# Patient Record
Sex: MALE | Employment: OTHER | URBAN - METROPOLITAN AREA
[De-identification: names, ages, dates, MRNs, and addresses within clinical notes are randomized per-mention and may not be internally consistent; named-entity substitution may affect disease eponyms.]

---

## 2018-01-24 ENCOUNTER — APPOINTMENT (OUTPATIENT)
Dept: GENERAL RADIOLOGY | Age: 58
DRG: 194 | End: 2018-01-24
Attending: EMERGENCY MEDICINE
Payer: MEDICAID

## 2018-01-24 ENCOUNTER — HOSPITAL ENCOUNTER (OUTPATIENT)
Age: 58
Setting detail: OBSERVATION
Discharge: HOME | DRG: 194 | End: 2018-01-26
Attending: EMERGENCY MEDICINE | Admitting: INTERNAL MEDICINE
Payer: MEDICAID

## 2018-01-24 DIAGNOSIS — I16.1 HYPERTENSIVE EMERGENCY: ICD-10-CM

## 2018-01-24 DIAGNOSIS — R07.9 ACUTE CHEST PAIN: ICD-10-CM

## 2018-01-24 DIAGNOSIS — I50.9 CONGESTIVE HEART FAILURE, UNSPECIFIED CONGESTIVE HEART FAILURE CHRONICITY, UNSPECIFIED CONGESTIVE HEART FAILURE TYPE: Primary | ICD-10-CM

## 2018-01-24 DIAGNOSIS — N17.9 ACUTE KIDNEY INJURY (HCC): ICD-10-CM

## 2018-01-24 LAB
ALBUMIN SERPL-MCNC: 3.2 G/DL (ref 3.4–5)
ALBUMIN/GLOB SERPL: 0.9 {RATIO} (ref 0.8–1.7)
ALP SERPL-CCNC: 81 U/L (ref 45–117)
ALT SERPL-CCNC: 67 U/L (ref 16–61)
ANION GAP SERPL CALC-SCNC: 10 MMOL/L (ref 3–18)
AST SERPL-CCNC: 32 U/L (ref 15–37)
BASOPHILS # BLD: 0 K/UL (ref 0–0.06)
BASOPHILS NFR BLD: 0 % (ref 0–2)
BILIRUB SERPL-MCNC: 0.6 MG/DL (ref 0.2–1)
BNP SERPL-MCNC: 1291 PG/ML (ref 0–900)
BUN SERPL-MCNC: 13 MG/DL (ref 7–18)
BUN/CREAT SERPL: 9 (ref 12–20)
CALCIUM SERPL-MCNC: 8.9 MG/DL (ref 8.5–10.1)
CHLORIDE SERPL-SCNC: 104 MMOL/L (ref 100–108)
CK MB CFR SERPL CALC: 1.3 % (ref 0–4)
CK MB CFR SERPL CALC: 1.9 % (ref 0–4)
CK MB CFR SERPL CALC: 2.1 % (ref 0–4)
CK MB SERPL-MCNC: 1.9 NG/ML (ref 5–25)
CK MB SERPL-MCNC: 2.1 NG/ML (ref 5–25)
CK MB SERPL-MCNC: 2.4 NG/ML (ref 5–25)
CK SERPL-CCNC: 100 U/L (ref 39–308)
CK SERPL-CCNC: 128 U/L (ref 39–308)
CK SERPL-CCNC: 147 U/L (ref 39–308)
CO2 SERPL-SCNC: 28 MMOL/L (ref 21–32)
CREAT SERPL-MCNC: 1.43 MG/DL (ref 0.6–1.3)
DIFFERENTIAL METHOD BLD: ABNORMAL
EOSINOPHIL # BLD: 0.2 K/UL (ref 0–0.4)
EOSINOPHIL NFR BLD: 3 % (ref 0–5)
ERYTHROCYTE [DISTWIDTH] IN BLOOD BY AUTOMATED COUNT: 15.1 % (ref 11.6–14.5)
EST. AVERAGE GLUCOSE BLD GHB EST-MCNC: 137 MG/DL
GLOBULIN SER CALC-MCNC: 3.7 G/DL (ref 2–4)
GLUCOSE BLD STRIP.AUTO-MCNC: 169 MG/DL (ref 70–110)
GLUCOSE BLD STRIP.AUTO-MCNC: 223 MG/DL (ref 70–110)
GLUCOSE BLD STRIP.AUTO-MCNC: 231 MG/DL (ref 70–110)
GLUCOSE SERPL-MCNC: 99 MG/DL (ref 74–99)
HBA1C MFR BLD: 6.4 % (ref 4.5–5.6)
HCT VFR BLD AUTO: 46.1 % (ref 36–48)
HGB BLD-MCNC: 14.8 G/DL (ref 13–16)
INR PPP: 1 (ref 0.8–1.2)
LYMPHOCYTES # BLD: 2 K/UL (ref 0.9–3.6)
LYMPHOCYTES NFR BLD: 30 % (ref 21–52)
MCH RBC QN AUTO: 29 PG (ref 24–34)
MCHC RBC AUTO-ENTMCNC: 32.1 G/DL (ref 31–37)
MCV RBC AUTO: 90.4 FL (ref 74–97)
MONOCYTES # BLD: 0.5 K/UL (ref 0.05–1.2)
MONOCYTES NFR BLD: 7 % (ref 3–10)
NEUTS SEG # BLD: 4 K/UL (ref 1.8–8)
NEUTS SEG NFR BLD: 60 % (ref 40–73)
PLATELET # BLD AUTO: 124 K/UL (ref 135–420)
PMV BLD AUTO: 13.3 FL (ref 9.2–11.8)
POTASSIUM SERPL-SCNC: 4.6 MMOL/L (ref 3.5–5.5)
PROT SERPL-MCNC: 6.9 G/DL (ref 6.4–8.2)
PROTHROMBIN TIME: 13 SEC (ref 11.5–15.2)
RBC # BLD AUTO: 5.1 M/UL (ref 4.7–5.5)
SODIUM SERPL-SCNC: 142 MMOL/L (ref 136–145)
TROPONIN I BLD-MCNC: <0.04 NG/ML (ref 0–0.08)
TROPONIN I SERPL-MCNC: 0.03 NG/ML (ref 0–0.06)
TROPONIN I SERPL-MCNC: <0.02 NG/ML (ref 0–0.06)
TROPONIN I SERPL-MCNC: <0.02 NG/ML (ref 0–0.06)
WBC # BLD AUTO: 6.7 K/UL (ref 4.6–13.2)

## 2018-01-24 PROCEDURE — 93005 ELECTROCARDIOGRAM TRACING: CPT

## 2018-01-24 PROCEDURE — 65660000000 HC RM CCU STEPDOWN

## 2018-01-24 PROCEDURE — 74011250636 HC RX REV CODE- 250/636: Performed by: EMERGENCY MEDICINE

## 2018-01-24 PROCEDURE — 74011250637 HC RX REV CODE- 250/637: Performed by: INTERNAL MEDICINE

## 2018-01-24 PROCEDURE — 74011636637 HC RX REV CODE- 636/637: Performed by: INTERNAL MEDICINE

## 2018-01-24 PROCEDURE — 74011250637 HC RX REV CODE- 250/637: Performed by: EMERGENCY MEDICINE

## 2018-01-24 PROCEDURE — 96376 TX/PRO/DX INJ SAME DRUG ADON: CPT

## 2018-01-24 PROCEDURE — 85610 PROTHROMBIN TIME: CPT | Performed by: EMERGENCY MEDICINE

## 2018-01-24 PROCEDURE — 99218 HC RM OBSERVATION: CPT

## 2018-01-24 PROCEDURE — 84484 ASSAY OF TROPONIN QUANT: CPT | Performed by: EMERGENCY MEDICINE

## 2018-01-24 PROCEDURE — 74011250636 HC RX REV CODE- 250/636: Performed by: INTERNAL MEDICINE

## 2018-01-24 PROCEDURE — 83880 ASSAY OF NATRIURETIC PEPTIDE: CPT | Performed by: EMERGENCY MEDICINE

## 2018-01-24 PROCEDURE — 85025 COMPLETE CBC W/AUTO DIFF WBC: CPT | Performed by: EMERGENCY MEDICINE

## 2018-01-24 PROCEDURE — 99285 EMERGENCY DEPT VISIT HI MDM: CPT

## 2018-01-24 PROCEDURE — 36415 COLL VENOUS BLD VENIPUNCTURE: CPT | Performed by: INTERNAL MEDICINE

## 2018-01-24 PROCEDURE — 96372 THER/PROPH/DIAG INJ SC/IM: CPT

## 2018-01-24 PROCEDURE — 80053 COMPREHEN METABOLIC PANEL: CPT | Performed by: EMERGENCY MEDICINE

## 2018-01-24 PROCEDURE — 82550 ASSAY OF CK (CPK): CPT | Performed by: EMERGENCY MEDICINE

## 2018-01-24 PROCEDURE — 71045 X-RAY EXAM CHEST 1 VIEW: CPT

## 2018-01-24 PROCEDURE — 83036 HEMOGLOBIN GLYCOSYLATED A1C: CPT | Performed by: INTERNAL MEDICINE

## 2018-01-24 PROCEDURE — C8929 TTE W OR WO FOL WCON,DOPPLER: HCPCS

## 2018-01-24 PROCEDURE — 96374 THER/PROPH/DIAG INJ IV PUSH: CPT

## 2018-01-24 PROCEDURE — 82962 GLUCOSE BLOOD TEST: CPT

## 2018-01-24 RX ORDER — OXYCODONE AND ACETAMINOPHEN 5; 325 MG/1; MG/1
2 TABLET ORAL
Status: COMPLETED | OUTPATIENT
Start: 2018-01-24 | End: 2018-01-24

## 2018-01-24 RX ORDER — SPIRONOLACTONE 25 MG/1
25 TABLET ORAL DAILY
Status: DISCONTINUED | OUTPATIENT
Start: 2018-01-24 | End: 2018-01-26 | Stop reason: HOSPADM

## 2018-01-24 RX ORDER — SPIRONOLACTONE 25 MG/1
25 TABLET ORAL DAILY
COMMUNITY
End: 2020-04-22

## 2018-01-24 RX ORDER — INSULIN LISPRO 100 [IU]/ML
INJECTION, SOLUTION INTRAVENOUS; SUBCUTANEOUS
Status: DISCONTINUED | OUTPATIENT
Start: 2018-01-24 | End: 2018-01-26 | Stop reason: HOSPADM

## 2018-01-24 RX ORDER — NITROGLYCERIN 0.4 MG/1
0.4 TABLET SUBLINGUAL
Status: COMPLETED | OUTPATIENT
Start: 2018-01-24 | End: 2018-01-24

## 2018-01-24 RX ORDER — FUROSEMIDE 10 MG/ML
40 INJECTION INTRAMUSCULAR; INTRAVENOUS
Status: COMPLETED | OUTPATIENT
Start: 2018-01-24 | End: 2018-01-24

## 2018-01-24 RX ORDER — FUROSEMIDE 10 MG/ML
40 INJECTION INTRAMUSCULAR; INTRAVENOUS EVERY 12 HOURS
Status: DISCONTINUED | OUTPATIENT
Start: 2018-01-24 | End: 2018-01-26 | Stop reason: HOSPADM

## 2018-01-24 RX ORDER — DEXTROSE 50 % IN WATER (D50W) INTRAVENOUS SYRINGE
25-50 AS NEEDED
Status: DISCONTINUED | OUTPATIENT
Start: 2018-01-24 | End: 2018-01-26 | Stop reason: HOSPADM

## 2018-01-24 RX ORDER — AMLODIPINE BESYLATE 5 MG/1
5 TABLET ORAL DAILY
Status: DISCONTINUED | OUTPATIENT
Start: 2018-01-24 | End: 2018-01-26 | Stop reason: HOSPADM

## 2018-01-24 RX ORDER — MAGNESIUM SULFATE 100 %
4 CRYSTALS MISCELLANEOUS AS NEEDED
Status: DISCONTINUED | OUTPATIENT
Start: 2018-01-24 | End: 2018-01-26 | Stop reason: HOSPADM

## 2018-01-24 RX ORDER — NICARDIPINE HYDROCHLORIDE 0.1 MG/ML
5-15 INJECTION INTRAVENOUS
Status: DISCONTINUED | OUTPATIENT
Start: 2018-01-24 | End: 2018-01-24

## 2018-01-24 RX ORDER — MINOXIDIL 2.5 MG/1
5 TABLET ORAL 2 TIMES DAILY
Status: DISCONTINUED | OUTPATIENT
Start: 2018-01-24 | End: 2018-01-26 | Stop reason: HOSPADM

## 2018-01-24 RX ORDER — CLONIDINE HYDROCHLORIDE 0.1 MG/1
0.2 TABLET ORAL 2 TIMES DAILY
Status: DISCONTINUED | OUTPATIENT
Start: 2018-01-24 | End: 2018-01-26 | Stop reason: HOSPADM

## 2018-01-24 RX ORDER — ASPIRIN 325 MG
325 TABLET ORAL
Status: COMPLETED | OUTPATIENT
Start: 2018-01-24 | End: 2018-01-24

## 2018-01-24 RX ORDER — HYDRALAZINE HYDROCHLORIDE 20 MG/ML
20 INJECTION INTRAMUSCULAR; INTRAVENOUS ONCE
Status: COMPLETED | OUTPATIENT
Start: 2018-01-24 | End: 2018-01-24

## 2018-01-24 RX ORDER — HEPARIN SODIUM 5000 [USP'U]/ML
5000 INJECTION, SOLUTION INTRAVENOUS; SUBCUTANEOUS EVERY 8 HOURS
Status: DISCONTINUED | OUTPATIENT
Start: 2018-01-24 | End: 2018-01-26 | Stop reason: HOSPADM

## 2018-01-24 RX ORDER — HYDRALAZINE HYDROCHLORIDE 20 MG/ML
20 INJECTION INTRAMUSCULAR; INTRAVENOUS
Status: DISCONTINUED | OUTPATIENT
Start: 2018-01-24 | End: 2018-01-26 | Stop reason: HOSPADM

## 2018-01-24 RX ADMIN — INSULIN LISPRO 4 UNITS: 100 INJECTION, SOLUTION INTRAVENOUS; SUBCUTANEOUS at 21:37

## 2018-01-24 RX ADMIN — HYDRALAZINE HYDROCHLORIDE 20 MG: 20 INJECTION INTRAMUSCULAR; INTRAVENOUS at 13:28

## 2018-01-24 RX ADMIN — CLONIDINE HYDROCHLORIDE 0.2 MG: 0.1 TABLET ORAL at 14:42

## 2018-01-24 RX ADMIN — NITROGLYCERIN 0.4 MG: 0.4 TABLET SUBLINGUAL at 08:25

## 2018-01-24 RX ADMIN — AMLODIPINE BESYLATE 5 MG: 5 TABLET ORAL at 14:42

## 2018-01-24 RX ADMIN — NITROGLYCERIN 1 INCH: 20 OINTMENT TOPICAL at 11:29

## 2018-01-24 RX ADMIN — ASPIRIN 325 MG ORAL TABLET 325 MG: 325 PILL ORAL at 08:24

## 2018-01-24 RX ADMIN — SPIRONOLACTONE 25 MG: 25 TABLET, FILM COATED ORAL at 14:42

## 2018-01-24 RX ADMIN — FUROSEMIDE 40 MG: 10 INJECTION, SOLUTION INTRAMUSCULAR; INTRAVENOUS at 09:19

## 2018-01-24 RX ADMIN — HEPARIN SODIUM 5000 UNITS: 5000 INJECTION, SOLUTION INTRAVENOUS; SUBCUTANEOUS at 21:37

## 2018-01-24 RX ADMIN — NITROGLYCERIN 0.4 MG: 0.4 TABLET SUBLINGUAL at 09:18

## 2018-01-24 RX ADMIN — INSULIN LISPRO 4 UNITS: 100 INJECTION, SOLUTION INTRAVENOUS; SUBCUTANEOUS at 17:22

## 2018-01-24 RX ADMIN — FUROSEMIDE 40 MG: 10 INJECTION, SOLUTION INTRAMUSCULAR; INTRAVENOUS at 21:36

## 2018-01-24 RX ADMIN — NITROGLYCERIN 1 INCH: 20 OINTMENT TOPICAL at 21:37

## 2018-01-24 RX ADMIN — MINOXIDIL 5 MG: 2.5 TABLET ORAL at 21:36

## 2018-01-24 RX ADMIN — OXYCODONE HYDROCHLORIDE AND ACETAMINOPHEN 2 TABLET: 5; 325 TABLET ORAL at 09:53

## 2018-01-24 RX ADMIN — CLONIDINE HYDROCHLORIDE 0.2 MG: 0.1 TABLET ORAL at 21:36

## 2018-01-24 NOTE — ED TRIAGE NOTES
Patient presents to ED by EMS from home residence with c/o of difficulty breathing and chest pain x1 week. EMS report admin x1 breathing treatment    Sepsis Screening completed    (  )Patient meets SIRS criteria. (X )Patient does not meet SIRS criteria.       SIRS Criteria is achieved when two or more of the following are present   Temperature < 96.8°F (36°C) or > 100.9°F (38.3°C)   Heart Rate > 90 beats per minute   Respiratory Rate > 20 breaths per minute   WBC count > 12,000 or <4,000 or > 10% bands

## 2018-01-24 NOTE — IP AVS SNAPSHOT
Sharif Jaeger [address] 
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 [address] 
[phone] [address] [facility] to continue managing your healthcare needs. [facility] [address] [place] [phone] Discharge Orders None A check brittany indicates which time of day the medication should be taken. My Medications CHANGE how you take these medications Instructions Each Dose to Equal  
 Morning Noon Evening Bedtime  
 oxyCODONE-acetaminophen 5-325 mg per tablet Commonly known as:  PERCOCET What changed:  how much to take Your last dose was: Your next dose is: Take 1 Tab by mouth every four (4) hours as needed. 1 Tab CONTINUE taking these medications Instructions Each Dose to Equal  
 Morning Noon Evening Bedtime  
 albuterol 90 mcg/actuation inhaler Commonly known as:  PROVENTIL HFA, VENTOLIN HFA, PROAIR HFA Your last dose was: Your next dose is: Take 1 Puff by inhalation. 1 puff in am and 1 puff in pm  
 1 Puff  
    
   
   
   
  
 amLODIPine 5 mg tablet Commonly known as:  Larissa Rutledge Your last dose was: Your next dose is: Take  by mouth daily. cloNIDine HCl 0.1 mg tablet Commonly known as:  CATAPRES Your last dose was: Your next dose is: Take  by mouth two (2) times a day. dimethicone 1 % topical cream  
   
Your last dose was: Your next dose is:    
   
   
 Apply  to affected area daily. glipiZIDE SR 5 mg CR tablet Commonly known as:  GLUCOTROL XL Your last dose was: Your next dose is: Take 1 Tab by mouth daily. 5 mg  
    
   
   
   
  
 hydrALAZINE 10 mg tablet Commonly known as:  APRESOLINE Your last dose was: Your next dose is: Take  by mouth. hydroCHLOROthiazide 25 mg tablet Commonly known as:  HYDRODIURIL Your last dose was: Your next dose is: Take  by mouth daily. metFORMIN 500 mg tablet Commonly known as:  GLUCOPHAGE Your last dose was: Your next dose is: Take 2 Tabs by mouth two (2) times daily (with meals). 1000 mg Oxygen Your last dose was: Your next dose is:    
   
   
 Indications: 4L  
     
   
   
   
  
 spironolactone 25 mg tablet Commonly known as:  ALDACTONE Your last dose was: Your next dose is: Take 25 mg by mouth daily. 25 mg Discharge Instructions Chest Pain: Care Instructions Your Care Instructions There are many things that can cause chest pain. Some are not serious and will get better on their own in a few days. But some kinds of chest pain need more testing and treatment. Your doctor may have recommended a follow-up visit in the next 8 to 12 hours. If you are not getting better, you may need more tests or treatment. Even though your doctor has released you, you still need to watch for any problems. The doctor carefully checked you, but sometimes problems can develop later. If you have new symptoms or if your symptoms do not get better, get medical care right away. If you have worse or different chest pain or pressure that lasts more than 5 minutes or you passed out (lost consciousness), call 911 or seek other emergency help right away. A medical visit is only one step in your treatment. Even if you feel better, you still need to do what your doctor recommends, such as going to all suggested follow-up appointments and taking medicines exactly as directed. This will help you recover and help prevent future problems. How can you care for yourself at home? · Rest until you feel better. · Take your medicine exactly as prescribed. Call your doctor if you think you are having a problem with your medicine. · Do not drive after taking a prescription pain medicine. When should you call for help? Call 911 if: 
? · You passed out (lost consciousness). ? · You have severe difficulty breathing. ? · You have symptoms of a heart attack. These may include: ¨ Chest pain or pressure, or a strange feeling in your chest. 
¨ Sweating. ¨ Shortness of breath. ¨ Nausea or vomiting. ¨ Pain, pressure, or a strange feeling in your back, neck, jaw, or upper belly or in one or both shoulders or arms. ¨ Lightheadedness or sudden weakness. ¨ A fast or irregular heartbeat. After you call 911, the  may tell you to chew 1 adult-strength or 2 to 4 low-dose aspirin. Wait for an ambulance. Do not try to drive yourself. ?Call your doctor today if: 
? · You have any trouble breathing. ? · Your chest pain gets worse. ? · You are dizzy or lightheaded, or you feel like you may faint. ? · You are not getting better as expected. ? · You are having new or different chest pain. Where can you learn more? Go to http://bhargav-mily.info/. Enter A120 in the search box to learn more about \"Chest Pain: Care Instructions. \" Current as of: March 20, 2017 Content Version: 11.4 © 1336-6955 [employer]. Care instructions adapted under license by [employer] (which disclaims liability or warranty for this information). If you have questions about a medical condition or this instruction, always ask your healthcare professional. [place] [address] any warranty or liability for your use of this information. Musculoskeletal Chest Pain: Care Instructions Your Care Instructions Chest pain is not always a sign that something is wrong with your heart or that you have another serious problem. The doctor thinks your chest pain is caused by strained muscles or ligaments, inflamed chest cartilage, or another problem in your chest, rather than by your heart. You may need more tests to find the cause of your chest pain. Follow-up care is a key part of your treatment and safety. Be sure to make and go to all appointments, and call your doctor if you are having problems. It's also a good idea to know your test results and keep a list of the medicines you take. How can you care for yourself at home? · Take pain medicines exactly as directed. ¨ If the doctor gave you a prescription medicine for pain, take it as prescribed. ¨ If you are not taking a prescription pain medicine, ask your doctor if you can take an over-the-counter medicine. · Rest and protect the sore area. · Stop, change, or take a break from any activity that may be causing your pain or soreness. · Put ice or a cold pack on the sore area for 10 to 20 minutes at a time. Try to do this every 1 to 2 hours for the next 3 days (when you are awake) or until the swelling goes down. Put a thin cloth between the ice and your skin.  
· After 2 or 3 days, apply a heating pad set on low or a warm cloth to the area that hurts. Some doctors suggest that you go back and forth between hot and cold. · Do not wrap or tape your ribs for support. This may cause you to take smaller breaths, which could increase your risk of lung problems. · Mentholated creams such as Bengay or Icy Hot may soothe sore muscles. Follow the instructions on the package. · Follow your doctor's instructions for exercising. · Gentle stretching and massage may help you get better faster. Stretch slowly to the point just before pain begins, and hold the stretch for at least 15 to 30 seconds. Do this 3 or 4 times a day. Stretch just after you have applied heat. · As your pain gets better, slowly return to your normal activities. Any increased pain may be a sign that you need to rest a while longer. When should you call for help? Call 911 anytime you think you may need emergency care. For example, call if: 
? · You have chest pain or pressure. This may occur with: ¨ Sweating. ¨ Shortness of breath. ¨ Nausea or vomiting. ¨ Pain that spreads from the chest to the neck, jaw, or one or both shoulders or arms. ¨ Dizziness or lightheadedness. ¨ A fast or uneven pulse. After calling 911, chew 1 adult-strength aspirin. Wait for an ambulance. Do not try to drive yourself. ? · You have sudden chest pain and shortness of breath, or you cough up blood. ?Call your doctor now or seek immediate medical care if: 
? · You have any trouble breathing. ? · Your chest pain gets worse. ? · Your chest pain occurs consistently with exercise and is relieved by rest. ? Watch closely for changes in your health, and be sure to contact your doctor if: 
? · Your chest pain does not get better after 1 week. Where can you learn more? Go to http://bhargav-mily.info/. Enter V293 in the search box to learn more about \"Musculoskeletal Chest Pain: Care Instructions. \" Current as of: March 20, 2017 Content Version: 11.4 © 4825-6065 Healthwise[employer] Inc[employer]. Care instructions adapted under license by [employer] (which disclaims liability or warranty for this information). If you have questions about a medical condition or this instruction, always ask your healthcare professional. [address] any warranty or liability for your use of this information. Learning About Saving Energy When You Have a Chronic Condition Introduction Everyday tasks can be tiring when you have COPD, heart failure, or another long-term (chronic) condition. You may feel at times that you've lost your ability to live your life. But learning to conserve, or save, your energy can help you be less tired. Conserving your energy means finding ways of doing daily activities with as little effort as possible. With some small changes in the way you do things, you can get your tasks done more easily. Some treatments are available that might help. Pulmonary rehabilitation can teach you ways to breathe easier. Cardiac rehabilitation can help make your heart stronger. You also may want to see an occupational or physical therapist. The therapist can give you more tips on building strength and moving with less effort. What can you do to conserve your energy? Planning · Make a list of what you have to do every day. Group the tasks by location. · Do all the chores in one part of your house around the same time. · Go out for errands or do chores at the time of day when you have the most energy. · Plan rest periods into your day. Getting things done · Sit down as often as you can when you get dressed, do chores, or cook. · Use a cart with wheels to roll items, such as laundry, from one room to another. · Push or slide boxes or other large items instead of lifting them. Reaching and bending · Put things you use the most on shelves that are at the level of your waist or shoulder. · Use long-handled grabbers or other tools to reach items on a high shelf or to  things off the floor. Use long-handled dusters when you clean the house. · Use a raised toilet seat to avoid bending too far to sit or stand up. Eating · Eat several small meals instead of three larger meals. · If you get too tired to eat much, try to choose healthy foods that have more calories. Have a yogurt-and-fruit smoothie for breakfast. Put avocado on a sandwich. Or add cheese or peanut butter to snacks. · If you don't feel very hungry, try to eat first and drink water or other fluids later, after a meal. This can help keep you from losing weight. Sip small amounts of fluids if you need to drink while you eat. Having sex · Choose the time of day when you have more energy. · A vwse-ne-gyph position for sex can be less tiring. Sometimes you may want to focus more on caressing. Watch closely for changes in your health, and be sure to contact your doctor if you have any problems. Where can you learn more? Go to http://bhargav-mily.info/. Enter H190 in the search box to learn more about \"Learning About Saving Energy When You Have a Chronic Condition. \" Current as of: May 12, 2017 Content Version: 11.4 © 2958-5058 [employer]. Care instructions adapted under license by [employer] (which disclaims liability or warranty for this information). If you have questions about a medical condition or this instruction, always ask your healthcare professional. [place] [address] any warranty or liability for your use of this information. Heart Failure: Care Instructions Your Care Instructions Heart failure occurs when your heart does not pump as much blood as the body needs. Failure does not mean that the heart has stopped pumping but rather that it is not pumping as well as it should.  Over time, this causes fluid buildup in your lungs and other parts of your body. Fluid buildup can cause shortness of breath, fatigue, swollen ankles, and other problems. By taking medicines regularly, reducing sodium (salt) in your diet, checking your weight every day, and making lifestyle changes, you can feel better and live longer. Follow-up care is a key part of your treatment and safety. Be sure to make and go to all appointments, and call your doctor if you are having problems. It's also a good idea to know your test results and keep a list of the medicines you take. How can you care for yourself at home? Medicines ? · Be safe with medicines. Take your medicines exactly as prescribed. Call your doctor if you think you are having a problem with your medicine. ? · Do not take any vitamins, over-the-counter medicine, or herbal products without talking to your doctor first. Diana Hopeical not take ibuprofen (Advil or Motrin) and naproxen (Aleve) without talking to your doctor first. They could make your heart failure worse. ? · You may be taking some of the following medicine. ¨ Beta-blockers can slow heart rate, decrease blood pressure, and improve your condition. Taking a beta-blocker may lower your chance of needing to be hospitalized. ¨ Angiotensin-converting enzyme inhibitors (ACEIs) reduce the heart's workload, lower blood pressure, and reduce swelling. Taking an ACEI may lower your chance of needing to be hospitalized again. ¨ Angiotensin II receptor blockers (ARBs) work like ACEIs. Your doctor may prescribe them instead of ACEIs. ¨ Diuretics, also called water pills, reduce swelling. ¨ Potassium supplements replace this important mineral, which is sometimes lost with diuretics. ¨ Aspirin and other blood thinners prevent blood clots, which can cause a stroke or heart attack. ? You will get more details on the specific medicines your doctor prescribes. Diet ? · Your doctor may suggest that you limit sodium to 2,000 milligrams (mg) a day or less. That is less than 1 teaspoon of salt a day, including all the salt you eat in cooking or in packaged foods. People get most of their sodium from processed foods. Fast food and restaurant meals also tend to be very high in sodium. ? · Ask your doctor how much liquid you can drink each day. You may have to limit liquids. ?Weight ? · Weigh yourself without clothing at the same time each day. Record your weight. Call your doctor if you have a sudden weight gain, such as more than 2 to 3 pounds in a day or 5 pounds in a week. (Your doctor may suggest a different range of weight gain.) A sudden weight gain may mean that your heart failure is getting worse. ? Activity level ? · Start light exercise (if your doctor says it is okay). Even if you can only do a small amount, exercise will help you get stronger, have more energy, and manage your weight and your stress. Walking is an easy way to get exercise. Start out by walking a little more than you did before. Bit by bit, increase the amount you walk. ? · When you exercise, watch for signs that your heart is working too hard. You are pushing yourself too hard if you cannot talk while you are exercising. If you become short of breath or dizzy or have chest pain, stop, sit down, and rest.  
? · If you feel \"wiped out\" the day after you exercise, walk slower or for a shorter distance until you can work up to a better pace. ? · Get enough rest at night. Sleeping with 1 or 2 pillows under your upper body and head may help you breathe easier. ? Lifestyle changes ? · Do not smoke. Smoking can make a heart condition worse. If you need help quitting, talk to your doctor about stop-smoking programs and medicines. These can increase your chances of quitting for good. Quitting smoking may be the most important step you can take to protect your heart. ? · Limit alcohol to 2 drinks a day for men and 1 drink a day for women. Too much alcohol can cause health problems. ? · Avoid getting sick from colds and the flu. Get a pneumococcal vaccine shot. If you have had one before, ask your doctor whether you need another dose. Get a flu shot each year. If you must be around people with colds or the flu, wash your hands often. When should you call for help? Call 911 if you have symptoms of sudden heart failure such as: 
? · You have severe trouble breathing. ? · You cough up pink, foamy mucus. ? · You have a new irregular or rapid heartbeat. ?Call your doctor now or seek immediate medical care if: 
? · You have new or increased shortness of breath. ? · You are dizzy or lightheaded, or you feel like you may faint. ? · You have sudden weight gain, such as more than 2 to 3 pounds in a day or 5 pounds in a week. (Your doctor may suggest a different range of weight gain.) ? · You have increased swelling in your legs, ankles, or feet. ? · You are suddenly so tired or weak that you cannot do your usual activities. ? Watch closely for changes in your health, and be sure to contact your doctor if you develop new symptoms. Where can you learn more? Go to http://bhargav-mily.info/. Enter G449 in the search box to learn more about \"Heart Failure: Care Instructions. \" Current as of: September 21, 2016 Content Version: 11.4 © 6245-1382 Flapshare. Care instructions adapted under license by [employer] (which disclaims liability or warranty for this information). If you have questions about a medical condition or this instruction, always ask your healthcare professional. [place] [address] any warranty or liability for your use of this information. [employer] Announcement  We are excited to announce that we are making your provider's discharge notes available to you in [email or web address]. You will see these notes when they are completed and signed by the physician that discharged you from your recent hospital stay. If you have any questions or concerns about any information you see in [email or web address], please call the Health Information Department where you were seen or reach out to your Primary Care Provider for more information about your plan of care. Introducing [facility] [place] [facility]! Himanshu Walton introduces [email or web address] patient portal. Now you can access parts of your medical record, email your doctor's office, and request medication refills online. 1. In your internet browser, go to [email or web address] 2. Click on the First Time User? Click Here link in the Sign In box. You will see the New Member Sign Up page. 3. Enter your [email or web address] Access Code exactly as it appears below. You will not need to use this code after youve completed the sign-up process. If you do not sign up before the expiration date, you must request a new code. · [email or web address] Access Code: [email or web address] Expires: 4/26/2018  2:04 PM 
 
4. Enter the last four digits of your Social Security Number (xxxx) and Date of Birth (mm/dd/yyyy) as indicated and click Submit. You will be taken to the next sign-up page. 5. Create a [email or web address] ID. This will be your [email or web address] login ID and cannot be changed, so think of one that is secure and easy to remember. 6. Create a [email or web address] password. You can change your password at any time. 7. Enter your Password Reset Question and Answer. This can be used at a later time if you forget your password. 8. Enter your e-mail address. You will receive e-mail notification when new information is available in [address] 9. Click Sign Up. You can now view and download portions of your medical record. 10. Click the Download Summary menu link to download a portable copy of your medical information. If you have questions, please visit the Frequently Asked Questions section of the MyChart website. Remember, Zliot is NOT to be used for urgent needs. For medical emergencies, dial 911. Now available from your iPhone and Android! Unresulted Labs-Please follow up with your PCP about these lab tests Order Current Status EKG, 12 LEAD, INITIAL Preliminary result EKG, 12 LEAD, SUBSEQUENT Preliminary result Providers Seen During Your Hospitalization Provider Specialty Primary office phone Tamika Oconnell MD Emergency Medicine [phone] Chuckie Dejesus MD Internal Medicine [phone] Your Primary Care Physician (PCP) Primary Care Physician Office Phone Office Fax Nyasia Moore, [address] [facility] [phone] You are allergic to the following Allergen Reactions Gabapentin Hives Lisinopril Swelling Tramadol Hives Recent Documentation Height Weight BMI Smoking Status 1.702 m (!) 166.3 kg 57.42 kg/m2 Former Smoker Emergency Contacts Name Discharge Info Relation Home Work Mobile Larissa Shahid DISCHARGE CAREGIVER [3] Spouse [3] [phone] Patient Belongings The following personal items are in your possession at time of discharge: 
  Dental Appliances: None  Visual Aid: None      Home Medications: None   Jewelry: None  Clothing: At bedside    Other Valuables: Radha Tarango Please provide this summary of care documentation to your next provider. Signatures-by signing, you are acknowledging that this After Visit Summary has been reviewed with you and you have received a copy. Patient Signature:  ____________________________________________________________ Date:  ____________________________________________________________  
  
Jackelyn Dumont Provider Signature:  ____________________________________________________________ Date:  ____________________________________________________________

## 2018-01-24 NOTE — PROGRESS NOTES
Indicates he is on Percocet 10/325 q 4 hrs. Tells Dr. Denton Mtz has rx'd this to him. I happen to know this physician has not been in practice some time now. RN called pharmacy to confirm he is getting or not getting this medication. Pharmacy told RN they last filled opioid pain medication August 2017. No prescriptions since then. Thus, patient not on this medication and may be demonstrating drug seeking behavior. With that said with today's \"Opioid Epidemic,\" afoot, I will not to supply this gentleman with opiod / narcotic pain meds.   D/W RN.    Dr. Colleen Mello

## 2018-01-24 NOTE — IP AVS SNAPSHOT
[address] [address] 
[phone] Patient: Sebastián Campa MRN: [id number] A check brittany indicates which time of day the medication should be taken. My Medications CHANGE how you take these medications Instructions Each Dose to Equal  
 Morning Noon Evening Bedtime  
 oxyCODONE-acetaminophen 5-325 mg per tablet Commonly known as:  PERCOCET What changed:  how much to take Your last dose was: Your next dose is: Take 1 Tab by mouth every four (4) hours as needed. 1 Tab CONTINUE taking these medications Instructions Each Dose to Equal  
 Morning Noon Evening Bedtime  
 albuterol 90 mcg/actuation inhaler Commonly known as:  PROVENTIL HFA, VENTOLIN HFA, PROAIR HFA Your last dose was: Your next dose is: Take 1 Puff by inhalation. 1 puff in am and 1 puff in pm  
 1 Puff  
    
   
   
   
  
 amLODIPine 5 mg tablet Commonly known as:  Lety Gant Your last dose was: Your next dose is: Take  by mouth daily. cloNIDine HCl 0.1 mg tablet Commonly known as:  CATAPRES Your last dose was: Your next dose is: Take  by mouth two (2) times a day. dimethicone 1 % topical cream  
   
Your last dose was: Your next dose is:    
   
   
 Apply  to affected area daily. glipiZIDE SR 5 mg CR tablet Commonly known as:  GLUCOTROL XL Your last dose was: Your next dose is: Take 1 Tab by mouth daily. 5 mg  
    
   
   
   
  
 hydrALAZINE 10 mg tablet Commonly known as:  APRESOLINE Your last dose was: Your next dose is: Take  by mouth. hydroCHLOROthiazide 25 mg tablet Commonly known as:  HYDRODIURIL Your last dose was: Your next dose is: Take  by mouth daily. metFORMIN 500 mg tablet Commonly known as:  GLUCOPHAGE Your last dose was: Your next dose is: Take 2 Tabs by mouth two (2) times daily (with meals). 1000 mg Oxygen Your last dose was: Your next dose is:    
   
   
 Indications: 4L  
     
   
   
   
  
 spironolactone 25 mg tablet Commonly known as:  ALDACTONE Your last dose was: Your next dose is: Take 25 mg by mouth daily.   
 25 mg

## 2018-01-24 NOTE — PROGRESS NOTES
Readmission Risk Assessment: Low Risk and [facility] patients    RRAT Score: 1 - 12    Initial Assessment:Emergency Contact: chart reviewed pt came to ED with c/o SOB and chest pain,admitted for CHF, pt states he lives at home alone uses [facility] for his doctors appointments,uses home 02 which pt states he's had the same equipment for over 10 years requesting for a portable machine,pt has home concentrator and 2 tanks on wheels which he does use outside the home,pt states he does have a sister that works at [employer] and lives close to him which she checks on,uses walking cane for ambulation, home C-Pap machine,at this time pt does not recall supply company name,appears to be anxious, due to SOB while talking best for pt to rest at this time,nurse at bedside,pt waiting on available bed,unit cm will follow up on d/c planning. Pertinent Medical Hx: see chart  PCP/Specialists: [facility]: pt stated     DME: C-pap,walking cane, home 02    Low Risk Care Transition Plan:  1. Evaluate for [facility] or [address] coordination of resources  2. Involve patient/caregiver in assessment, planning, education and implement of intervention. 3. CM daily patient care huddles/interdisciplinary rounds. 4. PCP/Specialist appointment within 7 - 10 days made prior to discharge. 5. Facilitate transportation and logistics for follow-up appointments. 6. Handoff to [address] Nurse Navigator or PCP practice.

## 2018-01-24 NOTE — ROUTINE PROCESS
Pt care received. Pt A/O x4. Pt resting in bed. C/O 10/10 neuropathy pain in legs. Dr Vineet Almeida paged for pain rx. Pt stable. Call light within reach, bed in lowest position and locked.

## 2018-01-24 NOTE — ROUTINE PROCESS
Bedside and Verbal shift change report given to Mario Mooney RN (oncoming nurse) by Fam Durán RN   (offgoing nurse). Report included the following information SBAR, Kardex, Intake/Output and MAR.

## 2018-01-24 NOTE — ED NOTES
Notified Dr. Luther Ha about patient's BP trends of 190s-200s/130s. Orders obtained to start rx Nicardipine drip at 5mg/hr. Per MD, new orders will be applied to drip as necessary.

## 2018-01-24 NOTE — H&P
[facility]YS[facility]      Danyelle De Oliveira  MR#: [id number]  : 1960  ACCOUNT #: [de-identified]   ADMIT DATE: 2018    PRIMARY CARE PHYSICIAN:  Dr. Ary Casas:  Chest pain. HISTORY OF PRESENT ILLNESS:  The patient is a 38-year-old gentleman. He is morbidly obese, and has a history of CHF and COPD. He was recently seen at [facility] for some chest pain. It seems like he had a few sets of cardiac enzymes and was discharged. He describes the chest pain as left-sided. He tells that it hurts when he coughs, touches it or moves. He has noted his blood pressure to be higher than usual.  He indicates that he takes all of his blood pressure medications without fail always. He took them today as well. In the ER, he was found to be in an acute exacerbation of CHF. Review of the record shows an echocardiogram completed at this institution in  that demonstrated diastolic congestive heart failure. In the ER, his CBC was checked and was normal.  His metabolic panel showed a mild increase in serum creatinine of 1.43. ProBNP was abnormally elevated at 1291. His chest x-ray demonstrated cardiomegaly. No other acute process. His first set of cardiac enzymes were unrevealing. Blood pressures were significantly elevated in the ER. He has received nitroglycerin. A nicardipine infusion has been ordered. Given the remarkably high blood pressure in the face of reported medication compliance, a duplex renal artery/vein bilateral has been ordered to examine him for the possibility of renal artery stenosis. An echocardiogram has been ordered as well. I saw the patient at bedside with Dr. Aspen Herrera, and also Dr. Liberty Aguilar of cardiology. The plan at this time is to bring him in for evaluation. We will be working on his CHF and his hypertension. We will follow up on his chest pain.   At present time, given the pleuritic nature of the chest pain, it would not appear to be cardiac. PAST MEDICAL HISTORY:  Morbid obesity, hypertension, dyslipidemia, lower extremity neuropathy, on chronic opiate therapy, CHF diastolic chronic, diabetes type 2, former smoker. SOCIAL HISTORY:  No alcohol. Denies drug use. He is a former smoker. Probably smoked about 30 years or so, put it away. FAMILY HISTORY:  Hypertension, congestive heart failure, diabetes, obesity. MEDICATIONS:  Spironolactone 25 mg daily, Percocet 1 tablet every 4 hours as needed, metformin 1000 mg twice a day, glipizide XL 5 mg daily, amlodipine 5 mg daily, clonidine 0.1 mg twice a day, hydrochlorothiazide 25 mg daily, hydralazine 10 mg tablet, albuterol as needed. MEDICATION ALLERGIES:  GABAPENTIN, LISINOPRIL, TRAMADOL. ALLEGEDLY HAS HIVES WITH TRAMADOL AND GABAPENTIN. NATURALLY TOLERATES THE OPIATES. REVIEW OF SYSTEMS:  CONSTITUTIONAL:  No fever or chills. HEENT:  No headache, no blurred vision. CARDIOVASCULAR:  No chest pain or palpitations. RESPIRATORY:  Positive for some shortness of breath. He has a slight cough. He tells he has chronic breathing problems that he attributes to COPD. He tells he is on disability for his breathing problems. GASTROINTESTINAL:  No nausea, vomiting, no abdominal pain. MUSCULOSKELETAL:  No myalgias or arthralgias. SKIN:  No rash, no itching. NEUROLOGIC:  No focal neurological deficits. PHYSICAL EXAMINATION:  VITAL SIGNS:  Blood pressure 183/127, pulse 70, respiratory rate 13, O2 98% on 4 L. Height 5 foot 7 inches, weight 360 pounds. GENERAL:  No apparent distress. HEENT:  PERRLA. EOMI. NECK:  Rather thick. LUNGS:  Rales are in the bases bilaterally. No distress. HEART:  S1, S2, regular rate and rhythm. ABDOMEN:  Soft, nontender, obese. Bowel sounds present. EXTREMITIES:  2+ edema. NEUROLOGIC:  Intact. Moves all 4 extremities. Speech is fluent. No facial droop. LABORATORY RESULTS:  White blood cell count 6.7, hemoglobin 14.8. Sodium 142, potassium 4.6, BUN 13, creatinine 1.43, albumin 3.2. Cardiac enzymes negative. ProBNP 1291. DIAGNOSTIC DATA:  Chest x-ray, cardiomegaly. EKG today, 76 beats per minute, normal sinus rhythm, left ventricular hypertrophy, T-wave abnormality. ASSESSMENT:  1. Congestive heart failure, diastolic, acute. 2.  Atypical chest pain. 3.  Remarkably difficult to control blood pressure in the face of reported medication compliance. 4.  Rule out renal artery stenosis. 5.  Super morbid obesity. 6.  Chronic obstructive pulmonary disease. 7.  Diabetes with neuropathy. 8.  Chronic pain, on chronic opioid therapy. PLAN:  1. Admit. 2.  Cardiac monitoring. 3.  Check 2 more sets of cardiac enzymes. 4.  Echocardiogram.  5.  Renal artery and vein ultrasound to assess for renal artery stenosis. 6.  Cardiology consultation. 7.  Diuresis with furosemide. 8.  Blood pressure control. 9.  DVT and GI prophylaxis. 10.  Oxygen as needed to maintain saturations greater than 90%. 11.  Disposition dependent upon response to therapy.       MD AMBER Moralez/HARSHAD  D: 01/24/2018 10:55     T: 01/24/2018 11:36  JOB #: [id number]
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
## 2018-01-24 NOTE — ROUTINE PROCESS
Echo attempted at 3:00 pm, but patient refused at that time due to just arriving to room and getting settled in. Patient has agreed to getting echo done first thing tomorrow morning (01/25/2018).

## 2018-01-24 NOTE — ED NOTES
Patient reports no relief with administration of rx Nitro, see SHIVANI Saravia MD aware. Orders received to HOLD additional dose.
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
## 2018-01-24 NOTE — ED NOTES
Patient reports continued chest pain. Subsequent EKG obtained. Patient continues to report chest pain despite admin second dose rx Nitro. Rani VILLEDA notified. Admin rx Lasix, see MAR. Rx Nicardipine drip HELD per MD orders at this time.

## 2018-01-25 PROBLEM — R07.9 CHEST PAIN: Status: ACTIVE | Noted: 2018-01-25

## 2018-01-25 PROBLEM — I50.43 ACUTE ON CHRONIC COMBINED SYSTOLIC AND DIASTOLIC ACC/AHA STAGE C CONGESTIVE HEART FAILURE (HCC): Status: ACTIVE | Noted: 2018-01-25

## 2018-01-25 LAB
ANION GAP SERPL CALC-SCNC: 7 MMOL/L (ref 3–18)
BUN SERPL-MCNC: 17 MG/DL (ref 7–18)
BUN/CREAT SERPL: 11 (ref 12–20)
CALCIUM SERPL-MCNC: 9.4 MG/DL (ref 8.5–10.1)
CHLORIDE SERPL-SCNC: 101 MMOL/L (ref 100–108)
CHOLEST SERPL-MCNC: 196 MG/DL
CO2 SERPL-SCNC: 33 MMOL/L (ref 21–32)
CREAT SERPL-MCNC: 1.48 MG/DL (ref 0.6–1.3)
ERYTHROCYTE [DISTWIDTH] IN BLOOD BY AUTOMATED COUNT: 15.1 % (ref 11.6–14.5)
GLUCOSE BLD STRIP.AUTO-MCNC: 105 MG/DL (ref 70–110)
GLUCOSE BLD STRIP.AUTO-MCNC: 115 MG/DL (ref 70–110)
GLUCOSE BLD STRIP.AUTO-MCNC: 193 MG/DL (ref 70–110)
GLUCOSE BLD STRIP.AUTO-MCNC: 215 MG/DL (ref 70–110)
GLUCOSE SERPL-MCNC: 134 MG/DL (ref 74–99)
HCT VFR BLD AUTO: 46 % (ref 36–48)
HDLC SERPL-MCNC: 64 MG/DL (ref 40–60)
HDLC SERPL: 3.1 {RATIO} (ref 0–5)
HGB BLD-MCNC: 14.7 G/DL (ref 13–16)
LDLC SERPL CALC-MCNC: 121.2 MG/DL (ref 0–100)
LIPID PROFILE,FLP: ABNORMAL
MCH RBC QN AUTO: 29 PG (ref 24–34)
MCHC RBC AUTO-ENTMCNC: 32 G/DL (ref 31–37)
MCV RBC AUTO: 90.7 FL (ref 74–97)
PLATELET # BLD AUTO: 160 K/UL (ref 135–420)
PMV BLD AUTO: 13.1 FL (ref 9.2–11.8)
POTASSIUM SERPL-SCNC: 4.2 MMOL/L (ref 3.5–5.5)
RBC # BLD AUTO: 5.07 M/UL (ref 4.7–5.5)
SODIUM SERPL-SCNC: 141 MMOL/L (ref 136–145)
TRIGL SERPL-MCNC: 54 MG/DL (ref ?–150)
TSH SERPL DL<=0.05 MIU/L-ACNC: 0.14 UIU/ML (ref 0.36–3.74)
VLDLC SERPL CALC-MCNC: 10.8 MG/DL
WBC # BLD AUTO: 8.2 K/UL (ref 4.6–13.2)

## 2018-01-25 PROCEDURE — 84443 ASSAY THYROID STIM HORMONE: CPT | Performed by: INTERNAL MEDICINE

## 2018-01-25 PROCEDURE — 74011636637 HC RX REV CODE- 636/637: Performed by: INTERNAL MEDICINE

## 2018-01-25 PROCEDURE — 99218 HC RM OBSERVATION: CPT

## 2018-01-25 PROCEDURE — 36415 COLL VENOUS BLD VENIPUNCTURE: CPT | Performed by: INTERNAL MEDICINE

## 2018-01-25 PROCEDURE — 82962 GLUCOSE BLOOD TEST: CPT

## 2018-01-25 PROCEDURE — 80048 BASIC METABOLIC PNL TOTAL CA: CPT | Performed by: INTERNAL MEDICINE

## 2018-01-25 PROCEDURE — 74011250636 HC RX REV CODE- 250/636: Performed by: INTERNAL MEDICINE

## 2018-01-25 PROCEDURE — 74011250637 HC RX REV CODE- 250/637: Performed by: INTERNAL MEDICINE

## 2018-01-25 PROCEDURE — 93975 VASCULAR STUDY: CPT

## 2018-01-25 PROCEDURE — 96376 TX/PRO/DX INJ SAME DRUG ADON: CPT

## 2018-01-25 PROCEDURE — 77010033678 HC OXYGEN DAILY

## 2018-01-25 PROCEDURE — 96372 THER/PROPH/DIAG INJ SC/IM: CPT

## 2018-01-25 PROCEDURE — 74011000250 HC RX REV CODE- 250: Performed by: INTERNAL MEDICINE

## 2018-01-25 PROCEDURE — 65660000000 HC RM CCU STEPDOWN

## 2018-01-25 PROCEDURE — 80061 LIPID PANEL: CPT | Performed by: INTERNAL MEDICINE

## 2018-01-25 PROCEDURE — 85027 COMPLETE CBC AUTOMATED: CPT | Performed by: INTERNAL MEDICINE

## 2018-01-25 RX ORDER — ACETAMINOPHEN 325 MG/1
650 TABLET ORAL
Status: DISCONTINUED | OUTPATIENT
Start: 2018-01-25 | End: 2018-01-26 | Stop reason: HOSPADM

## 2018-01-25 RX ADMIN — INSULIN LISPRO 4 UNITS: 100 INJECTION, SOLUTION INTRAVENOUS; SUBCUTANEOUS at 06:45

## 2018-01-25 RX ADMIN — SPIRONOLACTONE 25 MG: 25 TABLET, FILM COATED ORAL at 08:28

## 2018-01-25 RX ADMIN — AMLODIPINE BESYLATE 5 MG: 5 TABLET ORAL at 08:28

## 2018-01-25 RX ADMIN — HEPARIN SODIUM 5000 UNITS: 5000 INJECTION, SOLUTION INTRAVENOUS; SUBCUTANEOUS at 14:51

## 2018-01-25 RX ADMIN — FUROSEMIDE 40 MG: 10 INJECTION, SOLUTION INTRAMUSCULAR; INTRAVENOUS at 21:55

## 2018-01-25 RX ADMIN — SODIUM CHLORIDE 1 ML: 9 INJECTION INTRAMUSCULAR; INTRAVENOUS; SUBCUTANEOUS at 09:15

## 2018-01-25 RX ADMIN — ACETAMINOPHEN 650 MG: 325 TABLET ORAL at 19:56

## 2018-01-25 RX ADMIN — CLONIDINE HYDROCHLORIDE 0.2 MG: 0.1 TABLET ORAL at 08:28

## 2018-01-25 RX ADMIN — HEPARIN SODIUM 5000 UNITS: 5000 INJECTION, SOLUTION INTRAVENOUS; SUBCUTANEOUS at 05:30

## 2018-01-25 RX ADMIN — CLONIDINE HYDROCHLORIDE 0.2 MG: 0.1 TABLET ORAL at 21:55

## 2018-01-25 RX ADMIN — FUROSEMIDE 40 MG: 10 INJECTION, SOLUTION INTRAMUSCULAR; INTRAVENOUS at 08:28

## 2018-01-25 RX ADMIN — NITROGLYCERIN 1 INCH: 20 OINTMENT TOPICAL at 21:55

## 2018-01-25 RX ADMIN — INSULIN LISPRO 2 UNITS: 100 INJECTION, SOLUTION INTRAVENOUS; SUBCUTANEOUS at 16:56

## 2018-01-25 RX ADMIN — HEPARIN SODIUM 5000 UNITS: 5000 INJECTION, SOLUTION INTRAVENOUS; SUBCUTANEOUS at 21:54

## 2018-01-25 RX ADMIN — MINOXIDIL 5 MG: 2.5 TABLET ORAL at 21:55

## 2018-01-25 RX ADMIN — MINOXIDIL 5 MG: 2.5 TABLET ORAL at 08:28
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
## 2018-01-25 NOTE — PROGRESS NOTES
Problem: Falls - Risk of  Goal: *Absence of Falls  Document Richard Fall Risk and appropriate interventions in the flowsheet.    Outcome: Progressing Towards Goal  Fall Risk Interventions:

## 2018-01-25 NOTE — ROUTINE PROCESS
Bedside and Verbal shift change report given to Marta Boyce RN (oncoming nurse) by Tess Jeffers RN   (offgoing nurse). Report included the following information SBAR, Kardex, Intake/Output and MAR.

## 2018-01-25 NOTE — PROGRESS NOTES
Cardiology Progress Note      1/25/2018 1:44 PM    Admit Date: 1/24/2018    Admit Diagnosis: CHF (congestive heart failure) ([address]      Subjective:     Clarice Francis denies chest pain. Visit Vitals    /83 (BP 1 Location: Left arm, BP Patient Position: At rest)    Pulse 72    Temp 97.3 °F (36.3 °C)    Resp 26    Ht 5' 7\" (1.702 m)    Wt (!) 177.3 kg (390 lb 14 oz)    SpO2 100%    BMI 61.22 kg/m2     Current Facility-Administered Medications   Medication Dose Route Frequency    acetaminophen (TYLENOL) tablet 650 mg  650 mg Oral Q6H PRN    furosemide (LASIX) injection 40 mg  40 mg IntraVENous Q12H    nitroglycerin (NITROBID) 2 % ointment 1 Inch  1 Inch Topical BID    insulin lispro (HUMALOG) injection   SubCUTAneous AC&HS    glucose chewable tablet 16 g  4 Tab Oral PRN    glucagon (GLUCAGEN) injection 1 mg  1 mg IntraMUSCular PRN    dextrose (D50W) injection syrg 12.5-25 g  25-50 mL IntraVENous PRN    spironolactone (ALDACTONE) tablet 25 mg  25 mg Oral DAILY    cloNIDine HCl (CATAPRES) tablet 0.2 mg  0.2 mg Oral BID    amLODIPine (NORVASC) tablet 5 mg  5 mg Oral DAILY    minoxidil (LONITEN) tablet 5 mg  5 mg Oral BID    hydrALAZINE (APRESOLINE) 20 mg/mL injection 20 mg  20 mg IntraVENous Q6H PRN    heparin (porcine) injection 5,000 Units  5,000 Units SubCUTAneous Q8H         Objective:      Physical Exam:  Visit Vitals    /83 (BP 1 Location: Left arm, BP Patient Position: At rest)    Pulse 72    Temp 97.3 °F (36.3 °C)    Resp 26    Ht 5' 7\" (1.702 m)    Wt (!) 177.3 kg (390 lb 14 oz)    SpO2 100%    BMI 61.22 kg/m2     General Appearance:  Well developed, well nourished,alert and oriented x 3, and individual in no acute distress. Ears/Nose/Mouth/Throat:   Hearing grossly normal.         Neck: Supple. Chest:   Lungs clear to auscultation bilaterally. Cardiovascular:  Regular rate and rhythm, S1, S2 normal, no murmur. Abdomen:   Soft, non-tender, bowel sounds are active. Extremities: No edema bilaterally. Skin: Warm and dry.                Data Review:   Labs:    Recent Results (from the past 24 hour(s))   CARDIAC PANEL,(CK, CKMB & TROPONIN)    Collection Time: 01/24/18  3:15 PM   Result Value Ref Range     39 - 308 U/L    CK - MB 2.4 <3.6 ng/ml    CK-MB Index 1.9 0.0 - 4.0 %    Troponin-I, Qt. <0.02 0.00 - 0.06 NG/ML   HEMOGLOBIN A1C WITH EAG    Collection Time: 01/24/18  3:15 PM   Result Value Ref Range    Hemoglobin A1c 6.4 (H) 4.5 - 5.6 %    Est. average glucose 137 mg/dL   GLUCOSE, POC    Collection Time: 01/24/18  4:44 PM   Result Value Ref Range    Glucose (POC) 223 (H) 70 - 110 mg/dL   CARDIAC PANEL,(CK, CKMB & TROPONIN)    Collection Time: 01/24/18  7:45 PM   Result Value Ref Range     39 - 308 U/L    CK - MB 2.1 <3.6 ng/ml    CK-MB Index 2.1 0.0 - 4.0 %    Troponin-I, Qt. <0.02 0.00 - 0.06 NG/ML   GLUCOSE, POC    Collection Time: 01/24/18  9:04 PM   Result Value Ref Range    Glucose (POC) 231 (H) 70 - 110 mg/dL   LIPID PANEL    Collection Time: 01/25/18  2:51 AM   Result Value Ref Range    LIPID PROFILE          Cholesterol, total 196 <200 MG/DL    Triglyceride 54 <150 MG/DL    HDL Cholesterol 64 (H) 40 - 60 MG/DL    LDL, calculated 121.2 (H) 0 - 100 MG/DL    VLDL, calculated 10.8 MG/DL    CHOL/HDL Ratio 3.1 0 - 5.0     TSH 3RD GENERATION    Collection Time: 01/25/18  2:51 AM   Result Value Ref Range    TSH 0.14 (L) 0.36 - 3.74 uIU/mL   CBC W/O DIFF    Collection Time: 01/25/18  2:51 AM   Result Value Ref Range    WBC 8.2 4.6 - 13.2 K/uL    RBC 5.07 4.70 - 5.50 M/uL    HGB 14.7 13.0 - 16.0 g/dL    HCT 46.0 36.0 - 48.0 %    MCV 90.7 74.0 - 97.0 FL    MCH 29.0 24.0 - 34.0 PG    MCHC 32.0 31.0 - 37.0 g/dL    RDW 15.1 (H) 11.6 - 14.5 %    PLATELET 337 242 - 367 K/uL    MPV 13.1 (H) 9.2 - 84.8 FL   METABOLIC PANEL, BASIC    Collection Time: 01/25/18  2:51 AM   Result Value Ref Range    Sodium 141 136 - 145 mmol/L    Potassium 4.2 3.5 - 5.5 mmol/L    Chloride 101 100 - 108 mmol/L    CO2 33 (H) 21 - 32 mmol/L    Anion gap 7 3.0 - 18 mmol/L    Glucose 134 (H) 74 - 99 mg/dL    BUN 17 7.0 - 18 MG/DL    Creatinine 1.48 (H) 0.6 - 1.3 MG/DL    BUN/Creatinine ratio 11 (L) 12 - 20      GFR est AA 59 (L) >60 ml/min/1.73m2    GFR est non-AA 49 (L) >60 ml/min/1.73m2    Calcium 9.4 8.5 - 10.1 MG/DL   GLUCOSE, POC    Collection Time: 01/25/18  6:04 AM   Result Value Ref Range    Glucose (POC) 215 (H) 70 - 110 mg/dL   GLUCOSE, POC    Collection Time: 01/25/18 11:19 AM   Result Value Ref Range    Glucose (POC) 105 70 - 110 mg/dL       Telemetry: normal sinus rhythm      Assessment:     Active Problems:    CHF (congestive heart failure) ([facility]) (1/24/2018)        Plan:     Stable from the cardiac standpoint. Renal artery ultrasound pending.      Beatrice Billings MD

## 2018-01-25 NOTE — PROGRESS NOTES
2:59 PM  Assumed care for this pt. Pt is awake, alert, oriented x 4. Lungs are clear but diminished. Pt coughing but not expectorating any sputum. On O2 at 4L n.c. Abdomen obese. Pt went down for Dupplex study of renals via bed. 1915  Pt ambulated in  and had a BM.

## 2018-01-25 NOTE — PROGRESS NOTES
Problem: Falls - Risk of  Goal: *Absence of Falls  Document Richard Fall Risk and appropriate interventions in the flowsheet.    Outcome: Progressing Towards Goal  Fall Risk Interventions:  Mobility Interventions: Patient to call before getting OOB         Medication Interventions: Patient to call before getting OOB

## 2018-01-25 NOTE — PROGRESS NOTES
Hospitalist Progress Note    Patient: Reji Floyd MRN: [id number]  CSN: [id number]    YOB: 1960  Age: 59 y.o. Sex: male    DOA: 1/24/2018 LOS:  LOS: 1 day          Chief Complaint:    Chest Pain    Assessment/Plan     1. Acute on Chronic Diastolic CHF  2. Chest Pain  3. Uncontrolled HTN  4. COPD  5. DM w/ Neuropathy  6. Chronic Pain on Chronic Opioid Therapy    1. Echo complete with EF 21-73%, grade 2 diastolic dysfunction. Per Dr Massimo Brannon note, stable from cardiac standpoint. Continue Lasix. 2. Continued complaints of chest pain at time of exam, asking for Percocet 10/325. In agreement with Dr Gamal Wright note, will not give patient opioids for pain control. 3. Renal artery ultrasound pending. Patient was supposed to have this completed today, but went against NPO orders after was told he was scheduled for procedure and had lunch. Will have this completed tomorrow. 4. Continue supplemental O2.   5. Patient states he takes Percocet 10/325 for his neuropathy, and that gabapentin and tramadol do not work for him and cause him severe reactions. Last percocet refill was in Aug 2017. Patient Active Problem List   Diagnosis Code    COPD exacerbation ([facility] [place] [facility] J44.1    Type II or unspecified type diabetes mellitus without mention of complication, uncontrolled E11.65    Hypertension I10    JIM (obstructive sleep apnea) G47.33    Hypoxia R09.02    Morbid obesity (HCC) E66.01    CHF (congestive heart failure) ([facility]) I50.9       Subjective:    Patient upset he cannot have his usual Percocet. States he did not care that he was supposed to be NPO for ultrasound, that he wanted to have food. States he is ok with waiting until tomorrow for the ultrasound.     Review of systems:    Constitutional: denies fevers, chills, myalgias  Respiratory: denies SOB, cough  Cardiovascular: denies chest pain, palpitations  Gastrointestinal: denies nausea, vomiting, diarrhea      Vital signs/Intake and Output:  Visit Vitals    /83 (BP 1 Location: Left arm, BP Patient Position: At rest)    Pulse 72    Temp 97.3 °F (36.3 °C)    Resp 26    Ht 5' 7\" (1.702 m)    Wt (!) 177.3 kg (390 lb 14 oz)    SpO2 100%    BMI 61.22 kg/m2     Current Shift:  01/25 0701 - 01/25 1900  In: 1310 [P.O.:1310]  Out: 1200 [Urine:1200]  Last three shifts:  01/23 1901 - 01/25 0700  In: 80 [P.O.:930]  Out: 2450 [Urine:2450]    Exam:    General: Morbidly obese male, alert, NAD, OX3  Head/Neck: NCAT, supple, No masses, No lymphadenopathy  CVS:Regular rate and rhythm, no M/R/G, S1/S2 heard, no thrill  Lungs:diminished breath sounds bilaterally, no wheezes, rhonchi, or rales  Abdomen: Soft, Nontender, No distention, Normal Bowel sounds, No hepatomegaly  Extremities: No C/C/E, pulses palpable 2+  Skin:normal texture and turgor, no rashes, no lesions  Neuro:grossly normal , follows commands  Psych:appropriate                Labs: Results:       Chemistry Recent Labs      01/25/18   0251 01/24/18   0806   GLU  134*  99   NA  141  142   K  4.2  4.6   CL  101  104   CO2  33*  28   BUN  17  13   CREA  1.48*  1.43*   CA  9.4  8.9   AGAP  7  10   BUCR  11*  9*   AP   --   81   TP   --   6.9   ALB   --   3.2*   GLOB   --   3.7   AGRAT   --   0.9      CBC w/Diff Recent Labs      01/25/18   0251 01/24/18   0806   WBC  8.2  6.7   RBC  5.07  5.10   HGB  14.7  14.8   HCT  46.0  46.1   PLT  160  124*   GRANS   --   60   LYMPH   --   30   EOS   --   3      Cardiac Enzymes Recent Labs      01/24/18   1945  01/24/18   1515   CPK  100  128   CKND1  2.1  1.9      Coagulation Recent Labs      01/24/18   0806   PTP  13.0   INR  1.0       Lipid Panel Lab Results   Component Value Date/Time    Cholesterol, total 196 01/25/2018 02:51 AM    HDL Cholesterol 64 01/25/2018 02:51 AM    LDL, calculated 121.2 01/25/2018 02:51 AM    VLDL, calculated 10.8 01/25/2018 02:51 AM    Triglyceride 54 01/25/2018 02:51 AM    CHOL/HDL Ratio 3.1 01/25/2018 02:51 AM      BNP No results for input(s): BNPP in the last 72 hours.    Liver Enzymes Recent Labs      01/24/18   0806   TP  6.9   ALB  3.2*   AP  81   SGOT  32      Thyroid Studies Lab Results   Component Value Date/Time    TSH 0.14 01/25/2018 02:51 AM        Procedures/imaging: see electronic medical records for all procedures/Xrays and details which were not copied into this note but were reviewed prior to creation of [address] Ja[address] Pina, PA-C

## 2018-01-25 NOTE — PROGRESS NOTES
Pt requesting for percocet 10mg/32mg which he reports he takes at home. However, it was confirmed by pharmacy that pt's last fill on this med was on 8/2017. Per pt., he was not able to get med refilled when he ran out due to having to leave town in Sept to take care of his sick mother and didn't return home until December. Per pt, by the time he returned home, his doctor would not write him another prescription for refill and therefore, he made an appointment to see his pain management doctor this coming Monday.  notified and made aware of this. No order given for Percocet due to pt's drug seeking behavior as  defined at present moment. Tylenol 650mg po q6hrs prn ordered instead. Pt updated with 's decision. Tylenol offered to pt but he declined.

## 2018-01-25 NOTE — PROCEDURES
[facility]  *** FINAL REPORT ***    Name: Eliza Subramanian  MRN: [id number]  : 1960  HIS Order #: [id number]  [address] [facility] Visit #: [id number]  Date: 2018    TYPE OF TEST: Visceral Arterial Duplex    REASON FOR TEST  Eval for renal vascular HTN    Aortic PSV:  43.0 cm/s  Diameter AP: 2.8 cm   TV: 2.8 cm                   Right          Left  Renal Artery:- -------------  -------------  Proximal  PSV: 116.0  Mid       PSV:  80.0  Distal    PSV:  69.0           65.0  Aortic ratio :   2.7            1.5    Medullary PSV:  19.4            EDV:   5.8            EDR:   0.3            SDR:   3.3    Cortical  PSV:            EDV:            EDR:            SDR:  Stenosis:  Kidney size:    9.7 cm        12.1 cm               x      cm      x  5.2 cm    Hilar:-        Right          Left  Acc. Time  AT:  22 secs           secs  Acc. Index AI:             RI: 0.70    INTERPRETATION/FINDINGS  Duplex images were obtained using 2-D gray scale, color flow, and  spectral Doppler analysis. RENAL:    1. The right kidney measures 9.7 cm.  2. The left kidney measures 12.1 x 5.2 x 5.5cm. 3. Bilateral renal artery are not well seen secondary to limitation  stated below. 4. No evidence of significant stenosis on the examined vessels on the  right side. Left side only distal renal artery examined with PSV=  65cm/s  5. Bilateral arterial and renal venous flow noted    ADDITIONAL COMMENTS  Very limited scan secondary to body habitus. Limited scanning window. I have personally reviewed the data relevant to the interpretation of  this  study.     TECHNOLOGIST: Jose Mratin Hong  Signed: 2018 04:40 PM    PHYSICIAN: Jared Walker MD  Signed: 2018 01:10 PM

## 2018-01-25 NOTE — ROUTINE PROCESS
Bedside and Verbal shift change report given to KOURTNEY Shafer (oncoming nurse) by Corazon Hooks (offgoing nurse). Report included the following information SBAR, Kardex and MAR.

## 2018-01-25 NOTE — ROUTINE PROCESS
Bedside and Verbal shift change report given to MALIK Perrin (oncoming nurse) by Argenis Anand RN (offgoing nurse). Report included the following information SBAR, Kardex and MAR. Patient had no acute events overnight. Currently resting in NAD. No express needs reported at this time.

## 2018-01-26 ENCOUNTER — HOME HEALTH ADMISSION (OUTPATIENT)
Dept: HOME HEALTH SERVICES | Facility: HOME HEALTH | Age: 58
End: 2018-01-26

## 2018-01-26 VITALS
HEART RATE: 85 BPM | TEMPERATURE: 97.5 F | DIASTOLIC BLOOD PRESSURE: 85 MMHG | RESPIRATION RATE: 18 BRPM | SYSTOLIC BLOOD PRESSURE: 147 MMHG | BODY MASS INDEX: 49.44 KG/M2 | WEIGHT: 315 LBS | OXYGEN SATURATION: 100 % | HEIGHT: 67 IN

## 2018-01-26 LAB
GLUCOSE BLD STRIP.AUTO-MCNC: 117 MG/DL (ref 70–110)
GLUCOSE BLD STRIP.AUTO-MCNC: 127 MG/DL (ref 70–110)

## 2018-01-26 PROCEDURE — 74011250636 HC RX REV CODE- 250/636: Performed by: INTERNAL MEDICINE

## 2018-01-26 PROCEDURE — 82962 GLUCOSE BLOOD TEST: CPT

## 2018-01-26 PROCEDURE — 74011250637 HC RX REV CODE- 250/637: Performed by: INTERNAL MEDICINE

## 2018-01-26 PROCEDURE — 96372 THER/PROPH/DIAG INJ SC/IM: CPT

## 2018-01-26 PROCEDURE — 97161 PT EVAL LOW COMPLEX 20 MIN: CPT

## 2018-01-26 PROCEDURE — 77010033678 HC OXYGEN DAILY

## 2018-01-26 PROCEDURE — 96376 TX/PRO/DX INJ SAME DRUG ADON: CPT

## 2018-01-26 PROCEDURE — 99218 HC RM OBSERVATION: CPT

## 2018-01-26 RX ADMIN — AMLODIPINE BESYLATE 5 MG: 5 TABLET ORAL at 10:13

## 2018-01-26 RX ADMIN — SPIRONOLACTONE 25 MG: 25 TABLET, FILM COATED ORAL at 10:12

## 2018-01-26 RX ADMIN — FUROSEMIDE 40 MG: 10 INJECTION, SOLUTION INTRAMUSCULAR; INTRAVENOUS at 10:11

## 2018-01-26 RX ADMIN — MINOXIDIL 5 MG: 2.5 TABLET ORAL at 10:12

## 2018-01-26 RX ADMIN — HEPARIN SODIUM 5000 UNITS: 5000 INJECTION, SOLUTION INTRAVENOUS; SUBCUTANEOUS at 04:24

## 2018-01-26 RX ADMIN — NITROGLYCERIN 1 INCH: 20 OINTMENT TOPICAL at 10:11

## 2018-01-26 RX ADMIN — CLONIDINE HYDROCHLORIDE 0.2 MG: 0.1 TABLET ORAL at 10:12

## 2018-01-26 NOTE — DISCHARGE INSTRUCTIONS
Chest Pain: Care Instructions  Your Care Instructions    There are many things that can cause chest pain. Some are not serious and will get better on their own in a few days. But some kinds of chest pain need more testing and treatment. Your doctor may have recommended a follow-up visit in the next 8 to 12 hours. If you are not getting better, you may need more tests or treatment. Even though your doctor has released you, you still need to watch for any problems. The doctor carefully checked you, but sometimes problems can develop later. If you have new symptoms or if your symptoms do not get better, get medical care right away. If you have worse or different chest pain or pressure that lasts more than 5 minutes or you passed out (lost consciousness), call 911 or seek other emergency help right away. A medical visit is only one step in your treatment. Even if you feel better, you still need to do what your doctor recommends, such as going to all suggested follow-up appointments and taking medicines exactly as directed. This will help you recover and help prevent future problems. How can you care for yourself at home? · Rest until you feel better. · Take your medicine exactly as prescribed. Call your doctor if you think you are having a problem with your medicine. · Do not drive after taking a prescription pain medicine. When should you call for help? Call 911 if:  ? · You passed out (lost consciousness). ? · You have severe difficulty breathing. ? · You have symptoms of a heart attack. These may include:  ¨ Chest pain or pressure, or a strange feeling in your chest.  ¨ Sweating. ¨ Shortness of breath. ¨ Nausea or vomiting. ¨ Pain, pressure, or a strange feeling in your back, neck, jaw, or upper belly or in one or both shoulders or arms. ¨ Lightheadedness or sudden weakness. ¨ A fast or irregular heartbeat.   After you call 911, the  may tell you to chew 1 adult-strength or 2 to 4 low-dose aspirin. Wait for an ambulance. Do not try to drive yourself. ?Call your doctor today if:  ? · You have any trouble breathing. ? · Your chest pain gets worse. ? · You are dizzy or lightheaded, or you feel like you may faint. ? · You are not getting better as expected. ? · You are having new or different chest pain. Where can you learn more? Go to http://bhargav-mily.info/. Enter A120 in the search box to learn more about \"Chest Pain: Care Instructions. \"  Current as of: March 20, 2017  Content Version: 11.4  © 3370-2066 [employer]. Care instructions adapted under license by [employer] (which disclaims liability or warranty for this information). If you have questions about a medical condition or this instruction, always ask your healthcare professional. [place] [address] any warranty or liability for your use of this information. Musculoskeletal Chest Pain: Care Instructions  Your Care Instructions    Chest pain is not always a sign that something is wrong with your heart or that you have another serious problem. The doctor thinks your chest pain is caused by strained muscles or ligaments, inflamed chest cartilage, or another problem in your chest, rather than by your heart. You may need more tests to find the cause of your chest pain. Follow-up care is a key part of your treatment and safety. Be sure to make and go to all appointments, and call your doctor if you are having problems. It's also a good idea to know your test results and keep a list of the medicines you take. How can you care for yourself at home? · Take pain medicines exactly as directed. ¨ If the doctor gave you a prescription medicine for pain, take it as prescribed. ¨ If you are not taking a prescription pain medicine, ask your doctor if you can take an over-the-counter medicine. · Rest and protect the sore area.   · Stop, change, or take a break from any activity that may be causing your pain or soreness. · Put ice or a cold pack on the sore area for 10 to 20 minutes at a time. Try to do this every 1 to 2 hours for the next 3 days (when you are awake) or until the swelling goes down. Put a thin cloth between the ice and your skin. · After 2 or 3 days, apply a heating pad set on low or a warm cloth to the area that hurts. Some doctors suggest that you go back and forth between hot and cold. · Do not wrap or tape your ribs for support. This may cause you to take smaller breaths, which could increase your risk of lung problems. · Mentholated creams such as Bengay or Icy Hot may soothe sore muscles. Follow the instructions on the package. · Follow your doctor's instructions for exercising. · Gentle stretching and massage may help you get better faster. Stretch slowly to the point just before pain begins, and hold the stretch for at least 15 to 30 seconds. Do this 3 or 4 times a day. Stretch just after you have applied heat. · As your pain gets better, slowly return to your normal activities. Any increased pain may be a sign that you need to rest a while longer. When should you call for help? Call 911 anytime you think you may need emergency care. For example, call if:  ? · You have chest pain or pressure. This may occur with:  ¨ Sweating. ¨ Shortness of breath. ¨ Nausea or vomiting. ¨ Pain that spreads from the chest to the neck, jaw, or one or both shoulders or arms. ¨ Dizziness or lightheadedness. ¨ A fast or uneven pulse. After calling 911, chew 1 adult-strength aspirin. Wait for an ambulance. Do not try to drive yourself. ? · You have sudden chest pain and shortness of breath, or you cough up blood. ?Call your doctor now or seek immediate medical care if:  ? · You have any trouble breathing. ? · Your chest pain gets worse. ? · Your chest pain occurs consistently with exercise and is relieved by rest.   ? Watch closely for changes in your health, and be sure to contact your doctor if:  ? · Your chest pain does not get better after 1 week. Where can you learn more? Go to http://bhargav-mily.info/. Enter V293 in the search box to learn more about \"Musculoskeletal Chest Pain: Care Instructions. \"  Current as of: March 20, 2017  Content Version: 11.4  © 9515-3695 [employer]. Care instructions adapted under license by [employer] (which disclaims liability or warranty for this information). If you have questions about a medical condition or this instruction, always ask your healthcare professional. [place] [address] any warranty or liability for your use of this information. Learning About Saving Energy When You Have a Chronic Condition  Introduction    Everyday tasks can be tiring when you have COPD, heart failure, or another long-term (chronic) condition. You may feel at times that you've lost your ability to live your life. But learning to conserve, or save, your energy can help you be less tired. Conserving your energy means finding ways of doing daily activities with as little effort as possible. With some small changes in the way you do things, you can get your tasks done more easily. Some treatments are available that might help. Pulmonary rehabilitation can teach you ways to breathe easier. Cardiac rehabilitation can help make your heart stronger. You also may want to see an occupational or physical therapist. The therapist can give you more tips on building strength and moving with less effort. What can you do to conserve your energy? Planning  · Make a list of what you have to do every day. Group the tasks by location. · Do all the chores in one part of your house around the same time. · Go out for errands or do chores at the time of day when you have the most energy. · Plan rest periods into your day.   Getting things done  · Sit down as often as you can when you get dressed, do chores, or cook. · Use a cart with wheels to roll items, such as laundry, from one room to another. · Push or slide boxes or other large items instead of lifting them. Reaching and bending  · Put things you use the most on shelves that are at the level of your waist or shoulder. · Use long-handled grabbers or other tools to reach items on a high shelf or to  things off the floor. Use long-handled dusters when you clean the house. · Use a raised toilet seat to avoid bending too far to sit or stand up. Eating  · Eat several small meals instead of three larger meals. · If you get too tired to eat much, try to choose healthy foods that have more calories. Have a yogurt-and-fruit smoothie for breakfast. Put avocado on a sandwich. Or add cheese or peanut butter to snacks. · If you don't feel very hungry, try to eat first and drink water or other fluids later, after a meal. This can help keep you from losing weight. Sip small amounts of fluids if you need to drink while you eat. Having sex  · Choose the time of day when you have more energy. · A [id number] position for sex can be less tiring. Sometimes you may want to focus more on caressing. Watch closely for changes in your health, and be sure to contact your doctor if you have any problems. Where can you learn more? Go to http://bhargav-mily.info/. Enter H190 in the search box to learn more about \"Learning About Saving Energy When You Have a Chronic Condition. \"  Current as of: May 12, 2017  Content Version: 11.4  © 1976-1527 [employer]. Care instructions adapted under license by [employer] (which disclaims liability or warranty for this information). If you have questions about a medical condition or this instruction, always ask your healthcare professional. [address] any warranty or liability for your use of this information.          Heart Failure: Care Instructions  Your Care Instructions    Heart failure occurs when your heart does not pump as much blood as the body needs. Failure does not mean that the heart has stopped pumping but rather that it is not pumping as well as it should. Over time, this causes fluid buildup in your lungs and other parts of your body. Fluid buildup can cause shortness of breath, fatigue, swollen ankles, and other problems. By taking medicines regularly, reducing sodium (salt) in your diet, checking your weight every day, and making lifestyle changes, you can feel better and live longer. Follow-up care is a key part of your treatment and safety. Be sure to make and go to all appointments, and call your doctor if you are having problems. It's also a good idea to know your test results and keep a list of the medicines you take. How can you care for yourself at home? Medicines  ? · Be safe with medicines. Take your medicines exactly as prescribed. Call your doctor if you think you are having a problem with your medicine. ? · Do not take any vitamins, over-the-counter medicine, or herbal products without talking to your doctor first. Patricio Bowlesude not take ibuprofen (Advil or Motrin) and naproxen (Aleve) without talking to your doctor first. They could make your heart failure worse. ? · You may be taking some of the following medicine. ¨ Beta-blockers can slow heart rate, decrease blood pressure, and improve your condition. Taking a beta-blocker may lower your chance of needing to be hospitalized. ¨ Angiotensin-converting enzyme inhibitors (ACEIs) reduce the heart's workload, lower blood pressure, and reduce swelling. Taking an ACEI may lower your chance of needing to be hospitalized again. ¨ Angiotensin II receptor blockers (ARBs) work like ACEIs. Your doctor may prescribe them instead of ACEIs. ¨ Diuretics, also called water pills, reduce swelling.   ¨ Potassium supplements replace this important mineral, which is sometimes lost with diuretics. ¨ Aspirin and other blood thinners prevent blood clots, which can cause a stroke or heart attack. ? You will get more details on the specific medicines your doctor prescribes. Diet  ? · Your doctor may suggest that you limit sodium to 2,000 milligrams (mg) a day or less. That is less than 1 teaspoon of salt a day, including all the salt you eat in cooking or in packaged foods. People get most of their sodium from processed foods. Fast food and restaurant meals also tend to be very high in sodium. ? · Ask your doctor how much liquid you can drink each day. You may have to limit liquids. ?Weight  ? · Weigh yourself without clothing at the same time each day. Record your weight. Call your doctor if you have a sudden weight gain, such as more than 2 to 3 pounds in a day or 5 pounds in a week. (Your doctor may suggest a different range of weight gain.) A sudden weight gain may mean that your heart failure is getting worse. ? Activity level  ? · Start light exercise (if your doctor says it is okay). Even if you can only do a small amount, exercise will help you get stronger, have more energy, and manage your weight and your stress. Walking is an easy way to get exercise. Start out by walking a little more than you did before. Bit by bit, increase the amount you walk. ? · When you exercise, watch for signs that your heart is working too hard. You are pushing yourself too hard if you cannot talk while you are exercising. If you become short of breath or dizzy or have chest pain, stop, sit down, and rest.   ? · If you feel \"wiped out\" the day after you exercise, walk slower or for a shorter distance until you can work up to a better pace. ? · Get enough rest at night. Sleeping with 1 or 2 pillows under your upper body and head may help you breathe easier. ? Lifestyle changes  ? · Do not smoke. Smoking can make a heart condition worse.  If you need help quitting, talk to your doctor about stop-smoking programs and medicines. These can increase your chances of quitting for good. Quitting smoking may be the most important step you can take to protect your heart. ? · Limit alcohol to 2 drinks a day for men and 1 drink a day for women. Too much alcohol can cause health problems. ? · Avoid getting sick from colds and the flu. Get a pneumococcal vaccine shot. If you have had one before, ask your doctor whether you need another dose. Get a flu shot each year. If you must be around people with colds or the flu, wash your hands often. When should you call for help? Call 911 if you have symptoms of sudden heart failure such as:  ? · You have severe trouble breathing. ? · You cough up pink, foamy mucus. ? · You have a new irregular or rapid heartbeat. ?Call your doctor now or seek immediate medical care if:  ? · You have new or increased shortness of breath. ? · You are dizzy or lightheaded, or you feel like you may faint. ? · You have sudden weight gain, such as more than 2 to 3 pounds in a day or 5 pounds in a week. (Your doctor may suggest a different range of weight gain.)   ? · You have increased swelling in your legs, ankles, or feet. ? · You are suddenly so tired or weak that you cannot do your usual activities. ? Watch closely for changes in your health, and be sure to contact your doctor if you develop new symptoms. Where can you learn more? Go to http://bhargav-mily.info/. Enter W080 in the search box to learn more about \"Heart Failure: Care Instructions. \"  Current as of: September 21, 2016  Content Version: 11.4  © 9360-5108 [employer]. Care instructions adapted under license by [employer] (which disclaims liability or warranty for this information).  If you have questions about a medical condition or this instruction, always ask your healthcare professional. [address] any warranty or liability for your use of this information.
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
## 2018-01-26 NOTE — PROGRESS NOTES
Bedside and verbal report given to Danae Rivas RN, with use of kardex. Rounded on pt Q1 hour throughout shift, no s/s distress nor discomfort noted, call bell in reach.

## 2018-01-26 NOTE — PROGRESS NOTES
D/c plan Home with [facility]  Met with patient at bedside. Patient informed cm he needs home oxygen. Cm asked him where he was getting his oxygen from he had at home. States a friend loaned it to him. Patient will need a walk test to qualify for home oxygen. CM explained this to him states \" I am entitled to it\" explained to patient that there is a process to qualify. Explained to him his nurse will do a walk test once that is completed if he qualifies will submit to oxygen company for authorization. Patient very demanding states he is on disability and he can get what ever he needs. CM re explained to patient above process. Informed him once we have the qualifying oxygen sat. Cm will start process. Patient very demanding to cm. Shweta Taylor, Primary RN made aware of need for oxygen  Patient will need [facility] follow up see order. Plan is if needs oxygen to obtain and d/c home today  No other needs at this time  Care Management Interventions  PCP Verified by CM: Yes  Transition of Care Consult (CM Consult): [facility])  [facility] [place] - INPATIENT: Yes  Health Maintenance Reviewed: Yes  Current Support Network: Lives Alone  Confirm Follow Up Transport: Other (see comment)  Plan discussed with Pt/Family/Caregiver: Yes  Freedom of Choice Offered: Yes  Discharge Location  Discharge Placement: Home with [facility])     Patient does not qualify for oxygen walk test is 100%. Patient made aware and explained to him he should not be using someone elses oxygen and unless it is ordered by a physician. Di encourage patient to follow up with his pcp and have another walk test done. Jt Laboy informed patient does not qualify but will send [facility] if he is ready for d/c. Patient states he is ready for d/c. Cm informed primary RN. Patient informed he needs a phone in room that works. Cm had  give him a new phone. He also informed cm he would need a medicaid cab when he is ready for d/c.  [place] can call for medicaid cab when ready for d/c. If unable to get medicaid cab can call nursing supervisor for cab voucher

## 2018-01-26 NOTE — PROGRESS NOTES
8262 Assumed responsibility for patient from Aleksandra Parra, RN    1003 Morning assessment and medications administered    1330 Patient walk tested without oxygen.   Patient walked 100feet with saturation of 100 percent on room air

## 2018-01-26 NOTE — DISCHARGE SUMMARY
Discharge Summary    Patient: Leti Sheppard MRN: [id number]  CSN: [id number]    YOB: 1960  Age: 59 y.o. Sex: male    DOA: 1/24/2018 LOS:  LOS: 2 days   Discharge Date:      Primary Care Provider:  Bharat Landis MD    Admission Diagnoses: CHF (congestive heart failure) [facility])    Discharge Diagnoses:    Problem List as of 1/26/2018  Date Reviewed: 2/19/2013          Codes Class Noted - Resolved    * (Principal)Chest pain ICD-10-CM: R07.9  ICD-9-CM: 786.50  1/25/2018 - Present        Acute on chronic combined systolic and diastolic ACC/AHA stage C congestive heart failure ([facility] ICD-10-CM: I50.43  ICD-9-CM: 428.43, 428.0  1/25/2018 - Present        COPD exacerbation ([facility] ICD-10-CM: J44.1  ICD-9-CM: 491.21  2/19/2013 - Present        Type II diabetes mellitus with peripheral autonomic neuropathy ([facility] ICD-10-CM: E11.43  ICD-9-CM: 250.60, 337.1  2/19/2013 - Present        Hypertension ICD-10-CM: I10  ICD-9-CM: 401.9  Unknown - Present        JIM (obstructive sleep apnea) ICD-10-CM: G47.33  ICD-9-CM: 327.23  2/19/2013 - Present        Hypoxia ICD-10-CM: R09.02  ICD-9-CM: 799.02  2/19/2013 - Present        Morbid obesity ([facility] ICD-10-CM: E66.01  ICD-9-CM: 278.01  2/19/2013 - Present              Discharge Medications:     Current Discharge Medication List      CONTINUE these medications which have NOT CHANGED    Details   Oxygen Indications: 4L      spironolactone (ALDACTONE) 25 mg tablet Take 25 mg by mouth daily. dimethicone 1 % topical cream Apply  to affected area daily. Qty: 60 g, Refills: 0      oxyCODONE-acetaminophen (PERCOCET) 5-325 mg per tablet Take 1 Tab by mouth every four (4) hours as needed. Qty: 20 Tab, Refills: 0      metFORMIN (GLUCOPHAGE) 500 mg tablet Take 2 Tabs by mouth two (2) times daily (with meals). Qty: 60 Tab, Refills: 2      glipiZIDE SR (GLUCOTROL XL) 5 mg CR tablet Take 1 Tab by mouth daily.   Qty: 30 Tab, Refills: 2      amLODIPine (NORVASC) 5 mg tablet Take  by mouth daily. cloNIDine (CATAPRES) 0.1 mg tablet Take  by mouth two (2) times a day. hydrochlorothiazide (HYDRODIURIL) 25 mg tablet Take  by mouth daily. hydrALAZINE (APRESOLINE) 10 mg tablet Take  by mouth. albuterol (PROVENTIL HFA, VENTOLIN HFA) 90 mcg/actuation inhaler Take 1 Puff by inhalation. 1 puff in am and 1 puff in pm              Discharge Condition: Stable    Procedures : None    Consults: Cardiology      PHYSICAL EXAM    Visit Vitals    /85 (BP 1 Location: Left arm, BP Patient Position: At rest)    Pulse 85    Temp 97.5 °F (36.4 °C)    Resp 18    Ht 5' 7\" (1.702 m)    Wt (!) 166.3 kg (366 lb 9.6 oz)    SpO2 100%    BMI 57.42 kg/m2     General: Awake, cooperative, no acute distress, morbidly obese  HEENT: NC, Atraumatic. PERRLA, EOMI. Anicteric sclerae. Lungs:  CTA Bilaterally. No Wheezing/Rhonchi/Rales. Heart:  Regular  rhythm,  No murmur, No Rubs, No Gallops  Abdomen: Soft, Non distended, Non tender. +Bowel sounds,   Extremities: No c/c/e  Psych:   Not anxious or agitated. Neurologic:  No acute neurological deficits. Admission HPI : The patient is a 61-year-old gentleman. He is morbidly obese, and has a history of CHF and COPD. He was recently seen at [facility] for some chest pain. It seems like he had a few sets of cardiac enzymes and was discharged. He describes the chest pain as left-sided. He tells that it hurts when he coughs, touches it or moves. He has noted his blood pressure to be higher than usual.  He indicates that he takes all of his blood pressure medications without fail always. He took them today as well. In the ER, he was found to be in an acute exacerbation of CHF. Review of the record shows an echocardiogram completed at this institution in 2013 that demonstrated diastolic congestive heart failure.   In the ER, his CBC was checked and was normal.  His metabolic panel showed a mild increase in serum creatinine of 1.43. ProBNP was abnormally elevated at 1291. His chest x-ray demonstrated cardiomegaly. No other acute process. His first set of cardiac enzymes were unrevealing. Blood pressures were significantly elevated in the ER. He has received nitroglycerin. A nicardipine infusion has been ordered. Given the remarkably high blood pressure in the face of reported medication compliance, a duplex renal artery/vein bilateral has been ordered to examine him for the possibility of renal artery stenosis. An echocardiogram has been ordered as well. I saw the patient at bedside with Dr. Tonio Sosa, and also Dr. Sharmila Downey of cardiology. The plan at this time is to bring him in for evaluation. We will be working on his CHF and his hypertension. We will follow up on his chest pain. At present time, given the pleuritic nature of the chest pain, it would not appear to be cardiac. Hospital Course : This patient was admitted to medical services on January 24, 2018 for chest pain. His BNP was acutely elevated as was his BP. He was treated with antihypertensive medication and Lasix. He underwent an echo which showed an EF of 40-45% and grade 2 diastolic dysfunction, both of which are chronic. The patient also underwent a renal artery ultrasound as he was having difficulty controlling his BP even though he stated he was compliant with medications. The renal artery US was limited evaluation given the patient's body habitus. His hospital course was otherwise uncomplicated. When prepping for discharge he stated he needed a resupply of oxygen to be delivered prior to discharge. However, when he underwent the walk test with the nursing staff, he maintained sufficient oxygen saturation and does not meet requirements to have supplemental oxygen. Activity: Activity as tolerated    Diet: Diabetic Diet    Follow-up: This patient was instructed to follow up with his PCP upon discharge.  He also has an appointment with his primary care on Jan 29. Disposition: Patient will be discharged home in stable condition. Minutes spent on discharge: 35      Labs: Results:       Chemistry Recent Labs      01/25/18   0251  01/24/18   0806   GLU  134*  99   NA  141  142   K  4.2  4.6   CL  101  104   CO2  33*  28   BUN  17  13   CREA  1.48*  1.43*   CA  9.4  8.9   AGAP  7  10   BUCR  11*  9*   AP   --   81   TP   --   6.9   ALB   --   3.2*   GLOB   --   3.7   AGRAT   --   0.9      CBC w/Diff Recent Labs      01/25/18   0251  01/24/18   0806   WBC  8.2  6.7   RBC  5.07  5.10   HGB  14.7  14.8   HCT  46.0  46.1   PLT  160  124*   GRANS   --   60   LYMPH   --   30   EOS   --   3      Cardiac Enzymes Recent Labs      01/24/18   1945  01/24/18   1515   CPK  100  128   CKND1  2.1  1.9      Coagulation Recent Labs      01/24/18   0806   PTP  13.0   INR  1.0       Lipid Panel Lab Results   Component Value Date/Time    Cholesterol, total 196 01/25/2018 02:51 AM    HDL Cholesterol 64 01/25/2018 02:51 AM    LDL, calculated 121.2 01/25/2018 02:51 AM    VLDL, calculated 10.8 01/25/2018 02:51 AM    Triglyceride 54 01/25/2018 02:51 AM    CHOL/HDL Ratio 3.1 01/25/2018 02:51 AM      BNP No results for input(s): BNPP in the last 72 hours. Liver Enzymes Recent Labs      01/24/18   0806   TP  6.9   ALB  3.2*   AP  81   SGOT  32      Thyroid Studies Lab Results   Component Value Date/Time    TSH 0.14 01/25/2018 02:51 AM            Significant Diagnostic Studies: Xr Chest Port    Result Date: 1/24/2018  Portable chest History: Shortness of breath and cough. There is mild cardiomegaly. The lungs are clear. Normal pulmonary vasculature and visualized bony structures. IMPRESSION: Mild cardiomegaly but no radiographic evidence of acute cardiopulmonary process. No other significant change since prior study of 4/4/2013. No results found for this or any previous visit.         CC: Jes Flowers MD

## 2018-01-26 NOTE — PROGRESS NOTES
Cardiology Progress Note      1/26/2018 11:32 AM    Admit Date: 1/24/2018    Admit Diagnosis: CHF (congestive heart failure) ([address]      Subjective:     Sebastián Campa denies chest pain. Visit Vitals    /72 (BP 1 Location: Left arm, BP Patient Position: At rest)    Pulse 69    Temp 97.4 °F (36.3 °C)    Resp 18    Ht 5' 7\" (1.702 m)    Wt (!) 166.3 kg (366 lb 9.6 oz)    SpO2 99%    BMI 57.42 kg/m2     Current Facility-Administered Medications   Medication Dose Route Frequency    acetaminophen (TYLENOL) tablet 650 mg  650 mg Oral Q6H PRN    furosemide (LASIX) injection 40 mg  40 mg IntraVENous Q12H    nitroglycerin (NITROBID) 2 % ointment 1 Inch  1 Inch Topical BID    insulin lispro (HUMALOG) injection   SubCUTAneous AC&HS    glucose chewable tablet 16 g  4 Tab Oral PRN    glucagon (GLUCAGEN) injection 1 mg  1 mg IntraMUSCular PRN    dextrose (D50W) injection syrg 12.5-25 g  25-50 mL IntraVENous PRN    spironolactone (ALDACTONE) tablet 25 mg  25 mg Oral DAILY    cloNIDine HCl (CATAPRES) tablet 0.2 mg  0.2 mg Oral BID    amLODIPine (NORVASC) tablet 5 mg  5 mg Oral DAILY    minoxidil (LONITEN) tablet 5 mg  5 mg Oral BID    hydrALAZINE (APRESOLINE) 20 mg/mL injection 20 mg  20 mg IntraVENous Q6H PRN    heparin (porcine) injection 5,000 Units  5,000 Units SubCUTAneous Q8H         Objective:      Physical Exam:  Visit Vitals    /85 (BP 1 Location: Left arm, BP Patient Position: At rest)    Pulse 85    Temp 97.5 °F (36.4 °C)    Resp 18    Ht 5' 7\" (1.702 m)    Wt (!) 166.3 kg (366 lb 9.6 oz)    SpO2 100%    BMI 57.42 kg/m2     General Appearance:  Well developed, well nourished,alert and oriented x 3, and individual in no acute distress. Ears/Nose/Mouth/Throat:   Hearing grossly normal.         Neck: Supple. Chest:   Lungs clear to auscultation bilaterally. Cardiovascular:  Regular rate and rhythm, S1, S2 normal, no murmur. Abdomen:   Soft, non-tender, bowel sounds are active. Extremities: No edema bilaterally. Skin: Warm and dry. Data Review:   Labs:    Recent Results (from the past 24 hour(s))   GLUCOSE, POC    Collection Time: 01/25/18  4:03 PM   Result Value Ref Range    Glucose (POC) 193 (H) 70 - 110 mg/dL   GLUCOSE, POC    Collection Time: 01/25/18  8:58 PM   Result Value Ref Range    Glucose (POC) 115 (H) 70 - 110 mg/dL   GLUCOSE, POC    Collection Time: 01/26/18  6:11 AM   Result Value Ref Range    Glucose (POC) 127 (H) 70 - 110 mg/dL       Telemetry: normal sinus rhythm      Assessment:     Principal Problem:    Chest pain (1/25/2018)    Active Problems:    COPD exacerbation ([facility] (2/19/2013)      Type II diabetes mellitus with peripheral autonomic neuropathy ([facility] (2/19/2013)      Hypertension ()      Morbid obesity ([address] (2/19/2013)      Acute on chronic combined systolic and diastolic ACC/AHA stage C congestive heart failure ([address] (1/25/2018)        Plan:     No major change. BP control is better. Renal ultrasound was inconclusive.     Jeniffer Helms MD

## 2018-01-28 ENCOUNTER — HOME CARE VISIT (OUTPATIENT)
Dept: HOME HEALTH SERVICES | Facility: HOME HEALTH | Age: 58
End: 2018-01-28

## 2018-01-28 LAB
ATRIAL RATE: 76 BPM
ATRIAL RATE: 86 BPM
CALCULATED P AXIS, ECG09: 56 DEGREES
CALCULATED P AXIS, ECG09: 59 DEGREES
CALCULATED R AXIS, ECG10: -35 DEGREES
CALCULATED R AXIS, ECG10: -35 DEGREES
CALCULATED T AXIS, ECG11: 104 DEGREES
CALCULATED T AXIS, ECG11: 92 DEGREES
DIAGNOSIS, 93000: NORMAL
DIAGNOSIS, 93000: NORMAL
P-R INTERVAL, ECG05: 162 MS
P-R INTERVAL, ECG05: 162 MS
Q-T INTERVAL, ECG07: 408 MS
Q-T INTERVAL, ECG07: 436 MS
QRS DURATION, ECG06: 100 MS
QRS DURATION, ECG06: 94 MS
QTC CALCULATION (BEZET), ECG08: 488 MS
QTC CALCULATION (BEZET), ECG08: 490 MS
VENTRICULAR RATE, ECG03: 76 BPM
VENTRICULAR RATE, ECG03: 86 BPM

## 2018-01-30 ENCOUNTER — HOME CARE VISIT (OUTPATIENT)
Dept: HOME HEALTH SERVICES | Facility: HOME HEALTH | Age: 58
End: 2018-01-30

## 2018-03-03 ENCOUNTER — APPOINTMENT (OUTPATIENT)
Dept: GENERAL RADIOLOGY | Age: 58
DRG: 194 | End: 2018-03-03
Attending: EMERGENCY MEDICINE
Payer: MEDICAID

## 2018-03-03 ENCOUNTER — HOSPITAL ENCOUNTER (INPATIENT)
Age: 58
LOS: 3 days | Discharge: HOME | DRG: 194 | End: 2018-03-06
Attending: EMERGENCY MEDICINE | Admitting: INTERNAL MEDICINE
Payer: MEDICAID

## 2018-03-03 DIAGNOSIS — I50.9 ACUTE ON CHRONIC CONGESTIVE HEART FAILURE, UNSPECIFIED CONGESTIVE HEART FAILURE TYPE: ICD-10-CM

## 2018-03-03 DIAGNOSIS — J44.1 COPD EXACERBATION (HCC): Primary | ICD-10-CM

## 2018-03-03 DIAGNOSIS — G89.4 CHRONIC PAIN SYNDROME: ICD-10-CM

## 2018-03-03 LAB
ALBUMIN SERPL-MCNC: 2.9 G/DL (ref 3.4–5)
ALBUMIN/GLOB SERPL: 0.7 {RATIO} (ref 0.8–1.7)
ALP SERPL-CCNC: 87 U/L (ref 45–117)
ALT SERPL-CCNC: 35 U/L (ref 16–61)
ANION GAP SERPL CALC-SCNC: 5 MMOL/L (ref 3–18)
APPEARANCE UR: CLEAR
APTT PPP: 29.5 SEC (ref 23–36.4)
AST SERPL-CCNC: 22 U/L (ref 15–37)
BACTERIA URNS QL MICRO: NEGATIVE /HPF
BASOPHILS # BLD: 0 K/UL (ref 0–0.06)
BASOPHILS NFR BLD: 0 % (ref 0–2)
BILIRUB SERPL-MCNC: 0.5 MG/DL (ref 0.2–1)
BILIRUB UR QL: NEGATIVE
BNP SERPL-MCNC: 1764 PG/ML (ref 0–900)
BUN SERPL-MCNC: 16 MG/DL (ref 7–18)
BUN/CREAT SERPL: 12 (ref 12–20)
CALCIUM SERPL-MCNC: 8.7 MG/DL (ref 8.5–10.1)
CHLORIDE SERPL-SCNC: 107 MMOL/L (ref 100–108)
CK MB CFR SERPL CALC: 0.7 % (ref 0–4)
CK MB SERPL-MCNC: 1 NG/ML (ref 5–25)
CK SERPL-CCNC: 144 U/L (ref 39–308)
CO2 SERPL-SCNC: 32 MMOL/L (ref 21–32)
COLOR UR: YELLOW
CREAT SERPL-MCNC: 1.3 MG/DL (ref 0.6–1.3)
D DIMER PPP FEU-MCNC: 0.45 UG/ML(FEU)
DIFFERENTIAL METHOD BLD: ABNORMAL
EOSINOPHIL # BLD: 0.1 K/UL (ref 0–0.4)
EOSINOPHIL NFR BLD: 1 % (ref 0–5)
EPITH CASTS URNS QL MICRO: NEGATIVE /LPF (ref 0–5)
ERYTHROCYTE [DISTWIDTH] IN BLOOD BY AUTOMATED COUNT: 14.4 % (ref 11.6–14.5)
EST. AVERAGE GLUCOSE BLD GHB EST-MCNC: 131 MG/DL
GLOBULIN SER CALC-MCNC: 4.2 G/DL (ref 2–4)
GLUCOSE BLD STRIP.AUTO-MCNC: 229 MG/DL (ref 70–110)
GLUCOSE BLD STRIP.AUTO-MCNC: 265 MG/DL (ref 70–110)
GLUCOSE SERPL-MCNC: 123 MG/DL (ref 74–99)
GLUCOSE UR STRIP.AUTO-MCNC: >1000 MG/DL
HBA1C MFR BLD: 6.2 % (ref 4.5–5.6)
HCT VFR BLD AUTO: 44 % (ref 36–48)
HGB BLD-MCNC: 13.9 G/DL (ref 13–16)
HGB UR QL STRIP: ABNORMAL
INR PPP: 1.1 (ref 0.8–1.2)
KETONES UR QL STRIP.AUTO: NEGATIVE MG/DL
LEUKOCYTE ESTERASE UR QL STRIP.AUTO: NEGATIVE
LYMPHOCYTES # BLD: 1.3 K/UL (ref 0.9–3.6)
LYMPHOCYTES NFR BLD: 12 % (ref 21–52)
MAGNESIUM SERPL-MCNC: 2 MG/DL (ref 1.6–2.6)
MCH RBC QN AUTO: 28.6 PG (ref 24–34)
MCHC RBC AUTO-ENTMCNC: 31.6 G/DL (ref 31–37)
MCV RBC AUTO: 90.5 FL (ref 74–97)
MONOCYTES # BLD: 0.8 K/UL (ref 0.05–1.2)
MONOCYTES NFR BLD: 7 % (ref 3–10)
NEUTS SEG # BLD: 8.8 K/UL (ref 1.8–8)
NEUTS SEG NFR BLD: 80 % (ref 40–73)
NITRITE UR QL STRIP.AUTO: NEGATIVE
PH UR STRIP: 5 [PH] (ref 5–8)
PLATELET # BLD AUTO: 154 K/UL (ref 135–420)
PMV BLD AUTO: 12.3 FL (ref 9.2–11.8)
POTASSIUM SERPL-SCNC: 3.5 MMOL/L (ref 3.5–5.5)
PROT SERPL-MCNC: 7.1 G/DL (ref 6.4–8.2)
PROT UR STRIP-MCNC: ABNORMAL MG/DL
PROTHROMBIN TIME: 13.5 SEC (ref 11.5–15.2)
RBC # BLD AUTO: 4.86 M/UL (ref 4.7–5.5)
RBC #/AREA URNS HPF: NORMAL /HPF (ref 0–5)
SODIUM SERPL-SCNC: 144 MMOL/L (ref 136–145)
SP GR UR REFRACTOMETRY: 1.02 (ref 1–1.03)
TROPONIN I SERPL-MCNC: <0.02 NG/ML (ref 0–0.06)
UROBILINOGEN UR QL STRIP.AUTO: 0.2 EU/DL (ref 0.2–1)
WBC # BLD AUTO: 10.9 K/UL (ref 4.6–13.2)
WBC URNS QL MICRO: NORMAL /HPF (ref 0–5)

## 2018-03-03 PROCEDURE — 81001 URINALYSIS AUTO W/SCOPE: CPT | Performed by: INTERNAL MEDICINE

## 2018-03-03 PROCEDURE — 77030037296 HC NEB AREGN STRT KT DISP TRAN -B

## 2018-03-03 PROCEDURE — 74011000250 HC RX REV CODE- 250: Performed by: INTERNAL MEDICINE

## 2018-03-03 PROCEDURE — 74011000250 HC RX REV CODE- 250

## 2018-03-03 PROCEDURE — 85610 PROTHROMBIN TIME: CPT | Performed by: EMERGENCY MEDICINE

## 2018-03-03 PROCEDURE — 85379 FIBRIN DEGRADATION QUANT: CPT | Performed by: EMERGENCY MEDICINE

## 2018-03-03 PROCEDURE — 85730 THROMBOPLASTIN TIME PARTIAL: CPT | Performed by: EMERGENCY MEDICINE

## 2018-03-03 PROCEDURE — 94660 CPAP INITIATION&MGMT: CPT

## 2018-03-03 PROCEDURE — 96374 THER/PROPH/DIAG INJ IV PUSH: CPT

## 2018-03-03 PROCEDURE — 74011250636 HC RX REV CODE- 250/636: Performed by: EMERGENCY MEDICINE

## 2018-03-03 PROCEDURE — 85025 COMPLETE CBC W/AUTO DIFF WBC: CPT | Performed by: EMERGENCY MEDICINE

## 2018-03-03 PROCEDURE — 71045 X-RAY EXAM CHEST 1 VIEW: CPT

## 2018-03-03 PROCEDURE — 94762 N-INVAS EAR/PLS OXIMTRY CONT: CPT

## 2018-03-03 PROCEDURE — 65660000000 HC RM CCU STEPDOWN

## 2018-03-03 PROCEDURE — 94640 AIRWAY INHALATION TREATMENT: CPT

## 2018-03-03 PROCEDURE — 96375 TX/PRO/DX INJ NEW DRUG ADDON: CPT

## 2018-03-03 PROCEDURE — 82550 ASSAY OF CK (CPK): CPT | Performed by: EMERGENCY MEDICINE

## 2018-03-03 PROCEDURE — 74011636637 HC RX REV CODE- 636/637: Performed by: INTERNAL MEDICINE

## 2018-03-03 PROCEDURE — 74011250637 HC RX REV CODE- 250/637: Performed by: INTERNAL MEDICINE

## 2018-03-03 PROCEDURE — 99285 EMERGENCY DEPT VISIT HI MDM: CPT

## 2018-03-03 PROCEDURE — 82962 GLUCOSE BLOOD TEST: CPT

## 2018-03-03 PROCEDURE — 83036 HEMOGLOBIN GLYCOSYLATED A1C: CPT | Performed by: INTERNAL MEDICINE

## 2018-03-03 PROCEDURE — 93005 ELECTROCARDIOGRAM TRACING: CPT

## 2018-03-03 PROCEDURE — 83735 ASSAY OF MAGNESIUM: CPT | Performed by: EMERGENCY MEDICINE

## 2018-03-03 PROCEDURE — 77030035694 HC MSK BIPAP FLL FAC PERFMAX PHIL -B

## 2018-03-03 PROCEDURE — 83880 ASSAY OF NATRIURETIC PEPTIDE: CPT | Performed by: EMERGENCY MEDICINE

## 2018-03-03 PROCEDURE — 80053 COMPREHEN METABOLIC PANEL: CPT | Performed by: EMERGENCY MEDICINE

## 2018-03-03 PROCEDURE — 87070 CULTURE OTHR SPECIMN AEROBIC: CPT | Performed by: INTERNAL MEDICINE

## 2018-03-03 PROCEDURE — 74011000250 HC RX REV CODE- 250: Performed by: EMERGENCY MEDICINE

## 2018-03-03 PROCEDURE — 74011250636 HC RX REV CODE- 250/636: Performed by: INTERNAL MEDICINE

## 2018-03-03 RX ORDER — FUROSEMIDE 10 MG/ML
40 INJECTION INTRAMUSCULAR; INTRAVENOUS 2 TIMES DAILY
Status: DISCONTINUED | OUTPATIENT
Start: 2018-03-03 | End: 2018-03-06

## 2018-03-03 RX ORDER — HEPARIN SODIUM 5000 [USP'U]/ML
5000 INJECTION, SOLUTION INTRAVENOUS; SUBCUTANEOUS EVERY 8 HOURS
Status: DISCONTINUED | OUTPATIENT
Start: 2018-03-03 | End: 2018-03-06 | Stop reason: HOSPADM

## 2018-03-03 RX ORDER — FUROSEMIDE 10 MG/ML
40 INJECTION INTRAMUSCULAR; INTRAVENOUS
Status: COMPLETED | OUTPATIENT
Start: 2018-03-03 | End: 2018-03-03

## 2018-03-03 RX ORDER — GLIPIZIDE 5 MG/1
5 TABLET, FILM COATED, EXTENDED RELEASE ORAL DAILY
Status: DISCONTINUED | OUTPATIENT
Start: 2018-03-04 | End: 2018-03-05

## 2018-03-03 RX ORDER — CLONIDINE HYDROCHLORIDE 0.1 MG/1
0.1 TABLET ORAL 2 TIMES DAILY
Status: DISCONTINUED | OUTPATIENT
Start: 2018-03-03 | End: 2018-03-05

## 2018-03-03 RX ORDER — IPRATROPIUM BROMIDE AND ALBUTEROL SULFATE 2.5; .5 MG/3ML; MG/3ML
SOLUTION RESPIRATORY (INHALATION)
Status: COMPLETED
Start: 2018-03-03 | End: 2018-03-03

## 2018-03-03 RX ORDER — DEXTROSE 50 % IN WATER (D50W) INTRAVENOUS SYRINGE
25-50 AS NEEDED
Status: DISCONTINUED | OUTPATIENT
Start: 2018-03-03 | End: 2018-03-06 | Stop reason: HOSPADM

## 2018-03-03 RX ORDER — MAGNESIUM SULFATE 100 %
4 CRYSTALS MISCELLANEOUS AS NEEDED
Status: DISCONTINUED | OUTPATIENT
Start: 2018-03-03 | End: 2018-03-06 | Stop reason: HOSPADM

## 2018-03-03 RX ORDER — INSULIN LISPRO 100 [IU]/ML
INJECTION, SOLUTION INTRAVENOUS; SUBCUTANEOUS
Status: DISCONTINUED | OUTPATIENT
Start: 2018-03-03 | End: 2018-03-06 | Stop reason: HOSPADM

## 2018-03-03 RX ORDER — INSULIN LISPRO 100 [IU]/ML
INJECTION, SOLUTION INTRAVENOUS; SUBCUTANEOUS
Status: DISCONTINUED | OUTPATIENT
Start: 2018-03-03 | End: 2018-03-03

## 2018-03-03 RX ORDER — INSULIN LISPRO 100 [IU]/ML
0.05 INJECTION, SOLUTION INTRAVENOUS; SUBCUTANEOUS
Status: DISCONTINUED | OUTPATIENT
Start: 2018-03-03 | End: 2018-03-06 | Stop reason: HOSPADM

## 2018-03-03 RX ORDER — IPRATROPIUM BROMIDE AND ALBUTEROL SULFATE 2.5; .5 MG/3ML; MG/3ML
3 SOLUTION RESPIRATORY (INHALATION)
Status: COMPLETED | OUTPATIENT
Start: 2018-03-03 | End: 2018-03-03

## 2018-03-03 RX ORDER — ACETAMINOPHEN 325 MG/1
650 TABLET ORAL
Status: DISCONTINUED | OUTPATIENT
Start: 2018-03-03 | End: 2018-03-06 | Stop reason: HOSPADM

## 2018-03-03 RX ORDER — SPIRONOLACTONE 25 MG/1
25 TABLET ORAL DAILY
Status: DISCONTINUED | OUTPATIENT
Start: 2018-03-04 | End: 2018-03-06 | Stop reason: HOSPADM

## 2018-03-03 RX ADMIN — FUROSEMIDE 40 MG: 10 INJECTION, SOLUTION INTRAMUSCULAR; INTRAVENOUS at 11:12

## 2018-03-03 RX ADMIN — IPRATROPIUM BROMIDE AND ALBUTEROL SULFATE 3 ML: .5; 3 SOLUTION RESPIRATORY (INHALATION) at 11:08

## 2018-03-03 RX ADMIN — INSULIN LISPRO 6 UNITS: 100 INJECTION, SOLUTION INTRAVENOUS; SUBCUTANEOUS at 17:40

## 2018-03-03 RX ADMIN — FUROSEMIDE 40 MG: 10 INJECTION, SOLUTION INTRAMUSCULAR; INTRAVENOUS at 22:17

## 2018-03-03 RX ADMIN — WATER 1 G: 1 INJECTION INTRAMUSCULAR; INTRAVENOUS; SUBCUTANEOUS at 17:39

## 2018-03-03 RX ADMIN — IPRATROPIUM BROMIDE AND ALBUTEROL SULFATE 3 ML: .5; 3 SOLUTION RESPIRATORY (INHALATION) at 11:01

## 2018-03-03 RX ADMIN — METHYLPREDNISOLONE SODIUM SUCCINATE 40 MG: 40 INJECTION, POWDER, FOR SOLUTION INTRAMUSCULAR; INTRAVENOUS at 17:39

## 2018-03-03 RX ADMIN — CLONIDINE HYDROCHLORIDE 0.1 MG: 0.1 TABLET ORAL at 22:17

## 2018-03-03 RX ADMIN — HEPARIN SODIUM 5000 UNITS: 5000 INJECTION, SOLUTION INTRAVENOUS; SUBCUTANEOUS at 17:41

## 2018-03-03 RX ADMIN — METHYLPREDNISOLONE SODIUM SUCCINATE 40 MG: 40 INJECTION, POWDER, FOR SOLUTION INTRAMUSCULAR; INTRAVENOUS at 22:17

## 2018-03-03 RX ADMIN — INSULIN LISPRO 8 UNITS: 100 INJECTION, SOLUTION INTRAVENOUS; SUBCUTANEOUS at 17:40

## 2018-03-03 RX ADMIN — METHYLPREDNISOLONE SODIUM SUCCINATE 125 MG: 125 INJECTION, POWDER, FOR SOLUTION INTRAMUSCULAR; INTRAVENOUS at 11:12

## 2018-03-03 RX ADMIN — INSULIN LISPRO 6 UNITS: 100 INJECTION, SOLUTION INTRAVENOUS; SUBCUTANEOUS at 22:17

## 2018-03-03 NOTE — PROGRESS NOTES
Pt received in ER on EMS cpap unit, changed over to our bipap, pt c/o coughing and lungs \"filled with fluid\". Asif bipap well, duoneb given. Will get ABG in 30 minutes unless ordered differently. See doc flowsheet for settings and alarms.

## 2018-03-03 NOTE — ED NOTES
Pt transported to room on monitor by Jake Villafana RN. Assisted pt into hospital bed and positioned to comfort, beside report given.
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
## 2018-03-03 NOTE — PROGRESS NOTES
WHEN THIS THERAPIST ARRIVED IN ER TO OBTAIN ABG, PATIENT HAD APPARENTLY TAKEN HIMSELF OFF BIPAP AND WAS STANDING BY SIDE OF BED ON ROOM AIR. WAS CHANGING HIS TOP. NURSING HAD TRIED HIM ON 2L NC AS HE STATED THAT HE WEARS 2-4L AT HOME, BUT HE HAD TAKEN CANNULA OFF. CONFERRED WITH DR. EDMONDS AND HE INDICATED THAT NO ABG NEEDED AT THIS TIME.

## 2018-03-03 NOTE — IP AVS SNAPSHOT
[address] [place] [address] Kali Jaeger [address] 
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 [address] [phone] Discharge Orders None A check brittany indicates which time of day the medication should be taken. My Medications START taking these medications Instructions Each Dose to Equal  
 Morning Noon Evening Bedtime  
 furosemide 40 mg tablet Commonly known as:  LASIX Your last dose was: Your next dose is: Take 1 Tab by mouth daily. 40 mg  
    
   
   
   
  
 glipiZIDE 10 mg tablet Commonly known as:  Olevia Handy Replaces:  glipiZIDE SR 5 mg CR tablet Your last dose was: Your next dose is: Take 1 Tab by mouth two (2) times a day. 10 mg  
    
   
   
   
  
 oxyCODONE-acetaminophen  mg per tablet Commonly known as:  PERCOCET 10 Replaces:  oxyCODONE-acetaminophen 5-325 mg per tablet Your last dose was: Your next dose is: Take 1 Tab by mouth every six (6) hours as needed. Max Daily Amount: 4 Tabs. 1 Tab  
    
   
   
   
  
 predniSONE 5 mg dose pack Commonly known as:  STERAPRED Your last dose was: Your next dose is: See administration instruction per 5mg dose pack CHANGE how you take these medications Instructions Each Dose to Equal  
 Morning Noon Evening Bedtime  
 cloNIDine HCl 0.2 mg tablet Commonly known as:  CATAPRES What changed:   
- medication strength 
- how much to take - when to take this Your last dose was: Your next dose is: Take 1 Tab by mouth every eight (8) hours. 0.2 mg  
    
   
   
   
  
 hydrALAZINE 25 mg tablet Commonly known as:  APRESOLINE What changed:   
- medication strength 
- how much to take - when to take this Your last dose was: Your next dose is: Take 1 Tab by mouth three (3) times daily. 25 mg CONTINUE taking these medications Instructions Each Dose to Equal  
 Morning Noon Evening Bedtime  
 albuterol 90 mcg/actuation inhaler Commonly known as:  PROVENTIL HFA, VENTOLIN HFA, PROAIR HFA Your last dose was: Your next dose is: Take 1 Puff by inhalation. 1 puff in am and 1 puff in pm  
 1 Puff  
    
   
   
   
  
 amLODIPine 5 mg tablet Commonly known as:  Keon Hester Your last dose was: Your next dose is: Take  by mouth daily. dimethicone 1 % topical cream  
   
Your last dose was: Your next dose is:    
   
   
 Apply  to affected area daily. Oxygen Your last dose was: Your next dose is:    
   
   
 Indications: 4L  
     
   
   
   
  
 spironolactone 25 mg tablet Commonly known as:  ALDACTONE Your last dose was: Your next dose is: Take 25 mg by mouth daily. 25 mg  
    
   
   
   
  
  
STOP taking these medications   
 glipiZIDE SR 5 mg CR tablet Commonly known as:  GLUCOTROL XL  
Replaced by:  glipiZIDE 10 mg tablet  
   
  
 hydroCHLOROthiazide 25 mg tablet Commonly known as:  HYDRODIURIL  
   
 metFORMIN 500 mg tablet Commonly known as:  GLUCOPHAGE  
   
  
 oxyCODONE-acetaminophen 5-325 mg per tablet Commonly known as:  PERCOCET Replaced by:  oxyCODONE-acetaminophen  mg per tablet Where to Get Your Medications These medications were sent to [address] [place] [address] [address] Hours:  M-F 930am-6pm Phone:  [phone]  
  cloNIDine HCl 0.2 mg tablet  
 furosemide 40 mg tablet  
 glipiZIDE 10 mg tablet  
 hydrALAZINE 25 mg tablet  
 predniSONE 5 mg dose pack Information on where to get these meds will be given to you by the nurse or doctor. ! Ask your nurse or doctor about these medications  
  oxyCODONE-acetaminophen  mg per tablet Discharge Instructions DISCHARGE SUMMARY from Nurse PATIENT INSTRUCTIONS: 
 
Recommended activity: Activity as tolerated. *  Please give a list of your current medications to your Primary Care Provider. *  Please update this list whenever your medications are discontinued, doses are 
    changed, or new medications (including over-the-counter products) are added. *  Please carry medication information at all times in case of emergency situations. These are general instructions for a healthy lifestyle: No smoking/ No tobacco products/ Avoid exposure to second hand smoke Surgeon General's Warning:  Quitting smoking now greatly reduces serious risk to your health. Obesity, smoking, and sedentary lifestyle greatly increases your risk for illness A healthy diet, regular physical exercise & weight monitoring are important for maintaining a healthy lifestyle You may be retaining fluid if you have a history of heart failure or if you experience any of the following symptoms:  Weight gain of 3 pounds or more overnight or 5 pounds in a week, increased swelling in our hands or feet or shortness of breath while lying flat in bed. Please call your doctor as soon as you notice any of these symptoms; do not wait until your next office visit. Recognize signs and symptoms of STROKE: 
 
F-face looks uneven A-arms unable to move or move unevenly S-speech slurred or non-existent T-time-call 911 as soon as signs and symptoms begin-DO NOT go Back to bed or wait to see if you get better-TIME IS BRAIN. Warning Signs of HEART ATTACK Call 911 if you have these symptoms: 
? Chest discomfort. Most heart attacks involve discomfort in the center of the chest that lasts more than a few minutes, or that goes away and comes back. It can feel like uncomfortable pressure, squeezing, fullness, or pain. ? Discomfort in other areas of the upper body. Symptoms can include pain or discomfort in one or both arms, the back, neck, jaw, or stomach. ? Shortness of breath with or without chest discomfort. ? Other signs may include breaking out in a cold sweat, nausea, or lightheadedness. Don't wait more than five minutes to call [address]! Fast action can save your life. Calling 911 is almost always the fastest way to get lifesaving treatment. Emergency Medical Services staff can begin treatment when they arrive  up to an hour sooner than if someone gets to the hospital by car. The discharge information has been reviewed with the patient. The patient verbalized understanding. Discharge medications reviewed with the patient and appropriate educational materials and side effects teaching were provided. ___________________________________________________________________________________________________________________________________ Cardiac Rehabilitation: Care Instructions Your Care Instructions Cardiac rehabilitation is a program for people who have a heart problem, such as a heart attack, heart failure, or a heart valve disease.  The program includes exercise, lifestyle changes, education, and emotional support. Cardiac rehab can help you improve the quality of your life through better overall health. It can help you lose weight and feel better about yourself. On your cardiac rehab team, you may have your doctor, a nurse specialist, a dietitian, and a physical therapist. They will design your cardiac rehab program specifically for you. You will learn how to reduce your risk for heart problems, how to manage stress, and how to eat a heart-healthy diet. By the end of the program, you will be ready to maintain a healthier lifestyle on your own. Follow-up care is a key part of your treatment and safety. Be sure to make and go to all appointments, and call your doctor if you are having problems. It's also a good idea to know your test results and keep a list of the medicines you take. How can you care for yourself at home? · Take your medicines exactly as prescribed. Call your doctor if you think you are having a problem with your medicine. You will get more details on the specific medicines your doctor prescribes. · Weigh yourself every day if your doctor tells you to. Watch for sudden weight gain. Weigh yourself on the same scale with the same amount of clothing at the same time of day. · Plan your meals so that you are eating heart-healthy foods. ¨ Eat a variety of foods daily. Fresh fruits and vegetables and whole-grains are good choices. ¨ Limit your fat intake, especially saturated and trans fat. ¨ Limit salt (sodium). ¨ Increase fiber in your diet. ¨ Limit alcohol. · Learn how to take your pulse so that you can track your heart rate during exercise. · Always check with your doctor before you begin a new exercise program. 
· Warm up before you exercise and cool down afterward for at least 15 minutes each. This will help your heart gradually prepare for and recover from exercise and avoid pushing your heart too hard. · Stop exercising if you have any unusual discomfort, such as chest pain. · Do not smoke. Smoking can make heart problems worse. If you need help quitting, talk to your doctor about stop-smoking programs and medicines. These can increase your chances of quitting for good. When should you call for help? Call 911 anytime you think you may need emergency care. For example, call if: 
? · You have severe trouble breathing. ? · You cough up pink, foamy mucus and you have trouble breathing. ? · You have symptoms of a heart attack. These may include: ¨ Chest pain or pressure, or a strange feeling in the chest. 
¨ Sweating. ¨ Shortness of breath. ¨ Nausea or vomiting. ¨ Pain, pressure, or a strange feeling in the back, neck, jaw, or upper belly or in one or both shoulders or arms. ¨ Lightheadedness or sudden weakness. ¨ A fast or irregular heartbeat. After you call 911, the  may tell you to chew 1 adult-strength or 2 to 4 low-dose aspirin. Wait for an ambulance. Do not try to drive yourself. ? · You have angina symptoms (such as chest pain or pressure) that do not go away with rest or are not getting better within 5 minutes after you take a dose of nitroglycerin. ? · You have symptoms of a stroke. These may include: 
¨ Sudden numbness, tingling, weakness, or loss of movement in your face, arm, or leg, especially on only one side of your body. ¨ Sudden vision changes. ¨ Sudden trouble speaking. ¨ Sudden confusion or trouble understanding simple statements. ¨ Sudden problems with walking or balance. ¨ A sudden, severe headache that is different from past headaches. ? · You passed out (lost consciousness). ?Call your doctor now or seek immediate medical care if: 
? · You have new or increased shortness of breath. ? · You are dizzy or lightheaded, or you feel like you may faint.   
? · You gain weight suddenly, such as more than 2 to 3 pounds in a day or 5 pounds in a week. (Your doctor may suggest a different range of weight gain.) ? · You have increased swelling in your legs, ankles, or feet. ? Watch closely for changes in your health, and be sure to contact your doctor if you have any problems. Where can you learn more? Go to http://bhargav-mily.info/. Enter A936 in the search box to learn more about \"Cardiac Rehabilitation: Care Instructions. \" Current as of: September 21, 2016 Content Version: 11.4 © 6127-9336 [employer]iz. Care instructions adapted under license by [employer] (which disclaims liability or warranty for this information). If you have questions about a medical condition or this instruction, always ask your healthcare professional. [address] any warranty or liability for your use of this information. Chronic Obstructive Pulmonary Disease (COPD): Care Instructions Your Care Instructions Chronic obstructive pulmonary disease (COPD) is a general term for a group of lung diseases, including emphysema and chronic bronchitis. People with COPD have decreased airflow in and out of the lungs, which makes it hard to breathe. The airways also can get clogged with thick mucus. Cigarette smoking is a major cause of COPD. Although there is no cure for COPD, you can slow its progress. Following your treatment plan and taking care of yourself can help you feel better and live longer. Follow-up care is a key part of your treatment and safety. Be sure to make and go to all appointments, and call your doctor if you are having problems. It's also a good idea to know your test results and keep a list of the medicines you take. How can you care for yourself at home? ?Staying healthy ? · Do not smoke. This is the most important step you can take to prevent more damage to your lungs.  If you need help quitting, talk to your doctor about stop-smoking programs and medicines. These can increase your chances of quitting for good. ? · Avoid colds and flu. Get a pneumococcal vaccine shot. If you have had one before, ask your doctor whether you need a second dose. Get the flu vaccine every fall. If you must be around people with colds or the flu, wash your hands often. ? · Avoid secondhand smoke, air pollution, and high altitudes. Also avoid cold, dry air and hot, humid air. Stay at home with your windows closed when air pollution is bad. ?Medicines and oxygen therapy ? · Take your medicines exactly as prescribed. Call your doctor if you think you are having a problem with your medicine. ? · You may be taking medicines such as: ¨ Bronchodilators. These help open your airways and make breathing easier. Bronchodilators are either short-acting (work for 6 to 9 hours) or long-acting (work for 24 hours). You inhale most bronchodilators, so they start to act quickly. Always carry your quick-relief inhaler with you in case you need it while you are away from home. ¨ Corticosteroids (prednisone, budesonide). These reduce airway inflammation. They come in pill or inhaled form. You must take these medicines every day for them to work well. ? · A spacer may help you get more inhaled medicine to your lungs. Ask your doctor or pharmacist if a spacer is right for you. If it is, ask how to use it properly. ? · Do not take any vitamins, over-the-counter medicine, or herbal products without talking to your doctor first.  
? · If your doctor prescribed antibiotics, take them as directed. Do not stop taking them just because you feel better. You need to take the full course of antibiotics. ? · Oxygen therapy boosts the amount of oxygen in your blood and helps you breathe easier. Use the flow rate your doctor has recommended, and do not change it without talking to your doctor first.  
Activity ? · Get regular exercise. Walking is an easy way to get exercise. Start out slowly, and walk a little more each day. ? · Pay attention to your breathing. You are exercising too hard if you cannot talk while you are exercising. ? · Take short rest breaks when doing household chores and other activities. ? · Learn breathing methods-such as breathing through pursed lips-to help you become less short of breath. ? · If your doctor has not set you up with a pulmonary rehabilitation program, talk to him or her about whether rehab is right for you. Rehab includes exercise programs, education about your disease and how to manage it, help with diet and other changes, and emotional support. Diet ? · Eat regular, healthy meals. Use bronchodilators about 1 hour before you eat to make it easier to eat. Eat several small meals instead of three large ones. Drink beverages at the end of the meal. Avoid foods that are hard to chew. ? · Eat foods that contain protein so that you do not lose muscle mass. ? · Talk with your doctor if you gain too much weight or if you lose weight without trying. ?Mental health ? · Talk to your family, friends, or a therapist about your feelings. It is normal to feel frightened, angry, hopeless, helpless, and even guilty. Talking openly about bad feelings can help you cope. If these feelings last, talk to your doctor. When should you call for help? Call 911 anytime you think you may need emergency care. For example, call if: 
? · You have severe trouble breathing. ?Call your doctor now or seek immediate medical care if: 
? · You have new or worse trouble breathing. ? · You cough up blood. ? · You have a fever. ? Watch closely for changes in your health, and be sure to contact your doctor if: 
? · You cough more deeply or more often, especially if you notice more mucus or a change in the color of your mucus. ? · You have new or worse swelling in your legs or belly. ? · You are not getting better as expected. Where can you learn more? Go to http://bhargav-mily.info/. Duyen Ferrara in the search box to learn more about \"Chronic Obstructive Pulmonary Disease (COPD): Care Instructions. \" Current as of: May 12, 2017 Content Version: 11.4 © 3142-9186 [employer]. Care instructions adapted under license by "[employer] (which disclaims liability or warranty for this information). If you have questions about a medical condition or this instruction, always ask your healthcare professional. [address] any warranty or liability for your use of this information. Heart Failure: Care Instructions Your Care Instructions Heart failure occurs when your heart does not pump as much blood as the body needs. Failure does not mean that the heart has stopped pumping but rather that it is not pumping as well as it should. Over time, this causes fluid buildup in your lungs and other parts of your body. Fluid buildup can cause shortness of breath, fatigue, swollen ankles, and other problems. By taking medicines regularly, reducing sodium (salt) in your diet, checking your weight every day, and making lifestyle changes, you can feel better and live longer. Follow-up care is a key part of your treatment and safety. Be sure to make and go to all appointments, and call your doctor if you are having problems. It's also a good idea to know your test results and keep a list of the medicines you take. How can you care for yourself at home? Medicines ? · Be safe with medicines. Take your medicines exactly as prescribed. Call your doctor if you think you are having a problem with your medicine.   
? · Do not take any vitamins, over-the-counter medicine, or herbal products without talking to your doctor first. Tere Esposito not take ibuprofen (Advil or Motrin) and naproxen (Aleve) without talking to your doctor first. They could make your heart failure worse. ? · You may be taking some of the following medicine. ¨ Beta-blockers can slow heart rate, decrease blood pressure, and improve your condition. Taking a beta-blocker may lower your chance of needing to be hospitalized. ¨ Angiotensin-converting enzyme inhibitors (ACEIs) reduce the heart's workload, lower blood pressure, and reduce swelling. Taking an ACEI may lower your chance of needing to be hospitalized again. ¨ Angiotensin II receptor blockers (ARBs) work like ACEIs. Your doctor may prescribe them instead of ACEIs. ¨ Diuretics, also called water pills, reduce swelling. ¨ Potassium supplements replace this important mineral, which is sometimes lost with diuretics. ¨ Aspirin and other blood thinners prevent blood clots, which can cause a stroke or heart attack. ? You will get more details on the specific medicines your doctor prescribes. Diet ? · Your doctor may suggest that you limit sodium to 2,000 milligrams (mg) a day or less. That is less than 1 teaspoon of salt a day, including all the salt you eat in cooking or in packaged foods. People get most of their sodium from processed foods. Fast food and restaurant meals also tend to be very high in sodium. ? · Ask your doctor how much liquid you can drink each day. You may have to limit liquids. ?Weight ? · Weigh yourself without clothing at the same time each day. Record your weight. Call your doctor if you have a sudden weight gain, such as more than 2 to 3 pounds in a day or 5 pounds in a week. (Your doctor may suggest a different range of weight gain.) A sudden weight gain may mean that your heart failure is getting worse. ? Activity level ? · Start light exercise (if your doctor says it is okay). Even if you can only do a small amount, exercise will help you get stronger, have more energy, and manage your weight and your stress.  Walking is an easy way to get exercise. Start out by walking a little more than you did before. Bit by bit, increase the amount you walk. ? · When you exercise, watch for signs that your heart is working too hard. You are pushing yourself too hard if you cannot talk while you are exercising. If you become short of breath or dizzy or have chest pain, stop, sit down, and rest.  
? · If you feel \"wiped out\" the day after you exercise, walk slower or for a shorter distance until you can work up to a better pace. ? · Get enough rest at night. Sleeping with 1 or 2 pillows under your upper body and head may help you breathe easier. ? Lifestyle changes ? · Do not smoke. Smoking can make a heart condition worse. If you need help quitting, talk to your doctor about stop-smoking programs and medicines. These can increase your chances of quitting for good. Quitting smoking may be the most important step you can take to protect your heart. ? · Limit alcohol to 2 drinks a day for men and 1 drink a day for women. Too much alcohol can cause health problems. ? · Avoid getting sick from colds and the flu. Get a pneumococcal vaccine shot. If you have had one before, ask your doctor whether you need another dose. Get a flu shot each year. If you must be around people with colds or the flu, wash your hands often. When should you call for help? Call 911 if you have symptoms of sudden heart failure such as: 
? · You have severe trouble breathing. ? · You cough up pink, foamy mucus. ? · You have a new irregular or rapid heartbeat. ?Call your doctor now or seek immediate medical care if: 
? · You have new or increased shortness of breath. ? · You are dizzy or lightheaded, or you feel like you may faint. ? · You have sudden weight gain, such as more than 2 to 3 pounds in a day or 5 pounds in a week. (Your doctor may suggest a different range of weight gain.) ? · You have increased swelling in your legs, ankles, or feet. ? · You are suddenly so tired or weak that you cannot do your usual activities. ? Watch closely for changes in your health, and be sure to contact your doctor if you develop new symptoms. Where can you learn more? Go to http://bhargav-mily.info/. Enter X745 in the search box to learn more about \"Heart Failure: Care Instructions. \" Current as of: September 21, 2016 Content Version: 11.4 © 0862-0189 NetMovie. Care instructions adapted under license by [employer] (which disclaims liability or warranty for this information). If you have questions about a medical condition or this instruction, always ask your healthcare professional. [address] any warranty or liability for your use of this information. [employer] Announcement We are excited to announce that we are making your provider's discharge notes available to you in [employer]. You will see these notes when they are completed and signed by the physician that discharged you from your recent hospital stay. If you have any questions or concerns about any information you see in [employer], please call the Health Information Department where you were seen or reach out to your Primary Care Provider for more information about your plan of care. Introducing [facility]! [facility] introduces [employer] patient portal. Now you can access parts of your medical record, email your doctor's office, and request medication refills online. 1. In your internet browser, go to [email or web address] 2. Click on the First Time User? Click Here link in the Sign In box. You will see the New Member Sign Up page. 3. Enter your [employer] Access Code exactly as it appears below. You will not need to use this code after youve completed the sign-up process. If you do not sign up before the expiration date, you must request a new code. · aitainment Access Code: [email or web address] Expires: 4/26/2018  2:04 PM 
 
4. Enter the last four digits of your Social Security Number (xxxx) and Date of Birth (mm/dd/yyyy) as indicated and click Submit. You will be taken to the next sign-up page. 5. Create a aitainment ID. This will be your aitainment login ID and cannot be changed, so think of one that is secure and easy to remember. 6. Create a aitainment password. You can change your password at any time. 7. Enter your Password Reset Question and Answer. This can be used at a later time if you forget your password. 8. Enter your e-mail address. You will receive e-mail notification when new information is available in [address] 9. Click Sign Up. You can now view and download portions of your medical record. 10. Click the Download Summary menu link to download a portable copy of your medical information. If you have questions, please visit the Frequently Asked Questions section of the aitainment website. Remember, aitainment is NOT to be used for urgent needs. For medical emergencies, dial 911. Now available from your iPhone and Android! Unresulted Labs-Please follow up with your PCP about these lab tests Order Current Status EKG, 12 LEAD, INITIAL Preliminary result Providers Seen During Your Hospitalization Provider Specialty Primary office phone Av Wilson MD Emergency Medicine [phone] James Segal MD Internal Medicine [phone] Your Primary Care Physician (PCP) Primary Care Physician Office Phone Office Fax Yessenia Lackey, [address] [facility] [phone] You are allergic to the following Allergen Reactions Gabapentin Hives Lisinopril Swelling Tramadol Hives Recent Documentation Height Weight BMI Smoking Status 1.727 m (!) 160 kg 53.63 kg/m2 Former Smoker Emergency Contacts Name Discharge Info Relation Home Work Mobile Larissa Shahid DISCHARGE CAREGIVER [3] Spouse [3] [phone] Patient Belongings The following personal items are in your possession at time of discharge: 
  Dental Appliances: None  Visual Aid: None      Home Medications: None   Jewelry: None  Clothing: Hat, Shirt, Pants, Footwear    Other Valuables: At bedside Please provide this summary of care documentation to your next provider. Signatures-by signing, you are acknowledging that this After Visit Summary has been reviewed with you and you have received a copy. Patient Signature:  ____________________________________________________________ Date:  ____________________________________________________________  
  
[facility] Provider Signature:  ____________________________________________________________ Date:  ____________________________________________________________

## 2018-03-03 NOTE — ED NOTES
Pt has disconnected self from BiPAP and monitor. Pt refuses to place it back on and placed on 2L nasal cannula. Pt breathing much easier and tolerating nasal cannula. Dr. Derek Grimes informed.

## 2018-03-03 NOTE — IP AVS SNAPSHOT
[address] [place] [id number] 
[phone] Patient: Chris Stanley MRN: [id number] FBA:[id number]/5/9362 A check brittany indicates which time of day the medication should be taken. My Medications START taking these medications Instructions Each Dose to Equal  
 Morning Noon Evening Bedtime  
 furosemide 40 mg tablet Commonly known as:  LASIX Your last dose was: Your next dose is: Take 1 Tab by mouth daily. 40 mg  
    
   
   
   
  
 glipiZIDE 10 mg tablet Commonly known as:  Avril Barrier Replaces:  glipiZIDE SR 5 mg CR tablet Your last dose was: Your next dose is: Take 1 Tab by mouth two (2) times a day. 10 mg  
    
   
   
   
  
 oxyCODONE-acetaminophen  mg per tablet Commonly known as:  PERCOCET 10 Replaces:  oxyCODONE-acetaminophen 5-325 mg per tablet Your last dose was: Your next dose is: Take 1 Tab by mouth every six (6) hours as needed. Max Daily Amount: 4 Tabs. 1 Tab  
    
   
   
   
  
 predniSONE 5 mg dose pack Commonly known as:  STERAPRED Your last dose was: Your next dose is:    
   
   
 See administration instruction per 5mg dose pack CHANGE how you take these medications Instructions Each Dose to Equal  
 Morning Noon Evening Bedtime  
 cloNIDine HCl 0.2 mg tablet Commonly known as:  CATAPRES What changed:   
- medication strength 
- how much to take - when to take this Your last dose was: Your next dose is: Take 1 Tab by mouth every eight (8) hours. 0.2 mg  
    
   
   
   
  
 hydrALAZINE 25 mg tablet Commonly known as:  APRESOLINE What changed:   
- medication strength 
- how much to take - when to take this Your last dose was: Your next dose is: Take 1 Tab by mouth three (3) times daily.   
 25 mg  
    
 CONTINUE taking these medications Instructions Each Dose to Equal  
 Morning Noon Evening Bedtime  
 albuterol 90 mcg/actuation inhaler Commonly known as:  PROVENTIL HFA, VENTOLIN HFA, PROAIR HFA Your last dose was: Your next dose is: Take 1 Puff by inhalation. 1 puff in am and 1 puff in pm  
 1 Puff  
    
   
   
   
  
 amLODIPine 5 mg tablet Commonly known as:  [place] Your last dose was: Your next dose is: Take  by mouth daily. dimethicone 1 % topical cream  
   
Your last dose was: Your next dose is:    
   
   
 Apply  to affected area daily. Oxygen Your last dose was: Your next dose is:    
   
   
 Indications: 4L  
     
   
   
   
  
 spironolactone 25 mg tablet Commonly known as:  ALDACTONE Your last dose was: Your next dose is: Take 25 mg by mouth daily. 25 mg  
    
   
   
   
  
  
STOP taking these medications   
 glipiZIDE SR 5 mg CR tablet Commonly known as:  GLUCOTROL XL  
Replaced by:  glipiZIDE 10 mg tablet  
   
  
 hydroCHLOROthiazide 25 mg tablet Commonly known as:  HYDRODIURIL  
   
  
 metFORMIN 500 mg tablet Commonly known as:  GLUCOPHAGE  
   
  
 oxyCODONE-acetaminophen 5-325 mg per tablet Commonly known as:  PERCOCET Replaced by:  oxyCODONE-acetaminophen  mg per tablet Where to Get Your Medications These medications were sent to [address] [place] [address] [address]  [address] Hours:  M-F 930am-6pm Phone:  [phone]  
  cloNIDine HCl 0.2 mg tablet  
 furosemide 40 mg tablet  
 glipiZIDE 10 mg tablet  
 hydrALAZINE 25 mg tablet  
 predniSONE 5 mg dose pack Information on where to get these meds will be given to you by the nurse or doctor. ! Ask your nurse or doctor about these medications  
  oxyCODONE-acetaminophen  mg per tablet

## 2018-03-03 NOTE — ED NOTES
TRANSFER - OUT REPORT:    Verbal report given to KOURTENY Garrison(name) on Arch Dariel  being transferred to [place] [address](unit) for routine progression of care       Report consisted of patients Situation, Background, Assessment and   Recommendations(SBAR). Information from the following report(s) SBAR, ED Summary, Intake/Output, MAR and Recent Results was reviewed with the receiving nurse. Lines:   Peripheral IV 03/03/18 Right Forearm (Active)        Opportunity for questions and clarification was provided.       Patient transported with:   Monitor  O2 @ 2 liters  Registered Nurse

## 2018-03-03 NOTE — PROGRESS NOTES
PATIENT MOVED TO TELEMETRY FROM ER. STATES THAT SOMEONE WILL BRING HIS HOME BIPAP OR CPAP DEVICE IN FOR USE WHILE INPATIENT. NURSING AWARE THAT HIS EQUIPMENT WILL REQUIRE BIOMED CHECK. PATIENT REQUESTED THAT THERAPIST REMOVE THE HOSPITAL PROVIDED BIPAP FROM HIS ROOM. AGAIN, NURSING WAS NOTIFIED.

## 2018-03-04 LAB
GLUCOSE BLD STRIP.AUTO-MCNC: 141 MG/DL (ref 70–110)
GLUCOSE BLD STRIP.AUTO-MCNC: 189 MG/DL (ref 70–110)
GLUCOSE BLD STRIP.AUTO-MCNC: 193 MG/DL (ref 70–110)
GLUCOSE BLD STRIP.AUTO-MCNC: 199 MG/DL (ref 70–110)

## 2018-03-04 PROCEDURE — 74011250637 HC RX REV CODE- 250/637: Performed by: INTERNAL MEDICINE

## 2018-03-04 PROCEDURE — 51798 US URINE CAPACITY MEASURE: CPT

## 2018-03-04 PROCEDURE — 74011250636 HC RX REV CODE- 250/636: Performed by: INTERNAL MEDICINE

## 2018-03-04 PROCEDURE — 82962 GLUCOSE BLOOD TEST: CPT

## 2018-03-04 PROCEDURE — 74011000250 HC RX REV CODE- 250: Performed by: INTERNAL MEDICINE

## 2018-03-04 PROCEDURE — 74011636637 HC RX REV CODE- 636/637: Performed by: INTERNAL MEDICINE

## 2018-03-04 PROCEDURE — 65660000000 HC RM CCU STEPDOWN

## 2018-03-04 RX ADMIN — METHYLPREDNISOLONE SODIUM SUCCINATE 40 MG: 40 INJECTION, POWDER, FOR SOLUTION INTRAMUSCULAR; INTRAVENOUS at 21:51

## 2018-03-04 RX ADMIN — FUROSEMIDE 40 MG: 10 INJECTION, SOLUTION INTRAMUSCULAR; INTRAVENOUS at 20:15

## 2018-03-04 RX ADMIN — SPIRONOLACTONE 25 MG: 25 TABLET, FILM COATED ORAL at 09:38

## 2018-03-04 RX ADMIN — HEPARIN SODIUM 5000 UNITS: 5000 INJECTION, SOLUTION INTRAVENOUS; SUBCUTANEOUS at 16:49

## 2018-03-04 RX ADMIN — INSULIN LISPRO 8 UNITS: 100 INJECTION, SOLUTION INTRAVENOUS; SUBCUTANEOUS at 08:23

## 2018-03-04 RX ADMIN — HEPARIN SODIUM 5000 UNITS: 5000 INJECTION, SOLUTION INTRAVENOUS; SUBCUTANEOUS at 01:05

## 2018-03-04 RX ADMIN — INSULIN LISPRO 8 UNITS: 100 INJECTION, SOLUTION INTRAVENOUS; SUBCUTANEOUS at 16:49

## 2018-03-04 RX ADMIN — METHYLPREDNISOLONE SODIUM SUCCINATE 40 MG: 40 INJECTION, POWDER, FOR SOLUTION INTRAMUSCULAR; INTRAVENOUS at 14:00

## 2018-03-04 RX ADMIN — INSULIN LISPRO 3 UNITS: 100 INJECTION, SOLUTION INTRAVENOUS; SUBCUTANEOUS at 11:56

## 2018-03-04 RX ADMIN — CLONIDINE HYDROCHLORIDE 0.1 MG: 0.1 TABLET ORAL at 09:38

## 2018-03-04 RX ADMIN — INSULIN LISPRO 8 UNITS: 100 INJECTION, SOLUTION INTRAVENOUS; SUBCUTANEOUS at 11:55

## 2018-03-04 RX ADMIN — CLONIDINE HYDROCHLORIDE 0.1 MG: 0.1 TABLET ORAL at 20:15

## 2018-03-04 RX ADMIN — INSULIN LISPRO 3 UNITS: 100 INJECTION, SOLUTION INTRAVENOUS; SUBCUTANEOUS at 21:52

## 2018-03-04 RX ADMIN — INSULIN LISPRO 3 UNITS: 100 INJECTION, SOLUTION INTRAVENOUS; SUBCUTANEOUS at 06:57

## 2018-03-04 RX ADMIN — GLIPIZIDE 5 MG: 5 TABLET, FILM COATED, EXTENDED RELEASE ORAL at 09:38

## 2018-03-04 RX ADMIN — WATER 1 G: 1 INJECTION INTRAMUSCULAR; INTRAVENOUS; SUBCUTANEOUS at 16:49

## 2018-03-04 RX ADMIN — FUROSEMIDE 40 MG: 10 INJECTION, SOLUTION INTRAMUSCULAR; INTRAVENOUS at 09:38

## 2018-03-04 RX ADMIN — HEPARIN SODIUM 5000 UNITS: 5000 INJECTION, SOLUTION INTRAVENOUS; SUBCUTANEOUS at 09:37

## 2018-03-04 RX ADMIN — ACETAMINOPHEN 650 MG: 325 TABLET ORAL at 17:03

## 2018-03-04 RX ADMIN — METHYLPREDNISOLONE SODIUM SUCCINATE 40 MG: 40 INJECTION, POWDER, FOR SOLUTION INTRAMUSCULAR; INTRAVENOUS at 06:57

## 2018-03-04 NOTE — PROGRESS NOTES
Assumed care of pt. Pt is resting no sign of distress. Pt resting no sign of distress. 1954: Sputum sample sent and urine sample sent.

## 2018-03-04 NOTE — PROGRESS NOTES
Hospitalist Progress Note    Patient: Sneha Strange MRN: [id number]  CSN: [id number]    YOB: 1960  Age: 59 y.o. Sex: male    DOA: 3/3/2018 LOS:  LOS: 1 day              IMPRESSION and Plan:    Sneha Strange is a 59 y.o. male with   Patient Active Problem List    Diagnosis Date Noted    CHF (congestive heart failure) ([facility] 03/03/2018    Chest pain 01/25/2018    Acute on chronic combined systolic and diastolic ACC/AHA stage C congestive heart failure ([facility] 01/25/2018    COPD exacerbation ([facility] 02/19/2013    Type II diabetes mellitus with peripheral autonomic neuropathy ([facility] 02/19/2013    JIM (obstructive sleep apnea) 02/19/2013    Hypoxia 02/19/2013    Morbid obesity ([facility] 02/19/2013    Hypertension      Active Problems:    CHF (congestive heart failure) ([facility] (3/3/2018)        1. COPD exacerbation -- improving. Cont current rx   2. Acute on chronic diastolic congestive heart failure exacberbation with last echo on 1/24/18 - cont IV lasix. Diuresing well      3. DM-ii -- accuchecks and ssi    3. Respt distress  - improving     4. FEN -  Labs as ordered and reviewed        Patient's condition is fair        Recommend to continue hospitalization. Discussed with patient. Chief Complaints:   Chief Complaint   Patient presents with    Shortness of Breath     SUBJECTIVE:  Pt is seen and examined. \" I can breath much better today and less sob\"     No CP     No Fever, chills, Nausea, vomitting. Review of systems:    General: No fevers or chills. Cardiovascular: No chest pain or pressure. No palpitations. Pulmonary: shortness of breath.    Gastrointestinal: No nausea, vomiting    PE:  Patient Vitals for the past 24 hrs:   BP Temp Pulse Resp SpO2 Height Weight   03/04/18 1100 129/87 98.1 °F (36.7 °C) 77 20 99 % 5' 8\" (1.727 m) 158.7 kg (349 lb 13.9 oz)   03/04/18 0929 (!) 185/115 - - - - - -   03/04/18 0854 (!) 194/108 97.1 °F (36.2 °C) 85 23 99 % - -   03/04/18 0329 163/83 97.8 °F (36.6 °C) 80 30 96 % - -   03/04/18 0251 - - - - - - 158.7 kg (349 lb 13.9 oz)   03/04/18 0005 166/84 97.7 °F (36.5 °C) 83 25 96 % - -   03/03/18 2008 (!) 175/93 97.7 °F (36.5 °C) 82 25 95 % - -   03/03/18 1646 (!) 172/95 98.4 °F (36.9 °C) 83 25 93 % - -   03/03/18 1605 - - - - 95 % - -   03/03/18 1538 163/81 99 °F (37.2 °C) 83 26 100 % - -   03/03/18 1430 166/81 - 79 (!) 36 97 % - -   03/03/18 1330 179/84 - 80 (!) 31 100 % - -   03/03/18 1315 155/83 - 77 30 100 % - -   03/03/18 1245 190/85 - 72 27 90 % - -   03/03/18 1230 170/88 - 79 23 96 % - -       Intake/Output Summary (Last 24 hours) at 03/04/18 1217  Last data filed at 03/04/18 1103   Gross per 24 hour   Intake             2650 ml   Output             2901 ml   Net             -251 ml     Patient Vitals for the past 120 hrs:   Weight   03/03/18 1100 154.2 kg (340 lb)   03/04/18 0251 158.7 kg (349 lb 13.9 oz)   03/04/18 1100 158.7 kg (349 lb 13.9 oz)           HEENT: Perrla, EOMI; oral mucosa well prefused; Conjunctiva not injected  Neck: No JVD, Negative carotid bruits, normal pulses; No thyromegaly  Resp: b/l exp wheezing  CV: RRR s1s2 No murmur; No rubs; PMI not displaced  Abd: Positive Bowel Sounds, Soft, Nontender  Ext: No clubbing; No cyanosis;  No edema    Intake and Output:  Current Shift:  03/04 0701 - 03/04 1900  In: 480 [P.O.:480]  Out: 1 [Urine:1]  Last three shifts:  03/02 1901 - 03/04 0700  In: 2170 [P.O.:2160; I.V.:10]  Out: 5351 [Urine:3975]    Lab/Data Reviewed:  Recent Results (from the past 8 hour(s))   GLUCOSE, POC    Collection Time: 03/04/18  6:06 AM   Result Value Ref Range    Glucose (POC) 193 (H) 70 - 110 mg/dL   GLUCOSE, POC    Collection Time: 03/04/18 10:51 AM   Result Value Ref Range    Glucose (POC) 189 (H) 70 - 110 mg/dL     Medications:  Current Facility-Administered Medications   Medication Dose Route Frequency    cloNIDine HCl (CATAPRES) tablet 0.1 mg  0.1 mg Oral BID    glipiZIDE SR (GLUCOTROL XL) tablet 5 mg  5 mg Oral DAILY    spironolactone (ALDACTONE) tablet 25 mg  25 mg Oral DAILY    methylPREDNISolone (PF) (SOLU-MEDROL) injection 40 mg  40 mg IntraVENous Q8H    cefTRIAXone (ROCEPHIN) 1 g in sterile water (preservative free) 10 mL IV syringe  1 g IntraVENous Q24H    furosemide (LASIX) injection 40 mg  40 mg IntraVENous BID    acetaminophen (TYLENOL) tablet 650 mg  650 mg Oral Q4H PRN    heparin (porcine) injection 5,000 Units  5,000 Units SubCUTAneous Q8H    glucose chewable tablet 16 g  4 Tab Oral PRN    glucagon (GLUCAGEN) injection 1 mg  1 mg IntraMUSCular PRN    dextrose (D50W) injection syrg 12.5-25 g  25-50 mL IntraVENous PRN    insulin lispro (HUMALOG) injection 8 Units  0.05 Units/kg SubCUTAneous TID WITH MEALS    insulin lispro (HUMALOG) injection   SubCUTAneous AC&HS       Recent Results (from the past 24 hour(s))   GLUCOSE, POC    Collection Time: 03/03/18  4:45 PM   Result Value Ref Range    Glucose (POC) 265 (H) 70 - 110 mg/dL   URINALYSIS W/ RFLX MICROSCOPIC    Collection Time: 03/03/18  7:00 PM   Result Value Ref Range    Color YELLOW      Appearance CLEAR      Specific gravity 1.024 1.005 - 1.030      pH (UA) 5.0 5.0 - 8.0      Protein TRACE (A) NEG mg/dL    Glucose >1000 (A) NEG mg/dL    Ketone NEGATIVE  NEG mg/dL    Bilirubin NEGATIVE  NEG      Blood MODERATE (A) NEG      Urobilinogen 0.2 0.2 - 1.0 EU/dL    Nitrites NEGATIVE  NEG      Leukocyte Esterase NEGATIVE  NEG     URINE MICROSCOPIC ONLY    Collection Time: 03/03/18  7:00 PM   Result Value Ref Range    WBC 0 to 1 0 - 5 /hpf    RBC 2 to 4 0 - 5 /hpf    Epithelial cells NEGATIVE  0 - 5 /lpf    Bacteria NEGATIVE  NEG /hpf   GLUCOSE, POC    Collection Time: 03/03/18  9:12 PM   Result Value Ref Range    Glucose (POC) 229 (H) 70 - 110 mg/dL   GLUCOSE, POC    Collection Time: 03/04/18  6:06 AM   Result Value Ref Range    Glucose (POC) 193 (H) 70 - 110 mg/dL   GLUCOSE, POC    Collection Time: 03/04/18 10:51 AM   Result Value Ref Range    Glucose (POC) 189 (H) 70 - 110 mg/dL       Procedures/imaging: see electronic medical records for all procedures/Xrays and details which were not copied into this note but were reviewed prior to creation of Katy Fallon MD   3/4/2018, 12:17 PM

## 2018-03-04 NOTE — PROGRESS NOTES
0473 Completed shift report and assumed care from RONY Stephen RN. We reviewed SBAR labs meds and plan of care. 0825 Completed shift assessment  0910 Aid alerted that pt's BP was elevated, was in another pt's room  0925 Assessed pt's BP (185/115), pt has no complaints/assymptomatic, will admin BP meds and lasix and continue to montor pt.  1030 BP lowering appropriately  1200 Reassessed pt  1345 A. Rylie Bennett RN notified me that pt had 5 run of vtach during my lunch break. Electrodes replaced, pt is not symptomatic of any cardiac issues (denies chest pain) and is resting. Will continue to monitor pt.  1630 Reassessed pt  1935 Completed shift report and transferred to Cami Moreno RN. We reviewed SBAR, labs, meds, and plan of care for this pt.

## 2018-03-04 NOTE — PROGRESS NOTES
Problem: Falls - Risk of  Goal: *Absence of Falls  Document Richard Fall Risk and appropriate interventions in the flowsheet.    Outcome: Progressing Towards Goal  Fall Risk Interventions:            Medication Interventions: Teach patient to arise slowly

## 2018-03-04 NOTE — PROGRESS NOTES
1925 Pt received from offgoing nurse without any signs or symptoms of distress. Pt vitals are stable and within normal limits. Pt bed in low position with wheels locked and call bell within reach. 2015 Assessment completed and documented in flow sheet. Pt denies any further needs at this time. Pt in NAD with bed in low position, wheels locked and call bell within reach. 2217 Scheduled medications administered as ordered. [id number] Reassessment completed with no changes noted. Bed locked, in lowest position, with call light within reach. Scheduled medications administered as ordered. 0506 Reassessment completed with no changes noted. Bed locked, in lowest position, with call light within reach. 0600 Shift summary: Patient spent uneventful shift. No issues noted. See flow sheet and MAR.  0720 Bedside and Verbal shift change report given to Sonny Baker RN (oncoming nurse) by Javi Hood RN   (offgoing nurse). Report included the following information SBAR, MAR and Recent Results.
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
## 2018-03-04 NOTE — PROGRESS NOTES
Chart reviewed. Pt admitted to hospital for CHF, COPD. CM will follow for discharge planning needs. Readmission Risk Assessment:     High Risk and [facility] patients    RRAT Score:  21 or greater    Initial Assessment:   per chart, pt is a 59 y.o. male with PMHX of COPD who presents to the emergency department via EMS on c-pap C/O SOB onset over one month ago but worse today. Associated sxs include cough with blood tinged sputum and substernal CP which is reproducible to palpitation     Emergency Contact:    Name Relation Home Work Mobile      Vivek Spouse [phone]               Pertinent Medical Hx:    DM, HTN, CHF, JIM, morbid obesity    PCP/Specialists:  PCP [employer]:      DME:         High Risk Care Transition Plan:  1. Evaluate for [facility], SNF, acute rehab, community care coordination of Resources. 2. Involve patient/caregiver in assessment, planning, education and implement of intervention. 3. CM daily patient care huddles/interdisciplinary rounds. 4. PCP/Specialist appointment within 48 hours of discharge unless otherwise specified. 5. Facilitate transportation and logistics for follow-up appointments. 6. Medication reconciliation [id number] [facility]  7. Discharge follow-up phone call within 2  4 days (NN, [employer], Nursing) CM follow up as assigned. 8. Formal handoff between hospital provider and post-acute provider to transition patient  9. Handoff to [facility] Nurse Navigator or PCP practice.       Care Management Interventions  Transition of Care Consult (CM Consult): Discharge Planning

## 2018-03-04 NOTE — PROGRESS NOTES
Pt. Chiqui Ford he would wait for his family to bring his personal bi-pap. Pt.'s family never showed up. Pt refusing bi-pap.

## 2018-03-04 NOTE — ROUTINE PROCESS
Bedside shift change report given to [address] [place] [address] (oncoming nurse) by Prashant Cummings RN (offgoing nurse). Report included the following information SBAR, Kardex and MAR.

## 2018-03-05 ENCOUNTER — APPOINTMENT (OUTPATIENT)
Dept: NUCLEAR MEDICINE | Age: 58
DRG: 194 | End: 2018-03-05
Attending: HOSPITALIST
Payer: MEDICAID

## 2018-03-05 LAB
ALBUMIN SERPL-MCNC: 2.9 G/DL (ref 3.4–5)
ALBUMIN/GLOB SERPL: 0.6 {RATIO} (ref 0.8–1.7)
ALP SERPL-CCNC: 95 U/L (ref 45–117)
ALT SERPL-CCNC: 139 U/L (ref 16–61)
ANION GAP SERPL CALC-SCNC: 4 MMOL/L (ref 3–18)
AST SERPL-CCNC: 117 U/L (ref 15–37)
BASOPHILS # BLD: 0 K/UL (ref 0–0.1)
BASOPHILS NFR BLD: 0 % (ref 0–3)
BILIRUB SERPL-MCNC: 0.1 MG/DL (ref 0.2–1)
BUN SERPL-MCNC: 30 MG/DL (ref 7–18)
BUN/CREAT SERPL: 22 (ref 12–20)
CALCIUM SERPL-MCNC: 9.7 MG/DL (ref 8.5–10.1)
CHLORIDE SERPL-SCNC: 103 MMOL/L (ref 100–108)
CO2 SERPL-SCNC: 36 MMOL/L (ref 21–32)
CREAT SERPL-MCNC: 1.39 MG/DL (ref 0.6–1.3)
DIFFERENTIAL METHOD BLD: ABNORMAL
EOSINOPHIL # BLD: 0 K/UL (ref 0–0.4)
EOSINOPHIL NFR BLD: 0 % (ref 0–5)
ERYTHROCYTE [DISTWIDTH] IN BLOOD BY AUTOMATED COUNT: 14.1 % (ref 11.6–14.5)
GLOBULIN SER CALC-MCNC: 4.6 G/DL (ref 2–4)
GLUCOSE BLD STRIP.AUTO-MCNC: 113 MG/DL (ref 70–110)
GLUCOSE BLD STRIP.AUTO-MCNC: 190 MG/DL (ref 70–110)
GLUCOSE BLD STRIP.AUTO-MCNC: 240 MG/DL (ref 70–110)
GLUCOSE SERPL-MCNC: 137 MG/DL (ref 74–99)
HCT VFR BLD AUTO: 48.4 % (ref 36–48)
HGB BLD-MCNC: 15 G/DL (ref 13–16)
LYMPHOCYTES # BLD: 0.7 K/UL (ref 0.8–3.5)
LYMPHOCYTES NFR BLD: 5 % (ref 20–51)
MAGNESIUM SERPL-MCNC: 2.3 MG/DL (ref 1.6–2.6)
MCH RBC QN AUTO: 28.8 PG (ref 24–34)
MCHC RBC AUTO-ENTMCNC: 31 G/DL (ref 31–37)
MCV RBC AUTO: 93.1 FL (ref 74–97)
MONOCYTES # BLD: 0.6 K/UL (ref 0–1)
MONOCYTES NFR BLD: 4 % (ref 2–9)
NEUTS SEG # BLD: 13.4 K/UL (ref 1.8–8)
NEUTS SEG NFR BLD: 91 % (ref 42–75)
PLATELET # BLD AUTO: 198 K/UL (ref 135–420)
PMV BLD AUTO: 12.7 FL (ref 9.2–11.8)
POTASSIUM SERPL-SCNC: 4.4 MMOL/L (ref 3.5–5.5)
PROT SERPL-MCNC: 7.5 G/DL (ref 6.4–8.2)
RBC # BLD AUTO: 5.2 M/UL (ref 4.7–5.5)
RBC MORPH BLD: ABNORMAL
SODIUM SERPL-SCNC: 143 MMOL/L (ref 136–145)
WBC # BLD AUTO: 14.7 K/UL (ref 4.6–13.2)
WBC MORPH BLD: ABNORMAL

## 2018-03-05 PROCEDURE — C8929 TTE W OR WO FOL WCON,DOPPLER: HCPCS

## 2018-03-05 PROCEDURE — 74011250636 HC RX REV CODE- 250/636: Performed by: HOSPITALIST

## 2018-03-05 PROCEDURE — 74011636637 HC RX REV CODE- 636/637: Performed by: INTERNAL MEDICINE

## 2018-03-05 PROCEDURE — 74011250637 HC RX REV CODE- 250/637: Performed by: HOSPITALIST

## 2018-03-05 PROCEDURE — 74011000250 HC RX REV CODE- 250: Performed by: INTERNAL MEDICINE

## 2018-03-05 PROCEDURE — 65660000000 HC RM CCU STEPDOWN

## 2018-03-05 PROCEDURE — 82962 GLUCOSE BLOOD TEST: CPT

## 2018-03-05 PROCEDURE — 83735 ASSAY OF MAGNESIUM: CPT | Performed by: INTERNAL MEDICINE

## 2018-03-05 PROCEDURE — 74011250637 HC RX REV CODE- 250/637: Performed by: INTERNAL MEDICINE

## 2018-03-05 PROCEDURE — 85025 COMPLETE CBC W/AUTO DIFF WBC: CPT | Performed by: INTERNAL MEDICINE

## 2018-03-05 PROCEDURE — 36415 COLL VENOUS BLD VENIPUNCTURE: CPT | Performed by: INTERNAL MEDICINE

## 2018-03-05 PROCEDURE — 80053 COMPREHEN METABOLIC PANEL: CPT | Performed by: INTERNAL MEDICINE

## 2018-03-05 PROCEDURE — 94660 CPAP INITIATION&MGMT: CPT

## 2018-03-05 PROCEDURE — 77010033678 HC OXYGEN DAILY

## 2018-03-05 PROCEDURE — 74011250636 HC RX REV CODE- 250/636: Performed by: INTERNAL MEDICINE

## 2018-03-05 RX ORDER — HYDRALAZINE HYDROCHLORIDE 25 MG/1
25 TABLET, FILM COATED ORAL 3 TIMES DAILY
Status: DISCONTINUED | OUTPATIENT
Start: 2018-03-05 | End: 2018-03-06 | Stop reason: HOSPADM

## 2018-03-05 RX ORDER — CLONIDINE HYDROCHLORIDE 0.1 MG/1
0.1 TABLET ORAL
Status: COMPLETED | OUTPATIENT
Start: 2018-03-05 | End: 2018-03-05

## 2018-03-05 RX ORDER — CLONIDINE HYDROCHLORIDE 0.1 MG/1
0.2 TABLET ORAL EVERY 8 HOURS
Status: DISCONTINUED | OUTPATIENT
Start: 2018-03-05 | End: 2018-03-06 | Stop reason: HOSPADM

## 2018-03-05 RX ORDER — OXYCODONE AND ACETAMINOPHEN 10; 325 MG/1; MG/1
1 TABLET ORAL
Status: DISCONTINUED | OUTPATIENT
Start: 2018-03-05 | End: 2018-03-06 | Stop reason: HOSPADM

## 2018-03-05 RX ORDER — CLONIDINE HYDROCHLORIDE 0.1 MG/1
0.2 TABLET ORAL 2 TIMES DAILY
Status: DISCONTINUED | OUTPATIENT
Start: 2018-03-05 | End: 2018-03-05

## 2018-03-05 RX ORDER — AMLODIPINE BESYLATE 5 MG/1
5 TABLET ORAL DAILY
Status: DISCONTINUED | OUTPATIENT
Start: 2018-03-05 | End: 2018-03-06 | Stop reason: HOSPADM

## 2018-03-05 RX ADMIN — INSULIN LISPRO 8 UNITS: 100 INJECTION, SOLUTION INTRAVENOUS; SUBCUTANEOUS at 08:23

## 2018-03-05 RX ADMIN — FUROSEMIDE 40 MG: 10 INJECTION, SOLUTION INTRAMUSCULAR; INTRAVENOUS at 08:20

## 2018-03-05 RX ADMIN — INSULIN LISPRO 8 UNITS: 100 INJECTION, SOLUTION INTRAVENOUS; SUBCUTANEOUS at 11:32

## 2018-03-05 RX ADMIN — HYDRALAZINE HYDROCHLORIDE 25 MG: 25 TABLET, FILM COATED ORAL at 17:17

## 2018-03-05 RX ADMIN — AMLODIPINE BESYLATE 5 MG: 5 TABLET ORAL at 11:29

## 2018-03-05 RX ADMIN — ACETAMINOPHEN 650 MG: 325 TABLET ORAL at 05:05

## 2018-03-05 RX ADMIN — HYDRALAZINE HYDROCHLORIDE 25 MG: 25 TABLET, FILM COATED ORAL at 11:29

## 2018-03-05 RX ADMIN — HEPARIN SODIUM 5000 UNITS: 5000 INJECTION, SOLUTION INTRAVENOUS; SUBCUTANEOUS at 08:20

## 2018-03-05 RX ADMIN — METHYLPREDNISOLONE SODIUM SUCCINATE 40 MG: 40 INJECTION, POWDER, FOR SOLUTION INTRAMUSCULAR; INTRAVENOUS at 05:05

## 2018-03-05 RX ADMIN — INSULIN LISPRO 8 UNITS: 100 INJECTION, SOLUTION INTRAVENOUS; SUBCUTANEOUS at 17:25

## 2018-03-05 RX ADMIN — OXYCODONE HYDROCHLORIDE AND ACETAMINOPHEN 1 TABLET: 10; 325 TABLET ORAL at 17:18

## 2018-03-05 RX ADMIN — CLONIDINE HYDROCHLORIDE 0.1 MG: 0.1 TABLET ORAL at 08:21

## 2018-03-05 RX ADMIN — INSULIN LISPRO 3 UNITS: 100 INJECTION, SOLUTION INTRAVENOUS; SUBCUTANEOUS at 11:31

## 2018-03-05 RX ADMIN — SODIUM CHLORIDE 1 ML: 9 INJECTION INTRAMUSCULAR; INTRAVENOUS; SUBCUTANEOUS at 15:13

## 2018-03-05 RX ADMIN — WATER 1 G: 1 INJECTION INTRAMUSCULAR; INTRAVENOUS; SUBCUTANEOUS at 17:17

## 2018-03-05 RX ADMIN — SPIRONOLACTONE 25 MG: 25 TABLET, FILM COATED ORAL at 08:20

## 2018-03-05 RX ADMIN — CLONIDINE HYDROCHLORIDE 0.2 MG: 0.1 TABLET ORAL at 17:18

## 2018-03-05 RX ADMIN — CLONIDINE HYDROCHLORIDE 0.1 MG: 0.1 TABLET ORAL at 12:22

## 2018-03-05 RX ADMIN — INSULIN LISPRO 6 UNITS: 100 INJECTION, SOLUTION INTRAVENOUS; SUBCUTANEOUS at 22:53

## 2018-03-05 RX ADMIN — HYDRALAZINE HYDROCHLORIDE 25 MG: 25 TABLET, FILM COATED ORAL at 22:56

## 2018-03-05 RX ADMIN — FUROSEMIDE 40 MG: 10 INJECTION, SOLUTION INTRAMUSCULAR; INTRAVENOUS at 20:14

## 2018-03-05 RX ADMIN — OXYCODONE HYDROCHLORIDE AND ACETAMINOPHEN 1 TABLET: 10; 325 TABLET ORAL at 11:29

## 2018-03-05 RX ADMIN — HEPARIN SODIUM 5000 UNITS: 5000 INJECTION, SOLUTION INTRAVENOUS; SUBCUTANEOUS at 00:43

## 2018-03-05 RX ADMIN — OXYCODONE HYDROCHLORIDE AND ACETAMINOPHEN 1 TABLET: 10; 325 TABLET ORAL at 22:52

## 2018-03-05 RX ADMIN — HEPARIN SODIUM 5000 UNITS: 5000 INJECTION, SOLUTION INTRAVENOUS; SUBCUTANEOUS at 17:17

## 2018-03-05 RX ADMIN — GLIPIZIDE 5 MG: 5 TABLET, FILM COATED, EXTENDED RELEASE ORAL at 08:21

## 2018-03-05 RX ADMIN — METHYLPREDNISOLONE SODIUM SUCCINATE 40 MG: 40 INJECTION, POWDER, FOR SOLUTION INTRAMUSCULAR; INTRAVENOUS at 17:25

## 2018-03-05 NOTE — DIABETES MGMT
GLYCEMIC CONTROL PROGRESS NOTE:    -discussed in rounds, known h/o T2DM, HbA1C within recommended range for age + comorbids, on oral home regimen  -NAMRATA, IV steroids on board, 40 mg Q 8 hours, steroid induced hyperglycemia  -BG out of target range non-ICU: < 140 mg/dL, requiring corrective coverage  -TDD = 33 (24 mealtime + 9 - Humalog Very Insulin Resistant Corrective Coverage)  -PPG out of target range, recommend monitor BG trends and make adjustments as needed    Recent Glucose Results:   Lab Results   Component Value Date/Time     (H) 03/05/2018 04:16 AM    GLUCPOC 190 (H) 03/05/2018 10:38 AM    GLUCPOC 113 (H) 03/05/2018 06:22 AM    GLUCPOC 199 (H) 03/04/2018 09:17 PM         Carrington Crowell RN, MS  Glycemic Control Team

## 2018-03-05 NOTE — PROGRESS NOTES
1945: Received report from outgoing nurse. Pt resting in bed. No complains at this time. 2013: Assessment completed. Pt on 3L NC; expiratory Wheezing. BP elevated; Scheduled meds administered. Will continue to monitor BP.     0505: Pt reported a pain of 10/10. 650 mg Tylenol given PO    0600: pain reassessment completed. Pt Stated that pain has improved with Tylenol. Requested for a bath. Clean linen, gown, and towels  Provided. Bed linen changed     0630: Uneventful shift. Pt used CPAP overnight.

## 2018-03-05 NOTE — PROGRESS NOTES
Hospitalist Progress Note-critical care note     Patient: Aurora Smallwood MRN: [id number]  CSN: [id number]    YOB: 1960  Age: 59 y.o. Sex: male    DOA: 3/3/2018 LOS:  LOS: 2 days            Chief complaint: copd  Exacerbation , chf, htn     Assessment/Plan         Hospital Problems  Date Reviewed: 2/19/2013          Codes Class Noted POA    * (Principal)CHF (congestive heart failure) ([email or web address] ICD-10-CM: I50.9  ICD-9-CM: 428.0  3/3/2018 Unknown        COPD exacerbation ([email or web address] ICD-10-CM: J44.1  ICD-9-CM: 491.21  2/19/2013 Yes        Type II diabetes mellitus with peripheral autonomic neuropathy ([email or web address] ICD-10-CM: E11.43  ICD-9-CM: 250.60, 337.1  2/19/2013 Yes        Hypertension ICD-10-CM: I10  ICD-9-CM: 401.9  Unknown Yes        JIM (obstructive sleep apnea) ICD-10-CM: G47.33  ICD-9-CM: 327.23  2/19/2013 Yes        Morbid obesity ([email or web address] ICD-10-CM: E66.01  ICD-9-CM: 278.01  2/19/2013 Yes            1 syncope   Per pt reported, not on h/p   With sob, will have v./ q scan to r/o pe  2  COPD exacerbation   Continue iv steroid, weaning, breathing tx   On home nc O2 4-5 L    3 Acute on chronic diastolic congestive heart failure exacberbation    - cont IV lasix. Echo done today, results pending       4   DM-ii -- accuchecks and ssi     5 chronic pain   Reported having pain clinic appointment and missed     6 morbid obesity     7 HTN  Not controlled, increased clonidine, hydralazine   Subjective: sob better, chronic pain, need pain meds       Nurse: no acute issue     Disposition :1-2 days   Review of systems:    General: No fevers or chills. Cardiovascular: No chest pain or pressure. No palpitations. Pulmonary: shortness of breath better   Gastrointestinal: No nausea, vomiting.      Vital signs/Intake and Output:  Visit Vitals    BP (!) 161/111 (BP 1 Location: Left arm, BP Patient Position: At rest;Sitting)    Pulse 81    Temp 98.1 °F (36.7 °C)    Resp 20    Ht 5' 8\" (1.727 m)    Wt (!) 160.2 kg (353 lb 2.8 oz)    SpO2 100%    BMI 53.7 kg/m2     Current Shift:  03/05 0701 - 03/05 1900  In: 350 [P.O.:350]  Out: 375 [Urine:375]  Last three shifts:  03/03 1901 - 03/05 0700  In: 3371 [P.O.:4884]  Out: 5002 [Urine:5002]    Physical Exam:  General: WD, WN. Alert, cooperative, no acute distress    HEENT: NC, Atraumatic. PERRLA, anicteric sclerae. Lungs: CTA Bilaterally. No Wheezing/Rhonchi/Rales. Heart:  Regular  rhythm,  No murmur, No Rubs, No Gallops  Abdomen: Soft, Non distended, Non tender.  +Bowel sounds,   Extremities: No c/c/e  Psych:   Not anxious or agitated. Neurologic:  No acute neurological deficit. Labs: Results:       Chemistry Recent Labs      03/05/18   0416  03/03/18   1108   GLU  137*  123*   NA  143  144   K  4.4  3.5   CL  103  107   CO2  36*  32   BUN  30*  16   CREA  1.39*  1.30   CA  9.7  8.7   AGAP  4  5   BUCR  22*  12   AP  95  87   TP  7.5  7.1   ALB  2.9*  2.9*   GLOB  4.6*  4.2*   AGRAT  0.6*  0.7*      CBC w/Diff Recent Labs      03/05/18   0416  03/03/18   1108   WBC  14.7*  10.9   RBC  5.20  4.86   HGB  15.0  13.9   HCT  48.4*  44.0   PLT  198  154   GRANS  91*  80*   LYMPH  5*  12*   EOS  0  1      Cardiac Enzymes Recent Labs      03/03/18   1108   CPK  144   CKND1  0.7      Coagulation Recent Labs      03/03/18   1108   PTP  13.5   INR  1.1   APTT  29.5       Lipid Panel Lab Results   Component Value Date/Time    Cholesterol, total 196 01/25/2018 02:51 AM    HDL Cholesterol 64 (H) 01/25/2018 02:51 AM    LDL, calculated 121.2 (H) 01/25/2018 02:51 AM    VLDL, calculated 10.8 01/25/2018 02:51 AM    Triglyceride 54 01/25/2018 02:51 AM    CHOL/HDL Ratio 3.1 01/25/2018 02:51 AM      BNP No results for input(s): BNPP in the last 72 hours.    Liver Enzymes Recent Labs      03/05/18   0416   TP  7.5   ALB  2.9*   AP  95   SGOT  117*      Thyroid Studies Lab Results   Component Value Date/Time    TSH 0.14 (L) 01/25/2018 02:51 AM        Procedures/imaging: see electronic medical records for all procedures/Xrays and details which were not copied into this note but were reviewed prior to creation of Linnea Gooden MD

## 2018-03-05 NOTE — CDMP QUERY
Patient is noted to have a BMI of 53.2.   Please clarify if this patient is:     =>Morbidly obese (BMI ³ 40)  =>Obese (BMI 30 - 39.9)  =>Overweight (BMI 25 - 29.9)  =>Other explanation of clinical findings  =>Unable to determine (no explanation for clinical findings)    Presentation: 340lbs = BMI 53.2    REFERENCE:  The [address] [place] [address] has issued a statement indicating that, \"Individuals who are overweight, obese, or morbidly obese are at an increased risk for certain medical conditions    thank you  Lakshmi Banerjee RN [facility]  [phone]

## 2018-03-05 NOTE — ROUTINE PROCESS
Patient arrived in echo department for his echo. He refused to lay on the table and then he informed me he had an important phone call that he HAD to take on the phone in his room. I informed him that he should be in his room in time for the phone call but he said that was not good enough. He wanted to go back to his room immediately and did not want his echo done. I verified with him that he was refusing the test and he said yes. He had one in January and thought that was good enough.

## 2018-03-05 NOTE — PROGRESS NOTES
Problem: Falls - Risk of  Goal: *Absence of Falls  Document Richard Fall Risk and appropriate interventions in the flowsheet.    Outcome: Progressing Towards Goal  Fall Risk Interventions:            Medication Interventions: Patient to call before getting OOB, Teach patient to arise slowly

## 2018-03-05 NOTE — PROGRESS NOTES
D/c plan: Anticipate home when medically cleared  Met with patient at bedside. He informed cm he lives alone has a sister who checks on him daily. Reports he has home o2 t 3LPM via NC he gets from [address] [place] [address] home patient. Patient will need a follow up visit with [facility] upon dc if he does not need HHC. Cm will continue to follow. Care Management Interventions  PCP Verified by CM:  Yes  Transition of Care Consult (CM Consult): Home Health ([facility])  [address]: Yes  Current Support Network: Lives Alone, Family Lives Nearby  Confirm Follow Up Transport: Family  Plan discussed with Pt/Family/Caregiver: Yes  Freedom of Choice Offered: Yes  Discharge Location  Discharge Placement: Home with home health ([facility])

## 2018-03-06 ENCOUNTER — HOME HEALTH ADMISSION (OUTPATIENT)
Dept: HOME HEALTH SERVICES | Facility: HOME HEALTH | Age: 58
End: 2018-03-06

## 2018-03-06 VITALS
SYSTOLIC BLOOD PRESSURE: 172 MMHG | OXYGEN SATURATION: 100 % | DIASTOLIC BLOOD PRESSURE: 104 MMHG | HEIGHT: 68 IN | BODY MASS INDEX: 47.74 KG/M2 | RESPIRATION RATE: 22 BRPM | TEMPERATURE: 98 F | WEIGHT: 315 LBS | HEART RATE: 84 BPM

## 2018-03-06 LAB
BACTERIA SPEC CULT: NORMAL
GLUCOSE BLD STRIP.AUTO-MCNC: 134 MG/DL (ref 70–110)
GLUCOSE BLD STRIP.AUTO-MCNC: 146 MG/DL (ref 70–110)
GRAM STN SPEC: NORMAL
SERVICE CMNT-IMP: NORMAL

## 2018-03-06 PROCEDURE — 97166 OT EVAL MOD COMPLEX 45 MIN: CPT

## 2018-03-06 PROCEDURE — 74011250637 HC RX REV CODE- 250/637: Performed by: INTERNAL MEDICINE

## 2018-03-06 PROCEDURE — 97161 PT EVAL LOW COMPLEX 20 MIN: CPT

## 2018-03-06 PROCEDURE — 74011250636 HC RX REV CODE- 250/636: Performed by: HOSPITALIST

## 2018-03-06 PROCEDURE — 77010033678 HC OXYGEN DAILY

## 2018-03-06 PROCEDURE — 74011636637 HC RX REV CODE- 636/637: Performed by: INTERNAL MEDICINE

## 2018-03-06 PROCEDURE — 74011250636 HC RX REV CODE- 250/636: Performed by: INTERNAL MEDICINE

## 2018-03-06 PROCEDURE — 74011250637 HC RX REV CODE- 250/637: Performed by: HOSPITALIST

## 2018-03-06 RX ORDER — GLIPIZIDE 10 MG/1
10 TABLET ORAL 2 TIMES DAILY
Qty: 60 TAB | Refills: 0 | Status: SHIPPED | OUTPATIENT
Start: 2018-03-06 | End: 2020-04-22

## 2018-03-06 RX ORDER — HYDRALAZINE HYDROCHLORIDE 25 MG/1
25 TABLET, FILM COATED ORAL 3 TIMES DAILY
Qty: 90 TAB | Refills: 0 | Status: SHIPPED | OUTPATIENT
Start: 2018-03-06

## 2018-03-06 RX ORDER — FUROSEMIDE 40 MG/1
40 TABLET ORAL DAILY
Status: DISCONTINUED | OUTPATIENT
Start: 2018-03-06 | End: 2018-03-06

## 2018-03-06 RX ORDER — CLONIDINE HYDROCHLORIDE 0.2 MG/1
0.2 TABLET ORAL EVERY 8 HOURS
Qty: 90 TAB | Refills: 0 | Status: SHIPPED | OUTPATIENT
Start: 2018-03-06 | End: 2020-04-22

## 2018-03-06 RX ORDER — OXYCODONE AND ACETAMINOPHEN 10; 325 MG/1; MG/1
1 TABLET ORAL
Qty: 8 TAB | Refills: 0 | Status: SHIPPED | OUTPATIENT
Start: 2018-03-06 | End: 2018-04-27

## 2018-03-06 RX ORDER — FUROSEMIDE 40 MG/1
40 TABLET ORAL DAILY
Qty: 30 TAB | Refills: 0 | Status: SHIPPED | OUTPATIENT
Start: 2018-03-06 | End: 2020-04-22

## 2018-03-06 RX ORDER — PREDNISONE 5 MG/1
TABLET ORAL
Qty: 21 TAB | Refills: 0 | Status: SHIPPED | OUTPATIENT
Start: 2018-03-06 | End: 2018-04-27

## 2018-03-06 RX ORDER — FUROSEMIDE 40 MG/1
40 TABLET ORAL DAILY
Status: DISCONTINUED | OUTPATIENT
Start: 2018-03-06 | End: 2018-03-06 | Stop reason: HOSPADM

## 2018-03-06 RX ADMIN — SPIRONOLACTONE 25 MG: 25 TABLET, FILM COATED ORAL at 09:46

## 2018-03-06 RX ADMIN — CLONIDINE HYDROCHLORIDE 0.2 MG: 0.1 TABLET ORAL at 01:58

## 2018-03-06 RX ADMIN — HEPARIN SODIUM 5000 UNITS: 5000 INJECTION, SOLUTION INTRAVENOUS; SUBCUTANEOUS at 09:46

## 2018-03-06 RX ADMIN — METHYLPREDNISOLONE SODIUM SUCCINATE 40 MG: 40 INJECTION, POWDER, FOR SOLUTION INTRAMUSCULAR; INTRAVENOUS at 05:19

## 2018-03-06 RX ADMIN — INSULIN LISPRO 8 UNITS: 100 INJECTION, SOLUTION INTRAVENOUS; SUBCUTANEOUS at 09:46

## 2018-03-06 RX ADMIN — OXYCODONE HYDROCHLORIDE AND ACETAMINOPHEN 1 TABLET: 10; 325 TABLET ORAL at 10:59

## 2018-03-06 RX ADMIN — FUROSEMIDE 40 MG: 40 TABLET ORAL at 10:59

## 2018-03-06 RX ADMIN — OXYCODONE HYDROCHLORIDE AND ACETAMINOPHEN 1 TABLET: 10; 325 TABLET ORAL at 05:19

## 2018-03-06 RX ADMIN — CLONIDINE HYDROCHLORIDE 0.2 MG: 0.1 TABLET ORAL at 09:45

## 2018-03-06 RX ADMIN — HYDRALAZINE HYDROCHLORIDE 25 MG: 25 TABLET, FILM COATED ORAL at 09:45

## 2018-03-06 RX ADMIN — HEPARIN SODIUM 5000 UNITS: 5000 INJECTION, SOLUTION INTRAVENOUS; SUBCUTANEOUS at 01:59

## 2018-03-06 RX ADMIN — AMLODIPINE BESYLATE 5 MG: 5 TABLET ORAL at 09:45

## 2018-03-06 NOTE — PROGRESS NOTES
Patient currently awake and wishing to not yet wear his Hospital owned CPAP Unit. Breathing is without difficulty, vital signs stable, will continue to monitor.

## 2018-03-06 NOTE — ROUTINE PROCESS
Bedside and Verbal shift change report given to Luis Newell RN (oncoming nurse) by Vu Gauthier RN   (offgoing nurse). Report included the following information SBAR, Kardex, Intake/Output and MAR.

## 2018-03-06 NOTE — DISCHARGE SUMMARY
Discharge Summary    Patient: Uli Krishnan MRN: [id number]  CSN: [id number]    YOB: 1960  Age: 59 y.o. Sex: male    DOA: 3/3/2018 LOS:  LOS: 3 days   Discharge Date:      Primary Care Provider:  Diana Andersen MD    Admission Diagnoses: CHF (congestive heart failure) [facility])    Discharge Diagnoses:    Hospital Problems  Date Reviewed: 2/19/2013          Codes Class Noted POA    * (Principal)CHF (congestive heart failure) ([facility] ICD-10-CM: I50.9  ICD-9-CM: 428.0  3/3/2018 Unknown        COPD exacerbation ([facility] ICD-10-CM: J44.1  ICD-9-CM: 491.21  2/19/2013 Yes        Type II diabetes mellitus with peripheral autonomic neuropathy ([facility] ICD-10-CM: E11.43  ICD-9-CM: 250.60, 337.1  2/19/2013 Yes        Hypertension ICD-10-CM: I10  ICD-9-CM: 401.9  Unknown Yes        JIM (obstructive sleep apnea) ICD-10-CM: G47.33  ICD-9-CM: 327.23  2/19/2013 Yes        Morbid obesity ([facility] ICD-10-CM: E66.01  ICD-9-CM: 278.01  2/19/2013 Yes              Discharge Condition: Stable    Discharge Medications:     Discharge Medication List as of 3/6/2018 12:09 PM      START taking these medications    Details   furosemide (LASIX) 40 mg tablet Take 1 Tab by mouth daily. , Normal, Disp-30 Tab, R-0      oxyCODONE-acetaminophen (PERCOCET 10)  mg per tablet Take 1 Tab by mouth every six (6) hours as needed. Max Daily Amount: 4 Tabs., Print, Disp-8 Tab, R-0      glipiZIDE (GLUCOTROL) 10 mg tablet Take 1 Tab by mouth two (2) times a day., Normal, Disp-60 Tab, R-0      predniSONE (STERAPRED) 5 mg dose pack See administration instruction per 5mg dose pack, Normal, Disp-21 Tab, R-0         CONTINUE these medications which have CHANGED    Details   cloNIDine HCl (CATAPRES) 0.2 mg tablet Take 1 Tab by mouth every eight (8) hours. , Normal, Disp-90 Tab, R-0      hydrALAZINE (APRESOLINE) 25 mg tablet Take 1 Tab by mouth three (3) times daily. , Normal, Disp-90 Tab, R-0         CONTINUE these medications which have NOT CHANGED    Details Oxygen Indications: 4L, Historical Med      spironolactone (ALDACTONE) 25 mg tablet Take 25 mg by mouth daily. , Historical Med      dimethicone 1 % topical cream Apply  to affected area daily. Print, Disp-60 g, R-0      amLODIPine (NORVASC) 5 mg tablet Take  by mouth daily. Historical Med      albuterol (PROVENTIL HFA, VENTOLIN HFA) 90 mcg/actuation inhaler Take 1 Puff by inhalation. 1 puff in am and 1 puff in pm Historical Med, 1 Puff         STOP taking these medications       oxyCODONE-acetaminophen (PERCOCET) 5-325 mg per tablet Comments:   Reason for Stopping:         metFORMIN (GLUCOPHAGE) 500 mg tablet Comments:   Reason for Stopping:         glipiZIDE SR (GLUCOTROL XL) 5 mg CR tablet Comments:   Reason for Stopping:         hydrochlorothiazide (HYDRODIURIL) 25 mg tablet Comments:   Reason for Stopping:               Procedures : none     Consults: None      PHYSICAL EXAM   Visit Vitals    BP (!) 172/104 (BP 1 Location: Right arm, BP Patient Position: At rest)    Pulse 84    Temp 98 °F (36.7 °C)    Resp 22    Ht 5' 8\" (1.727 m)    Wt (!) 160 kg (352 lb 11.8 oz)    SpO2 100%    BMI 53.63 kg/m2     General: Awake, cooperative, no acute distress    HEENT: NC, Atraumatic. PERRLA, EOMI. Anicteric sclerae. Lungs:  CTA Bilaterally. No Wheezing/Rhonchi/Rales. Heart:  Regular  rhythm,  No murmur, No Rubs, No Gallops  Abdomen: Soft, Non distended, Non tender. +Bowel sounds,   Extremities: No c/c/e  Psych:   Not anxious or agitated. Neurologic:  No acute neurological deficits. Admission HPI :     Meli Matos is a 59 y.o. male being admitted to the hospital with acute resp distress. He was recenlty admitted on 1/24/18 due to CHF exacerbation. He is c/o worsening sob and cough since last 3-4 days and now cough productive of yellow sputum. He is also co wheezing.      Hospital Course :   Since he was admitted, iv lasix was given for chf exacerbation.  Iv steroid  With abx for copd exacerbation. With the treatment, his sob is back to his base line and walking in the encarnacion way without difficulty. He reported chronic pain and he missed appointment while he was in the hospital. Recommend to f/u with pain specialist. He refused to have v/q scan and he denies having syncope for this admission. HTN medication was increased   Activity: Activity as tolerated    Diet: Cardiac Diet    Follow-up: cardiology. Pulmonology , pcm , pain clinic     Disposition: home     Minutes spent on discharge: 45 min       Labs: Results:       Chemistry Recent Labs      03/05/18 0416   GLU  137*   NA  143   K  4.4   CL  103   CO2  36*   BUN  30*   CREA  1.39*   CA  9.7   AGAP  4   BUCR  22*   AP  95   TP  7.5   ALB  2.9*   GLOB  4.6*   AGRAT  0.6*      CBC w/Diff Recent Labs      03/05/18 0416   WBC  14.7*   RBC  5.20   HGB  15.0   HCT  48.4*   PLT  198   GRANS  91*   LYMPH  5*   EOS  0      Cardiac Enzymes No results for input(s): CPK, CKND1, TISHA in the last 72 hours. No lab exists for component: CKRMB, TROIP   Coagulation No results for input(s): PTP, INR, APTT in the last 72 hours. No lab exists for component: INREXT, INREXT    Lipid Panel Lab Results   Component Value Date/Time    Cholesterol, total 196 01/25/2018 02:51 AM    HDL Cholesterol 64 (H) 01/25/2018 02:51 AM    LDL, calculated 121.2 (H) 01/25/2018 02:51 AM    VLDL, calculated 10.8 01/25/2018 02:51 AM    Triglyceride 54 01/25/2018 02:51 AM    CHOL/HDL Ratio 3.1 01/25/2018 02:51 AM      BNP No results for input(s): BNPP in the last 72 hours.    Liver Enzymes Recent Labs      03/05/18 0416   TP  7.5   ALB  2.9*   AP  95   SGOT  117*      Thyroid Studies Lab Results   Component Value Date/Time    TSH 0.14 (L) 01/25/2018 02:51 AM            Significant Diagnostic Studies: Xr Chest Port    Result Date: 3/3/2018  Chest, single view Indication: Shortness of breath Comparison: Several prior chest radiograph, most recently 1/24/2018 Findings: Portable upright AP view of the chest was obtained. Mild central vascular congestion is noted with faint interstitial opacities present in a perihilar distribution. No pneumothorax or pleural effusion. Cardiac size is enlarged. Mediastinal contours are normal. There is no acute osseous abnormality. Impression: Cardiomegaly with mild central vascular congestion and mild perihilar interstitial edema.             [facility] [place]     CC: Job Vazquez MD

## 2018-03-06 NOTE — ROUTINE PROCESS
Bedside and Verbal shift change report given to Yamel Bradley RN (oncoming nurse) by Arelis Sheldon RN (offgoing nurse). Report included the following information SBAR and Kardex.

## 2018-03-06 NOTE — PROGRESS NOTES
5186 Bedside shift change report given to OCTAVIA Alonso (oncoming nurse) by Jacobo Sharma RN (offgoing nurse). Report included the following information SBAR, Kardex, ED Summary, Intake/Output, MAR, Accordion, Recent Results, Cardiac Rhythm NSR and Alarm Parameters . 3946 Assessment Completed. Pt stated pain was a 10/10. Pt lives at a 8 and medication Diane [place] helps a little bit. \" Pt received percocet at [phone]. Will receive next dose at 1119. Call light is within reach. 1159 Shift uneventful. Pt ambulated in hallway without oxygen with PT. Pt tolerated well. No complaints of chest pain or shortness of breath.

## 2018-03-06 NOTE — ROUTINE PROCESS
Dual AVS reviewed with KOURTNEY Lynn. All medications reviewed individually with patient. Opportunities for questions and concerns provided. Patient discharged via (mode of transport ie. Car, ambulance or air transport) car  Patient's arm band appropriately discarded.

## 2018-03-06 NOTE — PROGRESS NOTES
D/c plan: home today  Met with patient plan to d/c home. Denies needs from cm or dme. He informed cm he lives alone has a sister who checks on him daily. Reports he has home o2 at 3LPM via NC he gets from [address] [place] [address] home patient. Patient will need a follow up visit with [facility] upon dc    Care Management Interventions  PCP Verified by CM:  Yes  Transition of Care Consult (CM Consult): Home Health ([facility])  [address] Yes  Current Support Network: Lives Alone, Family Lives Nearby  Confirm Follow Up Transport: Family  Plan discussed with Pt/Family/Caregiver: Yes  Freedom of Choice Offered: Yes  Discharge Location  Discharge Placement: Home with home health ([facility])
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
## 2018-03-06 NOTE — DISCHARGE INSTRUCTIONS
DISCHARGE SUMMARY from Nurse    PATIENT INSTRUCTIONS:    Recommended activity: Activity as tolerated. *  Please give a list of your current medications to your Primary Care Provider. *  Please update this list whenever your medications are discontinued, doses are      changed, or new medications (including over-the-counter products) are added. *  Please carry medication information at all times in case of emergency situations. These are general instructions for a healthy lifestyle:    No smoking/ No tobacco products/ Avoid exposure to second hand smoke  Surgeon General's Warning:  Quitting smoking now greatly reduces serious risk to your health. Obesity, smoking, and sedentary lifestyle greatly increases your risk for illness    A healthy diet, regular physical exercise & weight monitoring are important for maintaining a healthy lifestyle    You may be retaining fluid if you have a history of heart failure or if you experience any of the following symptoms:  Weight gain of 3 pounds or more overnight or 5 pounds in a week, increased swelling in our hands or feet or shortness of breath while lying flat in bed. Please call your doctor as soon as you notice any of these symptoms; do not wait until your next office visit. Recognize signs and symptoms of STROKE:    F-face looks uneven    A-arms unable to move or move unevenly    S-speech slurred or non-existent    T-time-call 911 as soon as signs and symptoms begin-DO NOT go       Back to bed or wait to see if you get better-TIME IS BRAIN. Warning Signs of HEART ATTACK     Call 911 if you have these symptoms:   Chest discomfort. Most heart attacks involve discomfort in the center of the chest that lasts more than a few minutes, or that goes away and comes back. It can feel like uncomfortable pressure, squeezing, fullness, or pain.  Discomfort in other areas of the upper body.  Symptoms can include pain or discomfort in one or both arms, the back, neck, jaw, or stomach.  Shortness of breath with or without chest discomfort.  Other signs may include breaking out in a cold sweat, nausea, or lightheadedness. Don't wait more than five minutes to call 911 - MINUTES MATTER! Fast action can save your life. Calling 911 is almost always the fastest way to get lifesaving treatment. Emergency Medical Services staff can begin treatment when they arrive -- up to an hour sooner than if someone gets to the hospital by car. The discharge information has been reviewed with the patient. The patient verbalized understanding. Discharge medications reviewed with the patient and appropriate educational materials and side effects teaching were provided. ___________________________________________________________________________________________________________________________________     Cardiac Rehabilitation: Care Instructions  Your Care Instructions    Cardiac rehabilitation is a program for people who have a heart problem, such as a heart attack, heart failure, or a heart valve disease. The program includes exercise, lifestyle changes, education, and emotional support. Cardiac rehab can help you improve the quality of your life through better overall health. It can help you lose weight and feel better about yourself. On your cardiac rehab team, you may have your doctor, a nurse specialist, a dietitian, and a physical therapist. They will design your cardiac rehab program specifically for you. You will learn how to reduce your risk for heart problems, how to manage stress, and how to eat a heart-healthy diet. By the end of the program, you will be ready to maintain a healthier lifestyle on your own. Follow-up care is a key part of your treatment and safety. Be sure to make and go to all appointments, and call your doctor if you are having problems. It's also a good idea to know your test results and keep a list of the medicines you take.   How can you care for yourself at home? · Take your medicines exactly as prescribed. Call your doctor if you think you are having a problem with your medicine. You will get more details on the specific medicines your doctor prescribes. · Weigh yourself every day if your doctor tells you to. Watch for sudden weight gain. Weigh yourself on the same scale with the same amount of clothing at the same time of day. · Plan your meals so that you are eating heart-healthy foods. ¨ Eat a variety of foods daily. Fresh fruits and vegetables and whole-grains are good choices. ¨ Limit your fat intake, especially saturated and trans fat. ¨ Limit salt (sodium). ¨ Increase fiber in your diet. ¨ Limit alcohol. · Learn how to take your pulse so that you can track your heart rate during exercise. · Always check with your doctor before you begin a new exercise program.  · Warm up before you exercise and cool down afterward for at least 15 minutes each. This will help your heart gradually prepare for and recover from exercise and avoid pushing your heart too hard. · Stop exercising if you have any unusual discomfort, such as chest pain. · Do not smoke. Smoking can make heart problems worse. If you need help quitting, talk to your doctor about stop-smoking programs and medicines. These can increase your chances of quitting for good. When should you call for help? Call 911 anytime you think you may need emergency care. For example, call if:  ? · You have severe trouble breathing. ? · You cough up pink, foamy mucus and you have trouble breathing. ? · You have symptoms of a heart attack. These may include:  ¨ Chest pain or pressure, or a strange feeling in the chest.  ¨ Sweating. ¨ Shortness of breath. ¨ Nausea or vomiting. ¨ Pain, pressure, or a strange feeling in the back, neck, jaw, or upper belly or in one or both shoulders or arms. ¨ Lightheadedness or sudden weakness. ¨ A fast or irregular heartbeat.   After you call 911, the  may tell you to chew 1 adult-strength or 2 to 4 low-dose aspirin. Wait for an ambulance. Do not try to drive yourself. ? · You have angina symptoms (such as chest pain or pressure) that do not go away with rest or are not getting better within 5 minutes after you take a dose of nitroglycerin. ? · You have symptoms of a stroke. These may include:  ¨ Sudden numbness, tingling, weakness, or loss of movement in your face, arm, or leg, especially on only one side of your body. ¨ Sudden vision changes. ¨ Sudden trouble speaking. ¨ Sudden confusion or trouble understanding simple statements. ¨ Sudden problems with walking or balance. ¨ A sudden, severe headache that is different from past headaches. ? · You passed out (lost consciousness). ?Call your doctor now or seek immediate medical care if:  ? · You have new or increased shortness of breath. ? · You are dizzy or lightheaded, or you feel like you may faint. ? · You gain weight suddenly, such as more than 2 to 3 pounds in a day or 5 pounds in a week. (Your doctor may suggest a different range of weight gain.)   ? · You have increased swelling in your legs, ankles, or feet. ? Watch closely for changes in your health, and be sure to contact your doctor if you have any problems. Where can you learn more? Go to http://bhargav-mily.info/. Enter W790 in the search box to learn more about \"Cardiac Rehabilitation: Care Instructions. \"  Current as of: September 21, 2016  Content Version: 11.4  © 5240-1470 [employer] Care instructions adapted under license by [employer] (which disclaims liability or warranty for this information). If you have questions about a medical condition or this instruction, always ask your healthcare professional. [place] [address] any warranty or liability for your use of this information.          Chronic Obstructive Pulmonary Disease (COPD): Care Instructions  Your Care Instructions    Chronic obstructive pulmonary disease (COPD) is a general term for a group of lung diseases, including emphysema and chronic bronchitis. People with COPD have decreased airflow in and out of the lungs, which makes it hard to breathe. The airways also can get clogged with thick mucus. Cigarette smoking is a major cause of COPD. Although there is no cure for COPD, you can slow its progress. Following your treatment plan and taking care of yourself can help you feel better and live longer. Follow-up care is a key part of your treatment and safety. Be sure to make and go to all appointments, and call your doctor if you are having problems. It's also a good idea to know your test results and keep a list of the medicines you take. How can you care for yourself at home? ?Staying healthy  ? · Do not smoke. This is the most important step you can take to prevent more damage to your lungs. If you need help quitting, talk to your doctor about stop-smoking programs and medicines. These can increase your chances of quitting for good. ? · Avoid colds and flu. Get a pneumococcal vaccine shot. If you have had one before, ask your doctor whether you need a second dose. Get the flu vaccine every fall. If you must be around people with colds or the flu, wash your hands often. ? · Avoid secondhand smoke, air pollution, and high altitudes. Also avoid cold, dry air and hot, humid air. Stay at home with your windows closed when air pollution is bad. ?Medicines and oxygen therapy  ? · Take your medicines exactly as prescribed. Call your doctor if you think you are having a problem with your medicine. ? · You may be taking medicines such as:  ¨ Bronchodilators. These help open your airways and make breathing easier. Bronchodilators are either short-acting (work for 6 to 9 hours) or long-acting (work for 24 hours). You inhale most bronchodilators, so they start to act quickly.  Always carry your quick-relief inhaler with you in case you need it while you are away from home. ¨ Corticosteroids (prednisone, budesonide). These reduce airway inflammation. They come in pill or inhaled form. You must take these medicines every day for them to work well. ? · A spacer may help you get more inhaled medicine to your lungs. Ask your doctor or pharmacist if a spacer is right for you. If it is, ask how to use it properly. ? · Do not take any vitamins, over-the-counter medicine, or herbal products without talking to your doctor first.   ? · If your doctor prescribed antibiotics, take them as directed. Do not stop taking them just because you feel better. You need to take the full course of antibiotics. ? · Oxygen therapy boosts the amount of oxygen in your blood and helps you breathe easier. Use the flow rate your doctor has recommended, and do not change it without talking to your doctor first.   Activity  ? · Get regular exercise. Walking is an easy way to get exercise. Start out slowly, and walk a little more each day. ? · Pay attention to your breathing. You are exercising too hard if you cannot talk while you are exercising. ? · Take short rest breaks when doing household chores and other activities. ? · Learn breathing methods-such as breathing through pursed lips-to help you become less short of breath. ? · If your doctor has not set you up with a pulmonary rehabilitation program, talk to him or her about whether rehab is right for you. Rehab includes exercise programs, education about your disease and how to manage it, help with diet and other changes, and emotional support. Diet  ? · Eat regular, healthy meals. Use bronchodilators about 1 hour before you eat to make it easier to eat. Eat several small meals instead of three large ones. Drink beverages at the end of the meal. Avoid foods that are hard to chew. ? · Eat foods that contain protein so that you do not lose muscle mass.    ? · Talk with your doctor if you gain too much weight or if you lose weight without trying. ?Mental health  ? · Talk to your family, friends, or a therapist about your feelings. It is normal to feel frightened, angry, hopeless, helpless, and even guilty. Talking openly about bad feelings can help you cope. If these feelings last, talk to your doctor. When should you call for help? Call 911 anytime you think you may need emergency care. For example, call if:  ? · You have severe trouble breathing. ?Call your doctor now or seek immediate medical care if:  ? · You have new or worse trouble breathing. ? · You cough up blood. ? · You have a fever. ? Watch closely for changes in your health, and be sure to contact your doctor if:  ? · You cough more deeply or more often, especially if you notice more mucus or a change in the color of your mucus. ? · You have new or worse swelling in your legs or belly. ? · You are not getting better as expected. Where can you learn more? Go to http://bhargav-mily.info/. [place] in the search box to learn more about \"Chronic Obstructive Pulmonary Disease (COPD): Care Instructions. \"  Current as of: May 12, 2017  Content Version: 11.4  © 7123-9624 [employer]. Care instructions adapted under license by [employer] (which disclaims liability or warranty for this information). If you have questions about a medical condition or this instruction, always ask your healthcare professional. [place] [address] any warranty or liability for your use of this information. Heart Failure: Care Instructions  Your Care Instructions    Heart failure occurs when your heart does not pump as much blood as the body needs. Failure does not mean that the heart has stopped pumping but rather that it is not pumping as well as it should. Over time, this causes fluid buildup in your lungs and other parts of your body.  Fluid buildup can cause shortness of breath, fatigue, swollen ankles, and other problems. By taking medicines regularly, reducing sodium (salt) in your diet, checking your weight every day, and making lifestyle changes, you can feel better and live longer. Follow-up care is a key part of your treatment and safety. Be sure to make and go to all appointments, and call your doctor if you are having problems. It's also a good idea to know your test results and keep a list of the medicines you take. How can you care for yourself at home? Medicines  ? · Be safe with medicines. Take your medicines exactly as prescribed. Call your doctor if you think you are having a problem with your medicine. ? · Do not take any vitamins, over-the-counter medicine, or herbal products without talking to your doctor first. Renard Ludwig not take ibuprofen (Advil or Motrin) and naproxen (Aleve) without talking to your doctor first. They could make your heart failure worse. ? · You may be taking some of the following medicine. ¨ Beta-blockers can slow heart rate, decrease blood pressure, and improve your condition. Taking a beta-blocker may lower your chance of needing to be hospitalized. ¨ Angiotensin-converting enzyme inhibitors (ACEIs) reduce the heart's workload, lower blood pressure, and reduce swelling. Taking an ACEI may lower your chance of needing to be hospitalized again. ¨ Angiotensin II receptor blockers (ARBs) work like ACEIs. Your doctor may prescribe them instead of ACEIs. ¨ Diuretics, also called water pills, reduce swelling. ¨ Potassium supplements replace this important mineral, which is sometimes lost with diuretics. ¨ Aspirin and other blood thinners prevent blood clots, which can cause a stroke or heart attack. ? You will get more details on the specific medicines your doctor prescribes. Diet  ? · Your doctor may suggest that you limit sodium to 2,000 milligrams (mg) a day or less.  That is less than 1 teaspoon of salt a day, including all the salt you eat in cooking or in packaged foods. People get most of their sodium from processed foods. Fast food and restaurant meals also tend to be very high in sodium. ? · Ask your doctor how much liquid you can drink each day. You may have to limit liquids. ?Weight  ? · Weigh yourself without clothing at the same time each day. Record your weight. Call your doctor if you have a sudden weight gain, such as more than 2 to 3 pounds in a day or 5 pounds in a week. (Your doctor may suggest a different range of weight gain.) A sudden weight gain may mean that your heart failure is getting worse. ? Activity level  ? · Start light exercise (if your doctor says it is okay). Even if you can only do a small amount, exercise will help you get stronger, have more energy, and manage your weight and your stress. Walking is an easy way to get exercise. Start out by walking a little more than you did before. Bit by bit, increase the amount you walk. ? · When you exercise, watch for signs that your heart is working too hard. You are pushing yourself too hard if you cannot talk while you are exercising. If you become short of breath or dizzy or have chest pain, stop, sit down, and rest.   ? · If you feel \"wiped out\" the day after you exercise, walk slower or for a shorter distance until you can work up to a better pace. ? · Get enough rest at night. Sleeping with 1 or 2 pillows under your upper body and head may help you breathe easier. ? Lifestyle changes  ? · Do not smoke. Smoking can make a heart condition worse. If you need help quitting, talk to your doctor about stop-smoking programs and medicines. These can increase your chances of quitting for good. Quitting smoking may be the most important step you can take to protect your heart. ? · Limit alcohol to 2 drinks a day for men and 1 drink a day for women. Too much alcohol can cause health problems. ? · Avoid getting sick from colds and the flu.  Get a pneumococcal vaccine shot. If you have had one before, ask your doctor whether you need another dose. Get a flu shot each year. If you must be around people with colds or the flu, wash your hands often. When should you call for help? Call 911 if you have symptoms of sudden heart failure such as:  ? · You have severe trouble breathing. ? · You cough up pink, foamy mucus. ? · You have a new irregular or rapid heartbeat. ?Call your doctor now or seek immediate medical care if:  ? · You have new or increased shortness of breath. ? · You are dizzy or lightheaded, or you feel like you may faint. ? · You have sudden weight gain, such as more than 2 to 3 pounds in a day or 5 pounds in a week. (Your doctor may suggest a different range of weight gain.)   ? · You have increased swelling in your legs, ankles, or feet. ? · You are suddenly so tired or weak that you cannot do your usual activities. ? Watch closely for changes in your health, and be sure to contact your doctor if you develop new symptoms. Where can you learn more? Go to http://bhargav-mily.info/. Enter N809 in the search box to learn more about \"Heart Failure: Care Instructions. \"  Current as of: September 21, 2016  Content Version: 11.4  © 5089-0616 [employer]. Care instructions adapted under license by [employer] (which disclaims liability or warranty for this information). If you have questions about a medical condition or this instruction, always ask your healthcare professional. [place] [address] any warranty or liability for your use of this information.

## 2018-03-07 ENCOUNTER — APPOINTMENT (OUTPATIENT)
Dept: GENERAL RADIOLOGY | Age: 58
DRG: 194 | End: 2018-03-07
Attending: EMERGENCY MEDICINE
Payer: MEDICAID

## 2018-03-07 ENCOUNTER — HOME CARE VISIT (OUTPATIENT)
Dept: HOME HEALTH SERVICES | Facility: HOME HEALTH | Age: 58
End: 2018-03-07

## 2018-03-07 ENCOUNTER — HOSPITAL ENCOUNTER (OUTPATIENT)
Age: 58
Setting detail: OBSERVATION
Discharge: HOME HEALTH | DRG: 194 | End: 2018-03-09
Attending: EMERGENCY MEDICINE | Admitting: INTERNAL MEDICINE
Payer: MEDICAID

## 2018-03-07 DIAGNOSIS — G89.29 CHRONIC CHEST PAIN: ICD-10-CM

## 2018-03-07 DIAGNOSIS — I50.42 CHRONIC COMBINED SYSTOLIC AND DIASTOLIC HEART FAILURE (HCC): Primary | ICD-10-CM

## 2018-03-07 DIAGNOSIS — I10 HYPERTENSION, UNSPECIFIED TYPE: ICD-10-CM

## 2018-03-07 DIAGNOSIS — R07.9 CHRONIC CHEST PAIN: ICD-10-CM

## 2018-03-07 PROBLEM — I50.9 CHF (CONGESTIVE HEART FAILURE) (HCC): Chronic | Status: ACTIVE | Noted: 2018-03-03

## 2018-03-07 PROBLEM — N17.9 ACUTE KIDNEY INJURY (HCC): Status: ACTIVE | Noted: 2018-03-07

## 2018-03-07 PROBLEM — I50.43 ACUTE ON CHRONIC COMBINED SYSTOLIC AND DIASTOLIC ACC/AHA STAGE C CONGESTIVE HEART FAILURE (HCC): Chronic | Status: ACTIVE | Noted: 2018-01-25

## 2018-03-07 LAB
ALBUMIN SERPL-MCNC: 2.9 G/DL (ref 3.4–5)
ALBUMIN/GLOB SERPL: 0.7 {RATIO} (ref 0.8–1.7)
ALP SERPL-CCNC: 85 U/L (ref 45–117)
ALT SERPL-CCNC: 123 U/L (ref 16–61)
ANION GAP SERPL CALC-SCNC: 1 MMOL/L (ref 3–18)
ARTERIAL PATENCY WRIST A: ABNORMAL
AST SERPL-CCNC: 33 U/L (ref 15–37)
BASE EXCESS BLDV CALC-SCNC: 22 MMOL/L
BASOPHILS # BLD: 0 K/UL (ref 0–0.06)
BASOPHILS NFR BLD: 0 % (ref 0–2)
BDY SITE: ABNORMAL
BILIRUB SERPL-MCNC: 0.2 MG/DL (ref 0.2–1)
BNP SERPL-MCNC: 1239 PG/ML (ref 0–900)
BUN SERPL-MCNC: 39 MG/DL (ref 7–18)
BUN/CREAT SERPL: 23 (ref 12–20)
CALCIUM SERPL-MCNC: 8.7 MG/DL (ref 8.5–10.1)
CHLORIDE SERPL-SCNC: 104 MMOL/L (ref 100–108)
CK MB CFR SERPL CALC: 1.1 % (ref 0–4)
CK MB CFR SERPL CALC: 1.5 % (ref 0–4)
CK MB SERPL-MCNC: 1.9 NG/ML (ref 5–25)
CK MB SERPL-MCNC: 3.2 NG/ML (ref 5–25)
CK SERPL-CCNC: 171 U/L (ref 39–308)
CK SERPL-CCNC: 207 U/L (ref 39–308)
CO2 SERPL-SCNC: 39 MMOL/L (ref 21–32)
CREAT SERPL-MCNC: 1.66 MG/DL (ref 0.6–1.3)
DIFFERENTIAL METHOD BLD: ABNORMAL
EOSINOPHIL # BLD: 0.1 K/UL (ref 0–0.4)
EOSINOPHIL NFR BLD: 1 % (ref 0–5)
ERYTHROCYTE [DISTWIDTH] IN BLOOD BY AUTOMATED COUNT: 14.5 % (ref 11.6–14.5)
GAS FLOW.O2 O2 DELIVERY SYS: ABNORMAL L/MIN
GLOBULIN SER CALC-MCNC: 4 G/DL (ref 2–4)
GLUCOSE BLD STRIP.AUTO-MCNC: 99 MG/DL (ref 70–110)
GLUCOSE SERPL-MCNC: 140 MG/DL (ref 74–99)
HCO3 BLDV-SCNC: 45.8 MMOL/L (ref 23–28)
HCT VFR BLD AUTO: 48 % (ref 36–48)
HGB BLD-MCNC: 15.3 G/DL (ref 13–16)
LYMPHOCYTES # BLD: 3 K/UL (ref 0.9–3.6)
LYMPHOCYTES NFR BLD: 28 % (ref 21–52)
MAGNESIUM SERPL-MCNC: 2.4 MG/DL (ref 1.6–2.6)
MCH RBC QN AUTO: 29.4 PG (ref 24–34)
MCHC RBC AUTO-ENTMCNC: 31.9 G/DL (ref 31–37)
MCV RBC AUTO: 92.3 FL (ref 74–97)
MONOCYTES # BLD: 1.5 K/UL (ref 0.05–1.2)
MONOCYTES NFR BLD: 14 % (ref 3–10)
NEUTS SEG # BLD: 6.1 K/UL (ref 1.8–8)
NEUTS SEG NFR BLD: 57 % (ref 40–73)
O2/TOTAL GAS SETTING VFR VENT: 21 %
PCO2 BLDV: 66.7 MMHG (ref 41–51)
PH BLDV: 7.45 [PH] (ref 7.32–7.42)
PLATELET # BLD AUTO: 178 K/UL (ref 135–420)
PLATELET COMMENTS,PCOM: ABNORMAL
PMV BLD AUTO: 12.2 FL (ref 9.2–11.8)
PO2 BLDV: 56 MMHG (ref 25–40)
POTASSIUM SERPL-SCNC: 4.1 MMOL/L (ref 3.5–5.5)
PROT SERPL-MCNC: 6.9 G/DL (ref 6.4–8.2)
RBC # BLD AUTO: 5.2 M/UL (ref 4.7–5.5)
RBC MORPH BLD: ABNORMAL
SAO2 % BLDV: 88 % (ref 65–88)
SERVICE CMNT-IMP: ABNORMAL
SODIUM SERPL-SCNC: 144 MMOL/L (ref 136–145)
SPECIMEN TYPE: ABNORMAL
TOTAL RESP. RATE, ITRR: 22
TROPONIN I SERPL-MCNC: 0.05 NG/ML (ref 0–0.06)
TROPONIN I SERPL-MCNC: 0.06 NG/ML (ref 0–0.06)
WBC # BLD AUTO: 10.7 K/UL (ref 4.6–13.2)
WBC MORPH BLD: ABNORMAL

## 2018-03-07 PROCEDURE — 83036 HEMOGLOBIN GLYCOSYLATED A1C: CPT | Performed by: INTERNAL MEDICINE

## 2018-03-07 PROCEDURE — 77030013140 HC MSK NEB VYRM -A

## 2018-03-07 PROCEDURE — 83735 ASSAY OF MAGNESIUM: CPT | Performed by: EMERGENCY MEDICINE

## 2018-03-07 PROCEDURE — 74011250636 HC RX REV CODE- 250/636: Performed by: EMERGENCY MEDICINE

## 2018-03-07 PROCEDURE — 36415 COLL VENOUS BLD VENIPUNCTURE: CPT | Performed by: INTERNAL MEDICINE

## 2018-03-07 PROCEDURE — 93005 ELECTROCARDIOGRAM TRACING: CPT

## 2018-03-07 PROCEDURE — 99285 EMERGENCY DEPT VISIT HI MDM: CPT

## 2018-03-07 PROCEDURE — 65270000029 HC RM PRIVATE

## 2018-03-07 PROCEDURE — 96374 THER/PROPH/DIAG INJ IV PUSH: CPT

## 2018-03-07 PROCEDURE — G0299 HHS/HOSPICE OF RN EA 15 MIN: HCPCS

## 2018-03-07 PROCEDURE — 82962 GLUCOSE BLOOD TEST: CPT

## 2018-03-07 PROCEDURE — 83880 ASSAY OF NATRIURETIC PEPTIDE: CPT | Performed by: EMERGENCY MEDICINE

## 2018-03-07 PROCEDURE — 99218 HC RM OBSERVATION: CPT

## 2018-03-07 PROCEDURE — 96372 THER/PROPH/DIAG INJ SC/IM: CPT

## 2018-03-07 PROCEDURE — 82803 BLOOD GASES ANY COMBINATION: CPT

## 2018-03-07 PROCEDURE — 74011250637 HC RX REV CODE- 250/637: Performed by: EMERGENCY MEDICINE

## 2018-03-07 PROCEDURE — 80053 COMPREHEN METABOLIC PANEL: CPT | Performed by: EMERGENCY MEDICINE

## 2018-03-07 PROCEDURE — 82550 ASSAY OF CK (CPK): CPT | Performed by: EMERGENCY MEDICINE

## 2018-03-07 PROCEDURE — 74011250636 HC RX REV CODE- 250/636: Performed by: INTERNAL MEDICINE

## 2018-03-07 PROCEDURE — 96375 TX/PRO/DX INJ NEW DRUG ADDON: CPT

## 2018-03-07 PROCEDURE — 71045 X-RAY EXAM CHEST 1 VIEW: CPT

## 2018-03-07 PROCEDURE — 74011250637 HC RX REV CODE- 250/637: Performed by: INTERNAL MEDICINE

## 2018-03-07 PROCEDURE — 96376 TX/PRO/DX INJ SAME DRUG ADON: CPT

## 2018-03-07 PROCEDURE — 77030013052 HC MSK CPAP/BIPAP PHIL -B

## 2018-03-07 PROCEDURE — 94640 AIRWAY INHALATION TREATMENT: CPT

## 2018-03-07 PROCEDURE — 85025 COMPLETE CBC W/AUTO DIFF WBC: CPT | Performed by: EMERGENCY MEDICINE

## 2018-03-07 PROCEDURE — 74011000250 HC RX REV CODE- 250: Performed by: EMERGENCY MEDICINE

## 2018-03-07 RX ORDER — CLONIDINE HYDROCHLORIDE 0.1 MG/1
0.2 TABLET ORAL EVERY 8 HOURS
Status: DISCONTINUED | OUTPATIENT
Start: 2018-03-07 | End: 2018-03-09 | Stop reason: HOSPADM

## 2018-03-07 RX ORDER — OXYCODONE AND ACETAMINOPHEN 10; 325 MG/1; MG/1
1 TABLET ORAL
Status: DISCONTINUED | OUTPATIENT
Start: 2018-03-07 | End: 2018-03-09 | Stop reason: HOSPADM

## 2018-03-07 RX ORDER — IPRATROPIUM BROMIDE AND ALBUTEROL SULFATE 2.5; .5 MG/3ML; MG/3ML
3 SOLUTION RESPIRATORY (INHALATION)
Status: DISCONTINUED | OUTPATIENT
Start: 2018-03-08 | End: 2018-03-09 | Stop reason: HOSPADM

## 2018-03-07 RX ORDER — CARVEDILOL 3.12 MG/1
3.12 TABLET ORAL 2 TIMES DAILY WITH MEALS
Status: DISCONTINUED | OUTPATIENT
Start: 2018-03-08 | End: 2018-03-09 | Stop reason: HOSPADM

## 2018-03-07 RX ORDER — AMLODIPINE BESYLATE 5 MG/1
5 TABLET ORAL DAILY
Status: DISCONTINUED | OUTPATIENT
Start: 2018-03-08 | End: 2018-03-09 | Stop reason: HOSPADM

## 2018-03-07 RX ORDER — AMLODIPINE BESYLATE 5 MG/1
5 TABLET ORAL DAILY
Status: DISCONTINUED | OUTPATIENT
Start: 2018-03-08 | End: 2018-03-07

## 2018-03-07 RX ORDER — FUROSEMIDE 10 MG/ML
40 INJECTION INTRAMUSCULAR; INTRAVENOUS
Status: COMPLETED | OUTPATIENT
Start: 2018-03-07 | End: 2018-03-07

## 2018-03-07 RX ORDER — SPIRONOLACTONE 25 MG/1
25 TABLET ORAL DAILY
Status: DISCONTINUED | OUTPATIENT
Start: 2018-03-08 | End: 2018-03-09 | Stop reason: HOSPADM

## 2018-03-07 RX ORDER — FUROSEMIDE 10 MG/ML
40 INJECTION INTRAMUSCULAR; INTRAVENOUS EVERY 12 HOURS
Status: DISCONTINUED | OUTPATIENT
Start: 2018-03-07 | End: 2018-03-09 | Stop reason: HOSPADM

## 2018-03-07 RX ORDER — HYDRALAZINE HYDROCHLORIDE 25 MG/1
25 TABLET, FILM COATED ORAL 3 TIMES DAILY
Status: DISCONTINUED | OUTPATIENT
Start: 2018-03-07 | End: 2018-03-09 | Stop reason: HOSPADM

## 2018-03-07 RX ORDER — IPRATROPIUM BROMIDE AND ALBUTEROL SULFATE 2.5; .5 MG/3ML; MG/3ML
3 SOLUTION RESPIRATORY (INHALATION)
Status: COMPLETED | OUTPATIENT
Start: 2018-03-07 | End: 2018-03-07

## 2018-03-07 RX ORDER — MAGNESIUM SULFATE 100 %
4 CRYSTALS MISCELLANEOUS AS NEEDED
Status: DISCONTINUED | OUTPATIENT
Start: 2018-03-07 | End: 2018-03-09 | Stop reason: HOSPADM

## 2018-03-07 RX ORDER — HYDRALAZINE HYDROCHLORIDE 20 MG/ML
20 INJECTION INTRAMUSCULAR; INTRAVENOUS ONCE
Status: COMPLETED | OUTPATIENT
Start: 2018-03-07 | End: 2018-03-07

## 2018-03-07 RX ORDER — ALBUTEROL SULFATE 90 UG/1
1 AEROSOL, METERED RESPIRATORY (INHALATION)
Status: DISCONTINUED | OUTPATIENT
Start: 2018-03-08 | End: 2018-03-08

## 2018-03-07 RX ORDER — INSULIN LISPRO 100 [IU]/ML
INJECTION, SOLUTION INTRAVENOUS; SUBCUTANEOUS
Status: DISCONTINUED | OUTPATIENT
Start: 2018-03-07 | End: 2018-03-08

## 2018-03-07 RX ORDER — ACETAMINOPHEN 325 MG/1
650 TABLET ORAL
Status: DISCONTINUED | OUTPATIENT
Start: 2018-03-07 | End: 2018-03-09 | Stop reason: HOSPADM

## 2018-03-07 RX ORDER — HYDRALAZINE HYDROCHLORIDE 25 MG/1
25 TABLET, FILM COATED ORAL
Status: COMPLETED | OUTPATIENT
Start: 2018-03-07 | End: 2018-03-07

## 2018-03-07 RX ORDER — DEXTROSE 50 % IN WATER (D50W) INTRAVENOUS SYRINGE
25-50 AS NEEDED
Status: DISCONTINUED | OUTPATIENT
Start: 2018-03-07 | End: 2018-03-09 | Stop reason: HOSPADM

## 2018-03-07 RX ORDER — HEPARIN SODIUM 5000 [USP'U]/ML
5000 INJECTION, SOLUTION INTRAVENOUS; SUBCUTANEOUS EVERY 8 HOURS
Status: DISCONTINUED | OUTPATIENT
Start: 2018-03-07 | End: 2018-03-09 | Stop reason: HOSPADM

## 2018-03-07 RX ORDER — CARVEDILOL 3.12 MG/1
3.12 TABLET ORAL 2 TIMES DAILY WITH MEALS
Status: DISCONTINUED | OUTPATIENT
Start: 2018-03-08 | End: 2018-03-07

## 2018-03-07 RX ORDER — CLONIDINE HYDROCHLORIDE 0.1 MG/1
0.2 TABLET ORAL
Status: COMPLETED | OUTPATIENT
Start: 2018-03-07 | End: 2018-03-07

## 2018-03-07 RX ADMIN — CLONIDINE HYDROCHLORIDE 0.2 MG: 0.1 TABLET ORAL at 18:08

## 2018-03-07 RX ADMIN — METHYLPREDNISOLONE SODIUM SUCCINATE 40 MG: 40 INJECTION, POWDER, FOR SOLUTION INTRAMUSCULAR; INTRAVENOUS at 21:45

## 2018-03-07 RX ADMIN — HYDRALAZINE HYDROCHLORIDE 25 MG: 25 TABLET, FILM COATED ORAL at 18:08

## 2018-03-07 RX ADMIN — HEPARIN SODIUM 5000 UNITS: 5000 INJECTION, SOLUTION INTRAVENOUS; SUBCUTANEOUS at 21:45

## 2018-03-07 RX ADMIN — IPRATROPIUM BROMIDE AND ALBUTEROL SULFATE 3 ML: .5; 3 SOLUTION RESPIRATORY (INHALATION) at 17:34

## 2018-03-07 RX ADMIN — HYDRALAZINE HYDROCHLORIDE 20 MG: 20 INJECTION INTRAMUSCULAR; INTRAVENOUS at 19:31

## 2018-03-07 RX ADMIN — FUROSEMIDE 40 MG: 10 INJECTION, SOLUTION INTRAMUSCULAR; INTRAVENOUS at 18:52

## 2018-03-07 RX ADMIN — OXYCODONE AND ACETAMINOPHEN 1 TABLET: 10; 325 TABLET ORAL at 21:45

## 2018-03-07 NOTE — IP AVS SNAPSHOT
Robert Bustillo 
 
 
 [address] [address] 
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 [address] [place] [address] 
[phone] [address] [place] [address] to continue managing your healthcare needs. [phone] [place] [address] to continue managing your healthcare needs. [phone] Discharge Orders None A check brittany indicates which time of day the medication should be taken. My Medications START taking these medications Instructions Each Dose to Equal  
 Morning Noon Evening Bedtime  
 carvedilol 3.125 mg tablet Commonly known as:  Wilfredodeni Dao Your last dose was: Your next dose is: Take 1 Tab by mouth two (2) times daily (with meals). 3.125 mg CHANGE how you take these medications Instructions Each Dose to Equal  
 Morning Noon Evening Bedtime * oxyCODONE-acetaminophen  mg per tablet Commonly known as:  PERCOCET 10 What changed:  Another medication with the same name was added. Make sure you understand how and when to take each. Your last dose was: Your next dose is: Take 1 Tab by mouth every six (6) hours as needed.  Max Daily Amount: 4 Tabs.  
 1 Tab * oxyCODONE-acetaminophen  mg per tablet Commonly known as:  PERCOCET What changed: You were already taking a medication with the same name, and this prescription was added. Make sure you understand how and when to take each. Your last dose was: Your next dose is: Take 1 Tab by mouth every six (6) hours as needed for Pain. Max Daily Amount: 4 Tabs. 1 Tab * Notice: This list has 2 medication(s) that are the same as other medications prescribed for you. Read the directions carefully, and ask your doctor or other care provider to review them with you. CONTINUE taking these medications Instructions Each Dose to Equal  
 Morning Noon Evening Bedtime  
 albuterol 90 mcg/actuation inhaler Commonly known as:  PROVENTIL HFA, VENTOLIN HFA, PROAIR HFA Your last dose was: Your next dose is: Take 1 Puff by inhalation. 1 puff in am and 1 puff in pm  
 1 Puff  
    
   
   
   
  
 amLODIPine 5 mg tablet Commonly known as:  Lexy Sanchez Your last dose was: Your next dose is: Take  by mouth daily. cloNIDine HCl 0.2 mg tablet Commonly known as:  CATAPRES Your last dose was: Your next dose is: Take 1 Tab by mouth every eight (8) hours. 0.2 mg  
    
   
   
   
  
 dimethicone 1 % topical cream  
   
Your last dose was: Your next dose is:    
   
   
 Apply  to affected area daily. furosemide 40 mg tablet Commonly known as:  LASIX Your last dose was: Your next dose is: Take 1 Tab by mouth daily. 40 mg  
    
   
   
   
  
 glipiZIDE 10 mg tablet Commonly known as:  Maxcine Murphy Your last dose was: Your next dose is: Take 1 Tab by mouth two (2) times a day. 10 mg  
    
   
   
   
  
 hydrALAZINE 25 mg tablet Commonly known as:  APRESOLINE Your last dose was: Your next dose is: Take 1 Tab by mouth three (3) times daily. 25 mg Oxygen Your last dose was: Your next dose is:    
   
   
 Indications: 4L  
     
   
   
   
  
 predniSONE 5 mg dose pack Commonly known as:  STERAPRED Your last dose was: Your next dose is:    
   
   
 See administration instruction per 5mg dose pack  
     
   
   
   
  
 spironolactone 25 mg tablet Commonly known as:  ALDACTONE Your last dose was: Your next dose is: Take 25 mg by mouth daily. 25 mg Where to Get Your Medications These medications were sent to [facility] [email or web address] [place] - [address]  [address] Hours:  M-F 930am-6pm Phone:  [phone]  
  carvedilol 3.125 mg tablet Information on where to get these meds will be given to you by the nurse or doctor. ! Ask your nurse or doctor about these medications  
  oxyCODONE-acetaminophen  mg per tablet Discharge Instructions DISCHARGE SUMMARY from Nurse PATIENT INSTRUCTIONS: 
 
 
F-face looks uneven A-arms unable to move or move unevenly S-speech slurred or non-existent T-time-call 911 as soon as signs and symptoms begin-DO NOT go Back to bed or wait to see if you get better-TIME IS BRAIN. Warning Signs of HEART ATTACK Call 911 if you have these symptoms: 
? Chest discomfort. Most heart attacks involve discomfort in the center of the chest that lasts more than a few minutes, or that goes away and comes back. It can feel like uncomfortable pressure, squeezing, fullness, or pain. ? Discomfort in other areas of the upper body. Symptoms can include pain or discomfort in one or both arms, the back, neck, jaw, or stomach. ? Shortness of breath with or without chest discomfort. ? Other signs may include breaking out in a cold sweat, nausea, or lightheadedness. Don't wait more than five minutes to call [address]! Fast action can save your life. Calling 911 is almost always the fastest way to get lifesaving treatment. Emergency Medical Services staff can begin treatment when they arrive  up to an hour sooner than if someone gets to the hospital by car. The discharge information has been reviewed with the patient. The patient verbalized understanding. Discharge medications reviewed with the patient and appropriate educational materials and side effects teaching were provided. ___________________________________________________________________________________________________________________________________ Patient armband removed and shredded Introducing [facility] SERVICES! [employer] introduces [employer] patient portal. Now you can access parts of your medical record, email your doctor's office, and request medication refills online. 1. In your internet browser, go to [email or web address] 2. Click on the First Time User? Click Here link in the Sign In box. You will see the New Member Sign Up page. 3. Enter your [employer] Access Code exactly as it appears below. You will not need to use this code after youve completed the sign-up process. If you do not sign up before the expiration date, you must request a new code. · [employer] Access Code: [email or web address] Expires: 4/26/2018  2:04 PM 
 
4. Enter the last four digits of your Social Security Number (xxxx) and Date of Birth (mm/dd/yyyy) as indicated and click Submit. You will be taken to the next sign-up page. 5. Create a MyChart ID.  This will be your [facility] [employer] login ID and cannot be changed, so think of one that is secure and easy to remember. 6. Create a [employer] password. You can change your password at any time. 7. Enter your Password Reset Question and Answer. This can be used at a later time if you forget your password. 8. Enter your e-mail address. You will receive e-mail notification when new information is available in [address] 9. Click Sign Up. You can now view and download portions of your medical record. 10. Click the Download Summary menu link to download a portable copy of your medical information. If you have questions, please visit the Frequently Asked Questions section of the [employer] website. Remember, [employer] is NOT to be used for urgent needs. For medical emergencies, dial 911. Now available from your iPhone and Android! Unresulted Labs-Please follow up with your PCP about these lab tests Order Current Status EKG, 12 LEAD, INITIAL Preliminary result Providers Seen During Your Hospitalization Provider Specialty Primary office phone Sebastian Singh MD Emergency Medicine [phone] Nani Singleton MD Internal Medicine [phone] Heena Brumfield MD [facility] [phone] Your Primary Care Physician (PCP) Primary Care Physician Office Phone Office Fax Jovanna Dia, [address] [facility] [phone] You are allergic to the following Allergen Reactions Gabapentin Hives Lisinopril Swelling Tramadol Hives Recent Documentation Height Weight BMI Smoking Status 1.702 m 157.9 kg 54.52 kg/m2 Former Smoker Emergency Contacts Name Discharge Info Relation Home Work Mobile Larissa Shahid DISCHARGE CAREGIVER [3] Spouse [3] [phone] Patient Belongings The following personal items are in your possession at time of discharge: 
  Dental Appliances: None  Visual Aid: None          Jewelry: None  Clothing: None    Other Valuables: None Please provide this summary of care documentation to your next provider. Signatures-by signing, you are acknowledging that this After Visit Summary has been reviewed with you and you have received a copy. Patient Signature:  ____________________________________________________________ Date:  ____________________________________________________________  
  
[facility] Provider Signature:  ____________________________________________________________ Date:  ____________________________________________________________
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
## 2018-03-07 NOTE — PROGRESS NOTES
Received a call from the ER to discuss pt's plan of care. Pt bought to the ER for SOB. Pt was discharged yesterday with [facility] and a PCP follow up 2/9/18. Reviewed clinical and noted pt uses 3-4L NC of O2. Per chart review, pt was not wearing oxygen when EMS arrived to pt's home. Spoke with ER physician and pt will be provided additional medication education prior to discharge and encourage compliance with plan of care. No other plan of care needs have been identified. On call Case Management    1841:  Spoke with ED physician and pt is now complaining of CP. ED physician will consult with the evening Hospitalist regarding pt's need for further medical management in the hospital.  CM remains available.

## 2018-03-07 NOTE — ED TRIAGE NOTES
Pt transported via EMS for shortness of breath. States he was just discharged from [facility] yesterday. While en route with EMS states he had chest pain 10/10 on the left side. Pt states he uses oxygen at home 3-4 L/min. EMS states he was not on oxygen when they arrived. Pt is 100% on RA with mild effort. Pt takes breaths between words but is able to speak in clear sentences. Sepsis Screening completed    (  )Patient meets SIRS criteria. (xx  )Patient does not meet SIRS criteria.       SIRS Criteria is achieved when two or more of the following are present   Temperature < 96.8°F (36°C) or > 100.9°F (38.3°C)   Heart Rate > 90 beats per minute   Respiratory Rate > 20 breaths per minute   WBC count > 12,000 or <4,000 or > 10% bands

## 2018-03-07 NOTE — ED NOTES
MD at bedside to discuss discharge with pt. Pt frustrated and states that he does not want to be discharged tonight. Medicated per MAR. Pt Sp02 100% on RA. Pt given charlie mujica and ginger ale, MD aware.

## 2018-03-08 LAB
ANION GAP SERPL CALC-SCNC: 4 MMOL/L (ref 3–18)
BASOPHILS # BLD: 0 K/UL (ref 0–0.1)
BASOPHILS NFR BLD: 0 % (ref 0–3)
BUN SERPL-MCNC: 35 MG/DL (ref 7–18)
BUN/CREAT SERPL: 21 (ref 12–20)
CALCIUM SERPL-MCNC: 9.2 MG/DL (ref 8.5–10.1)
CHLORIDE SERPL-SCNC: 101 MMOL/L (ref 100–108)
CK MB CFR SERPL CALC: 1.5 % (ref 0–4)
CK MB CFR SERPL CALC: 1.7 % (ref 0–4)
CK MB SERPL-MCNC: 1.9 NG/ML (ref 5–25)
CK MB SERPL-MCNC: 2.3 NG/ML (ref 5–25)
CK SERPL-CCNC: 110 U/L (ref 39–308)
CK SERPL-CCNC: 156 U/L (ref 39–308)
CO2 SERPL-SCNC: 36 MMOL/L (ref 21–32)
CREAT SERPL-MCNC: 1.69 MG/DL (ref 0.6–1.3)
DIFFERENTIAL METHOD BLD: ABNORMAL
EOSINOPHIL # BLD: 0 K/UL (ref 0–0.4)
EOSINOPHIL NFR BLD: 0 % (ref 0–5)
ERYTHROCYTE [DISTWIDTH] IN BLOOD BY AUTOMATED COUNT: 14.5 % (ref 11.6–14.5)
EST. AVERAGE GLUCOSE BLD GHB EST-MCNC: 137 MG/DL
GLUCOSE BLD STRIP.AUTO-MCNC: 118 MG/DL (ref 70–110)
GLUCOSE BLD STRIP.AUTO-MCNC: 205 MG/DL (ref 70–110)
GLUCOSE BLD STRIP.AUTO-MCNC: 220 MG/DL (ref 70–110)
GLUCOSE BLD STRIP.AUTO-MCNC: 233 MG/DL (ref 70–110)
GLUCOSE SERPL-MCNC: 243 MG/DL (ref 74–99)
HBA1C MFR BLD: 6.4 % (ref 4.5–5.6)
HCT VFR BLD AUTO: 48.3 % (ref 36–48)
HGB BLD-MCNC: 15.1 G/DL (ref 13–16)
LYMPHOCYTES # BLD: 1.2 K/UL (ref 0.8–3.5)
LYMPHOCYTES NFR BLD: 11 % (ref 20–51)
MAGNESIUM SERPL-MCNC: 2.3 MG/DL (ref 1.6–2.6)
MCH RBC QN AUTO: 29.2 PG (ref 24–34)
MCHC RBC AUTO-ENTMCNC: 31.3 G/DL (ref 31–37)
MCV RBC AUTO: 93.2 FL (ref 74–97)
METAMYELOCYTES NFR BLD MANUAL: 1 %
MONOCYTES # BLD: 0.4 K/UL (ref 0–1)
MONOCYTES NFR BLD: 4 % (ref 2–9)
MYELOCYTES NFR BLD MANUAL: 1 %
NEUTS SEG # BLD: 8.7 K/UL (ref 1.8–8)
NEUTS SEG NFR BLD: 83 % (ref 42–75)
PLATELET # BLD AUTO: 218 K/UL (ref 135–420)
PLATELET COMMENTS,PCOM: ABNORMAL
PMV BLD AUTO: 12.4 FL (ref 9.2–11.8)
POTASSIUM SERPL-SCNC: 4.4 MMOL/L (ref 3.5–5.5)
RBC # BLD AUTO: 5.18 M/UL (ref 4.7–5.5)
RBC MORPH BLD: ABNORMAL
SODIUM SERPL-SCNC: 141 MMOL/L (ref 136–145)
TROPONIN I SERPL-MCNC: 0.02 NG/ML (ref 0–0.06)
TROPONIN I SERPL-MCNC: 0.03 NG/ML (ref 0–0.06)
WBC # BLD AUTO: 10.5 K/UL (ref 4.6–13.2)
WBC MORPH BLD: ABNORMAL

## 2018-03-08 PROCEDURE — 94760 N-INVAS EAR/PLS OXIMETRY 1: CPT

## 2018-03-08 PROCEDURE — 77010033678 HC OXYGEN DAILY

## 2018-03-08 PROCEDURE — 84484 ASSAY OF TROPONIN QUANT: CPT | Performed by: INTERNAL MEDICINE

## 2018-03-08 PROCEDURE — 77030013140 HC MSK NEB VYRM -A

## 2018-03-08 PROCEDURE — 94640 AIRWAY INHALATION TREATMENT: CPT

## 2018-03-08 PROCEDURE — 99218 HC RM OBSERVATION: CPT

## 2018-03-08 PROCEDURE — 74011636637 HC RX REV CODE- 636/637: Performed by: INTERNAL MEDICINE

## 2018-03-08 PROCEDURE — 74011000250 HC RX REV CODE- 250: Performed by: INTERNAL MEDICINE

## 2018-03-08 PROCEDURE — 65270000029 HC RM PRIVATE

## 2018-03-08 PROCEDURE — 85025 COMPLETE CBC W/AUTO DIFF WBC: CPT | Performed by: INTERNAL MEDICINE

## 2018-03-08 PROCEDURE — 96372 THER/PROPH/DIAG INJ SC/IM: CPT

## 2018-03-08 PROCEDURE — 96376 TX/PRO/DX INJ SAME DRUG ADON: CPT

## 2018-03-08 PROCEDURE — 74011250636 HC RX REV CODE- 250/636: Performed by: INTERNAL MEDICINE

## 2018-03-08 PROCEDURE — 74011636637 HC RX REV CODE- 636/637: Performed by: HOSPITALIST

## 2018-03-08 PROCEDURE — 82962 GLUCOSE BLOOD TEST: CPT

## 2018-03-08 PROCEDURE — 80048 BASIC METABOLIC PNL TOTAL CA: CPT | Performed by: INTERNAL MEDICINE

## 2018-03-08 PROCEDURE — 74011250637 HC RX REV CODE- 250/637: Performed by: INTERNAL MEDICINE

## 2018-03-08 PROCEDURE — 36415 COLL VENOUS BLD VENIPUNCTURE: CPT | Performed by: INTERNAL MEDICINE

## 2018-03-08 PROCEDURE — 83735 ASSAY OF MAGNESIUM: CPT | Performed by: INTERNAL MEDICINE

## 2018-03-08 RX ORDER — INSULIN LISPRO 100 [IU]/ML
INJECTION, SOLUTION INTRAVENOUS; SUBCUTANEOUS
Status: DISCONTINUED | OUTPATIENT
Start: 2018-03-08 | End: 2018-03-09

## 2018-03-08 RX ORDER — INSULIN LISPRO 100 [IU]/ML
3 INJECTION, SOLUTION INTRAVENOUS; SUBCUTANEOUS
Status: DISCONTINUED | OUTPATIENT
Start: 2018-03-08 | End: 2018-03-09

## 2018-03-08 RX ADMIN — HYDRALAZINE HYDROCHLORIDE 25 MG: 25 TABLET, FILM COATED ORAL at 09:11

## 2018-03-08 RX ADMIN — NITROGLYCERIN 0.5 INCH: 20 OINTMENT TOPICAL at 06:22

## 2018-03-08 RX ADMIN — NITROGLYCERIN 0.5 INCH: 20 OINTMENT TOPICAL at 23:57

## 2018-03-08 RX ADMIN — INSULIN LISPRO 4 UNITS: 100 INJECTION, SOLUTION INTRAVENOUS; SUBCUTANEOUS at 12:23

## 2018-03-08 RX ADMIN — CLONIDINE HYDROCHLORIDE 0.2 MG: 0.1 TABLET ORAL at 13:47

## 2018-03-08 RX ADMIN — INSULIN LISPRO 6 UNITS: 100 INJECTION, SOLUTION INTRAVENOUS; SUBCUTANEOUS at 22:41

## 2018-03-08 RX ADMIN — CLONIDINE HYDROCHLORIDE 0.2 MG: 0.1 TABLET ORAL at 22:42

## 2018-03-08 RX ADMIN — OXYCODONE AND ACETAMINOPHEN 1 TABLET: 10; 325 TABLET ORAL at 18:04

## 2018-03-08 RX ADMIN — IPRATROPIUM BROMIDE AND ALBUTEROL SULFATE 3 ML: .5; 3 SOLUTION RESPIRATORY (INHALATION) at 07:32

## 2018-03-08 RX ADMIN — FUROSEMIDE 40 MG: 10 INJECTION, SOLUTION INTRAMUSCULAR; INTRAVENOUS at 22:44

## 2018-03-08 RX ADMIN — METHYLPREDNISOLONE SODIUM SUCCINATE 40 MG: 40 INJECTION, POWDER, FOR SOLUTION INTRAMUSCULAR; INTRAVENOUS at 22:46

## 2018-03-08 RX ADMIN — NITROGLYCERIN 0.5 INCH: 20 OINTMENT TOPICAL at 18:04

## 2018-03-08 RX ADMIN — AMLODIPINE BESYLATE 5 MG: 5 TABLET ORAL at 09:12

## 2018-03-08 RX ADMIN — SPIRONOLACTONE 25 MG: 25 TABLET ORAL at 09:11

## 2018-03-08 RX ADMIN — HEPARIN SODIUM 5000 UNITS: 5000 INJECTION, SOLUTION INTRAVENOUS; SUBCUTANEOUS at 06:22

## 2018-03-08 RX ADMIN — INSULIN LISPRO 4 UNITS: 100 INJECTION, SOLUTION INTRAVENOUS; SUBCUTANEOUS at 16:37

## 2018-03-08 RX ADMIN — HYDRALAZINE HYDROCHLORIDE 25 MG: 25 TABLET, FILM COATED ORAL at 00:05

## 2018-03-08 RX ADMIN — METHYLPREDNISOLONE SODIUM SUCCINATE 40 MG: 40 INJECTION, POWDER, FOR SOLUTION INTRAMUSCULAR; INTRAVENOUS at 06:23

## 2018-03-08 RX ADMIN — INSULIN LISPRO 3 UNITS: 100 INJECTION, SOLUTION INTRAVENOUS; SUBCUTANEOUS at 16:37

## 2018-03-08 RX ADMIN — METHYLPREDNISOLONE SODIUM SUCCINATE 40 MG: 40 INJECTION, POWDER, FOR SOLUTION INTRAMUSCULAR; INTRAVENOUS at 13:47

## 2018-03-08 RX ADMIN — HEPARIN SODIUM 5000 UNITS: 5000 INJECTION, SOLUTION INTRAVENOUS; SUBCUTANEOUS at 22:47

## 2018-03-08 RX ADMIN — NITROGLYCERIN 0.5 INCH: 20 OINTMENT TOPICAL at 00:05

## 2018-03-08 RX ADMIN — IPRATROPIUM BROMIDE AND ALBUTEROL SULFATE 3 ML: .5; 3 SOLUTION RESPIRATORY (INHALATION) at 11:25

## 2018-03-08 RX ADMIN — OXYCODONE AND ACETAMINOPHEN 1 TABLET: 10; 325 TABLET ORAL at 06:22

## 2018-03-08 RX ADMIN — HYDRALAZINE HYDROCHLORIDE 25 MG: 25 TABLET, FILM COATED ORAL at 16:36

## 2018-03-08 RX ADMIN — OXYCODONE AND ACETAMINOPHEN 1 TABLET: 10; 325 TABLET ORAL at 23:57

## 2018-03-08 RX ADMIN — CLONIDINE HYDROCHLORIDE 0.2 MG: 0.1 TABLET ORAL at 06:22

## 2018-03-08 RX ADMIN — CARVEDILOL 3.12 MG: 3.12 TABLET, FILM COATED ORAL at 09:12

## 2018-03-08 RX ADMIN — CARVEDILOL 3.12 MG: 3.12 TABLET, FILM COATED ORAL at 16:36

## 2018-03-08 RX ADMIN — IPRATROPIUM BROMIDE AND ALBUTEROL SULFATE 3 ML: .5; 3 SOLUTION RESPIRATORY (INHALATION) at 15:17

## 2018-03-08 RX ADMIN — OXYCODONE AND ACETAMINOPHEN 1 TABLET: 10; 325 TABLET ORAL at 12:18

## 2018-03-08 RX ADMIN — FUROSEMIDE 40 MG: 10 INJECTION, SOLUTION INTRAMUSCULAR; INTRAVENOUS at 09:11

## 2018-03-08 RX ADMIN — IPRATROPIUM BROMIDE AND ALBUTEROL SULFATE 3 ML: .5; 3 SOLUTION RESPIRATORY (INHALATION) at 20:44

## 2018-03-08 RX ADMIN — HEPARIN SODIUM 5000 UNITS: 5000 INJECTION, SOLUTION INTRAVENOUS; SUBCUTANEOUS at 13:47

## 2018-03-08 RX ADMIN — INSULIN LISPRO 3 UNITS: 100 INJECTION, SOLUTION INTRAVENOUS; SUBCUTANEOUS at 12:23

## 2018-03-08 RX ADMIN — HYDRALAZINE HYDROCHLORIDE 25 MG: 25 TABLET, FILM COATED ORAL at 22:43

## 2018-03-08 RX ADMIN — NITROGLYCERIN 0.5 INCH: 20 OINTMENT TOPICAL at 12:20

## 2018-03-08 NOTE — PROGRESS NOTES
8595   Received patient OOB in recliner with call light within reach. Denies pain. At this time,patient is sleeping a lot. No acute distress noted. 1118  Patient remain in recliner. Call light within reach. 1220  Bedside and Verbal shift change report given to Narcisa Duvall (oncoming nurse) by Ernie Mcdonald (offgoing nurse). Report included the following information SBAR and Kardex.

## 2018-03-08 NOTE — ED NOTES
Pt stable. Pt alert and oriented x4. No signs of acute distress. Pt breathing with ease on room air. PT with some shortness of breath upon exertion. Oxygen sat 98%. Pt hypertensive. Given medication as ordered. Continue to monitor. Maintain safety precautions.

## 2018-03-08 NOTE — DIABETES MGMT
NUTRITION ASSESSMENT / 59 [facility] PLAN OF CARE     Javier Walters           59 y.o.              Patient Active Problem List   Diagnosis Code    COPD exacerbation ([facility] J44.1    Type II diabetes mellitus with peripheral autonomic neuropathy ([facility]) E11.43    Hypertension I10    JIM (obstructive sleep apnea) G47.33    Hypoxia R09.02    Morbid obesity ([facility]) E66.01    Chest pain R07.9    Acute on chronic combined systolic and diastolic ACC/AHA stage C congestive heart failure (HCC) I50.43    CHF (congestive heart failure) ([facility]) I50.9    Acute kidney injury ([facility]) N17.9        INTERVENTIONS/PLAN:   - BG out of target, known h/o DM2, on glipizide at home, well controlled, current A1C 6.4%  - steroids on board & exacerbating hyperglycemia, recommend add conservative mealtime dose of Humalog 3 units qac while steroids continue (orders entered per Dr. Calli Arredondo, per protocol)  - POCT + Humalog corrective coverage orders in place, recommend continue  - consult requested for insulin education, do not recommend this currently well controlled DM for insulin at home, continue glipizide  - do believe that diet & lifestyle are the biggest opportunities for improved health for this gentleman  - DM Education provided per [facility] ENA RD and RN (see additional note)  - encouraged OP DM Self Management Class (schedule given)  - recommend d/c pt on PTA DM regimen, referral to wt loss specialist, follow-up with OP PCM in next 30 days    ASSESSMENT:   Nutritional Status: super morbid obesity, h/o excessive energy intake, BG out of target r/t DM2 & exacerbated by steroids, large gap in understanding between self-care and impact on health, especially nutrition and wt status       Lab Results   Component Value Date/Time     (H) 03/08/2018 03:15 AM     (H) 03/07/2018 05:23 PM    GLUCPOC 220 (H) 03/08/2018 11:23 AM    GLUCPOC 118 (H) 03/08/2018 06:31 AM    GLUCPOC 99 03/07/2018 09:24 PM             Within target range (non-ICU: <140; OCC<525): [] Yes   [x]  No    Current Insulin regimen:   - Humalog Normal Insulin Sensitivity Corrective Coverage    Home medication/insulin regimen:   - Glipizide 10 mg BID with meals    HbA1c: equivalent  to ave BGlucose of 137 mg/dl for 2-3 months prior to admission    Lab Results   Component Value Date/Time    Hemoglobin A1c 6.4 (H) 03/07/2018 10:15 PM       Adequate glycemic control PTA:  [x] Yes  [] No       SUBJECTIVE/OBJECTIVE:   Information obtained from: pt, chart, IDT; pt able to confirm home DM regimen (see above), reports SMBG 3x/day, denies <70 mg/dl, reports has working glucometer + supplies, states needs help with O2 at home, states no questions re: Dm or nutrition at this time, see additional ed note per GC RN         Medications: [x]                Reviewed        Labs:   Lab Results   Component Value Date/Time    Sodium 141 03/08/2018 03:15 AM    Potassium 4.4 03/08/2018 03:15 AM    Chloride 101 03/08/2018 03:15 AM    CO2 36 (H) 03/08/2018 03:15 AM    Anion gap 4 03/08/2018 03:15 AM    Glucose 243 (H) 03/08/2018 03:15 AM    BUN 35 (H) 03/08/2018 03:15 AM    Creatinine 1.69 (H) 03/08/2018 03:15 AM    Calcium 9.2 03/08/2018 03:15 AM    Magnesium 2.3 03/08/2018 03:15 AM    Albumin 2.9 (L) 03/07/2018 05:23 PM       Anthropometrics: IBW : 73.9 kg (163 lb), % IBW (Calculated): 213.5 %, BMI (calculated): 54.5  Wt Readings from Last 1 Encounters:   03/08/18 157.9 kg (348 lb)    Ht Readings from Last 1 Encounters:   03/08/18 5' 7\" (1.702 m)       Estimated Nutrition Needs: 1825 Kcals/day (25 kcal/kg of IBW), Protein (g): 125 g (0.8 g/kg of actual wt) Fluid (ml): 1825 ml (1 ml/kcal)  Based on:   []          Actual BW    [x]          IBW   []            Adjusted BW        Diet:   Active Orders   Diet    DIET DIABETIC CONSISTENT CARB Regular       Intake:   Patient Vitals for the past 100 hrs:   % Diet Eaten   03/08/18 1211 100 %   03/08/18 0918 100 %       Nutrition Diagnoses:   - altered nutrition-related lab values r/t Dm AEB A1C of 6.4% and known h/o DM2  - super morbid obesity r/t h/o excessive energy intake AEB BMI of 54.5%  - lack of adherence to DM regimen and nutrition recommendations r/t DM AEB A1C of 6.4% and observed gap in understanding between self-care and impact on health       Nutrition Interventions:   - education, diet Consistent CHO     Goal: .  - BG will be in target range of  mg/dl (non-ICU) by 3/12/18  - pt will maintain current wt/prevent wt gain by 4/10/18  - pt will follow up with OP PCP for DM by 4/10/18       Nutrition Monitoring and Evaluation      [x]     Monitor po intake on meal rounds  [x]     Continue inpatient monitoring and intervention  [x]     Other: BG      Nutrition Risk:  []   High     []  Moderate    [x]  Minimal/Uncompromised    ADONAY Roca, MPH, RD, CDE

## 2018-03-08 NOTE — PROGRESS NOTES
2055 Pt arrived via wheelchair from the ED to 334. Tele box #4 on. Denies CP but is aggressively demanding percocet for his \"diabetes\". There are currently no orders. Attempting to complete the admission assessment but the patient is angry and refusing to answer questions. 2145 Administered medications as ordered except medications given already in the ED. Pt administered percocet for neuropathy pain in his legs. 0622 AM medications adminsitered. Pt was asleep in the chair. He is continuously cussing under his breath.

## 2018-03-08 NOTE — ED NOTES
TRANSFER - OUT REPORT:    Verbal report given to Miguelina(name) on [facility]  being transferred to tele(unit) for routine progression of care       Report consisted of patients Situation, Background, Assessment and   Recommendations(SBAR). Information from the following report(s) SBAR, ED Summary, Procedure Summary, Intake/Output, MAR, Recent Results and Cardiac Rhythm NSR was reviewed with the receiving nurse. Lines:   Peripheral IV 03/07/18 Left Hand (Active)   Site Assessment Clean, dry, & intact 3/7/2018  5:14 PM   Phlebitis Assessment 0 3/7/2018  5:14 PM   Dressing Status Clean, dry, & intact 3/7/2018  5:14 PM   Dressing Type 4 X 4 3/7/2018  5:14 PM   Hub Color/Line Status Flushed 3/7/2018  5:14 PM        Opportunity for questions and clarification was provided.       Patient transported with:   Monitor  Tech
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
## 2018-03-08 NOTE — ED NOTES
Primary Nurse Moon Redman and Ubaldo Diaz, RN performed a dual skin assessment on this patient No impairment noted  Axel score is 21

## 2018-03-08 NOTE — DIABETES MGMT
GLYCEMIC CONTROL PROGRESS NOTE:    -discussed in rounds, known h/o T2DM, HbA1C within recommended range for age + comorbids on oral home regimen  -pt is well controlled on current home regimen, EMR shows a h/o HbA1C within recommended range  -pt does not appear to need an escalation of DM medication regimen at this time  -BG out of target range non-ICU: < 140 mg/dL, requiring corrective coverage, recommend initiate mealtime insulin (orders entered per protocol)   *Humalog 3 units qac  -<24 hour admission  Recent Glucose Results:   Lab Results   Component Value Date/Time     (H) 03/08/2018 03:15 AM     (H) 03/07/2018 05:23 PM    GLUCPOC 220 (H) 03/08/2018 11:23 AM    GLUCPOC 118 (H) 03/08/2018 06:31 AM    GLUCPOC 99 03/07/2018 09:24 PM     Roselyn Fregoso RN, MS  Glycemic Control Team

## 2018-03-08 NOTE — PROGRESS NOTES
03/07/18 - 2230: pt states he did not bring home cpap machine and is willing to use hospital-issued machine. RT provided a hospital-issued vpap to pt at this time. Machine is on standby as pt is not yet ready to go to sleep.    03/08/18 - 0130: pt refusing cpap at this time. Pt states he is not ready to sleep but will call when he is ready.

## 2018-03-08 NOTE — PROGRESS NOTES
Hospitalist Progress Note    Patient: Heaven Wu MRN: [id number]  CSN: [id number]    YOB: 1960  Age: 59 y.o. Sex: male    DOA: 3/7/2018 LOS:  LOS: 1 day                Assessment/Plan     Patient Active Problem List   Diagnosis Code    COPD exacerbation ([facility] J44.1    Type II diabetes mellitus with peripheral autonomic neuropathy (HCC) E11.43    Hypertension I10    JIM (obstructive sleep apnea) G47.33    Hypoxia R09.02    Morbid obesity ([facility] E66.01    Chest pain R07.9    Acute on chronic combined systolic and diastolic ACC/AHA stage C congestive heart failure (HCC) I50.43    CHF (congestive heart failure) ([facility]) I50.9    Acute kidney injury ([facility] N17.9            60 yo male admitted for SOB    Acute on chronic diastolic CHF - continue with lasix,   Last echo     COPD - continue with solumedrol neb treatment. DM - on SSI    HTN - relatively controlled, on home meds. JIM - CPAP at night    BETHANY - monitor renal function in the setting of diuresis    Morbid obesity. DVT prophlylaxis      Disposition : 2-3 days    Review of systems  General: No fevers or chills. Cardiovascular: No chest pain or pressure. No palpitations. Pulmonary: see above. Gastrointestinal: No nausea, vomiting. Physical Exam:  General: Awake, cooperative, no acute distress    HEENT: NC, Atraumatic. PERRLA, anicteric sclerae. Lungs: Scattered rhonchi bilaterally. Heart:  Regular  rhythm,  No murmur, No Rubs, No Gallops  Abdomen: Soft, Non distended, Non tender.  +Bowel sounds,   Extremities: No c/c/e  Psych:   Not anxious or agitated. Neurologic:  No acute neurological deficit.                Vital signs/Intake and Output:  Visit Vitals    BP (!) 142/98 (BP 1 Location: Right arm, BP Patient Position: At rest)    Pulse 70    Temp 97.7 °F (36.5 °C)    Resp 18    Ht 5' 7\" (1.702 m)    Wt 157.9 kg (348 lb)    SpO2 99%    BMI 54.5 kg/m2     Current Shift:  03/08 0701 - 03/08 1900  In: 540 [P.O.:540]  Out: 2750 [Urine:2750]  Last three shifts:  03/06 1901 - 03/08 0700  In: -   Out: 2260 [Urine:2260]            Labs: Results:       Chemistry Recent Labs      03/08/18 0315 03/07/18   1723   GLU  243*  140*   NA  141  144   K  4.4  4.1   CL  101  104   CO2  36*  39*   BUN  35*  39*   CREA  1.69*  1.66*   CA  9.2  8.7   AGAP  4  1*   BUCR  21*  23*   AP   --   85   TP   --   6.9   ALB   --   2.9*   GLOB   --   4.0   AGRAT   --   0.7*      CBC w/Diff Recent Labs      03/08/18 0315 03/07/18   1723   WBC  10.5  10.7   RBC  5.18  5.20   HGB  15.1  15.3   HCT  48.3*  48.0   PLT  218  178   GRANS  83*  57   LYMPH  11*  28   EOS  0  1      Cardiac Enzymes Recent Labs      03/08/18   0904  03/08/18 0315   CPK  110  156   CKND1  1.7  1.5      Coagulation No results for input(s): PTP, INR, APTT in the last 72 hours. No lab exists for component: INREXT    Lipid Panel Lab Results   Component Value Date/Time    Cholesterol, total 196 01/25/2018 02:51 AM    HDL Cholesterol 64 (H) 01/25/2018 02:51 AM    LDL, calculated 121.2 (H) 01/25/2018 02:51 AM    VLDL, calculated 10.8 01/25/2018 02:51 AM    Triglyceride 54 01/25/2018 02:51 AM    CHOL/HDL Ratio 3.1 01/25/2018 02:51 AM      BNP No results for input(s): BNPP in the last 72 hours.    Liver Enzymes Recent Labs      03/07/18   1723   TP  6.9   ALB  2.9*   AP  85   SGOT  33      Thyroid Studies Lab Results   Component Value Date/Time    TSH 0.14 (L) 01/25/2018 02:51 AM        Procedures/imaging: see electronic medical records for all procedures/Xrays and details which were not copied into this note but were reviewed prior to creation of Plan

## 2018-03-08 NOTE — ROUTINE PROCESS
Bedside and Verbal shift change report given to Blair Mcpherson  (oncoming nurse) by Ashwini Dexter RN  (offgoing nurse). Report given with SBAR, Kardex, Intake/Output and Recent Results.

## 2018-03-08 NOTE — PROGRESS NOTES
Chart reviewed. Pt admitted in observation status for chest pain, CHF. CM will follow for discharge planning needs. 1130  Attended IDRs. Nursing asked to conduct walk test if patient will require oxygen at discharge. Pt stating he had oxygen at home, but the company (whose name he couldn't give) came and took it due to late payment. Pt noted to have medicaid. Pt will need cardiology follow-up at discharge. Readmission Risk Assessment:     Moderate Risk and MSSP/Good Help ACO patients    RRAT Score:  13-20    Initial Assessment:  Per chart, pt is a 59 y.o. male with pmhx of COPD and CHF c/o acute on chronic chest pain and SOB worsening today. Pt also reports home health nurse noted a high BP of 200/110; pt then called EMS. Associated sxs include dizziness, lightheadedness, and chronic bilateral leg swelling. Pt was seen at this facility 4 days ago for similar sxs and was discharged yesterday. Pt was evaluated by CXR which shows CHF. Emergency Contact:    Name Relation Home Work [address] Spouse [phone]      Pertinent Medical Hx:     CHF,COPD    PCP/Specialists:  JULIO CÉSAR [address] 405:       DME:      Per chart, home oxygen    Moderate Risk Care Transition Plan:  1. [facility], SNF, acute rehab, community care coordination of resources. 2. Involve patient/caregiver in assessment, planning, education and implement of intervention. 3. CM daily patient care huddles/interdisciplinary rounds. 4. PCP/Specialist appointment within 5  7 days made prior to discharge. 5. Facilitate transportation and logistics for follow-up appointments. 6. Medication reconciliation [address]  7. Formal handoff between hospital provider and post-acute provider to transition patient  Handoff to [address] Nurse Navigator or PCP practice.     Care Management Interventions  Transition of Care Consult (CM Consult): Discharge Planning

## 2018-03-09 VITALS
HEIGHT: 67 IN | SYSTOLIC BLOOD PRESSURE: 164 MMHG | RESPIRATION RATE: 18 BRPM | TEMPERATURE: 98.2 F | WEIGHT: 315 LBS | OXYGEN SATURATION: 94 % | HEART RATE: 79 BPM | DIASTOLIC BLOOD PRESSURE: 85 MMHG | BODY MASS INDEX: 49.44 KG/M2

## 2018-03-09 LAB
GLUCOSE BLD STRIP.AUTO-MCNC: 132 MG/DL (ref 70–110)
GLUCOSE BLD STRIP.AUTO-MCNC: 176 MG/DL (ref 70–110)
GLUCOSE BLD STRIP.AUTO-MCNC: 190 MG/DL (ref 70–110)

## 2018-03-09 PROCEDURE — 94760 N-INVAS EAR/PLS OXIMETRY 1: CPT

## 2018-03-09 PROCEDURE — 77010033678 HC OXYGEN DAILY

## 2018-03-09 PROCEDURE — 77030020887 HC CRM SKN PROT DIMTH S&N -A

## 2018-03-09 PROCEDURE — 82962 GLUCOSE BLOOD TEST: CPT

## 2018-03-09 PROCEDURE — 99218 HC RM OBSERVATION: CPT

## 2018-03-09 PROCEDURE — 74011636637 HC RX REV CODE- 636/637: Performed by: INTERNAL MEDICINE

## 2018-03-09 PROCEDURE — 94640 AIRWAY INHALATION TREATMENT: CPT

## 2018-03-09 PROCEDURE — 96372 THER/PROPH/DIAG INJ SC/IM: CPT

## 2018-03-09 PROCEDURE — 96376 TX/PRO/DX INJ SAME DRUG ADON: CPT

## 2018-03-09 PROCEDURE — 74011636637 HC RX REV CODE- 636/637: Performed by: HOSPITALIST

## 2018-03-09 PROCEDURE — 74011250636 HC RX REV CODE- 250/636: Performed by: INTERNAL MEDICINE

## 2018-03-09 PROCEDURE — 74011000250 HC RX REV CODE- 250: Performed by: INTERNAL MEDICINE

## 2018-03-09 PROCEDURE — 74011250637 HC RX REV CODE- 250/637: Performed by: INTERNAL MEDICINE

## 2018-03-09 RX ORDER — INSULIN LISPRO 100 [IU]/ML
5 INJECTION, SOLUTION INTRAVENOUS; SUBCUTANEOUS
Status: DISCONTINUED | OUTPATIENT
Start: 2018-03-09 | End: 2018-03-09 | Stop reason: HOSPADM

## 2018-03-09 RX ORDER — CARVEDILOL 3.12 MG/1
3.12 TABLET ORAL 2 TIMES DAILY WITH MEALS
Qty: 60 TAB | Refills: 2 | Status: ON HOLD | OUTPATIENT
Start: 2018-03-09 | End: 2018-04-24

## 2018-03-09 RX ORDER — OXYCODONE AND ACETAMINOPHEN 10; 325 MG/1; MG/1
1 TABLET ORAL
Qty: 12 TAB | Refills: 0 | Status: SHIPPED | OUTPATIENT
Start: 2018-03-09 | End: 2018-03-09

## 2018-03-09 RX ORDER — INSULIN LISPRO 100 [IU]/ML
INJECTION, SOLUTION INTRAVENOUS; SUBCUTANEOUS
Status: DISCONTINUED | OUTPATIENT
Start: 2018-03-09 | End: 2018-03-09 | Stop reason: HOSPADM

## 2018-03-09 RX ORDER — INSULIN LISPRO 100 [IU]/ML
6 INJECTION, SOLUTION INTRAVENOUS; SUBCUTANEOUS
Status: DISCONTINUED | OUTPATIENT
Start: 2018-03-09 | End: 2018-03-09

## 2018-03-09 RX ORDER — OXYCODONE AND ACETAMINOPHEN 10; 325 MG/1; MG/1
1 TABLET ORAL
Qty: 12 TAB | Refills: 0 | Status: SHIPPED | OUTPATIENT
Start: 2018-03-09 | End: 2018-04-27

## 2018-03-09 RX ADMIN — OXYCODONE AND ACETAMINOPHEN 1 TABLET: 10; 325 TABLET ORAL at 06:00

## 2018-03-09 RX ADMIN — HYDRALAZINE HYDROCHLORIDE 25 MG: 25 TABLET, FILM COATED ORAL at 08:18

## 2018-03-09 RX ADMIN — AMLODIPINE BESYLATE 5 MG: 5 TABLET ORAL at 08:17

## 2018-03-09 RX ADMIN — INSULIN LISPRO 3 UNITS: 100 INJECTION, SOLUTION INTRAVENOUS; SUBCUTANEOUS at 06:53

## 2018-03-09 RX ADMIN — OXYCODONE AND ACETAMINOPHEN 1 TABLET: 10; 325 TABLET ORAL at 12:01

## 2018-03-09 RX ADMIN — NITROGLYCERIN 0.5 INCH: 20 OINTMENT TOPICAL at 05:32

## 2018-03-09 RX ADMIN — DIMETHICONE: 0.01 CREAM TOPICAL at 09:00

## 2018-03-09 RX ADMIN — INSULIN LISPRO 2 UNITS: 100 INJECTION, SOLUTION INTRAVENOUS; SUBCUTANEOUS at 12:05

## 2018-03-09 RX ADMIN — INSULIN LISPRO 3 UNITS: 100 INJECTION, SOLUTION INTRAVENOUS; SUBCUTANEOUS at 08:16

## 2018-03-09 RX ADMIN — FUROSEMIDE 40 MG: 10 INJECTION, SOLUTION INTRAMUSCULAR; INTRAVENOUS at 08:17

## 2018-03-09 RX ADMIN — INSULIN LISPRO 6 UNITS: 100 INJECTION, SOLUTION INTRAVENOUS; SUBCUTANEOUS at 12:05

## 2018-03-09 RX ADMIN — SPIRONOLACTONE 25 MG: 25 TABLET ORAL at 08:18

## 2018-03-09 RX ADMIN — CARVEDILOL 3.12 MG: 3.12 TABLET, FILM COATED ORAL at 16:11

## 2018-03-09 RX ADMIN — CLONIDINE HYDROCHLORIDE 0.2 MG: 0.1 TABLET ORAL at 05:31

## 2018-03-09 RX ADMIN — INSULIN LISPRO 5 UNITS: 100 INJECTION, SOLUTION INTRAVENOUS; SUBCUTANEOUS at 16:11

## 2018-03-09 RX ADMIN — METHYLPREDNISOLONE SODIUM SUCCINATE 40 MG: 40 INJECTION, POWDER, FOR SOLUTION INTRAMUSCULAR; INTRAVENOUS at 05:34

## 2018-03-09 RX ADMIN — OXYCODONE AND ACETAMINOPHEN 1 TABLET: 10; 325 TABLET ORAL at 18:20

## 2018-03-09 RX ADMIN — IPRATROPIUM BROMIDE AND ALBUTEROL SULFATE 3 ML: .5; 3 SOLUTION RESPIRATORY (INHALATION) at 15:20

## 2018-03-09 RX ADMIN — NITROGLYCERIN 0.5 INCH: 20 OINTMENT TOPICAL at 12:00

## 2018-03-09 RX ADMIN — HEPARIN SODIUM 5000 UNITS: 5000 INJECTION, SOLUTION INTRAVENOUS; SUBCUTANEOUS at 05:36

## 2018-03-09 RX ADMIN — METHYLPREDNISOLONE SODIUM SUCCINATE 40 MG: 40 INJECTION, POWDER, FOR SOLUTION INTRAMUSCULAR; INTRAVENOUS at 15:26

## 2018-03-09 RX ADMIN — HYDRALAZINE HYDROCHLORIDE 25 MG: 25 TABLET, FILM COATED ORAL at 15:25

## 2018-03-09 RX ADMIN — CLONIDINE HYDROCHLORIDE 0.2 MG: 0.1 TABLET ORAL at 15:26

## 2018-03-09 RX ADMIN — HEPARIN SODIUM 5000 UNITS: 5000 INJECTION, SOLUTION INTRAVENOUS; SUBCUTANEOUS at 15:26

## 2018-03-09 RX ADMIN — IPRATROPIUM BROMIDE AND ALBUTEROL SULFATE 3 ML: .5; 3 SOLUTION RESPIRATORY (INHALATION) at 07:19

## 2018-03-09 RX ADMIN — IPRATROPIUM BROMIDE AND ALBUTEROL SULFATE 3 ML: .5; 3 SOLUTION RESPIRATORY (INHALATION) at 12:45

## 2018-03-09 RX ADMIN — CARVEDILOL 3.12 MG: 3.12 TABLET, FILM COATED ORAL at 08:18

## 2018-03-09 NOTE — PROGRESS NOTES
Pt awake and sitting in beside chair while eating. Pt states he does not wish to wear the cpap tonight but will call if needed.

## 2018-03-09 NOTE — PROGRESS NOTES
Chart reviewed. Nursing, please complete and chart a walk test if patient is to be discharged on oxygen. CM will cont to follow for discharge planning needs. 1300  Attended IDRs. Pt states he had home oxygen, but [facility]) came and got it due to non payment. CM will call [facility] to confirm. Per primary RN, pts walk test sats 88%. Pt offered FOC for HH. Pt chose [facility]. Referral placed with [facility] Layla for assistance. [address] with Hui from [address] [place] [address]. She states pt does have his DME and it was not retrieved from his residence. She is requesting updated documentation, including walk test.  Gertrude Cid will assist.       1406  Pt called to inform him [facility] confirmed he was current with them. Per Hui at [facility], pts DME was not removed from the home. Per Hui, pt had not gone to PCP to get documentation sent to [facility] in order for his medicaid to pay for oxygen. Per Hui, it has been relayed to the patient that he would receive (and has) received bills until documentation was sent. CM informed pt that we [facility]) would send over documentation as requested by [facility]. Pt also states he does not have a phone. [facility] made aware and will see patient on Sunday. Pt aware.

## 2018-03-09 NOTE — PROGRESS NOTES
Pt states he is not ready to wear cpap for HS at this time. Pt requests to go on cpap at 0300. RT will re-attempt at that time.

## 2018-03-09 NOTE — PROGRESS NOTES
Shift Summary Note:  Assumed care of patient sitting in a chair. Remains on nasal cannula. Medicated x2 for pain in lower extremities. Uneventful night, VSS, call bell within reach.   Patient Vitals for the past 12 hrs:   Temp Pulse Resp BP SpO2   03/09/18 0321 98.4 °F (36.9 °C) 75 18 (!) 155/94 94 %   03/08/18 2316 97.7 °F (36.5 °C) 64 18 156/62 94 %   03/08/18 2046 - - - - 98 %   03/08/18 1941 97.7 °F (36.5 °C) 78 18 148/71 98 %

## 2018-03-09 NOTE — DISCHARGE INSTRUCTIONS
DISCHARGE SUMMARY from Nurse    PATIENT INSTRUCTIONS:    After general anesthesia or intravenous sedation, for 24 hours or while taking prescription Narcotics:  · Limit your activities  · Do not drive and operate hazardous machinery  · Do not make important personal or business decisions  · Do  not drink alcoholic beverages  · If you have not urinated within 8 hours after discharge, please contact your surgeon on call. Report the following to your surgeon:  · Excessive pain, swelling, redness or odor of or around the surgical area  · Temperature over 100.5  · Nausea and vomiting lasting longer than 4 hours or if unable to take medications  · Any signs of decreased circulation or nerve impairment to extremity: change in color, persistent  numbness, tingling, coldness or increase pain  · Any questions    What to do at Home:  Recommended activity: Activity as tolerated. Please follow up with primary care provider. *  Please give a list of your current medications to your Primary Care Provider. *  Please update this list whenever your medications are discontinued, doses are      changed, or new medications (including over-the-counter products) are added. *  Please carry medication information at all times in case of emergency situations. These are general instructions for a healthy lifestyle:    No smoking/ No tobacco products/ Avoid exposure to second hand smoke  Surgeon General's Warning:  Quitting smoking now greatly reduces serious risk to your health.     Obesity, smoking, and sedentary lifestyle greatly increases your risk for illness    A healthy diet, regular physical exercise & weight monitoring are important for maintaining a healthy lifestyle    You may be retaining fluid if you have a history of heart failure or if you experience any of the following symptoms:  Weight gain of 3 pounds or more overnight or 5 pounds in a week, increased swelling in our hands or feet or shortness of breath while lying flat in bed. Please call your doctor as soon as you notice any of these symptoms; do not wait until your next office visit. Recognize signs and symptoms of STROKE:    F-face looks uneven    A-arms unable to move or move unevenly    S-speech slurred or non-existent    T-time-call 911 as soon as signs and symptoms begin-DO NOT go       Back to bed or wait to see if you get better-TIME IS BRAIN. Warning Signs of HEART ATTACK     Call 911 if you have these symptoms:   Chest discomfort. Most heart attacks involve discomfort in the center of the chest that lasts more than a few minutes, or that goes away and comes back. It can feel like uncomfortable pressure, squeezing, fullness, or pain.  Discomfort in other areas of the upper body. Symptoms can include pain or discomfort in one or both arms, the back, neck, jaw, or stomach.  Shortness of breath with or without chest discomfort.  Other signs may include breaking out in a cold sweat, nausea, or lightheadedness. Don't wait more than five minutes to call 911 - MINUTES MATTER! Fast action can save your life. Calling 911 is almost always the fastest way to get lifesaving treatment. Emergency Medical Services staff can begin treatment when they arrive -- up to an hour sooner than if someone gets to the hospital by car. The discharge information has been reviewed with the patient. The patient verbalized understanding. Discharge medications reviewed with the patient and appropriate educational materials and side effects teaching were provided.   ___________________________________________________________________________________________________________________________________      Patient armband removed and shredded

## 2018-03-09 NOTE — PROGRESS NOTES
6569 Assumed patient care from off going nurse Agustin Jones RN. Patient is sitting up in chair receiving scheduled breathing treatment. Whiteboard updated and call bell left within reach. 7077 Step 1)     Oxygen saturation at 98% on room air at rest.    If less than 88%: STOP! No further documentation needed; Otherwise proceeded to step 2 and 3      Step 2)     Oxygen saturation at  88% on room air with exertion     while walking for 6 minutes. Oxygen added to patient.       Step 3)     Oxygen saturation at 100% while walking on Oxygen at 4 LPM      Via:   [x] Nasal Cannula         [] Simple Face Mask          [] Non-Re-Breather Mask        [] Partial Re-Breather Mask          [] Venturi Mask

## 2018-03-09 NOTE — ROUTINE PROCESS
Dual AVS reviewed with Ronnell oWng RN. All medications reviewed individually with patient. Opportunities for questions and concerns provided. Patient discharged via (mode of transport ie. Car, ambulance or air transport) transport. Patient's arm band appropriately discarded.

## 2018-03-09 NOTE — ROUTINE PROCESS
Bedside and Verbal shift change report given to Dilma Saavedra (oncoming nurse) by Priscilla Becker RN   (offgoing nurse). Report included the following information SBAR, Kardex, MAR and Recent Results.

## 2018-03-09 NOTE — ROUTINE PROCESS
Bedside and Verbal shift change report given to Delfina (oncoming nurse) by Trina Pena (offgoing nurse). Report included the following information SBAR, Kardex, Intake/Output, MAR and Cardiac Rhythm NSR.

## 2018-03-10 LAB
ATRIAL RATE: 83 BPM
CALCULATED P AXIS, ECG09: 49 DEGREES
CALCULATED R AXIS, ECG10: -11 DEGREES
CALCULATED T AXIS, ECG11: 126 DEGREES
DIAGNOSIS, 93000: NORMAL
P-R INTERVAL, ECG05: 158 MS
Q-T INTERVAL, ECG07: 408 MS
QRS DURATION, ECG06: 100 MS
QTC CALCULATION (BEZET), ECG08: 479 MS
VENTRICULAR RATE, ECG03: 83 BPM

## 2018-03-10 NOTE — DISCHARGE SUMMARY
Discharge Summary    Patient: Sneha Strange MRN: [id number]  CSN: [id number]    YOB: 1960  Age: 59 y.o. Sex: male    DOA: 3/7/2018 LOS:  LOS: 2 days   Discharge Date: 3/9/2018     Primary Care Provider:  Welton Sacks, MD    Admission Diagnoses: Chest pain  CHF (congestive heart failure) ([facility]  Chest pain  Acute kidney injury [facility])    Discharge Diagnoses:    Problem List as of 3/9/2018  Date Reviewed: 2/19/2013          Codes Class Noted - Resolved    Acute kidney injury [facility]) ICD-10-CM: N17.9  ICD-9-CM: 584.9  3/7/2018 - Present        CHF (congestive heart failure) ([facility] (Chronic) ICD-10-CM: I50.9  ICD-9-CM: 428.0  3/3/2018 - Present        * (Principal)Chest pain ICD-10-CM: R07.9  ICD-9-CM: 786.50  1/25/2018 - Present        Acute on chronic combined systolic and diastolic ACC/AHA stage C congestive heart failure (HCC) (Chronic) ICD-10-CM: I50.43  ICD-9-CM: 428.43, 428.0  1/25/2018 - Present        COPD exacerbation ([facility] ICD-10-CM: J44.1  ICD-9-CM: 491.21  2/19/2013 - Present        Type II diabetes mellitus with peripheral autonomic neuropathy ([facility] ICD-10-CM: E11.43  ICD-9-CM: 250.60, 337.1  2/19/2013 - Present        Hypertension ICD-10-CM: I10  ICD-9-CM: 401.9  Unknown - Present        JIM (obstructive sleep apnea) ICD-10-CM: G47.33  ICD-9-CM: 327.23  2/19/2013 - Present        Hypoxia ICD-10-CM: R09.02  ICD-9-CM: 799.02  2/19/2013 - Present        Morbid obesity ([facility] ICD-10-CM: E66.01  ICD-9-CM: 278.01  2/19/2013 - Present              Discharge Medications:     Discharge Medication List as of 3/9/2018  6:05 PM      START taking these medications    Details   carvedilol (COREG) 3.125 mg tablet Take 1 Tab by mouth two (2) times daily (with meals). , Normal, Disp-60 Tab, R-2         CONTINUE these medications which have CHANGED    Details   !! oxyCODONE-acetaminophen (PERCOCET)  mg per tablet Take 1 Tab by mouth every six (6) hours as needed for Pain.  Max Daily Amount: 4 Tabs., Print, Disp-12 Tab, R-0       !! - Potential duplicate medications found. Please discuss with provider. CONTINUE these medications which have NOT CHANGED    Details   cloNIDine HCl (CATAPRES) 0.2 mg tablet Take 1 Tab by mouth every eight (8) hours. , Normal, Disp-90 Tab, R-0      furosemide (LASIX) 40 mg tablet Take 1 Tab by mouth daily. , Normal, Disp-30 Tab, R-0      !! oxyCODONE-acetaminophen (PERCOCET 10)  mg per tablet Take 1 Tab by mouth every six (6) hours as needed. Max Daily Amount: 4 Tabs., Print, Disp-8 Tab, R-0      hydrALAZINE (APRESOLINE) 25 mg tablet Take 1 Tab by mouth three (3) times daily. , Normal, Disp-90 Tab, R-0      glipiZIDE (GLUCOTROL) 10 mg tablet Take 1 Tab by mouth two (2) times a day., Normal, Disp-60 Tab, R-0      predniSONE (STERAPRED) 5 mg dose pack See administration instruction per 5mg dose pack, Normal, Disp-21 Tab, R-0      Oxygen Indications: 4L, Historical Med      spironolactone (ALDACTONE) 25 mg tablet Take 25 mg by mouth daily. , Historical Med      dimethicone 1 % topical cream Apply  to affected area daily. Print, Disp-60 g, R-0      amLODIPine (NORVASC) 5 mg tablet Take  by mouth daily. Historical Med      albuterol (PROVENTIL HFA, VENTOLIN HFA) 90 mcg/actuation inhaler Take 1 Puff by inhalation. 1 puff in am and 1 puff in pm Historical Med, 1 Puff       !! - Potential duplicate medications found. Please discuss with provider. Discharge Condition: Good          PHYSICAL EXAM   Visit Vitals    /85 (BP 1 Location: Right arm, BP Patient Position: At rest)    Pulse 79    Temp 98.2 °F (36.8 °C)    Resp 18    Ht 5' 7\" (1.702 m)    Wt 157.9 kg (348 lb 1.7 oz)    SpO2 94%    BMI 54.52 kg/m2     General: Awake, cooperative, no acute distress    HEENT: NC, Atraumatic. PERRLA, EOMI. Anicteric sclerae. Lungs:  CTA Bilaterally. No Wheezing/Rhonchi/Rales. Heart:  Regular  rhythm,  No murmur, No Rubs, No Gallops  Abdomen: Soft, Non distended, Non tender.  +Bowel sounds,   Extremities: No c/c/e  Psych:   Not anxious or agitated. Neurologic:  No acute neurological deficits. Admission HPI : Ileana Potts is a 59 y.o.  male hx of Sleep Apnea, DM CHF ef 45, COPD , home oxygen 4L  Presents to ER with worsening dyspnea and Chest pain since being discharged 24 hours ago  He was seen by [facility] nurse and found have /110 patient had not taken meds as prescribed  Hydralazine tid /clonidine bid  Patient feels like he is not at baseline and is afraid to be home alone  He has no phone to call 911/ and no personal care aide  Both of which he would like access      In ER hypoxemic with elvated bp responded well to discharge meds   Xray: cardiomegaly with interstial edema  BNP elevated   BMP worsening renal insufficency  Last echo 7/6973 ef 05STZ diastolic dysfunction  Increased filling pressure       Hospital Course :   Pt was admitted to the medical floor, started on IV steroids, inhaled duonebs, IV lasix and supplemental oxygen with the above measures he had gradual improvement in his symptoms. He did have hyperglycemia for which he was started on SSI. Today he is feeling much better, he reports that he had oxygen at home due to some payment issues it was taken away, he was ambulated and he qualifies for oxygen. [facility] arranged for home oxygen. He is eager to go home, He is stable, and has a follow up with [facility] clinic.     Activity: Activity as tolerated    Diet: Diabetic Diet    Follow-up: PCP    Disposition: home with home health    Minutes spent on discharge: 45       Labs: Results:       Chemistry Recent Labs      03/08/18   0315  03/07/18   1723   GLU  243*  140*   NA  141  144   K  4.4  4.1   CL  101  104   CO2  36*  39*   BUN  35*  39*   CREA  1.69*  1.66*   CA  9.2  8.7   AGAP  4  1*   BUCR  21*  23*   AP   --   85   TP   --   6.9   ALB   --   2.9*   GLOB   --   4.0   AGRAT   --   0.7*      CBC w/Diff Recent Labs 03/08/18   0315  03/07/18   1723   WBC  10.5  10.7   RBC  5.18  5.20   HGB  15.1  15.3   HCT  48.3*  48.0   PLT  218  178   GRANS  83*  57   LYMPH  11*  28   EOS  0  1      Cardiac Enzymes Recent Labs      03/08/18   0904  03/08/18   0315   CPK  110  156   CKND1  1.7  1.5      Coagulation No results for input(s): PTP, INR, APTT in the last 72 hours. No lab exists for component: INREXT    Lipid Panel Lab Results   Component Value Date/Time    Cholesterol, total 196 01/25/2018 02:51 AM    HDL Cholesterol 64 (H) 01/25/2018 02:51 AM    LDL, calculated 121.2 (H) 01/25/2018 02:51 AM    VLDL, calculated 10.8 01/25/2018 02:51 AM    Triglyceride 54 01/25/2018 02:51 AM    CHOL/HDL Ratio 3.1 01/25/2018 02:51 AM      BNP No results for input(s): BNPP in the last 72 hours. Liver Enzymes Recent Labs      03/07/18   1723   TP  6.9   ALB  2.9*   AP  85   SGOT  33      Thyroid Studies Lab Results   Component Value Date/Time    TSH 0.14 (L) 01/25/2018 02:51 AM            Significant Diagnostic Studies: Xr Chest Sngl V    Result Date: 3/7/2018  EXAM: AP portable upright chest INDICATION: Shortness of breath chest pain COMPARISON: March 3, 2018 _______________ FINDINGS: Cardiac size is enlarged. The media sternal contours normal. There is prominence of the central pulmonary vasculature with increased interstitial markings suggesting mild pulmonary edema. No focal airspace disease or effusion or pneumothorax seen. _______________     IMPRESSION: Cardiomegaly and mild pulmonary edema     Xr Chest Port    Result Date: 3/3/2018  Chest, single view Indication: Shortness of breath Comparison: Several prior chest radiograph, most recently 1/24/2018 Findings:  Portable upright AP view of the chest was obtained. Mild central vascular congestion is noted with faint interstitial opacities present in a perihilar distribution. No pneumothorax or pleural effusion. Cardiac size is enlarged.  Mediastinal contours are normal. There is no acute osseous abnormality. Impression: Cardiomegaly with mild central vascular congestion and mild perihilar interstitial edema. No results found for this or any previous visit.         CC: Steven Nino MD

## 2018-03-11 LAB
ATRIAL RATE: 86 BPM
CALCULATED P AXIS, ECG09: 61 DEGREES
CALCULATED R AXIS, ECG10: -37 DEGREES
CALCULATED T AXIS, ECG11: 96 DEGREES
DIAGNOSIS, 93000: NORMAL
P-R INTERVAL, ECG05: 162 MS
Q-T INTERVAL, ECG07: 394 MS
QRS DURATION, ECG06: 96 MS
QTC CALCULATION (BEZET), ECG08: 471 MS
VENTRICULAR RATE, ECG03: 86 BPM

## 2018-04-22 ENCOUNTER — HOSPITAL ENCOUNTER (INPATIENT)
Age: 58
LOS: 5 days | Discharge: HOME HEALTH | DRG: 194 | End: 2018-04-27
Attending: EMERGENCY MEDICINE | Admitting: FAMILY MEDICINE
Payer: MEDICAID

## 2018-04-22 ENCOUNTER — APPOINTMENT (OUTPATIENT)
Dept: GENERAL RADIOLOGY | Age: 58
DRG: 194 | End: 2018-04-22
Attending: EMERGENCY MEDICINE
Payer: MEDICAID

## 2018-04-22 DIAGNOSIS — J81.0 ACUTE PULMONARY EDEMA (HCC): ICD-10-CM

## 2018-04-22 DIAGNOSIS — I50.9 ACUTE ON CHRONIC CONGESTIVE HEART FAILURE, UNSPECIFIED HEART FAILURE TYPE (HCC): ICD-10-CM

## 2018-04-22 DIAGNOSIS — R06.03 RESPIRATORY DISTRESS: Primary | ICD-10-CM

## 2018-04-22 DIAGNOSIS — J44.1 CHRONIC OBSTRUCTIVE PULMONARY DISEASE WITH ACUTE EXACERBATION (HCC): ICD-10-CM

## 2018-04-22 DIAGNOSIS — R41.82 ALTERED MENTAL STATUS, UNSPECIFIED ALTERED MENTAL STATUS TYPE: ICD-10-CM

## 2018-04-22 DIAGNOSIS — I10 ESSENTIAL HYPERTENSION: ICD-10-CM

## 2018-04-22 PROBLEM — J96.90 RESPIRATORY FAILURE (HCC): Status: ACTIVE | Noted: 2018-04-22

## 2018-04-22 PROBLEM — F14.10 COCAINE ABUSE (HCC): Status: ACTIVE | Noted: 2018-04-22

## 2018-04-22 PROBLEM — J44.9 COPD (CHRONIC OBSTRUCTIVE PULMONARY DISEASE) (HCC): Status: ACTIVE | Noted: 2018-04-22

## 2018-04-22 PROBLEM — J96.20 ACUTE ON CHRONIC RESPIRATORY FAILURE (HCC): Status: ACTIVE | Noted: 2018-04-22

## 2018-04-22 LAB
ALBUMIN SERPL-MCNC: 3.2 G/DL (ref 3.4–5)
ALBUMIN/GLOB SERPL: 0.9 {RATIO} (ref 0.8–1.7)
ALP SERPL-CCNC: 74 U/L (ref 45–117)
ALT SERPL-CCNC: 56 U/L (ref 16–61)
AMPHET UR QL SCN: NEGATIVE
ANION GAP SERPL CALC-SCNC: 6 MMOL/L (ref 3–18)
APPEARANCE UR: CLEAR
ARTERIAL PATENCY WRIST A: YES
ARTERIAL PATENCY WRIST A: YES
AST SERPL-CCNC: 33 U/L (ref 15–37)
ATRIAL RATE: 68 BPM
BACTERIA URNS QL MICRO: NORMAL /HPF
BARBITURATES UR QL SCN: NEGATIVE
BASE EXCESS BLD CALC-SCNC: 0 MMOL/L
BASE EXCESS BLD CALC-SCNC: 3 MMOL/L
BASOPHILS # BLD: 0 K/UL (ref 0–0.06)
BASOPHILS NFR BLD: 0 % (ref 0–2)
BDY SITE: ABNORMAL
BDY SITE: ABNORMAL
BENZODIAZ UR QL: NEGATIVE
BILIRUB SERPL-MCNC: 0.3 MG/DL (ref 0.2–1)
BILIRUB UR QL: NEGATIVE
BNP SERPL-MCNC: 1691 PG/ML (ref 0–900)
BUN SERPL-MCNC: 13 MG/DL (ref 7–18)
BUN/CREAT SERPL: 9 (ref 12–20)
CALCIUM SERPL-MCNC: 8.6 MG/DL (ref 8.5–10.1)
CALCULATED P AXIS, ECG09: 57 DEGREES
CALCULATED R AXIS, ECG10: -36 DEGREES
CALCULATED T AXIS, ECG11: 110 DEGREES
CANNABINOIDS UR QL SCN: NEGATIVE
CHLORIDE SERPL-SCNC: 107 MMOL/L (ref 100–108)
CK MB CFR SERPL CALC: 1.7 % (ref 0–4)
CK MB SERPL-MCNC: 2.6 NG/ML (ref 5–25)
CK SERPL-CCNC: 155 U/L (ref 39–308)
CO2 SERPL-SCNC: 31 MMOL/L (ref 21–32)
COCAINE UR QL SCN: POSITIVE
COLOR UR: YELLOW
CREAT SERPL-MCNC: 1.39 MG/DL (ref 0.6–1.3)
DIAGNOSIS, 93000: NORMAL
DIFFERENTIAL METHOD BLD: ABNORMAL
EOSINOPHIL # BLD: 0.2 K/UL (ref 0–0.4)
EOSINOPHIL NFR BLD: 3 % (ref 0–5)
EPITH CASTS URNS QL MICRO: NORMAL /LPF (ref 0–5)
ERYTHROCYTE [DISTWIDTH] IN BLOOD BY AUTOMATED COUNT: 15.5 % (ref 11.6–14.5)
EST. AVERAGE GLUCOSE BLD GHB EST-MCNC: 143 MG/DL
ETHANOL SERPL-MCNC: 7 MG/DL (ref 0–3)
GAS FLOW.O2 O2 DELIVERY SYS: ABNORMAL L/MIN
GAS FLOW.O2 O2 DELIVERY SYS: ABNORMAL L/MIN
GLOBULIN SER CALC-MCNC: 3.5 G/DL (ref 2–4)
GLUCOSE BLD STRIP.AUTO-MCNC: 141 MG/DL (ref 70–110)
GLUCOSE BLD STRIP.AUTO-MCNC: 329 MG/DL (ref 70–110)
GLUCOSE BLD STRIP.AUTO-MCNC: 91 MG/DL (ref 70–110)
GLUCOSE SERPL-MCNC: 113 MG/DL (ref 74–99)
GLUCOSE UR STRIP.AUTO-MCNC: NEGATIVE MG/DL
HBA1C MFR BLD: 6.6 % (ref 4.5–5.6)
HCO3 BLD-SCNC: 26.5 MMOL/L (ref 22–26)
HCO3 BLD-SCNC: 28.7 MMOL/L (ref 22–26)
HCT VFR BLD AUTO: 45.2 % (ref 36–48)
HDSCOM,HDSCOM: ABNORMAL
HGB BLD-MCNC: 14.2 G/DL (ref 13–16)
HGB UR QL STRIP: ABNORMAL
HYALINE CASTS URNS QL MICRO: NORMAL /LPF (ref 0–2)
INR PPP: 1 (ref 0.8–1.2)
KETONES UR QL STRIP.AUTO: NEGATIVE MG/DL
LACTATE SERPL-SCNC: 0.7 MMOL/L (ref 0.4–2)
LEUKOCYTE ESTERASE UR QL STRIP.AUTO: NEGATIVE
LYMPHOCYTES # BLD: 1.7 K/UL (ref 0.9–3.6)
LYMPHOCYTES NFR BLD: 25 % (ref 21–52)
MAGNESIUM SERPL-MCNC: 2 MG/DL (ref 1.6–2.6)
MCH RBC QN AUTO: 28.5 PG (ref 24–34)
MCHC RBC AUTO-ENTMCNC: 31.4 G/DL (ref 31–37)
MCV RBC AUTO: 90.6 FL (ref 74–97)
METHADONE UR QL: NEGATIVE
MONOCYTES # BLD: 0.3 K/UL (ref 0.05–1.2)
MONOCYTES NFR BLD: 5 % (ref 3–10)
NEUTS SEG # BLD: 4.6 K/UL (ref 1.8–8)
NEUTS SEG NFR BLD: 67 % (ref 40–73)
NITRITE UR QL STRIP.AUTO: NEGATIVE
O2/TOTAL GAS SETTING VFR VENT: 24 %
O2/TOTAL GAS SETTING VFR VENT: 30 %
OPIATES UR QL: NEGATIVE
P-R INTERVAL, ECG05: 168 MS
PCO2 BLD: 50.7 MMHG (ref 35–45)
PCO2 BLD: 55.4 MMHG (ref 35–45)
PCP UR QL: NEGATIVE
PEEP RESPIRATORY: 6 CMH2O
PEEP RESPIRATORY: 6 CMH2O
PH BLD: 7.32 [PH] (ref 7.35–7.45)
PH BLD: 7.33 [PH] (ref 7.35–7.45)
PH UR STRIP: 5 [PH] (ref 5–8)
PIP ISTAT,IPIP: 20
PIP ISTAT,IPIP: 20
PLATELET # BLD AUTO: 109 K/UL (ref 135–420)
PMV BLD AUTO: 12.4 FL (ref 9.2–11.8)
PO2 BLD: 110 MMHG (ref 80–100)
PO2 BLD: 97 MMHG (ref 80–100)
POTASSIUM SERPL-SCNC: 4 MMOL/L (ref 3.5–5.5)
PROT SERPL-MCNC: 6.7 G/DL (ref 6.4–8.2)
PROT UR STRIP-MCNC: NEGATIVE MG/DL
PROTHROMBIN TIME: 12.5 SEC (ref 11.5–15.2)
Q-T INTERVAL, ECG07: 424 MS
QRS DURATION, ECG06: 110 MS
QTC CALCULATION (BEZET), ECG08: 450 MS
RBC # BLD AUTO: 4.99 M/UL (ref 4.7–5.5)
RBC #/AREA URNS HPF: NORMAL /HPF (ref 0–5)
SAO2 % BLD: 97 % (ref 92–97)
SAO2 % BLD: 98 % (ref 92–97)
SERVICE CMNT-IMP: ABNORMAL
SERVICE CMNT-IMP: ABNORMAL
SODIUM SERPL-SCNC: 144 MMOL/L (ref 136–145)
SP GR UR REFRACTOMETRY: 1.01 (ref 1–1.03)
SPECIMEN TYPE: ABNORMAL
SPECIMEN TYPE: ABNORMAL
SPONTANEOUS TIMED, IST: YES
SPONTANEOUS TIMED, IST: YES
TOTAL RESP. RATE, ITRR: 28
TOTAL RESP. RATE, ITRR: 28
TROPONIN I SERPL-MCNC: 0.03 NG/ML (ref 0–0.06)
UROBILINOGEN UR QL STRIP.AUTO: 0.2 EU/DL (ref 0.2–1)
VENTRICULAR RATE, ECG03: 68 BPM
WBC # BLD AUTO: 6.9 K/UL (ref 4.6–13.2)
WBC URNS QL MICRO: NORMAL /HPF (ref 0–5)

## 2018-04-22 PROCEDURE — 80053 COMPREHEN METABOLIC PANEL: CPT | Performed by: EMERGENCY MEDICINE

## 2018-04-22 PROCEDURE — 77030013140 HC MSK NEB VYRM -A

## 2018-04-22 PROCEDURE — 82803 BLOOD GASES ANY COMBINATION: CPT

## 2018-04-22 PROCEDURE — 85025 COMPLETE CBC W/AUTO DIFF WBC: CPT | Performed by: EMERGENCY MEDICINE

## 2018-04-22 PROCEDURE — 83605 ASSAY OF LACTIC ACID: CPT | Performed by: EMERGENCY MEDICINE

## 2018-04-22 PROCEDURE — 51702 INSERT TEMP BLADDER CATH: CPT

## 2018-04-22 PROCEDURE — 74011000250 HC RX REV CODE- 250: Performed by: EMERGENCY MEDICINE

## 2018-04-22 PROCEDURE — 74011250636 HC RX REV CODE- 250/636: Performed by: FAMILY MEDICINE

## 2018-04-22 PROCEDURE — 81001 URINALYSIS AUTO W/SCOPE: CPT | Performed by: EMERGENCY MEDICINE

## 2018-04-22 PROCEDURE — 74011636637 HC RX REV CODE- 636/637: Performed by: FAMILY MEDICINE

## 2018-04-22 PROCEDURE — 83735 ASSAY OF MAGNESIUM: CPT | Performed by: EMERGENCY MEDICINE

## 2018-04-22 PROCEDURE — 96365 THER/PROPH/DIAG IV INF INIT: CPT

## 2018-04-22 PROCEDURE — 74011250637 HC RX REV CODE- 250/637: Performed by: FAMILY MEDICINE

## 2018-04-22 PROCEDURE — 83880 ASSAY OF NATRIURETIC PEPTIDE: CPT | Performed by: EMERGENCY MEDICINE

## 2018-04-22 PROCEDURE — 80307 DRUG TEST PRSMV CHEM ANLYZR: CPT | Performed by: EMERGENCY MEDICINE

## 2018-04-22 PROCEDURE — 82962 GLUCOSE BLOOD TEST: CPT

## 2018-04-22 PROCEDURE — 71045 X-RAY EXAM CHEST 1 VIEW: CPT

## 2018-04-22 PROCEDURE — 77030035694 HC MSK BIPAP FLL FAC PERFMAX PHIL -B

## 2018-04-22 PROCEDURE — 96374 THER/PROPH/DIAG INJ IV PUSH: CPT

## 2018-04-22 PROCEDURE — 94660 CPAP INITIATION&MGMT: CPT

## 2018-04-22 PROCEDURE — 83036 HEMOGLOBIN GLYCOSYLATED A1C: CPT | Performed by: FAMILY MEDICINE

## 2018-04-22 PROCEDURE — 93005 ELECTROCARDIOGRAM TRACING: CPT

## 2018-04-22 PROCEDURE — 99285 EMERGENCY DEPT VISIT HI MDM: CPT

## 2018-04-22 PROCEDURE — 87040 BLOOD CULTURE FOR BACTERIA: CPT | Performed by: EMERGENCY MEDICINE

## 2018-04-22 PROCEDURE — 77030034850

## 2018-04-22 PROCEDURE — 74011250636 HC RX REV CODE- 250/636: Performed by: EMERGENCY MEDICINE

## 2018-04-22 PROCEDURE — 36600 WITHDRAWAL OF ARTERIAL BLOOD: CPT

## 2018-04-22 PROCEDURE — 96375 TX/PRO/DX INJ NEW DRUG ADDON: CPT

## 2018-04-22 PROCEDURE — 85610 PROTHROMBIN TIME: CPT | Performed by: EMERGENCY MEDICINE

## 2018-04-22 PROCEDURE — 94640 AIRWAY INHALATION TREATMENT: CPT

## 2018-04-22 PROCEDURE — 82550 ASSAY OF CK (CPK): CPT | Performed by: EMERGENCY MEDICINE

## 2018-04-22 PROCEDURE — 77010033678 HC OXYGEN DAILY

## 2018-04-22 PROCEDURE — 65610000006 HC RM INTENSIVE CARE

## 2018-04-22 PROCEDURE — 77030011943

## 2018-04-22 RX ORDER — FUROSEMIDE 10 MG/ML
40 INJECTION INTRAMUSCULAR; INTRAVENOUS 2 TIMES DAILY
Status: DISCONTINUED | OUTPATIENT
Start: 2018-04-22 | End: 2018-04-24

## 2018-04-22 RX ORDER — LEVOFLOXACIN 5 MG/ML
750 INJECTION, SOLUTION INTRAVENOUS EVERY 24 HOURS
Status: DISCONTINUED | OUTPATIENT
Start: 2018-04-22 | End: 2018-04-26

## 2018-04-22 RX ORDER — SODIUM CHLORIDE 0.9 % (FLUSH) 0.9 %
5-10 SYRINGE (ML) INJECTION EVERY 8 HOURS
Status: DISCONTINUED | OUTPATIENT
Start: 2018-04-22 | End: 2018-04-27 | Stop reason: HOSPADM

## 2018-04-22 RX ORDER — CLONIDINE HYDROCHLORIDE 0.1 MG/1
0.2 TABLET ORAL EVERY 8 HOURS
Status: DISCONTINUED | OUTPATIENT
Start: 2018-04-22 | End: 2018-04-22

## 2018-04-22 RX ORDER — HYDRALAZINE HYDROCHLORIDE 20 MG/ML
20 INJECTION INTRAMUSCULAR; INTRAVENOUS
Status: DISCONTINUED | OUTPATIENT
Start: 2018-04-22 | End: 2018-04-27 | Stop reason: HOSPADM

## 2018-04-22 RX ORDER — INSULIN LISPRO 100 [IU]/ML
INJECTION, SOLUTION INTRAVENOUS; SUBCUTANEOUS
Status: DISCONTINUED | OUTPATIENT
Start: 2018-04-22 | End: 2018-04-27 | Stop reason: HOSPADM

## 2018-04-22 RX ORDER — CARVEDILOL 3.12 MG/1
3.12 TABLET ORAL 2 TIMES DAILY WITH MEALS
Status: DISCONTINUED | OUTPATIENT
Start: 2018-04-22 | End: 2018-04-22

## 2018-04-22 RX ORDER — LABETALOL HYDROCHLORIDE 5 MG/ML
20 INJECTION, SOLUTION INTRAVENOUS
Status: COMPLETED | OUTPATIENT
Start: 2018-04-22 | End: 2018-04-22

## 2018-04-22 RX ORDER — DEXTROSE 50 % IN WATER (D50W) INTRAVENOUS SYRINGE
25-50 AS NEEDED
Status: DISCONTINUED | OUTPATIENT
Start: 2018-04-22 | End: 2018-04-27 | Stop reason: HOSPADM

## 2018-04-22 RX ORDER — FUROSEMIDE 10 MG/ML
80 INJECTION INTRAMUSCULAR; INTRAVENOUS
Status: COMPLETED | OUTPATIENT
Start: 2018-04-22 | End: 2018-04-22

## 2018-04-22 RX ORDER — CLONIDINE 0.3 MG/24H
1 PATCH, EXTENDED RELEASE TRANSDERMAL
Status: DISCONTINUED | OUTPATIENT
Start: 2018-04-22 | End: 2018-04-23

## 2018-04-22 RX ORDER — MAGNESIUM SULFATE HEPTAHYDRATE 40 MG/ML
2 INJECTION, SOLUTION INTRAVENOUS ONCE
Status: COMPLETED | OUTPATIENT
Start: 2018-04-22 | End: 2018-04-22

## 2018-04-22 RX ORDER — ALBUTEROL SULFATE 0.83 MG/ML
10 SOLUTION RESPIRATORY (INHALATION)
Status: COMPLETED | OUTPATIENT
Start: 2018-04-22 | End: 2018-04-22

## 2018-04-22 RX ORDER — MAGNESIUM SULFATE 100 %
4 CRYSTALS MISCELLANEOUS AS NEEDED
Status: DISCONTINUED | OUTPATIENT
Start: 2018-04-22 | End: 2018-04-27 | Stop reason: HOSPADM

## 2018-04-22 RX ORDER — MORPHINE SULFATE 4 MG/ML
1 INJECTION INTRAVENOUS
Status: DISCONTINUED | OUTPATIENT
Start: 2018-04-22 | End: 2018-04-27 | Stop reason: HOSPADM

## 2018-04-22 RX ORDER — ACETAMINOPHEN 325 MG/1
650 TABLET ORAL
Status: DISCONTINUED | OUTPATIENT
Start: 2018-04-22 | End: 2018-04-27 | Stop reason: HOSPADM

## 2018-04-22 RX ORDER — IPRATROPIUM BROMIDE AND ALBUTEROL SULFATE 2.5; .5 MG/3ML; MG/3ML
3 SOLUTION RESPIRATORY (INHALATION)
Status: DISCONTINUED | OUTPATIENT
Start: 2018-04-22 | End: 2018-04-27 | Stop reason: HOSPADM

## 2018-04-22 RX ORDER — HEPARIN SODIUM 5000 [USP'U]/ML
5000 INJECTION, SOLUTION INTRAVENOUS; SUBCUTANEOUS EVERY 8 HOURS
Status: DISCONTINUED | OUTPATIENT
Start: 2018-04-22 | End: 2018-04-27 | Stop reason: HOSPADM

## 2018-04-22 RX ORDER — FUROSEMIDE 10 MG/ML
40 INJECTION INTRAMUSCULAR; INTRAVENOUS DAILY
Status: DISCONTINUED | OUTPATIENT
Start: 2018-04-22 | End: 2018-04-22

## 2018-04-22 RX ORDER — IPRATROPIUM BROMIDE 0.5 MG/2.5ML
1 SOLUTION RESPIRATORY (INHALATION)
Status: COMPLETED | OUTPATIENT
Start: 2018-04-22 | End: 2018-04-22

## 2018-04-22 RX ORDER — SPIRONOLACTONE 25 MG/1
25 TABLET ORAL DAILY
Status: DISCONTINUED | OUTPATIENT
Start: 2018-04-23 | End: 2018-04-22

## 2018-04-22 RX ORDER — AMLODIPINE BESYLATE 5 MG/1
5 TABLET ORAL DAILY
Status: DISCONTINUED | OUTPATIENT
Start: 2018-04-23 | End: 2018-04-22

## 2018-04-22 RX ORDER — OXYCODONE AND ACETAMINOPHEN 10; 325 MG/1; MG/1
1 TABLET ORAL
Status: DISCONTINUED | OUTPATIENT
Start: 2018-04-22 | End: 2018-04-22

## 2018-04-22 RX ORDER — SODIUM CHLORIDE 0.9 % (FLUSH) 0.9 %
5-10 SYRINGE (ML) INJECTION AS NEEDED
Status: DISCONTINUED | OUTPATIENT
Start: 2018-04-22 | End: 2018-04-27 | Stop reason: HOSPADM

## 2018-04-22 RX ADMIN — METHYLPREDNISOLONE SODIUM SUCCINATE 125 MG: 125 INJECTION, POWDER, FOR SOLUTION INTRAMUSCULAR; INTRAVENOUS at 13:27

## 2018-04-22 RX ADMIN — SODIUM CHLORIDE 500 MG: 900 INJECTION, SOLUTION INTRAVENOUS at 15:27

## 2018-04-22 RX ADMIN — FUROSEMIDE 80 MG: 10 INJECTION, SOLUTION INTRAMUSCULAR; INTRAVENOUS at 14:29

## 2018-04-22 RX ADMIN — MAGNESIUM SULFATE HEPTAHYDRATE 2 G: 40 INJECTION, SOLUTION INTRAVENOUS at 13:27

## 2018-04-22 RX ADMIN — LABETALOL HYDROCHLORIDE 20 MG: 5 INJECTION, SOLUTION INTRAVENOUS at 14:22

## 2018-04-22 RX ADMIN — INSULIN LISPRO 8 UNITS: 100 INJECTION, SOLUTION INTRAVENOUS; SUBCUTANEOUS at 22:14

## 2018-04-22 RX ADMIN — LEVOFLOXACIN 750 MG: 5 INJECTION, SOLUTION INTRAVENOUS at 16:45

## 2018-04-22 RX ADMIN — IPRATROPIUM BROMIDE 1 MG: 0.5 SOLUTION RESPIRATORY (INHALATION) at 13:11

## 2018-04-22 RX ADMIN — ALBUTEROL SULFATE 10 MG: 2.5 SOLUTION RESPIRATORY (INHALATION) at 13:11

## 2018-04-22 RX ADMIN — HEPARIN SODIUM 5000 UNITS: 5000 INJECTION, SOLUTION INTRAVENOUS; SUBCUTANEOUS at 16:45

## 2018-04-22 RX ADMIN — HYDRALAZINE HYDROCHLORIDE 20 MG: 20 INJECTION INTRAMUSCULAR; INTRAVENOUS at 16:44

## 2018-04-22 RX ADMIN — Medication 10 ML: at 22:19

## 2018-04-22 RX ADMIN — FUROSEMIDE 40 MG: 10 INJECTION, SOLUTION INTRAMUSCULAR; INTRAVENOUS at 20:20

## 2018-04-22 NOTE — IP AVS SNAPSHOT
[address] [place] [address] [place] [address] 
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 
[phone] [address] [place] [address] to continue managing your healthcare needs. [phone] [facility] Diana Dwyer [facility] [address] 
[phone] [facility] contacted [facility] (Dr. Jayden Guevara) on 4/27/18 to assist with scheduling f/u appointment for patient. Msg left for MD's office to contact [facility] at [phone] to arrange f/u appointment. Discharge Orders None A check brittany indicates which time of day the medication should be taken. My Medications START taking these medications Instructions Each Dose to Equal  
 Morning Noon Evening Bedtime  
 fluticasone-vilanterol 100-25 mcg/dose inhaler Commonly known as:  KENYA ELLIPTA Your last dose was: Your next dose is: Take 1 Puff by inhalation daily. 1 Puff  
    
   
   
   
  
 predniSONE 10 mg tablet Commonly known as:  Nadeen Dolan Replaces:  predniSONE 5 mg dose pack Your last dose was: Your next dose is:    
   
   
 2 tabs daily x 3 days then 1 tab daily x 3 days then 1/2 tab daily x  3 days CONTINUE taking these medications Instructions Each Dose to Equal  
 Morning Noon Evening Bedtime  
 albuterol 90 mcg/actuation inhaler Commonly known as:  PROVENTIL HFA, VENTOLIN HFA, PROAIR HFA Your last dose was: Your next dose is: Take 1 Puff by inhalation. 1 puff in am and 1 puff in pm  
 1 Puff  
    
   
   
   
  
 amLODIPine 5 mg tablet Commonly known as:  Larissa Chimes Your last dose was: Your next dose is: Take  by mouth daily. cloNIDine HCl 0.2 mg tablet Commonly known as:  CATAPRES Your last dose was: Your next dose is: Take 1 Tab by mouth every eight (8) hours. 0.2 mg  
    
   
   
   
  
 furosemide 40 mg tablet Commonly known as:  LASIX Your last dose was: Your next dose is: Take 1 Tab by mouth daily. 40 mg  
    
   
   
   
  
 glipiZIDE 10 mg tablet Commonly known as:  Venus Stade Your last dose was: Your next dose is: Take 1 Tab by mouth two (2) times a day. 10 mg  
    
   
   
   
  
 hydrALAZINE 25 mg tablet Commonly known as:  APRESOLINE Your last dose was: Your next dose is: Take 1 Tab by mouth three (3) times daily. 25 mg  
    
   
   
   
  
 spironolactone 25 mg tablet Commonly known as:  ALDACTONE Your last dose was: Your next dose is: Take 25 mg by mouth daily. 25 mg  
    
   
   
   
  
  
STOP taking these medications   
 dimethicone 1 % topical cream  
   
  
 oxyCODONE-acetaminophen  mg per tablet Commonly known as:  PERCOCET Oxygen  
   
  
 predniSONE 5 mg dose pack Commonly known as:  STERAPRED Replaced by:  predniSONE 10 mg tablet ASK your doctor about these medications Instructions Each Dose to Equal  
 Morning Noon Evening Bedtime  
 carvedilol 3.125 mg tablet Commonly known as:  Enrique Shepardrot Your last dose was: Your next dose is: Take 1 Tab by mouth two (2) times daily (with meals). 3.125 mg Where to Get Your Medications Information on where to get these meds will be given to you by the nurse or doctor. ! Ask your nurse or doctor about these medications  
  fluticasone-vilanterol 100-25 mcg/dose inhaler  
 predniSONE 10 mg tablet Discharge Instructions COPD Exacerbation Plan: Care Instructions Your Care Instructions If you have chronic obstructive pulmonary disease (COPD), your usual shortness of breath could suddenly get worse. You may start coughing more and have more mucus. This flare-up is called a COPD exacerbation (say \"pd-WXQ-ep-BAY-pablo\"). A lung infection or air pollution could set off an exacerbation. Sometimes it can happen after a quick change in temperature or being around chemicals. Work with your doctor to make a plan for dealing with an exacerbation. You can better manage it if you plan ahead. Follow-up care is a key part of your treatment and safety. Be sure to make and go to all appointments, and call your doctor if you are having problems. It's also a good idea to know your test results and keep a list of the medicines you take. How can you care for yourself at home? During an exacerbation · Do not panic if you start to have one. Quick treatment at home may help you prevent serious breathing problems. If you have a COPD exacerbation plan that you developed with your doctor, follow it. · Take your medicines exactly as your doctor tells you. ¨ Use your inhaler as directed by your doctor. If your symptoms do not get better after you use your medicine, have someone take you to the emergency room. Call an ambulance if necessary. ¨ With inhaled medicines, a spacer or a nebulizer may help you get more medicine to your lungs. Ask your doctor or pharmacist how to use them properly. Practice using the spacer in front of a mirror before you have an exacerbation. This may help you get the medicine into your lungs quickly. ¨ If your doctor has given you steroid pills, take them as directed. ¨ Your doctor may have given you a prescription for antibiotics, which you can fill if you need it. ¨ Talk to your doctor if you have any problems with your medicine. And call your doctor if you have to use your antibiotic or steroid pills. Preventing an exacerbation · Do not smoke. This is the most important step you can take to prevent more damage to your lungs and prevent problems. If you already smoke, it is never too late to stop. If you need help quitting, talk to your doctor about stop-smoking programs and medicines. These can increase your chances of quitting for good. · Take your daily medicines as prescribed. · Avoid colds and flu. ¨ Get a pneumococcal vaccine. ¨ Get a flu vaccine each year, as soon as it is available. Ask those you live or work with to do the same, so they will not get the flu and infect you. ¨ Try to stay away from people with colds or the flu. ¨ Wash your hands often. · Avoid secondhand smoke; air pollution; cold, dry air; hot, humid air; and high altitudes. Stay at home with your windows closed when air pollution is bad. · Learn breathing techniques for COPD, such as breathing through pursed lips. These techniques can help you breathe easier during an exacerbation. When should you call for help? Call 911 anytime you think you may need emergency care. For example, call if: 
? · You have severe trouble breathing. ? · You have severe chest pain. ?Call your doctor now or seek immediate medical care if: 
? · You have new or worse shortness of breath. ? · You develop new chest pain. ? · You are coughing more deeply or more often, especially if you notice more mucus or a change in the color of your mucus. ? · You cough up blood. ? · You have new or increased swelling in your legs or belly. ? · You have a fever. ? Watch closely for changes in your health, and be sure to contact your doctor if: 
? · You need to use your antibiotic or steroid pills. ? · Your symptoms are getting worse. Where can you learn more? Go to http://bhargav-mily.info/. Enter C133 in the search box to learn more about \"COPD Exacerbation Plan: Care Instructions. \" Current as of: May 12, 2017 Content Version: 11.4 © 3258-5410 [employer]. Care instructions adapted under license by [email or web address] (which disclaims liability or warranty for this information). If you have questions about a medical condition or this instruction, always ask your healthcare professional. [address] any warranty or liability for your use of this information. When You Are Overweight: Care Instructions Your Care Instructions If you're overweight, your doctor may recommend that you make changes in your eating and exercise habits. Being overweight can lead to serious health problems, such as high blood pressure, heart disease, type 2 diabetes, and arthritis, or it can make these problems worse. Eating a healthy diet and being more active can help you reach and stay at a healthy weight. You don't have to make huge changes all at once. Start by making small changes in your eating and exercise habits. To lose weight, you need to burn more calories than you take in. You can do this by eating healthy foods in reasonable amounts and becoming more active every day. Follow-up care is a key part of your treatment and safety.  Be sure to make and go to all appointments, and call your doctor if you are having problems. It's also a good idea to know your test results and keep a list of the medicines you take. How can you care for yourself at home? · Improve your eating habits. You'll be more successful if you work on changing one eating habit at a time. All foods, if eaten in moderation, can be part of healthy eating. Remember to: 
[facility] a variety of foods from each food group. Include grains, vegetables, fruits, dairy, and protein foods. ¨ Limit foods high in fat, sugar, and calories. ¨ Eat slowly. And don't do anything else, such as watch TV, while you are eating. ¨ Pay attention to portion sizes. Put your food on a smaller plate. ¨ Plan your meals ahead of time. You'll be less likely to grab something that's not as healthy. · Get active. Regular activity can help you feel better, have more energy, and burn more calories. If you haven't been active, start slowly. Start with at least 30 minutes of moderate activity on most days of the week. Then gradually increase the amount of activity. Try for 60 or 90 minutes a day, at least 5 days a week. There are a lot of ways to fit activity into your life. You can: 
¨ Walk or bike to the store. Or walk with a friend, or walk the dog. ¨ Mow the lawn, rake leaves, shovel snow, or do some gardening. ¨ Use the stairs instead of the elevator, at least for a few floors. · Change your thinking. Your thoughts have a lot to do with how you feel and what you do. When you're trying to reach a healthy weight, changing how you think about certain things may help. Here are some ideas: ¨ Don't compare yourself to others. Healthy bodies come in all shapes and sizes. ¨ Pay attention to how hungry or full you feel. When you eat, be aware of why you're eating and how much you're eating. ¨ Focus on improving your health instead of dieting. Dieting almost never works over the long term. · Ask your doctor about other health professionals who can help you reach a healthy weight. ¨ A dietitian can help you make healthy changes in your diet. ¨ An exercise specialist or  can help you develop a safe and effective exercise program. 
¨ A counselor or psychiatrist can help you cope with issues such as depression, anxiety, or family problems that can make it hard to focus on reaching a healthy weight. · Get support from your family, your doctor, your friends, a support group-and support yourself. Where can you learn more? Go to http://bhargav-mily.info/. Enter V366 in the search box to learn more about \"When You Are Overweight: Care Instructions. \" Current as of: 2016 Content Version: 11.4 © 1387-6923 [employer]. Care instructions adapted under license by [employer] (which disclaims liability or warranty for this information). If you have questions about a medical condition or this instruction, always ask your healthcare professional. [place] [address] any warranty or liability for your use of this information. Patient armband removed and shredded MyChart Activation Thank you for requesting access to [employer]. Please follow the instructions below to securely access and download your online medical record. [employer] allows you to send messages to your doctor, view your test results, renew your prescriptions, schedule appointments, and more. How Do I Sign Up? 1. In your internet browser, go to [email or web address] Online 
2. Click on the First Time User? Click Here link in the Sign In box. You will be redirect to the New Member Sign Up page. 3. Enter your [employer] Access Code exactly as it appears below. You will not need to use this code after youve completed the sign-up process. If you do not sign up before the expiration date, you must request a new code. [employer] Access Code: [email or web address] Expires: 2018  8:47 AM (This is the date your [employer] access code will ) 4. Enter the last four digits of your Social Security Number (xxxx) and Date of Birth (mm/dd/yyyy) as indicated and click Submit. You will be taken to the next sign-up page. 5. Create a ShoorK ID. This will be your ShoorK login ID and cannot be changed, so think of one that is secure and easy to remember. 6. Create a ShoorK password. You can change your password at any time. 7. Enter your Password Reset Question and Answer. This can be used at a later time if you forget your password. 8. Enter your e-mail address. You will receive e-mail notification when new information is available in [address] 9. Click Sign Up. You can now view and download portions of your medical record. 10. Click the Download Summary menu link to download a portable copy of your medical information. Additional Information If you have questions, please visit the Frequently Asked Questions section of the ShoorK website at [email or web address] Remember, ShoorK is NOT to be used for urgent needs. For medical emergencies, dial 911. DISCHARGE SUMMARY from Nurse PATIENT INSTRUCTIONS: 
 
 
F-face looks uneven A-arms unable to move or move unevenly S-speech slurred or non-existent T-time-call 911 as soon as signs and symptoms begin-DO NOT go Back to bed or wait to see if you get better-TIME IS BRAIN. Warning Signs of HEART ATTACK Call 911 if you have these symptoms: 
? Chest discomfort. Most heart attacks involve discomfort in the center of the chest that lasts more than a few minutes, or that goes away and comes back. It can feel like uncomfortable pressure, squeezing, fullness, or pain. ? Discomfort in other areas of the upper body. Symptoms can include pain or discomfort in one or both arms, the back, neck, jaw, or stomach. ? Shortness of breath with or without chest discomfort. ? Other signs may include breaking out in a cold sweat, nausea, or lightheadedness. Don't wait more than five minutes to call [address]! Fast action can save your life. Calling 911 is almost always the fastest way to get lifesaving treatment. Emergency Medical Services staff can begin treatment when they arrive  up to an hour sooner than if someone gets to the hospital by car. The discharge information has been reviewed with the patient. The patient verbalized understanding. Discharge medications reviewed with the patient and appropriate educational materials and side effects teaching were provided. ___________________________________________________________________________________________________________________________________ 2NGageUhart Announcement We are excited to announce that we are making your provider's discharge notes available to you in [email or web address]. You will see these notes when they are completed and signed by the physician that discharged you from your recent hospital stay. If you have any questions or concerns about any information you see in [email or web address], please call the Health Information Department where you were seen or reach out to your Primary Care Provider for more information about your plan of care. Introducing [facility]! Casi Alfred introduces Swink.tv patient portal. Now you can access parts of your medical record, email your doctor's office, and request medication refills online. 1. In your internet browser, go to [email or web address] 2. Click on the First Time User? Click Here link in the Sign In box. You will see the New Member Sign Up page. 3. Enter your Swink.tv Access Code exactly as it appears below. You will not need to use this code after youve completed the sign-up process. If you do not sign up before the expiration date, you must request a new code.  
 
· [email or web address] Access Code: [email or web address] 
 Expires: 7/26/2018  8:47 AM 
 
4. Enter the last four digits of your Social Security Number (xxxx) and Date of Birth (mm/dd/yyyy) as indicated and click Submit. You will be taken to the next sign-up page. 5. Create a [employer] ID. This will be your SocialKaty login ID and cannot be changed, so think of one that is secure and easy to remember. 6. Create a SocialKaty password. You can change your password at any time. 7. Enter your Password Reset Question and Answer. This can be used at a later time if you forget your password. 8. Enter your e-mail address. You will receive e-mail notification when new information is available in [address] 9. Click Sign Up. You can now view and download portions of your medical record. 10. Click the Download Summary menu link to download a portable copy of your medical information. If you have questions, please visit the Frequently Asked Questions section of the SocialKaty website. Remember, SocialKaty is NOT to be used for urgent needs. For medical emergencies, dial 911. Now available from your iPhone and Android! Introducing Antonio Rios As a [facility] patient, I wanted to make you aware of our electronic visit tool called Antonio Rios. [address] 24/7 allows you to connect within minutes with a medical provider 24 hours a day, seven days a week via a mobile device or tablet or logging into a secure website from your computer. You can access Antonio Hernandezfin from anywhere in the [place]. A virtual visit might be right for you when you have a simple condition and feel like you just dont want to get out of bed, or cant get away from work for an appointment, when your regular [address] provider is not available (evenings, weekends or holidays), or when youre out of town and need minor care.   Electronic visits cost only $49 and if the Antonio Rios provider determines a prescription is needed to treat your condition, one can be electronically transmitted to a nearby pharmacy*. Please take a moment to enroll today if you have not already done so. The enrollment process is free and takes just a few minutes. To enroll, please download the [employer] 24/7 anabella to your tablet or phone, or visit [email or web address] to enroll on your computer. And, as an [address] [place] [address] patient with a [employer] account, the results of your visits will be scanned into your electronic medical record and your primary care provider will be able to view the scanned results. We urge you to continue to see your regular [employer] provider for your ongoing medical care. And while your primary care provider may not be the one available when you seek a Aloompa virtual visit, the peace of mind you get from getting a real diagnosis real time can be priceless. For more information on Aloompa, view our Frequently Asked Questions (FAQs) at [email or web address]. Sincerely, 
 
Etelvina Lomax MD 
Chief Medical Officer [facility] Roslyn Bearden *:  certain medications cannot be prescribed via Aloompa Unresulted Labs-Please follow up with your PCP about these lab tests Order Current Status CULTURE, BLOOD Preliminary result CULTURE, BLOOD Preliminary result Providers Seen During Your Hospitalization Provider Specialty Primary office phone Kathryn Brown MD Emergency Medicine [phone] Charles Frazier MD Family Practice [phone] Your Primary Care Physician (PCP) Primary Care Physician Office Phone Office Fax Lina Purvis, [address] [facility] [phone] You are allergic to the following Allergen Reactions Gabapentin Hives Lisinopril Swelling Tramadol Hives Recent Documentation Height Weight BMI Smoking Status 1.727 m (!) 171.6 kg 57.51 kg/m2 Former Smoker Emergency Contacts Name Discharge Info Relation Home Work Mobile Larissa Shahid DISCHARGE CAREGIVER [3] Spouse [3] [phone] Patient Belongings The following personal items are in your possession at time of discharge: 
  Dental Appliances: None  Visual Aid: None      Home Medications: None   Jewelry: None  Clothing: With patient    Other Valuables: None Please provide this summary of care documentation to your next provider. Signatures-by signing, you are acknowledging that this After Visit Summary has been reviewed with you and you have received a copy. Patient Signature:  ____________________________________________________________ Date:  ____________________________________________________________  
  
Arna Marcellus Provider Signature:  ____________________________________________________________ Date:  ____________________________________________________________

## 2018-04-22 NOTE — PROGRESS NOTES
60 yo male with HTN, DM, CKD, morbid obesity, OSAon bipap, chronic systolic and diastolic CHF with LVEF 45-75% in 03/2018 cocaine abuse admitted with acute respiratory distress. Pt was admitted with similar clinical presentation in 03/2018 at [facility] and then again at [facility].   Mildly confused on admission with Ph 7.33 and POC 55.6. Repeat ABG on bipap improved PCO2 to 50.7. Cocaine positive. Placed on bipap  Received lasix  cxr consistent with cardiomegaly, pulmonary vascular congestion, possible small left effusion and possible retrocardiac density. No fever or leucocytosis. BP stable. Elevated BNP. Trop unremarkable. C/w diuretics, bipap. Cardiology called by hospitalist.   Full consult to follow in am.   Call with any questions. D/w ER attending and Dr. Sarthak Chung.      Gorge Zepeda MD 4/22/2018
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
## 2018-04-22 NOTE — PROGRESS NOTES
1600- Patient arrived on unit via stretcher on BIPAP, IV antibiotics running. Initial admission assessment complete. Primary Nurse Nicholes Romberg, KOURTNEY and Kike Fernández RN performed a dual skin assessment on this patient No impairment noted. Called Dr Petar Eden, asked for ICU orders. 1630- Discussed patient status with Dr Petar Eden at bedside. Patient on BIPAP, needs IV medications for BP and pain. She will place orders. Patient refused butler catheter, condom catheter to be used for the strict I & O's.    1700-Patient yelling at family members on the phone with BIPAP off, oxygen sats dropping, educated patient on importance of wearing BIPAP. Patient yelling \"I [place]" but continues to pull mask off every few minutes. 1710- Patient hitting call light for assistance with urinal. Patient able to use urinal without assistance. Attempted to place external catheter without success. Patient continues to refuse butler placement. Patient putting out clear yellow urine in urinal.     1800- Condom catheter placed on patient to drainage bag.     1930-Bedside and Verbal shift change report given to Greg Trivedi RN (oncoming nurse) by Dahlia Nance RN (offgoing nurse).  Report included the following information SBAR, Kardex, Intake/Output, MAR, Recent Results and Cardiac Rhythm SR.

## 2018-04-22 NOTE — ED NOTES
Attempted to play butler, pt did not tolerate well. Pt request to use urinal to measure urine and has declined butler. Provider aware.
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
## 2018-04-22 NOTE — IP AVS SNAPSHOT
[address] [place] [id number] 
[phone] Patient: Ruben Leong MRN: [id number] TBK:[id number]/[id number] A check brittany indicates which time of day the medication should be taken. My Medications START taking these medications Instructions Each Dose to Equal  
 Morning Noon Evening Bedtime  
 fluticasone-vilanterol 100-25 mcg/dose inhaler Commonly known as:  BREO ELLIPTA Your last dose was: Your next dose is: Take 1 Puff by inhalation daily. 1 Puff  
    
   
   
   
  
 predniSONE 10 mg tablet Commonly known as:  Hannah Hummartin Replaces:  predniSONE 5 mg dose pack Your last dose was: Your next dose is:    
   
   
 2 tabs daily x 3 days then 1 tab daily x 3 days then 1/2 tab daily x  3 days CONTINUE taking these medications Instructions Each Dose to Equal  
 Morning Noon Evening Bedtime  
 albuterol 90 mcg/actuation inhaler Commonly known as:  PROVENTIL HFA, VENTOLIN HFA, PROAIR HFA Your last dose was: Your next dose is: Take 1 Puff by inhalation. 1 puff in am and 1 puff in pm  
 1 Puff  
    
   
   
   
  
 amLODIPine 5 mg tablet Commonly known as:  Unknown [place] Your last dose was: Your next dose is: Take  by mouth daily. cloNIDine HCl 0.2 mg tablet Commonly known as:  CATAPRES Your last dose was: Your next dose is: Take 1 Tab by mouth every eight (8) hours. 0.2 mg  
    
   
   
   
  
 furosemide 40 mg tablet Commonly known as:  LASIX Your last dose was: Your next dose is: Take 1 Tab by mouth daily. 40 mg  
    
   
   
   
  
 glipiZIDE 10 mg tablet Commonly known as:  Adria Stephensonet Your last dose was: Your next dose is: Take 1 Tab by mouth two (2) times a day.   
 10 mg  
    
   
   
   
  
 hydrALAZINE 25 mg tablet Commonly known as:  APRESOLINE Your last dose was: Your next dose is: Take 1 Tab by mouth three (3) times daily. 25 mg  
    
   
   
   
  
 spironolactone 25 mg tablet Commonly known as:  ALDACTONE Your last dose was: Your next dose is: Take 25 mg by mouth daily. 25 mg  
    
   
   
   
  
  
STOP taking these medications   
 dimethicone 1 % topical cream  
   
  
 oxyCODONE-acetaminophen  mg per tablet Commonly known as:  PERCOCET Oxygen  
   
  
 predniSONE 5 mg dose pack Commonly known as:  STERAPRED Replaced by:  predniSONE 10 mg tablet ASK your doctor about these medications Instructions Each Dose to Equal  
 Morning Noon Evening Bedtime  
 carvedilol 3.125 mg tablet Commonly known as:  Heath Pearson Your last dose was: Your next dose is: Take 1 Tab by mouth two (2) times daily (with meals). 3.125 mg Where to Get Your Medications Information on where to get these meds will be given to you by the nurse or doctor. ! Ask your nurse or doctor about these medications  
  fluticasone-vilanterol 100-25 mcg/dose inhaler  
 predniSONE 10 mg tablet

## 2018-04-22 NOTE — ED NOTES
Pt falling asleep often during care, wakes up with sternal rub. RT at bedside. Pt slept during ABG collection. Medicated per MAR. Pt 100% on bipap. Will continue to monitor. MD at bedside to assess.

## 2018-04-22 NOTE — ED NOTES
TRANSFER - OUT REPORT:    Verbal report given to Alicia Arana RN on Matilde Green  being transferred to ICU for routine progression of care       Report consisted of patients Situation, Background, Assessment and   Recommendations(SBAR). Information from the following report(s) SBAR, ED Summary, [facility] and Recent Results was reviewed with the receiving nurse. Lines:   Peripheral IV 04/22/18 Right Hand (Active)   Site Assessment Clean, dry, & intact 4/22/2018  1:08 PM   Phlebitis Assessment 0 4/22/2018  1:08 PM   Infiltration Assessment 0 4/22/2018  1:08 PM   Dressing Status Clean, dry, & intact 4/22/2018  1:08 PM   Hub Color/Line Status Flushed;Patent 4/22/2018  1:08 PM       Peripheral IV 04/22/18 Right Antecubital (Active)   Site Assessment Clean, dry, & intact 4/22/2018  1:06 PM   Phlebitis Assessment 0 4/22/2018  1:06 PM   Infiltration Assessment 0 4/22/2018  1:06 PM   Dressing Status Clean, dry, & intact 4/22/2018  1:06 PM   Dressing Type Transparent 4/22/2018  1:06 PM   Hub Color/Line Status Patent; Flushed 4/22/2018  1:06 PM   Action Taken Blood drawn 4/22/2018  1:06 PM   Alcohol Cap Used Yes 4/22/2018  1:06 PM        Opportunity for questions and clarification was provided.       Patient transported with:   Monitor  O2 @ bipap liters  Registered Nurse

## 2018-04-22 NOTE — PROGRESS NOTES
PATIENT ARRIVED IN ER VIA EMS ON CPAP. HAS HX COPD/CHF. PLACED ON BIPAP @ 20/6 AND 30%. CONTINUOUS NEB GIVEN INLINE. STAT ABG'S WERE OBTAINED AND RESULTS WERE GIVEN TO DR. Antonina López, ER. DECREASED FIO2 TO 24%.

## 2018-04-22 NOTE — ED TRIAGE NOTES
Pt to ED from EMS for increase work of breathing and shortness of breath. Pt presented with CPAP, received albuterol and Atrovent en route. Pt 95% on RA, RT at bedside to assess. Pt has history of CHF and COPD. Sepsis Screening completed    (  )Patient meets SIRS criteria. ( xx )Patient does not meet SIRS criteria.       SIRS Criteria is achieved when two or more of the following are present   Temperature < 96.8°F (36°C) or > 100.9°F (38.3°C)   Heart Rate > 90 beats per minute   Respiratory Rate > 20 breaths per minute   WBC count > 12,000 or <4,000 or > 10% bands

## 2018-04-23 ENCOUNTER — APPOINTMENT (OUTPATIENT)
Dept: GENERAL RADIOLOGY | Age: 58
DRG: 194 | End: 2018-04-23
Attending: INTERNAL MEDICINE
Payer: MEDICAID

## 2018-04-23 LAB
ALBUMIN SERPL-MCNC: 3.1 G/DL (ref 3.4–5)
ALBUMIN/GLOB SERPL: 0.8 {RATIO} (ref 0.8–1.7)
ALP SERPL-CCNC: 76 U/L (ref 45–117)
ALT SERPL-CCNC: 44 U/L (ref 16–61)
ANION GAP SERPL CALC-SCNC: 7 MMOL/L (ref 3–18)
AST SERPL-CCNC: 12 U/L (ref 15–37)
BASOPHILS # BLD: 0 K/UL (ref 0–0.06)
BASOPHILS NFR BLD: 0 % (ref 0–2)
BILIRUB DIRECT SERPL-MCNC: 0.1 MG/DL (ref 0–0.2)
BILIRUB SERPL-MCNC: 0.2 MG/DL (ref 0.2–1)
BUN SERPL-MCNC: 20 MG/DL (ref 7–18)
BUN/CREAT SERPL: 13 (ref 12–20)
CALCIUM SERPL-MCNC: 9.3 MG/DL (ref 8.5–10.1)
CHLORIDE SERPL-SCNC: 103 MMOL/L (ref 100–108)
CK MB CFR SERPL CALC: 1.6 % (ref 0–4)
CK MB SERPL-MCNC: 1.5 NG/ML (ref 5–25)
CK SERPL-CCNC: 95 U/L (ref 39–308)
CO2 SERPL-SCNC: 32 MMOL/L (ref 21–32)
CREAT SERPL-MCNC: 1.55 MG/DL (ref 0.6–1.3)
DIFFERENTIAL METHOD BLD: ABNORMAL
EOSINOPHIL # BLD: 0.1 K/UL (ref 0–0.4)
EOSINOPHIL NFR BLD: 1 % (ref 0–5)
ERYTHROCYTE [DISTWIDTH] IN BLOOD BY AUTOMATED COUNT: 15.7 % (ref 11.6–14.5)
GLOBULIN SER CALC-MCNC: 3.8 G/DL (ref 2–4)
GLUCOSE BLD STRIP.AUTO-MCNC: 126 MG/DL (ref 70–110)
GLUCOSE BLD STRIP.AUTO-MCNC: 138 MG/DL (ref 70–110)
GLUCOSE BLD STRIP.AUTO-MCNC: 144 MG/DL (ref 70–110)
GLUCOSE BLD STRIP.AUTO-MCNC: 189 MG/DL (ref 70–110)
GLUCOSE BLD STRIP.AUTO-MCNC: 210 MG/DL (ref 70–110)
GLUCOSE SERPL-MCNC: 128 MG/DL (ref 74–99)
HCT VFR BLD AUTO: 47.3 % (ref 36–48)
HGB BLD-MCNC: 15.2 G/DL (ref 13–16)
LYMPHOCYTES # BLD: 1.3 K/UL (ref 0.9–3.6)
LYMPHOCYTES NFR BLD: 13 % (ref 21–52)
MCH RBC QN AUTO: 28.8 PG (ref 24–34)
MCHC RBC AUTO-ENTMCNC: 32.1 G/DL (ref 31–37)
MCV RBC AUTO: 89.8 FL (ref 74–97)
MONOCYTES # BLD: 0.6 K/UL (ref 0.05–1.2)
MONOCYTES NFR BLD: 6 % (ref 3–10)
NEUTS SEG # BLD: 8 K/UL (ref 1.8–8)
NEUTS SEG NFR BLD: 80 % (ref 40–73)
PLATELET # BLD AUTO: 114 K/UL (ref 135–420)
PLATELET COMMENTS,PCOM: ABNORMAL
POTASSIUM SERPL-SCNC: 3.9 MMOL/L (ref 3.5–5.5)
PROT SERPL-MCNC: 6.9 G/DL (ref 6.4–8.2)
RBC # BLD AUTO: 5.27 M/UL (ref 4.7–5.5)
RBC MORPH BLD: ABNORMAL
SODIUM SERPL-SCNC: 142 MMOL/L (ref 136–145)
TROPONIN I SERPL-MCNC: <0.02 NG/ML (ref 0–0.06)
WBC # BLD AUTO: 10 K/UL (ref 4.6–13.2)

## 2018-04-23 PROCEDURE — 77030013140 HC MSK NEB VYRM -A

## 2018-04-23 PROCEDURE — 94640 AIRWAY INHALATION TREATMENT: CPT

## 2018-04-23 PROCEDURE — 74011250636 HC RX REV CODE- 250/636: Performed by: INTERNAL MEDICINE

## 2018-04-23 PROCEDURE — 77010033678 HC OXYGEN DAILY

## 2018-04-23 PROCEDURE — 80076 HEPATIC FUNCTION PANEL: CPT | Performed by: INTERNAL MEDICINE

## 2018-04-23 PROCEDURE — 65660000000 HC RM CCU STEPDOWN

## 2018-04-23 PROCEDURE — 74011250637 HC RX REV CODE- 250/637: Performed by: HOSPITALIST

## 2018-04-23 PROCEDURE — 71045 X-RAY EXAM CHEST 1 VIEW: CPT

## 2018-04-23 PROCEDURE — 94760 N-INVAS EAR/PLS OXIMETRY 1: CPT

## 2018-04-23 PROCEDURE — 74011000250 HC RX REV CODE- 250: Performed by: HOSPITALIST

## 2018-04-23 PROCEDURE — 82962 GLUCOSE BLOOD TEST: CPT

## 2018-04-23 PROCEDURE — 36415 COLL VENOUS BLD VENIPUNCTURE: CPT | Performed by: INTERNAL MEDICINE

## 2018-04-23 PROCEDURE — 80048 BASIC METABOLIC PNL TOTAL CA: CPT | Performed by: INTERNAL MEDICINE

## 2018-04-23 PROCEDURE — 74011636637 HC RX REV CODE- 636/637: Performed by: FAMILY MEDICINE

## 2018-04-23 PROCEDURE — 85025 COMPLETE CBC W/AUTO DIFF WBC: CPT | Performed by: INTERNAL MEDICINE

## 2018-04-23 PROCEDURE — 74011636637 HC RX REV CODE- 636/637: Performed by: HOSPITALIST

## 2018-04-23 PROCEDURE — 94660 CPAP INITIATION&MGMT: CPT

## 2018-04-23 PROCEDURE — 82550 ASSAY OF CK (CPK): CPT | Performed by: INTERNAL MEDICINE

## 2018-04-23 PROCEDURE — 74011250636 HC RX REV CODE- 250/636: Performed by: FAMILY MEDICINE

## 2018-04-23 RX ORDER — CLONIDINE HYDROCHLORIDE 0.1 MG/1
0.2 TABLET ORAL EVERY 8 HOURS
Status: DISCONTINUED | OUTPATIENT
Start: 2018-04-23 | End: 2018-04-27 | Stop reason: HOSPADM

## 2018-04-23 RX ORDER — ARFORMOTEROL TARTRATE 15 UG/2ML
15 SOLUTION RESPIRATORY (INHALATION)
Status: DISCONTINUED | OUTPATIENT
Start: 2018-04-23 | End: 2018-04-26

## 2018-04-23 RX ORDER — BUDESONIDE 0.5 MG/2ML
500 INHALANT ORAL
Status: DISCONTINUED | OUTPATIENT
Start: 2018-04-23 | End: 2018-04-26

## 2018-04-23 RX ORDER — INSULIN GLARGINE 100 [IU]/ML
5 INJECTION, SOLUTION SUBCUTANEOUS DAILY
Status: DISCONTINUED | OUTPATIENT
Start: 2018-04-23 | End: 2018-04-27 | Stop reason: HOSPADM

## 2018-04-23 RX ORDER — HYDRALAZINE HYDROCHLORIDE 25 MG/1
25 TABLET, FILM COATED ORAL 3 TIMES DAILY
Status: DISCONTINUED | OUTPATIENT
Start: 2018-04-23 | End: 2018-04-27 | Stop reason: HOSPADM

## 2018-04-23 RX ORDER — SPIRONOLACTONE 25 MG/1
25 TABLET ORAL DAILY
Status: DISCONTINUED | OUTPATIENT
Start: 2018-04-23 | End: 2018-04-27 | Stop reason: HOSPADM

## 2018-04-23 RX ORDER — PREDNISONE 20 MG/1
40 TABLET ORAL
Status: DISCONTINUED | OUTPATIENT
Start: 2018-04-23 | End: 2018-04-24

## 2018-04-23 RX ADMIN — FUROSEMIDE 40 MG: 10 INJECTION, SOLUTION INTRAMUSCULAR; INTRAVENOUS at 21:19

## 2018-04-23 RX ADMIN — HYDRALAZINE HYDROCHLORIDE 20 MG: 20 INJECTION INTRAMUSCULAR; INTRAVENOUS at 05:05

## 2018-04-23 RX ADMIN — INSULIN GLARGINE 5 UNITS: 100 INJECTION, SOLUTION SUBCUTANEOUS at 09:39

## 2018-04-23 RX ADMIN — HEPARIN SODIUM 5000 UNITS: 5000 INJECTION, SOLUTION INTRAVENOUS; SUBCUTANEOUS at 17:10

## 2018-04-23 RX ADMIN — SPIRONOLACTONE 25 MG: 25 TABLET ORAL at 17:17

## 2018-04-23 RX ADMIN — INSULIN LISPRO 2 UNITS: 100 INJECTION, SOLUTION INTRAVENOUS; SUBCUTANEOUS at 17:11

## 2018-04-23 RX ADMIN — ARFORMOTEROL TARTRATE 15 MCG: 15 SOLUTION RESPIRATORY (INHALATION) at 19:48

## 2018-04-23 RX ADMIN — ARFORMOTEROL TARTRATE 15 MCG: 15 SOLUTION RESPIRATORY (INHALATION) at 08:33

## 2018-04-23 RX ADMIN — HYDRALAZINE HYDROCHLORIDE 25 MG: 25 TABLET, FILM COATED ORAL at 21:20

## 2018-04-23 RX ADMIN — HEPARIN SODIUM 5000 UNITS: 5000 INJECTION, SOLUTION INTRAVENOUS; SUBCUTANEOUS at 09:39

## 2018-04-23 RX ADMIN — BUDESONIDE 500 MCG: 0.5 INHALANT RESPIRATORY (INHALATION) at 08:33

## 2018-04-23 RX ADMIN — FUROSEMIDE 40 MG: 10 INJECTION, SOLUTION INTRAMUSCULAR; INTRAVENOUS at 09:39

## 2018-04-23 RX ADMIN — Medication 10 ML: at 05:05

## 2018-04-23 RX ADMIN — Medication 10 ML: at 17:18

## 2018-04-23 RX ADMIN — PREDNISONE 40 MG: 20 TABLET ORAL at 09:39

## 2018-04-23 RX ADMIN — BUDESONIDE 500 MCG: 0.5 INHALANT RESPIRATORY (INHALATION) at 19:48

## 2018-04-23 RX ADMIN — LEVOFLOXACIN 750 MG: 5 INJECTION, SOLUTION INTRAVENOUS at 17:17

## 2018-04-23 RX ADMIN — CLONIDINE HYDROCHLORIDE 0.2 MG: 0.1 TABLET ORAL at 17:11

## 2018-04-23 RX ADMIN — Medication 10 ML: at 21:19

## 2018-04-23 RX ADMIN — HYDRALAZINE HYDROCHLORIDE 25 MG: 25 TABLET, FILM COATED ORAL at 17:11

## 2018-04-23 NOTE — PROGRESS NOTES
Patient refused to wear BIPAP at all during the night. Patient states that he never sleeps during the night only during the day and that he doesn't need it for that reason as well as his numbers are good.  (Vital Signs)

## 2018-04-23 NOTE — ROUTINE PROCESS
1930 Bedside and Verbal shift change  Received from SMITHA Lugo RN (outgoing nurse), to PORFIRIO Blank (oncoming)  Pt. Is AOX 4. IV SL, Pt. denies  pain at this time. Report included the following information SBAR, Kardex, Procedure Summary, Intake/Output, MAR, Recent Lab Results, and  Cardiac Rhythm @ NSR. Will resume care and monitor Pt. Condition. Pt. Educated on call bell when in need of help and assistance. Pt. verbalized understanding. Pt. Head to toe Assessment Done and documented. Provided fluids/juices per Pt. Request.    2100  Pt. In bed, visitors in room. 2200  Pt. Resting in bed comfortably, bed alarm on.    2300  Pt. Made no complaints. 0000 Pt. Denies pain. 0145  Pt. Resting in bed comfortably, no sign of distress. 0330  Pt made no complaints. 0500 Pt. Able to rest well throughout the will. 0630  Pt. Denies pain. Verbal and bedside Shift changed report given to Roberta Ashby RN (oncoming RN) on Pt. Condition. Report consisted of patients Situation, History, Activities, intake/output,  Background, Assessment and Recommendations(SBAR). Information from the following report(s) Kardex, order Summary, Lab results and MAR was reviewed with the receiving nurse. Opportunity for questions and clarification was provided.

## 2018-04-23 NOTE — DIABETES MGMT
GLYCEMIC CONTROL & NUTRITION:    - Discussed in rounds, known h/o DM2, current A1C 6.6%  - noted steroids on board & exacerbating hyperglycemia  - conservative basal dose of Lantus initiated, recommend continue & will provide additional recommendations prn    Recent Glucose Results:   Lab Results   Component Value Date/Time     (H) 04/23/2018 09:58 AM    GLUCPOC 144 (H) 04/23/2018 11:27 AM    GLUCPOC 126 (H) 04/23/2018 06:26 AM    GLUCPOC 210 (H) 04/23/2018 12:00 AM       Lab Results   Component Value Date/Time    Hemoglobin A1c 6.6 (H) 04/22/2018 01:05 PM             Ian Espinoza, MPH, RD, CDE  Glycemic Control Team  Pager [phone] (M-F 7:30a-4p)
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
## 2018-04-23 NOTE — PROGRESS NOTES
Hospitalist Progress Note-critical care note     Patient: Leti Sheppard MRN: [id number]  CSN: [id number]    YOB: 1960  Age: 59 y.o. Sex: male    DOA: 4/22/2018 LOS:  LOS: 1 day            Chief complaint: copd exa. chf exacerbation,  Acute on chronic respiratory failure. DM     Assessment/Plan         Hospital Problems  Date Reviewed: 2/19/2013          Codes Class Noted POA    * (Principal)Acute on chronic respiratory failure ([facility] ICD-10-CM: J96.20  ICD-9-CM: 518.84  4/22/2018 Unknown        Cocaine abuse ICD-10-CM: F14.10  ICD-9-CM: 305.60  4/22/2018 Yes        Acute kidney injury [facility]) ICD-10-CM: N17.9  ICD-9-CM: 584.9  3/7/2018 Yes        Acute on chronic combined systolic and diastolic ACC/AHA stage C congestive heart failure (HCC) (Chronic) ICD-10-CM: I50.43  ICD-9-CM: 428.43, 428.0  1/25/2018 Yes        COPD exacerbation ([facility] ICD-10-CM: J44.1  ICD-9-CM: 491.21  2/19/2013 Yes        Type II diabetes mellitus with peripheral autonomic neuropathy (HCC) ICD-10-CM: E11.43  ICD-9-CM: 250.60, 337.1  2/19/2013 Yes        Hypertension ICD-10-CM: I10  ICD-9-CM: 401.9  Unknown Yes        JIM (obstructive sleep apnea) ICD-10-CM: G47.33  ICD-9-CM: 327.23  2/19/2013 Yes        Morbid obesity ([facility] ICD-10-CM: E66.01  ICD-9-CM: 278.01  2/19/2013 Yes            Acute on chronic respiratory failure:  Improving, off bipap AM , nc O2 down to 2 L.   -pul on board      Acute on chronic diastolic CHF:  Mild improving  , watch  Renal function and K level   Continue IV lasix 40 mg BID   EF 45 and diastolic dysfunction  Increased filling pressure-03/18     Cocaine abuse:   continue educated pt    Consult the pt for substance abuse, high risks for cardic death      Highly suspicious drug seeking behavior:   UDS was neg for percocet although his last dose was 2 days ago per pt. Asked for pain meds after admission       COPD exac:   Start on Levaquin. continue with neb treatment.  Add low steroid and  Brovana/pulmicort     Uncontrolled DM:  on SSI, add lantus 5 units       HTN: Hold PO meds 2nd to BIPAP. Switch to hydralazine prn, lasix and clonidine patch, monitor BP.      JIM: On BIPAP      BETHANY -mild improving,  Cr 1.39 today , will continue to monitor renal function in the setting of diuresis      Morbid obesity/BMI 55     High risks for readmission         Disposition :2-3 days   Will transfer to  [facility] if continue doing well during the day. Subjective: sob better , still wheezing   Nurse: off bipap, no acute issue over night       Review of systems:    General: No fevers or chills. Cardiovascular: No chest pain or pressure. No palpitations. Pulmonary: shortness of breath better , + wheezing   Gastrointestinal: No nausea, vomiting. Vital signs/Intake and Output:  Visit Vitals    /77    Pulse (!) 57    Temp 97.9 °F (36.6 °C)    Resp 25    Ht 5' 8\" (1.727 m)    Wt (!) 165.6 kg (365 lb)    SpO2 98%    BMI 55.5 kg/m2     Current Shift:     Last three shifts:  04/21 1901 - 04/23 0700  In: 8182 [P.O.:1200; I.V.:150]  Out: 4950 [Urine:4950]    Physical Exam:  General: WD, WN. Alert, cooperative, no acute distress    HEENT: NC, Atraumatic. PERRLA, anicteric sclerae. Lungs: + Wheezing/Rhonchi/Rales. Heart:  Regular  rhythm,  No murmur, No Rubs, No Gallops  Abdomen: Soft, Non distended, Non tender.  +Bowel sounds,   Extremities: No c/c/e  Psych:   Not anxious or agitated. Neurologic:  No acute neurological deficit.              Labs: Results:       Chemistry Recent Labs      04/22/18   1305   GLU  113*   NA  144   K  4.0   CL  107   CO2  31   BUN  13   CREA  1.39*   CA  8.6   AGAP  6   BUCR  9*   AP  74   TP  6.7   ALB  3.2*   GLOB  3.5   AGRAT  0.9      CBC w/Diff Recent Labs      04/22/18   1305   WBC  6.9   RBC  4.99   HGB  14.2   HCT  45.2   PLT  109*   GRANS  67   LYMPH  25   EOS  3      Cardiac Enzymes Recent Labs      04/22/18   1305   CPK  155   CKND1  1.7      Coagulation Recent Labs 04/22/18   1305   PTP  12.5   INR  1.0       Lipid Panel Lab Results   Component Value Date/Time    Cholesterol, total 196 01/25/2018 02:51 AM    HDL Cholesterol 64 (H) 01/25/2018 02:51 AM    LDL, calculated 121.2 (H) 01/25/2018 02:51 AM    VLDL, calculated 10.8 01/25/2018 02:51 AM    Triglyceride 54 01/25/2018 02:51 AM    CHOL/HDL Ratio 3.1 01/25/2018 02:51 AM      BNP No results for input(s): BNPP in the last 72 hours.    Liver Enzymes Recent Labs      04/22/18   1305   TP  6.7   ALB  3.2*   AP  74   SGOT  33      Thyroid Studies Lab Results   Component Value Date/Time    TSH 0.14 (L) 01/25/2018 02:51 AM        Procedures/imaging: see electronic medical records for all procedures/Xrays and details which were not copied into this note but were reviewed prior to creation of Laurent Mckeon MD

## 2018-04-23 NOTE — PROGRESS NOTES
conducted an initial consultation and Spiritual Assessment for Eva Jackson, who is a 59 y.o.,male. According to the patients chart Rastafari Affiliation is: Pentecostalism.     Patient was very repentant about the behavior that brought him here, being very open about the drugs, alcohol and smoking that have \"run his life. \"  He did confession with his brother (who is a ) last night and knows he can start over. Patient stated that \"this time is different - I could have . I asked the Giana Sang to take away from me the taste and desire for those bad things - and he did. \"    Patient is  but has been  from his wife for more than a dozen years now. But \"she's a good friend. \"  However patient knows that unless he does an Advance medical Directive his wife will come first and then his boys (ages 25 to 36). Patient may request completing an AMD at a later time. The reason the Patient came to the hospital is:   Patient Active Problem List    Diagnosis Date Noted    COPD (chronic obstructive pulmonary disease) ([facility] 2018    Acute on chronic respiratory failure ([address] 2018    Cocaine abuse 2018    Acute kidney injury ([address] 2018    CHF (congestive heart failure) ([address] 2018    Chest pain 2018    Acute on chronic combined systolic and diastolic ACC/AHA stage C congestive heart failure ([address] 2018    COPD exacerbation ([address] 2013    Type II diabetes mellitus with peripheral autonomic neuropathy ([address] 2013    JIM (obstructive sleep apnea) 2013    Hypoxia 2013    Morbid obesity ([address] 2013    Hypertension         The  provided the following Interventions:  Initiated a relationship of care and support. Explored issues of massiel, belief, spirituality and Advent/ritual needs while hospitalized. Listened empathically. Provided information about Spiritual Care Services.   Offered prayer and assurance of continued prayers on patients behalf. Chart reviewed. The following outcomes were achieved:  Patient shared limited information about their medical narrative, spiritual journey and beliefs.  confirmed Patient's Shinto Affiliation. Patient processed feelings about current hospitalization. Patient expressed gratitude for Spiritual Care visit. Patient was reviewed in ICU Interdisciplinary Rounds. Assessment:  There are no significant spiritual or Holiness issues which require further intervention at this time. Patient does not have any Holiness or cultural needs that will affect patients preferences in health care. Plan:  Chaplains will continue to follow and will provide pastoral care as needed or requested.  recommends bedside caregivers page  on duty if patient shows signs of acute spiritual or emotional distress. [id number] [facility]   Board Certified   [phone] - Office

## 2018-04-23 NOTE — PROGRESS NOTES
1930: Bedside and Verbal shift change report received from NATANAEL Zamora RN . Report included the following information SBAR, Intake/Output, MAR, Recent Results and Cardiac Rhythm NSR.  2000: Assessment complete, see flow sheets. 2048: BIPAP removed. Pt placed on NC. Tolerating well. 2130: Pt requesting dinner, box lunch given to pt.   2207: BS elevated, pt has eaten and had cranberry juice. Covered per orders. 2300: Pt assisted to bedside commode, tolerated well. Pt requesting another box lunch, educated on diabetic diet and BS.   2330: RT at the bedside to place pt on BIPAP. Pt states he does not sleep at night and declines. RT at bedside to discuss education on BIPAP use. 0000: Reassessment, no change. 0200: Pt requesting gram crackers with diet soda. Continues to decline BIPAP.   0400: Reassessment, no change. 0505: Hydralazine given per orders for elevated BP. See MAR. Pt had uneventful evening. Refused Bipap all night. O2 maintained >92%. Dyspnea on exertion. Lungs coarse. Congested cough, nonproductive. Pt HR dropped into 40-50 occasionally when pt sleeps. Pt sleeping when nurse checking on pt although he states he does not sleep at night.

## 2018-04-23 NOTE — INTERDISCIPLINARY ROUNDS
CRITICAL CARE INTERDISCIPLINARY ROUNDS    Patient Information:    Name:   Mamie Cartagena    Age:   59 y.o. Admission Date:   4/22/2018    Critical Care Day: 1    Surgery Date:  na    Attending Provider:   Allison Rodriguez MD    Surgeon:   Spenser Liz):   Hospitalist    Critical Care Physician:  Dr. Armaan Naranjo    Code Status: Full Code    Problem List:     Patient Active Problem List   Diagnosis Code    COPD exacerbation ([facility] J44.1    Type II diabetes mellitus with peripheral autonomic neuropathy ([facility] E11.43    Hypertension I10    JIM (obstructive sleep apnea) G47.33    Hypoxia R09.02    Morbid obesity ([facility] E66.01    Chest pain R07.9    Acute on chronic combined systolic and diastolic ACC/AHA stage C congestive heart failure ([facility]) I50.43    CHF (congestive heart failure) ([facility]) I50.9    Acute kidney injury ([facility] N17.9    COPD (chronic obstructive pulmonary disease) ([facility]) J44.9    Acute on chronic respiratory failure ([facility]) J96.20    Cocaine abuse F14.10       Principal Problem:  Acute on chronic respiratory failure ([facility]    During rounds the following quality care indicators and evidence based practices were addressed :  DVT Prophylaxis, PUD Prophylaxis and Pressure Injury Prevention Boo Day na (M-Care self) ;  Central Line Day: na Isolation: na ; Antibiotic Stewardship: yes; Probiotics Necessary: na          Glycemic Control:   Recent Labs      04/22/18   1305   GLU  113*   ;  Recent Labs      04/22/18   1528  04/22/18   1334   PHI  7.326*  7.323*   PCO2I  50.7*  55.4*   PO2I  97  110*    Adjustments Lantus, titrate insulin    Acute MI/PCI:   Not applicable    Door to Balloon: Admission Time: 1254      Heart Failure:    Not applicable     SCIP Measures for other Surgeries:   Not applicable CHG Bath Daily on All Ortho yes    Pneumonia:    Not applicable    Interdisciplinary team rounds were held with the following team membersCare Management, Diabetes Treatment Specialist, Nursing, Nutrition, Pharmacy and Physician. Plan of care discussed. Goals of Care/ Recommendations: educate about home med's   See clinical pathway and/or care plan for interventions and desired outcomes.     Critical Care Discharge Status:  Michael Tracy

## 2018-04-23 NOTE — ROUTINE PROCESS
0700.  Bedside, Verbal and Written shift change report given to EMMANUEL Sierra (oncoming nurse) by MALIK Segal RN (offgoing nurse). Report included the following information SBAR, Kardex, Intake/Output and Recent Results. Assumed care of pt at this time, pt aox4 following commands, denies pain, up at bereket, No gtts, remains monitored, 2L NC, voiding per urinal.      0900. Scheduled med's given, pt tolerating well. Pt very demanding.    1100. Pt status unchanged, sitting up in bed asymptomatic.  1300. Pt reassessed, status unchanged, eating lunch. 1500. Pt status unchanged, up to bedside commode, sitting in chair, talking on phone. Denies pain. Telemetry monitor continued, remains on 2L NC  1630. TRANSFER - OUT REPORT:    Verbal report given to [facility] (name) on Alber Bradford  being transferred to [facility](unit) for routine progression of care       Report consisted of patients Situation, Background, Assessment and   Recommendations(SBAR). Information from the following report(s) SBAR, Kardex, Intake/Output and Recent Results was reviewed with the receiving nurse.     Lines:   Peripheral IV 04/22/18 Right Hand (Active)   Site Assessment Clean, dry, & intact 4/23/2018  4:51 AM   Phlebitis Assessment 0 4/23/2018  4:51 AM   Infiltration Assessment 0 4/23/2018  4:51 AM   Dressing Status Intact 4/23/2018  4:51 AM   Dressing Type Transparent 4/23/2018  4:51 AM   Hub Color/Line Status Capped 4/23/2018  4:51 AM   Action Taken Open ports on tubing capped 4/23/2018 12:00 AM   Alcohol Cap Used Yes 4/23/2018 12:00 AM       Peripheral IV 04/22/18 Right Antecubital (Active)   Site Assessment Clean, dry, & intact 4/23/2018  4:51 AM   Phlebitis Assessment 0 4/23/2018  4:51 AM   Infiltration Assessment 0 4/23/2018  4:51 AM   Dressing Status Clean, dry, & intact 4/23/2018  4:51 AM   Dressing Type Tape;Transparent 4/23/2018  4:51 AM   Hub Color/Line Status Capped 4/23/2018  4:51 AM   Action Taken Open ports on tubing capped 4/23/2018 12:00 AM   Alcohol Cap Used Yes 4/23/2018  4:51 AM        Opportunity for questions and clarification was provided.       Patient transported with:   O2 @ 2 liters

## 2018-04-24 LAB
ANION GAP SERPL CALC-SCNC: 5 MMOL/L (ref 3–18)
BASOPHILS # BLD: 0 K/UL (ref 0–0.06)
BASOPHILS NFR BLD: 0 % (ref 0–2)
BUN SERPL-MCNC: 29 MG/DL (ref 7–18)
BUN/CREAT SERPL: 18 (ref 12–20)
CALCIUM SERPL-MCNC: 9.4 MG/DL (ref 8.5–10.1)
CHLORIDE SERPL-SCNC: 101 MMOL/L (ref 100–108)
CO2 SERPL-SCNC: 34 MMOL/L (ref 21–32)
CREAT SERPL-MCNC: 1.6 MG/DL (ref 0.6–1.3)
DIFFERENTIAL METHOD BLD: ABNORMAL
EOSINOPHIL # BLD: 0 K/UL (ref 0–0.4)
EOSINOPHIL NFR BLD: 0 % (ref 0–5)
ERYTHROCYTE [DISTWIDTH] IN BLOOD BY AUTOMATED COUNT: 15.7 % (ref 11.6–14.5)
GLUCOSE BLD STRIP.AUTO-MCNC: 128 MG/DL (ref 70–110)
GLUCOSE BLD STRIP.AUTO-MCNC: 154 MG/DL (ref 70–110)
GLUCOSE BLD STRIP.AUTO-MCNC: 155 MG/DL (ref 70–110)
GLUCOSE BLD STRIP.AUTO-MCNC: 296 MG/DL (ref 70–110)
GLUCOSE SERPL-MCNC: 164 MG/DL (ref 74–99)
HCT VFR BLD AUTO: 47.1 % (ref 36–48)
HGB BLD-MCNC: 14.6 G/DL (ref 13–16)
LYMPHOCYTES # BLD: 1.6 K/UL (ref 0.9–3.6)
LYMPHOCYTES NFR BLD: 15 % (ref 21–52)
MCH RBC QN AUTO: 28.7 PG (ref 24–34)
MCHC RBC AUTO-ENTMCNC: 31 G/DL (ref 31–37)
MCV RBC AUTO: 92.5 FL (ref 74–97)
MONOCYTES # BLD: 0.9 K/UL (ref 0.05–1.2)
MONOCYTES NFR BLD: 8 % (ref 3–10)
NEUTS SEG # BLD: 7.9 K/UL (ref 1.8–8)
NEUTS SEG NFR BLD: 77 % (ref 40–73)
PLATELET # BLD AUTO: 116 K/UL (ref 135–420)
PMV BLD AUTO: 12.1 FL (ref 9.2–11.8)
POTASSIUM SERPL-SCNC: 4.2 MMOL/L (ref 3.5–5.5)
RBC # BLD AUTO: 5.09 M/UL (ref 4.7–5.5)
SODIUM SERPL-SCNC: 140 MMOL/L (ref 136–145)
WBC # BLD AUTO: 10.4 K/UL (ref 4.6–13.2)

## 2018-04-24 PROCEDURE — 80048 BASIC METABOLIC PNL TOTAL CA: CPT | Performed by: INTERNAL MEDICINE

## 2018-04-24 PROCEDURE — 65660000000 HC RM CCU STEPDOWN

## 2018-04-24 PROCEDURE — 74011250636 HC RX REV CODE- 250/636: Performed by: FAMILY MEDICINE

## 2018-04-24 PROCEDURE — 94760 N-INVAS EAR/PLS OXIMETRY 1: CPT

## 2018-04-24 PROCEDURE — 74011636637 HC RX REV CODE- 636/637: Performed by: HOSPITALIST

## 2018-04-24 PROCEDURE — 74011250637 HC RX REV CODE- 250/637: Performed by: HOSPITALIST

## 2018-04-24 PROCEDURE — 74011250636 HC RX REV CODE- 250/636: Performed by: INTERNAL MEDICINE

## 2018-04-24 PROCEDURE — 74011636637 HC RX REV CODE- 636/637: Performed by: FAMILY MEDICINE

## 2018-04-24 PROCEDURE — 94640 AIRWAY INHALATION TREATMENT: CPT

## 2018-04-24 PROCEDURE — 85025 COMPLETE CBC W/AUTO DIFF WBC: CPT | Performed by: INTERNAL MEDICINE

## 2018-04-24 PROCEDURE — 74011000250 HC RX REV CODE- 250: Performed by: HOSPITALIST

## 2018-04-24 PROCEDURE — 77010033678 HC OXYGEN DAILY

## 2018-04-24 PROCEDURE — 36415 COLL VENOUS BLD VENIPUNCTURE: CPT | Performed by: INTERNAL MEDICINE

## 2018-04-24 PROCEDURE — 82962 GLUCOSE BLOOD TEST: CPT

## 2018-04-24 RX ORDER — FUROSEMIDE 40 MG/1
40 TABLET ORAL DAILY
Status: DISCONTINUED | OUTPATIENT
Start: 2018-04-25 | End: 2018-04-27 | Stop reason: HOSPADM

## 2018-04-24 RX ADMIN — INSULIN LISPRO 2 UNITS: 100 INJECTION, SOLUTION INTRAVENOUS; SUBCUTANEOUS at 06:50

## 2018-04-24 RX ADMIN — HYDRALAZINE HYDROCHLORIDE 25 MG: 25 TABLET, FILM COATED ORAL at 18:19

## 2018-04-24 RX ADMIN — CLONIDINE HYDROCHLORIDE 0.2 MG: 0.1 TABLET ORAL at 08:36

## 2018-04-24 RX ADMIN — HEPARIN SODIUM 5000 UNITS: 5000 INJECTION, SOLUTION INTRAVENOUS; SUBCUTANEOUS at 08:36

## 2018-04-24 RX ADMIN — Medication 10 ML: at 23:44

## 2018-04-24 RX ADMIN — BUDESONIDE 500 MCG: 0.5 INHALANT RESPIRATORY (INHALATION) at 19:46

## 2018-04-24 RX ADMIN — ARFORMOTEROL TARTRATE 15 MCG: 15 SOLUTION RESPIRATORY (INHALATION) at 07:21

## 2018-04-24 RX ADMIN — FUROSEMIDE 40 MG: 10 INJECTION, SOLUTION INTRAMUSCULAR; INTRAVENOUS at 08:36

## 2018-04-24 RX ADMIN — Medication 10 ML: at 06:51

## 2018-04-24 RX ADMIN — INSULIN LISPRO 2 UNITS: 100 INJECTION, SOLUTION INTRAVENOUS; SUBCUTANEOUS at 16:30

## 2018-04-24 RX ADMIN — CLONIDINE HYDROCHLORIDE 0.2 MG: 0.1 TABLET ORAL at 18:20

## 2018-04-24 RX ADMIN — BUDESONIDE 500 MCG: 0.5 INHALANT RESPIRATORY (INHALATION) at 07:20

## 2018-04-24 RX ADMIN — INSULIN LISPRO 6 UNITS: 100 INJECTION, SOLUTION INTRAVENOUS; SUBCUTANEOUS at 23:43

## 2018-04-24 RX ADMIN — HYDRALAZINE HYDROCHLORIDE 25 MG: 25 TABLET, FILM COATED ORAL at 23:43

## 2018-04-24 RX ADMIN — ARFORMOTEROL TARTRATE 15 MCG: 15 SOLUTION RESPIRATORY (INHALATION) at 19:46

## 2018-04-24 RX ADMIN — PREDNISONE 40 MG: 20 TABLET ORAL at 08:36

## 2018-04-24 RX ADMIN — CLONIDINE HYDROCHLORIDE 0.2 MG: 0.1 TABLET ORAL at 01:32

## 2018-04-24 RX ADMIN — INSULIN GLARGINE 5 UNITS: 100 INJECTION, SOLUTION SUBCUTANEOUS at 09:00

## 2018-04-24 RX ADMIN — HEPARIN SODIUM 5000 UNITS: 5000 INJECTION, SOLUTION INTRAVENOUS; SUBCUTANEOUS at 01:32

## 2018-04-24 RX ADMIN — HEPARIN SODIUM 5000 UNITS: 5000 INJECTION, SOLUTION INTRAVENOUS; SUBCUTANEOUS at 18:20

## 2018-04-24 RX ADMIN — LEVOFLOXACIN 750 MG: 5 INJECTION, SOLUTION INTRAVENOUS at 18:20

## 2018-04-24 RX ADMIN — HYDRALAZINE HYDROCHLORIDE 25 MG: 25 TABLET, FILM COATED ORAL at 08:36

## 2018-04-24 RX ADMIN — Medication 10 ML: at 18:25

## 2018-04-24 RX ADMIN — SPIRONOLACTONE 25 MG: 25 TABLET ORAL at 08:37
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
## 2018-04-24 NOTE — ROUTINE PROCESS
Bedside shift change report given to Garry Jaeger RN (oncoming nurse) by Jenae Christina RN (offgoing nurse).  Report included the following information MALKA Murri[place] Mar  .

## 2018-04-24 NOTE — ROUTINE PROCESS
TRANSFER - IN REPORT:    Verbal report received from Gama Camarillo (name) on Bridgette Davidson  being received from ICU(unit) for routine progression of care      Report consisted of patients Situation, Background, Assessment and   Recommendations(SBAR). Information from the following report(s) SBAR, Kardex, ED Summary, Procedure Summary, Intake/Output, MAR, Accordion, Recent Results and Med Rec Status was reviewed with the receiving nurse. Opportunity for questions and clarification was provided. Assessment completed upon patients arrival to unit and care assumed. Shift Summary- Shift uneventful. Pt rested throughout shift. Denied chest pain or shortness of breath.

## 2018-04-24 NOTE — ROUTINE PROCESS
Bedside and Verbal shift change report given to Milind Yan (oncoming nurse) by Bri Schaffer RN (offgoing nurse). Report included the following information SBAR, Kardex, ED Summary, Procedure Summary, Intake/Output, MAR, Accordion, Recent Results and Med Rec Status.

## 2018-04-24 NOTE — PROGRESS NOTES
D/c plan TBD  Met with pt. At bedside patient  Had refused bipap last night. He states he needs oxygen at home. Meli RN is aware patient will need a  Walk test 24 hours of d/c. Patient lives with his girlfriend. Has medicaid. Pt recommendations appreciated  Care Management Interventions  PCP Verified by CM: Yes  Transition of Care Consult (CM Consult): Discharge Planning  Current Support Network: Other  Confirm Follow Up Transport: Family  Plan discussed with Pt/Family/Caregiver: Yes  Freedom of Choice Offered:  Yes

## 2018-04-24 NOTE — PROGRESS NOTES
Hospitalist Progress Note-critical care note     Patient: Prem Lambert MRN: [id number]  CSN: [id number]    YOB: 1960  Age: 59 y.o. Sex: male    DOA: 4/22/2018 LOS:  LOS: 2 days            Chief complaint: copd exa. chf exacerbation,  Acute on chronic respiratory failure. DM     Assessment/Plan         Hospital Problems  Date Reviewed: 2/19/2013          Codes Class Noted POA    * (Principal)Acute on chronic respiratory failure ([facility] ICD-10-CM: J96.20  ICD-9-CM: 518.84  4/22/2018 Unknown        Cocaine abuse ICD-10-CM: F14.10  ICD-9-CM: 305.60  4/22/2018 Yes        Acute kidney injury [facility]) ICD-10-CM: N17.9  ICD-9-CM: 584.9  3/7/2018 Yes        Acute on chronic combined systolic and diastolic ACC/AHA stage C congestive heart failure (HCC) (Chronic) ICD-10-CM: I50.43  ICD-9-CM: 428.43, 428.0  1/25/2018 Yes        COPD exacerbation ([facility] ICD-10-CM: J44.1  ICD-9-CM: 491.21  2/19/2013 Yes        Type II diabetes mellitus with peripheral autonomic neuropathy (HCC) ICD-10-CM: E11.43  ICD-9-CM: 250.60, 337.1  2/19/2013 Yes        Hypertension ICD-10-CM: I10  ICD-9-CM: 401.9  Unknown Yes        JIM (obstructive sleep apnea) ICD-10-CM: G47.33  ICD-9-CM: 327.23  2/19/2013 Yes        Morbid obesity ([facility] ICD-10-CM: E66.01  ICD-9-CM: 278.01  2/19/2013 Yes            Acute on chronic respiratory failure:  Improving, off bipap AM , nc O2 down to 2 L, will continue wean, will have walking test tomorrow AM   -pul on board      Acute on chronic diastolic CHF:  Improving   Switch to po lasix due to mild elevated cr    EF 45 and diastolic dysfunction  Increased filling pressure-03/18     Cocaine abuse:   continue educated pt    Consult the pt for substance abuse, high risks for cardic death           COPD exac:  On Levaquin. continue with neb treatment.  Add low steroid and  Brovana/pulmicort       Uncontrolled DM:  on SSI, add lantus 5 units       HTN: will continue current regimen .       JIM: On BIPAP      BETHANY - will continue to monitor renal function in the setting of diuresis      Morbid obesity/BMI 55     High risks for readmission         Disposition :1-2 days     Subjective: no giving o2 last time   Nurse: refused to have bipap last night       Review of systems:    General: No fevers or chills. Cardiovascular: No chest pain or pressure. No palpitations. Pulmonary: shortness of breath better , no wheezing   Gastrointestinal: No nausea, vomiting. Vital signs/Intake and Output:  Visit Vitals    /81 (BP 1 Location: Left arm, BP Patient Position: At rest;Supine; Head of bed elevated (Comment degrees))    Pulse 74    Temp 97.5 °F (36.4 °C)    Resp 16    Ht 5' 8\" (1.727 m)    Wt (!) 166 kg (366 lb)    SpO2 100%    BMI 55.65 kg/m2     Current Shift:  04/24 0701 - 04/24 1900  In: 800 [P.O.:800]  Out: 6168 [PNWZR:4750]  Last three shifts:  04/22 1901 - 04/24 0700  In: 2220 [P.O.:1920; I.V.:300]  Out: 7850 [Urine:7250]    Physical Exam:  General: WD, WN. Alert, cooperative, no acute distress    HEENT: NC, Atraumatic. PERRLA, anicteric sclerae. Lungs: + Wheezing/Rhonchi/Rales. Heart:  Regular  rhythm,  No murmur, No Rubs, No Gallops  Abdomen: Soft, Non distended, Non tender.  +Bowel sounds,   Extremities: No c/c/e  Psych:   Not anxious or agitated. Neurologic:  No acute neurological deficit.              Labs: Results:       Chemistry Recent Labs      04/24/18   0500  04/23/18   0958  04/22/18   1305   GLU  164*  128*  113*   NA  140  142  144   K  4.2  3.9  4.0   CL  101  103  107   CO2  34*  32  31   BUN  29*  20*  13   CREA  1.60*  1.55*  1.39*   CA  9.4  9.3  8.6   AGAP  5  7  6   BUCR  18  13  9*   AP   --   76  74   TP   --   6.9  6.7   ALB   --   3.1*  3.2*   GLOB   --   3.8  3.5   AGRAT   --   0.8  0.9      CBC w/Diff Recent Labs      04/24/18   0500  04/23/18   0958  04/22/18   1305   WBC  10.4  10.0  6.9   RBC  5.09  5.27  4.99   HGB  14.6  15.2  14.2   HCT  47.1  47.3  45.2   PLT  116*  114*  109* GRANS  77*  80*  67   LYMPH  15*  13*  25   EOS  0  1  3      Cardiac Enzymes Recent Labs      04/23/18   0958  04/22/18   1305   CPK  95  155   CKND1  1.6  1.7      Coagulation Recent Labs      04/22/18   1305   PTP  12.5   INR  1.0       Lipid Panel Lab Results   Component Value Date/Time    Cholesterol, total 196 01/25/2018 02:51 AM    HDL Cholesterol 64 (H) 01/25/2018 02:51 AM    LDL, calculated 121.2 (H) 01/25/2018 02:51 AM    VLDL, calculated 10.8 01/25/2018 02:51 AM    Triglyceride 54 01/25/2018 02:51 AM    CHOL/HDL Ratio 3.1 01/25/2018 02:51 AM      BNP No results for input(s): BNPP in the last 72 hours.    Liver Enzymes Recent Labs      04/23/18   0958   TP  6.9   ALB  3.1*   AP  76   SGOT  12*      Thyroid Studies Lab Results   Component Value Date/Time    TSH 0.14 (L) 01/25/2018 02:51 AM        Procedures/imaging: see electronic medical records for all procedures/Xrays and details which were not copied into this note but were reviewed prior to creation of Melissa Melton MD

## 2018-04-24 NOTE — PROGRESS NOTES
Problem: Falls - Risk of  Goal: *Absence of Falls  Document Richard Fall Risk and appropriate interventions in the flowsheet.    Outcome: Progressing Towards Goal  Fall Risk Interventions:  Mobility Interventions: Patient to call before getting OOB         Medication Interventions: Evaluate medications/consider consulting pharmacy    Elimination Interventions: Call light in reach    History of Falls Interventions: Evaluate medications/consider consulting pharmacy

## 2018-04-25 LAB
ANION GAP SERPL CALC-SCNC: 3 MMOL/L (ref 3–18)
BASOPHILS # BLD: 0 K/UL (ref 0–0.06)
BASOPHILS NFR BLD: 0 % (ref 0–2)
BUN SERPL-MCNC: 30 MG/DL (ref 7–18)
BUN/CREAT SERPL: 20 (ref 12–20)
CALCIUM SERPL-MCNC: 9.1 MG/DL (ref 8.5–10.1)
CHLORIDE SERPL-SCNC: 100 MMOL/L (ref 100–108)
CO2 SERPL-SCNC: 35 MMOL/L (ref 21–32)
CREAT SERPL-MCNC: 1.47 MG/DL (ref 0.6–1.3)
DIFFERENTIAL METHOD BLD: ABNORMAL
EOSINOPHIL # BLD: 0 K/UL (ref 0–0.4)
EOSINOPHIL NFR BLD: 0 % (ref 0–5)
ERYTHROCYTE [DISTWIDTH] IN BLOOD BY AUTOMATED COUNT: 15.6 % (ref 11.6–14.5)
GLUCOSE BLD STRIP.AUTO-MCNC: 123 MG/DL (ref 70–110)
GLUCOSE BLD STRIP.AUTO-MCNC: 125 MG/DL (ref 70–110)
GLUCOSE BLD STRIP.AUTO-MCNC: 134 MG/DL (ref 70–110)
GLUCOSE BLD STRIP.AUTO-MCNC: 182 MG/DL (ref 70–110)
GLUCOSE SERPL-MCNC: 115 MG/DL (ref 74–99)
HCT VFR BLD AUTO: 47 % (ref 36–48)
HGB BLD-MCNC: 14.5 G/DL (ref 13–16)
LYMPHOCYTES # BLD: 1.8 K/UL (ref 0.9–3.6)
LYMPHOCYTES NFR BLD: 18 % (ref 21–52)
MCH RBC QN AUTO: 28.7 PG (ref 24–34)
MCHC RBC AUTO-ENTMCNC: 30.9 G/DL (ref 31–37)
MCV RBC AUTO: 92.9 FL (ref 74–97)
MONOCYTES # BLD: 0.7 K/UL (ref 0.05–1.2)
MONOCYTES NFR BLD: 7 % (ref 3–10)
NEUTS SEG # BLD: 7.1 K/UL (ref 1.8–8)
NEUTS SEG NFR BLD: 75 % (ref 40–73)
PLATELET # BLD AUTO: 124 K/UL (ref 135–420)
POTASSIUM SERPL-SCNC: 4.5 MMOL/L (ref 3.5–5.5)
RBC # BLD AUTO: 5.06 M/UL (ref 4.7–5.5)
SODIUM SERPL-SCNC: 138 MMOL/L (ref 136–145)
WBC # BLD AUTO: 9.6 K/UL (ref 4.6–13.2)

## 2018-04-25 PROCEDURE — 80048 BASIC METABOLIC PNL TOTAL CA: CPT | Performed by: INTERNAL MEDICINE

## 2018-04-25 PROCEDURE — 82962 GLUCOSE BLOOD TEST: CPT

## 2018-04-25 PROCEDURE — 85025 COMPLETE CBC W/AUTO DIFF WBC: CPT | Performed by: INTERNAL MEDICINE

## 2018-04-25 PROCEDURE — 36415 COLL VENOUS BLD VENIPUNCTURE: CPT | Performed by: INTERNAL MEDICINE

## 2018-04-25 PROCEDURE — 94760 N-INVAS EAR/PLS OXIMETRY 1: CPT

## 2018-04-25 PROCEDURE — 74011636637 HC RX REV CODE- 636/637: Performed by: HOSPITALIST

## 2018-04-25 PROCEDURE — 74011636637 HC RX REV CODE- 636/637: Performed by: FAMILY MEDICINE

## 2018-04-25 PROCEDURE — 65660000000 HC RM CCU STEPDOWN

## 2018-04-25 PROCEDURE — 74011250636 HC RX REV CODE- 250/636: Performed by: INTERNAL MEDICINE

## 2018-04-25 PROCEDURE — 74011250636 HC RX REV CODE- 250/636: Performed by: FAMILY MEDICINE

## 2018-04-25 PROCEDURE — 74011250637 HC RX REV CODE- 250/637: Performed by: HOSPITALIST

## 2018-04-25 PROCEDURE — 94640 AIRWAY INHALATION TREATMENT: CPT

## 2018-04-25 PROCEDURE — 74011000250 HC RX REV CODE- 250: Performed by: HOSPITALIST

## 2018-04-25 PROCEDURE — 74011636637 HC RX REV CODE- 636/637: Performed by: INTERNAL MEDICINE

## 2018-04-25 PROCEDURE — 77010033678 HC OXYGEN DAILY

## 2018-04-25 RX ADMIN — BUDESONIDE 500 MCG: 0.5 INHALANT RESPIRATORY (INHALATION) at 19:20

## 2018-04-25 RX ADMIN — ARFORMOTEROL TARTRATE 15 MCG: 15 SOLUTION RESPIRATORY (INHALATION) at 07:07

## 2018-04-25 RX ADMIN — HYDRALAZINE HYDROCHLORIDE 25 MG: 25 TABLET, FILM COATED ORAL at 08:33

## 2018-04-25 RX ADMIN — HEPARIN SODIUM 5000 UNITS: 5000 INJECTION, SOLUTION INTRAVENOUS; SUBCUTANEOUS at 01:32

## 2018-04-25 RX ADMIN — CLONIDINE HYDROCHLORIDE 0.2 MG: 0.1 TABLET ORAL at 01:32

## 2018-04-25 RX ADMIN — FUROSEMIDE 40 MG: 40 TABLET ORAL at 08:32

## 2018-04-25 RX ADMIN — LEVOFLOXACIN 750 MG: 5 INJECTION, SOLUTION INTRAVENOUS at 17:26

## 2018-04-25 RX ADMIN — PREDNISONE 30 MG: 20 TABLET ORAL at 08:32

## 2018-04-25 RX ADMIN — CLONIDINE HYDROCHLORIDE 0.2 MG: 0.1 TABLET ORAL at 08:32

## 2018-04-25 RX ADMIN — BUDESONIDE 500 MCG: 0.5 INHALANT RESPIRATORY (INHALATION) at 07:07

## 2018-04-25 RX ADMIN — HEPARIN SODIUM 5000 UNITS: 5000 INJECTION, SOLUTION INTRAVENOUS; SUBCUTANEOUS at 08:33

## 2018-04-25 RX ADMIN — CLONIDINE HYDROCHLORIDE 0.2 MG: 0.1 TABLET ORAL at 16:28

## 2018-04-25 RX ADMIN — HYDRALAZINE HYDROCHLORIDE 25 MG: 25 TABLET, FILM COATED ORAL at 16:28

## 2018-04-25 RX ADMIN — SPIRONOLACTONE 25 MG: 25 TABLET ORAL at 08:33

## 2018-04-25 RX ADMIN — ARFORMOTEROL TARTRATE 15 MCG: 15 SOLUTION RESPIRATORY (INHALATION) at 19:20

## 2018-04-25 RX ADMIN — HEPARIN SODIUM 5000 UNITS: 5000 INJECTION, SOLUTION INTRAVENOUS; SUBCUTANEOUS at 16:28

## 2018-04-25 RX ADMIN — INSULIN LISPRO 2 UNITS: 100 INJECTION, SOLUTION INTRAVENOUS; SUBCUTANEOUS at 12:39

## 2018-04-25 RX ADMIN — Medication 10 ML: at 22:00

## 2018-04-25 RX ADMIN — INSULIN GLARGINE 5 UNITS: 100 INJECTION, SOLUTION SUBCUTANEOUS at 08:33

## 2018-04-25 RX ADMIN — Medication 10 ML: at 14:00

## 2018-04-25 RX ADMIN — Medication 10 ML: at 06:31

## 2018-04-25 NOTE — PROGRESS NOTES
Problem: Falls - Risk of  Goal: *Absence of Falls  Document Richard Fall Risk and appropriate interventions in the flowsheet.    Outcome: Progressing Towards Goal  Fall Risk Interventions:  Mobility Interventions: Assess mobility with egress test, Bed/chair exit alarm         Medication Interventions: Assess postural VS orthostatic hypotension, Bed/chair exit alarm, Patient to call before getting OOB, Teach patient to arise slowly    Elimination Interventions: Bed/chair exit alarm, Call light in reach, Patient to call for help with toileting needs, Toileting schedule/hourly rounds, Urinal in reach    History of Falls Interventions: Bed/chair exit alarm, Room close to nurse's station
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
## 2018-04-25 NOTE — PROGRESS NOTES
Hospitalist Progress Note-critical care note     Patient: Bridgette Davidson MRN: [id number]  CSN: [id number]    YOB: 1960  Age: 59 y.o. Sex: male    DOA: 4/22/2018 LOS:  LOS: 3 days            Chief complaint: copd exa. chf exacerbation,  Acute on chronic respiratory failure. DM     Assessment/Plan         Hospital Problems  Date Reviewed: 2/19/2013          Codes Class Noted POA    * (Principal)Acute on chronic respiratory failure ([facility] ICD-10-CM: J96.20  ICD-9-CM: 518.84  4/22/2018 Unknown        Cocaine abuse ICD-10-CM: F14.10  ICD-9-CM: 305.60  4/22/2018 Yes        Acute kidney injury [facility]) ICD-10-CM: N17.9  ICD-9-CM: 584.9  3/7/2018 Yes        Acute on chronic combined systolic and diastolic ACC/AHA stage C congestive heart failure (HCC) (Chronic) ICD-10-CM: I50.43  ICD-9-CM: 428.43, 428.0  1/25/2018 Yes        COPD exacerbation ([facility] ICD-10-CM: J44.1  ICD-9-CM: 491.21  2/19/2013 Yes        Type II diabetes mellitus with peripheral autonomic neuropathy (HCC) ICD-10-CM: E11.43  ICD-9-CM: 250.60, 337.1  2/19/2013 Yes        Hypertension ICD-10-CM: I10  ICD-9-CM: 401.9  Unknown Yes        JIM (obstructive sleep apnea) ICD-10-CM: G47.33  ICD-9-CM: 327.23  2/19/2013 Yes        Morbid obesity ([facility] ICD-10-CM: E66.01  ICD-9-CM: 278.01  2/19/2013 Yes            Acute on chronic respiratory failure:  Refused  bipap two nights in all   - nc O2 down to 1 L   , will continue wean, will have walking test tomorrow AM   -pul on board    -will have close appointment with pulmonology     Acute on chronic diastolic CHF:  Continue improving   Will continue po lasix    EF 45 and diastolic dysfunction  Increased filling pressure-03/18     Cocaine abuse:   continue educated pt             COPD exac:  On Levaquin. continue with neb treatment.  Weaning off po steroid and  Brovana/pulmicort       Uncontrolled DM:  on SSI,  lantus 5 units       HTN: will continue current regimen .       JIM: refuse to use bipap       BETHANY - improving       Morbid obesity/BMI 55     High risks for readmission         Disposition :1-2 days     Subjective: dizziness, still wheezing  I know my body ,will go home Friday -sister at home on Friday   Nurse: report dizziness, not orthostatic           Review of systems:    General: No fevers or chills. Cardiovascular: No chest pain or pressure. No palpitations. Pulmonary: shortness of breath better , + wheezing   Gastrointestinal: No nausea, vomiting. Vital signs/Intake and Output:  Visit Vitals    /82 (BP 1 Location: Right arm, BP Patient Position: At rest;Sitting)    Pulse 76    Temp 97.7 °F (36.5 °C)    Resp 16    Ht 5' 8\" (1.727 m)    Wt (!) 166 kg (366 lb)    SpO2 96%    BMI 55.65 kg/m2     Current Shift:  04/25 0701 - 04/25 1900  In: 960 [P.O.:960]  Out: 500 [Urine:500]  Last three shifts:  04/23 1901 - 04/25 0700  In: 2240 [P.O.:2240]  Out: 9326 [Urine:5645]    Physical Exam:  General: WD, WN. Alert, cooperative, no acute distress    HEENT: NC, Atraumatic. PERRLA, anicteric sclerae. Lungs: No  Wheezing/Rhonchi/Rales. Heart:  Regular  rhythm,  No murmur, No Rubs, No Gallops  Abdomen: Soft, Non distended, Non tender.  +Bowel sounds,   Extremities: No c/c/e  Psych:   Not anxious or agitated. Neurologic:  No acute neurological deficit.              Labs: Results:       Chemistry Recent Labs      04/25/18   0412  04/24/18   0500  04/23/18   0958   GLU  115*  164*  128*   NA  138  140  142   K  4.5  4.2  3.9   CL  100  101  103   CO2  35*  34*  32   BUN  30*  29*  20*   CREA  1.47*  1.60*  1.55*   CA  9.1  9.4  9.3   AGAP  3  5  7   BUCR  20  18  13   AP   --    --   76   TP   --    --   6.9   ALB   --    --   3.1*   GLOB   --    --   3.8   AGRAT   --    --   0.8      CBC w/Diff Recent Labs      04/25/18   0412  04/24/18   0500  04/23/18   0958   WBC  9.6  10.4  10.0   RBC  5.06  5.09  5.27   HGB  14.5  14.6  15.2   HCT  47.0  47.1  47.3   PLT  124*  116*  114*   GRANS  75*  77*  80* LYMPH  18*  15*  13*   EOS  0  0  1      Cardiac Enzymes Recent Labs      04/23/18   0958   CPK  95   CKND1  1.6      Coagulation No results for input(s): PTP, INR, APTT in the last 72 hours. No lab exists for component: INREXT, INREXT    Lipid Panel Lab Results   Component Value Date/Time    Cholesterol, total 196 01/25/2018 02:51 AM    HDL Cholesterol 64 (H) 01/25/2018 02:51 AM    LDL, calculated 121.2 (H) 01/25/2018 02:51 AM    VLDL, calculated 10.8 01/25/2018 02:51 AM    Triglyceride 54 01/25/2018 02:51 AM    CHOL/HDL Ratio 3.1 01/25/2018 02:51 AM      BNP No results for input(s): BNPP in the last 72 hours.    Liver Enzymes Recent Labs      04/23/18   0958   TP  6.9   ALB  3.1*   AP  76   SGOT  12*      Thyroid Studies Lab Results   Component Value Date/Time    TSH 0.14 (L) 01/25/2018 02:51 AM        Procedures/imaging: see electronic medical records for all procedures/Xrays and details which were not copied into this note but were reviewed prior to creation of Melissa Melton MD

## 2018-04-25 NOTE — PROGRESS NOTES
AS PREVIOUSLY NOTED, PATIENT REFUSED BIPAP AGAIN LAST NIGHT. WEARING O2 INTERMITTENTLY. SPO2 97% ON ROOM AIR.

## 2018-04-25 NOTE — PROGRESS NOTES
Assumed care of pt. Pt is alert no sign of distress. Call light within reach.   1430: Pt refused to do walk test.
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
## 2018-04-25 NOTE — ROUTINE PROCESS
Bedside shift change report given to Sunny Blum RN (oncoming nurse) by Roger Gates RN (offgoing nurse).  Report included the following information MALKA Murrieta Mar  .

## 2018-04-25 NOTE — PROGRESS NOTES
Problem: Falls - Risk of  Goal: *Absence of Falls  Document Richard Fall Risk and appropriate interventions in the flowsheet.    Outcome: Progressing Towards Goal  Fall Risk Interventions:  Mobility Interventions: Assess mobility with egress test         Medication Interventions: Assess postural VS orthostatic hypotension    Elimination Interventions: Bed/chair exit alarm    History of Falls Interventions: Bed/chair exit alarm

## 2018-04-26 LAB
GLUCOSE BLD STRIP.AUTO-MCNC: 110 MG/DL (ref 70–110)
GLUCOSE BLD STRIP.AUTO-MCNC: 127 MG/DL (ref 70–110)
GLUCOSE BLD STRIP.AUTO-MCNC: 168 MG/DL (ref 70–110)
GLUCOSE BLD STRIP.AUTO-MCNC: 251 MG/DL (ref 70–110)

## 2018-04-26 PROCEDURE — 74011000250 HC RX REV CODE- 250: Performed by: HOSPITALIST

## 2018-04-26 PROCEDURE — 82962 GLUCOSE BLOOD TEST: CPT

## 2018-04-26 PROCEDURE — 74011636637 HC RX REV CODE- 636/637: Performed by: INTERNAL MEDICINE

## 2018-04-26 PROCEDURE — 94640 AIRWAY INHALATION TREATMENT: CPT

## 2018-04-26 PROCEDURE — 74011250636 HC RX REV CODE- 250/636: Performed by: FAMILY MEDICINE

## 2018-04-26 PROCEDURE — 74011250636 HC RX REV CODE- 250/636: Performed by: HOSPITALIST

## 2018-04-26 PROCEDURE — 74011250637 HC RX REV CODE- 250/637: Performed by: HOSPITALIST

## 2018-04-26 PROCEDURE — 74011636637 HC RX REV CODE- 636/637: Performed by: HOSPITALIST

## 2018-04-26 PROCEDURE — 65660000000 HC RM CCU STEPDOWN

## 2018-04-26 PROCEDURE — 77010033678 HC OXYGEN DAILY

## 2018-04-26 PROCEDURE — 94760 N-INVAS EAR/PLS OXIMETRY 1: CPT

## 2018-04-26 PROCEDURE — 74011636637 HC RX REV CODE- 636/637: Performed by: FAMILY MEDICINE

## 2018-04-26 RX ORDER — LEVOFLOXACIN 5 MG/ML
750 INJECTION, SOLUTION INTRAVENOUS EVERY 24 HOURS
Status: COMPLETED | OUTPATIENT
Start: 2018-04-26 | End: 2018-04-26

## 2018-04-26 RX ORDER — FLUTICASONE FUROATE AND VILANTEROL 100; 25 UG/1; UG/1
1 POWDER RESPIRATORY (INHALATION) DAILY
Status: DISCONTINUED | OUTPATIENT
Start: 2018-04-27 | End: 2018-04-27 | Stop reason: HOSPADM

## 2018-04-26 RX ORDER — PREDNISONE 20 MG/1
20 TABLET ORAL
Status: DISCONTINUED | OUTPATIENT
Start: 2018-04-27 | End: 2018-04-27 | Stop reason: HOSPADM

## 2018-04-26 RX ADMIN — CLONIDINE HYDROCHLORIDE 0.2 MG: 0.1 TABLET ORAL at 00:08

## 2018-04-26 RX ADMIN — HYDRALAZINE HYDROCHLORIDE 25 MG: 25 TABLET, FILM COATED ORAL at 00:08

## 2018-04-26 RX ADMIN — INSULIN GLARGINE 5 UNITS: 100 INJECTION, SOLUTION SUBCUTANEOUS at 08:57

## 2018-04-26 RX ADMIN — INSULIN LISPRO 2 UNITS: 100 INJECTION, SOLUTION INTRAVENOUS; SUBCUTANEOUS at 12:17

## 2018-04-26 RX ADMIN — FUROSEMIDE 40 MG: 40 TABLET ORAL at 08:57

## 2018-04-26 RX ADMIN — Medication 10 ML: at 22:12

## 2018-04-26 RX ADMIN — Medication 10 ML: at 16:17

## 2018-04-26 RX ADMIN — HEPARIN SODIUM 5000 UNITS: 5000 INJECTION, SOLUTION INTRAVENOUS; SUBCUTANEOUS at 16:16

## 2018-04-26 RX ADMIN — CLONIDINE HYDROCHLORIDE 0.2 MG: 0.1 TABLET ORAL at 08:55

## 2018-04-26 RX ADMIN — Medication 10 ML: at 06:54

## 2018-04-26 RX ADMIN — BUDESONIDE 500 MCG: 0.5 INHALANT RESPIRATORY (INHALATION) at 07:29

## 2018-04-26 RX ADMIN — INSULIN LISPRO 6 UNITS: 100 INJECTION, SOLUTION INTRAVENOUS; SUBCUTANEOUS at 17:22

## 2018-04-26 RX ADMIN — ARFORMOTEROL TARTRATE 15 MCG: 15 SOLUTION RESPIRATORY (INHALATION) at 07:29

## 2018-04-26 RX ADMIN — LEVOFLOXACIN 750 MG: 5 INJECTION, SOLUTION INTRAVENOUS at 16:17

## 2018-04-26 RX ADMIN — HEPARIN SODIUM 5000 UNITS: 5000 INJECTION, SOLUTION INTRAVENOUS; SUBCUTANEOUS at 08:58

## 2018-04-26 RX ADMIN — SPIRONOLACTONE 25 MG: 25 TABLET ORAL at 08:56

## 2018-04-26 RX ADMIN — HEPARIN SODIUM 5000 UNITS: 5000 INJECTION, SOLUTION INTRAVENOUS; SUBCUTANEOUS at 00:08

## 2018-04-26 RX ADMIN — HYDRALAZINE HYDROCHLORIDE 25 MG: 25 TABLET, FILM COATED ORAL at 22:11

## 2018-04-26 RX ADMIN — PREDNISONE 30 MG: 20 TABLET ORAL at 08:56

## 2018-04-26 RX ADMIN — HYDRALAZINE HYDROCHLORIDE 25 MG: 25 TABLET, FILM COATED ORAL at 16:17

## 2018-04-26 RX ADMIN — CLONIDINE HYDROCHLORIDE 0.2 MG: 0.1 TABLET ORAL at 16:17

## 2018-04-26 RX ADMIN — HYDRALAZINE HYDROCHLORIDE 25 MG: 25 TABLET, FILM COATED ORAL at 08:56

## 2018-04-26 NOTE — PROGRESS NOTES
Problem: Falls - Risk of  Goal: *Absence of Falls  Document Richard Fall Risk and appropriate interventions in the flowsheet.    Outcome: Progressing Towards Goal  Fall Risk Interventions:  Mobility Interventions: Assess mobility with egress test, Bed/chair exit alarm, Communicate number of staff needed for ambulation/transfer, Patient to call before getting OOB         Medication Interventions: Assess postural VS orthostatic hypotension, Bed/chair exit alarm, Patient to call before getting OOB, Teach patient to arise slowly    Elimination Interventions: Bed/chair exit alarm, Call light in reach, Patient to call for help with toileting needs, Toileting schedule/hourly rounds, Urinal in reach    History of Falls Interventions: Bed/chair exit alarm, Room close to nurse's station

## 2018-04-26 NOTE — PROGRESS NOTES
1925: Assumed patient care, he was alert and oriented to person, place, time and situation. Respiratory status was stable on 3L via nasal cannula. Vital signs were stable. MEWS score was a one. Patient denied any nausea vomiting dizziness or anxiety. White board was updated and explained. Bed was locked and in lowest position. Call bell, water and personal belongings were within reach. Patient had no questions, comments or concerns after bedside shift report. 0700: Patient had an uneventful shift. Respiratory status, vital signs and MEWS score remained stable. Patient was resting quietly with no signs of distress noted. Bed locked and in lowest position. Call bell water and personal belongings were within reach. Patient had no questions, comments or concerns after bedside shift report.  Bedside report given to Chris ALMENDAREZ

## 2018-04-26 NOTE — PROGRESS NOTES
Problem: Falls - Risk of  Goal: *Absence of Falls  Document Richard Fall Risk and appropriate interventions in the flowsheet.    Outcome: Progressing Towards Goal  Fall Risk Interventions:  Mobility Interventions: Patient to call before getting OOB         Medication Interventions: Patient to call before getting OOB    Elimination Interventions: Call light in reach    History of Falls Interventions: Bed/chair exit alarm
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
## 2018-04-26 NOTE — PROGRESS NOTES
Step 1)     Oxygen saturation at _____98________% on room air at rest.    If less than 88%: STOP! No further documentation needed; Otherwise proceeded to step 2 and 3      Step 2)     Oxygen saturation at  _____96________% on room air with exertion     while walking for 6 minutes. Oxygen added to patient.       Step 3)     Oxygen saturation at _____98________% while walking on Oxygen at ____1_____LPM      Via:   [x] Nasal Cannula         [] Simple Face Mask          [] Non-Re-Breather Mask        [] Partial Re-Breather Mask          [] Venturi Mask

## 2018-04-26 NOTE — PROGRESS NOTES
0730 Assumed responsibility for patient from Lucas Rizzo, [facility] Medications administered and assessment performed    [phone] Patient resting quietly in bed. Requesting that Dr. Kareem Ricardo fill out paperwork today in preparation for discharge tomorrow    7105 Insulin administered. Bedside shift change report given to Nile Fischer RN (oncoming nurse) by Aniya Capps RN (offgoing nurse). Report included the following information SBAR, Kardex and MAR.

## 2018-04-26 NOTE — PROGRESS NOTES
Hospitalist Progress Note-critical care note     Patient: Mercedes Gallegos MRN: [id number]  CSN: [id number]    YOB: 1960  Age: 59 y.o. Sex: male    DOA: 4/22/2018 LOS:  LOS: 4 days            Chief complaint: copd exa. chf exacerbation,  Acute on chronic respiratory failure. DM     Assessment/Plan         Hospital Problems  Date Reviewed: 2/19/2013          Codes Class Noted POA    * (Principal)Acute on chronic respiratory failure ([facility] ICD-10-CM: J96.20  ICD-9-CM: 518.84  4/22/2018 Unknown        Cocaine abuse ICD-10-CM: F14.10  ICD-9-CM: 305.60  4/22/2018 Yes        Acute kidney injury [facility]) ICD-10-CM: N17.9  ICD-9-CM: 584.9  3/7/2018 Yes        Acute on chronic combined systolic and diastolic ACC/AHA stage C congestive heart failure (HCC) (Chronic) ICD-10-CM: I50.43  ICD-9-CM: 428.43, 428.0  1/25/2018 Yes        COPD exacerbation ([facility] ICD-10-CM: J44.1  ICD-9-CM: 491.21  2/19/2013 Yes        Type II diabetes mellitus with peripheral autonomic neuropathy (HCC) ICD-10-CM: E11.43  ICD-9-CM: 250.60, 337.1  2/19/2013 Yes        Hypertension ICD-10-CM: I10  ICD-9-CM: 401.9  Unknown Yes        JIM (obstructive sleep apnea) ICD-10-CM: G47.33  ICD-9-CM: 327.23  2/19/2013 Yes        Morbid obesity ([facility] ICD-10-CM: E66.01  ICD-9-CM: 278.01  2/19/2013 Yes            Acute on chronic respiratory failure:  Refused  bipap two nights in all   will have walking test today for home O2 evaluation    -will have close appointment with pulmonology     Acute on chronic diastolic CHF:  Continue improving   Will continue po lasix    EF 45 and diastolic dysfunction  Increased filling pressure-03/18     Cocaine abuse:   continue educated pt             COPD exac:  On Levaquin.-last dose for 5 days . continue with neb treatment.  Weaning off po steroid and  Brovana/pulmicort       Uncontrolled DM:  on SSI,  lantus 5 units       HTN: will continue current regimen .       JIM: refuse to use bipap       BETHANY - improving       Morbid obesity/BMI 55     High risks for readmission         Disposition :tomorrow     Subjective: It was not my fault not to use bipap. I want them come back at 1:AM, they did not come     Nurse: no acute issue         Review of systems:    General: No fevers or chills. Cardiovascular: No chest pain or pressure. No palpitations. Pulmonary: shortness of breath better , + wheezing   Gastrointestinal: No nausea, vomiting. Vital signs/Intake and Output:  Visit Vitals    /78 (BP 1 Location: Left arm, BP Patient Position: Sitting)    Pulse 76    Temp 97.5 °F (36.4 °C)    Resp 19    Ht 5' 8\" (1.727 m)    Wt (!) 166 kg (366 lb)    SpO2 99%    BMI 55.65 kg/m2     Current Shift:  04/26 0701 - 04/26 1900  In: 400 [P.O.:400]  Out: -   Last three shifts:  04/24 1901 - 04/26 0700  In: 2860 [P.O.:2860]  Out: 2150 [Urine:2150]    Physical Exam:  General: WD, WN. Alert, cooperative, no acute distress    HEENT: NC, Atraumatic. PERRLA, anicteric sclerae. Lungs: No  Wheezing/Rhonchi/Rales. Heart:  Regular  rhythm,  No murmur, No Rubs, No Gallops  Abdomen: Soft, Non distended, Non tender.  +Bowel sounds,   Extremities: No c/c/e  Psych:   Not anxious or agitated. Neurologic:  No acute neurological deficit.              Labs: Results:       Chemistry Recent Labs      04/25/18   0412  04/24/18   0500  04/23/18   0958   GLU  115*  164*  128*   NA  138  140  142   K  4.5  4.2  3.9   CL  100  101  103   CO2  35*  34*  32   BUN  30*  29*  20*   CREA  1.47*  1.60*  1.55*   CA  9.1  9.4  9.3   AGAP  3  5  7   BUCR  20  18  13   AP   --    --   76   TP   --    --   6.9   ALB   --    --   3.1*   GLOB   --    --   3.8   AGRAT   --    --   0.8      CBC w/Diff Recent Labs      04/25/18   0412  04/24/18   0500  04/23/18   0958   WBC  9.6  10.4  10.0   RBC  5.06  5.09  5.27   HGB  14.5  14.6  15.2   HCT  47.0  47.1  47.3   PLT  124*  116*  114*   GRANS  75*  77*  80*   LYMPH  18*  15*  13*   EOS  0  0  1      Cardiac Enzymes Recent Labs      04/23/18   0958   CPK  95   CKND1  1.6      Coagulation No results for input(s): PTP, INR, APTT in the last 72 hours. No lab exists for component: INREXT, INREXT    Lipid Panel Lab Results   Component Value Date/Time    Cholesterol, total 196 01/25/2018 02:51 AM    HDL Cholesterol 64 (H) 01/25/2018 02:51 AM    LDL, calculated 121.2 (H) 01/25/2018 02:51 AM    VLDL, calculated 10.8 01/25/2018 02:51 AM    Triglyceride 54 01/25/2018 02:51 AM    CHOL/HDL Ratio 3.1 01/25/2018 02:51 AM      BNP No results for input(s): BNPP in the last 72 hours.    Liver Enzymes Recent Labs      04/23/18   0958   TP  6.9   ALB  3.1*   AP  76   SGOT  12*      Thyroid Studies Lab Results   Component Value Date/Time    TSH 0.14 (L) 01/25/2018 02:51 AM        Procedures/imaging: see electronic medical records for all procedures/Xrays and details which were not copied into this note but were reviewed prior to creation of Kenia Pearson MD

## 2018-04-26 NOTE — CDMP QUERY
Documentation of Acute Renal Kathleen Leaver is noted in the progress notes. Currently the patient does not meet RIFLE criteria (BSV approved) to support this diagnosis. If you are using another criteria to support this diagnosis, please document this in your progress note. Otherwise, please document in the progress notes the clinical indicators that support this diagnosis or state that the diagnosis has been ruled out. RIFLE  (BSV Approved)    RISK:  Increased SCr x 1.5 or GFR decrease > 25% (within 7 days)    INJURY:  Increased SCr x 2.0 or GFR decreased > 50%    FAILURE:  Increased SCr x 3.0 or GFR decrease > 75% or SCr >4.0 mg/dL or acute increase >0.5 mg/dL    LOSS:  Persistent acute renal failure = complete loss of kidney function > 4 weeks    END STAGE:  End stage of kidney disease > 3 months    (1/25-BUN=17, Cr=1.48)  (3/3-BUN-16, Cr-1.30)    4/22-BUN=13, Cr=1.39  4/23-BUN=20, Cr=1.55  4/24-BUN=29, Cr=1.60  4/25-BUN=30, Cr=1.47    Thank you.   Ramírez Capps RN [facility]  [phone]

## 2018-04-27 VITALS
HEIGHT: 68 IN | BODY MASS INDEX: 47.74 KG/M2 | RESPIRATION RATE: 18 BRPM | DIASTOLIC BLOOD PRESSURE: 106 MMHG | SYSTOLIC BLOOD PRESSURE: 132 MMHG | HEART RATE: 62 BPM | TEMPERATURE: 99.3 F | WEIGHT: 315 LBS | OXYGEN SATURATION: 98 %

## 2018-04-27 LAB — GLUCOSE BLD STRIP.AUTO-MCNC: 98 MG/DL (ref 70–110)

## 2018-04-27 PROCEDURE — 74011250636 HC RX REV CODE- 250/636: Performed by: FAMILY MEDICINE

## 2018-04-27 PROCEDURE — 74011250637 HC RX REV CODE- 250/637: Performed by: HOSPITALIST

## 2018-04-27 PROCEDURE — 82962 GLUCOSE BLOOD TEST: CPT

## 2018-04-27 RX ORDER — PREDNISONE 10 MG/1
TABLET ORAL
Qty: 12 TAB | Refills: 0 | Status: SHIPPED | OUTPATIENT
Start: 2018-04-27 | End: 2018-05-16

## 2018-04-27 RX ORDER — FLUTICASONE FUROATE AND VILANTEROL 100; 25 UG/1; UG/1
1 POWDER RESPIRATORY (INHALATION) DAILY
Qty: 1 INHALER | Refills: 0 | Status: SHIPPED | OUTPATIENT
Start: 2018-04-27 | End: 2020-04-22

## 2018-04-27 RX ADMIN — CLONIDINE HYDROCHLORIDE 0.2 MG: 0.1 TABLET ORAL at 01:42

## 2018-04-27 RX ADMIN — HEPARIN SODIUM 5000 UNITS: 5000 INJECTION, SOLUTION INTRAVENOUS; SUBCUTANEOUS at 01:42

## 2018-04-27 RX ADMIN — Medication 10 ML: at 06:00

## 2018-04-27 NOTE — DISCHARGE SUMMARY
Discharge Summary  Subjective:       Admit Date: 4/22/2018  Discharge Date:  4/27/2018, 8:17 AM    Discharging Physician: Marques Clay pager [phone]  Primary Care Provider:  Romayne Payment, MD    Patient ID:  Deepika Goodman  59 y.o. male  1960    Reason for Admission:  Respiratory failure ([address]  COPD (chronic obstructive pulmonary disease) ([address]    Discharge Diagnosis:   - acute on chronic respiratory failure  - COPD w exacebation  - JIM  - morbid obesity  - Dm2 w peripheral neuropathy  - cocaine abuse      Patient Active Problem List   Diagnosis Code    COPD exacerbation ([address] J44.1    Type II diabetes mellitus with peripheral autonomic neuropathy ([address] E11.43    Hypertension I10    JIM (obstructive sleep apnea) G47.33    Hypoxia R09.02    Morbid obesity (HCC) E66.01    Chest pain R07.9    Acute on chronic combined systolic and diastolic ACC/AHA stage C congestive heart failure (HCC) I50.43    CHF (congestive heart failure) (HCC) I50.9    Acute kidney injury (HCC) N17.9    COPD (chronic obstructive pulmonary disease) ([address] J44.9    Acute on chronic respiratory failure ([address] J96.20    Cocaine abuse F14.10       Consultants:    pulmonology    Hospital Course:   Reason for admission ( Admitting HPI): \" Deepika Goodman is a 59 y.o. male with past medical history of Sleep Apnea, DM diastolic CHF , COPD on home oxygen 4L, who was brought by EMS for recurrent SOB since 10 am. He states that he has been having wheezing and trouble breathing this am, his dyspnea has not been relieved after increase his O2 and neb therapy. EMS was called, he was placed with CPAP, then brought to the ED.       In ER, he was difficult to arousable, his ABG revealed with hypoxia, CPAP switched to BIPAP, CXR reviewed with intestial edema. UDS was + for cocaine. He is to admit to ICU for further care     Pt was recently admitted at [facility] for SOB and just discharged on 04/12. \"    He was admitted to the hospitalist service.  He was started on steroids, antiboitics and his respiratory symptoms improved. He had refused BIPAP despite staff encouraging its use. He is currently stable for DC home. The importance he abstain from cocaine was discussed at length    Pertinent Imaging/ Operative procedures:   CXR:\"      Significant interval improvement in vascular congestion and likely improved  edema. No new abnormality identified. \"           Pertinent Results:    CMP: No results found for: NA, K, CL, CO2, AGAP, GLU, BUN, CREA, GFRAA, GFRNA, CA, MG, PHOS, ALB, TBIL, TP, ALB, GLOB, AGRAT, SGOT, ALT, GPT  CBC: No results found for: WBC, HGB, HGBEXT, HCT, HCTEXT, PLT, PLTEXT, HGBEXT, HCTEXT, PLTEXT      Physical Exam on Discharge:  Visit Vitals    BP (!) 132/106 (BP Patient Position: Sitting)    Pulse 62    Temp 99.3 °F (37.4 °C)    Resp 18    Ht 5' 8\" (1.727 m)    Wt (!) 171.6 kg (378 lb 3.2 oz)    SpO2 98%    BMI 57.51 kg/m2     Body mass index is 57.51 kg/(m^2). GEN: NAD  HEART: S1 S2  NEURO: nonfoca;   Condition at discharge: stable    Discharge Medications  Current Discharge Medication List      START taking these medications    Details   fluticasone-vilanterol (BREO ELLIPTA) 100-25 mcg/dose inhaler Take 1 Puff by inhalation daily. Qty: 1 Inhaler, Refills: 0      predniSONE (DELTASONE) 10 mg tablet 2 tabs daily x 3 days then 1 tab daily x 3 days then 1/2 tab daily x  3 days  Qty: 12 Tab, Refills: 0         CONTINUE these medications which have NOT CHANGED    Details   cloNIDine HCl (CATAPRES) 0.2 mg tablet Take 1 Tab by mouth every eight (8) hours. Qty: 90 Tab, Refills: 0      furosemide (LASIX) 40 mg tablet Take 1 Tab by mouth daily. Qty: 30 Tab, Refills: 0      hydrALAZINE (APRESOLINE) 25 mg tablet Take 1 Tab by mouth three (3) times daily. Qty: 90 Tab, Refills: 0      glipiZIDE (GLUCOTROL) 10 mg tablet Take 1 Tab by mouth two (2) times a day.   Qty: 60 Tab, Refills: 0      spironolactone (ALDACTONE) 25 mg tablet Take 25 mg by mouth daily.      amLODIPine (NORVASC) 5 mg tablet Take  by mouth daily. albuterol (PROVENTIL HFA, VENTOLIN HFA) 90 mcg/actuation inhaler Take 1 Puff by inhalation.  1 puff in am and 1 puff in pm          STOP taking these medications       oxyCODONE-acetaminophen (PERCOCET)  mg per tablet Comments:   Reason for Stopping:         carvedilol (COREG) 3.125 mg tablet Comments:   Reason for Stopping:         oxyCODONE-acetaminophen (PERCOCET 10)  mg per tablet Comments:   Reason for Stopping:         predniSONE (STERAPRED) 5 mg dose pack Comments:   Reason for Stopping:         Oxygen Comments:   Reason for Stopping:         dimethicone 1 % topical cream Comments:   Reason for Stopping:               Disposition: home   Follow up:  pcp  Restrictions: no cocaine    Diagnostic Imaging/Labs pending at discharge: none      Hamilton Zuniga, [address] [facility]  Internal Medicine/Geriatrics    Time Spent 45 minutes with >50% coordination of care          CC: Moose Luis MD

## 2018-04-27 NOTE — PROGRESS NOTES
1930 Received bedside report from [facility]. Assumed patient care. Patient AO x4, sitting on side of bed. 2000 Assessment completed, meds administered, and nurse asked patient about Bipap. He stated that they should come at around 1am.      0100 Patient refused Bipap per RPT.     0600 Patient had uneventful night, no complaints reported or observed. Bedside and Verbal shift change report given to Kennedi Watson RN (oncoming nurse) by Toro Barrett (offgoing nurse). Report included the following information SBAR, Kardex and MAR.

## 2018-04-27 NOTE — PROGRESS NOTES
800 Assumed care of patient from [facility] discharge instructions explained to patient, understanding verbalized during answer/question session. INT and telemetry monitor removed. Scripts provided with educational material. Patient escorted out of facility via wheelchair in stable condition.

## 2018-04-27 NOTE — PROGRESS NOTES
Problem: Falls - Risk of  Goal: *Absence of Falls  Document Richard Fall Risk and appropriate interventions in the flowsheet.    Outcome: Progressing Towards Goal  Fall Risk Interventions:  Mobility Interventions: Patient to call before getting OOB         Medication Interventions: Patient to call before getting OOB    Elimination Interventions: Call light in reach, Urinal in reach    History of Falls Interventions: Utilize gait belt for transfer/ambulation

## 2018-04-27 NOTE — DISCHARGE INSTRUCTIONS
COPD Exacerbation Plan: Care Instructions  Your Care Instructions    If you have chronic obstructive pulmonary disease (COPD), your usual shortness of breath could suddenly get worse. You may start coughing more and have more mucus. This flare-up is called a COPD exacerbation (say \"wi-ECQ-id-BAY-pablo\"). A lung infection or air pollution could set off an exacerbation. Sometimes it can happen after a quick change in temperature or being around chemicals. Work with your doctor to make a plan for dealing with an exacerbation. You can better manage it if you plan ahead. Follow-up care is a key part of your treatment and safety. Be sure to make and go to all appointments, and call your doctor if you are having problems. It's also a good idea to know your test results and keep a list of the medicines you take. How can you care for yourself at home? During an exacerbation  · Do not panic if you start to have one. Quick treatment at home may help you prevent serious breathing problems. If you have a COPD exacerbation plan that you developed with your doctor, follow it. · Take your medicines exactly as your doctor tells you. ¨ Use your inhaler as directed by your doctor. If your symptoms do not get better after you use your medicine, have someone take you to the emergency room. Call an ambulance if necessary. ¨ With inhaled medicines, a spacer or a nebulizer may help you get more medicine to your lungs. Ask your doctor or pharmacist how to use them properly. Practice using the spacer in front of a mirror before you have an exacerbation. This may help you get the medicine into your lungs quickly. ¨ If your doctor has given you steroid pills, take them as directed. ¨ Your doctor may have given you a prescription for antibiotics, which you can fill if you need it. ¨ Talk to your doctor if you have any problems with your medicine.  And call your doctor if you have to use your antibiotic or steroid pills.  Preventing an exacerbation  · Do not smoke. This is the most important step you can take to prevent more damage to your lungs and prevent problems. If you already smoke, it is never too late to stop. If you need help quitting, talk to your doctor about stop-smoking programs and medicines. These can increase your chances of quitting for good. · Take your daily medicines as prescribed. · Avoid colds and flu. ¨ Get a pneumococcal vaccine. ¨ Get a flu vaccine each year, as soon as it is available. Ask those you live or work with to do the same, so they will not get the flu and infect you. ¨ Try to stay away from people with colds or the flu. ¨ Wash your hands often. · Avoid secondhand smoke; air pollution; cold, dry air; hot, humid air; and high altitudes. Stay at home with your windows closed when air pollution is bad. · Learn breathing techniques for COPD, such as breathing through pursed lips. These techniques can help you breathe easier during an exacerbation. When should you call for help? Call 911 anytime you think you may need emergency care. For example, call if:  ? · You have severe trouble breathing. ? · You have severe chest pain. ?Call your doctor now or seek immediate medical care if:  ? · You have new or worse shortness of breath. ? · You develop new chest pain. ? · You are coughing more deeply or more often, especially if you notice more mucus or a change in the color of your mucus. ? · You cough up blood. ? · You have new or increased swelling in your legs or belly. ? · You have a fever. ? Watch closely for changes in your health, and be sure to contact your doctor if:  ? · You need to use your antibiotic or steroid pills. ? · Your symptoms are getting worse. Where can you learn more? Go to http://bhargav-mily.info/. Enter H518 in the search box to learn more about \"COPD Exacerbation Plan: Care Instructions. \"  Current as of:  May 12, 2017  Content Version: 11.4  © 3943-4268 Healthwise, [employer]. Care instructions adapted under license by [employer] (which disclaims liability or warranty for this information). If you have questions about a medical condition or this instruction, always ask your healthcare professional. [address] any warranty or liability for your use of this information. When You Are Overweight: Care Instructions  Your Care Instructions    If you're overweight, your doctor may recommend that you make changes in your eating and exercise habits. Being overweight can lead to serious health problems, such as high blood pressure, heart disease, type 2 diabetes, and arthritis, or it can make these problems worse. Eating a healthy diet and being more active can help you reach and stay at a healthy weight. You don't have to make huge changes all at once. Start by making small changes in your eating and exercise habits. To lose weight, you need to burn more calories than you take in. You can do this by eating healthy foods in reasonable amounts and becoming more active every day. Follow-up care is a key part of your treatment and safety. Be sure to make and go to all appointments, and call your doctor if you are having problems. It's also a good idea to know your test results and keep a list of the medicines you take. How can you care for yourself at home? · Improve your eating habits. You'll be more successful if you work on changing one eating habit at a time. All foods, if eaten in moderation, can be part of healthy eating. Remember to:  [facility] a variety of foods from each food group. Include grains, vegetables, fruits, dairy, and protein foods. ¨ Limit foods high in fat, sugar, and calories. ¨ Eat slowly. And don't do anything else, such as watch TV, while you are eating. ¨ Pay attention to portion sizes. Put your food on a smaller plate. ¨ Plan your meals ahead of time.  You'll be less likely to grab something that's not as healthy. · Get active. Regular activity can help you feel better, have more energy, and burn more calories. If you haven't been active, start slowly. Start with at least 30 minutes of moderate activity on most days of the week. Then gradually increase the amount of activity. Try for 60 or 90 minutes a day, at least 5 days a week. There are a lot of ways to fit activity into your life. You can:  ¨ Walk or bike to the store. Or walk with a friend, or walk the dog. ¨ Mow the lawn, rake leaves, shovel snow, or do some gardening. ¨ Use the stairs instead of the elevator, at least for a few floors. · Change your thinking. Your thoughts have a lot to do with how you feel and what you do. When you're trying to reach a healthy weight, changing how you think about certain things may help. Here are some ideas:  ¨ Don't compare yourself to others. Healthy bodies come in all shapes and sizes. ¨ Pay attention to how hungry or full you feel. When you eat, be aware of why you're eating and how much you're eating. ¨ Focus on improving your health instead of dieting. Dieting almost never works over the long term. · Ask your doctor about other health professionals who can help you reach a healthy weight. ¨ A dietitian can help you make healthy changes in your diet. ¨ An exercise specialist or  can help you develop a safe and effective exercise program.  ¨ A counselor or psychiatrist can help you cope with issues such as depression, anxiety, or family problems that can make it hard to focus on reaching a healthy weight. · Get support from your family, your doctor, your friends, a support group-and support yourself. Where can you learn more? Go to http://bhargav-mily.info/. Enter R757 in the search box to learn more about \"When You Are Overweight: Care Instructions. \"  Current as of: October 13, 2016  Content Version: 11.4  © 3605-5550 Healthwise, [employer]. Care instructions adapted under license by [employer] (which disclaims liability or warranty for this information). If you have questions about a medical condition or this instruction, always ask your healthcare professional. [address] any warranty or liability for your use of this information. Patient armband removed and shredded  MyChart Activation    Thank you for requesting access to [employer]. Please follow the instructions below to securely access and download your online medical record. [employer] allows you to send messages to your doctor, view your test results, renew your prescriptions, schedule appointments, and more. How Do I Sign Up? 1. In your internet browser, go to [email or web address]  2. Click on the First Time User? Click Here link in the Sign In box. You will be redirect to the New Member Sign Up page. 3. Enter your [employer] Access Code exactly as it appears below. You will not need to use this code after youve completed the sign-up process. If you do not sign up before the expiration date, you must request a new code. [employer] Access Code: [email or web address]  Expires: 2018  8:47 AM (This is the date your [employer] access code will )    4. Enter the last four digits of your Social Security Number (xxxx) and Date of Birth (mm/dd/yyyy) as indicated and click Submit. You will be taken to the next sign-up page. 5. Create a [employer] ID. This will be your [employer] login ID and cannot be changed, so think of one that is secure and easy to remember. 6. Create a [employer] password. You can change your password at any time. 7. Enter your Password Reset Question and Answer. This can be used at a later time if you forget your password. 8. Enter your e-mail address. You will receive e-mail notification when new information is available in [address] 9. Click Sign Up. You can now view and download portions of your medical record.   10. Click the Download Summary menu link to download a portable copy of your medical information. Additional Information    If you have questions, please visit the Frequently Asked Questions section of the [email or web address] website at [email or web address] Remember, [email or web address] is NOT to be used for urgent needs. For medical emergencies, dial 911. DISCHARGE SUMMARY from Nurse    PATIENT INSTRUCTIONS:    After general anesthesia or intravenous sedation, for 24 hours or while taking prescription Narcotics:  · Limit your activities  · Do not drive and operate hazardous machinery  · Do not make important personal or business decisions  · Do  not drink alcoholic beverages  · If you have not urinated within 8 hours after discharge, please contact your surgeon on call. Report the following to your surgeon:  · Excessive pain, swelling, redness or odor of or around the surgical area  · Temperature over 100.5  · Nausea and vomiting lasting longer than 4 hours or if unable to take medications  · Any signs of decreased circulation or nerve impairment to extremity: change in color, persistent  numbness, tingling, coldness or increase pain  · Any questions    What to do at Home:  Recommended activity: Activity as tolerated    *  Please give a list of your current medications to your Primary Care Provider. *  Please update this list whenever your medications are discontinued, doses are      changed, or new medications (including over-the-counter products) are added. *  Please carry medication information at all times in case of emergency situations. These are general instructions for a healthy lifestyle:    No smoking/ No tobacco products/ Avoid exposure to second hand smoke  Surgeon General's Warning:  Quitting smoking now greatly reduces serious risk to your health.     Obesity, smoking, and sedentary lifestyle greatly increases your risk for illness    A healthy diet, regular physical exercise & weight monitoring are important for maintaining a healthy lifestyle    You may be retaining fluid if you have a history of heart failure or if you experience any of the following symptoms:  Weight gain of 3 pounds or more overnight or 5 pounds in a week, increased swelling in our hands or feet or shortness of breath while lying flat in bed. Please call your doctor as soon as you notice any of these symptoms; do not wait until your next office visit. Recognize signs and symptoms of STROKE:    F-face looks uneven    A-arms unable to move or move unevenly    S-speech slurred or non-existent    T-time-call 911 as soon as signs and symptoms begin-DO NOT go       Back to bed or wait to see if you get better-TIME IS BRAIN. Warning Signs of HEART ATTACK     Call 911 if you have these symptoms:   Chest discomfort. Most heart attacks involve discomfort in the center of the chest that lasts more than a few minutes, or that goes away and comes back. It can feel like uncomfortable pressure, squeezing, fullness, or pain.  Discomfort in other areas of the upper body. Symptoms can include pain or discomfort in one or both arms, the back, neck, jaw, or stomach.  Shortness of breath with or without chest discomfort.  Other signs may include breaking out in a cold sweat, nausea, or lightheadedness. Don't wait more than five minutes to call 911 - MINUTES MATTER! Fast action can save your life. Calling 911 is almost always the fastest way to get lifesaving treatment. Emergency Medical Services staff can begin treatment when they arrive -- up to an hour sooner than if someone gets to the hospital by car. The discharge information has been reviewed with the patient. The patient verbalized understanding.   Discharge medications reviewed with the patient and appropriate educational materials and side effects teaching were provided.   ___________________________________________________________________________________________________________________________________

## 2018-04-28 LAB
BACTERIA SPEC CULT: NORMAL
BACTERIA SPEC CULT: NORMAL
SERVICE CMNT-IMP: NORMAL
SERVICE CMNT-IMP: NORMAL

## 2018-05-11 ENCOUNTER — APPOINTMENT (OUTPATIENT)
Dept: NUCLEAR MEDICINE | Age: 58
DRG: 194 | End: 2018-05-11
Attending: PHYSICIAN ASSISTANT
Payer: MEDICAID

## 2018-05-11 ENCOUNTER — HOSPITAL ENCOUNTER (INPATIENT)
Age: 58
LOS: 5 days | Discharge: HOME HEALTH | DRG: 194 | End: 2018-05-16
Attending: EMERGENCY MEDICINE | Admitting: HOSPITALIST
Payer: MEDICAID

## 2018-05-11 ENCOUNTER — APPOINTMENT (OUTPATIENT)
Dept: GENERAL RADIOLOGY | Age: 58
DRG: 194 | End: 2018-05-11
Attending: PHYSICIAN ASSISTANT
Payer: MEDICAID

## 2018-05-11 DIAGNOSIS — N17.9 ACUTE KIDNEY INJURY (HCC): ICD-10-CM

## 2018-05-11 DIAGNOSIS — R03.0 ELEVATED BLOOD PRESSURE READING: ICD-10-CM

## 2018-05-11 DIAGNOSIS — R06.02 SOB (SHORTNESS OF BREATH): Primary | ICD-10-CM

## 2018-05-11 DIAGNOSIS — J44.9 CHRONIC OBSTRUCTIVE PULMONARY DISEASE, UNSPECIFIED COPD TYPE (HCC): ICD-10-CM

## 2018-05-11 PROBLEM — I16.1 HYPERTENSIVE EMERGENCY: Status: ACTIVE | Noted: 2018-05-11

## 2018-05-11 PROBLEM — R06.03 ACUTE RESPIRATORY DISTRESS: Status: ACTIVE | Noted: 2018-05-11

## 2018-05-11 LAB
ALBUMIN SERPL-MCNC: 3.4 G/DL (ref 3.4–5)
ALBUMIN/GLOB SERPL: 0.9 {RATIO} (ref 0.8–1.7)
ALP SERPL-CCNC: 85 U/L (ref 45–117)
ALT SERPL-CCNC: 26 U/L (ref 16–61)
AMPHET UR QL SCN: NEGATIVE
ANION GAP SERPL CALC-SCNC: 7 MMOL/L (ref 3–18)
APPEARANCE UR: CLEAR
ARTERIAL PATENCY WRIST A: YES
ARTERIAL PATENCY WRIST A: YES
AST SERPL-CCNC: 18 U/L (ref 15–37)
BACTERIA URNS QL MICRO: ABNORMAL /HPF
BARBITURATES UR QL SCN: NEGATIVE
BASE EXCESS BLD CALC-SCNC: 4 MMOL/L
BASE EXCESS BLD CALC-SCNC: 6 MMOL/L
BASOPHILS # BLD: 0 K/UL (ref 0–0.06)
BASOPHILS NFR BLD: 0 % (ref 0–2)
BDY SITE: ABNORMAL
BDY SITE: ABNORMAL
BENZODIAZ UR QL: NEGATIVE
BILIRUB SERPL-MCNC: 0.9 MG/DL (ref 0.2–1)
BILIRUB UR QL: ABNORMAL
BNP SERPL-MCNC: 3379 PG/ML (ref 0–900)
BUN SERPL-MCNC: 13 MG/DL (ref 7–18)
BUN/CREAT SERPL: 7 (ref 12–20)
CALCIUM SERPL-MCNC: 9.1 MG/DL (ref 8.5–10.1)
CANNABINOIDS UR QL SCN: NEGATIVE
CHLORIDE SERPL-SCNC: 101 MMOL/L (ref 100–108)
CK MB CFR SERPL CALC: 2.2 % (ref 0–4)
CK MB SERPL-MCNC: 4.8 NG/ML (ref 5–25)
CK SERPL-CCNC: 217 U/L (ref 39–308)
CO2 SERPL-SCNC: 29 MMOL/L (ref 21–32)
COCAINE UR QL SCN: POSITIVE
COLOR UR: ABNORMAL
CREAT SERPL-MCNC: 1.76 MG/DL (ref 0.6–1.3)
D DIMER PPP FEU-MCNC: 0.57 UG/ML(FEU)
DIFFERENTIAL METHOD BLD: ABNORMAL
EOSINOPHIL # BLD: 0.1 K/UL (ref 0–0.4)
EOSINOPHIL NFR BLD: 2 % (ref 0–5)
EPITH CASTS URNS QL MICRO: ABNORMAL /LPF (ref 0–5)
ERYTHROCYTE [DISTWIDTH] IN BLOOD BY AUTOMATED COUNT: 15.4 % (ref 11.6–14.5)
EST. AVERAGE GLUCOSE BLD GHB EST-MCNC: 131 MG/DL
GAS FLOW.O2 O2 DELIVERY SYS: ABNORMAL L/MIN
GAS FLOW.O2 O2 DELIVERY SYS: ABNORMAL L/MIN
GAS FLOW.O2 SETTING OXYMISER: 4 L/M
GLOBULIN SER CALC-MCNC: 4 G/DL (ref 2–4)
GLUCOSE BLD STRIP.AUTO-MCNC: 154 MG/DL (ref 70–110)
GLUCOSE BLD STRIP.AUTO-MCNC: 385 MG/DL (ref 70–110)
GLUCOSE SERPL-MCNC: 128 MG/DL (ref 74–99)
GLUCOSE UR STRIP.AUTO-MCNC: NEGATIVE MG/DL
HBA1C MFR BLD: 6.2 % (ref 4.5–5.6)
HCO3 BLD-SCNC: 28.5 MMOL/L (ref 22–26)
HCO3 BLD-SCNC: 31 MMOL/L (ref 22–26)
HCT VFR BLD AUTO: 49.2 % (ref 36–48)
HDSCOM,HDSCOM: ABNORMAL
HGB BLD-MCNC: 15.7 G/DL (ref 13–16)
HGB UR QL STRIP: NEGATIVE
KETONES UR QL STRIP.AUTO: ABNORMAL MG/DL
LEUKOCYTE ESTERASE UR QL STRIP.AUTO: NEGATIVE
LYMPHOCYTES # BLD: 1.2 K/UL (ref 0.9–3.6)
LYMPHOCYTES NFR BLD: 19 % (ref 21–52)
MCH RBC QN AUTO: 29.1 PG (ref 24–34)
MCHC RBC AUTO-ENTMCNC: 31.9 G/DL (ref 31–37)
MCV RBC AUTO: 91.3 FL (ref 74–97)
METHADONE UR QL: NEGATIVE
MONOCYTES # BLD: 0.4 K/UL (ref 0.05–1.2)
MONOCYTES NFR BLD: 6 % (ref 3–10)
MUCOUS THREADS URNS QL MICRO: ABNORMAL /LPF
NEUTS SEG # BLD: 4.7 K/UL (ref 1.8–8)
NEUTS SEG NFR BLD: 73 % (ref 40–73)
NITRITE UR QL STRIP.AUTO: NEGATIVE
O2/TOTAL GAS SETTING VFR VENT: 28 %
O2/TOTAL GAS SETTING VFR VENT: 36 %
OPIATES UR QL: NEGATIVE
PCO2 BLD: 43.1 MMHG (ref 35–45)
PCO2 BLD: 52.8 MMHG (ref 35–45)
PCP UR QL: NEGATIVE
PEEP RESPIRATORY: 6 CMH2O
PH BLD: 7.38 [PH] (ref 7.35–7.45)
PH BLD: 7.43 [PH] (ref 7.35–7.45)
PH UR STRIP: 5 [PH] (ref 5–8)
PIP ISTAT,IPIP: 16
PLATELET # BLD AUTO: 153 K/UL (ref 135–420)
PMV BLD AUTO: 12.9 FL (ref 9.2–11.8)
PO2 BLD: 83 MMHG (ref 80–100)
PO2 BLD: 86 MMHG (ref 80–100)
POTASSIUM SERPL-SCNC: 4.1 MMOL/L (ref 3.5–5.5)
PROT SERPL-MCNC: 7.4 G/DL (ref 6.4–8.2)
PROT UR STRIP-MCNC: 100 MG/DL
RBC # BLD AUTO: 5.39 M/UL (ref 4.7–5.5)
RBC #/AREA URNS HPF: ABNORMAL /HPF (ref 0–5)
SAO2 % BLD: 96 % (ref 92–97)
SAO2 % BLD: 97 % (ref 92–97)
SERVICE CMNT-IMP: ABNORMAL
SERVICE CMNT-IMP: ABNORMAL
SODIUM SERPL-SCNC: 137 MMOL/L (ref 136–145)
SP GR UR REFRACTOMETRY: >1.03 (ref 1–1.03)
SPECIMEN TYPE: ABNORMAL
SPECIMEN TYPE: ABNORMAL
SPONTANEOUS TIMED, IST: YES
TOTAL RESP. RATE, ITRR: 24
TOTAL RESP. RATE, ITRR: 30
TROPONIN I SERPL-MCNC: 0.02 NG/ML (ref 0–0.06)
TROPONIN I SERPL-MCNC: 0.07 NG/ML (ref 0–0.06)
TSH SERPL DL<=0.05 MIU/L-ACNC: 0.52 UIU/ML (ref 0.36–3.74)
UROBILINOGEN UR QL STRIP.AUTO: 1 EU/DL (ref 0.2–1)
WBC # BLD AUTO: 6.4 K/UL (ref 4.6–13.2)
WBC URNS QL MICRO: ABNORMAL /HPF (ref 0–5)

## 2018-05-11 PROCEDURE — 71045 X-RAY EXAM CHEST 1 VIEW: CPT

## 2018-05-11 PROCEDURE — 77030035694 HC MSK BIPAP FLL FAC PERFMAX PHIL -B

## 2018-05-11 PROCEDURE — A9567 TECHNETIUM TC-99M AEROSOL: HCPCS

## 2018-05-11 PROCEDURE — 74011250636 HC RX REV CODE- 250/636: Performed by: INTERNAL MEDICINE

## 2018-05-11 PROCEDURE — 84443 ASSAY THYROID STIM HORMONE: CPT | Performed by: INTERNAL MEDICINE

## 2018-05-11 PROCEDURE — 74011636637 HC RX REV CODE- 636/637: Performed by: HOSPITALIST

## 2018-05-11 PROCEDURE — 87040 BLOOD CULTURE FOR BACTERIA: CPT | Performed by: PHYSICIAN ASSISTANT

## 2018-05-11 PROCEDURE — 74011250637 HC RX REV CODE- 250/637: Performed by: INTERNAL MEDICINE

## 2018-05-11 PROCEDURE — 77030037296 HC NEB AREGN STRT KT DISP TRAN -B

## 2018-05-11 PROCEDURE — 93005 ELECTROCARDIOGRAM TRACING: CPT

## 2018-05-11 PROCEDURE — 85025 COMPLETE CBC W/AUTO DIFF WBC: CPT | Performed by: PHYSICIAN ASSISTANT

## 2018-05-11 PROCEDURE — 82803 BLOOD GASES ANY COMBINATION: CPT

## 2018-05-11 PROCEDURE — 80307 DRUG TEST PRSMV CHEM ANLYZR: CPT | Performed by: PHYSICIAN ASSISTANT

## 2018-05-11 PROCEDURE — 94640 AIRWAY INHALATION TREATMENT: CPT

## 2018-05-11 PROCEDURE — 94660 CPAP INITIATION&MGMT: CPT

## 2018-05-11 PROCEDURE — 65610000006 HC RM INTENSIVE CARE

## 2018-05-11 PROCEDURE — 99285 EMERGENCY DEPT VISIT HI MDM: CPT

## 2018-05-11 PROCEDURE — 77030013140 HC MSK NEB VYRM -A

## 2018-05-11 PROCEDURE — 83880 ASSAY OF NATRIURETIC PEPTIDE: CPT | Performed by: PHYSICIAN ASSISTANT

## 2018-05-11 PROCEDURE — 36600 WITHDRAWAL OF ARTERIAL BLOOD: CPT

## 2018-05-11 PROCEDURE — 74011250636 HC RX REV CODE- 250/636: Performed by: PHYSICIAN ASSISTANT

## 2018-05-11 PROCEDURE — 74011250637 HC RX REV CODE- 250/637: Performed by: HOSPITALIST

## 2018-05-11 PROCEDURE — 96365 THER/PROPH/DIAG IV INF INIT: CPT

## 2018-05-11 PROCEDURE — 82550 ASSAY OF CK (CPK): CPT | Performed by: PHYSICIAN ASSISTANT

## 2018-05-11 PROCEDURE — 74011000250 HC RX REV CODE- 250: Performed by: HOSPITALIST

## 2018-05-11 PROCEDURE — 85379 FIBRIN DEGRADATION QUANT: CPT | Performed by: PHYSICIAN ASSISTANT

## 2018-05-11 PROCEDURE — 83036 HEMOGLOBIN GLYCOSYLATED A1C: CPT | Performed by: HOSPITALIST

## 2018-05-11 PROCEDURE — 74011250636 HC RX REV CODE- 250/636: Performed by: HOSPITALIST

## 2018-05-11 PROCEDURE — 74011250637 HC RX REV CODE- 250/637: Performed by: PHYSICIAN ASSISTANT

## 2018-05-11 PROCEDURE — 81001 URINALYSIS AUTO W/SCOPE: CPT | Performed by: PHYSICIAN ASSISTANT

## 2018-05-11 PROCEDURE — 80053 COMPREHEN METABOLIC PANEL: CPT | Performed by: PHYSICIAN ASSISTANT

## 2018-05-11 PROCEDURE — 82962 GLUCOSE BLOOD TEST: CPT

## 2018-05-11 PROCEDURE — 74011000250 HC RX REV CODE- 250: Performed by: PHYSICIAN ASSISTANT

## 2018-05-11 RX ORDER — HYDRALAZINE HYDROCHLORIDE 20 MG/ML
20 INJECTION INTRAMUSCULAR; INTRAVENOUS ONCE
Status: COMPLETED | OUTPATIENT
Start: 2018-05-11 | End: 2018-05-11

## 2018-05-11 RX ORDER — IPRATROPIUM BROMIDE AND ALBUTEROL SULFATE 2.5; .5 MG/3ML; MG/3ML
3 SOLUTION RESPIRATORY (INHALATION)
Status: COMPLETED | OUTPATIENT
Start: 2018-05-11 | End: 2018-05-11

## 2018-05-11 RX ORDER — ACETAMINOPHEN 325 MG/1
650 TABLET ORAL
Status: DISCONTINUED | OUTPATIENT
Start: 2018-05-11 | End: 2018-05-16 | Stop reason: HOSPADM

## 2018-05-11 RX ORDER — FUROSEMIDE 10 MG/ML
40 INJECTION INTRAMUSCULAR; INTRAVENOUS ONCE
Status: COMPLETED | OUTPATIENT
Start: 2018-05-11 | End: 2018-05-11

## 2018-05-11 RX ORDER — ASPIRIN 325 MG
325 TABLET ORAL
Status: COMPLETED | OUTPATIENT
Start: 2018-05-11 | End: 2018-05-11

## 2018-05-11 RX ORDER — INSULIN LISPRO 100 [IU]/ML
INJECTION, SOLUTION INTRAVENOUS; SUBCUTANEOUS
Status: DISCONTINUED | OUTPATIENT
Start: 2018-05-11 | End: 2018-05-16 | Stop reason: HOSPADM

## 2018-05-11 RX ORDER — IPRATROPIUM BROMIDE AND ALBUTEROL SULFATE 2.5; .5 MG/3ML; MG/3ML
3 SOLUTION RESPIRATORY (INHALATION)
Status: DISCONTINUED | OUTPATIENT
Start: 2018-05-11 | End: 2018-05-14

## 2018-05-11 RX ORDER — HEPARIN SODIUM 5000 [USP'U]/ML
5000 INJECTION, SOLUTION INTRAVENOUS; SUBCUTANEOUS EVERY 8 HOURS
Status: DISCONTINUED | OUTPATIENT
Start: 2018-05-11 | End: 2018-05-16 | Stop reason: HOSPADM

## 2018-05-11 RX ORDER — DIPHENHYDRAMINE HYDROCHLORIDE 50 MG/ML
12.5 INJECTION, SOLUTION INTRAMUSCULAR; INTRAVENOUS
Status: DISCONTINUED | OUTPATIENT
Start: 2018-05-11 | End: 2018-05-16 | Stop reason: HOSPADM

## 2018-05-11 RX ORDER — HYDRALAZINE HYDROCHLORIDE 25 MG/1
25 TABLET, FILM COATED ORAL 3 TIMES DAILY
Status: DISCONTINUED | OUTPATIENT
Start: 2018-05-11 | End: 2018-05-11

## 2018-05-11 RX ORDER — NALOXONE HYDROCHLORIDE 0.4 MG/ML
0.4 INJECTION, SOLUTION INTRAMUSCULAR; INTRAVENOUS; SUBCUTANEOUS AS NEEDED
Status: DISCONTINUED | OUTPATIENT
Start: 2018-05-11 | End: 2018-05-16 | Stop reason: HOSPADM

## 2018-05-11 RX ORDER — SPIRONOLACTONE 25 MG/1
25 TABLET ORAL DAILY
Status: DISCONTINUED | OUTPATIENT
Start: 2018-05-11 | End: 2018-05-16 | Stop reason: HOSPADM

## 2018-05-11 RX ORDER — ONDANSETRON 2 MG/ML
4 INJECTION INTRAMUSCULAR; INTRAVENOUS
Status: DISCONTINUED | OUTPATIENT
Start: 2018-05-11 | End: 2018-05-16 | Stop reason: HOSPADM

## 2018-05-11 RX ORDER — NITROGLYCERIN 40 MG/1
1 PATCH TRANSDERMAL DAILY
Status: DISCONTINUED | OUTPATIENT
Start: 2018-05-11 | End: 2018-05-14

## 2018-05-11 RX ORDER — MAGNESIUM SULFATE 1 G/100ML
1 INJECTION INTRAVENOUS
Status: COMPLETED | OUTPATIENT
Start: 2018-05-11 | End: 2018-05-11

## 2018-05-11 RX ORDER — CLONIDINE HYDROCHLORIDE 0.1 MG/1
0.2 TABLET ORAL
Status: COMPLETED | OUTPATIENT
Start: 2018-05-11 | End: 2018-05-11

## 2018-05-11 RX ORDER — FUROSEMIDE 10 MG/ML
40 INJECTION INTRAMUSCULAR; INTRAVENOUS 2 TIMES DAILY
Status: DISCONTINUED | OUTPATIENT
Start: 2018-05-11 | End: 2018-05-16 | Stop reason: HOSPADM

## 2018-05-11 RX ORDER — DOCUSATE SODIUM 100 MG/1
100 CAPSULE, LIQUID FILLED ORAL 2 TIMES DAILY
Status: DISCONTINUED | OUTPATIENT
Start: 2018-05-11 | End: 2018-05-16 | Stop reason: HOSPADM

## 2018-05-11 RX ORDER — HYDRALAZINE HYDROCHLORIDE 50 MG/1
50 TABLET, FILM COATED ORAL 3 TIMES DAILY
Status: DISCONTINUED | OUTPATIENT
Start: 2018-05-11 | End: 2018-05-14

## 2018-05-11 RX ORDER — CLONIDINE HYDROCHLORIDE 0.1 MG/1
0.2 TABLET ORAL EVERY 8 HOURS
Status: DISCONTINUED | OUTPATIENT
Start: 2018-05-11 | End: 2018-05-16 | Stop reason: HOSPADM

## 2018-05-11 RX ORDER — ARFORMOTEROL TARTRATE 15 UG/2ML
15 SOLUTION RESPIRATORY (INHALATION)
Status: DISCONTINUED | OUTPATIENT
Start: 2018-05-11 | End: 2018-05-16 | Stop reason: HOSPADM

## 2018-05-11 RX ORDER — KETOROLAC TROMETHAMINE 30 MG/ML
15 INJECTION, SOLUTION INTRAMUSCULAR; INTRAVENOUS
Status: DISCONTINUED | OUTPATIENT
Start: 2018-05-11 | End: 2018-05-11

## 2018-05-11 RX ORDER — BUDESONIDE 0.5 MG/2ML
500 INHALANT ORAL
Status: DISCONTINUED | OUTPATIENT
Start: 2018-05-11 | End: 2018-05-16 | Stop reason: HOSPADM

## 2018-05-11 RX ORDER — LEVOFLOXACIN 5 MG/ML
500 INJECTION, SOLUTION INTRAVENOUS EVERY 24 HOURS
Status: COMPLETED | OUTPATIENT
Start: 2018-05-11 | End: 2018-05-15

## 2018-05-11 RX ORDER — AMLODIPINE BESYLATE 5 MG/1
5 TABLET ORAL DAILY
Status: DISCONTINUED | OUTPATIENT
Start: 2018-05-11 | End: 2018-05-16 | Stop reason: HOSPADM

## 2018-05-11 RX ORDER — FUROSEMIDE 40 MG/1
40 TABLET ORAL
Status: COMPLETED | OUTPATIENT
Start: 2018-05-11 | End: 2018-05-11

## 2018-05-11 RX ORDER — DEXTROSE 50 % IN WATER (D50W) INTRAVENOUS SYRINGE
25-50 AS NEEDED
Status: DISCONTINUED | OUTPATIENT
Start: 2018-05-11 | End: 2018-05-16 | Stop reason: HOSPADM

## 2018-05-11 RX ORDER — FUROSEMIDE 40 MG/1
40 TABLET ORAL DAILY
Status: DISCONTINUED | OUTPATIENT
Start: 2018-05-12 | End: 2018-05-11

## 2018-05-11 RX ORDER — PANTOPRAZOLE SODIUM 40 MG/1
40 TABLET, DELAYED RELEASE ORAL
Status: DISCONTINUED | OUTPATIENT
Start: 2018-05-12 | End: 2018-05-16 | Stop reason: HOSPADM

## 2018-05-11 RX ORDER — MAGNESIUM SULFATE 100 %
4 CRYSTALS MISCELLANEOUS AS NEEDED
Status: DISCONTINUED | OUTPATIENT
Start: 2018-05-11 | End: 2018-05-16 | Stop reason: HOSPADM

## 2018-05-11 RX ADMIN — HYDRALAZINE HYDROCHLORIDE 50 MG: 50 TABLET, FILM COATED ORAL at 18:09

## 2018-05-11 RX ADMIN — FUROSEMIDE 40 MG: 10 INJECTION, SOLUTION INTRAMUSCULAR; INTRAVENOUS at 18:03

## 2018-05-11 RX ADMIN — CLONIDINE HYDROCHLORIDE 0.2 MG: 0.1 TABLET ORAL at 21:16

## 2018-05-11 RX ADMIN — METHYLPREDNISOLONE SODIUM SUCCINATE 60 MG: 125 INJECTION, POWDER, FOR SOLUTION INTRAMUSCULAR; INTRAVENOUS at 18:05

## 2018-05-11 RX ADMIN — SPIRONOLACTONE 25 MG: 25 TABLET, FILM COATED ORAL at 18:06

## 2018-05-11 RX ADMIN — INSULIN LISPRO 2 UNITS: 100 INJECTION, SOLUTION INTRAVENOUS; SUBCUTANEOUS at 21:15

## 2018-05-11 RX ADMIN — LEVOFLOXACIN 500 MG: 5 INJECTION, SOLUTION INTRAVENOUS at 18:08

## 2018-05-11 RX ADMIN — BUDESONIDE 500 MCG: 0.5 INHALANT RESPIRATORY (INHALATION) at 19:52

## 2018-05-11 RX ADMIN — AMLODIPINE BESYLATE 5 MG: 5 TABLET ORAL at 13:28

## 2018-05-11 RX ADMIN — IPRATROPIUM BROMIDE AND ALBUTEROL SULFATE 3 ML: .5; 3 SOLUTION RESPIRATORY (INHALATION) at 08:00

## 2018-05-11 RX ADMIN — HYDRALAZINE HYDROCHLORIDE 25 MG: 25 TABLET, FILM COATED ORAL at 13:28

## 2018-05-11 RX ADMIN — HEPARIN SODIUM 5000 UNITS: 5000 INJECTION, SOLUTION INTRAVENOUS; SUBCUTANEOUS at 14:42

## 2018-05-11 RX ADMIN — HYDRALAZINE HYDROCHLORIDE 20 MG: 20 INJECTION INTRAMUSCULAR; INTRAVENOUS at 14:42

## 2018-05-11 RX ADMIN — ASPIRIN 325 MG ORAL TABLET 325 MG: 325 PILL ORAL at 07:57

## 2018-05-11 RX ADMIN — HEPARIN SODIUM 5000 UNITS: 5000 INJECTION, SOLUTION INTRAVENOUS; SUBCUTANEOUS at 21:15

## 2018-05-11 RX ADMIN — CLONIDINE HYDROCHLORIDE 0.2 MG: 0.1 TABLET ORAL at 13:24

## 2018-05-11 RX ADMIN — HYDRALAZINE HYDROCHLORIDE 50 MG: 50 TABLET, FILM COATED ORAL at 21:16

## 2018-05-11 RX ADMIN — ARFORMOTEROL TARTRATE 15 MCG: 15 SOLUTION RESPIRATORY (INHALATION) at 19:52

## 2018-05-11 RX ADMIN — IPRATROPIUM BROMIDE AND ALBUTEROL SULFATE 3 ML: .5; 3 SOLUTION RESPIRATORY (INHALATION) at 15:29

## 2018-05-11 RX ADMIN — IPRATROPIUM BROMIDE AND ALBUTEROL SULFATE 3 ML: .5; 3 SOLUTION RESPIRATORY (INHALATION) at 19:52

## 2018-05-11 RX ADMIN — MAGNESIUM SULFATE HEPTAHYDRATE 1 G: 1 INJECTION, SOLUTION INTRAVENOUS at 08:16

## 2018-05-11 RX ADMIN — METHYLPREDNISOLONE SODIUM SUCCINATE 60 MG: 125 INJECTION, POWDER, FOR SOLUTION INTRAMUSCULAR; INTRAVENOUS at 23:04

## 2018-05-11 RX ADMIN — INSULIN LISPRO 10 UNITS: 100 INJECTION, SOLUTION INTRAVENOUS; SUBCUTANEOUS at 18:02

## 2018-05-11 RX ADMIN — FUROSEMIDE 40 MG: 40 TABLET ORAL at 13:24

## 2018-05-11 RX ADMIN — IPRATROPIUM BROMIDE AND ALBUTEROL SULFATE 3 ML: .5; 3 SOLUTION RESPIRATORY (INHALATION) at 13:29

## 2018-05-11 RX ADMIN — FUROSEMIDE 40 MG: 10 INJECTION, SOLUTION INTRAMUSCULAR; INTRAVENOUS at 21:15

## 2018-05-11 RX ADMIN — DOCUSATE SODIUM 100 MG: 100 CAPSULE, LIQUID FILLED ORAL at 21:16

## 2018-05-11 NOTE — PROGRESS NOTES
Pharmacy Dosing Services: Levaquin     The following medication: Levaquin 500 mg IV q48h was automatically dose-adjusted to Levaquin 500 mg IV q24h per THE [facility] P&T Committee Protocol, with respect to renal function. Pt Weight:   Wt Readings from Last 1 Encounters:   05/11/18 (!) 165.6 kg (365 lb)     Serum Creatinine Lab Results   Component Value Date/Time    Creatinine 1.76 (H) 05/11/2018 08:06 AM       Creatinine Clearance Estimated Creatinine Clearance: 66.8 mL/min (based on Cr of 1.76). BUN Lab Results   Component Value Date/Time    BUN 13 05/11/2018 08:06 AM         Pharmacy to continue to monitor patient daily. Will make dosage adjustments based upon changing renal function. Signed Siomara Schreiber.  Contact information: [phone]
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
## 2018-05-11 NOTE — ED TRIAGE NOTES
Patient arrived via rescue in obvious respiratory distress. Patient complaining of increasing shortness of breath with chest pain onset this morning. Patient was recently admitted at this facility respiratory distress and COPD exacerbation. Patient denies any history of chest pain prior to this morning. Patient states he attempted to use his inhaler prior to arrival with minimal relief. Patient with history of CHF, COPD, and asthma. Patient also endorses cough but denies any known fevers. Rescue endorses labored breathing and SpO2 sats of 95% upon their arrival. Patient was given x2 Albuterol treatment, x1 Atrovent treatment, 125mg of Solu-medrol, and x2 sublingual nitro en route. Patient with increased work of breathing and tachypnea upon arrival. Patient able to speak in short, x4-5 word sentences. Diminished lung sounds with inspiratory and expiratory wheezing noted. Respiratory to bedside upon arrival to place patient on BiPAP. Sepsis Screening completed    ( x )Patient meets SIRS criteria. (  )Patient does not meet SIRS criteria.       SIRS Criteria is achieved when two or more of the following are present   Temperature < 96.8°F (36°C) or > 100.9°F (38.3°C)   Heart Rate > 90 beats per minute   Respiratory Rate > 20 breaths per minute   WBC count > 12,000 or <4,000 or > 10% bands
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
## 2018-05-11 NOTE — IP AVS SNAPSHOT
[address] [place] [id number] 
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 [phone] Discharge Orders None A check brittany indicates which time of day the medication should be taken. My Medications ASK your doctor about these medications Instructions Each Dose to Equal  
 Morning Noon Evening Bedtime  
 albuterol 90 mcg/actuation inhaler Commonly known as:  PROVENTIL HFA, VENTOLIN HFA, PROAIR HFA Your last dose was: Your next dose is: Take 1 Puff by inhalation. 1 puff in am and 1 puff in pm  
 1 Puff  
    
   
   
   
  
 amLODIPine 5 mg tablet Commonly known as:  Mayda Robertson Your last dose was: Your next dose is: Take  by mouth daily. cloNIDine HCl 0.2 mg tablet Commonly known as:  CATAPRES Your last dose was: Your next dose is: Take 1 Tab by mouth every eight (8) hours. 0.2 mg  
    
   
   
   
  
 fluticasone-vilanterol 100-25 mcg/dose inhaler Commonly known as:  ROMELIAO ELLIPTA Your last dose was: Your next dose is: Take 1 Puff by inhalation daily. 1 Puff  
    
   
   
   
  
 furosemide 40 mg tablet Commonly known as:  LASIX Your last dose was: Your next dose is: Take 1 Tab by mouth daily. 40 mg  
    
   
   
   
  
 glipiZIDE 10 mg tablet Commonly known as:  Kevin Gauze Your last dose was: Your next dose is: Take 1 Tab by mouth two (2) times a day. 10 mg  
    
   
   
   
  
 hydrALAZINE 25 mg tablet Commonly known as:  APRESOLINE Your last dose was: Your next dose is: Take 1 Tab by mouth three (3) times daily. 25 mg  
    
   
   
   
  
 predniSONE 10 mg tablet Commonly known as:  [place] Your last dose was: Your next dose is:    
   
   
 2 tabs daily x 3 days then 1 tab daily x 3 days then 1/2 tab daily x  3 days  
     
   
   
   
  
 spironolactone 25 mg tablet Commonly known as:  ALDACTONE Your last dose was: Your next dose is: Take 25 mg by mouth daily. 25 mg Discharge Instructions Asthma Attack: Care Instructions Your Care Instructions During an asthma attack, the airways swell and narrow. This makes it hard to breathe. Severe asthma attacks can be life-threatening, but you can help prevent them by keeping your asthma under control and treating symptoms before they get bad. Symptoms include being short of breath, having chest tightness, coughing, and wheezing. Noting and treating these symptoms can also help you avoid future trips to the emergency room. The doctor has checked you carefully, but problems can develop later. If you notice any problems or new symptoms, get medical treatment right away. Follow-up care is a key part of your treatment and safety. Be sure to make and go to all appointments, and call your doctor if you are having problems.  It's also a good idea to know your test results and keep a list of the medicines you take. How can you care for yourself at home? · Follow your asthma action plan to prevent and treat attacks. If you don't have an asthma action plan, work with your doctor to create one. · Take your asthma medicines exactly as prescribed. Talk to your doctor right away if you have any questions about how to take them. ¨ Use your quick-relief medicine when you have symptoms of an attack. Quick-relief medicine is usually an albuterol inhaler. Some people need to use quick-relief medicine before they exercise. ¨ Take your controller medicine every day, not just when you have symptoms. Controller medicine is usually an inhaled corticosteroid. The goal is to prevent problems before they occur. Don't use your controller medicine to treat an attack that has already started. It doesn't work fast enough to help. ¨ If your doctor prescribed corticosteroid pills to use during an attack, take them exactly as prescribed. It may take hours for the pills to work, but they may make the episode shorter and help you breathe better. ¨ Keep your quick-relief medicine with you at all times. · Talk to your doctor before using other medicines. Some medicines, such as aspirin, can cause asthma attacks in some people. · If you have a peak flow meter, use it to check how well you are breathing. This can help you predict when an asthma attack is going to occur. Then you can take medicine to prevent the asthma attack or make it less severe. · Do not smoke or allow others to smoke around you. Avoid smoky places. Smoking makes asthma worse. If you need help quitting, talk to your doctor about stop-smoking programs and medicines. These can increase your chances of quitting for good. · Learn what triggers an asthma attack for you, and avoid the triggers when you can. Common triggers include colds, smoke, air pollution, dust, pollen, mold, pets, cockroaches, stress, and cold air. · Avoid colds and the flu. Get a pneumococcal vaccine shot. If you have had one before, ask your doctor if you need a second dose. Get a flu vaccine every fall. If you must be around people with colds or the flu, wash your hands often. When should you call for help? Call 911 anytime you think you may need emergency care. For example, call if: 
? · You have severe trouble breathing. ?Call your doctor now or seek immediate medical care if: 
? · Your symptoms do not get better after you have followed your asthma action plan. ? · You have new or worse trouble breathing. ? · Your coughing and wheezing get worse. ? · You cough up dark brown or bloody mucus (sputum). ? · You have a new or higher fever. ? Watch closely for changes in your health, and be sure to contact your doctor if: 
? · You need to use quick-relief medicine on more than 2 days a week (unless it is just for exercise). ? · You cough more deeply or more often, especially if you notice more mucus or a change in the color of your mucus. ? · You are not getting better as expected. Where can you learn more? Go to http://bhargav-mily.info/. Enter Z739 in the search box to learn more about \"Asthma Attack: Care Instructions. \" Current as of: May 12, 2017 Content Version: 11.4 © 0061-3115 [employer]. Care instructions adapted under license by [employer] (which disclaims liability or warranty for this information). If you have questions about a medical condition or this instruction, always ask your healthcare professional. [place] [address] any warranty or liability for your use of this information. Learning About Asthma Triggers What are asthma triggers? When you have asthma, certain things can make your symptoms worse. These are called triggers. Learn what triggers an asthma attack for you, and avoid the triggers when you can.  Common triggers include colds, smoke, air pollution, dust, pollen, pets, stress, and cold air. How do asthma triggers affect you? Triggers can make it harder for your lungs to work as they should. They can lead to sudden breathing problems and other symptoms. When you are around a trigger, an asthma attack is more likely. If your symptoms are severe, you may need emergency treatment or have to go to the hospital for treatment. What can you do to avoid triggers? The first thing is to know your triggers. When you are having symptoms, note the things around you that might be causing them. Then look for patterns that may be triggering your symptoms. Record your triggers on a piece of paper or in an asthma diary. When you have your list of possible triggers, work with your doctor to find ways to avoid them. Avoid colds and flu. Get a pneumococcal vaccine shot. If you have had one before, ask your doctor whether you need a second dose. Get a flu vaccine every year, as soon as it's available. If you must be around people with colds or the flu, wash your hands often. Here are some ways to avoid a few common triggers. · Do not smoke or allow others to smoke around you. If you need help quitting, talk to your doctor about stop-smoking programs and medicines. These can increase your chances of quitting for good. · If there is a lot of pollution, pollen, or dust outside, stay at home and keep your windows closed. Use an air conditioner or air filter in your home. Check your local weather report or newspaper for air quality and pollen reports. What else should you know? · Take your controller medicine every day, not just when you have symptoms. It helps prevent problems before they occur. · Your doctor may suggest that you check how well your lungs are working by measuring your peak expiratory flow (PEF) throughout the day. Your PEF may drop when you are near things that trigger symptoms. Where can you learn more? Go to http://bhargav-mily.info/. Enter O880 in the search box to learn more about \"Learning About Asthma Triggers. \" Current as of: May 12, 2017 Content Version: 11.4 © 1646-8552 [employer]. Care instructions adapted under license by [employer] (which disclaims liability or warranty for this information). If you have questions about a medical condition or this instruction, always ask your healthcare professional. [address] any warranty or liability for your use of this information. Learning About ACE Inhibitors for Heart Failure Introduction ACE (angiotensin-converting enzyme) inhibitors block an enzyme that makes blood vessels narrow. As a result, the blood vessels relax and widen. This lowers blood pressure. These medicines also put more water and salt into the urine. This also lowers blood pressure. In heart failure, your heart does not pump as much blood as your body needs. These medicines: · Make it easier for your heart to pump. · Help reduce symptoms. · Make a heart attack or stroke less likely. Examples These medicines include: · Benazepril (Lotensin). · Lisinopril (Prinivil, Zestril). · Ramipril (Altace). This is not a complete list. 
Possible side effects Side effects may include: · Cough. · Low blood pressure. You may feel dizzy and weak. · High potassium levels. · An allergic reaction. You may have other side effects or reactions not listed here. Check the information that comes with your medicine. What to know about taking this medicine · ACE inhibitors: ¨ Are often used to treat heart failure. They may be the only medicine used if your only symptoms are feeling tired and mildly short of breath with no fluid buildup (edema). But in most other cases, they are used with other medicines. ¨ Can cause a dry cough. If the cough is bad, talk to your doctor. You may need to try a different medicine. ¨ Can relieve symptoms, improve how you feel, and make it less likely you will need to stay in a hospital. And they can reduce the risk of early death. ¨ Can cause an allergic reaction of the skin. Symptoms may range from mild swelling to painful welts. It is rare to have severe swelling that makes it hard to breathe. · Take your medicines exactly as prescribed. Call your doctor if you think you are having a problem with your medicine. · Check with your doctor or pharmacist before you use any other medicines. This includes over-the-counter medicines. Make sure your doctor knows all of the medicines, vitamins, herbal products, and supplements you take. Taking some medicines together can cause problems. · You should not take an ACE inhibitor if you are pregnant or planning to become pregnant. · You may need regular blood tests. Where can you learn more? Go to http://bhargav-mily.info/. Enter D722 in the search box to learn more about \"Learning About ACE Inhibitors for Heart Failure. \" Current as of: September 21, 2016 Content Version: 11.4 © 4498-5933 [employer]. Care instructions adapted under license by [employer] (which disclaims liability or warranty for this information). If you have questions about a medical condition or this instruction, always ask your healthcare professional. [address] any warranty or liability for your use of this information. Learning About Asthma What is asthma? Asthma is a long-term condition that affects your breathing. It causes the airways that lead to the lungs to swell. People with asthma may have asthma attacks. During an asthma attack, the airways tighten and become narrower. This makes it hard to breathe, and you may wheeze or cough. If you have a bad asthma attack, you may need emergency care. Asthma affects people in different ways.  Some people only have asthma attacks during allergy season, or when they breathe in cold air, or when they exercise. Others have many bad attacks that send them to the doctor often. What are the symptoms? Symptoms of asthma can be mild or severe. You may have mild attacks now and then, you may have severe symptoms every day, or you may have something in between. How often you have symptoms can also change. When you have asthma, you may: · Wheeze, making a loud or soft whistling noise when you breathe in and out. · Cough a lot. · Feel tightness in your chest. 
· Feel short of breath. · Have trouble sleeping because of coughing or having a hard time breathing. · Get tired quickly during exercise. Your symptoms may be worse at night. How can you prevent asthma attacks? Certain things can make asthma symptoms worse. These are called triggers. When you are around a trigger, an asthma attack is more likely. Common triggers include: · Cigarette smoke or air pollution. · Things you are allergic to, such as: 
¨ Pollen, mold, or dust mites. ¨ Pet hair, skin, or saliva. · Illnesses, like colds, flu, or pneumonia. · Exercise. · Dry, cold air. Here are some ways to avoid a few common triggers: · Do not smoke or allow others to smoke around you. If you need help quitting, talk to your doctor about stop-smoking programs and medicines. These can increase your chances of quitting for good. · If there is a lot of pollution, pollen, or dust outside, stay at home and keep your windows closed. Use an air conditioner or air filter in your home. Check your local weather report or newspaper for air quality and pollen reports. · Get the flu vaccine every year. Talk to your doctor about getting a pneumococcal shot. Wash your hands often to prevent infections. · Avoid exercising outdoors in cold weather. If you are outdoors in cold weather, wear a scarf around your face and breathe through your nose. How is asthma treated? There are two parts to treating asthma, which are outlined in your asthma action plan. The goals are to: 
· Control asthma over the long term. The asthma action plan tells you which medicine you may need to take every day. This is called a controller medicine. It helps to reduce the swelling of the airways and prevent asthma attacks. · Treat asthma attacks when they occur. The asthma action plan tells you what to do when you have an asthma attack. It helps you identify triggers that can cause your attacks. You use quick-relief medicine during an attack. The asthma plan also helps you track your symptoms and know how well the treatment is working. Follow-up care is a key part of your treatment and safety. Be sure to make and go to all appointments, and call your doctor if you are having problems. It's also a good idea to know your test results and keep a list of the medicines you take. Where can you learn more? Go to http://bhargav-mily.info/. Enter 9187 [id number] in the search box to learn more about \"Learning About Asthma. \" Current as of: May 12, 2017 Content Version: 11.4 © 7873-5948 [employer]. Care instructions adapted under license by [employer] (which disclaims liability or warranty for this information). If you have questions about a medical condition or this instruction, always ask your healthcare professional. [address] any warranty or liability for your use of this information. Body Mass Index: Care Instructions Your Care Instructions Body mass index (BMI) can help you see if your weight is raising your risk for health problems. It uses a formula to compare how much you weigh with how tall you are. · A BMI lower than 18.5 is considered underweight. · A BMI between 18.5 and 24.9 is considered healthy. · A BMI between 25 and 29.9 is considered overweight. A BMI of 30 or higher is considered obese. If your BMI is in the normal range, it means that you have a lower risk for weight-related health problems. If your BMI is in the overweight or obese range, you may be at increased risk for weight-related health problems, such as high blood pressure, heart disease, stroke, arthritis or joint pain, and diabetes. If your BMI is in the underweight range, you may be at increased risk for health problems such as fatigue, lower protection (immunity) against illness, muscle loss, bone loss, hair loss, and hormone problems. BMI is just one measure of your risk for weight-related health problems. You may be at higher risk for health problems if you are not active, you eat an unhealthy diet, or you drink too much alcohol or use tobacco products. Follow-up care is a key part of your treatment and safety. Be sure to make and go to all appointments, and call your doctor if you are having problems. It's also a good idea to know your test results and keep a list of the medicines you take. How can you care for yourself at home? · Practice healthy eating habits. This includes eating plenty of fruits, vegetables, whole grains, lean protein, and low-fat dairy. · If your doctor recommends it, get more exercise. Walking is a good choice. Bit by bit, increase the amount you walk every day. Try for at least 30 minutes on most days of the week. · Do not smoke. Smoking can increase your risk for health problems. If you need help quitting, talk to your doctor about stop-smoking programs and medicines. These can increase your chances of quitting for good. · Limit alcohol to 2 drinks a day for men and 1 drink a day for women. Too much alcohol can cause health problems. If you have a BMI higher than 25 · Your doctor may do other tests to check your risk for weight-related health problems.  This may include measuring the distance around your waist. A waist measurement of more than 40 inches in men or 35 inches in women can increase the risk of weight-related health problems. · Talk with your doctor about steps you can take to stay healthy or improve your health. You may need to make lifestyle changes to lose weight and stay healthy, such as changing your diet and getting regular exercise. If you have a BMI lower than 18.5 · Your doctor may do other tests to check your risk for health problems. · Talk with your doctor about steps you can take to stay healthy or improve your health. You may need to make lifestyle changes to gain or maintain weight and stay healthy, such as getting more healthy foods in your diet and doing exercises to build muscle. Where can you learn more? Go to http://bhargav-mily.info/. Enter S176 in the search box to learn more about \"Body Mass Index: Care Instructions. \" Current as of: October 13, 2016 Content Version: 11.4 © 5513-8744 [employer]. Care instructions adapted under license by [employer] (which disclaims liability or warranty for this information). If you have questions about a medical condition or this instruction, always ask your healthcare professional. [address] any warranty or liability for your use of this information. Chronic Obstructive Pulmonary Disease (COPD): Care Instructions Your Care Instructions Chronic obstructive pulmonary disease (COPD) is a general term for a group of lung diseases, including emphysema and chronic bronchitis. People with COPD have decreased airflow in and out of the lungs, which makes it hard to breathe. The airways also can get clogged with thick mucus. Cigarette smoking is a major cause of COPD. Although there is no cure for COPD, you can slow its progress. Following your treatment plan and taking care of yourself can help you feel better and live longer. Follow-up care is a key part of your treatment and safety.  Be sure to make and go to all appointments, and call your doctor if you are having problems. It's also a good idea to know your test results and keep a list of the medicines you take. How can you care for yourself at home? ?Staying healthy ? · Do not smoke. This is the most important step you can take to prevent more damage to your lungs. If you need help quitting, talk to your doctor about stop-smoking programs and medicines. These can increase your chances of quitting for good. ? · Avoid colds and flu. Get a pneumococcal vaccine shot. If you have had one before, ask your doctor whether you need a second dose. Get the flu vaccine every fall. If you must be around people with colds or the flu, wash your hands often. ? · Avoid secondhand smoke, air pollution, and high altitudes. Also avoid cold, dry air and hot, humid air. Stay at home with your windows closed when air pollution is bad. ?Medicines and oxygen therapy ? · Take your medicines exactly as prescribed. Call your doctor if you think you are having a problem with your medicine. ? · You may be taking medicines such as: ¨ Bronchodilators. These help open your airways and make breathing easier. Bronchodilators are either short-acting (work for 6 to 9 hours) or long-acting (work for 24 hours). You inhale most bronchodilators, so they start to act quickly. Always carry your quick-relief inhaler with you in case you need it while you are away from home. ¨ Corticosteroids (prednisone, budesonide). These reduce airway inflammation. They come in pill or inhaled form. You must take these medicines every day for them to work well. ? · A spacer may help you get more inhaled medicine to your lungs. Ask your doctor or pharmacist if a spacer is right for you. If it is, ask how to use it properly.   
? · Do not take any vitamins, over-the-counter medicine, or herbal products without talking to your doctor first.  
 ? · If your doctor prescribed antibiotics, take them as directed. Do not stop taking them just because you feel better. You need to take the full course of antibiotics. ? · Oxygen therapy boosts the amount of oxygen in your blood and helps you breathe easier. Use the flow rate your doctor has recommended, and do not change it without talking to your doctor first.  
Activity ? · Get regular exercise. Walking is an easy way to get exercise. Start out slowly, and walk a little more each day. ? · Pay attention to your breathing. You are exercising too hard if you cannot talk while you are exercising. ? · Take short rest breaks when doing household chores and other activities. ? · Learn breathing methods-such as breathing through pursed lips-to help you become less short of breath. ? · If your doctor has not set you up with a pulmonary rehabilitation program, talk to him or her about whether rehab is right for you. Rehab includes exercise programs, education about your disease and how to manage it, help with diet and other changes, and emotional support. Diet ? · Eat regular, healthy meals. Use bronchodilators about 1 hour before you eat to make it easier to eat. Eat several small meals instead of three large ones. Drink beverages at the end of the meal. Avoid foods that are hard to chew. ? · Eat foods that contain protein so that you do not lose muscle mass. ? · Talk with your doctor if you gain too much weight or if you lose weight without trying. ?Mental health ? · Talk to your family, friends, or a therapist about your feelings. It is normal to feel frightened, angry, hopeless, helpless, and even guilty. Talking openly about bad feelings can help you cope. If these feelings last, talk to your doctor. When should you call for help? Call 911 anytime you think you may need emergency care. For example, call if: 
? · You have severe trouble breathing. ?Call your doctor now or seek immediate medical care if: 
? · You have new or worse trouble breathing. ? · You cough up blood. ? · You have a fever. ? Watch closely for changes in your health, and be sure to contact your doctor if: 
? · You cough more deeply or more often, especially if you notice more mucus or a change in the color of your mucus. ? · You have new or worse swelling in your legs or belly. ? · You are not getting better as expected. Where can you learn more? Go to http://bhargav-mily.info/. Rosalia Velasquez in the search box to learn more about \"Chronic Obstructive Pulmonary Disease (COPD): Care Instructions. \" Current as of: May 12, 2017 Content Version: 11.4 © 0727-9791 Oco. Care instructions adapted under license by [employer] (which disclaims liability or warranty for this information). If you have questions about a medical condition or this instruction, always ask your healthcare professional. [address] any warranty or liability for your use of this information. Chronic Obstructive Pulmonary Disease (COPD) Flare-Ups: Care Instructions Your Care Instructions Chronic obstructive pulmonary disease (COPD) is a lung disease that makes it hard to breathe. It is caused by damage to the lungs over many years, usually from smoking. COPD is often a mix of two diseases: · Chronic bronchitis: The airways that carry air to the lungs (bronchial tubes) get inflamed and make a lot of mucus. This can narrow or block the airways. · Emphysema: In a healthy person, the tiny air sacs in the lungs are like balloons. As you breathe in and out, they get bigger and smaller to move air through your lungs. But with emphysema, these air sacs are damaged and lose their stretch. Less air gets in and out of the lungs. Many people with COPD have attacks called flare-ups or exacerbations.  This is when your usual symptoms quickly get worse and stay worse. The doctor has checked you carefully. But problems can develop later. If you notice any problems or new symptoms, get medical treatment right away. Follow-up care is a key part of your treatment and safety. Be sure to make and go to all appointments, and call your doctor if you are having problems. It's also a good idea to know your test results and keep a list of the medicines you take. How can you care for yourself at home? · Be safe with medicines. Take your medicines exactly as prescribed. Call your doctor if you think you are having a problem with your medicine. You may be taking medicines such as: ¨ Bronchodilators. These help open your airways and make breathing easier. ¨ Corticosteroids. These reduce airway inflammation. They may be given as pills, in a vein, or in an inhaled form. You may go home with pills in addition to an inhaler that you already use. · A spacer may help you get more inhaled medicine to your lungs. Ask your doctor or pharmacist if a spacer is right for you. If it is, ask how to use it properly. · If your doctor prescribed antibiotics, take them as directed. Do not stop taking them just because you feel better. You need to take the full course of antibiotics. · If your doctor prescribed oxygen, use the flow rate your doctor has recommended. Do not change it without talking to your doctor first. 
· Do not smoke. Smoking makes COPD worse. If you need help quitting, talk to your doctor about stop-smoking programs and medicines. These can increase your chances of quitting for good. When should you call for help? Call 911 anytime you think you may need emergency care. For example, call if: 
? · You have severe trouble breathing. ?Call your doctor now or seek immediate medical care if: 
? · You have new or worse trouble breathing. ? · Your coughing or wheezing gets worse. ? · You cough up dark brown or bloody mucus (sputum). ? · You have a new or higher fever. ? Watch closely for changes in your health, and be sure to contact your doctor if: 
? · You notice more mucus or a change in the color of your mucus. ? · You need to use your antibiotic or steroid pills. ? · You do not get better as expected. Where can you learn more? Go to http://bhargav-mily.info/. Enter Q241 in the search box to learn more about \"Chronic Obstructive Pulmonary Disease (COPD) Flare-Ups: Care Instructions. \" Current as of: May 12, 2017 Content Version: 11.4 © 0546-2598 DesignPax. Care instructions adapted under license by [employer] (which disclaims liability or warranty for this information). If you have questions about a medical condition or this instruction, always ask your healthcare professional. [place] [address] any warranty or liability for your use of this information. Learning About CPAP for Sleep Apnea What is CPAP? CPAP is a small machine that you use at home every night while you sleep. It increases air pressure in your throat to keep your airway open. When you have sleep apnea, this can help you sleep better so you feel much better. CPAP stands for \"continuous positive airway pressure. \" The CPAP machine will have one of the following: · A mask that covers your nose and mouth · Prongs that fit into your nose · A mask that covers your nose only, the most common type. This type is called NCPAP. The N stands for \"nasal.\" Why is it done? CPAP is usually the best treatment for obstructive sleep apnea. It is the first treatment choice and the most widely used. Your doctor may suggest CPAP if you have: · Moderate to severe sleep apnea. · Sleep apnea and coronary artery disease (CAD) or heart failure. How does it help?  
· CPAP can help you have more normal sleep, so you feel less sleepy and more alert during the daytime. · CPAP may help keep heart failure or other heart problems from getting worse. · CPAP may help lower your blood pressure. · If you use CPAP, your bed partner may also sleep better because you are not snoring or restless. What are the side effects? Some people who use CPAP have: · A dry or stuffy nose and a sore throat. · Irritated skin on the face. · Sore eyes. · Bloating. If you have any of these problems, work with your doctor to fix them. Here are some things you can try: · Be sure the mask or nasal prongs fit well. · See if your doctor can adjust the pressure of your CPAP. · If your nose is dry, try a humidifier. · If your nose is runny or stuffy, try decongestant medicine or a steroid nasal spray. Be safe with medicines. Read and follow all instructions on the label. Do not use the medicine longer than the label says. If these things do not help, you might try a different type of machine. Some machines have air pressure that adjusts on its own. Others have air pressures that are different when you breathe in than when you breathe out. This may reduce discomfort caused by too much pressure in your nose. Where can you learn more? Go to http://bhargav-mily.info/. Enter A711 in the search box to learn more about \"Learning About CPAP for Sleep Apnea. \" Current as of: May 12, 2017 Content Version: 11.4 © 4744-8702 Invrep. Care instructions adapted under license by [employer] (which disclaims liability or warranty for this information). If you have questions about a medical condition or this instruction, always ask your healthcare professional. [place] [address] any warranty or liability for your use of this information. DISCHARGE SUMMARY from Nurse PATIENT INSTRUCTIONS: 
 
 
F-face looks uneven A-arms unable to move or move unevenly S-speech slurred or non-existent T-time-call 911 as soon as signs and symptoms begin-DO NOT go Back to bed or wait to see if you get better-TIME IS BRAIN. Warning Signs of HEART ATTACK Call 911 if you have these symptoms: 
? Chest discomfort. Most heart attacks involve discomfort in the center of the chest that lasts more than a few minutes, or that goes away and comes back. It can feel like uncomfortable pressure, squeezing, fullness, or pain. ? Discomfort in other areas of the upper body. Symptoms can include pain or discomfort in one or both arms, the back, neck, jaw, or stomach. ? Shortness of breath with or without chest discomfort. ? Other signs may include breaking out in a cold sweat, nausea, or lightheadedness. Don't wait more than five minutes to call [address]! Fast action can save your life. Calling 911 is almost always the fastest way to get lifesaving treatment. Emergency Medical Services staff can begin treatment when they arrive  up to an hour sooner than if someone gets to the hospital by car. The discharge information has been reviewed with the patient. The patient verbalized understanding. Discharge medications reviewed with the patient and appropriate educational materials and side effects teaching were provided. Patient armband removed and shredded 
 
___________________________________________________________________________________________________________________________________ Introducing [facility]! [employer] introduces [employer]antly patient portal. Now you can access parts of your medical record, email your doctor's office, and request medication refills online. 1. In your internet browser, go to [email or web address] 2. Click on the First Time User? Click Here link in the Sign In box. You will see the New Member Sign Up page. 3. Enter your TicTacTi Access Code exactly as it appears below. You will not need to use this code after youve completed the sign-up process. If you do not sign up before the expiration date, you must request a new code. · TicTacTi Access Code: [email or web address] Expires: 7/26/2018  8:47 AM 
 
4. Enter the last four digits of your Social Security Number (xxxx) and Date of Birth (mm/dd/yyyy) as indicated and click Submit. You will be taken to the next sign-up page. 5. Create a TuneStarst ID. This will be your TicTacTi login ID and cannot be changed, so think of one that is secure and easy to remember. 6. Create a TicTacTi password. You can change your password at any time. 7. Enter your Password Reset Question and Answer. This can be used at a later time if you forget your password. 8. Enter your e-mail address. You will receive e-mail notification when new information is available in [address] 9. Click Sign Up. You can now view and download portions of your medical record. 10. Click the Download Summary menu link to download a portable copy of your medical information. If you have questions, please visit the Frequently Asked Questions section of the TicTacTi website. Remember, TicTacTi is NOT to be used for urgent needs. For medical emergencies, dial 911. Now available from your iPhone and Android! Introducing Antonio Rios As a [employer] patient, I wanted to make you aware of our electronic visit tool called Antonio Jasonludwig. [employer] 24/7 allows you to connect within minutes with a medical provider 24 hours a day, seven days a week via a mobile device or tablet or logging into a secure website from your computer. You can access Antonio Rios from anywhere in the [place].  
 
A virtual visit might be right for you when you have a simple condition and feel like you just dont want to get out of bed, or cant get away from work for an appointment, when your regular [facility] provider is not available (evenings, weekends or holidays), or when youre out of town and need minor care. Electronic visits cost only $49 and if the [employer] 24/7 provider determines a prescription is needed to treat your condition, one can be electronically transmitted to a nearby pharmacy*. Please take a moment to enroll today if you have not already done so. The enrollment process is free and takes just a few minutes. To enroll, please download the [employer] 24/[employer] anabella to your tablet or phone, or visit [email or web address] to enroll on your computer. And, as an [address] [place] [address] patient with a Campaign[employer] account, the results of your visits will be scanned into your electronic medical record and your primary care provider will be able to view the scanned results. We urge you to continue to see your regular [facility] provider for your ongoing medical care. And while your primary care provider may not be the one available when you seek a [employer] virtual visit, the peace of mind you get from getting a real diagnosis real time can be priceless. For more information on [employer], view our Frequently Asked Questions (FAQs) at [email or web address]. Sincerely, 
 
Chyna Zhang MD 
Chief Medical Officer [address] *:  certain medications cannot be prescribed via [employer] Unresulted Labs-Please follow up with your PCP about these lab tests Order Current Status CULTURE, BLOOD Preliminary result CULTURE, BLOOD Preliminary result Providers Seen During Your Hospitalization Provider Specialty Primary office phone Estella Mckinnon DO Emergency Medicine [phone] Roberto Taylor MD Internal Medicine [phone] Your Primary Care Physician (PCP) Primary Care Physician Office Phone Office Fax Mak Hernández Nadeen Garret Esquivel [phone] You are allergic to the following Allergen Reactions Gabapentin Hives Lisinopril Swelling Tramadol Hives Recent Documentation Height Weight BMI Smoking Status 1.676 m (!) 171.1 kg 60.88 kg/m2 Former Smoker Emergency Contacts Name Discharge Info Relation Home Work Mobile Larissa Shahid DISCHARGE CAREGIVER [3] Spouse [3] [phone] Patient Belongings The following personal items are in your possession at time of discharge: 
     Visual Aid: None             Clothing: Undergarments, Pants, Shirt, Footwear    Other Valuables: Buzz 1923 Please provide this summary of care documentation to your next provider. Signatures-by signing, you are acknowledging that this After Visit Summary has been reviewed with you and you have received a copy. Patient Signature:  ____________________________________________________________ Date:  ____________________________________________________________  
  
[facility] Provider Signature:  ____________________________________________________________ Date:  ____________________________________________________________

## 2018-05-11 NOTE — ED NOTES
TRANSFER - OUT REPORT:    Verbal report given to Mary TAPIA RN on Ruben Leong  being transferred to (ICU) for routine progression of care       Report consisted of patients Situation, Background, Assessment and   Recommendations(SBAR). Information from the following report(s) SBAR, ED Summary, [facility] and Recent Results was reviewed with the receiving nurse. Lines:   Peripheral IV 05/11/18 Left Antecubital (Active)   Site Assessment Clean, dry, & intact 5/11/2018  8:07 AM   Phlebitis Assessment 0 5/11/2018  8:07 AM   Infiltration Assessment 0 5/11/2018  8:07 AM   Dressing Status Clean, dry, & intact 5/11/2018  8:07 AM   Dressing Type Transparent 5/11/2018  8:07 AM   Hub Color/Line Status Patent; Flushed 5/11/2018  8:07 AM   Action Taken Blood drawn 5/11/2018  8:07 AM   Alcohol Cap Used Yes 5/11/2018  8:07 AM       Peripheral IV 05/11/18 Right;Posterior Arm (Active)   Site Assessment Clean, dry, & intact 5/11/2018  8:17 AM   Phlebitis Assessment 0 5/11/2018  8:17 AM   Infiltration Assessment 0 5/11/2018  8:17 AM   Dressing Status Clean, dry, & intact 5/11/2018  8:17 AM   Dressing Type Transparent 5/11/2018  8:17 AM   Hub Color/Line Status Patent; Flushed 5/11/2018  8:17 AM   Action Taken Blood drawn 5/11/2018  8:17 AM   Alcohol Cap Used Yes 5/11/2018  8:17 AM        Opportunity for questions and clarification was provided.       Patient transported with:   Monitor  O2 @ 3 liters  Registered Nurse  [facility]

## 2018-05-11 NOTE — ED NOTES
Repeat troponin drawn per orders. Patient on 4LPM via nasal cannula for trial at this time. Patient states he is still feeling short of breath but less so than upon his arrival.     Vital signs stable. Call bell within reach. Will continue to monitor and assess.

## 2018-05-11 NOTE — PROGRESS NOTES
Reason for Readmission: SOB and sharp chest pains             RRAT Score and Risk Level:   23       Level of Readmission: 2         Care Conference scheduled: TBD         Resources/supports as identified by patient/family:   [address] [place] facing patient (as identified by patient/family and CM): Finances/Medication cost?       Transportation        Support system or lack thereof? Living arrangements? Self-care/ADLs/Cognition? Current Advanced Directive/Advance Care Plan:  Not on chart           Plan for utilizing home health:  yes              Likelihood of additional readmission:  TBD             Transition of Care Plan:    Based on readmission, the patient's previous Plan of Care   has been evaluated and/or modified. The current Transition of Care Plan is:    Chart reviewed pt recently discharged 4-26-18 from [facility],cm will cont to review chart and remain for assistance if need to assist with prevention of readmission.

## 2018-05-11 NOTE — ED NOTES
Patient resting on stretcher in position of comfort with eyes closed. No distress noted. Vital signs stable. Patient remains on BiPAP at this time. Patient has no complaints or requests at this time. Call bell within reach of patient. Will continue to monitor and assess.

## 2018-05-11 NOTE — IP AVS SNAPSHOT
[address] [place] [address] [place] [address] 
[phone] Patient: Prem Lambert MRN: [id number] YMS:[birth year] A check brittany indicates which time of day the medication should be taken. My Medications ASK your doctor about these medications Instructions Each Dose to Equal  
 Morning Noon Evening Bedtime  
 albuterol 90 mcg/actuation inhaler Commonly known as:  PROVENTIL HFA, VENTOLIN HFA, PROAIR HFA Your last dose was: Your next dose is: Take 1 Puff by inhalation. 1 puff in am and 1 puff in pm  
 1 Puff  
    
   
   
   
  
 amLODIPine 5 mg tablet Commonly known as:  Haig Severn Your last dose was: Your next dose is: Take  by mouth daily. cloNIDine HCl 0.2 mg tablet Commonly known as:  CATAPRES Your last dose was: Your next dose is: Take 1 Tab by mouth every eight (8) hours. 0.2 mg  
    
   
   
   
  
 fluticasone-vilanterol 100-25 mcg/dose inhaler Commonly known as:  BREO ELLIPTA Your last dose was: Your next dose is: Take 1 Puff by inhalation daily. 1 Puff  
    
   
   
   
  
 furosemide 40 mg tablet Commonly known as:  LASIX Your last dose was: Your next dose is: Take 1 Tab by mouth daily. 40 mg  
    
   
   
   
  
 glipiZIDE 10 mg tablet Commonly known as:  Jaylene Lat Your last dose was: Your next dose is: Take 1 Tab by mouth two (2) times a day. 10 mg  
    
   
   
   
  
 hydrALAZINE 25 mg tablet Commonly known as:  APRESOLINE Your last dose was: Your next dose is: Take 1 Tab by mouth three (3) times daily. 25 mg  
    
   
   
   
  
 predniSONE 10 mg tablet Commonly known as:  Lannis Peter Your last dose was: Your next dose is: 2 tabs daily x 3 days then 1 tab daily x 3 days then 1/2 tab daily x  3 days  
     
   
   
   
  
 spironolactone 25 mg tablet Commonly known as:  ALDACTONE Your last dose was: Your next dose is: Take 25 mg by mouth daily.   
 25 mg

## 2018-05-11 NOTE — ED NOTES
Urine sample obtained from patient. Specimen collected per orders. Patient medicated per MAR orders. Verified order, patient identification, and allergies prior to administration. Call bell within reach of patient. Will continue to monitor and assess.

## 2018-05-12 ENCOUNTER — APPOINTMENT (OUTPATIENT)
Dept: GENERAL RADIOLOGY | Age: 58
DRG: 194 | End: 2018-05-12
Attending: INTERNAL MEDICINE
Payer: MEDICAID

## 2018-05-12 LAB
ANION GAP SERPL CALC-SCNC: 7 MMOL/L (ref 3–18)
BASOPHILS # BLD: 0 K/UL (ref 0–0.06)
BASOPHILS NFR BLD: 0 % (ref 0–2)
BUN SERPL-MCNC: 27 MG/DL (ref 7–18)
BUN/CREAT SERPL: 16 (ref 12–20)
CALCIUM SERPL-MCNC: 9.4 MG/DL (ref 8.5–10.1)
CHLORIDE SERPL-SCNC: 98 MMOL/L (ref 100–108)
CO2 SERPL-SCNC: 29 MMOL/L (ref 21–32)
CREAT SERPL-MCNC: 1.69 MG/DL (ref 0.6–1.3)
DIFFERENTIAL METHOD BLD: ABNORMAL
EOSINOPHIL # BLD: 0 K/UL (ref 0–0.4)
EOSINOPHIL NFR BLD: 0 % (ref 0–5)
ERYTHROCYTE [DISTWIDTH] IN BLOOD BY AUTOMATED COUNT: 15.3 % (ref 11.6–14.5)
GLUCOSE BLD STRIP.AUTO-MCNC: 121 MG/DL (ref 70–110)
GLUCOSE BLD STRIP.AUTO-MCNC: 123 MG/DL (ref 70–110)
GLUCOSE BLD STRIP.AUTO-MCNC: 185 MG/DL (ref 70–110)
GLUCOSE BLD STRIP.AUTO-MCNC: 191 MG/DL (ref 70–110)
GLUCOSE SERPL-MCNC: 147 MG/DL (ref 74–99)
HCT VFR BLD AUTO: 48.6 % (ref 36–48)
HGB BLD-MCNC: 15.4 G/DL (ref 13–16)
LYMPHOCYTES # BLD: 0.4 K/UL (ref 0.9–3.6)
LYMPHOCYTES NFR BLD: 6 % (ref 21–52)
MAGNESIUM SERPL-MCNC: 2.1 MG/DL (ref 1.6–2.6)
MCH RBC QN AUTO: 28.6 PG (ref 24–34)
MCHC RBC AUTO-ENTMCNC: 31.7 G/DL (ref 31–37)
MCV RBC AUTO: 90.3 FL (ref 74–97)
MONOCYTES # BLD: 0.1 K/UL (ref 0.05–1.2)
MONOCYTES NFR BLD: 1 % (ref 3–10)
NEUTS SEG # BLD: 7.1 K/UL (ref 1.8–8)
NEUTS SEG NFR BLD: 93 % (ref 40–73)
PLATELET # BLD AUTO: 136 K/UL (ref 135–420)
PMV BLD AUTO: 12.1 FL (ref 9.2–11.8)
POTASSIUM SERPL-SCNC: 4.6 MMOL/L (ref 3.5–5.5)
RBC # BLD AUTO: 5.38 M/UL (ref 4.7–5.5)
SODIUM SERPL-SCNC: 134 MMOL/L (ref 136–145)
WBC # BLD AUTO: 7.6 K/UL (ref 4.6–13.2)

## 2018-05-12 PROCEDURE — 74011250637 HC RX REV CODE- 250/637: Performed by: HOSPITALIST

## 2018-05-12 PROCEDURE — 74011250636 HC RX REV CODE- 250/636: Performed by: HOSPITALIST

## 2018-05-12 PROCEDURE — 74011250637 HC RX REV CODE- 250/637: Performed by: INTERNAL MEDICINE

## 2018-05-12 PROCEDURE — 83735 ASSAY OF MAGNESIUM: CPT | Performed by: HOSPITALIST

## 2018-05-12 PROCEDURE — 77010033678 HC OXYGEN DAILY

## 2018-05-12 PROCEDURE — 85025 COMPLETE CBC W/AUTO DIFF WBC: CPT | Performed by: HOSPITALIST

## 2018-05-12 PROCEDURE — 71045 X-RAY EXAM CHEST 1 VIEW: CPT

## 2018-05-12 PROCEDURE — 94640 AIRWAY INHALATION TREATMENT: CPT

## 2018-05-12 PROCEDURE — 74011636637 HC RX REV CODE- 636/637: Performed by: HOSPITALIST

## 2018-05-12 PROCEDURE — 36415 COLL VENOUS BLD VENIPUNCTURE: CPT | Performed by: HOSPITALIST

## 2018-05-12 PROCEDURE — 80048 BASIC METABOLIC PNL TOTAL CA: CPT | Performed by: HOSPITALIST

## 2018-05-12 PROCEDURE — 74011250636 HC RX REV CODE- 250/636: Performed by: INTERNAL MEDICINE

## 2018-05-12 PROCEDURE — 65660000000 HC RM CCU STEPDOWN

## 2018-05-12 PROCEDURE — 82962 GLUCOSE BLOOD TEST: CPT

## 2018-05-12 PROCEDURE — 74011000250 HC RX REV CODE- 250: Performed by: HOSPITALIST

## 2018-05-12 PROCEDURE — 97162 PT EVAL MOD COMPLEX 30 MIN: CPT

## 2018-05-12 RX ADMIN — IPRATROPIUM BROMIDE AND ALBUTEROL SULFATE 3 ML: .5; 3 SOLUTION RESPIRATORY (INHALATION) at 19:19

## 2018-05-12 RX ADMIN — INSULIN LISPRO 2 UNITS: 100 INJECTION, SOLUTION INTRAVENOUS; SUBCUTANEOUS at 21:54

## 2018-05-12 RX ADMIN — SPIRONOLACTONE 25 MG: 25 TABLET, FILM COATED ORAL at 08:02

## 2018-05-12 RX ADMIN — IPRATROPIUM BROMIDE AND ALBUTEROL SULFATE 3 ML: .5; 3 SOLUTION RESPIRATORY (INHALATION) at 07:15

## 2018-05-12 RX ADMIN — BUDESONIDE 500 MCG: 0.5 INHALANT RESPIRATORY (INHALATION) at 19:19

## 2018-05-12 RX ADMIN — HEPARIN SODIUM 5000 UNITS: 5000 INJECTION, SOLUTION INTRAVENOUS; SUBCUTANEOUS at 06:17

## 2018-05-12 RX ADMIN — INSULIN LISPRO 2 UNITS: 100 INJECTION, SOLUTION INTRAVENOUS; SUBCUTANEOUS at 16:52

## 2018-05-12 RX ADMIN — BUDESONIDE 500 MCG: 0.5 INHALANT RESPIRATORY (INHALATION) at 07:15

## 2018-05-12 RX ADMIN — ARFORMOTEROL TARTRATE 15 MCG: 15 SOLUTION RESPIRATORY (INHALATION) at 07:15

## 2018-05-12 RX ADMIN — CLONIDINE HYDROCHLORIDE 0.2 MG: 0.1 TABLET ORAL at 14:16

## 2018-05-12 RX ADMIN — IPRATROPIUM BROMIDE AND ALBUTEROL SULFATE 3 ML: .5; 3 SOLUTION RESPIRATORY (INHALATION) at 11:50

## 2018-05-12 RX ADMIN — DOCUSATE SODIUM 100 MG: 100 CAPSULE, LIQUID FILLED ORAL at 08:01

## 2018-05-12 RX ADMIN — ARFORMOTEROL TARTRATE 15 MCG: 15 SOLUTION RESPIRATORY (INHALATION) at 19:19

## 2018-05-12 RX ADMIN — LEVOFLOXACIN 500 MG: 5 INJECTION, SOLUTION INTRAVENOUS at 16:52

## 2018-05-12 RX ADMIN — DOCUSATE SODIUM 100 MG: 100 CAPSULE, LIQUID FILLED ORAL at 21:53

## 2018-05-12 RX ADMIN — HEPARIN SODIUM 5000 UNITS: 5000 INJECTION, SOLUTION INTRAVENOUS; SUBCUTANEOUS at 21:53

## 2018-05-12 RX ADMIN — HYDRALAZINE HYDROCHLORIDE 50 MG: 50 TABLET, FILM COATED ORAL at 08:01

## 2018-05-12 RX ADMIN — HYDRALAZINE HYDROCHLORIDE 50 MG: 50 TABLET, FILM COATED ORAL at 21:53

## 2018-05-12 RX ADMIN — HYDRALAZINE HYDROCHLORIDE 50 MG: 50 TABLET, FILM COATED ORAL at 16:52

## 2018-05-12 RX ADMIN — FUROSEMIDE 40 MG: 10 INJECTION, SOLUTION INTRAMUSCULAR; INTRAVENOUS at 08:01

## 2018-05-12 RX ADMIN — FUROSEMIDE 40 MG: 10 INJECTION, SOLUTION INTRAMUSCULAR; INTRAVENOUS at 21:53

## 2018-05-12 RX ADMIN — METHYLPREDNISOLONE SODIUM SUCCINATE 40 MG: 40 INJECTION, POWDER, FOR SOLUTION INTRAMUSCULAR; INTRAVENOUS at 21:53

## 2018-05-12 RX ADMIN — CLONIDINE HYDROCHLORIDE 0.2 MG: 0.1 TABLET ORAL at 21:53

## 2018-05-12 RX ADMIN — METHYLPREDNISOLONE SODIUM SUCCINATE 60 MG: 125 INJECTION, POWDER, FOR SOLUTION INTRAMUSCULAR; INTRAVENOUS at 08:01

## 2018-05-12 RX ADMIN — CLONIDINE HYDROCHLORIDE 0.2 MG: 0.1 TABLET ORAL at 06:17

## 2018-05-12 RX ADMIN — PANTOPRAZOLE SODIUM 40 MG: 40 TABLET, DELAYED RELEASE ORAL at 08:01

## 2018-05-12 RX ADMIN — AMLODIPINE BESYLATE 5 MG: 5 TABLET ORAL at 08:02

## 2018-05-12 RX ADMIN — HEPARIN SODIUM 5000 UNITS: 5000 INJECTION, SOLUTION INTRAVENOUS; SUBCUTANEOUS at 13:49

## 2018-05-12 RX ADMIN — IPRATROPIUM BROMIDE AND ALBUTEROL SULFATE 3 ML: .5; 3 SOLUTION RESPIRATORY (INHALATION) at 02:16

## 2018-05-12 NOTE — PROGRESS NOTES
0730-Bedside and Verbal shift change report given to Rolo Chang RN (oncoming nurse) by Wisam Ortiz RN (offgoing nurse). Report included the following information Kardex, Intake/Output, MAR, Recent Results and Cardiac Rhythm SR. Initial shift assessment complete. NAD. Patient refused BIPAP overnight per off going RN. Will continue to monitor. 1200- Reassessment complete, no changes to previous. 1445- TRANSFER - OUT REPORT:    Verbal report given to [facility] RN(name) on Prashant Baxter  being transferred to (unit) for routine progression of care       Report consisted of patients Situation, Background, Assessment and   Recommendations(SBAR). Information from the following report(s) SBAR, Kardex, Intake/Output, MAR, Recent Results and Cardiac Rhythm SR was reviewed with the receiving nurse. Lines:   Peripheral IV 05/11/18 Left Antecubital (Active)   Site Assessment Clean, dry, & intact 5/12/2018 11:55 AM   Phlebitis Assessment 0 5/12/2018 11:55 AM   Infiltration Assessment 0 5/12/2018 11:55 AM   Dressing Status Clean, dry, & intact 5/12/2018 11:55 AM   Dressing Type Transparent 5/12/2018 11:55 AM   Hub Color/Line Status Capped 5/12/2018 11:55 AM   Action Taken Open ports on tubing capped 5/12/2018 11:55 AM   Alcohol Cap Used Yes 5/12/2018 11:55 AM        Opportunity for questions and clarification was provided. Patient transported with:   Monitor  O2 @ 3 liters  Registered Nurse     Dual skin assessment complete upon transfer out as well as telemetry strip dual checked and signed off with receiving RN.

## 2018-05-12 NOTE — PROGRESS NOTES
Admitted from ER via [facility]. Initial assessment completed. AAOX4. On n/c @ 4l/m w/out sob. Afebrile. Monitor shows nsr w/out ectopy. Dr Chasidy Reyna visited w/ new orders. Dr Daisy Mazariegos also visited. No c/o of pain     1700- VS stable . No sob. Monitor no changed. Asking for more foods & crackers. .    1800- Condition no changed during the shift. To notify MD for bs 385 & covered w/ 10 units per protocoll. 1915- Notified Dr Ramiro Reveles of bs w/out new order. Bedside and Verbal shift change report given to  [address] (oncoming nurse) by  Tova Farah RN (offgoing nurse). Report included the following information SBAR, Kardex, ED Summary, Intake/Output, MAR and Recent Results.

## 2018-05-12 NOTE — PROGRESS NOTES
Problem: Falls - Risk of  Goal: *Absence of Falls  Document Richard Fall Risk and appropriate interventions in the flowsheet.    Outcome: Progressing Towards Goal  Fall Risk Interventions:            Medication Interventions: Bed/chair exit alarm, Patient to call before getting OOB

## 2018-05-12 NOTE — PROGRESS NOTES
Hospitalist Progress Note    Patient: Mercedes Gallegos MRN: [id number]  CSN: [id number]    YOB: 1960  Age: 59 y.o. Sex: male    DOA: 5/11/2018 LOS:  LOS: 1 day            Assessment/Plan     Principal Problem:    Acute respiratory distress (5/11/2018)    Active Problems:    COPD exacerbation ([address] (2/19/2013)      Type II diabetes mellitus with peripheral autonomic neuropathy ([address] (2/19/2013)      JIM (obstructive sleep apnea) (2/19/2013)      Morbid obesity ([address] (2/19/2013)      Acute on chronic combined systolic and diastolic ACC/AHA stage C congestive heart failure ([address] (1/25/2018)      Cocaine abuse (4/22/2018)      SOB (shortness of breath) (5/11/2018)      Hypertensive emergency (5/11/2018)      Acute on chronic respiratory failure: Multi risks, Cocaine use, CHF exac, COPD exac, JIM, noncompliance. On BIPAP, f/u ABG. Discussed the case with Dr. Chasidy Reyna    Acute on chronic systolic and diastolic CHF:  IV lasix 40 mg BID. Last echo was done in March, 2018. EF 45 and diastolic dysfunction  Increased filling pressure     Cocaine abuse: Pt admit that he did use it. Consult the pt for substance abuse, high risks for cardiac death.      COPD exac: On Levaquin. Taper steroids, continue with neb treatment.      Uncontrolled DM:  on SSI      HTN: On lasix and hydralazine prn, lasix and norvasc, monitor BP.      JIM: On BIPAP      BETHANY: monitor renal function in the setting of diuresis      Morbid obesity/BMI 55     High risks for readmission 2nd to cocaine abuse, CHF exac, COPD exac, Obese and non compliance with medical regimen      DVT prophylaxis     Poor prognosis     Full code    Transfer to Tele today    CC:    Acute respiratory failure, COPD exac, chronic systolic/diastolic CHF, JIM, DM, hx of cocaine abuse, HTN  Subjective:     Pt was seen and examined with the nurse in the morning round. Feeling better. Review of systems  General: No fevers or chills.   Cardiovascular: No chest pain or pressure. No palpitations. Pulmonary: No cough, + SOB  Gastrointestinal: No nausea, vomiting. Objective:      Visit Vitals    /67 (BP 1 Location: Right arm, BP Patient Position: At rest)    Pulse 81    Temp 97.8 °F (36.6 °C)    Resp 18    Ht 5' 6\" (1.676 m)    Wt (!) 165.6 kg (365 lb)    SpO2 96%    BMI 58.91 kg/m2       Physical Exam:    Gen: NAD, obese. Heent:  MMM, NC, AT. Cor: s1s2 RRR. No MRG. PMI mid 5th intercostal space. Resp:  Decreased BS b/l  Abd:  NT ND.  BS positive. No rebound or guarding. No masses. Ext: 1+ edema. Intake and Output:  Current Shift:  05/12 0701 - 05/12 1900  In: 400 [P.O.:400]  Out: 2125 [Urine:2125]  Last three shifts:  05/10 1901 - 05/12 0700  In: 1240 [P.O.:1240]  Out: 2625 [Urine:2625]    Labs: Results:       Chemistry Recent Labs      05/12/18   0530  05/11/18   0806   GLU  147*  128*   NA  134*  137   K  4.6  4.1   CL  98*  101   CO2  29  29   BUN  27*  13   CREA  1.69*  1.76*   CA  9.4  9.1   AGAP  7  7   BUCR  16  7*   AP   --   85   TP   --   7.4   ALB   --   3.4   GLOB   --   4.0   AGRAT   --   0.9      CBC w/Diff Recent Labs      05/12/18   0530  05/11/18   0806   WBC  7.6  6.4   RBC  5.38  5.39   HGB  15.4  15.7   HCT  48.6*  49.2*   PLT  136  153   GRANS  93*  73   LYMPH  6*  19*   EOS  0  2      Cardiac Enzymes Recent Labs      05/11/18   0806   CPK  217   CKND1  2.2      Coagulation No results for input(s): PTP, INR, APTT in the last 72 hours. No lab exists for component: INREXT    Lipid Panel Lab Results   Component Value Date/Time    Cholesterol, total 196 01/25/2018 02:51 AM    HDL Cholesterol 64 (H) 01/25/2018 02:51 AM    LDL, calculated 121.2 (H) 01/25/2018 02:51 AM    VLDL, calculated 10.8 01/25/2018 02:51 AM    Triglyceride 54 01/25/2018 02:51 AM    CHOL/HDL Ratio 3.1 01/25/2018 02:51 AM      BNP No results for input(s): BNPP in the last 72 hours.    Liver Enzymes Recent Labs      05/11/18   0806   TP  7.4   ALB  3.4   AP  85 SGOT  18      Thyroid Studies Lab Results   Component Value Date/Time    TSH 0.52 05/11/2018 08:06 AM        Procedures/imaging: see electronic medical records for all procedures/Xrays and details which were not copied into this note but were reviewed prior to creation of Plan      Medications Reviewed  Ruddy Evans MD
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
## 2018-05-12 NOTE — PROGRESS NOTES
Assumed care of pt. Pt is alert no sign of distress. 1830: pt resting no sign of distress. Pt had uneventful shift. Pt did refuse breathing tx due to meal coming.

## 2018-05-12 NOTE — PROGRESS NOTES
1930- Report and care received, assessment completed per flow sheet. Uncooperative, argumentative, requesting 2 frozen meals, 8 charlie crackers, and 2 juices. Education completed regarding hyperglycemia and concerns regarding associated complications. Remains argumentative, however, agrees to 6 charlie crackers, diet soda, and insists on 2 frozen healthy choices but willing to space out at 2100 and 0100. Removing and rearranging wires, restless, denies anxiety. Respirations unlabored. 2300- Reassessment completed and without change. Refuses bipap at this time, states he'll wear it later. 0200- Continues to refuse bipap despite encouragement. 0400- Reassessment completed and without change.

## 2018-05-12 NOTE — PROGRESS NOTES
Sandy Gray M.D  [address] [address] [address]  Phone [phone]   Fax [phone]    Pulmonary Critical Care & Sleep Medicine         Name: Mamie Cartagena MRN: [id number]   : 1960 Hospital: [facility] [place]   Date: 2018  Room: [facility]           IMPRESSION:   Active Problems:    COPD exacerbation ([address] (2013)      Type II diabetes mellitus with peripheral autonomic neuropathy ([address] (2013)      JIM (obstructive sleep apnea) (2013)      Morbid obesity ([address] (2013)      Acute on chronic combined systolic and diastolic ACC/AHA stage C congestive heart failure ([address] (2018)      Cocaine abuse (2018)      SOB (shortness of breath) (2018)      Acute respiratory distress (2018)      Hypertensive emergency (2018)  Acute on chronic renal failure improving  Elevated blood sugars       RECOMMENDATIONS:  -- Continue lasix  -- Decrease steroids   -- Yesterday recheck abg on NC was ok no major change today  -- Overall doing well ok to transfer out of ICU  -- DW patient about compliance,     -- Non compliant patient most likely has Cocaine induced HTN and Fluid overload with wheezing and respiratory distress  -- Troponin is coming down  -- Recheck TSH is normal  -- Sputum culture-- Pending   -- RPT xray in am is more or less same   · Pulm: stable respirations; continue BIPAP nightly for now as tolerated; on bronchodilators and steroids  · Cardiac: BP control-clonidine patch; prn iv hydralazine; avoid betablockers due to cocaine use; lasix bid for now; watch renal fn  · ID: empiric levaquin x 5 days  · Renal: watch IOs and renal fn  · GI: no active issues  · Neuro: stable  · Hem: watch platelets;  Hb stable  · Endo: watch BG; SSI   · Nutrition: Oral diet   · Proph:  DVt and GI proph reviewed  · D/w patient - advised not to use cocaine-risks for MI, stroke, bleeding complications  Will defer respective systems problem management to primary and other consultant and follow patient in ICU with primary and other medical team  Further recommendations will be based on the patient's response to recommended treatment and results of the investigation ordered. Quality Care: PPI, DVT prophylaxis, HOB elevated, Infection control all reviewed and addressed. PAIN AND SEDATION: none  · Skin/Wound: no issues  · Nutrition: oral diet   · Prophylaxis: DVT / GI Prophylaxis addressed. · Restraints: none  · PT/OT eval and treat: as needed  · Lines/Tubes: PIVs only   ADVANCE DIRECTIVE: Full code   CC time 34 min      High complexity decision making was performed in this consultation and evaluation of this patient    Subjective:  5/12/18  Doing well  Did not wear bipap last night  Still complains of cough and wheezing  Overall doing well sitting up in chair     This patient has been seen and evaluated at the request of Dr.R Jose Acevedo for shortness of breath. Patient is a 59 y. o.AA male with hx of CHF, COPD, DM type 2, and suspected sleep apnea. He is a non-compliant person. He needed BIPAP in the ER. His BP was 180/128 in ER. He has responded to BP control and lasix. UDS +ve for cocaine. Patient awake, alert, on nc o2. He has no chest pains or hemoptysis. Breathing is better. Cough less-non-productive. He has no headaches or vomiting.      Current Facility-Administered Medications   Medication Dose Route Frequency    methylPREDNISolone (PF) (SOLU-MEDROL) injection 40 mg  40 mg IntraVENous Q12H    cloNIDine HCl (CATAPRES) tablet 0.2 mg  0.2 mg Oral Q8H    amLODIPine (NORVASC) tablet 5 mg  5 mg Oral DAILY    budesonide (PULMICORT) 500 mcg/2 ml nebulizer suspension  500 mcg Nebulization BID RT    arformoterol (BROVANA) neb solution 15 mcg  15 mcg Nebulization BID RT    spironolactone (ALDACTONE) tablet 25 mg  25 mg Oral DAILY    docusate sodium (COLACE) capsule 100 mg  100 mg Oral BID    heparin (porcine) injection 5,000 Units  5,000 Units SubCUTAneous Q8H    albuterol-ipratropium (DUO-NEB) 2.5 MG-0.5 MG/3 ML  3 mL Nebulization Q4H RT    insulin lispro (HUMALOG) injection   SubCUTAneous AC&HS    levoFLOXacin (LEVAQUIN) 500 mg in D5W IVPB  500 mg IntraVENous Q24H    furosemide (LASIX) injection 40 mg  40 mg IntraVENous BID    nitroglycerin (NITRODUR) 0.2 mg/hr patch 1 Patch  1 Patch TransDERmal DAILY    pantoprazole (PROTONIX) tablet 40 mg  40 mg Oral ACB    hydrALAZINE (APRESOLINE) tablet 50 mg  50 mg Oral TID       Latest lactic acid:   Lactic acid   Date Value Ref Range Status   2018 0.7 0.4 - 2.0 MMOL/L Final       Objective:   Vital Signs:    Visit Vitals    /74    Pulse 63    Temp 98.4 °F (36.9 °C)    Resp 23    Ht 5' 6\" (1.676 m)    Wt (!) 165.6 kg (365 lb)    SpO2 98%    BMI 58.91 kg/m2       O2 Device: Nasal cannula   O2 Flow Rate (L/min): 3 l/min   Temp (24hrs), Av.1 °F (36.7 °C), Min:97.6 °F (36.4 °C), Max:98.4 °F (36.9 °C)       Intake/Output:   Last shift:       07 - 1900  In: 400 [P.O.:400]  Out: 1400 [Urine:1400]  Last 3 shifts: 05/10 190 -  0700  In: 1240 [P.O.:1240]  Out: 2625 [Urine:2625]    Intake/Output Summary (Last 24 hours) at 18 1112  Last data filed at 18 1034   Gross per 24 hour   Intake             1640 ml   Output             4025 ml   Net            -2385 ml         Physical Exam:   Comfortable; on nc o2; acyanotic  HEENT: pupils not dilated, reactive, no scleral jaundice, moist oral mucosa, no nasal drainage; neck supple  Neck: No adenopathy or thyroid swelling  CVS: S1S2 no murmurs; JVD not elevated  RS: Mod air entry bilaterally, decreased BS at bases with mild crackles, no wheezes   Abd: soft, non tender, no hepatosplenomegaly, no abd distension, no guarding or rigidity, bowel sounds heard  Neuro: awake, alert, moving all extremities  Extrm: mild bilateral pitting leg edema, no swelling or clubbing  Skin: no rash  Lymphatic: no cervical or supraclavicular adenopathy    Telemetry: normal sinus rhythm      Data review:     CBC w/Diff Recent Labs      05/12/18   0530  05/11/18   0806   WBC  7.6  6.4   RBC  5.38  5.39   HGB  15.4  15.7   HCT  48.6*  49.2*   PLT  136  153   GRANS  93*  73   LYMPH  6*  19*   EOS  0  2        Chemistry Recent Labs      05/12/18   0530  05/11/18   0806   GLU  147*  128*   NA  134*  137   K  4.6  4.1   CL  98*  101   CO2  29  29   BUN  27*  13   CREA  1.69*  1.76*   CA  9.4  9.1   MG  2.1   --    AGAP  7  7   BUCR  16  7*   AP   --   85   TP   --   7.4   ALB   --   3.4   GLOB   --   4.0   AGRAT   --   0.9        Lactic Acid Lactic acid   Date Value Ref Range Status   04/22/2018 0.7 0.4 - 2.0 MMOL/L Final     No results for input(s): LAC in the last 72 hours. Micro  Recent Labs      05/11/18   0816  05/11/18   0806   CULT  NO GROWTH 1 DAY  NO GROWTH 1 DAY     Recent Labs      05/11/18   0816  05/11/18   0806   CULT  NO GROWTH 1 DAY  NO GROWTH 1 DAY        ABG Recent Labs      05/11/18   1540  05/11/18   0819   PHI  7.428  7.377   PCO2I  43.1  52.8*   PO2I  86  83   HCO3I  28.5*  31.0*   FIO2I  36  28        Liver Enzymes Protein, total   Date Value Ref Range Status   05/11/2018 7.4 6.4 - 8.2 g/dL Final     Albumin   Date Value Ref Range Status   05/11/2018 3.4 3.4 - 5.0 g/dL Final     Globulin   Date Value Ref Range Status   05/11/2018 4.0 2.0 - 4.0 g/dL Final     A-G Ratio   Date Value Ref Range Status   05/11/2018 0.9 0.8 - 1.7   Final     AST (SGOT)   Date Value Ref Range Status   05/11/2018 18 15 - 37 U/L Final     Alk.  phosphatase   Date Value Ref Range Status   05/11/2018 85 45 - 117 U/L Final     Recent Labs      05/11/18   0806   TP  7.4   ALB  3.4   GLOB  4.0   AGRAT  0.9   SGOT  18   AP  85        Cardiac Enzymes Lab Results   Component Value Date/Time    TROIQ 0.02 05/11/2018 12:15 PM        BNP No results found for: BNP, BNPP, XBNPT     Coagulation No results for input(s): PTP, INR, APTT in the last 72 hours.    No lab exists for component: INREXT, INREXT      Thyroid  Lab Results   Component Value Date/Time    TSH 0.52 05/11/2018 08:06 AM          Lipid Panel Lab Results   Component Value Date/Time    Cholesterol, total 196 01/25/2018 02:51 AM    HDL Cholesterol 64 (H) 01/25/2018 02:51 AM    LDL, calculated 121.2 (H) 01/25/2018 02:51 AM    VLDL, calculated 10.8 01/25/2018 02:51 AM    Triglyceride 54 01/25/2018 02:51 AM    CHOL/HDL Ratio 3.1 01/25/2018 02:51 AM          Urinalysis Lab Results   Component Value Date/Time    Color DARK YELLOW 05/11/2018 01:20 PM    Appearance CLEAR 05/11/2018 01:20 PM    Specific gravity >1.030 (H) 05/11/2018 01:20 PM    pH (UA) 5.0 05/11/2018 01:20 PM    Protein 100 (A) 05/11/2018 01:20 PM    Glucose NEGATIVE  05/11/2018 01:20 PM    Ketone TRACE (A) 05/11/2018 01:20 PM    Bilirubin SMALL (A) 05/11/2018 01:20 PM    Urobilinogen 1.0 05/11/2018 01:20 PM    Nitrites NEGATIVE  05/11/2018 01:20 PM    Leukocyte Esterase NEGATIVE  05/11/2018 01:20 PM    Epithelial cells FEW 05/11/2018 01:20 PM    Bacteria FEW (A) 05/11/2018 01:20 PM    WBC 0 to 1 05/11/2018 01:20 PM    RBC 0 to 1 05/11/2018 01:20 PM        XR (Most Recent). CXR reviewed by me and compared with previous CXR   Results from Hospital Encounter encounter on 05/11/18   XR CHEST PORT   Narrative Chest, single view    Indication: Cough, dyspnea, chest pain, tachycardia. Comparison: Several prior exams, most recently 4/23/2018    Findings:  Portable upright AP view of the chest was obtained. Lungs are mildly  underexpanded. Similar degree of mild central vascular congestion noted. No  pneumothorax or pleural effusion. Mild interval increase in left retrocardiac  opacity noted. Cardiac size is mildly enlarged, unchanged. No acute osseous  abnormality. Impression Impression:    1. Underexpanded lungs with left retrocardiac atelectasis or airspace disease. Atelectasis is favored given the low lung volumes.   2. Moderate cardiac enlargement and central vascular congestion, similar to  prior. CT (Most Recent)   Results from Hospital Encounter encounter on 06/15/11   CTA CHEST W AND W/O CONTRAST   Narrative Ordering MD: Gilbert Nunez MD  Signed By: Priscila King MD  ** FINAL **  ---------------------------------------------------------------------  PROCEDURE DATE:  Adonis 15 2011  3:17AM    Accession Number:  [id number]  Order No:   [id number]  Procedure:   CTS - CTA CHEST W/WO CONTRAST  CPT Code:   88200  Reason:   SHORTNESS OF BREATH  INTERPRETATION:  CTA CHEST  INDICATION: Difficulty breathing, shortness of breath  TECHNIQUE: THIN SECTION IMAGING WAS PERFORMED OF THE THORAX AFTER IV   CONTRAST WAS GIVEN AT A HIGH RATE OF ADMINISTRATION. MULTIPLE   WINDOWS WERE USED. CORONAL AND SAGITTAL REFORMATIONS WERE   PERFORMED. POST PROCESSING IMAGES INCLUDE MULTIPLANAR  REFORMATTED   IMAGES IN SAGGITAL AND CORONAL PLANES (MPR) UTILIZING MAXIMUM   INTENSITY PROJECTION (MIP). COMPARISON:  None   FINDINGS:  Pulmonary arteries:  No filling defects to suggest pulmonary   embolus. Significant motion artifact from a patient breathing   obscures the distal pulmonary arteries, tiny distal PD not seen but   not excluded. Aorta:  No evidence of aortic dissection. Mediastinum:  No acute abnormalities. Heart  slightly enlarged,   pericardium normal.  Lungs: Dependent changes, respiratory motion, lungs appear clear. Visualized upper abdomen:  No abnormalities. CORONAL and SAGITTAL REFORMATIONS  No filling defects are seen within the pulmonary arteries to   suspect pulmonary embolus . IMPRESSION:  1. No large central pulmonary embolism, significant motion artifact   would obscure a tiny distal embolus, none seen but not entirely   excluded. Note: Preliminary report provided by [place] at the time   of this examination. EKG No results found for this or any previous visit.      ECHO   ECHO March 2018  SUMMARY:  Left ventricle: Size was at the upper limits of normal. Systolic function was  moderately reduced by EF (biplane method  of disks). Ejection fraction was estimated to be 45 % in the range of 40 % to 50 %. There was moderate diffuse  hypokinesis. There was moderate concentric hypertrophy. Doppler parameters were consistent with abnormal left  ventricular relaxation (grade 1 diastolic dysfunction). Doppler parameters were consistent with elevated ventricular  end-diastolic filling pressure. Right ventricle: The size was at the upper limits of normal. Systolic function was normal.  Left atrium: The atrium was mildly dilated. Right atrium: The atrium was mildly dilated. Mitral valve: There was mild regurgitation. Aortic valve: The valve was trileaflet. Leaflets exhibited normal thickness, mild calcification, normal cuspal  separation, and sclerosis without stenosis. INDICATIONS: Shortness of breath.                  Lili Martin MD

## 2018-05-12 NOTE — ROUTINE PROCESS
Bedside shift change report given to [address] [place] [address] (oncoming nurse) by Jenae Christina RN (offgoing nurse). Report included the following information SBAR, Kardex and MAR.

## 2018-05-12 NOTE — PROGRESS NOTES
TRANSFER - IN REPORT:    Verbal report received from [address] [place] [address] Ne RN(name) on Anitha Rothman  being received from ICU(unit) for routine progression of care      Report consisted of patients Situation, Background, Assessment and   Recommendations(SBAR). Information from the following report(s) SBAR, Kardex and MAR was reviewed with the receiving nurse. Opportunity for questions and clarification was provided. Assessment completed upon patients arrival to unit and care assumed.

## 2018-05-12 NOTE — ROUTINE PROCESS
Primary Nurse Meli Latif, RN and Grayson Mittal RN, RN performed a dual skin assessment on this patient No impairment noted  Axel score is 22

## 2018-05-13 LAB
ANION GAP SERPL CALC-SCNC: 5 MMOL/L (ref 3–18)
ATRIAL RATE: 88 BPM
BASOPHILS # BLD: 0 K/UL (ref 0–0.1)
BASOPHILS NFR BLD: 0 % (ref 0–3)
BUN SERPL-MCNC: 30 MG/DL (ref 7–18)
BUN/CREAT SERPL: 18 (ref 12–20)
CALCIUM SERPL-MCNC: 9.5 MG/DL (ref 8.5–10.1)
CALCULATED P AXIS, ECG09: 68 DEGREES
CALCULATED R AXIS, ECG10: -20 DEGREES
CALCULATED T AXIS, ECG11: 107 DEGREES
CHLORIDE SERPL-SCNC: 99 MMOL/L (ref 100–108)
CO2 SERPL-SCNC: 30 MMOL/L (ref 21–32)
CREAT SERPL-MCNC: 1.7 MG/DL (ref 0.6–1.3)
DIAGNOSIS, 93000: NORMAL
DIFFERENTIAL METHOD BLD: ABNORMAL
EOSINOPHIL # BLD: 0 K/UL (ref 0–0.4)
EOSINOPHIL NFR BLD: 0 % (ref 0–5)
ERYTHROCYTE [DISTWIDTH] IN BLOOD BY AUTOMATED COUNT: 15.3 % (ref 11.6–14.5)
GLUCOSE BLD STRIP.AUTO-MCNC: 132 MG/DL (ref 70–110)
GLUCOSE BLD STRIP.AUTO-MCNC: 182 MG/DL (ref 70–110)
GLUCOSE BLD STRIP.AUTO-MCNC: 190 MG/DL (ref 70–110)
GLUCOSE BLD STRIP.AUTO-MCNC: 192 MG/DL (ref 70–110)
GLUCOSE BLD STRIP.AUTO-MCNC: 195 MG/DL (ref 70–110)
GLUCOSE SERPL-MCNC: 156 MG/DL (ref 74–99)
HCT VFR BLD AUTO: 49.2 % (ref 36–48)
HGB BLD-MCNC: 15.5 G/DL (ref 13–16)
LYMPHOCYTES # BLD: 0.3 K/UL (ref 0.8–3.5)
LYMPHOCYTES NFR BLD: 3 % (ref 20–51)
MAGNESIUM SERPL-MCNC: 2.3 MG/DL (ref 1.6–2.6)
MCH RBC QN AUTO: 28.7 PG (ref 24–34)
MCHC RBC AUTO-ENTMCNC: 31.5 G/DL (ref 31–37)
MCV RBC AUTO: 91.1 FL (ref 74–97)
MONOCYTES # BLD: 0.4 K/UL (ref 0–1)
MONOCYTES NFR BLD: 4 % (ref 2–9)
NEUTS SEG # BLD: 8.7 K/UL (ref 1.8–8)
NEUTS SEG NFR BLD: 93 % (ref 42–75)
P-R INTERVAL, ECG05: 156 MS
PLATELET # BLD AUTO: 158 K/UL (ref 135–420)
PLATELET COMMENTS,PCOM: ABNORMAL
POTASSIUM SERPL-SCNC: 5 MMOL/L (ref 3.5–5.5)
Q-T INTERVAL, ECG07: 408 MS
QRS DURATION, ECG06: 98 MS
QTC CALCULATION (BEZET), ECG08: 493 MS
RBC # BLD AUTO: 5.4 M/UL (ref 4.7–5.5)
RBC MORPH BLD: ABNORMAL
SODIUM SERPL-SCNC: 134 MMOL/L (ref 136–145)
VENTRICULAR RATE, ECG03: 88 BPM
WBC # BLD AUTO: 9.4 K/UL (ref 4.6–13.2)

## 2018-05-13 PROCEDURE — 74011250636 HC RX REV CODE- 250/636: Performed by: INTERNAL MEDICINE

## 2018-05-13 PROCEDURE — 97110 THERAPEUTIC EXERCISES: CPT

## 2018-05-13 PROCEDURE — 74011250637 HC RX REV CODE- 250/637: Performed by: INTERNAL MEDICINE

## 2018-05-13 PROCEDURE — 83735 ASSAY OF MAGNESIUM: CPT | Performed by: HOSPITALIST

## 2018-05-13 PROCEDURE — 80048 BASIC METABOLIC PNL TOTAL CA: CPT | Performed by: HOSPITALIST

## 2018-05-13 PROCEDURE — 74011250637 HC RX REV CODE- 250/637: Performed by: HOSPITALIST

## 2018-05-13 PROCEDURE — 74011000250 HC RX REV CODE- 250: Performed by: HOSPITALIST

## 2018-05-13 PROCEDURE — 85025 COMPLETE CBC W/AUTO DIFF WBC: CPT | Performed by: HOSPITALIST

## 2018-05-13 PROCEDURE — 82962 GLUCOSE BLOOD TEST: CPT

## 2018-05-13 PROCEDURE — 74011636637 HC RX REV CODE- 636/637: Performed by: HOSPITALIST

## 2018-05-13 PROCEDURE — 36415 COLL VENOUS BLD VENIPUNCTURE: CPT | Performed by: HOSPITALIST

## 2018-05-13 PROCEDURE — 94640 AIRWAY INHALATION TREATMENT: CPT

## 2018-05-13 PROCEDURE — 77010033678 HC OXYGEN DAILY

## 2018-05-13 PROCEDURE — 94760 N-INVAS EAR/PLS OXIMETRY 1: CPT

## 2018-05-13 PROCEDURE — 74011250636 HC RX REV CODE- 250/636: Performed by: HOSPITALIST

## 2018-05-13 PROCEDURE — 65660000000 HC RM CCU STEPDOWN

## 2018-05-13 RX ADMIN — AMLODIPINE BESYLATE 5 MG: 5 TABLET ORAL at 08:07

## 2018-05-13 RX ADMIN — SPIRONOLACTONE 25 MG: 25 TABLET, FILM COATED ORAL at 08:07

## 2018-05-13 RX ADMIN — HEPARIN SODIUM 5000 UNITS: 5000 INJECTION, SOLUTION INTRAVENOUS; SUBCUTANEOUS at 06:43

## 2018-05-13 RX ADMIN — DOCUSATE SODIUM 100 MG: 100 CAPSULE, LIQUID FILLED ORAL at 22:03

## 2018-05-13 RX ADMIN — IPRATROPIUM BROMIDE AND ALBUTEROL SULFATE 3 ML: .5; 3 SOLUTION RESPIRATORY (INHALATION) at 15:38

## 2018-05-13 RX ADMIN — DOCUSATE SODIUM 100 MG: 100 CAPSULE, LIQUID FILLED ORAL at 08:07

## 2018-05-13 RX ADMIN — BUDESONIDE 500 MCG: 0.5 INHALANT RESPIRATORY (INHALATION) at 08:36

## 2018-05-13 RX ADMIN — FUROSEMIDE 40 MG: 10 INJECTION, SOLUTION INTRAMUSCULAR; INTRAVENOUS at 22:02

## 2018-05-13 RX ADMIN — BUDESONIDE 500 MCG: 0.5 INHALANT RESPIRATORY (INHALATION) at 20:00

## 2018-05-13 RX ADMIN — CLONIDINE HYDROCHLORIDE 0.2 MG: 0.1 TABLET ORAL at 22:03

## 2018-05-13 RX ADMIN — INSULIN LISPRO 2 UNITS: 100 INJECTION, SOLUTION INTRAVENOUS; SUBCUTANEOUS at 06:42

## 2018-05-13 RX ADMIN — IPRATROPIUM BROMIDE AND ALBUTEROL SULFATE 3 ML: .5; 3 SOLUTION RESPIRATORY (INHALATION) at 11:42

## 2018-05-13 RX ADMIN — CLONIDINE HYDROCHLORIDE 0.2 MG: 0.1 TABLET ORAL at 06:42

## 2018-05-13 RX ADMIN — ARFORMOTEROL TARTRATE 15 MCG: 15 SOLUTION RESPIRATORY (INHALATION) at 20:00

## 2018-05-13 RX ADMIN — IPRATROPIUM BROMIDE AND ALBUTEROL SULFATE 3 ML: .5; 3 SOLUTION RESPIRATORY (INHALATION) at 20:00

## 2018-05-13 RX ADMIN — FUROSEMIDE 40 MG: 10 INJECTION, SOLUTION INTRAMUSCULAR; INTRAVENOUS at 08:07

## 2018-05-13 RX ADMIN — IPRATROPIUM BROMIDE AND ALBUTEROL SULFATE 3 ML: .5; 3 SOLUTION RESPIRATORY (INHALATION) at 08:36

## 2018-05-13 RX ADMIN — HEPARIN SODIUM 5000 UNITS: 5000 INJECTION, SOLUTION INTRAVENOUS; SUBCUTANEOUS at 22:03

## 2018-05-13 RX ADMIN — INSULIN LISPRO 2 UNITS: 100 INJECTION, SOLUTION INTRAVENOUS; SUBCUTANEOUS at 11:30

## 2018-05-13 RX ADMIN — CLONIDINE HYDROCHLORIDE 0.2 MG: 0.1 TABLET ORAL at 15:51

## 2018-05-13 RX ADMIN — LEVOFLOXACIN 500 MG: 5 INJECTION, SOLUTION INTRAVENOUS at 14:40

## 2018-05-13 RX ADMIN — INSULIN LISPRO 2 UNITS: 100 INJECTION, SOLUTION INTRAVENOUS; SUBCUTANEOUS at 17:05

## 2018-05-13 RX ADMIN — INSULIN LISPRO 2 UNITS: 100 INJECTION, SOLUTION INTRAVENOUS; SUBCUTANEOUS at 22:02

## 2018-05-13 RX ADMIN — HEPARIN SODIUM 5000 UNITS: 5000 INJECTION, SOLUTION INTRAVENOUS; SUBCUTANEOUS at 15:51

## 2018-05-13 RX ADMIN — METHYLPREDNISOLONE SODIUM SUCCINATE 40 MG: 40 INJECTION, POWDER, FOR SOLUTION INTRAMUSCULAR; INTRAVENOUS at 22:02

## 2018-05-13 RX ADMIN — ARFORMOTEROL TARTRATE 15 MCG: 15 SOLUTION RESPIRATORY (INHALATION) at 08:36

## 2018-05-13 RX ADMIN — HYDRALAZINE HYDROCHLORIDE 50 MG: 50 TABLET, FILM COATED ORAL at 08:07

## 2018-05-13 RX ADMIN — PANTOPRAZOLE SODIUM 40 MG: 40 TABLET, DELAYED RELEASE ORAL at 06:42

## 2018-05-13 RX ADMIN — HYDRALAZINE HYDROCHLORIDE 50 MG: 50 TABLET, FILM COATED ORAL at 22:03

## 2018-05-13 RX ADMIN — HYDRALAZINE HYDROCHLORIDE 50 MG: 50 TABLET, FILM COATED ORAL at 15:51

## 2018-05-13 RX ADMIN — METHYLPREDNISOLONE SODIUM SUCCINATE 40 MG: 40 INJECTION, POWDER, FOR SOLUTION INTRAMUSCULAR; INTRAVENOUS at 08:07

## 2018-05-13 NOTE — PROGRESS NOTES
1925 Pt received from offgoing nurse without any signs or symptoms of distress. Pt vitals are stable and within normal limits. Pt bed in low position with wheels locked and call bell within reach. 2004 Assessment completed and documented in flow sheet. Pt denies any further needs at this time. Pt in NAD with bed in low position, wheels locked and call bell within reach. 2153 Scheduled medications administered as ordered. 2348 Reassessment completed with no changes noted. Bed locked, in lowest position, with call light within reach. 1181 Reassessment completed with no changes noted. Bed locked, in lowest position, with call light within reach. 1333 Bedside and Verbal shift change report given to [phone] (oncoming nurse) by RONY Cook RN (offgoing nurse). Report included the following information SBAR, Procedure Summary, Intake/Output, MAR and Recent Results.    EKG strip reviewed with Cecily Poe RN

## 2018-05-13 NOTE — PROGRESS NOTES
Hospitalist Progress Note    Patient: Angelika Fountain MRN: [id number]  CSN: [id number]    YOB: 1960  Age: 59 y.o. Sex: male    DOA: 5/11/2018 LOS:  LOS: 2 days            Assessment/Plan     Principal Problem:    Acute respiratory distress (5/11/2018)    Active Problems:    COPD exacerbation ([facility] [address] (2/19/2013)      Type II diabetes mellitus with peripheral autonomic neuropathy ([facility] [address] (2/19/2013)      JIM (obstructive sleep apnea) (2/19/2013)      Morbid obesity ([facility] [address] (2/19/2013)      Acute on chronic combined systolic and diastolic ACC/AHA stage C congestive heart failure ([facility] [address] (1/25/2018)      Cocaine abuse (4/22/2018)      SOB (shortness of breath) (5/11/2018)      Hypertensive emergency (5/11/2018)      Acute on chronic respiratory failure: Multi risks, Cocaine use, CHF exac, COPD exac, JIM, noncompliance. On BIPAP, f/u ABG. Discussed the case with Dr. Vijay Wilhelm     Acute on chronic systolic and diastolic CHF:  IV lasix 40 mg BID. Last echo was done in March, 2018. XF 31 and diastolic dysfunction  Increased filling pressure      Cocaine abuse: Pt admit that he did use it. Consult the pt for substance abuse, high risks for cardiac death.      COPD exac: On Levaquin. Taper steroids, continue with neb treatment.      Uncontrolled DM:  on SSI      HTN: On lasix and hydralazine prn, lasix and norvasc, monitor BP.      JIM: On BIPAP      BETHANY: monitor renal function in the setting of diuresis      Morbid obesity/BMI 54      High risks for readmission 2nd to cocaine abuse, CHF exac, COPD exac, Obese and non compliance with medical regimen      DVT prophylaxis      Poor prognosis      Full code     Dispo: Home in 1-2 days     CC: Acute respiratory failure, COPD exac, chronic systolic/diastolic CHF, JIM, DM, hx of cocaine abuse, HTN     Subjective:     Pt was seen and examined with the nurse in the morning round. Less SOB.  On NC O2, no BIPAP last night.  + cough and wheezing    Review of systems  General: No fevers or chills. Cardiovascular: No chest pain or pressure. No palpitations. Pulmonary: No cough, SOB  Gastrointestinal: No nausea, vomiting. Objective:      Visit Vitals    /67 (BP 1 Location: Right arm, BP Patient Position: At rest)    Pulse 60    Temp 97.9 °F (36.6 °C)    Resp 21    Ht 5' 6\" (1.676 m)    Wt (!) 167.9 kg (370 lb 2.4 oz)    SpO2 96%    BMI 59.74 kg/m2       Physical Exam:    Gen: NAD, obese  Heent:  MMM, NC, AT. Cor: s1s2 RRR. No MRG. PMI mid 5th intercostal space. Resp: Decrease BS b/l  Abd:  NT ND.  BS positive. No rebound or guarding. Ext: 1+ edema. Intake and Output:  Current Shift:     Last three shifts:  05/11 1901 - 05/13 0700  In: 6780 [P.O.:6680; I.V.:100]  Out: 6875 [Urine:6875]    Labs: Results:       Chemistry Recent Labs      05/13/18 0604 05/12/18   0530 05/11/18   0806   GLU  156*  147*  128*   NA  134*  134*  137   K  5.0  4.6  4.1   CL  99*  98*  101   CO2  30  29  29   BUN  30*  27*  13   CREA  1.70*  1.69*  1.76*   CA  9.5  9.4  9.1   AGAP  5  7  7   BUCR  18  16  7*   AP   --    --   85   TP   --    --   7.4   ALB   --    --   3.4   GLOB   --    --   4.0   AGRAT   --    --   0.9      CBC w/Diff Recent Labs      05/13/18 0604 05/12/18   0530 05/11/18   0806   WBC  9.4  7.6  6.4   RBC  5.40  5.38  5.39   HGB  15.5  15.4  15.7   HCT  49.2*  48.6*  49.2*   PLT  158  136  153   GRANS  93*  93*  73   LYMPH  3*  6*  19*   EOS  0  0  2      Cardiac Enzymes Recent Labs      05/11/18   0806   CPK  217   CKND1  2.2      Coagulation No results for input(s): PTP, INR, APTT in the last 72 hours.     No lab exists for component: INREXT    Lipid Panel Lab Results   Component Value Date/Time    Cholesterol, total 196 01/25/2018 02:51 AM    HDL Cholesterol 64 (H) 01/25/2018 02:51 AM    LDL, calculated 121.2 (H) 01/25/2018 02:51 AM    VLDL, calculated 10.8 01/25/2018 02:51 AM    Triglyceride 54 01/25/2018 02:51 AM    CHOL/HDL Ratio 3.1 01/25/2018 02:51 AM BNP No results for input(s): BNPP in the last 72 hours.    Liver Enzymes Recent Labs      05/11/18   0806   TP  7.4   ALB  3.4   AP  85   SGOT  18      Thyroid Studies Lab Results   Component Value Date/Time    TSH 0.52 05/11/2018 08:06 AM        Procedures/imaging: see electronic medical records for all procedures/Xrays and details which were not copied into this note but were reviewed prior to creation of Plan      Medications Reviewed  Casandra Foster MD

## 2018-05-13 NOTE — PROGRESS NOTES
Problem: Mobility Impaired (Adult and Pediatric)  Goal: *Acute Goals and Plan of Care (Insert Text)  Outcome: Progressing Towards Goal  physical Therapy TREATMENT    Patient: Alber Bradford (64 y.o. male)  Date: 5/13/2018  Diagnosis: SOB (shortness of breath) Acute respiratory distress       Precautions: Fall   Chart, physical therapy assessment, plan of care and goals were reviewed. ASSESSMENT:  Pt sitting EOB with O2 via NC, willing to participate with max encouragement. Pt attempted to perform sit to stand with bed slightly elevated and maxA but did not complete task, stating \"I'm out of breath. \" Pt able to scoot to EOB with CGA and appropriate weight shifting. Therex completed with appropriate tolerance and slight increase in RR. Pt denied any SOB with Therex (see below). Pt left sitting up in bed, bed alarm on, O2 via NC, nrs notified, pt stating \"I'm waiting to see my doctor. \"  Progression toward goals:  []      Improving appropriately and progressing toward goals  [x]      Improving slowly and progressing toward goals  []      Not making progress toward goals and plan of care will be adjusted     PLAN:  Patient continues to benefit from skilled intervention to address the above impairments. Continue treatment per established plan of care. Discharge Recommendations:  Morgan Chinchilla  Further Equipment Recommendations for Discharge:  rolling walker and N/A     SUBJECTIVE:   Patient stated I don't want to do this, I feel like I can't breath.     OBJECTIVE DATA SUMMARY:   Critical Behavior:  Functional Mobility Training:  Bed Mobility:  Scooting: Contact guard assistance  Transfers:  Sit to Stand: Maximum assistance (attempted sit to stand but unable to complete)  Balance:  Sitting: Impaired  Sitting - Static: Supported sitting  Ambulation/Gait Training:  Stairs:  Neuro Re-Education:  Therapeutic Exercises:   Seated hip flexion marches: 10x each   Seated knee extension: 10x each  Pain:  Pain Scale 1: Numeric (0 - 10)  Pain Intensity 1: 0   Pt c/o chest pain and SOB  Activity Tolerance:   fair  Please refer to the flowsheet for vital signs taken during this treatment.   After treatment:   [] Patient left in no apparent distress sitting up in chair  [x] Patient left in no apparent distress in bed  [x] Call bell left within reach  [x] Nursing notified  [] Caregiver present  [x] Bed alarm activated      No Back   Time Calculation: 11 mins

## 2018-05-13 NOTE — PROGRESS NOTES
Pt's O2 sat is 100% on 3L. He is on 4L at home, but does not use it consistently. Difficult to assess the right approach to see if O2 can be weaned any further because he does get very SOB the moment he moves around or coughs. This Rt advised him him to use it to his comfort level. But also to always use it when moving around.

## 2018-05-13 NOTE — PROGRESS NOTES
Problem: Falls - Risk of  Goal: *Absence of Falls  Document Richard Fall Risk and appropriate interventions in the flowsheet.    Outcome: Progressing Towards Goal  Fall Risk Interventions:            Medication Interventions: Teach patient to arise slowly         History of Falls Interventions: Door open when patient unattended

## 2018-05-13 NOTE — ROUTINE PROCESS
0700: Assumed care of pt Natalie Dimas. Pt is alert no sign of distress. 1000: Pt is resting no sign of distress. 1230: pt resting no sign of distress. 1400: pt resting no sign of distress. 1800: pt resting no sign of distress. Educated on the not eating too many crackers and tv dinners through the night.

## 2018-05-13 NOTE — ROUTINE PROCESS
Bedside shift change report given to [address] [place], [place] [address]way (oncoming nurse) by Vickey Baumann RN (offgoing nurse). Report included the following information SBAR, Kardex and MAR.

## 2018-05-13 NOTE — PROGRESS NOTES
Pt stated he did not desire to wear CPAP nor take a breathing tx at this time. Pt was resting comfortably in bed upon my arrival. RN is made aware of pt's request. Will continue to monitor.      Alexis Dear, RRT

## 2018-05-13 NOTE — PROGRESS NOTES
Rachelle Chacon M.D  [address] [place] [address] [facility] [address]  Phone [phone]   Fax [phone]    Pulmonary Critical Care & Sleep Medicine         Name: Ramya Cook MRN: [id number]   : 1960 Hospital: [facility] [place]   Date: 2018  Room: [facility]           IMPRESSION:   Active Problems:    COPD exacerbation ([facility] [address] (2013)      Type II diabetes mellitus with peripheral autonomic neuropathy (HCC) (2013)      JIM (obstructive sleep apnea) (2013)      Morbid obesity ([facility] [address] (2013)      Acute on chronic combined systolic and diastolic ACC/AHA stage C congestive heart failure ([facility] [address] (2018)      Cocaine abuse (2018)      SOB (shortness of breath) (2018)      Acute respiratory distress (2018)      Hypertensive emergency (2018)  Addendum CDMP query suggested patient does not meet criteria fot acute on chronic renal failure         RECOMMENDATIONS:  -- Continue lasix  -- Decrease steroids 40 bid  -- Overall doing well post transfer from ICU  -- DW patient about compliance,   -- Dr Alphonse Brito will see tomorrow will update him on patient condition.     -- Non compliant patient most likely has Cocaine induced HTN and Fluid overload with wheezing and respiratory distress  -- Troponin is coming down  -- Recheck TSH is normal  -- Sputum culture-- Pending   -- RPT xray in am is more or less same   · Pulm: stable respirations; continue BIPAP nightly for now as tolerated; on bronchodilators and steroids  · Cardiac: BP control-clonidine patch; prn iv hydralazine; avoid betablockers due to cocaine use; lasix bid for now; watch renal fn  · ID: empiric levaquin x 5 days  · Proph:  DVt and GI proph reviewed  · D/w patient - advised not to use cocaine-risks for MI, stroke, bleeding complications  Will defer respective systems problem management to primary and other consultant and follow patient in ICU with primary and other medical team  Further recommendations will be based on the patient's response to recommended treatment and results of the investigation ordered. · PT/OT eval and treat: as needed  · Lines/Tubes: PIVs only   ADVANCE DIRECTIVE: Full code   I spent 35 min of time excluding procedure, with face to face evaluation  >50% on complex decision making, coordination of care and counseling patient. High complexity decision making was performed in this consultation and evaluation of this patient    Subjective:  5/13/18  Doing well  Did not wear bipap last night  Still complains of cough and wheezing  Overall doing well sitting up in chair  He tells me that he did wear CPAP but documentation suggest against it  Doing well post ICU transfer  Overall difficult to manage due to non compliance     This patient has been seen and evaluated at the request of Dr.R Keshav Amaro for shortness of breath. Patient is a 59 y. o.AA male with hx of CHF, COPD, DM type 2, and suspected sleep apnea. He is a non-compliant person. He needed BIPAP in the ER. His BP was 180/128 in ER. He has responded to BP control and lasix. UDS +ve for cocaine. Patient awake, alert, on nc o2. He has no chest pains or hemoptysis. Breathing is better. Cough less-non-productive. He has no headaches or vomiting.      Current Facility-Administered Medications   Medication Dose Route Frequency    methylPREDNISolone (PF) (SOLU-MEDROL) injection 40 mg  40 mg IntraVENous Q12H    cloNIDine HCl (CATAPRES) tablet 0.2 mg  0.2 mg Oral Q8H    amLODIPine (NORVASC) tablet 5 mg  5 mg Oral DAILY    budesonide (PULMICORT) 500 mcg/2 ml nebulizer suspension  500 mcg Nebulization BID RT    arformoterol (BROVANA) neb solution 15 mcg  15 mcg Nebulization BID RT    spironolactone (ALDACTONE) tablet 25 mg  25 mg Oral DAILY    docusate sodium (COLACE) capsule 100 mg  100 mg Oral BID    heparin (porcine) injection 5,000 Units  5,000 Units SubCUTAneous Q8H    albuterol-ipratropium (DUO-NEB) 2.5 MG-0.5 MG/3 ML  3 mL Nebulization Q4H RT    insulin lispro (HUMALOG) injection   SubCUTAneous AC&HS    levoFLOXacin (LEVAQUIN) 500 mg in D5W IVPB  500 mg IntraVENous Q24H    furosemide (LASIX) injection 40 mg  40 mg IntraVENous BID    nitroglycerin (NITRODUR) 0.2 mg/hr patch 1 Patch  1 Patch TransDERmal DAILY    pantoprazole (PROTONIX) tablet 40 mg  40 mg Oral ACB    hydrALAZINE (APRESOLINE) tablet 50 mg  50 mg Oral TID       Latest lactic acid:   Lactic acid   Date Value Ref Range Status   2018 0.7 0.4 - 2.0 MMOL/L Final       Objective:   Vital Signs:    Visit Vitals    /70 (BP 1 Location: Right arm, BP Patient Position: At rest)    Pulse 100    Temp 97.4 °F (36.3 °C)    Resp 21    Ht 5' 6\" (1.676 m)    Wt (!) 167.9 kg (370 lb 2.4 oz)    SpO2 97%    BMI 59.74 kg/m2       O2 Device: Room air   O2 Flow Rate (L/min): 3 l/min   Temp (24hrs), Av °F (36.7 °C), Min:97.4 °F (36.3 °C), Max:98.3 °F (36.8 °C)       Intake/Output:   Last shift:       07 - 1900  In: 8573 [P.O.:1070]  Out: 700 [Urine:700]  Last 3 shifts: 1901 -  0700  In: 6780 [P.O.:6680; I.V.:100]  Out: 6875 [Urine:6875]    Intake/Output Summary (Last 24 hours) at 18 1254  Last data filed at 18 1250   Gross per 24 hour   Intake             6450 ml   Output             3950 ml   Net             2500 ml         Physical Exam:   Comfortable; on nc o2; acyanotic  HEENT: pupils not dilated, reactive, no scleral jaundice, moist oral mucosa, no nasal drainage; neck supple  Neck: No adenopathy or thyroid swelling  CVS: S1S2 no murmurs; JVD not elevated  RS: Mod air entry bilaterally, decreased BS at bases with mild crackles, no wheezes   Abd: soft, non tender, no hepatosplenomegaly, no abd distension, no guarding or rigidity, bowel sounds heard  Neuro: awake, alert, moving all extremities  Extrm: mild bilateral pitting leg edema, no swelling or clubbing  Skin: no rash  Lymphatic: no cervical or supraclavicular adenopathy    Telemetry: normal sinus rhythm      Data review:     CBC w/Diff Recent Labs      05/13/18   0604  05/12/18   0530  05/11/18   0806   WBC  9.4  7.6  6.4   RBC  5.40  5.38  5.39   HGB  15.5  15.4  15.7   HCT  49.2*  48.6*  49.2*   PLT  158  136  153   GRANS  93*  93*  73   LYMPH  3*  6*  19*   EOS  0  0  2        Chemistry Recent Labs      05/13/18   0604  05/12/18   0530  05/11/18   0806   GLU  156*  147*  128*   NA  134*  134*  137   K  5.0  4.6  4.1   CL  99*  98*  101   CO2  30  29  29   BUN  30*  27*  13   CREA  1.70*  1.69*  1.76*   CA  9.5  9.4  9.1   MG  2.3  2.1   --    AGAP  5  7  7   BUCR  18  16  7*   AP   --    --   85   TP   --    --   7.4   ALB   --    --   3.4   GLOB   --    --   4.0   AGRAT   --    --   0.9        Lactic Acid Lactic acid   Date Value Ref Range Status   04/22/2018 0.7 0.4 - 2.0 MMOL/L Final     No results for input(s): LAC in the last 72 hours. Micro  Recent Labs      05/11/18   0816  05/11/18   0806   CULT  NO GROWTH 2 DAYS  NO GROWTH 2 DAYS     Recent Labs      05/11/18   0816  05/11/18   0806   CULT  NO GROWTH 2 DAYS  NO GROWTH 2 DAYS        ABG Recent Labs      05/11/18   1540  05/11/18   0819   PHI  7.428  7.377   PCO2I  43.1  52.8*   PO2I  86  83   HCO3I  28.5*  31.0*   FIO2I  36  28        Liver Enzymes Protein, total   Date Value Ref Range Status   05/11/2018 7.4 6.4 - 8.2 g/dL Final     Albumin   Date Value Ref Range Status   05/11/2018 3.4 3.4 - 5.0 g/dL Final     Globulin   Date Value Ref Range Status   05/11/2018 4.0 2.0 - 4.0 g/dL Final     A-G Ratio   Date Value Ref Range Status   05/11/2018 0.9 0.8 - 1.7   Final     AST (SGOT)   Date Value Ref Range Status   05/11/2018 18 15 - 37 U/L Final     Alk.  phosphatase   Date Value Ref Range Status   05/11/2018 85 45 - 117 U/L Final     Recent Labs      05/11/18   0806   TP  7.4   ALB  3.4   GLOB  4.0   AGRAT  0.9   SGOT  18   AP  85        Cardiac Enzymes No results found for: CPK, CK, CKMMB, CKMB, RCK3, CKMBT, CKNDX, CKND1, TISHA, TROPT, TROIQ, KITTY, TROPT, TNIPOC, BNP, BNPP     BNP No results found for: BNP, BNPP, XBNPT     Coagulation No results for input(s): PTP, INR, APTT in the last 72 hours. No lab exists for component: INREXT, INREXT      Thyroid  Lab Results   Component Value Date/Time    TSH 0.52 05/11/2018 08:06 AM          Lipid Panel Lab Results   Component Value Date/Time    Cholesterol, total 196 01/25/2018 02:51 AM    HDL Cholesterol 64 (H) 01/25/2018 02:51 AM    LDL, calculated 121.2 (H) 01/25/2018 02:51 AM    VLDL, calculated 10.8 01/25/2018 02:51 AM    Triglyceride 54 01/25/2018 02:51 AM    CHOL/HDL Ratio 3.1 01/25/2018 02:51 AM          Urinalysis Lab Results   Component Value Date/Time    Color DARK YELLOW 05/11/2018 01:20 PM    Appearance CLEAR 05/11/2018 01:20 PM    Specific gravity >1.030 (H) 05/11/2018 01:20 PM    pH (UA) 5.0 05/11/2018 01:20 PM    Protein 100 (A) 05/11/2018 01:20 PM    Glucose NEGATIVE  05/11/2018 01:20 PM    Ketone TRACE (A) 05/11/2018 01:20 PM    Bilirubin SMALL (A) 05/11/2018 01:20 PM    Urobilinogen 1.0 05/11/2018 01:20 PM    Nitrites NEGATIVE  05/11/2018 01:20 PM    Leukocyte Esterase NEGATIVE  05/11/2018 01:20 PM    Epithelial cells FEW 05/11/2018 01:20 PM    Bacteria FEW (A) 05/11/2018 01:20 PM    WBC 0 to 1 05/11/2018 01:20 PM    RBC 0 to 1 05/11/2018 01:20 PM        XR (Most Recent). CXR reviewed by me and compared with previous CXR   Results from Hospital Encounter encounter on 05/11/18   XR CHEST PORT   Narrative Chest, single view    Indication: Hypoxia    Comparison: 5/11/2018    Findings:  Portable upright AP view of the chest was obtained. The  cardiomediastinal silhouette appears unchanged, laterally prominent in size. The pulmonary vasculature is unremarkable. The lungs are hypoinflated with  improved interval aeration of the retrocardiac left lower lobe.  No confluent  airspace opacity is seen throughout the lungs. No pleural effusion nor  pneumothorax. No acute osseous abnormality. Impression Impression:  Low lung volumes with no radiographic evidence of an acute abnormality. CT (Most Recent)   Results from Hospital Encounter encounter on 06/15/11   CTA CHEST W AND W/O CONTRAST   Narrative Ordering MD: Max Gutiérrez MD  Signed By: Tobin Chinchilla MD  ** FINAL **  ---------------------------------------------------------------------  PROCEDURE DATE:  Adonis 15 2011  3:17AM    Accession Number:  [id number]  Order No:   [id number]  Procedure:   CTS - CTA CHEST W/WO CONTRAST  CPT Code:   47596  Reason:   SHORTNESS OF BREATH  INTERPRETATION:  CTA CHEST  INDICATION: Difficulty breathing, shortness of breath  TECHNIQUE: THIN SECTION IMAGING WAS PERFORMED OF THE THORAX AFTER IV   CONTRAST WAS GIVEN AT A HIGH RATE OF ADMINISTRATION. MULTIPLE   WINDOWS WERE USED. CORONAL AND SAGITTAL REFORMATIONS WERE   PERFORMED. POST PROCESSING IMAGES INCLUDE MULTIPLANAR  REFORMATTED   IMAGES IN SAGGITAL AND CORONAL PLANES (MPR) UTILIZING MAXIMUM   INTENSITY PROJECTION (MIP). COMPARISON:  None   FINDINGS:  Pulmonary arteries:  No filling defects to suggest pulmonary   embolus. Significant motion artifact from a patient breathing   obscures the distal pulmonary arteries, tiny distal PD not seen but   not excluded. Aorta:  No evidence of aortic dissection. Mediastinum:  No acute abnormalities. Heart  slightly enlarged,   pericardium normal.  Lungs: Dependent changes, respiratory motion, lungs appear clear. Visualized upper abdomen:  No abnormalities. CORONAL and SAGITTAL REFORMATIONS  No filling defects are seen within the pulmonary arteries to   suspect pulmonary embolus . IMPRESSION:  1. No large central pulmonary embolism, significant motion artifact   would obscure a tiny distal embolus, none seen but not entirely   excluded.   Note: Preliminary report provided by [facility] at the time   of this examination. EKG No results found for this or any previous visit. ECHO   ECHO March 2018  SUMMARY:  Left ventricle: Size was at the upper limits of normal. Systolic function was  moderately reduced by EF (biplane method  of disks). Ejection fraction was estimated to be 45 % in the range of 40 % to 50 %. There was moderate diffuse  hypokinesis. There was moderate concentric hypertrophy. Doppler parameters were consistent with abnormal left  ventricular relaxation (grade 1 diastolic dysfunction). Doppler parameters were consistent with elevated ventricular  end-diastolic filling pressure. Right ventricle: The size was at the upper limits of normal. Systolic function was normal.  Left atrium: The atrium was mildly dilated. Right atrium: The atrium was mildly dilated. Mitral valve: There was mild regurgitation. Aortic valve: The valve was trileaflet. Leaflets exhibited normal thickness, mild calcification, normal cuspal  separation, and sclerosis without stenosis. INDICATIONS: Shortness of breath.                  Adithya Headley MD

## 2018-05-13 NOTE — PROGRESS NOTES
Chart reviewed. Nursing, please conduct and chart walk test if patient will require oxygen at discharge. CM will follow for discharge planning needs. Reason for Admission:  Per chart, pt is a 59 y.o. male who hx of copd, chf, dm , morbid obesity, cocaine abuse came to ER due to worsening sob since yesterday. He reported that has chronic sob on 4 L nc O2, but his sob with chest pain, but no chest pain now. He was put on bipap in ER received breathing tx . First trop 0.07-0.02. uds + cocaine, pro-bnp over 3300. He still reported sob, bp was up to 207/183, clonidine /hydralzaine given  In ed . vq scan negative for PE                 RRAT Score:     28, high             Resources/supports as identified by patient/family:                   Top Challenges facing patient (as identified by patient/family and CM): Finances/Medication cost?                    Transportation? Support system or lack thereof? Living arrangements? Self-care/ADLs/Cognition? Current Advanced Directive/Advance Care Plan:                            Plan for utilizing home health:                          Likelihood of readmission:                  Transition of Care Plan:           TBD         Care Management Interventions  Transition of Care Consult (CM Consult): Discharge Planning  Physical Therapy Consult: Yes  Occupational Therapy Consult:  Yes

## 2018-05-13 NOTE — PROGRESS NOTES
Entered pt's room to administer breathing tx. Pt states \" I will wait until morning\". RN aware. Will continue to monitor.      Domiink Dumont, RRT

## 2018-05-14 LAB
ANION GAP SERPL CALC-SCNC: 4 MMOL/L (ref 3–18)
BASOPHILS # BLD: 0 K/UL (ref 0–0.1)
BASOPHILS NFR BLD: 0 % (ref 0–3)
BUN SERPL-MCNC: 34 MG/DL (ref 7–18)
BUN/CREAT SERPL: 21 (ref 12–20)
CALCIUM SERPL-MCNC: 9.3 MG/DL (ref 8.5–10.1)
CHLORIDE SERPL-SCNC: 99 MMOL/L (ref 100–108)
CO2 SERPL-SCNC: 32 MMOL/L (ref 21–32)
CREAT SERPL-MCNC: 1.64 MG/DL (ref 0.6–1.3)
DIFFERENTIAL METHOD BLD: ABNORMAL
EOSINOPHIL # BLD: 0 K/UL (ref 0–0.4)
EOSINOPHIL NFR BLD: 0 % (ref 0–5)
ERYTHROCYTE [DISTWIDTH] IN BLOOD BY AUTOMATED COUNT: 15.4 % (ref 11.6–14.5)
GLUCOSE BLD STRIP.AUTO-MCNC: 121 MG/DL (ref 70–110)
GLUCOSE BLD STRIP.AUTO-MCNC: 189 MG/DL (ref 70–110)
GLUCOSE BLD STRIP.AUTO-MCNC: 249 MG/DL (ref 70–110)
GLUCOSE BLD STRIP.AUTO-MCNC: 98 MG/DL (ref 70–110)
GLUCOSE SERPL-MCNC: 175 MG/DL (ref 74–99)
HCT VFR BLD AUTO: 48.1 % (ref 36–48)
HGB BLD-MCNC: 14.9 G/DL (ref 13–16)
LYMPHOCYTES # BLD: 0.1 K/UL (ref 0.8–3.5)
LYMPHOCYTES NFR BLD: 1 % (ref 20–51)
MAGNESIUM SERPL-MCNC: 2.2 MG/DL (ref 1.6–2.6)
MCH RBC QN AUTO: 28.7 PG (ref 24–34)
MCHC RBC AUTO-ENTMCNC: 31 G/DL (ref 31–37)
MCV RBC AUTO: 92.7 FL (ref 74–97)
MONOCYTES # BLD: 0.2 K/UL (ref 0–1)
MONOCYTES NFR BLD: 2 % (ref 2–9)
NEUTS SEG # BLD: 7.3 K/UL (ref 1.8–8)
NEUTS SEG NFR BLD: 97 % (ref 42–75)
PLATELET # BLD AUTO: 153 K/UL (ref 135–420)
PLATELET COMMENTS,PCOM: ABNORMAL
POTASSIUM SERPL-SCNC: 4.7 MMOL/L (ref 3.5–5.5)
RBC # BLD AUTO: 5.19 M/UL (ref 4.7–5.5)
RBC MORPH BLD: ABNORMAL
SODIUM SERPL-SCNC: 135 MMOL/L (ref 136–145)
WBC # BLD AUTO: 7.6 K/UL (ref 4.6–13.2)

## 2018-05-14 PROCEDURE — 74011250636 HC RX REV CODE- 250/636: Performed by: INTERNAL MEDICINE

## 2018-05-14 PROCEDURE — 97165 OT EVAL LOW COMPLEX 30 MIN: CPT

## 2018-05-14 PROCEDURE — 74011250637 HC RX REV CODE- 250/637: Performed by: HOSPITALIST

## 2018-05-14 PROCEDURE — 83735 ASSAY OF MAGNESIUM: CPT | Performed by: HOSPITALIST

## 2018-05-14 PROCEDURE — 65660000000 HC RM CCU STEPDOWN

## 2018-05-14 PROCEDURE — 94640 AIRWAY INHALATION TREATMENT: CPT

## 2018-05-14 PROCEDURE — 80048 BASIC METABOLIC PNL TOTAL CA: CPT | Performed by: HOSPITALIST

## 2018-05-14 PROCEDURE — 77010033678 HC OXYGEN DAILY

## 2018-05-14 PROCEDURE — 36415 COLL VENOUS BLD VENIPUNCTURE: CPT | Performed by: HOSPITALIST

## 2018-05-14 PROCEDURE — 82962 GLUCOSE BLOOD TEST: CPT

## 2018-05-14 PROCEDURE — 74011000250 HC RX REV CODE- 250: Performed by: HOSPITALIST

## 2018-05-14 PROCEDURE — 94760 N-INVAS EAR/PLS OXIMETRY 1: CPT

## 2018-05-14 PROCEDURE — 85025 COMPLETE CBC W/AUTO DIFF WBC: CPT | Performed by: HOSPITALIST

## 2018-05-14 PROCEDURE — 74011636637 HC RX REV CODE- 636/637: Performed by: HOSPITALIST

## 2018-05-14 PROCEDURE — 74011250637 HC RX REV CODE- 250/637: Performed by: INTERNAL MEDICINE

## 2018-05-14 PROCEDURE — 74011250636 HC RX REV CODE- 250/636: Performed by: HOSPITALIST

## 2018-05-14 RX ORDER — PREDNISONE 20 MG/1
40 TABLET ORAL
Status: DISCONTINUED | OUTPATIENT
Start: 2018-05-15 | End: 2018-05-16 | Stop reason: HOSPADM

## 2018-05-14 RX ORDER — IPRATROPIUM BROMIDE AND ALBUTEROL SULFATE 2.5; .5 MG/3ML; MG/3ML
3 SOLUTION RESPIRATORY (INHALATION)
Status: DISCONTINUED | OUTPATIENT
Start: 2018-05-14 | End: 2018-05-16 | Stop reason: HOSPADM

## 2018-05-14 RX ADMIN — IPRATROPIUM BROMIDE AND ALBUTEROL SULFATE 3 ML: .5; 3 SOLUTION RESPIRATORY (INHALATION) at 20:45

## 2018-05-14 RX ADMIN — ARFORMOTEROL TARTRATE 15 MCG: 15 SOLUTION RESPIRATORY (INHALATION) at 20:45

## 2018-05-14 RX ADMIN — INSULIN LISPRO 4 UNITS: 100 INJECTION, SOLUTION INTRAVENOUS; SUBCUTANEOUS at 07:30

## 2018-05-14 RX ADMIN — SPIRONOLACTONE 25 MG: 25 TABLET, FILM COATED ORAL at 09:20

## 2018-05-14 RX ADMIN — BUDESONIDE 500 MCG: 0.5 INHALANT RESPIRATORY (INHALATION) at 07:16

## 2018-05-14 RX ADMIN — CLONIDINE HYDROCHLORIDE 0.2 MG: 0.1 TABLET ORAL at 06:25

## 2018-05-14 RX ADMIN — FUROSEMIDE 40 MG: 10 INJECTION, SOLUTION INTRAMUSCULAR; INTRAVENOUS at 22:05

## 2018-05-14 RX ADMIN — HEPARIN SODIUM 5000 UNITS: 5000 INJECTION, SOLUTION INTRAVENOUS; SUBCUTANEOUS at 14:11

## 2018-05-14 RX ADMIN — DOCUSATE SODIUM 100 MG: 100 CAPSULE, LIQUID FILLED ORAL at 22:05

## 2018-05-14 RX ADMIN — DOCUSATE SODIUM 100 MG: 100 CAPSULE, LIQUID FILLED ORAL at 09:20

## 2018-05-14 RX ADMIN — IPRATROPIUM BROMIDE AND ALBUTEROL SULFATE 3 ML: .5; 3 SOLUTION RESPIRATORY (INHALATION) at 07:16

## 2018-05-14 RX ADMIN — HYDRALAZINE HYDROCHLORIDE 75 MG: 50 TABLET, FILM COATED ORAL at 22:05

## 2018-05-14 RX ADMIN — FUROSEMIDE 40 MG: 10 INJECTION, SOLUTION INTRAMUSCULAR; INTRAVENOUS at 09:20

## 2018-05-14 RX ADMIN — AMLODIPINE BESYLATE 5 MG: 5 TABLET ORAL at 09:19

## 2018-05-14 RX ADMIN — ARFORMOTEROL TARTRATE 15 MCG: 15 SOLUTION RESPIRATORY (INHALATION) at 07:16

## 2018-05-14 RX ADMIN — HYDRALAZINE HYDROCHLORIDE 50 MG: 50 TABLET, FILM COATED ORAL at 09:19

## 2018-05-14 RX ADMIN — PANTOPRAZOLE SODIUM 40 MG: 40 TABLET, DELAYED RELEASE ORAL at 06:31

## 2018-05-14 RX ADMIN — BUDESONIDE 500 MCG: 0.5 INHALANT RESPIRATORY (INHALATION) at 20:45

## 2018-05-14 RX ADMIN — HEPARIN SODIUM 5000 UNITS: 5000 INJECTION, SOLUTION INTRAVENOUS; SUBCUTANEOUS at 22:05

## 2018-05-14 RX ADMIN — INSULIN LISPRO 2 UNITS: 100 INJECTION, SOLUTION INTRAVENOUS; SUBCUTANEOUS at 16:53

## 2018-05-14 RX ADMIN — LEVOFLOXACIN 500 MG: 5 INJECTION, SOLUTION INTRAVENOUS at 14:57

## 2018-05-14 RX ADMIN — CLONIDINE HYDROCHLORIDE 0.2 MG: 0.1 TABLET ORAL at 22:05

## 2018-05-14 RX ADMIN — HEPARIN SODIUM 5000 UNITS: 5000 INJECTION, SOLUTION INTRAVENOUS; SUBCUTANEOUS at 06:25

## 2018-05-14 RX ADMIN — METHYLPREDNISOLONE SODIUM SUCCINATE 40 MG: 40 INJECTION, POWDER, FOR SOLUTION INTRAMUSCULAR; INTRAVENOUS at 09:20

## 2018-05-14 RX ADMIN — HYDRALAZINE HYDROCHLORIDE 75 MG: 50 TABLET, FILM COATED ORAL at 16:53

## 2018-05-14 RX ADMIN — IPRATROPIUM BROMIDE AND ALBUTEROL SULFATE 3 ML: .5; 3 SOLUTION RESPIRATORY (INHALATION) at 12:03

## 2018-05-14 RX ADMIN — CLONIDINE HYDROCHLORIDE 0.2 MG: 0.1 TABLET ORAL at 14:11

## 2018-05-14 NOTE — PROGRESS NOTES
Bedside and Verbal shift change report given to KIMBERLEY Mcdermott (oncoming nurse) by Noah Vegas RN   (offgoing nurse). Report included the following information SBAR, Kardex and MAR.

## 2018-05-14 NOTE — PROGRESS NOTES
[id number] Assumed patient care from off going nurse RORO Rogers RN. Patient is alert x 4 sitting up in bed receiving scheduled breathing treatment. Whiteboard updated, bed left in lowest position, call bell left within reach.

## 2018-05-14 NOTE — PROGRESS NOTES
[facility]                  Pulmonary, Critical Care, and Sleep Medicine     [facility] at [facility]   [address] [facility], [address] [place] [address], [address] [place] [address], [place], [address]   Phone: [phone]. Fax: [phone] Phone: [phone]. Fax: [phone]     Name: Prem Lmabert MRN: [id number]   : 1960 Hospital: [facility] [place]   Date: 2018  Room: [address] [place] [address] NOTE:      IMPRESSION:   AE-COPD  Acute on chronic systolic and diastolic CHF  Acute hypoxic respiratory failure due to above. Cocaine abuse  Possible JIM  Morbid obesity  DM  HTN emergency on admission. CKD        PLAN:  C/w duoneb, brovana, pulmicort  C/w prednisone 40 mg daily, taper slowly as tolerated. May change duoneb to atrovent once wheezing improves   Complete levaquin as planned for 5 days. Titrate fio2 as tolerated, keep spo2 >==92%    Will defer respective systems problem management to primary and other consultant and follow patient in ICU with primary and other medical team  Further recommendations will be based on the patient's response to recommended treatment and results of the investigation ordered. Will follow from pulmonary standpoint. D/w pt and Dr. Lisa Hernandes.      Patient is a 59 y. o.AA male with hx of cocaine abuse, CHF, COPD, DM type 2, medical noncompliance, suspected sleep apnea, recurrent hospitalization. Admitted with AE-COPD, AE-CHD, requiring BiPAP on admission. 18:  C/o cough without sputum production  C/o not feeling well  C/o wheezing and dyspnea.    No fever chills cp  Leg edema improving      Current Facility-Administered Medications   Medication Dose Route Frequency    albuterol-ipratropium (DUO-NEB) 2.5 MG-0.5 MG/3 ML  3 mL Nebulization Q4HWA RT    methylPREDNISolone (PF) (SOLU-MEDROL) injection 40 mg  40 mg IntraVENous Q12H    cloNIDine HCl (CATAPRES) tablet 0.2 mg  0.2 mg Oral Q8H    amLODIPine (NORVASC) tablet 5 mg  5 mg Oral DAILY    budesonide (PULMICORT) 500 mcg/2 ml nebulizer suspension  500 mcg Nebulization BID RT    arformoterol (BROVANA) neb solution 15 mcg  15 mcg Nebulization BID RT    spironolactone (ALDACTONE) tablet 25 mg  25 mg Oral DAILY    docusate sodium (COLACE) capsule 100 mg  100 mg Oral BID    heparin (porcine) injection 5,000 Units  5,000 Units SubCUTAneous Q8H    insulin lispro (HUMALOG) injection   SubCUTAneous AC&HS    levoFLOXacin (LEVAQUIN) 500 mg in D5W IVPB  500 mg IntraVENous Q24H    furosemide (LASIX) injection 40 mg  40 mg IntraVENous BID    nitroglycerin (NITRODUR) 0.2 mg/hr patch 1 Patch  1 Patch TransDERmal DAILY    pantoprazole (PROTONIX) tablet 40 mg  40 mg Oral ACB    hydrALAZINE (APRESOLINE) tablet 50 mg  50 mg Oral TID           Objective:   Vital Signs:    Visit Vitals    BP (!) 152/97 (BP 1 Location: Right arm, BP Patient Position: At rest;Sitting)    Pulse 70    Temp 97.6 °F (36.4 °C)    Resp 21    Ht 5' 6\" (1.676 m)    Wt (!) 169.6 kg (373 lb 14.4 oz)    SpO2 99%    BMI 60.35 kg/m2       O2 Device:  (nasal canula)   O2 Flow Rate (L/min): 3 l/min   Temp (24hrs), Av.5 °F (36.4 °C), Min:97.2 °F (36.2 °C), Max:97.8 °F (36.6 °C)       Intake/Output:   Last shift:       07 - 1900  In: 720 [P.O.:720]  Out:  [Urine:]  Last 3 shifts: 1901 -  0700  In: 2720 [P.O.:9940]  Out: 6700 [Urine:6700]    Intake/Output Summary (Last 24 hours) at 18 1443  Last data filed at 18 1424   Gross per 24 hour   Intake             4310 ml   Output             5405 ml   Net            -1095 ml         Physical Exam:   Comfortable; on nc o2; acyanotic. Morbidly obese. HEENT: no scleral jaundice, moist oral mucosa, no nasal drainage; neck supple  Neck: No adenopathy or thyroid swelling  CVS: S1S2 no murmurs; JVD difficult to evaluated with short thick neck.    RS: Mod air entry bilaterally. No crackles or rhonchi. End-expiratory wheezing +  Abd: soft, non tender  Neuro: awake, alert, moving all extremities  Extrm: mild bilateral pitting leg edema, no swelling or clubbing  Skin: no rash      Data review:     CBC w/Diff Recent Labs      05/14/18 0335 05/13/18   0604  05/12/18   0530   WBC  7.6  9.4  7.6   RBC  5.19  5.40  5.38   HGB  14.9  15.5  15.4   HCT  48.1*  49.2*  48.6*   PLT  153  158  136   GRANS  97*  93*  93*   LYMPH  1*  3*  6*   EOS  0  0  0        Chemistry Recent Labs      05/14/18 0335 05/13/18   0604  05/12/18   0530   GLU  175*  156*  147*   NA  135*  134*  134*   K  4.7  5.0  4.6   CL  99*  99*  98*   CO2  32  30  29   BUN  34*  30*  27*   CREA  1.64*  1.70*  1.69*   CA  9.3  9.5  9.4   MG  2.2  2.3  2.1   AGAP  4  5  7   BUCR  21*  18  16        Lactic Acid Lactic acid   Date Value Ref Range Status   04/22/2018 0.7 0.4 - 2.0 MMOL/L Final     No results for input(s): LAC in the last 72 hours. ABG Recent Labs      05/11/18   1540   PHI  7.428   PCO2I  43.1   PO2I  86   HCO3I  28.5*   FIO2I  36        Liver Enzymes Protein, total   Date Value Ref Range Status   05/11/2018 7.4 6.4 - 8.2 g/dL Final     Albumin   Date Value Ref Range Status   05/11/2018 3.4 3.4 - 5.0 g/dL Final     Globulin   Date Value Ref Range Status   05/11/2018 4.0 2.0 - 4.0 g/dL Final     A-G Ratio   Date Value Ref Range Status   05/11/2018 0.9 0.8 - 1.7   Final     AST (SGOT)   Date Value Ref Range Status   05/11/2018 18 15 - 37 U/L Final     Alk. phosphatase   Date Value Ref Range Status   05/11/2018 85 45 - 117 U/L Final     No results for input(s): TP, ALB, GLOB, AGRAT, SGOT, GPT, AP, TBIL in the last 72 hours.     No lab exists for component: DBIL       Thyroid  Lab Results   Component Value Date/Time    TSH 0.52 05/11/2018 08:06 AM          Lipid Panel Lab Results   Component Value Date/Time    Cholesterol, total 196 01/25/2018 02:51 AM    HDL Cholesterol 64 (H) 01/25/2018 02:51 AM LDL, calculated 121.2 (H) 01/25/2018 02:51 AM    VLDL, calculated 10.8 01/25/2018 02:51 AM    Triglyceride 54 01/25/2018 02:51 AM    CHOL/HDL Ratio 3.1 01/25/2018 02:51 AM          Urinalysis Lab Results   Component Value Date/Time    Color DARK YELLOW 05/11/2018 01:20 PM    Appearance CLEAR 05/11/2018 01:20 PM    Specific gravity >1.030 (H) 05/11/2018 01:20 PM    pH (UA) 5.0 05/11/2018 01:20 PM    Protein 100 (A) 05/11/2018 01:20 PM    Glucose NEGATIVE  05/11/2018 01:20 PM    Ketone TRACE (A) 05/11/2018 01:20 PM    Bilirubin SMALL (A) 05/11/2018 01:20 PM    Urobilinogen 1.0 05/11/2018 01:20 PM    Nitrites NEGATIVE  05/11/2018 01:20 PM    Leukocyte Esterase NEGATIVE  05/11/2018 01:20 PM    Epithelial cells FEW 05/11/2018 01:20 PM    Bacteria FEW (A) 05/11/2018 01:20 PM    WBC 0 to 1 05/11/2018 01:20 PM    RBC 0 to 1 05/11/2018 01:20 PM        XR (Most Recent). CXR reviewed by me and compared with previous CXR   Results from Hospital Encounter encounter on 05/11/18   XR CHEST PORT   Narrative Chest, single view    Indication: Hypoxia    Comparison: 5/11/2018    Findings:  Portable upright AP view of the chest was obtained. The  cardiomediastinal silhouette appears unchanged, laterally prominent in size. The pulmonary vasculature is unremarkable. The lungs are hypoinflated with  improved interval aeration of the retrocardiac left lower lobe. No confluent  airspace opacity is seen throughout the lungs. No pleural effusion nor  pneumothorax. No acute osseous abnormality. Impression Impression:  Low lung volumes with no radiographic evidence of an acute abnormality.          CT (Most Recent)   Results from Hospital Encounter encounter on 06/15/11   CTA CHEST W AND W/O CONTRAST   Narrative Ordering MD: Timo Blackmon MD  Signed By: Katelyn Crawford MD  ** FINAL **  ---------------------------------------------------------------------  PROCEDURE DATE:  Adonis 15 2011  3:17AM    Accession Number: [id number]  Order No:   [id number]  Procedure:   CTS - CTA CHEST W/WO CONTRAST  CPT Code:   97286  Reason:   SHORTNESS OF BREATH  INTERPRETATION:  CTA CHEST  INDICATION: Difficulty breathing, shortness of breath  TECHNIQUE: THIN SECTION IMAGING WAS PERFORMED OF THE THORAX AFTER IV   CONTRAST WAS GIVEN AT A HIGH RATE OF ADMINISTRATION. MULTIPLE   WINDOWS WERE USED. CORONAL AND SAGITTAL REFORMATIONS WERE   PERFORMED. POST PROCESSING IMAGES INCLUDE MULTIPLANAR  REFORMATTED   IMAGES IN SAGGITAL AND CORONAL PLANES (MPR) UTILIZING MAXIMUM   INTENSITY PROJECTION (MIP). COMPARISON:  None   FINDINGS:  Pulmonary arteries:  No filling defects to suggest pulmonary   embolus. Significant motion artifact from a patient breathing   obscures the distal pulmonary arteries, tiny distal PD not seen but   not excluded. Aorta:  No evidence of aortic dissection. Mediastinum:  No acute abnormalities. Heart  slightly enlarged,   pericardium normal.  Lungs: Dependent changes, respiratory motion, lungs appear clear. Visualized upper abdomen:  No abnormalities. CORONAL and SAGITTAL REFORMATIONS  No filling defects are seen within the pulmonary arteries to   suspect pulmonary embolus . IMPRESSION:  1. No large central pulmonary embolism, significant motion artifact   would obscure a tiny distal embolus, none seen but not entirely   excluded. Note: Preliminary report provided by [facility] at the time   of this examination. ECHO   ECHO March 2018  SUMMARY:  Left ventricle: Size was at the upper limits of normal. Systolic function was  moderately reduced by EF (biplane method  of disks). Ejection fraction was estimated to be 45 % in the range of 40 % to 50 %. There was moderate diffuse  hypokinesis. There was moderate concentric hypertrophy. Doppler parameters were consistent with abnormal left  ventricular relaxation (grade 1 diastolic dysfunction).  Doppler parameters were consistent with elevated ventricular  end-diastolic filling pressure. Right ventricle: The size was at the upper limits of normal. Systolic function was normal.  Left atrium: The atrium was mildly dilated. Right atrium: The atrium was mildly dilated. Mitral valve: There was mild regurgitation. Aortic valve: The valve was trileaflet. Leaflets exhibited normal thickness, mild calcification, normal cuspal  separation, and sclerosis without stenosis. INDICATIONS: Shortness of breath.             Rich Main MD   5/14/2018
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
## 2018-05-14 NOTE — ROUTINE PROCESS
Bedside and Verbal shift change report given to SANDRA Valdes RN (oncoming nurse) by PORFIRIO Culver (offgoing nurse). Report included the following information SBAR, Kardex, Intake/Output and MAR.

## 2018-05-14 NOTE — PROGRESS NOTES
Problem: Falls - Risk of  Goal: *Absence of Falls  Document Richard Fall Risk and appropriate interventions in the flowsheet.    Outcome: Progressing Towards Goal  Fall Risk Interventions:            Medication Interventions: Evaluate medications/consider consulting pharmacy         History of Falls Interventions: Door open when patient unattended

## 2018-05-14 NOTE — PROGRESS NOTES
1930: Report received from [facility]. Pt appears to be in stable condition. Requesting nursing staff to follow his eating plan for the night. Pt non compliant with his diabetic diet. Education was provided to patient. No distress noted. 0600: Pt had an uneventful night. No acute changes noted.

## 2018-05-14 NOTE — CDMP QUERY
Dr. Agrawal Smoker:   Documentation of Jesica Barringtonkler is noted in the progress notes. Currently the patient does not meet RIFLE criteria (BSV approved) to support this diagnosis. If you are using another criteria to support this diagnosis, please document this in your progress note. Otherwise, please document in the progress notes the clinical indicators that support this diagnosis or state that the diagnosis has been ruled out. Is ARF ruled out?      --attending MD Dx CKD3  --5/12 and 5/13 pulm PN:  \"Acute on chronic renal failure improving\"  --5/11 admission creatinine 1.76 GFR AA 48   --5/14 creatinine 1.64 GFR AA 52  --historical 4/2/18 creatinine 1.47, GFR AA 59    RIFLE  (BSV Approved)  RISK:  Increased SCr x 1.5 or GFR decrease > 25% (within 7 days)  INJURY:  Increased SCr x 2.0 or GFR decreased > 50%  FAILURE:  Increased SCr x 3.0 or GFR decrease > 75% or SCr >4.0 mg/dL or acute increase >0.5 mg/dL  LOSS:  Persistent acute renal failure = complete loss of kidney function > 4 weeks  END STAGE:  End stage of kidney disease > 3 months    Thank you.   Bebeto Ponce RN BSN CCDS [phone]

## 2018-05-14 NOTE — PROGRESS NOTES
Problem: Mobility Impaired (Adult and Pediatric)  Goal: *Acute Goals and Plan of Care (Insert Text)  Pt refused PT due to:  []  Nausea/vomiting  [x]  Eating. Patient expressed desire to have PT in AM. Edu pt will make every attempt to accommodate. []  Pain  []  Pt lethargic  []  Off Unit  Will f/u tomorrow AM.  Thank you.   Costa Schreiber, PTA

## 2018-05-14 NOTE — PROGRESS NOTES
Chart reviewed, met with pt at bedside. Pt admitted from home, plans discharge to same when stable. Pt has been readmitted from discharge in April. Pt states that he has not had a chance to see [facility] because, \"I have been in and out of the hospital\". FOC offered, referral placed with CMS. [facility] responded to referral and indicated that pt has not responded to visits to home (pt states he does not have phone), they will attempt to see him again at discharge if he will answer the door. Pt also asking about personal care that he states his Medicaid authorized through [employer]- he received letter, Monty Cotter year, but not a year ago\". CM gave pt phone number to   to follow up with his  and CM will be available to assist if needed with personal care agencies. Pt will need qualifying testing for home oxygen, had home oxygen through [facility], but per pt they \"took it back\" because pt was unable to make it to MD appts for re qualification testing. Plan: home with [facility]; home oxygen if he qualifies. Care Management Interventions  PCP Verified by CM:  Yes  Transition of Care Consult (CM Consult): [address]: No  Reason Outside [place]: Patient already serviced by other home care/hospice agency ([facility])  Physical Therapy Consult: Yes  Occupational Therapy Consult: Yes  Plan discussed with Pt/Family/Caregiver: Yes  Freedom of Choice Offered: Yes  Discharge Location  Discharge Placement: Home with home health

## 2018-05-14 NOTE — PROGRESS NOTES
Awoke pt who stated \" I will take my tx later\". It is in my opinion that pt;s tx either be QID or while awake. This is the second consecutive day that the pt has refused the midmorning tx times. Pt is resting comfortably; no respiratory distress noted. Will continue to monitor.      Tracie Fournier, RRT

## 2018-05-14 NOTE — PROGRESS NOTES
Hospitalist Progress Note-critical care note     Patient: Meseret Gaspar MRN: [id number]  CSN: [id number]    YOB: 1960  Age: 59 y.o. Sex: male    DOA: 5/11/2018 LOS:  LOS: 3 days            Chief complaint: acute respiratory distress, hypertensive emergency. DM, copd exacerbation     Assessment/Plan         Hospital Problems  Date Reviewed: 2/19/2013          Codes Class Noted POA    SOB (shortness of breath) ICD-10-CM: R06.02  ICD-9-CM: 786.05  5/11/2018 Unknown        * (Principal)Acute respiratory distress ICD-10-CM: R06.03  ICD-9-CM: 518.82  5/11/2018 Unknown        Hypertensive emergency ICD-10-CM: I16.1  ICD-9-CM: 401.9  5/11/2018 Unknown        Cocaine abuse ICD-10-CM: F14.10  ICD-9-CM: 305.60  4/22/2018 Yes        Acute on chronic combined systolic and diastolic ACC/AHA stage C congestive heart failure (HCC) (Chronic) ICD-10-CM: I50.43  ICD-9-CM: 428.43, 428.0  1/25/2018 Yes        COPD exacerbation ([facility] ICD-10-CM: J44.1  ICD-9-CM: 491.21  2/19/2013 Yes        Type II diabetes mellitus with peripheral autonomic neuropathy ([facility] ICD-10-CM: E11.43  ICD-9-CM: 250.60, 337.1  2/19/2013 Yes        JIM (obstructive sleep apnea) ICD-10-CM: G47.33  ICD-9-CM: 327.23  2/19/2013 Yes        Morbid obesity (HCC) ICD-10-CM: E66.01  ICD-9-CM: 278.01  2/19/2013 Yes              Acute respiratory distress   Resolved      COPD exac:   Resolving    on Levaquin and breathing tx , on po steroid      Acute on chronic diastolic CHF:   -continue IV lasix 40 mg BID  Echo on 3/2018.  WG 14 and diastolic dysfunction  Increased filling pressure       Hypertensive emergency   Resolved, not compliance medication   clonidine , will d.c nitro patch and increase hydralazine        Cocaine abuse:   uds positive for cocaine , continue educating pt            Uncontrolled DM:    on SSI             JIM: cpap         ckd3   Stable   monitor renal function in the setting of diuresis , monitor electrolytes       Morbid obesity/BMI 54      High risks for readmission    Disposition :W[place] -the day agree to leave     Subjective: I still wheezing, chest congestion, ready to leave on Wednesday   Nurse; no acute issue   Review of systems:    General: No fevers or chills. Cardiovascular: No chest pain or pressure. No palpitations. Pulmonary:  shortness of breath, wheezing, chest congestion    Gastrointestinal: No nausea, vomiting. Vital signs/Intake and Output:  Visit Vitals    BP (!) 152/97 (BP 1 Location: Right arm, BP Patient Position: At rest;Sitting)    Pulse 70    Temp 97.6 °F (36.4 °C)    Resp 21    Ht 5' 6\" (1.676 m)    Wt (!) 169.6 kg (373 lb 14.4 oz)    SpO2 99%    BMI 60.35 kg/m2     Current Shift:  05/14 0701 - 05/14 1900  In: 720 [P.O.:720]  Out: 2055 [Urine:2055]  Last three shifts:  05/12 1901 - 05/14 0700  In: 9940 [P.O.:9940]  Out: 6700 [Urine:6700]    Physical Exam:  General: WD, WN. Alert, cooperative, no acute distress    HEENT: NC, Atraumatic. PERRLA, anicteric sclerae. Lungs: Wheezing in upper air way   Heart:  Regular  rhythm,  No murmur, No Rubs, No Gallops  Abdomen: Soft, Non distended, Non tender.  +Bowel sounds,   Extremities: No c/c/e  Psych:   Not anxious or agitated. Neurologic:  No acute neurological deficit. Labs: Results:       Chemistry Recent Labs      05/14/18 0335 05/13/18   0604 05/12/18   0530   GLU  175*  156*  147*   NA  135*  134*  134*   K  4.7  5.0  4.6   CL  99*  99*  98*   CO2  32  30  29   BUN  34*  30*  27*   CREA  1.64*  1.70*  1.69*   CA  9.3  9.5  9.4   AGAP  4  5  7   BUCR  21*  18  16      CBC w/Diff Recent Labs      05/14/18 0335 05/13/18   0604  05/12/18   0530   WBC  7.6  9.4  7.6   RBC  5.19  5.40  5.38   HGB  14.9  15.5  15.4   HCT  48.1*  49.2*  48.6*   PLT  153  158  136   GRANS  97*  93*  93*   LYMPH  1*  3*  6*   EOS  0  0  0      Cardiac Enzymes No results for input(s): CPK, CKND1, TISHA in the last 72 hours.     No lab exists for component: CKRMB, TROIP   Coagulation No results for input(s): PTP, INR, APTT in the last 72 hours. No lab exists for component: INREXT    Lipid Panel Lab Results   Component Value Date/Time    Cholesterol, total 196 01/25/2018 02:51 AM    HDL Cholesterol 64 (H) 01/25/2018 02:51 AM    LDL, calculated 121.2 (H) 01/25/2018 02:51 AM    VLDL, calculated 10.8 01/25/2018 02:51 AM    Triglyceride 54 01/25/2018 02:51 AM    CHOL/HDL Ratio 3.1 01/25/2018 02:51 AM      BNP No results for input(s): BNPP in the last 72 hours. Liver Enzymes No results for input(s): TP, ALB, TBIL, AP, SGOT, GPT in the last 72 hours.     No lab exists for component: DBIL   Thyroid Studies Lab Results   Component Value Date/Time    TSH 0.52 05/11/2018 08:06 AM        Procedures/imaging: see electronic medical records for all procedures/Xrays and details which were not copied into this note but were reviewed prior to creation of Chidi Molina MD

## 2018-05-14 NOTE — PROGRESS NOTES
Entered pt's room for 0000 breathing tx. Pt states \" skip this one\". 0000 tx was skipped. Pt was eating upon my entering the room. Will continue to monitor.      Josefina Cowan, RRT

## 2018-05-15 LAB
GLUCOSE BLD STRIP.AUTO-MCNC: 145 MG/DL (ref 70–110)
GLUCOSE BLD STRIP.AUTO-MCNC: 166 MG/DL (ref 70–110)
GLUCOSE BLD STRIP.AUTO-MCNC: 210 MG/DL (ref 70–110)
GLUCOSE BLD STRIP.AUTO-MCNC: 227 MG/DL (ref 70–110)
MAGNESIUM SERPL-MCNC: 2 MG/DL (ref 1.6–2.6)

## 2018-05-15 PROCEDURE — 74011000250 HC RX REV CODE- 250: Performed by: HOSPITALIST

## 2018-05-15 PROCEDURE — 36415 COLL VENOUS BLD VENIPUNCTURE: CPT | Performed by: HOSPITALIST

## 2018-05-15 PROCEDURE — 97116 GAIT TRAINING THERAPY: CPT

## 2018-05-15 PROCEDURE — 77010033678 HC OXYGEN DAILY

## 2018-05-15 PROCEDURE — 65660000000 HC RM CCU STEPDOWN

## 2018-05-15 PROCEDURE — 83735 ASSAY OF MAGNESIUM: CPT | Performed by: HOSPITALIST

## 2018-05-15 PROCEDURE — 94640 AIRWAY INHALATION TREATMENT: CPT

## 2018-05-15 PROCEDURE — 74011250637 HC RX REV CODE- 250/637: Performed by: HOSPITALIST

## 2018-05-15 PROCEDURE — 82962 GLUCOSE BLOOD TEST: CPT

## 2018-05-15 PROCEDURE — 94760 N-INVAS EAR/PLS OXIMETRY 1: CPT

## 2018-05-15 PROCEDURE — 74011636637 HC RX REV CODE- 636/637: Performed by: HOSPITALIST

## 2018-05-15 PROCEDURE — 97530 THERAPEUTIC ACTIVITIES: CPT

## 2018-05-15 PROCEDURE — 74011250636 HC RX REV CODE- 250/636: Performed by: HOSPITALIST

## 2018-05-15 RX ADMIN — HYDRALAZINE HYDROCHLORIDE 75 MG: 50 TABLET, FILM COATED ORAL at 22:58

## 2018-05-15 RX ADMIN — BUDESONIDE 500 MCG: 0.5 INHALANT RESPIRATORY (INHALATION) at 19:58

## 2018-05-15 RX ADMIN — INSULIN LISPRO 4 UNITS: 100 INJECTION, SOLUTION INTRAVENOUS; SUBCUTANEOUS at 23:00

## 2018-05-15 RX ADMIN — INSULIN LISPRO 2 UNITS: 100 INJECTION, SOLUTION INTRAVENOUS; SUBCUTANEOUS at 17:12

## 2018-05-15 RX ADMIN — DOCUSATE SODIUM 100 MG: 100 CAPSULE, LIQUID FILLED ORAL at 22:58

## 2018-05-15 RX ADMIN — PANTOPRAZOLE SODIUM 40 MG: 40 TABLET, DELAYED RELEASE ORAL at 06:45

## 2018-05-15 RX ADMIN — HEPARIN SODIUM 5000 UNITS: 5000 INJECTION, SOLUTION INTRAVENOUS; SUBCUTANEOUS at 14:16

## 2018-05-15 RX ADMIN — HEPARIN SODIUM 5000 UNITS: 5000 INJECTION, SOLUTION INTRAVENOUS; SUBCUTANEOUS at 22:58

## 2018-05-15 RX ADMIN — ARFORMOTEROL TARTRATE 15 MCG: 15 SOLUTION RESPIRATORY (INHALATION) at 19:58

## 2018-05-15 RX ADMIN — AMLODIPINE BESYLATE 5 MG: 5 TABLET ORAL at 08:37

## 2018-05-15 RX ADMIN — INSULIN LISPRO 4 UNITS: 100 INJECTION, SOLUTION INTRAVENOUS; SUBCUTANEOUS at 06:37

## 2018-05-15 RX ADMIN — HYDRALAZINE HYDROCHLORIDE 75 MG: 50 TABLET, FILM COATED ORAL at 16:15

## 2018-05-15 RX ADMIN — ARFORMOTEROL TARTRATE 15 MCG: 15 SOLUTION RESPIRATORY (INHALATION) at 07:22

## 2018-05-15 RX ADMIN — CLONIDINE HYDROCHLORIDE 0.2 MG: 0.1 TABLET ORAL at 14:16

## 2018-05-15 RX ADMIN — FUROSEMIDE 40 MG: 10 INJECTION, SOLUTION INTRAMUSCULAR; INTRAVENOUS at 22:59

## 2018-05-15 RX ADMIN — IPRATROPIUM BROMIDE AND ALBUTEROL SULFATE 3 ML: .5; 3 SOLUTION RESPIRATORY (INHALATION) at 19:58

## 2018-05-15 RX ADMIN — DOCUSATE SODIUM 100 MG: 100 CAPSULE, LIQUID FILLED ORAL at 08:37

## 2018-05-15 RX ADMIN — IPRATROPIUM BROMIDE AND ALBUTEROL SULFATE 3 ML: .5; 3 SOLUTION RESPIRATORY (INHALATION) at 07:22

## 2018-05-15 RX ADMIN — CLONIDINE HYDROCHLORIDE 0.2 MG: 0.1 TABLET ORAL at 22:58

## 2018-05-15 RX ADMIN — FUROSEMIDE 40 MG: 10 INJECTION, SOLUTION INTRAMUSCULAR; INTRAVENOUS at 08:37

## 2018-05-15 RX ADMIN — PREDNISONE 40 MG: 20 TABLET ORAL at 08:36

## 2018-05-15 RX ADMIN — SPIRONOLACTONE 25 MG: 25 TABLET, FILM COATED ORAL at 08:37

## 2018-05-15 RX ADMIN — BUDESONIDE 500 MCG: 0.5 INHALANT RESPIRATORY (INHALATION) at 07:22

## 2018-05-15 RX ADMIN — HEPARIN SODIUM 5000 UNITS: 5000 INJECTION, SOLUTION INTRAVENOUS; SUBCUTANEOUS at 06:46

## 2018-05-15 RX ADMIN — IPRATROPIUM BROMIDE AND ALBUTEROL SULFATE 3 ML: .5; 3 SOLUTION RESPIRATORY (INHALATION) at 11:11

## 2018-05-15 RX ADMIN — LEVOFLOXACIN 500 MG: 5 INJECTION, SOLUTION INTRAVENOUS at 16:15

## 2018-05-15 RX ADMIN — HYDRALAZINE HYDROCHLORIDE 75 MG: 50 TABLET, FILM COATED ORAL at 08:36

## 2018-05-15 RX ADMIN — CLONIDINE HYDROCHLORIDE 0.2 MG: 0.1 TABLET ORAL at 06:45

## 2018-05-15 NOTE — PROGRESS NOTES
Problem: Mobility Impaired (Adult and Pediatric)  Goal: *Acute Goals and Plan of Care (Insert Text)  Physical Therapy Goals  Initiated 5/12/2018 and to be accomplished within 3-7 day(s)  1. Patient will move from supine to sit and sit to supine  in bed with supervision/set-up. 2.  Patient will transfer from bed to chair and chair to bed with supervision/set-up using the least restrictive device. 3.  Patient will perform sit to stand with supervision/set-up. 4.  Patient will ambulate with supervision/set-up for 150 feet with the least restrictive device. 5.  Patient will ascend/descend 2 stairs with handrail(s) with supervision/set-up. Outcome: Progressing Towards Goal  physical Therapy TREATMENT    Patient: Giana Yang (89 y.o. male)  Date: 5/15/2018  Diagnosis: SOB (shortness of breath) Acute respiratory distress       Precautions: Fall   Chart, physical therapy assessment, plan of care and goals were reviewed. ASSESSMENT:  Pt is very mobile and his SPO2 increases from 96 to 98%, on room air. Pt is selective about activties he is will ing to perform, but is very capable of safe for home D/c. Progression toward goals:  []      Improving appropriately and progressing toward goals  []      Improving slowly and progressing toward goals  []      Not making progress toward goals and plan of care will be adjusted     PLAN:  Patient continues to benefit from skilled intervention to address the above impairments. Continue treatment per established plan of care. Discharge Recommendations:  Home Health  Further Equipment Recommendations for Discharge:  gait belt     SUBJECTIVE:   Patient stated I get dizzy when I get up. I'm gonna fall if you me make me go out there.     OBJECTIVE DATA SUMMARY:   Critical Behavior:  Neurologic State: Alert  Orientation Level: Oriented X4  Cognition: Appropriate decision making, Appropriate for age attention/concentration, Appropriate safety awareness, Follows commands, Recognition of people/places  Safety/Judgement: Awareness of environment  Functional Mobility Training:  Bed Mobility:  Rolling: Supervision  Supine to Sit: Supervision  Sit to Supine: Supervision  Transfers:  Sit to Stand: Supervision  Stand to Sit: Supervision  Balance:  Sitting: Intact  Sitting - Static: Good (unsupported)  Sitting - Dynamic: Fair (occasional)  Standing: Impaired  Ambulation/Gait Training:  Distance (ft): 160 Feet (ft)  Ambulation - Level of Assistance: Modified independent  Base of Support: Widened  Speed/Colette: Slow  Pain:  Pain Scale 1: Numeric (0 - 10)  Pain Intensity 1: 0  Pain Out: 0  Activity Tolerance:   Fair   Please refer to the flowsheet for vital signs taken during this treatment.   After treatment:   [x] Patient left in no apparent distress sitting EOB  [] Patient left in no apparent distress in bed  [x] Call bell left within reach  [x] Nursing notified  [x] Caregiver present  [] Bed alarm activated      Brandon Veliz PTA   Time Calculation: 23 mins

## 2018-05-15 NOTE — PROGRESS NOTES
1925: Assumed patient care, he was alert and oriented to person, place, time and situation. Respiratory status was stable on 3L via nasal cannula. Vital signs were stable. MEWS score was a one. Patient denied any nausea vomiting dizziness or anxiety. White board was updated and explained. Bed was locked and in lowest position. Call bell, water and personal belongings were within reach. Patient had no questions, comments or concerns after bedside shift report. 2200: Patient refused to wear his CPAP at [facility]. Patient was educated on the need for and possible risks of refusing the treatment. 0700: Patient had an uneventful shift. Respiratory status, vital signs and MEWS score remained stable. Patient was resting quietly with no signs of distress noted. Bed locked and in lowest position. Call bell water and personal belongings were within reach. Patient had no questions, comments or concerns after bedside shift report.  Bedside report given to [facility] ASHLY ALMENDAREZ

## 2018-05-15 NOTE — PROGRESS NOTES
Hospitalist Progress Note    Patient: Angelika Fountain MRN: [id number]  CSN: [id number]    YOB: 1960  Age: 59 y.o. Sex: male    DOA: 5/11/2018 LOS:  LOS: 4 days          Chief Complaint:    Acute COPD Exacerbation/Respiratory Distress    Assessment/Plan     1. Acute Respiratory Distress - Resolved. 2. COPD Exacerbation  3. Acute on Chronic Diastolic CHF  4. Hypertensive Emergency  5. Cocaine Abuse  6. Uncontrolled DM  7. JIM  8. CKD 3  9. Morbid Obesity    2. Appreciate pulmonology input. Discussed the case with Dr Farrukh Reynaga. Will continue with current treatment plan as wheezing persists. Continue Levaquin. Patient has refused breathing treatments on 5/14/18; no evidence of refusal yet today. 3. Continue Lasix. 4. Resolved. Continued noncompliance with hypertensive medications. 5. Counseled on cessation. 6. Continue current regimen. 7. Refusing CPAP. 8. Stable. DVT Prophylaxis - Heparin  Dispo: Likely can d/c Thursday. Patient Active Problem List   Diagnosis Code    COPD exacerbation ([facility] J44.1    Type II diabetes mellitus with peripheral autonomic neuropathy ([facility]) E11.43    Hypertension I10    JIM (obstructive sleep apnea) G47.33    Hypoxia R09.02    Morbid obesity ([facility]) E66.01    Chest pain R07.9    Acute on chronic combined systolic and diastolic ACC/AHA stage C congestive heart failure ([facility]) I50.43    CHF (congestive heart failure) ([facility]) I50.9    Acute kidney injury ([facility]) N17.9    COPD (chronic obstructive pulmonary disease) ([facility]) J44.9    Acute on chronic respiratory failure ([facility]) J96.20    Cocaine abuse F14.10    SOB (shortness of breath) R06.02    Acute respiratory distress R06.03    Hypertensive emergency I16.1       Subjective:    States he is having some difficulty breathing, no improvement in symptoms.      Review of systems:    Constitutional: denies fevers, chills, myalgias  Respiratory:  +SOB, +cough, +wheezing  Cardiovascular: denies chest pain, palpitations  Gastrointestinal: denies nausea, vomiting, diarrhea      Vital signs/Intake and Output:  Visit Vitals    /81 (BP 1 Location: Right arm, BP Patient Position: At rest;Sitting)    Pulse 64    Temp 98.1 °F (36.7 °C)    Resp 18    Ht 5' 6\" (1.676 m)    Wt (!) 171 kg (377 lb)    SpO2 98%    BMI 60.85 kg/m2     Current Shift:  05/15 0701 - 05/15 1900  In: -   Out: 2075 [Urine:2075]  Last three shifts:  05/13 1901 - 05/15 0700  In: 4920 [P.O.:4920]  Out: 7730 [Urine:7730]    Exam:    General: Morbidly obese, alert, NAD, OX3  Head/Neck: NCAT, supple, No masses, No lymphadenopathy  CVS:Regular rate and rhythm, no M/R/G, S1/S2 heard, no thrill  Lungs:Diminished air entry, wheezing bilaterally  Abdomen: Soft, Nontender, No distention, Normal Bowel sounds, No hepatomegaly  Extremities: BLE edema  Skin:normal texture and turgor, no rashes, no lesions  Neuro:grossly normal , follows commands  Psych:appropriate                Labs: Results:       Chemistry Recent Labs      05/14/18   0335  05/13/18   0604   GLU  175*  156*   NA  135*  134*   K  4.7  5.0   CL  99*  99*   CO2  32  30   BUN  34*  30*   CREA  1.64*  1.70*   CA  9.3  9.5   AGAP  4  5   BUCR  21*  18      CBC w/Diff Recent Labs      05/14/18 0335  05/13/18   0604   WBC  7.6  9.4   RBC  5.19  5.40   HGB  14.9  15.5   HCT  48.1*  49.2*   PLT  153  158   GRANS  97*  93*   LYMPH  1*  3*   EOS  0  0      Cardiac Enzymes No results for input(s): CPK, CKND1, TISHA in the last 72 hours. No lab exists for component: CKRMB, TROIP   Coagulation No results for input(s): PTP, INR, APTT in the last 72 hours.     No lab exists for component: INREXT    Lipid Panel Lab Results   Component Value Date/Time    Cholesterol, total 196 01/25/2018 02:51 AM    HDL Cholesterol 64 (H) 01/25/2018 02:51 AM    LDL, calculated 121.2 (H) 01/25/2018 02:51 AM    VLDL, calculated 10.8 01/25/2018 02:51 AM    Triglyceride 54 01/25/2018 02:51 AM    CHOL/HDL Ratio 3.1 01/25/2018 02:51 AM      BNP No results for input(s): BNPP in the last 72 hours. Liver Enzymes No results for input(s): TP, ALB, TBIL, AP, SGOT, GPT in the last 72 hours.     No lab exists for component: DBIL   Thyroid Studies Lab Results   Component Value Date/Time    TSH 0.52 05/11/2018 08:06 AM        Procedures/imaging: see electronic medical records for all procedures/Xrays and details which were not copied into this note but were reviewed prior to creation of [address] Jaison Pina, PA-C

## 2018-05-15 NOTE — PROGRESS NOTES
Patient refused neb treatment. Stated he wanted to sleep. Patient in no acute distress, SP02 91% on room air.

## 2018-05-15 NOTE — PROGRESS NOTES
Problem: Self Care Deficits Care Plan (Adult)  Goal: *Acute Goals and Plan of Care (Insert Text)  Occupational Therapy Goals  Initiated 5/14/2018 within 7 day(s). 1.  Patient will perform lower body dressing with modified independence while sitting on EOB and with O2 sats maintainted >/=90%  2. Patient will perform grooming with mod I while standing at sink. 3.  Patient will perform toilet transfers with modified independence. 4.  Patient will perform all aspects of toileting with modified independence. 5.  Patient will utilize energy conservation techniques during functional activities with 1-2 verbal cue(s). Occupational Therapy Treatment Attempt/Discharge    Chart reviewed. Attempted Occupational Therapy Treatment, however, patient unable to be seen due to:  []  Nausea/vomiting  []  Eating  []  Pain  []  Patient too lethargic  []  Off Unit for testing/procedure  []  Dialysis treatment in progress  []  Telemetry Results  [x]  Other:  Pt reports he just finished washing up with CNA; reports deficits in IADLs with request for Care Coordination assist w/ Thao notified. Pt able to complete ADLs near/at baseline. Pt reports \"dizzy\" but lacks understanding of co-morbidities affecting functional endurance. Limited education opportunity with patient as pt w/o insight. Pt does report he knows he's getting older and slowing down, but perseverates on needing a private caregiver to cook and clean for him because it's too difficult. Will f/u later as patient's schedule allows.    Thank you for this referral.  Ethel Jones, OTR/L

## 2018-05-15 NOTE — PROGRESS NOTES
[facility]                  Pulmonary, Critical Care, and Sleep Medicine     [facility] at [address] [facility]   [address] [facility], [address] [place] [address], [address] [place] [address], [place], [address]   Phone: [phone]. Fax: [phone] Phone: [phone]. Fax: [phone]     Name: Charan Vasquez MRN: [id number]   : 1960 Hospital: [facility] [place]   Date: 5/15/2018  Room: [address] [place] [address] NOTE:      IMPRESSION:   AE-COPD  Acute on chronic systolic and diastolic CHF  Acute hypoxic respiratory failure due to above. (VQ scan 18: low probability for PE)  Cocaine abuse  Possible JIM  Morbid obesity  DM  HTN emergency on admission. CKD  Medication noncompliance in the hospital and at home. PLAN:  Refusing nebs therapy intermittently. Advised to comply with therapy. C/w duoneb, brovana, pulmicort  C/w prednisone 40 mg daily, taper slowly as tolerated. Will leave the same dose today due to persistent wheezing. May change duoneb to atrovent once wheezing improves   Complete levaquin as planned for 5 days. Last dose today. Titrate fio2 as tolerated, keep spo2 >==92%    Will defer respective systems problem management to primary and other consultant and follow patient in ICU with primary and other medical team  Further recommendations will be based on the patient's response to recommended treatment and results of the investigation ordered. Will follow from pulmonary standpoint. D/w pt and Vena Angles. Patient is a 59 y. o.AA male with hx of cocaine abuse, CHF, COPD, DM type 2, medical noncompliance, suspected sleep apnea, recurrent hospitalization. Admitted with AE-COPD, AE-CHF, requiring BiPAP on admission. 5/15/18:  Refused neb therapy overnight.    \"I can't go home before Thursday\"   C/o cough without sputum production  C/o not feeling well  C/o wheezing and dyspnea. No fever chills cp  Leg edema improving  No other overnight events.        Current Facility-Administered Medications   Medication Dose Route Frequency    albuterol-ipratropium (DUO-NEB) 2.5 MG-0.5 MG/3 ML  3 mL Nebulization Q4HWA RT    hydrALAZINE (APRESOLINE) tablet 75 mg  75 mg Oral TID    predniSONE (DELTASONE) tablet 40 mg  40 mg Oral DAILY WITH BREAKFAST    cloNIDine HCl (CATAPRES) tablet 0.2 mg  0.2 mg Oral Q8H    amLODIPine (NORVASC) tablet 5 mg  5 mg Oral DAILY    budesonide (PULMICORT) 500 mcg/2 ml nebulizer suspension  500 mcg Nebulization BID RT    arformoterol (BROVANA) neb solution 15 mcg  15 mcg Nebulization BID RT    spironolactone (ALDACTONE) tablet 25 mg  25 mg Oral DAILY    docusate sodium (COLACE) capsule 100 mg  100 mg Oral BID    heparin (porcine) injection 5,000 Units  5,000 Units SubCUTAneous Q8H    insulin lispro (HUMALOG) injection   SubCUTAneous AC&HS    levoFLOXacin (LEVAQUIN) 500 mg in D5W IVPB  500 mg IntraVENous Q24H    furosemide (LASIX) injection 40 mg  40 mg IntraVENous BID    pantoprazole (PROTONIX) tablet 40 mg  40 mg Oral ACB           Objective:   Vital Signs:    Visit Vitals    /81 (BP 1 Location: Right arm, BP Patient Position: At rest;Sitting)    Pulse 64    Temp 98.1 °F (36.7 °C)    Resp 18    Ht 5' 6\" (1.676 m)    Wt (!) 171 kg (377 lb)    SpO2 98%    BMI 60.85 kg/m2       O2 Device: Nasal cannula   O2 Flow Rate (L/min): 2 l/min   Temp (24hrs), Av.2 °F (36.8 °C), Min:98.1 °F (36.7 °C), Max:98.7 °F (37.1 °C)       Intake/Output:   Last shift:      05/15 0701 - 05/15 1900  In: -   Out:  [Urine:]  Last 3 shifts: 1901 - 05/15 0700  In: 4920 [P.O.:4920]  Out: 7730 [Urine:7730]    Intake/Output Summary (Last 24 hours) at 05/15/18 1154  Last data filed at 05/15/18 1052   Gross per 24 hour   Intake             1680 ml   Output             5505 ml   Net            -3825 ml         Physical Exam:   Comfortable; on nc o2; acyanotic. Morbidly obese. HEENT: no scleral jaundice, moist oral mucosa, no nasal drainage; neck supple  Neck: No adenopathy or thyroid swelling  CVS: S1S2 no murmurs; JVD difficult to evaluated with short thick neck. RS: Mod air entry bilaterally. No crackles or rhonchi. End-expiratory wheezing +  Abd: soft, non tender  Neuro: awake, alert, moving all extremities  Extrm: mild bilateral pitting leg edema, no swelling or clubbing  Skin: no rash      Data review:     CBC w/Diff Recent Labs      05/14/18   0335  05/13/18   0604   WBC  7.6  9.4   RBC  5.19  5.40   HGB  14.9  15.5   HCT  48.1*  49.2*   PLT  153  158   GRANS  97*  93*   LYMPH  1*  3*   EOS  0  0        Chemistry Recent Labs      05/15/18   0443  05/14/18   0335  05/13/18   0604   GLU   --   175*  156*   NA   --   135*  134*   K   --   4.7  5.0   CL   --   99*  99*   CO2   --   32  30   BUN   --   34*  30*   CREA   --   1.64*  1.70*   CA   --   9.3  9.5   MG  2.0  2.2  2.3   AGAP   --   4  5   BUCR   --   21*  18        Lactic Acid Lactic acid   Date Value Ref Range Status   04/22/2018 0.7 0.4 - 2.0 MMOL/L Final     No results for input(s): LAC in the last 72 hours. ABG No results for input(s): PHI, PHI, POC2, PCO2I, PO2, PO2I, HCO3, HCO3I, FIO2, FIO2I in the last 72 hours. Liver Enzymes Protein, total   Date Value Ref Range Status   05/11/2018 7.4 6.4 - 8.2 g/dL Final     Albumin   Date Value Ref Range Status   05/11/2018 3.4 3.4 - 5.0 g/dL Final     Globulin   Date Value Ref Range Status   05/11/2018 4.0 2.0 - 4.0 g/dL Final     A-G Ratio   Date Value Ref Range Status   05/11/2018 0.9 0.8 - 1.7   Final     AST (SGOT)   Date Value Ref Range Status   05/11/2018 18 15 - 37 U/L Final     Alk. phosphatase   Date Value Ref Range Status   05/11/2018 85 45 - 117 U/L Final     No results for input(s): TP, ALB, GLOB, AGRAT, SGOT, GPT, AP, TBIL in the last 72 hours.     No lab exists for component: DBIL       Thyroid  Lab Results   Component Value Date/Time    TSH 0.52 05/11/2018 08:06 AM          Lipid Panel Lab Results   Component Value Date/Time    Cholesterol, total 196 01/25/2018 02:51 AM    HDL Cholesterol 64 (H) 01/25/2018 02:51 AM    LDL, calculated 121.2 (H) 01/25/2018 02:51 AM    VLDL, calculated 10.8 01/25/2018 02:51 AM    Triglyceride 54 01/25/2018 02:51 AM    CHOL/HDL Ratio 3.1 01/25/2018 02:51 AM          Urinalysis Lab Results   Component Value Date/Time    Color DARK YELLOW 05/11/2018 01:20 PM    Appearance CLEAR 05/11/2018 01:20 PM    Specific gravity >1.030 (H) 05/11/2018 01:20 PM    pH (UA) 5.0 05/11/2018 01:20 PM    Protein 100 (A) 05/11/2018 01:20 PM    Glucose NEGATIVE  05/11/2018 01:20 PM    Ketone TRACE (A) 05/11/2018 01:20 PM    Bilirubin SMALL (A) 05/11/2018 01:20 PM    Urobilinogen 1.0 05/11/2018 01:20 PM    Nitrites NEGATIVE  05/11/2018 01:20 PM    Leukocyte Esterase NEGATIVE  05/11/2018 01:20 PM    Epithelial cells FEW 05/11/2018 01:20 PM    Bacteria FEW (A) 05/11/2018 01:20 PM    WBC 0 to 1 05/11/2018 01:20 PM    RBC 0 to 1 05/11/2018 01:20 PM        XR (Most Recent). CXR reviewed by me and compared with previous CXR   Results from Hospital Encounter encounter on 05/11/18   XR CHEST PORT   Narrative Chest, single view    Indication: Hypoxia    Comparison: 5/11/2018    Findings:  Portable upright AP view of the chest was obtained. The  cardiomediastinal silhouette appears unchanged, laterally prominent in size. The pulmonary vasculature is unremarkable. The lungs are hypoinflated with  improved interval aeration of the retrocardiac left lower lobe. No confluent  airspace opacity is seen throughout the lungs. No pleural effusion nor  pneumothorax. No acute osseous abnormality. Impression Impression:  Low lung volumes with no radiographic evidence of an acute abnormality.          CT (Most Recent)   Results from [place] encounter on 06/15/11   CTA CHEST W AND W/O CONTRAST   Narrative Ordering MD: Smith Sanchez Darrin Parsons MD  Signed By: Glory Arora MD  ** FINAL **  ---------------------------------------------------------------------  PROCEDURE DATE:  Adonis 15 2011  3:17AM    Accession Number:  [id number]  Order No:   [id number]  Procedure:   CTS - CTA CHEST W/WO CONTRAST  CPT Code:   04496  Reason:   SHORTNESS OF BREATH  INTERPRETATION:  CTA CHEST  INDICATION: Difficulty breathing, shortness of breath  TECHNIQUE: THIN SECTION IMAGING WAS PERFORMED OF THE THORAX AFTER IV   CONTRAST WAS GIVEN AT A HIGH RATE OF ADMINISTRATION. MULTIPLE   WINDOWS WERE USED. CORONAL AND SAGITTAL REFORMATIONS WERE   PERFORMED. POST PROCESSING IMAGES INCLUDE MULTIPLANAR  REFORMATTED   IMAGES IN SAGGITAL AND CORONAL PLANES (MPR) UTILIZING MAXIMUM   INTENSITY PROJECTION (MIP). COMPARISON:  None   FINDINGS:  Pulmonary arteries:  No filling defects to suggest pulmonary   embolus. Significant motion artifact from a patient breathing   obscures the distal pulmonary arteries, tiny distal PD not seen but   not excluded. Aorta:  No evidence of aortic dissection. Mediastinum:  No acute abnormalities. Heart  slightly enlarged,   pericardium normal.  Lungs: Dependent changes, respiratory motion, lungs appear clear. Visualized upper abdomen:  No abnormalities. CORONAL and SAGITTAL REFORMATIONS  No filling defects are seen within the pulmonary arteries to   suspect pulmonary embolus . IMPRESSION:  1. No large central pulmonary embolism, significant motion artifact   would obscure a tiny distal embolus, none seen but not entirely   excluded. Note: Preliminary report provided by [employer] at the time   of this examination. ECHO   ECHO March 2018  SUMMARY:  Left ventricle: Size was at the upper limits of normal. Systolic function was  moderately reduced by EF (biplane method  of disks). Ejection fraction was estimated to be 45 % in the range of 40 % to 50 %. There was moderate diffuse  hypokinesis.  There was moderate concentric hypertrophy. Doppler parameters were consistent with abnormal left  ventricular relaxation (grade 1 diastolic dysfunction). Doppler parameters were consistent with elevated ventricular  end-diastolic filling pressure. Right ventricle: The size was at the upper limits of normal. Systolic function was normal.  Left atrium: The atrium was mildly dilated. Right atrium: The atrium was mildly dilated. Mitral valve: There was mild regurgitation. Aortic valve: The valve was trileaflet. Leaflets exhibited normal thickness, mild calcification, normal cuspal  separation, and sclerosis without stenosis. INDICATIONS: Shortness of breath. VQ scan 5/11/18: Low probability for pulmonary embolism without definite focal segmental  perfusion defect.        Esme Ludwig MD   5/15/2018

## 2018-05-15 NOTE — PROGRESS NOTES
Problem: Self Care Deficits Care Plan (Adult)  Goal: *Acute Goals and Plan of Care (Insert Text)  Occupational Therapy Goals  Initiated 5/14/2018 within 7 day(s). 1.  Patient will perform lower body dressing with modified independence while sitting on EOB and with O2 sats maintainted >/=90%  2. Patient will perform grooming with mod I while standing at sink. 3.  Patient will perform toilet transfers with modified independence. 4.  Patient will perform all aspects of toileting with modified independence. 5.  Patient will utilize energy conservation techniques during functional activities with 1-2 verbal cue(s). Occupational Therapy Treatment Attempt    Chart reviewed. Attempted Occupational Therapy Treatment, however, patient unable to be seen due to:  []  Nausea/vomiting  [x]  Eating/seated EOB  []  Pain  []  Patient too lethargic  []  Off Unit for testing/procedure  []  Dialysis treatment in progress  []  Telemetry Results  []  Other:     Will f/u later as patient's schedule allows.    Thank you for this referral.  Ethel Jones, OTR/L

## 2018-05-15 NOTE — PROGRESS NOTES
D/c plan: home when medically cleared. Patient informed cm he was just called by [employer] and they are calling him because they are picking up his machine. Telephone call to [email or web address] [phone]. They informed cm they would be pick ing up the cpap machine because they ran a test and he has not used it since he has had it. Cm spoke with pateints nurse he is refusing cpap here. [facility] informed cm they have been trying to meet with the patient to get the machine but he has refused to meed with them. Patient also informed cm he would like [facility]. Informed him that they have been arranged durig the last 3 visits. [facility] informed cm he has not been home or answer the door when they have attempted to make the visit. Patient was informed of this. He informed cm \"well sometimes you just don't feel like answering the door\"  Patient states he will use [facility] and he will answer the door. Patient presently on oxygen he will need a wean or walk test  Care Management Interventions  PCP Verified by CM:  Yes  Transition of Care Consult (CM Consult): [address]: No  Reason [facility]: Patient already serviced by other home care/hospice agency ([facility])  Physical Therapy Consult: Yes  Occupational Therapy Consult: Yes  Plan discussed with Pt/Family/Caregiver: Yes  Freedom of Choice Offered: Yes  Discharge Location  Discharge Placement: Home with home health

## 2018-05-15 NOTE — PROGRESS NOTES
Problem: Falls - Risk of  Goal: *Absence of Falls  Document Richard Fall Risk and appropriate interventions in the flowsheet.    Outcome: Progressing Towards Goal  Fall Risk Interventions:            Medication Interventions: Patient to call before getting OOB         History of Falls Interventions: Consult care management for discharge planning

## 2018-05-16 VITALS
DIASTOLIC BLOOD PRESSURE: 77 MMHG | SYSTOLIC BLOOD PRESSURE: 132 MMHG | HEART RATE: 81 BPM | HEIGHT: 66 IN | BODY MASS INDEX: 50.62 KG/M2 | WEIGHT: 315 LBS | RESPIRATION RATE: 20 BRPM | TEMPERATURE: 97.4 F | OXYGEN SATURATION: 100 %

## 2018-05-16 LAB
GLUCOSE BLD STRIP.AUTO-MCNC: 125 MG/DL (ref 70–110)
GLUCOSE BLD STRIP.AUTO-MCNC: 130 MG/DL (ref 70–110)
MAGNESIUM SERPL-MCNC: 2.2 MG/DL (ref 1.6–2.6)

## 2018-05-16 PROCEDURE — 82962 GLUCOSE BLOOD TEST: CPT

## 2018-05-16 PROCEDURE — 94760 N-INVAS EAR/PLS OXIMETRY 1: CPT

## 2018-05-16 PROCEDURE — 83735 ASSAY OF MAGNESIUM: CPT | Performed by: HOSPITALIST

## 2018-05-16 PROCEDURE — 74011250636 HC RX REV CODE- 250/636: Performed by: HOSPITALIST

## 2018-05-16 PROCEDURE — 74011636637 HC RX REV CODE- 636/637: Performed by: HOSPITALIST

## 2018-05-16 PROCEDURE — 94640 AIRWAY INHALATION TREATMENT: CPT

## 2018-05-16 PROCEDURE — 74011250637 HC RX REV CODE- 250/637: Performed by: HOSPITALIST

## 2018-05-16 PROCEDURE — 77010033678 HC OXYGEN DAILY

## 2018-05-16 PROCEDURE — 36415 COLL VENOUS BLD VENIPUNCTURE: CPT | Performed by: HOSPITALIST

## 2018-05-16 PROCEDURE — 74011000250 HC RX REV CODE- 250: Performed by: HOSPITALIST

## 2018-05-16 RX ORDER — PREDNISONE 10 MG/1
TABLET ORAL
Qty: 21 TAB | Refills: 0 | Status: SHIPPED | OUTPATIENT
Start: 2018-05-16 | End: 2019-06-30

## 2018-05-16 RX ADMIN — ARFORMOTEROL TARTRATE 15 MCG: 15 SOLUTION RESPIRATORY (INHALATION) at 07:05

## 2018-05-16 RX ADMIN — DOCUSATE SODIUM 100 MG: 100 CAPSULE, LIQUID FILLED ORAL at 08:21

## 2018-05-16 RX ADMIN — AMLODIPINE BESYLATE 5 MG: 5 TABLET ORAL at 08:21

## 2018-05-16 RX ADMIN — PREDNISONE 40 MG: 20 TABLET ORAL at 08:20

## 2018-05-16 RX ADMIN — BUDESONIDE 500 MCG: 0.5 INHALANT RESPIRATORY (INHALATION) at 07:06

## 2018-05-16 RX ADMIN — IPRATROPIUM BROMIDE AND ALBUTEROL SULFATE 3 ML: .5; 3 SOLUTION RESPIRATORY (INHALATION) at 07:06

## 2018-05-16 RX ADMIN — IPRATROPIUM BROMIDE AND ALBUTEROL SULFATE 3 ML: .5; 3 SOLUTION RESPIRATORY (INHALATION) at 11:06

## 2018-05-16 RX ADMIN — HEPARIN SODIUM 5000 UNITS: 5000 INJECTION, SOLUTION INTRAVENOUS; SUBCUTANEOUS at 08:01

## 2018-05-16 RX ADMIN — HYDRALAZINE HYDROCHLORIDE 75 MG: 50 TABLET, FILM COATED ORAL at 08:21

## 2018-05-16 RX ADMIN — CLONIDINE HYDROCHLORIDE 0.2 MG: 0.1 TABLET ORAL at 08:01

## 2018-05-16 RX ADMIN — SPIRONOLACTONE 25 MG: 25 TABLET, FILM COATED ORAL at 08:21

## 2018-05-16 RX ADMIN — FUROSEMIDE 40 MG: 10 INJECTION, SOLUTION INTRAMUSCULAR; INTRAVENOUS at 08:22

## 2018-05-16 NOTE — PROGRESS NOTES
[facility]                  Pulmonary, Critical Care, and Sleep Medicine     [facility] at [facility]   [address] [facility], [address] [place] [address], [address] [place] [address], [place], [address]   Phone: [phone]. Fax: [phone] Phone: [phone]. Fax: [phone]     Name: Geoff Ford MRN: [id number]   : 1960 Hospital: [facility] [place]   Date: 2018  Room: [address] [place] [address] NOTE:      IMPRESSION:   AE-COPD, slowly improving. Acute on chronic systolic and diastolic CHF  Acute hypoxic respiratory failure due to above. (VQ scan 18: low probability for PE)  Cocaine abuse  Possible JIM  Morbid obesity  DM  HTN emergency on admission. CKD  Medication noncompliance in the hospital and at home. PLAN:  Refused cpap therapy last night. He was noncompliant to cpap at home and so his cpap was taken away by DME company, per patient. C/w duoneb, brovana, pulmicort  C/w prednisone 40 mg daily, taper slowly as tolerated. Will leave the same dose today due to persistent but improving wheezing. May change duoneb to atrovent once wheezing improves   Completed levaquin as planned for 5 days. Titrate fio2 as tolerated, keep spo2 >=92%  Multiple hospitalization likely due to noncompliance to medical therapy. High risk for rehospitalization after discharge. Recommend  and risk management involvement now. Will defer respective systems problem management to primary and other consultant and follow patient in ICU with primary and other medical team  Further recommendations will be based on the patient's response to recommended treatment and results of the investigation ordered. Will follow from pulmonary standpoint. D/w pt and Angeles Gagnon. Patient is a 59 y. o.AA male with hx of cocaine abuse, CHF, COPD, DM type 2, medical noncompliance, suspected sleep apnea, recurrent hospitalization. Admitted with AE-COPD, AE-CHF, requiring BiPAP on admission. 18:  Refused cpap therapy overnight per RT note. Pt stated this morning that he is not refusing the cpap therapy?! \"I can't go home before next week\"   Denies any cough. C/o dizzi when walking. C/o mild wheezing, but improved. No fever chills cp  Not ambulating outside the room  Leg edema improving  No other overnight events.        Current Facility-Administered Medications   Medication Dose Route Frequency    albuterol-ipratropium (DUO-NEB) 2.5 MG-0.5 MG/3 ML  3 mL Nebulization Q4HWA RT    hydrALAZINE (APRESOLINE) tablet 75 mg  75 mg Oral TID    predniSONE (DELTASONE) tablet 40 mg  40 mg Oral DAILY WITH BREAKFAST    cloNIDine HCl (CATAPRES) tablet 0.2 mg  0.2 mg Oral Q8H    amLODIPine (NORVASC) tablet 5 mg  5 mg Oral DAILY    budesonide (PULMICORT) 500 mcg/2 ml nebulizer suspension  500 mcg Nebulization BID RT    arformoterol (BROVANA) neb solution 15 mcg  15 mcg Nebulization BID RT    spironolactone (ALDACTONE) tablet 25 mg  25 mg Oral DAILY    docusate sodium (COLACE) capsule 100 mg  100 mg Oral BID    heparin (porcine) injection 5,000 Units  5,000 Units SubCUTAneous Q8H    insulin lispro (HUMALOG) injection   SubCUTAneous AC&HS    furosemide (LASIX) injection 40 mg  40 mg IntraVENous BID    pantoprazole (PROTONIX) tablet 40 mg  40 mg Oral ACB           Objective:   Vital Signs:    Visit Vitals    /78 (BP 1 Location: Right arm, BP Patient Position: At rest)    Pulse 94    Temp 98.2 °F (36.8 °C)    Resp 20    Ht 5' 6\" (1.676 m)    Wt (!) 171.1 kg (377 lb 3.3 oz)    SpO2 98%    BMI 60.88 kg/m2       O2 Device: Nasal cannula   O2 Flow Rate (L/min): 3 l/min   Temp (24hrs), Av.1 °F (36.7 °C), Min:98 °F (36.7 °C), Max:98.3 °F (36.8 °C)       Intake/Output:   Last shift:       0701 -  1900  In: 240 [P.O.:240]  Out: 1200 [Urine:1200]  Last 3 shifts:  1901 - 05/16 0700  In: 1440 [P.O.:1440]  Out: 6625 [Urine:6625]    Intake/Output Summary (Last 24 hours) at 05/16/18 0947  Last data filed at 05/16/18 0932   Gross per 24 hour   Intake             1440 ml   Output             4800 ml   Net            -3360 ml         Physical Exam:   Comfortable; on nc o2; acyanotic. Morbidly obese. HEENT: no scleral jaundice, moist oral mucosa, no nasal drainage; neck supple  Neck: No adenopathy or thyroid swelling  CVS: S1S2 no murmurs; JVD difficult to evaluated with short thick neck. RS: Mod air entry bilaterally. No crackles or rhonchi. End-expiratory wheezing present but improved. Abd: soft, non tender  Neuro: awake, alert, moving all extremities  Extrm: mild bilateral pitting leg edema, no swelling or clubbing  Skin: no rash      Data review:     CBC w/Diff Recent Labs      05/14/18   0335   WBC  7.6   RBC  5.19   HGB  14.9   HCT  48.1*   PLT  153   GRANS  97*   LYMPH  1*   EOS  0        Chemistry Recent Labs      05/16/18   0110  05/15/18   0443  05/14/18   0335   GLU   --    --   175*   NA   --    --   135*   K   --    --   4.7   CL   --    --   99*   CO2   --    --   32   BUN   --    --   34*   CREA   --    --   1.64*   CA   --    --   9.3   MG  2.2  2.0  2.2   AGAP   --    --   4   BUCR   --    --   21*        Lactic Acid Lactic acid   Date Value Ref Range Status   04/22/2018 0.7 0.4 - 2.0 MMOL/L Final     No results for input(s): LAC in the last 72 hours. ABG No results for input(s): PHI, PHI, POC2, PCO2I, PO2, PO2I, HCO3, HCO3I, FIO2, FIO2I in the last 72 hours.      Liver Enzymes Protein, total   Date Value Ref Range Status   05/11/2018 7.4 6.4 - 8.2 g/dL Final     Albumin   Date Value Ref Range Status   05/11/2018 3.4 3.4 - 5.0 g/dL Final     Globulin   Date Value Ref Range Status   05/11/2018 4.0 2.0 - 4.0 g/dL Final     A-G Ratio   Date Value Ref Range Status   05/11/2018 0.9 0.8 - 1.7   Final     AST (SGOT)   Date Value Ref Range Status   05/11/2018 18 15 - 37 U/L Final     Alk. phosphatase   Date Value Ref Range Status   05/11/2018 85 45 - 117 U/L Final     No results for input(s): TP, ALB, GLOB, AGRAT, SGOT, GPT, AP, TBIL in the last 72 hours. No lab exists for component: DBIL       Thyroid  Lab Results   Component Value Date/Time    TSH 0.52 05/11/2018 08:06 AM          Lipid Panel Lab Results   Component Value Date/Time    Cholesterol, total 196 01/25/2018 02:51 AM    HDL Cholesterol 64 (H) 01/25/2018 02:51 AM    LDL, calculated 121.2 (H) 01/25/2018 02:51 AM    VLDL, calculated 10.8 01/25/2018 02:51 AM    Triglyceride 54 01/25/2018 02:51 AM    CHOL/HDL Ratio 3.1 01/25/2018 02:51 AM          Urinalysis Lab Results   Component Value Date/Time    Color DARK YELLOW 05/11/2018 01:20 PM    Appearance CLEAR 05/11/2018 01:20 PM    Specific gravity >1.030 (H) 05/11/2018 01:20 PM    pH (UA) 5.0 05/11/2018 01:20 PM    Protein 100 (A) 05/11/2018 01:20 PM    Glucose NEGATIVE  05/11/2018 01:20 PM    Ketone TRACE (A) 05/11/2018 01:20 PM    Bilirubin SMALL (A) 05/11/2018 01:20 PM    Urobilinogen 1.0 05/11/2018 01:20 PM    Nitrites NEGATIVE  05/11/2018 01:20 PM    Leukocyte Esterase NEGATIVE  05/11/2018 01:20 PM    Epithelial cells FEW 05/11/2018 01:20 PM    Bacteria FEW (A) 05/11/2018 01:20 PM    WBC 0 to 1 05/11/2018 01:20 PM    RBC 0 to 1 05/11/2018 01:20 PM        XR (Most Recent). CXR reviewed by me and compared with previous CXR   Results from Hospital Encounter encounter on 05/11/18   XR CHEST PORT   Narrative Chest, single view    Indication: Hypoxia    Comparison: 5/11/2018    Findings:  Portable upright AP view of the chest was obtained. The  cardiomediastinal silhouette appears unchanged, laterally prominent in size. The pulmonary vasculature is unremarkable. The lungs are hypoinflated with  improved interval aeration of the retrocardiac left lower lobe. No confluent  airspace opacity is seen throughout the lungs. No pleural effusion nor  pneumothorax.   No acute osseous abnormality. Impression Impression:  Low lung volumes with no radiographic evidence of an acute abnormality. CT (Most Recent)   Results from Hospital Encounter encounter on 06/15/11   CTA CHEST W AND W/O CONTRAST   Narrative Ordering MD: Melissa Valerio MD  Signed By: Samuel Lao MD  ** FINAL **  ---------------------------------------------------------------------  PROCEDURE DATE:  Adonis 15 2011  3:17AM    Accession Number:  [id number]  Order No:   [id number]  Procedure:   CTS - CTA CHEST W/WO CONTRAST  CPT Code:   27669  Reason:   SHORTNESS OF BREATH  INTERPRETATION:  CTA CHEST  INDICATION: Difficulty breathing, shortness of breath  TECHNIQUE: THIN SECTION IMAGING WAS PERFORMED OF THE THORAX AFTER IV   CONTRAST WAS GIVEN AT A HIGH RATE OF ADMINISTRATION. MULTIPLE   WINDOWS WERE USED. CORONAL AND SAGITTAL REFORMATIONS WERE   PERFORMED. POST PROCESSING IMAGES INCLUDE MULTIPLANAR  REFORMATTED   IMAGES IN SAGGITAL AND CORONAL PLANES (MPR) UTILIZING MAXIMUM   INTENSITY PROJECTION (MIP). COMPARISON:  None   FINDINGS:  Pulmonary arteries:  No filling defects to suggest pulmonary   embolus. Significant motion artifact from a patient breathing   obscures the distal pulmonary arteries, tiny distal PD not seen but   not excluded. Aorta:  No evidence of aortic dissection. Mediastinum:  No acute abnormalities. Heart  slightly enlarged,   pericardium normal.  Lungs: Dependent changes, respiratory motion, lungs appear clear. Visualized upper abdomen:  No abnormalities. CORONAL and SAGITTAL REFORMATIONS  No filling defects are seen within the pulmonary arteries to   suspect pulmonary embolus . IMPRESSION:  1. No large central pulmonary embolism, significant motion artifact   would obscure a tiny distal embolus, none seen but not entirely   excluded. Note: Preliminary report provided by nivio at the time   of this examination.            ECHO   ECHO March 2018  SUMMARY:  Left ventricle: Size was at the upper limits of normal. Systolic function was  moderately reduced by EF (biplane method  of disks). Ejection fraction was estimated to be 45 % in the range of 40 % to 50 %. There was moderate diffuse  hypokinesis. There was moderate concentric hypertrophy. Doppler parameters were consistent with abnormal left  ventricular relaxation (grade 1 diastolic dysfunction). Doppler parameters were consistent with elevated ventricular  end-diastolic filling pressure. Right ventricle: The size was at the upper limits of normal. Systolic function was normal.  Left atrium: The atrium was mildly dilated. Right atrium: The atrium was mildly dilated. Mitral valve: There was mild regurgitation. Aortic valve: The valve was trileaflet. Leaflets exhibited normal thickness, mild calcification, normal cuspal  separation, and sclerosis without stenosis. INDICATIONS: Shortness of breath. VQ scan 5/11/18: Low probability for pulmonary embolism without definite focal segmental  perfusion defect.        Alanna Miller MD   5/16/2018

## 2018-05-16 NOTE — DISCHARGE SUMMARY
Discharge Summary    Patient: Amilcar Pitt MRN: [id number]  CSN: [id number]    YOB: 1960  Age: 59 y.o.   Sex: male    DOA: 5/11/2018 LOS:  LOS: 5 days   Discharge Date: 5/16/2018     Primary Care Provider:  Jacy Hazel MD    Admission Diagnoses: SOB (shortness of breath)    Discharge Diagnoses:    Problem List as of 5/16/2018  Date Reviewed: 2/19/2013          Codes Class Noted - Resolved    SOB (shortness of breath) ICD-10-CM: R06.02  ICD-9-CM: 786.05  5/11/2018 - Present        * (Principal)Acute respiratory distress ICD-10-CM: R06.03  ICD-9-CM: 518.82  5/11/2018 - Present        Hypertensive emergency ICD-10-CM: I16.1  ICD-9-CM: 401.9  5/11/2018 - Present        COPD (chronic obstructive pulmonary disease) ([facility] ICD-10-CM: J44.9  ICD-9-CM: 496  4/22/2018 - Present        Acute on chronic respiratory failure ([facility] ICD-10-CM: J96.20  ICD-9-CM: 518.84  4/22/2018 - Present        Cocaine abuse ICD-10-CM: F14.10  ICD-9-CM: 305.60  4/22/2018 - Present        Acute kidney injury ([facility] ICD-10-CM: N17.9  ICD-9-CM: 584.9  3/7/2018 - Present        CHF (congestive heart failure) (HCC) (Chronic) ICD-10-CM: I50.9  ICD-9-CM: 428.0  3/3/2018 - Present        Chest pain ICD-10-CM: R07.9  ICD-9-CM: 786.50  1/25/2018 - Present        Acute on chronic combined systolic and diastolic ACC/AHA stage C congestive heart failure (HCC) (Chronic) ICD-10-CM: I50.43  ICD-9-CM: 428.43, 428.0  1/25/2018 - Present        COPD exacerbation ([facility] ICD-10-CM: J44.1  ICD-9-CM: 491.21  2/19/2013 - Present        Type II diabetes mellitus with peripheral autonomic neuropathy ([facility] ICD-10-CM: E11.43  ICD-9-CM: 250.60, 337.1  2/19/2013 - Present        Hypertension ICD-10-CM: I10  ICD-9-CM: 401.9  Unknown - Present        JIM (obstructive sleep apnea) ICD-10-CM: G47.33  ICD-9-CM: 327.23  2/19/2013 - Present        Hypoxia ICD-10-CM: R09.02  ICD-9-CM: 799.02  2/19/2013 - Present        Morbid obesity ([facility] ICD-10-CM: E66.01  ICD-9-CM: 278.01  2/19/2013 - Present              Discharge Medications:     Discharge Medication List as of 5/16/2018 11:41 AM      CONTINUE these medications which have NOT CHANGED    Details   fluticasone-vilanterol (BREO ELLIPTA) 100-25 mcg/dose inhaler Take 1 Puff by inhalation daily. , Print, Disp-1 Inhaler, R-0      predniSONE (DELTASONE) 10 mg tablet 2 tabs daily x 3 days then 1 tab daily x 3 days then 1/2 tab daily x  3 days, Print, Disp-12 Tab, R-0      cloNIDine HCl (CATAPRES) 0.2 mg tablet Take 1 Tab by mouth every eight (8) hours. , Normal, Disp-90 Tab, R-0      furosemide (LASIX) 40 mg tablet Take 1 Tab by mouth daily. , Normal, Disp-30 Tab, R-0      hydrALAZINE (APRESOLINE) 25 mg tablet Take 1 Tab by mouth three (3) times daily. , Normal, Disp-90 Tab, R-0      glipiZIDE (GLUCOTROL) 10 mg tablet Take 1 Tab by mouth two (2) times a day., Normal, Disp-60 Tab, R-0      spironolactone (ALDACTONE) 25 mg tablet Take 25 mg by mouth daily. , Historical Med      amLODIPine (NORVASC) 5 mg tablet Take  by mouth daily. Historical Med      albuterol (PROVENTIL HFA, VENTOLIN HFA) 90 mcg/actuation inhaler Take 1 Puff by inhalation. 1 puff in am and 1 puff in pm Historical Med, 1 Puff             Discharge Condition: Stable    Procedures : None    Consults: Pulmonary/Critical Care      PHYSICAL EXAM    Visit Vitals    /77 (BP 1 Location: Right arm, BP Patient Position: At rest)    Pulse 81    Temp 97.4 °F (36.3 °C)    Resp 20    Ht 5' 6\" (1.676 m)    Wt (!) 171.1 kg (377 lb 3.3 oz)    SpO2 100%    BMI 60.88 kg/m2     General: Awake, cooperative, no acute distress    HEENT: NC, Atraumatic. PERRLA, EOMI. Anicteric sclerae. Lungs:  Mild wheezing bilaterally, diminished breath sounds bilaterally  Heart:  Regular  rhythm,  No murmur, No Rubs, No Gallops  Abdomen: Soft, Non distended, Non tender. +Bowel sounds,   Extremities: Trace edema BLE  Psych:   Not anxious or agitated.   Neurologic:  No acute neurological deficits. Admission HPI : Ruben Leong is a 59 y.o. male who hx of copd, chf, dm , morbid obesity, cocaine abuse came to ER due to worsening sob since yesterday. He reported that has chronic sob on 4 L nc O2, but his sob with chest pain, but no chest pain now. He was put on bipap in ER received breathing tx . First trop 0.07-0.02. uds + cocaine, pro-bnp over 3300. He still reported sob, bp was up to 207/183, clonidine /hydralzaine given  In ed . vq scan negative for PE     Hospital Course : This patient was admitted to medical services for a COPD exacerbation. He was started on supplemental oxygen, steroids, neb treatments, and CPAP while sleeping. His respiratory status improved, his steroids were weaned to oral steroids, and he worked with PT during his stay. PTs recommendation is to be discharged with home health. Throughout the course of this hospitalization, the patient began to refuse neb treatments, and refuse to wear his CPAP machine while sleeping. Prior to discharge he worked with physical therapy while on room air, and maintained oxygen saturation levels between 96-98%. He will be discharged on a continued oral taper of steroids, but may resume his other usual medications. Activity: Activity as tolerated    Diet: Diabetic Diet    Follow-up: PCP    Disposition: Home, stable condition. Minutes spent on discharge: 50       Labs: Results:       Chemistry Recent Labs      05/14/18   0335   GLU  175*   NA  135*   K  4.7   CL  99*   CO2  32   BUN  34*   CREA  1.64*   CA  9.3   AGAP  4   BUCR  21*      CBC w/Diff Recent Labs      05/14/18   0335   WBC  7.6   RBC  5.19   HGB  14.9   HCT  48.1*   PLT  153   GRANS  97*   LYMPH  1*   EOS  0      Cardiac Enzymes No results for input(s): CPK, CKND1, TISHA in the last 72 hours. No lab exists for component: CKRMB, TROIP   Coagulation No results for input(s): PTP, INR, APTT in the last 72 hours.     No lab exists for component: INREXT    Lipid Panel Lab Results   Component Value Date/Time    Cholesterol, total 196 01/25/2018 02:51 AM    HDL Cholesterol 64 (H) 01/25/2018 02:51 AM    LDL, calculated 121.2 (H) 01/25/2018 02:51 AM    VLDL, calculated 10.8 01/25/2018 02:51 AM    Triglyceride 54 01/25/2018 02:51 AM    CHOL/HDL Ratio 3.1 01/25/2018 02:51 AM      BNP No results for input(s): BNPP in the last 72 hours. Liver Enzymes No results for input(s): TP, ALB, TBIL, AP, SGOT, GPT in the last 72 hours. No lab exists for component: DBIL   Thyroid Studies Lab Results   Component Value Date/Time    TSH 0.52 05/11/2018 08:06 AM            Significant Diagnostic Studies: Xr Chest Port    Result Date: 5/12/2018  Chest, single view Indication: Hypoxia Comparison: 5/11/2018 Findings:  Portable upright AP view of the chest was obtained. The cardiomediastinal silhouette appears unchanged, laterally prominent in size. The pulmonary vasculature is unremarkable. The lungs are hypoinflated with improved interval aeration of the retrocardiac left lower lobe. No confluent airspace opacity is seen throughout the lungs. No pleural effusion nor pneumothorax. No acute osseous abnormality. Impression: Low lung volumes with no radiographic evidence of an acute abnormality. Xr Chest Port    Result Date: 5/11/2018  Chest, single view Indication: Cough, dyspnea, chest pain, tachycardia. Comparison: Several prior exams, most recently 4/23/2018 Findings:  Portable upright AP view of the chest was obtained. Lungs are mildly underexpanded. Similar degree of mild central vascular congestion noted. No pneumothorax or pleural effusion. Mild interval increase in left retrocardiac opacity noted. Cardiac size is mildly enlarged, unchanged. No acute osseous abnormality. Impression: 1. Underexpanded lungs with left retrocardiac atelectasis or airspace disease. Atelectasis is favored given the low lung volumes.  2. Moderate cardiac enlargement and central vascular congestion, similar to prior. Xr Chest Port    Result Date: 4/23/2018  CHEST AP PORTABLE Indication: Congestive heart failure, shortness of breath. Comparison: X-ray 04/22/2018. Findings: Examination is slightly limited by patient's obese body habitus. Borderline cardiomegaly. There has been interval improvement in previously noted vascular congestion and hazy opacity. The lungs appear clear of alveolar opacity. No evidence for pneumothorax or pleural effusion. IMPRESSION: Significant interval improvement in vascular congestion and likely improved edema. No new abnormality identified. Xr Chest Port    Result Date: 4/22/2018  Chest, single view Indication: Difficulty breathing, shortness of breath Comparison: 3/7/2018 Findings:  Portable upright AP view of the chest was obtained. The cardiomediastinal silhouette appears unchanged, markedly enlarged. Mild central vascular prominence and diffusely increased interstitial markings appear similar to prior studies. Retrocardiac left basilar patchy opacity. No confluent airspace opacity elsewhere throughout the hypoinflated lungs. No pleural effusion nor pneumothorax. No acute osseous abnormality. Impression: Cardiomegaly and central vascular prominence are suggestive of mild pulmonary edema. Retrocardiac left basilar atelectasis/infiltrate. Nm Lung Perfusion W Vent    Result Date: 5/11/2018  Ventilation-perfusion lung scan History:  Chest pain, acute in onset, evaluation for pulmonary embolism. Comparison:  Chest radiograph performed the same date. Technique: Ventilation imaging was performed after inhalation of 0.8 millicuries of Tc 19M DTPA with a nebulizer followed by imaging in multiple projections. Perfusion imaging was performed after intravenous injection of 7.2 millicuries of Tc 75O MAA followed by imaging in multiple projections.  Injection site: Right forearm vein Findings: Ventilation imaging shows small amount of swallowed activity along with mild central deposition of inhaled radiotracer. There is no significant ventilatory defect demonstrated. Perfusion imaging shows mild inhomogeneity along the peripheral aspect of the lungs. No focal segmental perfusion defect is seen. No perfusion mismatches are present. Impression: Low probability for pulmonary embolism without definite focal segmental perfusion defect. No results found for this or any previous visit.         CC: Maidsyn Nelson MD

## 2018-05-16 NOTE — ROUTINE PROCESS
Dual AVS reviewed with Ernestina Dandy RN. All medications reviewed individually with patient. Opportunities for questions and concerns provided. Patient discharged via (mode of transport ie. Car, ambulance or air transport) Car  Patient's arm band appropriately discarded.

## 2018-05-16 NOTE — PROGRESS NOTES
1925: Assumed patient care, he was alert and oriented to person, place, time and situation. Respiratory status was stable on room air. Vital signs were stable. MEWS score was a one. Patient denied any nausea vomiting dizziness or anxiety. White board was updated and explained. Bed was locked and in lowest position. Call bell, water and personal belongings were within reach. Patient had no questions, comments or concerns after bedside shift report. 2200: Patient refused to wear his CPAP. This nurse educated him on the need for the treatment and possible consequences of being noncompliant. 0700: Patient had an uneventful shift. Respiratory status, vital signs and MEWS score remained stable. Patient was resting quietly with no signs of distress noted. Bed locked and in lowest position. Call bell water and personal belongings were within reach. Patient had no questions, comments or concerns after bedside shift report. Bedside report given to RORO George R.N.

## 2018-05-16 NOTE — PROGRESS NOTES
2866:  Assumed care for patient, received bedside report from Tawnya Colbert RN. Patient receiving breathing treatment from RT. Patient currently has no complaints of pain or discomfort at the time. Whiteboard updated, bed at the lowest position with call bell within reach. 1800:  Arrived to patient room and patient stated\" I dont know what happened, I just seen blood everywhere. \"  Patient gown was covered in blood and blood noted on the floor. Upon assessment, patient IV was out and heart monitor on bed next to patient. Patient arm assessed, pressure dressing applied, gown changed, linen changed, and patient assisted back into bed. Catheter tip intact. Bed at the lowest position with call bell within reach. Bedside and Verbal shift change report given to Tawnya Colbert RN (oncoming nurse) by August Davis RN   (offgoing nurse). Report included the following information SBAR, Kardex, Procedure Summary, Intake/Output, MAR, Med Rec Status, Cardiac Rhythm SR with PAC and Alarm Parameters .

## 2018-05-16 NOTE — DISCHARGE INSTRUCTIONS
Asthma Attack: Care Instructions  Your Care Instructions    During an asthma attack, the airways swell and narrow. This makes it hard to breathe. Severe asthma attacks can be life-threatening, but you can help prevent them by keeping your asthma under control and treating symptoms before they get bad. Symptoms include being short of breath, having chest tightness, coughing, and wheezing. Noting and treating these symptoms can also help you avoid future trips to the emergency room. The doctor has checked you carefully, but problems can develop later. If you notice any problems or new symptoms, get medical treatment right away. Follow-up care is a key part of your treatment and safety. Be sure to make and go to all appointments, and call your doctor if you are having problems. It's also a good idea to know your test results and keep a list of the medicines you take. How can you care for yourself at home? · Follow your asthma action plan to prevent and treat attacks. If you don't have an asthma action plan, work with your doctor to create one. · Take your asthma medicines exactly as prescribed. Talk to your doctor right away if you have any questions about how to take them. ¨ Use your quick-relief medicine when you have symptoms of an attack. Quick-relief medicine is usually an albuterol inhaler. Some people need to use quick-relief medicine before they exercise. ¨ Take your controller medicine every day, not just when you have symptoms. Controller medicine is usually an inhaled corticosteroid. The goal is to prevent problems before they occur. Don't use your controller medicine to treat an attack that has already started. It doesn't work fast enough to help. ¨ If your doctor prescribed corticosteroid pills to use during an attack, take them exactly as prescribed. It may take hours for the pills to work, but they may make the episode shorter and help you breathe better.   ¨ Keep your quick-relief medicine with you at all times. · Talk to your doctor before using other medicines. Some medicines, such as aspirin, can cause asthma attacks in some people. · If you have a peak flow meter, use it to check how well you are breathing. This can help you predict when an asthma attack is going to occur. Then you can take medicine to prevent the asthma attack or make it less severe. · Do not smoke or allow others to smoke around you. Avoid smoky places. Smoking makes asthma worse. If you need help quitting, talk to your doctor about stop-smoking programs and medicines. These can increase your chances of quitting for good. · Learn what triggers an asthma attack for you, and avoid the triggers when you can. Common triggers include colds, smoke, air pollution, dust, pollen, mold, pets, cockroaches, stress, and cold air. · Avoid colds and the flu. Get a pneumococcal vaccine shot. If you have had one before, ask your doctor if you need a second dose. Get a flu vaccine every fall. If you must be around people with colds or the flu, wash your hands often. When should you call for help? Call 911 anytime you think you may need emergency care. For example, call if:  ? · You have severe trouble breathing. ?Call your doctor now or seek immediate medical care if:  ? · Your symptoms do not get better after you have followed your asthma action plan. ? · You have new or worse trouble breathing. ? · Your coughing and wheezing get worse. ? · You cough up dark brown or bloody mucus (sputum). ? · You have a new or higher fever. ? Watch closely for changes in your health, and be sure to contact your doctor if:  ? · You need to use quick-relief medicine on more than 2 days a week (unless it is just for exercise). ? · You cough more deeply or more often, especially if you notice more mucus or a change in the color of your mucus. ? · You are not getting better as expected. Where can you learn more?   Go to http://bhargav-mily.info/. Enter T639 in the search box to learn more about \"Asthma Attack: Care Instructions. \"  Current as of: May 12, 2017  Content Version: 11.4  © 5892-2268 [employer]. Care instructions adapted under license by [employer] (which disclaims liability or warranty for this information). If you have questions about a medical condition or this instruction, always ask your healthcare professional. [address] any warranty or liability for your use of this information. Learning About Asthma Triggers  What are asthma triggers? When you have asthma, certain things can make your symptoms worse. These are called triggers. Learn what triggers an asthma attack for you, and avoid the triggers when you can. Common triggers include colds, smoke, air pollution, dust, pollen, pets, stress, and cold air. How do asthma triggers affect you? Triggers can make it harder for your lungs to work as they should. They can lead to sudden breathing problems and other symptoms. When you are around a trigger, an asthma attack is more likely. If your symptoms are severe, you may need emergency treatment or have to go to the hospital for treatment. What can you do to avoid triggers? The first thing is to know your triggers. When you are having symptoms, note the things around you that might be causing them. Then look for patterns that may be triggering your symptoms. Record your triggers on a piece of paper or in an asthma diary. When you have your list of possible triggers, work with your doctor to find ways to avoid them. Avoid colds and flu. Get a pneumococcal vaccine shot. If you have had one before, ask your doctor whether you need a second dose. Get a flu vaccine every year, as soon as it's available. If you must be around people with colds or the flu, wash your hands often. Here are some ways to avoid a few common triggers.   · Do not smoke or allow others to smoke around you. If you need help quitting, talk to your doctor about stop-smoking programs and medicines. These can increase your chances of quitting for good. · If there is a lot of pollution, pollen, or dust outside, stay at home and keep your windows closed. Use an air conditioner or air filter in your home. Check your local weather report or newspaper for air quality and pollen reports. What else should you know? · Take your controller medicine every day, not just when you have symptoms. It helps prevent problems before they occur. · Your doctor may suggest that you check how well your lungs are working by measuring your peak expiratory flow (PEF) throughout the day. Your PEF may drop when you are near things that trigger symptoms. Where can you learn more? Go to http://bhargav-mily.info/. Enter D893 in the search box to learn more about \"Learning About Asthma Triggers. \"  Current as of: May 12, 2017  Content Version: 11.4  © 9935-9860 [employer]. Care instructions adapted under license by [employer] (which disclaims liability or warranty for this information). If you have questions about a medical condition or this instruction, always ask your healthcare professional. [place] [address] any warranty or liability for your use of this information. Learning About ACE Inhibitors for Heart Failure  Introduction    ACE (angiotensin-converting enzyme) inhibitors block an enzyme that makes blood vessels narrow. As a result, the blood vessels relax and widen. This lowers blood pressure. These medicines also put more water and salt into the urine. This also lowers blood pressure. In heart failure, your heart does not pump as much blood as your body needs. These medicines:  · Make it easier for your heart to pump. · Help reduce symptoms. · Make a heart attack or stroke less likely.   Examples  These medicines include:  · Benazepril (Lotensin). · Lisinopril (Prinivil, Zestril). · Ramipril (Altace). This is not a complete list.  Possible side effects  Side effects may include:  · Cough. · Low blood pressure. You may feel dizzy and weak. · High potassium levels. · An allergic reaction. You may have other side effects or reactions not listed here. Check the information that comes with your medicine. What to know about taking this medicine  · ACE inhibitors:  ¨ Are often used to treat heart failure. They may be the only medicine used if your only symptoms are feeling tired and mildly short of breath with no fluid buildup (edema). But in most other cases, they are used with other medicines. ¨ Can cause a dry cough. If the cough is bad, talk to your doctor. You may need to try a different medicine. ¨ Can relieve symptoms, improve how you feel, and make it less likely you will need to stay in a hospital. And they can reduce the risk of early death. ¨ Can cause an allergic reaction of the skin. Symptoms may range from mild swelling to painful welts. It is rare to have severe swelling that makes it hard to breathe. · Take your medicines exactly as prescribed. Call your doctor if you think you are having a problem with your medicine. · Check with your doctor or pharmacist before you use any other medicines. This includes over-the-counter medicines. Make sure your doctor knows all of the medicines, vitamins, herbal products, and supplements you take. Taking some medicines together can cause problems. · You should not take an ACE inhibitor if you are pregnant or planning to become pregnant. · You may need regular blood tests. Where can you learn more? Go to http://bhargav-mily.info/. Enter E323 in the search box to learn more about \"Learning About ACE Inhibitors for Heart Failure. \"  Current as of: September 21, 2016  Content Version: 11.4  © 5899-7836 [employer].  Care instructions adapted under license by [employer] (which disclaims liability or warranty for this information). If you have questions about a medical condition or this instruction, always ask your healthcare professional. [address] any warranty or liability for your use of this information. Learning About Asthma  What is asthma? Asthma is a long-term condition that affects your breathing. It causes the airways that lead to the lungs to swell. People with asthma may have asthma attacks. During an asthma attack, the airways tighten and become narrower. This makes it hard to breathe, and you may wheeze or cough. If you have a bad asthma attack, you may need emergency care. Asthma affects people in different ways. Some people only have asthma attacks during allergy season, or when they breathe in cold air, or when they exercise. Others have many bad attacks that send them to the doctor often. What are the symptoms? Symptoms of asthma can be mild or severe. You may have mild attacks now and then, you may have severe symptoms every day, or you may have something in between. How often you have symptoms can also change. When you have asthma, you may:  · Wheeze, making a loud or soft whistling noise when you breathe in and out. · Cough a lot. · Feel tightness in your chest.  · Feel short of breath. · Have trouble sleeping because of coughing or having a hard time breathing. · Get tired quickly during exercise. Your symptoms may be worse at night. How can you prevent asthma attacks? Certain things can make asthma symptoms worse. These are called triggers. When you are around a trigger, an asthma attack is more likely. Common triggers include:  · Cigarette smoke or air pollution. · Things you are allergic to, such as:  ¨ Pollen, mold, or dust mites. ¨ Pet hair, skin, or saliva. · Illnesses, like colds, flu, or pneumonia. · Exercise. · Dry, cold air.   Here are some ways to avoid a few common triggers:  · Do not smoke or allow others to smoke around you. If you need help quitting, talk to your doctor about stop-smoking programs and medicines. These can increase your chances of quitting for good. · If there is a lot of pollution, pollen, or dust outside, stay at home and keep your windows closed. Use an air conditioner or air filter in your home. Check your local weather report or newspaper for air quality and pollen reports. · Get the flu vaccine every year. Talk to your doctor about getting a pneumococcal shot. Wash your hands often to prevent infections. · Avoid exercising outdoors in cold weather. If you are outdoors in cold weather, wear a scarf around your face and breathe through your nose. How is asthma treated? There are two parts to treating asthma, which are outlined in your asthma action plan. The goals are to:  · Control asthma over the long term. The asthma action plan tells you which medicine you may need to take every day. This is called a controller medicine. It helps to reduce the swelling of the airways and prevent asthma attacks. · Treat asthma attacks when they occur. The asthma action plan tells you what to do when you have an asthma attack. It helps you identify triggers that can cause your attacks. You use quick-relief medicine during an attack. The asthma plan also helps you track your symptoms and know how well the treatment is working. Follow-up care is a key part of your treatment and safety. Be sure to make and go to all appointments, and call your doctor if you are having problems. It's also a good idea to know your test results and keep a list of the medicines you take. Where can you learn more? Go to http://bhargav-mily.info/. Enter 9187 [id number] in the search box to learn more about \"Learning About Asthma. \"  Current as of: May 12, 2017  Content Version: 11.4  © 7472-8188 Healthwise[employer] Inc[employer].  Care instructions adapted under license by [employer] (which disclaims liability or warranty for this information). If you have questions about a medical condition or this instruction, always ask your healthcare professional. [address] any warranty or liability for your use of this information. Body Mass Index: Care Instructions  Your Care Instructions    Body mass index (BMI) can help you see if your weight is raising your risk for health problems. It uses a formula to compare how much you weigh with how tall you are. · A BMI lower than 18.5 is considered underweight. · A BMI between 18.5 and 24.9 is considered healthy. · A BMI between 25 and 29.9 is considered overweight. A BMI of 30 or higher is considered obese. If your BMI is in the normal range, it means that you have a lower risk for weight-related health problems. If your BMI is in the overweight or obese range, you may be at increased risk for weight-related health problems, such as high blood pressure, heart disease, stroke, arthritis or joint pain, and diabetes. If your BMI is in the underweight range, you may be at increased risk for health problems such as fatigue, lower protection (immunity) against illness, muscle loss, bone loss, hair loss, and hormone problems. BMI is just one measure of your risk for weight-related health problems. You may be at higher risk for health problems if you are not active, you eat an unhealthy diet, or you drink too much alcohol or use tobacco products. Follow-up care is a key part of your treatment and safety. Be sure to make and go to all appointments, and call your doctor if you are having problems. It's also a good idea to know your test results and keep a list of the medicines you take. How can you care for yourself at home? · Practice healthy eating habits. This includes eating plenty of fruits, vegetables, whole grains, lean protein, and low-fat dairy. · If your doctor recommends it, get more exercise. Walking is a good choice. Bit by bit, increase the amount you walk every day. Try for at least 30 minutes on most days of the week. · Do not smoke. Smoking can increase your risk for health problems. If you need help quitting, talk to your doctor about stop-smoking programs and medicines. These can increase your chances of quitting for good. · Limit alcohol to 2 drinks a day for men and 1 drink a day for women. Too much alcohol can cause health problems. If you have a BMI higher than 25  · Your doctor may do other tests to check your risk for weight-related health problems. This may include measuring the distance around your waist. A waist measurement of more than 40 inches in men or 35 inches in women can increase the risk of weight-related health problems. · Talk with your doctor about steps you can take to stay healthy or improve your health. You may need to make lifestyle changes to lose weight and stay healthy, such as changing your diet and getting regular exercise. If you have a BMI lower than 18.5  · Your doctor may do other tests to check your risk for health problems. · Talk with your doctor about steps you can take to stay healthy or improve your health. You may need to make lifestyle changes to gain or maintain weight and stay healthy, such as getting more healthy foods in your diet and doing exercises to build muscle. Where can you learn more? Go to http://bhargav-mily.info/. Enter S176 in the search box to learn more about \"Body Mass Index: Care Instructions. \"  Current as of: October 13, 2016  Content Version: 11.4  © 2559-1097 Healthwise, Inc[employer]. Care instructions adapted under license by [employer] (which disclaims liability or warranty for this information).  If you have questions about a medical condition or this instruction, always ask your healthcare professional. [place] [address] any warranty or liability for your use of this information. Chronic Obstructive Pulmonary Disease (COPD): Care Instructions  Your Care Instructions    Chronic obstructive pulmonary disease (COPD) is a general term for a group of lung diseases, including emphysema and chronic bronchitis. People with COPD have decreased airflow in and out of the lungs, which makes it hard to breathe. The airways also can get clogged with thick mucus. Cigarette smoking is a major cause of COPD. Although there is no cure for COPD, you can slow its progress. Following your treatment plan and taking care of yourself can help you feel better and live longer. Follow-up care is a key part of your treatment and safety. Be sure to make and go to all appointments, and call your doctor if you are having problems. It's also a good idea to know your test results and keep a list of the medicines you take. How can you care for yourself at home? ?Staying healthy  ? · Do not smoke. This is the most important step you can take to prevent more damage to your lungs. If you need help quitting, talk to your doctor about stop-smoking programs and medicines. These can increase your chances of quitting for good. ? · Avoid colds and flu. Get a pneumococcal vaccine shot. If you have had one before, ask your doctor whether you need a second dose. Get the flu vaccine every fall. If you must be around people with colds or the flu, wash your hands often. ? · Avoid secondhand smoke, air pollution, and high altitudes. Also avoid cold, dry air and hot, humid air. Stay at home with your windows closed when air pollution is bad. ?Medicines and oxygen therapy  ? · Take your medicines exactly as prescribed. Call your doctor if you think you are having a problem with your medicine. ? · You may be taking medicines such as:  ¨ Bronchodilators. These help open your airways and make breathing easier. Bronchodilators are either short-acting (work for 6 to 9 hours) or long-acting (work for 24 hours).  You inhale most bronchodilators, so they start to act quickly. Always carry your quick-relief inhaler with you in case you need it while you are away from home. ¨ Corticosteroids (prednisone, budesonide). These reduce airway inflammation. They come in pill or inhaled form. You must take these medicines every day for them to work well. ? · A spacer may help you get more inhaled medicine to your lungs. Ask your doctor or pharmacist if a spacer is right for you. If it is, ask how to use it properly. ? · Do not take any vitamins, over-the-counter medicine, or herbal products without talking to your doctor first.   ? · If your doctor prescribed antibiotics, take them as directed. Do not stop taking them just because you feel better. You need to take the full course of antibiotics. ? · Oxygen therapy boosts the amount of oxygen in your blood and helps you breathe easier. Use the flow rate your doctor has recommended, and do not change it without talking to your doctor first.   Activity  ? · Get regular exercise. Walking is an easy way to get exercise. Start out slowly, and walk a little more each day. ? · Pay attention to your breathing. You are exercising too hard if you cannot talk while you are exercising. ? · Take short rest breaks when doing household chores and other activities. ? · Learn breathing methods-such as breathing through pursed lips-to help you become less short of breath. ? · If your doctor has not set you up with a pulmonary rehabilitation program, talk to him or her about whether rehab is right for you. Rehab includes exercise programs, education about your disease and how to manage it, help with diet and other changes, and emotional support. Diet  ? · Eat regular, healthy meals. Use bronchodilators about 1 hour before you eat to make it easier to eat. Eat several small meals instead of three large ones. Drink beverages at the end of the meal. Avoid foods that are hard to chew.    ? · Eat foods that contain protein so that you do not lose muscle mass. ? · Talk with your doctor if you gain too much weight or if you lose weight without trying. ?Mental health  ? · Talk to your family, friends, or a therapist about your feelings. It is normal to feel frightened, angry, hopeless, helpless, and even guilty. Talking openly about bad feelings can help you cope. If these feelings last, talk to your doctor. When should you call for help? Call 911 anytime you think you may need emergency care. For example, call if:  ? · You have severe trouble breathing. ?Call your doctor now or seek immediate medical care if:  ? · You have new or worse trouble breathing. ? · You cough up blood. ? · You have a fever. ? Watch closely for changes in your health, and be sure to contact your doctor if:  ? · You cough more deeply or more often, especially if you notice more mucus or a change in the color of your mucus. ? · You have new or worse swelling in your legs or belly. ? · You are not getting better as expected. Where can you learn more? Go to http://bhargav"[employer]mily.info/. Rosa Higginbotham in the search box to learn more about \"Chronic Obstructive Pulmonary Disease (COPD): Care Instructions. \"  Current as of: May 12, 2017  Content Version: 11.4  © 0862-8865 [employer]. Care instructions adapted under license by [employer] (which disclaims liability or warranty for this information). If you have questions about a medical condition or this instruction, always ask your healthcare professional. [place] [address] any warranty or liability for your use of this information. Chronic Obstructive Pulmonary Disease (COPD) Flare-Ups: Care Instructions  Your Care Instructions    Chronic obstructive pulmonary disease (COPD) is a lung disease that makes it hard to breathe. It is caused by damage to the lungs over many years, usually from smoking.   COPD is often a mix of two diseases:  · Chronic bronchitis: The airways that carry air to the lungs (bronchial tubes) get inflamed and make a lot of mucus. This can narrow or block the airways. · Emphysema: In a healthy person, the tiny air sacs in the lungs are like balloons. As you breathe in and out, they get bigger and smaller to move air through your lungs. But with emphysema, these air sacs are damaged and lose their stretch. Less air gets in and out of the lungs. Many people with COPD have attacks called flare-ups or exacerbations. This is when your usual symptoms quickly get worse and stay worse. The doctor has checked you carefully. But problems can develop later. If you notice any problems or new symptoms, get medical treatment right away. Follow-up care is a key part of your treatment and safety. Be sure to make and go to all appointments, and call your doctor if you are having problems. It's also a good idea to know your test results and keep a list of the medicines you take. How can you care for yourself at home? · Be safe with medicines. Take your medicines exactly as prescribed. Call your doctor if you think you are having a problem with your medicine. You may be taking medicines such as:  ¨ Bronchodilators. These help open your airways and make breathing easier. ¨ Corticosteroids. These reduce airway inflammation. They may be given as pills, in a vein, or in an inhaled form. You may go home with pills in addition to an inhaler that you already use. · A spacer may help you get more inhaled medicine to your lungs. Ask your doctor or pharmacist if a spacer is right for you. If it is, ask how to use it properly. · If your doctor prescribed antibiotics, take them as directed. Do not stop taking them just because you feel better. You need to take the full course of antibiotics. · If your doctor prescribed oxygen, use the flow rate your doctor has recommended.  Do not change it without talking to your doctor first.  · Do not smoke. Smoking makes COPD worse. If you need help quitting, talk to your doctor about stop-smoking programs and medicines. These can increase your chances of quitting for good. When should you call for help? Call 911 anytime you think you may need emergency care. For example, call if:  ? · You have severe trouble breathing. ?Call your doctor now or seek immediate medical care if:  ? · You have new or worse trouble breathing. ? · Your coughing or wheezing gets worse. ? · You cough up dark brown or bloody mucus (sputum). ? · You have a new or higher fever. ? Watch closely for changes in your health, and be sure to contact your doctor if:  ? · You notice more mucus or a change in the color of your mucus. ? · You need to use your antibiotic or steroid pills. ? · You do not get better as expected. Where can you learn more? Go to http://bhargav-mily.info/. Enter J215 in the search box to learn more about \"Chronic Obstructive Pulmonary Disease (COPD) Flare-Ups: Care Instructions. \"  Current as of: May 12, 2017  Content Version: 11.4  © 5468-3119 [employer]. Care instructions adapted under license by [employer] (which disclaims liability or warranty for this information). If you have questions about a medical condition or this instruction, always ask your healthcare professional. [address] any warranty or liability for your use of this information. Learning About CPAP for Sleep Apnea  What is CPAP? CPAP is a small machine that you use at home every night while you sleep. It increases air pressure in your throat to keep your airway open. When you have sleep apnea, this can help you sleep better so you feel much better. CPAP stands for \"continuous positive airway pressure. \"  The CPAP machine will have one of the following:  · A mask that covers your nose and mouth  · Prongs that fit into your nose  · A mask that covers your nose only, the most common type. This type is called NCPAP. The N stands for \"nasal.\"  Why is it done? CPAP is usually the best treatment for obstructive sleep apnea. It is the first treatment choice and the most widely used. Your doctor may suggest CPAP if you have:  · Moderate to severe sleep apnea. · Sleep apnea and coronary artery disease (CAD) or heart failure. How does it help? · CPAP can help you have more normal sleep, so you feel less sleepy and more alert during the daytime. · CPAP may help keep heart failure or other heart problems from getting worse. · CPAP may help lower your blood pressure. · If you use CPAP, your bed partner may also sleep better because you are not snoring or restless. What are the side effects? Some people who use CPAP have:  · A dry or stuffy nose and a sore throat. · Irritated skin on the face. · Sore eyes. · Bloating. If you have any of these problems, work with your doctor to fix them. Here are some things you can try:  · Be sure the mask or nasal prongs fit well. · See if your doctor can adjust the pressure of your CPAP. · If your nose is dry, try a humidifier. · If your nose is runny or stuffy, try decongestant medicine or a steroid nasal spray. Be safe with medicines. Read and follow all instructions on the label. Do not use the medicine longer than the label says. If these things do not help, you might try a different type of machine. Some machines have air pressure that adjusts on its own. Others have air pressures that are different when you breathe in than when you breathe out. This may reduce discomfort caused by too much pressure in your nose. Where can you learn more? Go to http://bhargav-mily.info/. Enter B985 in the search box to learn more about \"Learning About CPAP for Sleep Apnea. \"  Current as of: May 12, 2017  Content Version: 11.4  © 9436-2886 [employer].  Care instructions adapted under license by [address] (which disclaims liability or warranty for this information). If you have questions about a medical condition or this instruction, always ask your healthcare professional. [address] any warranty or liability for your use of this information. DISCHARGE SUMMARY from Nurse    PATIENT INSTRUCTIONS:    After general anesthesia or intravenous sedation, for 24 hours or while taking prescription Narcotics:  · Limit your activities  · Do not drive and operate hazardous machinery  · Do not make important personal or business decisions  · Do  not drink alcoholic beverages  · If you have not urinated within 8 hours after discharge, please contact your surgeon on call. Report the following to your surgeon:  · Excessive pain, swelling, redness or odor of or around the surgical area  · Temperature over 100.5  · Nausea and vomiting lasting longer than 4 hours or if unable to take medications  · Any signs of decreased circulation or nerve impairment to extremity: change in color, persistent  numbness, tingling, coldness or increase pain  · Any questions    What to do at Home:  Recommended activity: Activity as tolerated,       *  Please give a list of your current medications to your Primary Care Provider. *  Please update this list whenever your medications are discontinued, doses are      changed, or new medications (including over-the-counter products) are added. *  Please carry medication information at all times in case of emergency situations. These are general instructions for a healthy lifestyle:    No smoking/ No tobacco products/ Avoid exposure to second hand smoke  Surgeon General's Warning:  Quitting smoking now greatly reduces serious risk to your health.     Obesity, smoking, and sedentary lifestyle greatly increases your risk for illness    A healthy diet, regular physical exercise & weight monitoring are important for maintaining a healthy lifestyle    You may be retaining fluid if you have a history of heart failure or if you experience any of the following symptoms:  Weight gain of 3 pounds or more overnight or 5 pounds in a week, increased swelling in our hands or feet or shortness of breath while lying flat in bed. Please call your doctor as soon as you notice any of these symptoms; do not wait until your next office visit. Recognize signs and symptoms of STROKE:    F-face looks uneven    A-arms unable to move or move unevenly    S-speech slurred or non-existent    T-time-call 911 as soon as signs and symptoms begin-DO NOT go       Back to bed or wait to see if you get better-TIME IS BRAIN. Warning Signs of HEART ATTACK     Call 911 if you have these symptoms:   Chest discomfort. Most heart attacks involve discomfort in the center of the chest that lasts more than a few minutes, or that goes away and comes back. It can feel like uncomfortable pressure, squeezing, fullness, or pain.  Discomfort in other areas of the upper body. Symptoms can include pain or discomfort in one or both arms, the back, neck, jaw, or stomach.  Shortness of breath with or without chest discomfort.  Other signs may include breaking out in a cold sweat, nausea, or lightheadedness. Don't wait more than five minutes to call 911 - MINUTES MATTER! Fast action can save your life. Calling 911 is almost always the fastest way to get lifesaving treatment. Emergency Medical Services staff can begin treatment when they arrive -- up to an hour sooner than if someone gets to the hospital by car. The discharge information has been reviewed with the patient. The patient verbalized understanding. Discharge medications reviewed with the patient and appropriate educational materials and side effects teaching were provided.   Patient armband removed and shredded    ___________________________________________________________________________________________________________________________________

## 2018-05-16 NOTE — PROGRESS NOTES
Care Management Interventions  PCP Verified by CM: Yes  Transition of Care Consult (CM Consult): [address]: No  Reason Outside [place]: Patient already serviced by other home care/hospice agency ([facility])  Physical Therapy Consult: Yes  Occupational Therapy Consult: Yes  Plan discussed with Pt/Family/Caregiver: Yes  Freedom of Choice Offered: Yes  Discharge Location  Discharge Placement: Home with home health     D/c plan home today  Met with patient during IDR's patient informed cm that he wanted the oxygen. Dr. Balwinder Meeks informed him he does not qualify for home oxygen. Patient has not worn o2 today and sats was informed was 98%. Patient has not worn bipap since he has been here. Dr. Balwinder Meeks aware. No orders for home bipap. Patient again has asked for [facility]. Informed him that they will come to see him however he must be homebound and answer the door. Patient verbally agreed to above. See new orders for [facility]. No other needs has ride home. Care Management Interventions  PCP Verified by CM:  Yes  Transition of Care Consult (CM Consult): [address]: No  Reason Outside [place]: Patient already serviced by other home care/hospice agency ([facility])  Physical Therapy Consult: Yes  Occupational Therapy Consult: Yes  Plan discussed with Pt/Family/Caregiver: Yes  Freedom of Choice Offered: Yes  Discharge Location  Discharge Placement: Home with home health

## 2018-05-29 NOTE — ROUTINE PROCESS
Bedside and Verbal shift change report given to Srinivasa Newby RN (oncoming nurse) by SN Ken / Kaia Veloz RN (offgoing nurse). Report included the following information SBAR and Kardex. Plan: Patient declines treatment to her hands Detail Level: Detailed

## 2018-07-08 ENCOUNTER — HOSPITAL ENCOUNTER (INPATIENT)
Age: 58
LOS: 4 days | Discharge: HOME | DRG: 194 | End: 2018-07-12
Attending: EMERGENCY MEDICINE | Admitting: INTERNAL MEDICINE
Payer: MEDICAID

## 2018-07-08 ENCOUNTER — APPOINTMENT (OUTPATIENT)
Dept: GENERAL RADIOLOGY | Age: 58
DRG: 194 | End: 2018-07-08
Attending: EMERGENCY MEDICINE
Payer: MEDICAID

## 2018-07-08 DIAGNOSIS — I50.9 ACUTE ON CHRONIC CONGESTIVE HEART FAILURE, UNSPECIFIED HEART FAILURE TYPE (HCC): ICD-10-CM

## 2018-07-08 DIAGNOSIS — J44.1 ACUTE EXACERBATION OF CHRONIC OBSTRUCTIVE PULMONARY DISEASE (COPD) (HCC): ICD-10-CM

## 2018-07-08 DIAGNOSIS — I10 UNCONTROLLED HYPERTENSION: ICD-10-CM

## 2018-07-08 DIAGNOSIS — J96.21 ACUTE ON CHRONIC RESPIRATORY FAILURE WITH HYPOXIA AND HYPERCAPNIA (HCC): Primary | ICD-10-CM

## 2018-07-08 DIAGNOSIS — J96.22 ACUTE ON CHRONIC RESPIRATORY FAILURE WITH HYPOXIA AND HYPERCAPNIA (HCC): Primary | ICD-10-CM

## 2018-07-08 PROBLEM — J96.91 RESPIRATORY FAILURE WITH HYPOXIA AND HYPERCAPNIA (HCC): Status: ACTIVE | Noted: 2018-07-08

## 2018-07-08 PROBLEM — J96.92 RESPIRATORY FAILURE WITH HYPOXIA AND HYPERCAPNIA (HCC): Status: ACTIVE | Noted: 2018-07-08

## 2018-07-08 LAB
ALBUMIN SERPL-MCNC: 3.2 G/DL (ref 3.4–5)
ALBUMIN/GLOB SERPL: 0.8 {RATIO} (ref 0.8–1.7)
ALP SERPL-CCNC: 87 U/L (ref 45–117)
ALT SERPL-CCNC: 42 U/L (ref 16–61)
AMPHET UR QL SCN: NEGATIVE
ANION GAP SERPL CALC-SCNC: 1 MMOL/L (ref 3–18)
APPEARANCE UR: CLEAR
ARTERIAL PATENCY WRIST A: YES
ARTERIAL PATENCY WRIST A: YES
AST SERPL-CCNC: 20 U/L (ref 15–37)
BARBITURATES UR QL SCN: NEGATIVE
BASE EXCESS BLD CALC-SCNC: 12 MMOL/L
BASE EXCESS BLD CALC-SCNC: 9 MMOL/L
BASOPHILS # BLD: 0 K/UL (ref 0–0.06)
BASOPHILS NFR BLD: 1 % (ref 0–2)
BDY SITE: ABNORMAL
BDY SITE: ABNORMAL
BENZODIAZ UR QL: NEGATIVE
BILIRUB SERPL-MCNC: 0.3 MG/DL (ref 0.2–1)
BILIRUB UR QL: NEGATIVE
BNP SERPL-MCNC: 997 PG/ML (ref 0–900)
BUN SERPL-MCNC: 18 MG/DL (ref 7–18)
BUN/CREAT SERPL: 11 (ref 12–20)
CALCIUM SERPL-MCNC: 8.9 MG/DL (ref 8.5–10.1)
CANNABINOIDS UR QL SCN: NEGATIVE
CHLORIDE SERPL-SCNC: 106 MMOL/L (ref 100–108)
CK MB CFR SERPL CALC: 0.9 % (ref 0–4)
CK MB CFR SERPL CALC: 1 % (ref 0–4)
CK MB CFR SERPL CALC: 1.2 % (ref 0–4)
CK MB SERPL-MCNC: 1.1 NG/ML (ref 5–25)
CK MB SERPL-MCNC: 1.3 NG/ML (ref 5–25)
CK MB SERPL-MCNC: 1.6 NG/ML (ref 5–25)
CK SERPL-CCNC: 121 U/L (ref 39–308)
CK SERPL-CCNC: 131 U/L (ref 39–308)
CK SERPL-CCNC: 136 U/L (ref 39–308)
CO2 SERPL-SCNC: 34 MMOL/L (ref 21–32)
COCAINE UR QL SCN: NEGATIVE
COLOR UR: YELLOW
CREAT SERPL-MCNC: 1.58 MG/DL (ref 0.6–1.3)
DIFFERENTIAL METHOD BLD: ABNORMAL
EOSINOPHIL # BLD: 0.3 K/UL (ref 0–0.4)
EOSINOPHIL NFR BLD: 4 % (ref 0–5)
ERYTHROCYTE [DISTWIDTH] IN BLOOD BY AUTOMATED COUNT: 15 % (ref 11.6–14.5)
EST. AVERAGE GLUCOSE BLD GHB EST-MCNC: 151 MG/DL
GAS FLOW.O2 O2 DELIVERY SYS: ABNORMAL L/MIN
GAS FLOW.O2 O2 DELIVERY SYS: ABNORMAL L/MIN
GLOBULIN SER CALC-MCNC: 3.8 G/DL (ref 2–4)
GLUCOSE BLD STRIP.AUTO-MCNC: 130 MG/DL (ref 70–110)
GLUCOSE BLD STRIP.AUTO-MCNC: 312 MG/DL (ref 70–110)
GLUCOSE BLD STRIP.AUTO-MCNC: 339 MG/DL (ref 70–110)
GLUCOSE SERPL-MCNC: 119 MG/DL (ref 74–99)
GLUCOSE UR STRIP.AUTO-MCNC: NEGATIVE MG/DL
HBA1C MFR BLD: 6.9 % (ref 4.5–5.6)
HCO3 BLD-SCNC: 35.4 MMOL/L (ref 22–26)
HCO3 BLD-SCNC: 37.3 MMOL/L (ref 22–26)
HCT VFR BLD AUTO: 45.4 % (ref 36–48)
HDSCOM,HDSCOM: NORMAL
HGB BLD-MCNC: 14.3 G/DL (ref 13–16)
HGB UR QL STRIP: NEGATIVE
KETONES UR QL STRIP.AUTO: NEGATIVE MG/DL
LEUKOCYTE ESTERASE UR QL STRIP.AUTO: NEGATIVE
LYMPHOCYTES # BLD: 1.4 K/UL (ref 0.9–3.6)
LYMPHOCYTES NFR BLD: 23 % (ref 21–52)
MAGNESIUM SERPL-MCNC: 1.8 MG/DL (ref 1.6–2.6)
MAGNESIUM SERPL-MCNC: 1.9 MG/DL (ref 1.6–2.6)
MCH RBC QN AUTO: 28.9 PG (ref 24–34)
MCHC RBC AUTO-ENTMCNC: 31.5 G/DL (ref 31–37)
MCV RBC AUTO: 91.7 FL (ref 74–97)
METHADONE UR QL: NEGATIVE
MONOCYTES # BLD: 0.4 K/UL (ref 0.05–1.2)
MONOCYTES NFR BLD: 6 % (ref 3–10)
NEUTS SEG # BLD: 4.1 K/UL (ref 1.8–8)
NEUTS SEG NFR BLD: 66 % (ref 40–73)
NITRITE UR QL STRIP.AUTO: NEGATIVE
O2/TOTAL GAS SETTING VFR VENT: 28 %
O2/TOTAL GAS SETTING VFR VENT: 28 %
OPIATES UR QL: NEGATIVE
PCO2 BLD: 61.7 MMHG (ref 35–45)
PCO2 BLD: 75.3 MMHG (ref 35–45)
PCP UR QL: NEGATIVE
PEEP RESPIRATORY: 7 CMH2O
PEEP RESPIRATORY: 7 CMH2O
PH BLD: 7.28 [PH] (ref 7.35–7.45)
PH BLD: 7.39 [PH] (ref 7.35–7.45)
PH UR STRIP: 6.5 [PH] (ref 5–8)
PHOSPHATE SERPL-MCNC: 4.1 MG/DL (ref 2.5–4.9)
PIP ISTAT,IPIP: 20
PIP ISTAT,IPIP: 20
PLATELET # BLD AUTO: 160 K/UL (ref 135–420)
PMV BLD AUTO: 12.9 FL (ref 9.2–11.8)
PO2 BLD: 117 MMHG (ref 80–100)
PO2 BLD: 80 MMHG (ref 80–100)
POTASSIUM SERPL-SCNC: 4 MMOL/L (ref 3.5–5.5)
PROT SERPL-MCNC: 7 G/DL (ref 6.4–8.2)
PROT UR STRIP-MCNC: NEGATIVE MG/DL
RBC # BLD AUTO: 4.95 M/UL (ref 4.7–5.5)
SAO2 % BLD: 93 % (ref 92–97)
SAO2 % BLD: 98 % (ref 92–97)
SERVICE CMNT-IMP: ABNORMAL
SERVICE CMNT-IMP: ABNORMAL
SODIUM SERPL-SCNC: 141 MMOL/L (ref 136–145)
SP GR UR REFRACTOMETRY: 1.01 (ref 1–1.03)
SPECIMEN TYPE: ABNORMAL
SPECIMEN TYPE: ABNORMAL
TOTAL RESP. RATE, ITRR: 18
TROPONIN I SERPL-MCNC: 0.02 NG/ML (ref 0–0.06)
TROPONIN I SERPL-MCNC: <0.02 NG/ML (ref 0–0.06)
TROPONIN I SERPL-MCNC: <0.02 NG/ML (ref 0–0.06)
UROBILINOGEN UR QL STRIP.AUTO: 0.2 EU/DL (ref 0.2–1)
WBC # BLD AUTO: 6.2 K/UL (ref 4.6–13.2)

## 2018-07-08 PROCEDURE — 83036 HEMOGLOBIN GLYCOSYLATED A1C: CPT | Performed by: INTERNAL MEDICINE

## 2018-07-08 PROCEDURE — 74011000250 HC RX REV CODE- 250: Performed by: EMERGENCY MEDICINE

## 2018-07-08 PROCEDURE — 96375 TX/PRO/DX INJ NEW DRUG ADDON: CPT

## 2018-07-08 PROCEDURE — 84100 ASSAY OF PHOSPHORUS: CPT | Performed by: INTERNAL MEDICINE

## 2018-07-08 PROCEDURE — 74011250636 HC RX REV CODE- 250/636: Performed by: INTERNAL MEDICINE

## 2018-07-08 PROCEDURE — 74011636637 HC RX REV CODE- 636/637: Performed by: INTERNAL MEDICINE

## 2018-07-08 PROCEDURE — 80307 DRUG TEST PRSMV CHEM ANLYZR: CPT | Performed by: EMERGENCY MEDICINE

## 2018-07-08 PROCEDURE — 85025 COMPLETE CBC W/AUTO DIFF WBC: CPT | Performed by: EMERGENCY MEDICINE

## 2018-07-08 PROCEDURE — 82962 GLUCOSE BLOOD TEST: CPT

## 2018-07-08 PROCEDURE — 74011250637 HC RX REV CODE- 250/637: Performed by: INTERNAL MEDICINE

## 2018-07-08 PROCEDURE — 77030013140 HC MSK NEB VYRM -A

## 2018-07-08 PROCEDURE — 36415 COLL VENOUS BLD VENIPUNCTURE: CPT | Performed by: INTERNAL MEDICINE

## 2018-07-08 PROCEDURE — 93005 ELECTROCARDIOGRAM TRACING: CPT

## 2018-07-08 PROCEDURE — 74011250636 HC RX REV CODE- 250/636: Performed by: EMERGENCY MEDICINE

## 2018-07-08 PROCEDURE — 74011636637 HC RX REV CODE- 636/637: Performed by: EMERGENCY MEDICINE

## 2018-07-08 PROCEDURE — 77030013033 HC MSK BPAP/CPAP MMKA -B

## 2018-07-08 PROCEDURE — 94640 AIRWAY INHALATION TREATMENT: CPT

## 2018-07-08 PROCEDURE — 83735 ASSAY OF MAGNESIUM: CPT | Performed by: INTERNAL MEDICINE

## 2018-07-08 PROCEDURE — 99285 EMERGENCY DEPT VISIT HI MDM: CPT

## 2018-07-08 PROCEDURE — 83735 ASSAY OF MAGNESIUM: CPT | Performed by: EMERGENCY MEDICINE

## 2018-07-08 PROCEDURE — 81003 URINALYSIS AUTO W/O SCOPE: CPT | Performed by: EMERGENCY MEDICINE

## 2018-07-08 PROCEDURE — 77010033678 HC OXYGEN DAILY

## 2018-07-08 PROCEDURE — 74011000250 HC RX REV CODE- 250: Performed by: INTERNAL MEDICINE

## 2018-07-08 PROCEDURE — 94660 CPAP INITIATION&MGMT: CPT

## 2018-07-08 PROCEDURE — 80053 COMPREHEN METABOLIC PANEL: CPT | Performed by: EMERGENCY MEDICINE

## 2018-07-08 PROCEDURE — 5A09457 ASSISTANCE WITH RESPIRATORY VENTILATION, 24-96 CONSECUTIVE HOURS, CONTINUOUS POSITIVE AIRWAY PRESSURE: ICD-10-PCS | Performed by: INTERNAL MEDICINE

## 2018-07-08 PROCEDURE — 65610000006 HC RM INTENSIVE CARE

## 2018-07-08 PROCEDURE — 36600 WITHDRAWAL OF ARTERIAL BLOOD: CPT

## 2018-07-08 PROCEDURE — 82550 ASSAY OF CK (CPK): CPT | Performed by: EMERGENCY MEDICINE

## 2018-07-08 PROCEDURE — 83880 ASSAY OF NATRIURETIC PEPTIDE: CPT | Performed by: EMERGENCY MEDICINE

## 2018-07-08 PROCEDURE — 96374 THER/PROPH/DIAG INJ IV PUSH: CPT

## 2018-07-08 PROCEDURE — 82803 BLOOD GASES ANY COMBINATION: CPT

## 2018-07-08 PROCEDURE — 71045 X-RAY EXAM CHEST 1 VIEW: CPT

## 2018-07-08 RX ORDER — PREDNISONE 20 MG/1
60 TABLET ORAL
Status: COMPLETED | OUTPATIENT
Start: 2018-07-08 | End: 2018-07-08

## 2018-07-08 RX ORDER — HYDRALAZINE HYDROCHLORIDE 20 MG/ML
20 INJECTION INTRAMUSCULAR; INTRAVENOUS
Status: COMPLETED | OUTPATIENT
Start: 2018-07-08 | End: 2018-07-08

## 2018-07-08 RX ORDER — ENOXAPARIN SODIUM 100 MG/ML
40 INJECTION SUBCUTANEOUS EVERY 24 HOURS
Status: DISCONTINUED | OUTPATIENT
Start: 2018-07-08 | End: 2018-07-12 | Stop reason: HOSPADM

## 2018-07-08 RX ORDER — AMLODIPINE BESYLATE 5 MG/1
5 TABLET ORAL DAILY
Status: DISCONTINUED | OUTPATIENT
Start: 2018-07-09 | End: 2018-07-12 | Stop reason: HOSPADM

## 2018-07-08 RX ORDER — ACETAMINOPHEN 325 MG/1
650 TABLET ORAL
Status: DISCONTINUED | OUTPATIENT
Start: 2018-07-08 | End: 2018-07-12 | Stop reason: HOSPADM

## 2018-07-08 RX ORDER — FUROSEMIDE 10 MG/ML
40 INJECTION INTRAMUSCULAR; INTRAVENOUS DAILY
Status: DISCONTINUED | OUTPATIENT
Start: 2018-07-09 | End: 2018-07-12 | Stop reason: HOSPADM

## 2018-07-08 RX ORDER — LEVOFLOXACIN 5 MG/ML
500 INJECTION, SOLUTION INTRAVENOUS EVERY 24 HOURS
Status: DISCONTINUED | OUTPATIENT
Start: 2018-07-08 | End: 2018-07-12 | Stop reason: HOSPADM

## 2018-07-08 RX ORDER — IPRATROPIUM BROMIDE AND ALBUTEROL SULFATE 2.5; .5 MG/3ML; MG/3ML
3 SOLUTION RESPIRATORY (INHALATION)
Status: COMPLETED | OUTPATIENT
Start: 2018-07-08 | End: 2018-07-08

## 2018-07-08 RX ORDER — BUDESONIDE 0.5 MG/2ML
500 INHALANT ORAL
Status: DISCONTINUED | OUTPATIENT
Start: 2018-07-08 | End: 2018-07-12 | Stop reason: HOSPADM

## 2018-07-08 RX ORDER — IPRATROPIUM BROMIDE AND ALBUTEROL SULFATE 2.5; .5 MG/3ML; MG/3ML
3 SOLUTION RESPIRATORY (INHALATION)
Status: DISCONTINUED | OUTPATIENT
Start: 2018-07-08 | End: 2018-07-12 | Stop reason: HOSPADM

## 2018-07-08 RX ORDER — SPIRONOLACTONE 25 MG/1
25 TABLET ORAL DAILY
Status: DISCONTINUED | OUTPATIENT
Start: 2018-07-09 | End: 2018-07-12 | Stop reason: HOSPADM

## 2018-07-08 RX ORDER — FUROSEMIDE 10 MG/ML
40 INJECTION INTRAMUSCULAR; INTRAVENOUS ONCE
Status: COMPLETED | OUTPATIENT
Start: 2018-07-08 | End: 2018-07-08

## 2018-07-08 RX ORDER — CLONIDINE HYDROCHLORIDE 0.1 MG/1
0.2 TABLET ORAL EVERY 8 HOURS
Status: DISCONTINUED | OUTPATIENT
Start: 2018-07-08 | End: 2018-07-12 | Stop reason: HOSPADM

## 2018-07-08 RX ORDER — AMLODIPINE BESYLATE 5 MG/1
5 TABLET ORAL DAILY
Status: DISCONTINUED | OUTPATIENT
Start: 2018-07-08 | End: 2018-07-12 | Stop reason: ALTCHOICE

## 2018-07-08 RX ORDER — INSULIN LISPRO 100 [IU]/ML
INJECTION, SOLUTION INTRAVENOUS; SUBCUTANEOUS
Status: DISCONTINUED | OUTPATIENT
Start: 2018-07-08 | End: 2018-07-12 | Stop reason: HOSPADM

## 2018-07-08 RX ORDER — MAGNESIUM SULFATE 100 %
4 CRYSTALS MISCELLANEOUS AS NEEDED
Status: DISCONTINUED | OUTPATIENT
Start: 2018-07-08 | End: 2018-07-12 | Stop reason: HOSPADM

## 2018-07-08 RX ORDER — FUROSEMIDE 10 MG/ML
40 INJECTION INTRAMUSCULAR; INTRAVENOUS
Status: COMPLETED | OUTPATIENT
Start: 2018-07-08 | End: 2018-07-08

## 2018-07-08 RX ORDER — CLONIDINE 0.2 MG/24H
1 PATCH, EXTENDED RELEASE TRANSDERMAL
Status: DISCONTINUED | OUTPATIENT
Start: 2018-07-08 | End: 2018-07-12

## 2018-07-08 RX ORDER — GABAPENTIN 800 MG/1
TABLET ORAL 3 TIMES DAILY
COMMUNITY
End: 2020-04-22

## 2018-07-08 RX ORDER — ALBUTEROL SULFATE 0.83 MG/ML
2.5 SOLUTION RESPIRATORY (INHALATION)
Status: DISCONTINUED | OUTPATIENT
Start: 2018-07-08 | End: 2018-07-12 | Stop reason: HOSPADM

## 2018-07-08 RX ORDER — DEXTROSE 50 % IN WATER (D50W) INTRAVENOUS SYRINGE
25-50 AS NEEDED
Status: DISCONTINUED | OUTPATIENT
Start: 2018-07-08 | End: 2018-07-12 | Stop reason: HOSPADM

## 2018-07-08 RX ORDER — ONDANSETRON 2 MG/ML
4 INJECTION INTRAMUSCULAR; INTRAVENOUS
Status: DISCONTINUED | OUTPATIENT
Start: 2018-07-08 | End: 2018-07-12 | Stop reason: HOSPADM

## 2018-07-08 RX ORDER — LABETALOL HYDROCHLORIDE 5 MG/ML
10 INJECTION, SOLUTION INTRAVENOUS
Status: DISCONTINUED | OUTPATIENT
Start: 2018-07-08 | End: 2018-07-12 | Stop reason: HOSPADM

## 2018-07-08 RX ORDER — HYDRALAZINE HYDROCHLORIDE 25 MG/1
25 TABLET, FILM COATED ORAL 3 TIMES DAILY
Status: DISCONTINUED | OUTPATIENT
Start: 2018-07-08 | End: 2018-07-09

## 2018-07-08 RX ADMIN — FUROSEMIDE 40 MG: 10 INJECTION, SOLUTION INTRAMUSCULAR; INTRAVENOUS at 13:04

## 2018-07-08 RX ADMIN — INSULIN LISPRO 8 UNITS: 100 INJECTION, SOLUTION INTRAVENOUS; SUBCUTANEOUS at 17:13

## 2018-07-08 RX ADMIN — LEVOFLOXACIN 500 MG: 5 INJECTION, SOLUTION INTRAVENOUS at 13:00

## 2018-07-08 RX ADMIN — BUDESONIDE 500 MCG: 0.5 INHALANT RESPIRATORY (INHALATION) at 19:41

## 2018-07-08 RX ADMIN — HYDRALAZINE HYDROCHLORIDE 25 MG: 25 TABLET, FILM COATED ORAL at 21:44

## 2018-07-08 RX ADMIN — CLONIDINE HYDROCHLORIDE 0.2 MG: 0.1 TABLET ORAL at 23:24

## 2018-07-08 RX ADMIN — FUROSEMIDE 40 MG: 10 INJECTION, SOLUTION INTRAMUSCULAR; INTRAVENOUS at 07:35

## 2018-07-08 RX ADMIN — HYDRALAZINE HYDROCHLORIDE 25 MG: 25 TABLET, FILM COATED ORAL at 13:06

## 2018-07-08 RX ADMIN — IPRATROPIUM BROMIDE AND ALBUTEROL SULFATE 3 ML: .5; 3 SOLUTION RESPIRATORY (INHALATION) at 12:37

## 2018-07-08 RX ADMIN — CLONIDINE HYDROCHLORIDE 0.2 MG: 0.1 TABLET ORAL at 17:14

## 2018-07-08 RX ADMIN — AMLODIPINE BESYLATE 5 MG: 5 TABLET ORAL at 13:06

## 2018-07-08 RX ADMIN — HYDRALAZINE HYDROCHLORIDE 20 MG: 20 INJECTION INTRAMUSCULAR; INTRAVENOUS at 08:34

## 2018-07-08 RX ADMIN — INSULIN LISPRO 12 UNITS: 100 INJECTION, SOLUTION INTRAVENOUS; SUBCUTANEOUS at 21:43

## 2018-07-08 RX ADMIN — IPRATROPIUM BROMIDE AND ALBUTEROL SULFATE 3 ML: .5; 3 SOLUTION RESPIRATORY (INHALATION) at 19:41

## 2018-07-08 RX ADMIN — METHYLPREDNISOLONE SODIUM SUCCINATE 40 MG: 40 INJECTION, POWDER, FOR SOLUTION INTRAMUSCULAR; INTRAVENOUS at 23:23

## 2018-07-08 RX ADMIN — PREDNISONE 60 MG: 20 TABLET ORAL at 07:34

## 2018-07-08 RX ADMIN — METHYLPREDNISOLONE SODIUM SUCCINATE 40 MG: 40 INJECTION, POWDER, FOR SOLUTION INTRAMUSCULAR; INTRAVENOUS at 13:06

## 2018-07-08 RX ADMIN — IPRATROPIUM BROMIDE AND ALBUTEROL SULFATE 3 ML: .5; 3 SOLUTION RESPIRATORY (INHALATION) at 07:36

## 2018-07-08 RX ADMIN — ENOXAPARIN SODIUM 40 MG: 40 INJECTION, SOLUTION INTRAVENOUS; SUBCUTANEOUS at 13:04

## 2018-07-08 RX ADMIN — IPRATROPIUM BROMIDE AND ALBUTEROL SULFATE 3 ML: .5; 3 SOLUTION RESPIRATORY (INHALATION) at 15:56

## 2018-07-08 RX ADMIN — METHYLPREDNISOLONE SODIUM SUCCINATE 40 MG: 40 INJECTION, POWDER, FOR SOLUTION INTRAMUSCULAR; INTRAVENOUS at 17:14

## 2018-07-08 RX ADMIN — HYDRALAZINE HYDROCHLORIDE 25 MG: 25 TABLET, FILM COATED ORAL at 17:14

## 2018-07-08 NOTE — ROUTINE PROCESS
Patient has requested juice and charlie crackers on 3 occasions. He was educated on the effects of steroids on elevating blood sugar and as well as increased carbohydrate in take. Patient refused all education and demanded cranberry juice and charlie crackers. Patient 1630 blood sugar = 339. Patient continues at this time to request more juice and charlie crackers and will not stop requesting them until request is filled. Patient continues to be educated on high blood sugar.
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
## 2018-07-08 NOTE — ED NOTES
Patient is resting quietly with eyes closed, respirations are even an non labored with diminished breath sounds in bilateral bases. VS monitored and recorded. SR on monitor.
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
## 2018-07-08 NOTE — PROGRESS NOTES
RESPIRATORY NOTE:  BIPAP initiated per MD, Pt responds easily to verbal stimuli though somewhat restless with periods of irregular breathing and snoring noted, will continue to monitor.

## 2018-07-08 NOTE — ROUTINE PROCESS
TRANSFER - OUT REPORT:    Verbal report given to Phil Tam on Michelle Orellana  being transferred to ICU for routine progression of care       Report consisted of patients Situation, Background, Assessment and   Recommendations(SBAR). Information from the following report(s) Intake/Output and Cardiac Rhythm SR was reviewed with the receiving nurse. Lines:   Peripheral IV 07/08/18 Right Antecubital (Active)   Site Assessment Clean, dry, & intact 7/8/2018  7:29 AM   Phlebitis Assessment 0 7/8/2018  7:29 AM   Infiltration Assessment 0 7/8/2018  7:29 AM   Dressing Status Clean, dry, & intact 7/8/2018  7:29 AM   Dressing Type Transparent 7/8/2018  7:29 AM        Opportunity for questions and clarification was provided.       Patient transported with:   Registered Nurse, [place]
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
## 2018-07-08 NOTE — IP AVS SNAPSHOT
[address] [place] [id number] 
[phone] Patient: Elizabeth Thompson MRN: [id number] CIJ:[id number] A check brittany indicates which time of day the medication should be taken. My Medications CONTINUE taking these medications Instructions Each Dose to Equal  
 Morning Noon Evening Bedtime  
 albuterol 90 mcg/actuation inhaler Commonly known as:  PROVENTIL HFA, VENTOLIN HFA, PROAIR HFA Your last dose was: Your next dose is: Take 1 Puff by inhalation. 1 puff in am and 1 puff in pm  
 1 Puff  
    
   
   
   
  
 amLODIPine 5 mg tablet Commonly known as:  Luisito Alstrom Your last dose was: Your next dose is: Take  by mouth daily. cloNIDine HCl 0.2 mg tablet Commonly known as:  CATAPRES Your last dose was: Your next dose is: Take 1 Tab by mouth every eight (8) hours. 0.2 mg  
    
   
   
   
  
 fluticasone-vilanterol 100-25 mcg/dose inhaler Commonly known as:  BREO ELLIPTA Your last dose was: Your next dose is: Take 1 Puff by inhalation daily. 1 Puff  
    
   
   
   
  
 furosemide 40 mg tablet Commonly known as:  LASIX Your last dose was: Your next dose is: Take 1 Tab by mouth daily. 40 mg  
    
   
   
   
  
 gabapentin 800 mg tablet Commonly known as:  NEURONTIN Your last dose was: Your next dose is: Take  by mouth three (3) times daily. glipiZIDE 10 mg tablet Commonly known as:  Seldon Mildred Your last dose was: Your next dose is: Take 1 Tab by mouth two (2) times a day. 10 mg  
    
   
   
   
  
 hydrALAZINE 25 mg tablet Commonly known as:  APRESOLINE Your last dose was: Your next dose is: Take 1 Tab by mouth three (3) times daily.   
 25 mg  
    
   
   
 predniSONE 10 mg tablet Commonly known as:  Edgar Sprague Your last dose was: Your next dose is:    
   
   
 Days 1 & 2: Take FOUR tabs. Days 3 & 4: Take THREE tabs. Days 5 & 6: Take TWO tabs. Days 7 & 8: Take ONE tab.  
     
   
   
   
  
 spironolactone 25 mg tablet Commonly known as:  ALDACTONE Your last dose was: Your next dose is: Take 25 mg by mouth daily.   
 25 mg

## 2018-07-08 NOTE — ROUTINE PROCESS
Bedside and Verbal shift change report given to GIANCARLO Landrum (oncoming nurse) by RORO Arcos RN (offgoing nurse). Report included the following information SBAR, Kardex and MAR.

## 2018-07-08 NOTE — ROUTINE PROCESS
11:00 Amanda from ED calls with report. Patient arrived by stretcher to ICU 2. Patient has oxygen and bipap. Patient requested BIPAP be removed. Patient appears to be short of breath, but not in any acute distress at this time. Patient was assessed and medications were administers. No c/o pain at this time. Levaquin is running IV at this time. Patient requested diet from physician. Cardiac diet order was ordered. BS= 130. Patient is hypertensive and crackles are heard at bases of lungs bilaterally. Patient eating lunch at this time.

## 2018-07-08 NOTE — IP AVS SNAPSHOT
[address] [address] [place] [address] [address] 
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 
[phone] Michael Taylor MD On 8/1/2018 Follow up appointment scheduled for August 1, 2018 at 10:30 a.m. with Dr. Tova Wolfe.  (Dr. Isac Iniguez out of office until mid August). Please arrive at 10:00 a.m. for check in. [address] [facility] [address] [facility] 
[phone] Care Mgmt contacted MD's office (Dr. Nickie Cifuentes) on 7/12/18 to assist with scheduling f/u appointment, per MD's office, patient will need to [facility] in\" to MD's office to arrange f/u appointment. [facility]  Chosen to continue managing your healthcare needs [facility] ([facility]) [phone] Discharge Orders None A check brittany indicates which time of day the medication should be taken. My Medications CONTINUE taking these medications Instructions Each Dose to Equal  
 Morning Noon Evening Bedtime  
 albuterol 90 mcg/actuation inhaler Commonly known as:  PROVENTIL HFA, VENTOLIN HFA, PROAIR HFA Your last dose was: Your next dose is: Take 1 Puff by inhalation. 1 puff in am and 1 puff in pm  
 1 Puff  
    
   
   
   
  
 amLODIPine 5 mg tablet Commonly known as:  Paramjit Wolfe Your last dose was: Your next dose is: Take  by mouth daily. cloNIDine HCl 0.2 mg tablet Commonly known as:  CATAPRES Your last dose was: Your next dose is: Take 1 Tab by mouth every eight (8) hours. 0.2 mg  
    
   
   
   
  
 fluticasone-vilanterol 100-25 mcg/dose inhaler Commonly known as:  BREO ELLIPTA Your last dose was: Your next dose is: Take 1 Puff by inhalation daily. 1 Puff  
    
   
   
   
  
 furosemide 40 mg tablet Commonly known as:  LASIX Your last dose was: Your next dose is: Take 1 Tab by mouth daily. 40 mg  
    
   
   
   
  
 gabapentin 800 mg tablet Commonly known as:  NEURONTIN Your last dose was: Your next dose is: Take  by mouth three (3) times daily. glipiZIDE 10 mg tablet Commonly known as:  Marguerite Shoe Your last dose was: Your next dose is: Take 1 Tab by mouth two (2) times a day. 10 mg  
    
   
   
   
  
 hydrALAZINE 25 mg tablet Commonly known as:  APRESOLINE Your last dose was: Your next dose is: Take 1 Tab by mouth three (3) times daily. 25 mg  
    
   
   
   
  
 predniSONE 10 mg tablet Commonly known as:  Derby Shreyas Your last dose was: Your next dose is:    
   
   
 Days 1 & 2: Take FOUR tabs. Days 3 & 4: Take THREE tabs. Days 5 & 6: Take TWO tabs. Days 7 & 8: Take ONE tab.  
     
   
   
   
  
 spironolactone 25 mg tablet Commonly known as:  ALDACTONE Your last dose was: Your next dose is: Take 25 mg by mouth daily. 25 mg Discharge Instructions COPD Exacerbation Plan: Care Instructions Your Care Instructions If you have chronic obstructive pulmonary disease (COPD), your usual shortness of breath could suddenly get worse. You may start coughing more and have more mucus. This flare-up is called a COPD exacerbation (say \"vy-YQL-ij-BAY-pablo\"). A lung infection or air pollution could set off an exacerbation. Sometimes it can happen after a quick change in temperature or being around chemicals. Work with your doctor to make a plan for dealing with an exacerbation. You can better manage it if you plan ahead. Follow-up care is a key part of your treatment and safety. Be sure to make and go to all appointments, and call your doctor if you are having problems. It's also a good idea to know your test results and keep a list of the medicines you take. How can you care for yourself at home? During an exacerbation · Do not panic if you start to have one. Quick treatment at home may help you prevent serious breathing problems. If you have a COPD exacerbation plan that you developed with your doctor, follow it. · Take your medicines exactly as your doctor tells you. ¨ Use your inhaler as directed by your doctor. If your symptoms do not get better after you use your medicine, have someone take you to the emergency room. Call an ambulance if necessary. ¨ With inhaled medicines, a spacer or a nebulizer may help you get more medicine to your lungs. Ask your doctor or pharmacist how to use them properly. Practice using the spacer in front of a mirror before you have an exacerbation. This may help you get the medicine into your lungs quickly. ¨ If your doctor has given you steroid pills, take them as directed. ¨ Your doctor may have given you a prescription for antibiotics, which you can fill if you need it. ¨ Talk to your doctor if you have any problems with your medicine. And call your doctor if you have to use your antibiotic or steroid pills. Preventing an exacerbation · Do not smoke. This is the most important step you can take to prevent more damage to your lungs and prevent problems. If you already smoke, it is never too late to stop. If you need help quitting, talk to your doctor about stop-smoking programs and medicines. These can increase your chances of quitting for good. · Take your daily medicines as prescribed. · Avoid colds and flu. ¨ Get a pneumococcal vaccine. ¨ Get a flu vaccine each year, as soon as it is available. Ask those you live or work with to do the same, so they will not get the flu and infect you. ¨ Try to stay away from people with colds or the flu. ¨ Wash your hands often. · Avoid secondhand smoke; air pollution; cold, dry air; hot, humid air; and high altitudes. Stay at home with your windows closed when air pollution is bad. · Learn breathing techniques for COPD, such as breathing through pursed lips. These techniques can help you breathe easier during an exacerbation. When should you call for help? Call 911 anytime you think you may need emergency care. For example, call if: 
  · You have severe trouble breathing.  
  · You have severe chest pain.  
 Call your doctor now or seek immediate medical care if: 
  · You have new or worse shortness of breath.  
  · You develop new chest pain.  
  · You are coughing more deeply or more often, especially if you notice more mucus or a change in the color of your mucus.  
  · You cough up blood.  
  · You have new or increased swelling in your legs or belly.  
  · You have a fever.  
 Watch closely for changes in your health, and be sure to contact your doctor if: 
  · You need to use your antibiotic or steroid pills.  
  · Your symptoms are getting worse. Where can you learn more? Go to http://bhargav-mily.info/. Enter M377 in the search box to learn more about \"COPD Exacerbation Plan: Care Instructions. \" Current as of: December 6, 2017 Content Version: 11.7 © 6638-7312 [employer]. Care instructions adapted under license by [employer] (which disclaims liability or warranty for this information). If you have questions about a medical condition or this instruction, always ask your healthcare professional. [address] any warranty or liability for your use of this information. Patient armband removed and shredded DISCHARGE SUMMARY from Nurse PATIENT INSTRUCTIONS: 
 
 
F-face looks uneven A-arms unable to move or move unevenly S-speech slurred or non-existent T-time-call 911 as soon as signs and symptoms begin-DO NOT go Back to bed or wait to see if you get better-TIME IS BRAIN. Warning Signs of HEART ATTACK Call 911 if you have these symptoms: 
? Chest discomfort. Most heart attacks involve discomfort in the center of the chest that lasts more than a few minutes, or that goes away and comes back. It can feel like uncomfortable pressure, squeezing, fullness, or pain. ? Discomfort in other areas of the upper body. Symptoms can include pain or discomfort in one or both arms, the back, neck, jaw, or stomach. ? Shortness of breath with or without chest discomfort. ? Other signs may include breaking out in a cold sweat, nausea, or lightheadedness. Don't wait more than five minutes to call [address]! Fast action can save your life. Calling 911 is almost always the fastest way to get lifesaving treatment. Emergency Medical Services staff can begin treatment when they arrive  up to an hour sooner than if someone gets to the hospital by car. The discharge information has been reviewed with the patient.   The patient verbalized understanding. Discharge medications reviewed with the patient and appropriate educational materials and side effects teaching were provided. ___________________________________________________________________________________________________________________________________ MyChart Announcement We are excited to announce that we are making your provider's discharge notes available to you in [employer]. You will see these notes when they are completed and signed by the physician that discharged you from your recent hospital stay. If you have any questions or concerns about any information you see in [employer], please call the Health Information Department where you were seen or reach out to your Primary Care Provider for more information about your plan of care. Introducing [facility]! Gina Benson introduces [employer] patient portal. Now you can access parts of your medical record, email your doctor's office, and request medication refills online. 1. In your internet browser, go to [email or web address] 2. Click on the First Time User? Click Here link in the Sign In box. You will see the New Member Sign Up page. 3. Enter your [employer] Access Code exactly as it appears below. You will not need to use this code after youve completed the sign-up process. If you do not sign up before the expiration date, you must request a new code. · [employer] Access Code: [email or web address] Expires: 7/26/2018  8:47 AM 
 
4. Enter the last four digits of your Social Security Number (xxxx) and Date of Birth (mm/dd/yyyy) as indicated and click Submit. You will be taken to the next sign-up page. 5. Create a TenKodt ID. This will be your [employer] login ID and cannot be changed, so think of one that is secure and easy to remember. 6. Create a [employer] password. You can change your password at any time. 7. Enter your Password Reset Question and Answer.  This can be used at a later time if you forget your password. 8. Enter your e-mail address. You will receive e-mail notification when new information is available in [address] 9. Click Sign Up. You can now view and download portions of your medical record. 10. Click the Download Summary menu link to download a portable copy of your medical information. If you have questions, please visit the Frequently Asked Questions section of the [employer] website. Remember, [employer] is NOT to be used for urgent needs. For medical emergencies, dial 911. Now available from your iPhone and Android! Introducing Antonio Rios As a [employer] patient, I wanted to make you aware of our electronic visit tool called Antonio Rios. Nobl/Chrends allows you to connect within minutes with a medical provider 24 hours a day, seven days a week via a mobile device or tablet or logging into a secure website from your computer. You can access Antonio Rios from anywhere in the [place]. A virtual visit might be right for you when you have a simple condition and feel like you just dont want to get out of bed, or cant get away from work for an appointment, when your regular [facility] provider is not available (evenings, weekends or holidays), or when youre out of town and need minor care. Electronic visits cost only $49 and if the [employer] provider determines a prescription is needed to treat your condition, one can be electronically transmitted to a nearby pharmacy*. Please take a moment to enroll today if you have not already done so. The enrollment process is free and takes just a few minutes. To enroll, please download the [employer] anabella to your tablet or phone, or visit [email or web address] to enroll on your computer.    
And, as an [address] [place] [address] patient with a [employer]ize[employer] account, the results of your visits will be scanned into your electronic medical record and your primary care provider will be able to view the scanned results. We urge you to continue to see your regular [facility] provider for your ongoing medical care. And while your primary care provider may not be the one available when you seek a [employer] virtual visit, the peace of mind you get from getting a real diagnosis real time can be priceless. For more information on Friend [employer]er, view our Frequently Asked Questions (FAQs) at [email or web address]. Sincerely, 
 
Dulce Weiner MD 
Chief Medical Officer Rivera8 Roslyn Bearden *:  certain medications cannot be prescribed via [employer] Providers Seen During Your Hospitalization Provider Specialty Primary office phone Scott Ortiz MD Emergency Medicine [phone] [facility] [place] [facility] [phone] Your Primary Care Physician (PCP) Primary Care Physician Office Phone Office Fax Marilyn Nahomy, [address] [facility] [phone] You are allergic to the following Allergen Reactions Gabapentin Hives Lisinopril Swelling Tramadol Hives Recent Documentation Height Weight BMI Smoking Status 1.702 m (!) 178.8 kg 61.74 kg/m2 Former Smoker Emergency Contacts Name Discharge Info Relation Home Work Mobile Larissa Shahid DISCHARGE CAREGIVER [3] Spouse [3] [phone] Patient Belongings The following personal items are in your possession at time of discharge: 
     Visual Aid: None      Home Medications:  (sent with patient to ICU)      Clothing: With patient Please provide this summary of care documentation to your next provider. Signatures-by signing, you are acknowledging that this After Visit Summary has been reviewed with you and you have received a copy. Patient Signature:  ____________________________________________________________ Date:  ____________________________________________________________  
  
Vi Feller Provider Signature:  ____________________________________________________________ Date:  ____________________________________________________________

## 2018-07-08 NOTE — ED TRIAGE NOTES
Patient arrived via EMS from home. Ambulatory to stretcher. Patient c/o shortness of breath and swelling. Sepsis Screening completed    (  )Patient meets SIRS criteria. ( x )Patient does not meet SIRS criteria.       SIRS Criteria is achieved when two or more of the following are present   Temperature < 96.8°F (36°C) or > 100.9°F (38.3°C)   Heart Rate > 90 beats per minute   Respiratory Rate > 20 breaths per minute   WBC count > 12,000 or <4,000 or > 10% bands

## 2018-07-09 ENCOUNTER — APPOINTMENT (OUTPATIENT)
Dept: GENERAL RADIOLOGY | Age: 58
DRG: 194 | End: 2018-07-09
Attending: INTERNAL MEDICINE
Payer: MEDICAID

## 2018-07-09 PROBLEM — G47.30 SLEEP APNEA: Status: ACTIVE | Noted: 2018-07-09

## 2018-07-09 LAB
ALBUMIN SERPL-MCNC: 2.9 G/DL (ref 3.4–5)
ALBUMIN/GLOB SERPL: 0.7 {RATIO} (ref 0.8–1.7)
ALP SERPL-CCNC: 86 U/L (ref 45–117)
ALT SERPL-CCNC: 32 U/L (ref 16–61)
ANION GAP SERPL CALC-SCNC: 7 MMOL/L (ref 3–18)
ARTERIAL PATENCY WRIST A: YES
AST SERPL-CCNC: 8 U/L (ref 15–37)
ATRIAL RATE: 88 BPM
BASE EXCESS BLD CALC-SCNC: 9 MMOL/L
BASOPHILS # BLD: 0 K/UL (ref 0–0.1)
BASOPHILS NFR BLD: 0 % (ref 0–3)
BDY SITE: ABNORMAL
BILIRUB SERPL-MCNC: 0.3 MG/DL (ref 0.2–1)
BODY TEMPERATURE: 98.6
BUN SERPL-MCNC: 23 MG/DL (ref 7–18)
BUN/CREAT SERPL: 13 (ref 12–20)
CALCIUM SERPL-MCNC: 9.3 MG/DL (ref 8.5–10.1)
CALCULATED P AXIS, ECG09: 62 DEGREES
CALCULATED R AXIS, ECG10: -29 DEGREES
CALCULATED T AXIS, ECG11: 94 DEGREES
CHLORIDE SERPL-SCNC: 100 MMOL/L (ref 100–108)
CK MB CFR SERPL CALC: 1.2 % (ref 0–4)
CK MB SERPL-MCNC: 1.2 NG/ML (ref 5–25)
CK SERPL-CCNC: 101 U/L (ref 39–308)
CO2 SERPL-SCNC: 31 MMOL/L (ref 21–32)
CREAT SERPL-MCNC: 1.71 MG/DL (ref 0.6–1.3)
DIAGNOSIS, 93000: NORMAL
DIFFERENTIAL METHOD BLD: ABNORMAL
EOSINOPHIL # BLD: 0 K/UL (ref 0–0.4)
EOSINOPHIL NFR BLD: 0 % (ref 0–5)
ERYTHROCYTE [DISTWIDTH] IN BLOOD BY AUTOMATED COUNT: 15 % (ref 11.6–14.5)
GAS FLOW.O2 O2 DELIVERY SYS: ABNORMAL L/MIN
GAS FLOW.O2 SETTING OXYMISER: 3.5 L/M
GLOBULIN SER CALC-MCNC: 4.2 G/DL (ref 2–4)
GLUCOSE BLD STRIP.AUTO-MCNC: 155 MG/DL (ref 70–110)
GLUCOSE BLD STRIP.AUTO-MCNC: 196 MG/DL (ref 70–110)
GLUCOSE BLD STRIP.AUTO-MCNC: 208 MG/DL (ref 70–110)
GLUCOSE BLD STRIP.AUTO-MCNC: 216 MG/DL (ref 70–110)
GLUCOSE SERPL-MCNC: 272 MG/DL (ref 74–99)
HCO3 BLD-SCNC: 34.6 MMOL/L (ref 22–26)
HCT VFR BLD AUTO: 44.5 % (ref 36–48)
HGB BLD-MCNC: 14.4 G/DL (ref 13–16)
LYMPHOCYTES # BLD: 0.8 K/UL (ref 0.8–3.5)
LYMPHOCYTES NFR BLD: 10 % (ref 20–51)
MCH RBC QN AUTO: 29.6 PG (ref 24–34)
MCHC RBC AUTO-ENTMCNC: 32.4 G/DL (ref 31–37)
MCV RBC AUTO: 91.6 FL (ref 74–97)
MONOCYTES # BLD: 0 K/UL (ref 0–1)
MONOCYTES NFR BLD: 0 % (ref 2–9)
NEUTS SEG # BLD: 7.3 K/UL (ref 1.8–8)
NEUTS SEG NFR BLD: 90 % (ref 42–75)
O2/TOTAL GAS SETTING VFR VENT: 0.34 %
P-R INTERVAL, ECG05: 160 MS
PCO2 BLD: 63.6 MMHG (ref 35–45)
PH BLD: 7.34 [PH] (ref 7.35–7.45)
PLATELET # BLD AUTO: 151 K/UL (ref 135–420)
PLATELET COMMENTS,PCOM: ABNORMAL
PO2 BLD: 93 MMHG (ref 80–100)
POTASSIUM SERPL-SCNC: 4.4 MMOL/L (ref 3.5–5.5)
PROT SERPL-MCNC: 7.1 G/DL (ref 6.4–8.2)
Q-T INTERVAL, ECG07: 404 MS
QRS DURATION, ECG06: 112 MS
QTC CALCULATION (BEZET), ECG08: 488 MS
RBC # BLD AUTO: 4.86 M/UL (ref 4.7–5.5)
RBC MORPH BLD: ABNORMAL
SAO2 % BLD: 96 % (ref 92–97)
SERVICE CMNT-IMP: ABNORMAL
SODIUM SERPL-SCNC: 138 MMOL/L (ref 136–145)
SPECIMEN TYPE: ABNORMAL
TOTAL RESP. RATE, ITRR: 17
TROPONIN I SERPL-MCNC: <0.02 NG/ML (ref 0–0.06)
VENTRICULAR RATE, ECG03: 88 BPM
WBC # BLD AUTO: 8.1 K/UL (ref 4.6–13.2)

## 2018-07-09 PROCEDURE — 74011250637 HC RX REV CODE- 250/637: Performed by: INTERNAL MEDICINE

## 2018-07-09 PROCEDURE — 74011636637 HC RX REV CODE- 636/637: Performed by: INTERNAL MEDICINE

## 2018-07-09 PROCEDURE — 74011636637 HC RX REV CODE- 636/637: Performed by: HOSPITALIST

## 2018-07-09 PROCEDURE — 77010033678 HC OXYGEN DAILY

## 2018-07-09 PROCEDURE — 36415 COLL VENOUS BLD VENIPUNCTURE: CPT | Performed by: INTERNAL MEDICINE

## 2018-07-09 PROCEDURE — 65660000000 HC RM CCU STEPDOWN

## 2018-07-09 PROCEDURE — 94640 AIRWAY INHALATION TREATMENT: CPT

## 2018-07-09 PROCEDURE — 94760 N-INVAS EAR/PLS OXIMETRY 1: CPT

## 2018-07-09 PROCEDURE — 82550 ASSAY OF CK (CPK): CPT | Performed by: INTERNAL MEDICINE

## 2018-07-09 PROCEDURE — 74011000250 HC RX REV CODE- 250: Performed by: INTERNAL MEDICINE

## 2018-07-09 PROCEDURE — 71045 X-RAY EXAM CHEST 1 VIEW: CPT

## 2018-07-09 PROCEDURE — 74011250637 HC RX REV CODE- 250/637: Performed by: HOSPITALIST

## 2018-07-09 PROCEDURE — 74011250636 HC RX REV CODE- 250/636: Performed by: INTERNAL MEDICINE

## 2018-07-09 PROCEDURE — 82962 GLUCOSE BLOOD TEST: CPT

## 2018-07-09 PROCEDURE — 36600 WITHDRAWAL OF ARTERIAL BLOOD: CPT

## 2018-07-09 PROCEDURE — 82803 BLOOD GASES ANY COMBINATION: CPT

## 2018-07-09 PROCEDURE — 85025 COMPLETE CBC W/AUTO DIFF WBC: CPT | Performed by: INTERNAL MEDICINE

## 2018-07-09 PROCEDURE — 97161 PT EVAL LOW COMPLEX 20 MIN: CPT

## 2018-07-09 PROCEDURE — 80053 COMPREHEN METABOLIC PANEL: CPT | Performed by: INTERNAL MEDICINE

## 2018-07-09 RX ORDER — HYDRALAZINE HYDROCHLORIDE 50 MG/1
50 TABLET, FILM COATED ORAL 3 TIMES DAILY
Status: DISCONTINUED | OUTPATIENT
Start: 2018-07-09 | End: 2018-07-12 | Stop reason: HOSPADM

## 2018-07-09 RX ORDER — INSULIN GLARGINE 100 [IU]/ML
5 INJECTION, SOLUTION SUBCUTANEOUS DAILY
Status: DISCONTINUED | OUTPATIENT
Start: 2018-07-09 | End: 2018-07-12 | Stop reason: HOSPADM

## 2018-07-09 RX ORDER — HYDRALAZINE HYDROCHLORIDE 25 MG/1
25 TABLET, FILM COATED ORAL ONCE
Status: COMPLETED | OUTPATIENT
Start: 2018-07-09 | End: 2018-07-09

## 2018-07-09 RX ORDER — HYDRALAZINE HYDROCHLORIDE 25 MG/1
25 TABLET, FILM COATED ORAL ONCE
Status: DISPENSED | OUTPATIENT
Start: 2018-07-09 | End: 2018-07-09

## 2018-07-09 RX ADMIN — HYDRALAZINE HYDROCHLORIDE 50 MG: 50 TABLET, FILM COATED ORAL at 15:28

## 2018-07-09 RX ADMIN — HYDRALAZINE HYDROCHLORIDE 25 MG: 25 TABLET, FILM COATED ORAL at 09:00

## 2018-07-09 RX ADMIN — METHYLPREDNISOLONE SODIUM SUCCINATE 40 MG: 40 INJECTION, POWDER, FOR SOLUTION INTRAMUSCULAR; INTRAVENOUS at 06:05

## 2018-07-09 RX ADMIN — ENOXAPARIN SODIUM 40 MG: 40 INJECTION, SOLUTION INTRAVENOUS; SUBCUTANEOUS at 11:16

## 2018-07-09 RX ADMIN — CLONIDINE HYDROCHLORIDE 0.2 MG: 0.1 TABLET ORAL at 22:42

## 2018-07-09 RX ADMIN — IPRATROPIUM BROMIDE AND ALBUTEROL SULFATE 3 ML: .5; 3 SOLUTION RESPIRATORY (INHALATION) at 07:24

## 2018-07-09 RX ADMIN — BUDESONIDE 500 MCG: 0.5 INHALANT RESPIRATORY (INHALATION) at 19:50

## 2018-07-09 RX ADMIN — CLONIDINE HYDROCHLORIDE 0.2 MG: 0.1 TABLET ORAL at 09:31

## 2018-07-09 RX ADMIN — BUDESONIDE 500 MCG: 0.5 INHALANT RESPIRATORY (INHALATION) at 07:24

## 2018-07-09 RX ADMIN — INSULIN LISPRO 3 UNITS: 100 INJECTION, SOLUTION INTRAVENOUS; SUBCUTANEOUS at 09:32

## 2018-07-09 RX ADMIN — IPRATROPIUM BROMIDE AND ALBUTEROL SULFATE 3 ML: .5; 3 SOLUTION RESPIRATORY (INHALATION) at 19:50

## 2018-07-09 RX ADMIN — HYDRALAZINE HYDROCHLORIDE 50 MG: 50 TABLET, FILM COATED ORAL at 21:15

## 2018-07-09 RX ADMIN — IPRATROPIUM BROMIDE AND ALBUTEROL SULFATE 3 ML: .5; 3 SOLUTION RESPIRATORY (INHALATION) at 11:13

## 2018-07-09 RX ADMIN — INSULIN LISPRO 3 UNITS: 100 INJECTION, SOLUTION INTRAVENOUS; SUBCUTANEOUS at 03:00

## 2018-07-09 RX ADMIN — FUROSEMIDE 40 MG: 10 INJECTION, SOLUTION INTRAMUSCULAR; INTRAVENOUS at 09:30

## 2018-07-09 RX ADMIN — CLONIDINE HYDROCHLORIDE 0.2 MG: 0.1 TABLET ORAL at 15:27

## 2018-07-09 RX ADMIN — AMLODIPINE BESYLATE 5 MG: 5 TABLET ORAL at 09:30

## 2018-07-09 RX ADMIN — LEVOFLOXACIN 500 MG: 5 INJECTION, SOLUTION INTRAVENOUS at 11:16

## 2018-07-09 RX ADMIN — SPIRONOLACTONE 25 MG: 25 TABLET, FILM COATED ORAL at 09:30

## 2018-07-09 RX ADMIN — IPRATROPIUM BROMIDE AND ALBUTEROL SULFATE 3 ML: .5; 3 SOLUTION RESPIRATORY (INHALATION) at 15:16

## 2018-07-09 RX ADMIN — INSULIN LISPRO 6 UNITS: 100 INJECTION, SOLUTION INTRAVENOUS; SUBCUTANEOUS at 21:16

## 2018-07-09 RX ADMIN — INSULIN GLARGINE 5 UNITS: 100 INJECTION, SOLUTION SUBCUTANEOUS at 11:52

## 2018-07-09 RX ADMIN — HYDRALAZINE HYDROCHLORIDE 25 MG: 25 TABLET, FILM COATED ORAL at 09:54

## 2018-07-09 RX ADMIN — INSULIN LISPRO 6 UNITS: 100 INJECTION, SOLUTION INTRAVENOUS; SUBCUTANEOUS at 11:52

## 2018-07-09 NOTE — DIABETES MGMT
GLYCEMIC CONTROL PROGRESS NOTE: 
 
-discussed in rounds, HbA1C within recommended range for age + comorbids on oral home regimen 
-IV steroids 40 mg q6 hours, steroid induced hyperglycewmia 
-steroids reduced to daily 
-BG out of target range ICU: 140-180 mg/dL 
-TDD = 20 - Humalog Normal Insulin Sensitivity Corrective Coverage 
-lantus 5 units orders today, expect will see improvement in GC w/ decrease in steroids, Recent Glucose Results:  
Lab Results Component Value Date/Time  (H) 07/09/2018 12:56 AM  
 GLUCPOC 196 (H) 07/09/2018 07:09 AM  
 GLUCPOC 312 (H) 07/08/2018 09:24 PM  
 GLUCPOC 339 (H) 07/08/2018 04:40 PM  
 
 
 
 
 
Roscoe Montgomery RN, MS 
Glycemic Control Team 
Pager [phone] (M-TH 8:30-5P) *After Hours pager [phone]
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
## 2018-07-09 NOTE — PROGRESS NOTES
PT note:  Consult received. Pt in process of being transported to telemetry unit - 342 at this time. Will f/u at later time as pt schedule allows. Thank you.   Sujit Corley, PT

## 2018-07-09 NOTE — ROUTINE PROCESS
1943  Bedside and Verbal shift change report given to Tee Martins RN (oncoming nurse) by Santa Kraus RN (offgoing nurse). Report included the following information SBAR, Kardex and MAR.

## 2018-07-09 NOTE — PROGRESS NOTES
NUTRITION UPDATE    - Recc add Consistent Carb diet to help w/ high blood sugars-per MD in rounds add restriction       Ebenezer Panchal, RD  PAGER:  [phone]

## 2018-07-09 NOTE — PROGRESS NOTES
Occupational Therapy Evaluation/Treatment Attempt    Chart reviewed. Attempted Occupational Therapy Evaluation/Treatment, however, patient unable to be seen due to:  []  Nausea/vomiting  []  Eating  []  Pain  []  Patient too lethargic  [x]  Off Unit for testing/procedure/transferring to room 342  []  Dialysis treatment in progress   []  Telemetry Results  []  Other:     Will f/u later as patient's schedule allows.    Thank you for this referral.  Ethel Jones, OTR/L

## 2018-07-09 NOTE — PROGRESS NOTES
1940 - Assumed care of pt. Pt sleeping during shift change, easily awakes to voice. PT is A/O x4. On 3.5L NC diminished, faint crackles at bases. Pt takes shallow breaths and has increased RR. Pt is obese, semi soft abdomen, + bowl sounds, uses the urinal. Skin is intact, does have +2 nonpitting edema to the feet and ankles. Pt states he was just here not too long ago. States when he is here he gets 2 healthy choice meals at night for his diabetic snack. I try to to explain I would doubt he gets 2, but he said no they know me here and that is what I get. Also wants 2 ginger ales. Discussed with pt about his blood sugars, he says only high because of the steroids and at home he is around 120s. He talked about the swelling in his feet and ankles and how tight they feel, talked about him taking lasix and trying to get the fluid off, discussed the importance of watching how much fluid is intaking due to the fact we are giving him medication to get the fluid off. He claims he watches his intake. 2015 - Spoke to supervisor to get 2 TV dinners for pt.   2130 - Blood sugar checked, 312. Insulin given and discussed again about how high his sugars are and limiting his food and drinks. Supervisor came to unit advised no TV dinners but did have 1 box lunch. Provided for pt and pt said then he needs 2 of these because it is so long before he gets breakfast. Explained this was all we had for tonight. Pt accepted, asked for 2 more cans of ginger ale.   2300 - Pt sleeping, BP still 170s/80s, PRN labetalol only for SBP >180. Will continue to monitor. 0000 - RT at bedside, pt refuses to wear Bipap.   0600 - Pt slept on and off. Pt got up and uses the restroom urinated and had BM. Linens changed. Pt bathed with CHG wipes and new gown. Pt situated back in bed watching TV.

## 2018-07-09 NOTE — PROGRESS NOTES
Problem: Diabetes Self-Management  Goal: *Incorporating nutritional management into lifestyle  Describe effect of type, amount and timing of food on blood glucose; list 3 methods for planning meals. Outcome: Not Progressing Towards Goal  Frequently ask for foods, many carbs consumed. Pt states his blood sugars are not this high at home.  Educated, but will need reinforcement

## 2018-07-09 NOTE — PROGRESS NOTES
Pt transported via bed and on monitor to room 342. RN receiving pt in room. Bedside report and skin assessment completed. Pt placed on telemetry. EKG strip verified by both sending and receiving nurse. Pt with VSS.

## 2018-07-09 NOTE — ROUTINE PROCESS
2:00 PM  TRANSFER - IN REPORT:    Verbal report received from ДМИТРИЙ Oropeza RN(name) on Marilee Hudson  being received from ICU(unit) for routine progression of care      Report consisted of patients Situation, Background, Assessment and   Recommendations(SBAR). Information from the following report(s) SBAR, Kardex and MAR was reviewed with the receiving nurse. Opportunity for questions and clarification was provided. Assessment completed upon patients arrival to unit and care assumed.

## 2018-07-09 NOTE — PROGRESS NOTES
Hospitalist Progress Note-critical care note Patient: Maria M Jasmine MRN: [id number]  [facility]: [id number] YOB: 1960  Age: 59 y.o. Sex: male DOA: 7/8/2018 LOS:  LOS: 1 day Chief complaint: acute on chronic respiratory failure, shyam, chf/copd exacerbation, DM, HTn 
 
Assessment/Plan Hospital Problems  Date Reviewed: 7/8/2018 Codes Class Noted POA Sleep apnea ICD-10-CM: G47.30 ICD-9-CM: 780.57  7/9/2018 Unknown * (Principal)Acute exacerbation of CHF (congestive heart failure) (HCC) ICD-10-CM: I50.9 ICD-9-CM: 428.0  7/8/2018 Unknown COPD with exacerbation ([facility] ICD-10-CM: J44.1 ICD-9-CM: 491.21  7/8/2018 Unknown Respiratory failure with hypoxia and hypercapnia (HCC) ICD-10-CM: J96.91, J96.92 
ICD-9-CM: 518.81  7/8/2018 Unknown Cocaine abuse ICD-10-CM: F14.10 ICD-9-CM: 305.60  4/22/2018 Yes Acute on chronic combined systolic and diastolic ACC/AHA stage C congestive heart failure (HCC) (Chronic) ICD-10-CM: I50.43 ICD-9-CM: 428.43, 428.0  1/25/2018 Yes COPD exacerbation ([facility] ICD-10-CM: J44.1 ICD-9-CM: 491.21  2/19/2013 Yes Type II diabetes mellitus with peripheral autonomic neuropathy (HCC) ICD-10-CM: E11.43 
ICD-9-CM: 250.60, 337.1  2/19/2013 Yes Morbid obesity ([facility] ICD-10-CM: E66.01 
ICD-9-CM: 278.01  2/19/2013 Yes Acute on chronic hypoxemic/ hypercarbic respiratory failure due to decompensated CHF and COPD exacerbation Refused bipap last night, need bipap at night,  
Continue educate pt and treat chf and copd  
  
 COPD exac:  
 on Levaquin and breathing tx/iv steroid  
   
Acute on chronic diastolic CHF: 
 -continue IV lasix 40 mg BID Echo on 3/2018.  RD 78 :diastolic dysfunction  Increased filling pressure 
  
  
HTN 
 
clonidine ,hydralazine , norvasc/spirolactone lasix , will increase hydralazine for better bp control  
   
  
Hx Cocaine abuse:  
   
   
  
Uncontrolled DM:   
on SSI, add lantus for better control    
   
   
JIM: reported insurance issue  
   
  
ckd3 Stable  
monitor renal function in the setting of diuresis , monitor electrolytes  
   
Morbid obesity/BMI 55 
   
Disposition :2-3 days Subjective: sob a little bit better, insurance company does not give breathing treatment machine and cpap machine . Nurse; refused to use bipap Review of systems: 
 
General: No fevers or chills. Cardiovascular: No chest pain or pressure. No palpitations. Pulmonary: shortness of breath a little bit better Gastrointestinal: No nausea, vomiting. Vital signs/Intake and Output: 
Visit Vitals  BP (!) 177/92  Pulse 96  Temp 97.8 °F (36.6 °C)  Resp 22  
 Ht 5' 7\" (1.702 m)  Wt (!) 175.9 kg (387 lb 12.6 oz)  SpO2 100%  BMI 60.74 kg/m2 Current Shift:  07/09 0701 - 07/09 1900 In: 480 [P.O.:480] Out: - Last three shifts:  07/07 1901 - 07/09 0700 In: 36 [P.O.:720; I.V.:100] Out: 5452 [ORYGK:[id number]] Physical Exam: 
General: WD, WN. Alert, cooperative, no acute distress   
HEENT: NC, Atraumatic. PERRLA, anicteric sclerae. Lungs: CTA Bilaterally. No Wheezing/Rhonchi/Rales. Heart:  Regular  rhythm,  + murmur, No Rubs, No Gallops Abdomen: Soft, Non distended, Non tender.  +Bowel sounds, Extremities: No c/c/e Psych:   Not anxious or agitated. Neurologic:  No acute neurological deficit. Labs: Results:  
   
Chemistry Recent Labs  
   07/09/18 
 0056  07/08/18 
 7938 GLU  272*  119* NA  138  141  
K  4.4  4.0  
CL  100  106 CO2  31  34* BUN  23*  18  
CREA  1.71*  1.58* CA  9.3  8.9 AGAP  7  1*  
BUCR  13  11* AP  86  87  
TP  7.1  7.0 ALB  2.9*  3.2*  
GLOB  4.2*  3.8 AGRAT  0.7*  0.8 CBC w/Diff Recent Labs  
   07/09/18 
 0056  07/08/18 
 2712 WBC  8.1  6.2 RBC  4.86  4.95  
HGB  14.4  14.3 HCT  44.5  45.4 PLT  151  160 GRANS  90*  66  
LYMPH  10*  23 EOS  0  4 Cardiac Enzymes Recent Labs 07/09/18 0056 07/08/18 1944 CPK  101  121 CKND1  1.2  0.9 Coagulation No results for input(s): PTP, INR, APTT in the last 72 hours. No lab exists for component: INREXT, INREXT Lipid Panel Lab Results Component Value Date/Time Cholesterol, total 196 01/25/2018 02:51 AM  
 HDL Cholesterol 64 (H) 01/25/2018 02:51 AM  
 LDL, calculated 121.2 (H) 01/25/2018 02:51 AM  
 VLDL, calculated 10.8 01/25/2018 02:51 AM  
 Triglyceride 54 01/25/2018 02:51 AM  
 CHOL/HDL Ratio 3.1 01/25/2018 02:51 AM  
  
BNP No results for input(s): BNPP in the last 72 hours. Liver Enzymes Recent Labs  
   07/09/18 0056 TP  7.1 ALB  2.9* AP  86 SGOT  8* Thyroid Studies Lab Results Component Value Date/Time TSH 0.52 05/11/2018 08:06 AM  
    
Procedures/imaging: see electronic medical records for all procedures/Xrays and details which were not copied into this note but were reviewed prior to creation of Plan Michael Galarza MD
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
## 2018-07-09 NOTE — PROGRESS NOTES
NUTRITION UPDATE    - Pt is refusing kcal restriction at this time.  Blood sugar elevated due to medication tried to control via diet, but pt is refusing; will remove restriction so that pt can order more food per pt request    Will follow up tomorrow       Maggy Garcia, TERRI  PAGER:  [phone]

## 2018-07-09 NOTE — PROGRESS NOTES
1401  Pt arrived to  342 via bed. Dual skin check done with SMITHA Oropeza RN. No skin breakdown noted. Placed on Cardiac Monitoring. Pt on 3.5L O2 via nasal cannula. Lungs with slight inspiratory wheezes. No SOB at rest.  Pt has +2 non pitting edema of both feet/ ankles. Made comfortable in bed.   3:30 PM   Pt resting in bed. No complaint of pain. Sedrick Ramos brought in to the room as per pt request.   4:57 PM  Pt was seen by PT. Pt ambulated about 40 ft with PT then sat at edge of bed for dinner. Pt's O2 sat went down to 90 % while ambulating but went back up after ambulating.

## 2018-07-09 NOTE — ACP (ADVANCE CARE PLANNING)
Patient stated that he has \"done an AMD before\" and that he wants his sister to speak for him. She is an RN at [employer]. Patient is \"\" for years from his wife. Patient used to get to [facility] until the [facility] broke down to get him there. Stated that he has good support. Comes here \"when he needs to get going right again. You treat me pretty good here. \"     Patient is very open to visits. [address], M.Div.   Board Certified   [phone] - Office

## 2018-07-09 NOTE — PROGRESS NOTES
Reason for Admission:   SOB               RRAT Score:27                 Resources/supports as identified by patient/family:  TBD                 Top Challenges facing patient (as identified by patient/family and CM):  TBD                     Finances/Medication cost?                    Transportation? Support system or lack thereof? Living arrangements? Self-care/ADLs/Cognition? Current Advanced Directive/Advance Care Plan:  Not on file physician will be made aware                          Plan for utilizing home health:  TBD                        Likelihood of readmission: TBD                 Transition of Care Plan:  Chart reviewed and spoke with pt while in ICU pt pt states he lives alone,denies home 02 and c-pap stated he did have equipment in past put was taken away by supply company, cm was made aware that patient had been non-compliant with equipment not utilization as ordered by physician due to non compliance equipment  was not reordered,pt uses home cane for ambulating,pt ordered but unable to assess while in ICU due to transferring to [facility].

## 2018-07-09 NOTE — PROGRESS NOTES
0730  Received report at 0730 this am at bedside using SBAR format. Pt awake and alert. No c/o pain. Pt able to move self in bed. Hypertensive with po meds to be given this am.  No c/o chest pain. No SOB. On 3.5 L NC with O2 sats 100 percent. All questions answered and all clinicals reviewed. 1300  Report given to receiving RN by Molly Miller RN, using SBAR format at bedside. All questions answered and clinicals reviewed.

## 2018-07-10 LAB
ALBUMIN SERPL-MCNC: 2.7 G/DL (ref 3.4–5)
ALBUMIN/GLOB SERPL: 0.7 {RATIO} (ref 0.8–1.7)
ALP SERPL-CCNC: 74 U/L (ref 45–117)
ALT SERPL-CCNC: 29 U/L (ref 16–61)
ANION GAP SERPL CALC-SCNC: 3 MMOL/L (ref 3–18)
AST SERPL-CCNC: 12 U/L (ref 15–37)
BASOPHILS # BLD: 0 K/UL (ref 0–0.06)
BASOPHILS NFR BLD: 0 % (ref 0–2)
BILIRUB SERPL-MCNC: 0.2 MG/DL (ref 0.2–1)
BUN SERPL-MCNC: 28 MG/DL (ref 7–18)
BUN/CREAT SERPL: 20 (ref 12–20)
CALCIUM SERPL-MCNC: 9.4 MG/DL (ref 8.5–10.1)
CHLORIDE SERPL-SCNC: 101 MMOL/L (ref 100–108)
CO2 SERPL-SCNC: 34 MMOL/L (ref 21–32)
CREAT SERPL-MCNC: 1.41 MG/DL (ref 0.6–1.3)
DIFFERENTIAL METHOD BLD: ABNORMAL
EOSINOPHIL # BLD: 0 K/UL (ref 0–0.4)
EOSINOPHIL NFR BLD: 0 % (ref 0–5)
ERYTHROCYTE [DISTWIDTH] IN BLOOD BY AUTOMATED COUNT: 15.1 % (ref 11.6–14.5)
GLOBULIN SER CALC-MCNC: 3.7 G/DL (ref 2–4)
GLUCOSE BLD STRIP.AUTO-MCNC: 127 MG/DL (ref 70–110)
GLUCOSE BLD STRIP.AUTO-MCNC: 154 MG/DL (ref 70–110)
GLUCOSE BLD STRIP.AUTO-MCNC: 197 MG/DL (ref 70–110)
GLUCOSE BLD STRIP.AUTO-MCNC: 315 MG/DL (ref 70–110)
GLUCOSE SERPL-MCNC: 158 MG/DL (ref 74–99)
HCT VFR BLD AUTO: 45.1 % (ref 36–48)
HGB BLD-MCNC: 14.1 G/DL (ref 13–16)
LYMPHOCYTES # BLD: 1.3 K/UL (ref 0.9–3.6)
LYMPHOCYTES NFR BLD: 10 % (ref 21–52)
MCH RBC QN AUTO: 29.1 PG (ref 24–34)
MCHC RBC AUTO-ENTMCNC: 31.3 G/DL (ref 31–37)
MCV RBC AUTO: 93.2 FL (ref 74–97)
MONOCYTES # BLD: 0.9 K/UL (ref 0.05–1.2)
MONOCYTES NFR BLD: 7 % (ref 3–10)
NEUTS SEG # BLD: 11 K/UL (ref 1.8–8)
NEUTS SEG NFR BLD: 83 % (ref 40–73)
PLATELET # BLD AUTO: 164 K/UL (ref 135–420)
PMV BLD AUTO: 13.8 FL (ref 9.2–11.8)
POTASSIUM SERPL-SCNC: 4.1 MMOL/L (ref 3.5–5.5)
PROT SERPL-MCNC: 6.4 G/DL (ref 6.4–8.2)
RBC # BLD AUTO: 4.84 M/UL (ref 4.7–5.5)
SODIUM SERPL-SCNC: 138 MMOL/L (ref 136–145)
WBC # BLD AUTO: 13.1 K/UL (ref 4.6–13.2)

## 2018-07-10 PROCEDURE — 74011000250 HC RX REV CODE- 250: Performed by: INTERNAL MEDICINE

## 2018-07-10 PROCEDURE — 36415 COLL VENOUS BLD VENIPUNCTURE: CPT | Performed by: INTERNAL MEDICINE

## 2018-07-10 PROCEDURE — 85025 COMPLETE CBC W/AUTO DIFF WBC: CPT | Performed by: INTERNAL MEDICINE

## 2018-07-10 PROCEDURE — 82962 GLUCOSE BLOOD TEST: CPT

## 2018-07-10 PROCEDURE — 94760 N-INVAS EAR/PLS OXIMETRY 1: CPT

## 2018-07-10 PROCEDURE — 74011636637 HC RX REV CODE- 636/637: Performed by: HOSPITALIST

## 2018-07-10 PROCEDURE — 94640 AIRWAY INHALATION TREATMENT: CPT

## 2018-07-10 PROCEDURE — 74011636637 HC RX REV CODE- 636/637: Performed by: INTERNAL MEDICINE

## 2018-07-10 PROCEDURE — 74011250637 HC RX REV CODE- 250/637: Performed by: INTERNAL MEDICINE

## 2018-07-10 PROCEDURE — 74011250636 HC RX REV CODE- 250/636: Performed by: INTERNAL MEDICINE

## 2018-07-10 PROCEDURE — 74011250637 HC RX REV CODE- 250/637: Performed by: HOSPITALIST

## 2018-07-10 PROCEDURE — 77010033678 HC OXYGEN DAILY

## 2018-07-10 PROCEDURE — 80053 COMPREHEN METABOLIC PANEL: CPT | Performed by: INTERNAL MEDICINE

## 2018-07-10 PROCEDURE — 94660 CPAP INITIATION&MGMT: CPT

## 2018-07-10 PROCEDURE — 97530 THERAPEUTIC ACTIVITIES: CPT

## 2018-07-10 PROCEDURE — 65660000000 HC RM CCU STEPDOWN

## 2018-07-10 PROCEDURE — 97116 GAIT TRAINING THERAPY: CPT

## 2018-07-10 RX ADMIN — INSULIN LISPRO 3 UNITS: 100 INJECTION, SOLUTION INTRAVENOUS; SUBCUTANEOUS at 21:27

## 2018-07-10 RX ADMIN — IPRATROPIUM BROMIDE AND ALBUTEROL SULFATE 3 ML: .5; 3 SOLUTION RESPIRATORY (INHALATION) at 07:10

## 2018-07-10 RX ADMIN — ENOXAPARIN SODIUM 40 MG: 40 INJECTION, SOLUTION INTRAVENOUS; SUBCUTANEOUS at 12:18

## 2018-07-10 RX ADMIN — CLONIDINE HYDROCHLORIDE 0.2 MG: 0.1 TABLET ORAL at 15:28

## 2018-07-10 RX ADMIN — INSULIN LISPRO 3 UNITS: 100 INJECTION, SOLUTION INTRAVENOUS; SUBCUTANEOUS at 16:56

## 2018-07-10 RX ADMIN — LEVOFLOXACIN 500 MG: 5 INJECTION, SOLUTION INTRAVENOUS at 12:18

## 2018-07-10 RX ADMIN — HYDRALAZINE HYDROCHLORIDE 50 MG: 50 TABLET, FILM COATED ORAL at 08:11

## 2018-07-10 RX ADMIN — AMLODIPINE BESYLATE 5 MG: 5 TABLET ORAL at 08:11

## 2018-07-10 RX ADMIN — SPIRONOLACTONE 25 MG: 25 TABLET, FILM COATED ORAL at 08:11

## 2018-07-10 RX ADMIN — METHYLPREDNISOLONE SODIUM SUCCINATE 40 MG: 40 INJECTION, POWDER, FOR SOLUTION INTRAMUSCULAR; INTRAVENOUS at 06:47

## 2018-07-10 RX ADMIN — IPRATROPIUM BROMIDE AND ALBUTEROL SULFATE 3 ML: .5; 3 SOLUTION RESPIRATORY (INHALATION) at 11:24

## 2018-07-10 RX ADMIN — CLONIDINE HYDROCHLORIDE 0.2 MG: 0.1 TABLET ORAL at 22:37

## 2018-07-10 RX ADMIN — BUDESONIDE 500 MCG: 0.5 INHALANT RESPIRATORY (INHALATION) at 07:10

## 2018-07-10 RX ADMIN — INSULIN GLARGINE 5 UNITS: 100 INJECTION, SOLUTION SUBCUTANEOUS at 08:13

## 2018-07-10 RX ADMIN — BUDESONIDE 500 MCG: 0.5 INHALANT RESPIRATORY (INHALATION) at 19:45

## 2018-07-10 RX ADMIN — HYDRALAZINE HYDROCHLORIDE 50 MG: 50 TABLET, FILM COATED ORAL at 15:29

## 2018-07-10 RX ADMIN — CLONIDINE HYDROCHLORIDE 0.2 MG: 0.1 TABLET ORAL at 06:47

## 2018-07-10 RX ADMIN — IPRATROPIUM BROMIDE AND ALBUTEROL SULFATE 3 ML: .5; 3 SOLUTION RESPIRATORY (INHALATION) at 19:45

## 2018-07-10 RX ADMIN — FUROSEMIDE 40 MG: 10 INJECTION, SOLUTION INTRAMUSCULAR; INTRAVENOUS at 08:12

## 2018-07-10 RX ADMIN — INSULIN LISPRO 12 UNITS: 100 INJECTION, SOLUTION INTRAVENOUS; SUBCUTANEOUS at 12:18

## 2018-07-10 RX ADMIN — HYDRALAZINE HYDROCHLORIDE 50 MG: 50 TABLET, FILM COATED ORAL at 21:27
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
## 2018-07-10 NOTE — PROGRESS NOTES
Occupational Therapy Evaluation/Screening:  Services are not indicated at this time. Occupational therapy orders received. Pt well-known to this OT from previous admission. Pt able to perform ADLs and observed in room at baseline. Pt limited by habitus/pannus, but using clothing modifications (i.e. Slip on shoes). Pt in handicap accessible apartment w/o architectural barriers. Pt not receptive to compensatory strategies and education, therefore has maximized his potential with Occupational Therapy. No skilled therapy identified at this time. Please consult occupational therapy if any therapy needs arise. Thank you.   Ethel Jones, OTR/L

## 2018-07-10 NOTE — PROGRESS NOTES
2100: Patient insists that he needs 2 diabetic snacks at night. Patient has an expectation to eat two healthy choice meals at bedtime \"because they're small and I won't be able to eat again until 0700. \" Patient also requested a total of 7 cranberry juices. Educated patient on the effects of the food and the juices on his blood glucose. Patient also requesting several packs of charlie crackers. 0500: Patient has been refusing to wear Bipap over night. Notified by [employer] tech that patient's heart rate was going into the 30's. Checked on patient who was sleeping with clear signs of apnea, woke patient up and explained to him the effects of not wearing the bipap while he was asleep and what was being seen on the monitor and informed him that since he was sleeping now that respiratory therapy would come apply his bipap. Patient agreed. Patient's heart rate sustained in the 70's in NSR post bipap application. Breath sounds clear and equal bilaterally.

## 2018-07-10 NOTE — ROUTINE PROCESS
Bedside and Verbal shift change report given to Rosalind OCONNELL (oncoming nurse) by Molly Perez RN (offgoing nurse). Report included the following information SBAR, Kardex, Procedure Summary, Intake/Output, MAR, Accordion, Recent Results and Med Rec Status.

## 2018-07-10 NOTE — PROGRESS NOTES
D/c plan: home when medically cleared  Met with patient during IDR. Along with Dr. Rupal Olmstead and RT.patient had refused bipat last pm per RT. However, patient denies this. Patient states he needs home o2 and he is presently wearing o2. RN please document a qualifying o2 24 hours before d/c. Patient will attempt cpap tonight per Dr. Rupal Olmstead and patmarlys. Patient will need a sleep stubdy for bipap Dr. Rupal Olmstead has asked patiet to have follow up appointment with his partner Dr. Kimani Hussein. Cms will obtain an appointment beore patient is d/c.  Patient is well known to this cm and HHC has been ordered in the past 3 times and they have reported to cm patient does not answer the door or not home when they attempt to see him.patient goes to [facility] clinic  CM will continue to follow

## 2018-07-10 NOTE — PROGRESS NOTES
Hospitalist Progress Note-critical care note Patient: Uli Krishnan MRN: [id number]  CSN: [id number] YOB: 1960  Age: 59 y.o. Sex: male DOA: 7/8/2018 LOS:  LOS: 2 days Chief complaint: acute on chronic respiratory failure, shyam, chf/copd exacerbation, DM, HTn 
 
Assessment/Plan Hospital Problems  Date Reviewed: 7/8/2018 Codes Class Noted POA Sleep apnea ICD-10-CM: G47.30 ICD-9-CM: 780.57  7/9/2018 Unknown * (Principal)Acute exacerbation of CHF (congestive heart failure) (HCC) ICD-10-CM: I50.9 ICD-9-CM: 428.0  7/8/2018 Unknown COPD with exacerbation ([facility] ICD-10-CM: J44.1 ICD-9-CM: 491.21  7/8/2018 Unknown Respiratory failure with hypoxia and hypercapnia (HCC) ICD-10-CM: J96.91, J96.92 
ICD-9-CM: 518.81  7/8/2018 Unknown Cocaine abuse ICD-10-CM: F14.10 ICD-9-CM: 305.60  4/22/2018 Yes Acute on chronic combined systolic and diastolic ACC/AHA stage C congestive heart failure (HCC) (Chronic) ICD-10-CM: I50.43 ICD-9-CM: 428.43, 428.0  1/25/2018 Yes COPD exacerbation ([facility] ICD-10-CM: J44.1 ICD-9-CM: 491.21  2/19/2013 Yes Type II diabetes mellitus with peripheral autonomic neuropathy (HCC) ICD-10-CM: E11.43 
ICD-9-CM: 250.60, 337.1  2/19/2013 Yes Morbid obesity ([facility] ICD-10-CM: E66.01 
ICD-9-CM: 278.01  2/19/2013 Yes Acute on chronic hypoxemic/ hypercarbic respiratory failure due to decompensated CHF and COPD exacerbation Refused bipap at night, was put on this morning and he took off per himself Continue educate pt and treat chf and copd Not compliance of treatment and appointment  
 
 COPD exac:  
 on Levaquin and breathing tx/iv steroid -weaning off-now on po prednisone  
   
Acute on chronic diastolic CHF: 
 -continue IV lasix 40 mg BID Echo on 3/2018.  QL 91 :diastolic dysfunction  Increased filling pressure 
  
  
HTN 
 
clonidine ,hydralazine , norvasc/spirolactone lasix , will increase hydralazine for better bp control  
   
  
Hx Cocaine abuse:  
   
   
  
Uncontrolled DM:   
on SSI, add lantus for better control    
   
   
JIM: will try cpap tonight  
   
  
ckd3 Stable  
monitor renal function in the setting of diuresis , monitor electrolytes  
   
Morbid obesity/BMI 55 
   
Disposition :1-2 days Subjective:  I am not sure who is my lung doctor, machine was took back per insurance company Nurse: refused bipap last night, was put on bipap AM 
 
Case discussed with Dr. Mell Bauer. Pt is wellknown  Non compliance with treatment, he needs to have sleep study done as out-pt, he is not compliance of appointment. Need f/u with dr. Bishnu Blank as out-pt for sleep study . Review of systems: 
 
General: No fevers or chills. Cardiovascular: No chest pain or pressure. No palpitations. Pulmonary: shortness of breath is ok Gastrointestinal: No nausea, vomiting. Vital signs/Intake and Output: 
Visit Vitals  /86 (BP 1 Location: Left arm, BP Patient Position: Sitting)  Pulse 83  Temp 97.3 °F (36.3 °C)  Resp 18  Ht 5' 7\" (1.702 m)  Wt (!) 175 kg (385 lb 12.9 oz)  SpO2 94%  BMI 60.43 kg/m2 Current Shift:  07/10 0701 - 07/10 1900 In: 840 [P.O.:840] Out: 2200 [Urine:2200] Last three shifts:  07/08 1901 - 07/10 0700 In: 1560 [P.O.:1560] Out: 3502 [HVFOJ:4685] Physical Exam: 
General: WD, WN. Alert, cooperative, no acute distress   
HEENT: NC, Atraumatic. PERRLA, anicteric sclerae. Lungs: CTA Bilaterally. No Wheezing/Rhonchi/Rales. Heart:  Regular  rhythm,  + murmur, No Rubs, No Gallops Abdomen: Soft, Non distended, Non tender.  +Bowel sounds, Extremities: No c/c/e Psych:   Not anxious or agitated. Neurologic:  No acute neurological deficit. Labs: Results:  
   
Chemistry Recent Labs  
   07/10/18 
 0404  07/09/18 
 0687  07/08/18 
 4570 GLU  158*  272*  119* NA  138  138  141  
K  4.1  4.4  4.0  
CL  101  100  106 CO2  34* 31  34* BUN  28*  23*  18  
CREA  1.41*  1.71*  1.58* CA  9.4  9.3  8.9 AGAP  3  7  1*  
BUCR  20  13  11* AP  74  86  87  
TP  6.4  7.1  7.0 ALB  2.7*  2.9*  3.2*  
GLOB  3.7  4.2*  3.8 AGRAT  0.7*  0.7*  0.8 CBC w/Diff Recent Labs  
   07/10/18 
 0404  07/09/18 
 0056  07/08/18 
 5268 WBC  13.1  8.1  6.2 RBC  4.84  4.86  4.95  
HGB  14.1  14.4  14.3 HCT  45.1  44.5  45.4 PLT  164  151  160 GRANS  83*  90*  66  
LYMPH  10*  10*  23 EOS  0  0  4 Cardiac Enzymes Recent Labs  
   07/09/18 
 0056  07/08/18 
 1944 CPK  101  121 CKND1  1.2  0.9 Coagulation No results for input(s): PTP, INR, APTT in the last 72 hours. No lab exists for component: INREXT, INREXT Lipid Panel Lab Results Component Value Date/Time Cholesterol, total 196 01/25/2018 02:51 AM  
 HDL Cholesterol 64 (H) 01/25/2018 02:51 AM  
 LDL, calculated 121.2 (H) 01/25/2018 02:51 AM  
 VLDL, calculated 10.8 01/25/2018 02:51 AM  
 Triglyceride 54 01/25/2018 02:51 AM  
 CHOL/HDL Ratio 3.1 01/25/2018 02:51 AM  
  
BNP No results for input(s): BNPP in the last 72 hours. Liver Enzymes Recent Labs  
   07/10/18 
 0404 TP  6.4 ALB  2.7* AP  74 SGOT  12* Thyroid Studies Lab Results Component Value Date/Time TSH 0.52 05/11/2018 08:06 AM  
    
Procedures/imaging: see electronic medical records for all procedures/Xrays and details which were not copied into this note but were reviewed prior to creation of Plan Sussy Fischer MD

## 2018-07-10 NOTE — PROGRESS NOTES
1305:  Pt refusing PT at this time stating he has been up waiting all day and now needs to rest.  Will follow up as schedule permits.

## 2018-07-10 NOTE — PROGRESS NOTES
Problem: Mobility Impaired (Adult and Pediatric)  Goal: *Acute Goals and Plan of Care (Insert Text)  Physical Therapy Goals   Initiated 7/9/2018 and to be accomplished within 3-4 day(s)  1. Patient will move from supine <> sit with mod I in prep for out of bed activity and change of position. 2.  Patient will perform sit<> stand with mod I with LRAD in prep for transfers/ambulation. 3.  Patient will transfer from bed <> chair with S with LRAD for time up in chair for completion of ADL activity. 4.  Patient will ambulate 150 feet with mod I/LRAD for improved functional mobility/safe discharge. Outcome: Progressing Towards Goal  physical Therapy TREATMENT    Patient: Bj Cuadra (87 y.o. male)  Date: 7/10/2018  Diagnosis: Respiratory failure with hypoxia and hypercapnia (HCC)  COPD with exacerbation (HCC)  Acute exacerbation of CHF (congestive heart failure) (HCC) Acute exacerbation of CHF (congestive heart failure) ([facility]  Precautions: Fall   Chart, physical therapy assessment, plan of care and goals were reviewed. PLOF: independent, ambulatory without AD  ASSESSMENT:  No assistance needed for bed mobility. Attempted to retrieve portable O2 tank to ambulate in encarnacion but pt stated he cannot walk that far because his feet are swollen. Pt ambulated 20ft x2 from bed to door and back without difficulty. Wide MORIS due to body habitus, increased pace, no LOB, no SOB, on 3.5 L O2. Pt talking throughout session. Pt prefers to be seen between 8:30 and 9:00 tomorrow. Pt left sitting EOB     Education: PT POC  Progression toward goals:  [x]      Improving appropriately and progressing toward goals  []      Improving slowly and progressing toward goals  []      Not making progress toward goals and plan of care will be adjusted     PLAN:  Patient continues to benefit from skilled intervention to address the above impairments. Continue treatment per established plan of care.   Discharge Recommendations: None  Further Equipment Recommendations for Discharge:  N/A     SUBJECTIVE:   Patient stated I have not stopped moving since I have been here.     OBJECTIVE DATA SUMMARY:   Critical Behavior:  Neurologic State: Appropriate for age, Alert  Orientation Level: Oriented X4  Cognition: Appropriate for age attention/concentration, Appropriate decision making, Appropriate safety awareness, Follows commands  Safety/Judgement: Awareness of environment, Fall prevention  Functional Mobility Training:  Bed Mobility:  Supine to Sit: Independent  Transfers:  Sit to Stand: Supervision  Stand to Sit: Independent  Balance:  Sitting: Intact  Standing: Intact; Without support  Ambulation/Gait Training:  Distance (ft): 40 Feet (ft)  Ambulation - Level of Assistance: Independent  Gait Abnormalities: Trunk sway increased  Base of Support: Widened  Speed/Colette: Accelerated  GCODE:Mobility [id number] Current  CI= 1-19%. The severity rating is based on the Level of Assistance required for Functional Mobility and ADLs. Pain:  Pre:0  Post:0  Pain Scale 1: Numeric (0 - 10)  Activity Tolerance:   Good but self limiting  Please refer to the flowsheet for vital signs taken during this treatment.   After treatment:   [] Patient left in no apparent distress sitting up in chair  [x] Patient left in no apparent distress in bed  [x] Call bell left within reach  [x] Nursing notified  [] Caregiver present  [] Bed alarm activated      Polly Coronado PTA   Time Calculation: 23 mins

## 2018-07-11 LAB
ALBUMIN SERPL-MCNC: 2.8 G/DL (ref 3.4–5)
ALBUMIN/GLOB SERPL: 0.8 {RATIO} (ref 0.8–1.7)
ALP SERPL-CCNC: 71 U/L (ref 45–117)
ALT SERPL-CCNC: 24 U/L (ref 16–61)
ANION GAP SERPL CALC-SCNC: 1 MMOL/L (ref 3–18)
ARTERIAL PATENCY WRIST A: YES
AST SERPL-CCNC: 6 U/L (ref 15–37)
BASE EXCESS BLD CALC-SCNC: 10 MMOL/L
BASOPHILS # BLD: 0 K/UL (ref 0–0.1)
BASOPHILS NFR BLD: 0 % (ref 0–2)
BDY SITE: ABNORMAL
BILIRUB SERPL-MCNC: 0.1 MG/DL (ref 0.2–1)
BODY TEMPERATURE: 97.6
BUN SERPL-MCNC: 30 MG/DL (ref 7–18)
BUN/CREAT SERPL: 21 (ref 12–20)
CALCIUM SERPL-MCNC: 9.4 MG/DL (ref 8.5–10.1)
CHLORIDE SERPL-SCNC: 102 MMOL/L (ref 100–108)
CO2 SERPL-SCNC: 37 MMOL/L (ref 21–32)
CREAT SERPL-MCNC: 1.46 MG/DL (ref 0.6–1.3)
DIFFERENTIAL METHOD BLD: ABNORMAL
EOSINOPHIL # BLD: 0 K/UL (ref 0–0.4)
EOSINOPHIL NFR BLD: 0 % (ref 0–5)
ERYTHROCYTE [DISTWIDTH] IN BLOOD BY AUTOMATED COUNT: 15.3 % (ref 11.6–14.5)
GAS FLOW.O2 O2 DELIVERY SYS: ABNORMAL L/MIN
GAS FLOW.O2 SETTING OXYMISER: 3.5 L/M
GLOBULIN SER CALC-MCNC: 3.6 G/DL (ref 2–4)
GLUCOSE BLD STRIP.AUTO-MCNC: 128 MG/DL (ref 70–110)
GLUCOSE BLD STRIP.AUTO-MCNC: 130 MG/DL (ref 70–110)
GLUCOSE BLD STRIP.AUTO-MCNC: 172 MG/DL (ref 70–110)
GLUCOSE BLD STRIP.AUTO-MCNC: 95 MG/DL (ref 70–110)
GLUCOSE SERPL-MCNC: 160 MG/DL (ref 74–99)
HCO3 BLD-SCNC: 35.3 MMOL/L (ref 22–26)
HCT VFR BLD AUTO: 43.9 % (ref 36–48)
HGB BLD-MCNC: 13.4 G/DL (ref 13–16)
LYMPHOCYTES # BLD: 1.2 K/UL (ref 0.9–3.6)
LYMPHOCYTES NFR BLD: 12 % (ref 21–52)
MCH RBC QN AUTO: 28.7 PG (ref 24–34)
MCHC RBC AUTO-ENTMCNC: 30.5 G/DL (ref 31–37)
MCV RBC AUTO: 94 FL (ref 74–97)
MONOCYTES # BLD: 0.6 K/UL (ref 0.05–1.2)
MONOCYTES NFR BLD: 6 % (ref 3–10)
NEUTS SEG # BLD: 8.1 K/UL (ref 1.8–8)
NEUTS SEG NFR BLD: 82 % (ref 40–73)
PCO2 BLD: 60.6 MMHG (ref 35–45)
PH BLD: 7.37 [PH] (ref 7.35–7.45)
PLATELET # BLD AUTO: 148 K/UL (ref 135–420)
PMV BLD AUTO: 13.4 FL (ref 9.2–11.8)
PO2 BLD: 78 MMHG (ref 80–100)
POTASSIUM SERPL-SCNC: 4.2 MMOL/L (ref 3.5–5.5)
PROT SERPL-MCNC: 6.4 G/DL (ref 6.4–8.2)
RBC # BLD AUTO: 4.67 M/UL (ref 4.7–5.5)
SAO2 % BLD: 95 % (ref 92–97)
SERVICE CMNT-IMP: ABNORMAL
SODIUM SERPL-SCNC: 140 MMOL/L (ref 136–145)
SPECIMEN TYPE: ABNORMAL
WBC # BLD AUTO: 9.9 K/UL (ref 4.6–13.2)

## 2018-07-11 PROCEDURE — 36415 COLL VENOUS BLD VENIPUNCTURE: CPT | Performed by: INTERNAL MEDICINE

## 2018-07-11 PROCEDURE — 80053 COMPREHEN METABOLIC PANEL: CPT | Performed by: INTERNAL MEDICINE

## 2018-07-11 PROCEDURE — 85025 COMPLETE CBC W/AUTO DIFF WBC: CPT | Performed by: INTERNAL MEDICINE

## 2018-07-11 PROCEDURE — 36600 WITHDRAWAL OF ARTERIAL BLOOD: CPT

## 2018-07-11 PROCEDURE — 74011636637 HC RX REV CODE- 636/637: Performed by: INTERNAL MEDICINE

## 2018-07-11 PROCEDURE — 74011250636 HC RX REV CODE- 250/636: Performed by: INTERNAL MEDICINE

## 2018-07-11 PROCEDURE — 94760 N-INVAS EAR/PLS OXIMETRY 1: CPT

## 2018-07-11 PROCEDURE — 82803 BLOOD GASES ANY COMBINATION: CPT

## 2018-07-11 PROCEDURE — 65660000000 HC RM CCU STEPDOWN

## 2018-07-11 PROCEDURE — 74011250637 HC RX REV CODE- 250/637: Performed by: INTERNAL MEDICINE

## 2018-07-11 PROCEDURE — 94640 AIRWAY INHALATION TREATMENT: CPT

## 2018-07-11 PROCEDURE — 97116 GAIT TRAINING THERAPY: CPT

## 2018-07-11 PROCEDURE — 74011250637 HC RX REV CODE- 250/637: Performed by: HOSPITALIST

## 2018-07-11 PROCEDURE — 74011636637 HC RX REV CODE- 636/637: Performed by: HOSPITALIST

## 2018-07-11 PROCEDURE — 74011000250 HC RX REV CODE- 250: Performed by: INTERNAL MEDICINE

## 2018-07-11 PROCEDURE — 77010033678 HC OXYGEN DAILY

## 2018-07-11 PROCEDURE — 94660 CPAP INITIATION&MGMT: CPT

## 2018-07-11 PROCEDURE — 82962 GLUCOSE BLOOD TEST: CPT

## 2018-07-11 RX ORDER — PREDNISONE 20 MG/1
20 TABLET ORAL
Status: DISCONTINUED | OUTPATIENT
Start: 2018-07-12 | End: 2018-07-12 | Stop reason: HOSPADM

## 2018-07-11 RX ADMIN — FUROSEMIDE 40 MG: 10 INJECTION, SOLUTION INTRAMUSCULAR; INTRAVENOUS at 10:41

## 2018-07-11 RX ADMIN — HYDRALAZINE HYDROCHLORIDE 50 MG: 50 TABLET, FILM COATED ORAL at 23:16

## 2018-07-11 RX ADMIN — HYDRALAZINE HYDROCHLORIDE 50 MG: 50 TABLET, FILM COATED ORAL at 10:41

## 2018-07-11 RX ADMIN — PREDNISONE 30 MG: 20 TABLET ORAL at 10:41

## 2018-07-11 RX ADMIN — ENOXAPARIN SODIUM 40 MG: 40 INJECTION, SOLUTION INTRAVENOUS; SUBCUTANEOUS at 13:37

## 2018-07-11 RX ADMIN — IPRATROPIUM BROMIDE AND ALBUTEROL SULFATE 3 ML: .5; 3 SOLUTION RESPIRATORY (INHALATION) at 11:00

## 2018-07-11 RX ADMIN — IPRATROPIUM BROMIDE AND ALBUTEROL SULFATE 3 ML: .5; 3 SOLUTION RESPIRATORY (INHALATION) at 07:12

## 2018-07-11 RX ADMIN — BUDESONIDE 500 MCG: 0.5 INHALANT RESPIRATORY (INHALATION) at 07:12

## 2018-07-11 RX ADMIN — CLONIDINE HYDROCHLORIDE 0.2 MG: 0.1 TABLET ORAL at 23:16

## 2018-07-11 RX ADMIN — IPRATROPIUM BROMIDE AND ALBUTEROL SULFATE 3 ML: .5; 3 SOLUTION RESPIRATORY (INHALATION) at 15:28

## 2018-07-11 RX ADMIN — CLONIDINE HYDROCHLORIDE 0.2 MG: 0.1 TABLET ORAL at 06:58

## 2018-07-11 RX ADMIN — IPRATROPIUM BROMIDE AND ALBUTEROL SULFATE 3 ML: .5; 3 SOLUTION RESPIRATORY (INHALATION) at 19:59

## 2018-07-11 RX ADMIN — CLONIDINE HYDROCHLORIDE 0.2 MG: 0.1 TABLET ORAL at 17:11

## 2018-07-11 RX ADMIN — AMLODIPINE BESYLATE 5 MG: 5 TABLET ORAL at 10:42

## 2018-07-11 RX ADMIN — HYDRALAZINE HYDROCHLORIDE 50 MG: 50 TABLET, FILM COATED ORAL at 17:11

## 2018-07-11 RX ADMIN — AMLODIPINE BESYLATE 5 MG: 5 TABLET ORAL at 10:41

## 2018-07-11 RX ADMIN — INSULIN LISPRO 3 UNITS: 100 INJECTION, SOLUTION INTRAVENOUS; SUBCUTANEOUS at 17:16

## 2018-07-11 RX ADMIN — INSULIN GLARGINE 5 UNITS: 100 INJECTION, SOLUTION SUBCUTANEOUS at 10:42

## 2018-07-11 RX ADMIN — SPIRONOLACTONE 25 MG: 25 TABLET, FILM COATED ORAL at 10:41

## 2018-07-11 RX ADMIN — BUDESONIDE 500 MCG: 0.5 INHALANT RESPIRATORY (INHALATION) at 19:59

## 2018-07-11 RX ADMIN — LEVOFLOXACIN 500 MG: 5 INJECTION, SOLUTION INTRAVENOUS at 13:37

## 2018-07-11 NOTE — PROGRESS NOTES
D/c plan: anticipate d/c home tomorrow  Met with patient at bedside. He informed Dr. Zach Guidry he does not want to be d/c home until tomorrow because he lives alone and his sister is going to come over and stay with him tomorrow. Patient has been ordered [address] in the last 3 visits and the [address] agency has reported he does not answer the door and they will only accept this patient if he plans not allowing or answering the door. Patient has to have a sleep study for cpap and or bipap. Cm spoke with Dr. Zach Guidry however, he does not have to have sleep study for trilogy, however he will need orders for trilogy and settings if he is to be d/c home with trilogy. Perez Nino, KOURTNEY is aware of need for walk test. Cm will place order for [facility] when he is d/c and he will need to follow up with Dr. Tanja Carrizales. He has pcp and medicaid. Once cm has order for triology and oxygen will need to be ordered  Dr. Jacinto Miller will speak with Dr. Nicole Pastrana for recommendations about Trilogy    [phone] patients walk test he never dropped below 90% does not qualify for home o2 at this time.   However, if MD wants cm to order nebulizer and trilogy will need recommendations and orders

## 2018-07-11 NOTE — PROGRESS NOTES
Hospitalist Progress Note Patient: Chris Stanley MRN: [id number]  CSN: [id number] YOB: 1960  Age: 59 y.o. Sex: male DOA: 7/8/2018 LOS:  LOS: 3 days Assessment/Plan Patient Active Problem List  
Diagnosis Code  COPD exacerbation ([facility] J44.1  Type II diabetes mellitus with peripheral autonomic neuropathy ([facility]) E11.43  
 Hypertension I10  
 JIM (obstructive sleep apnea) G47.33  
 Hypoxia R09.02  
 Morbid obesity ([facility]) E66.01  
 Chest pain R07.9  Acute on chronic combined systolic and diastolic ACC/AHA stage C congestive heart failure ([facility]) I50.43  
 CHF (congestive heart failure) ([facility]) I50.9  Acute kidney injury ([facility] N17.9  COPD (chronic obstructive pulmonary disease) ([facility]) J44.9  Acute on chronic respiratory failure ([facility]) J96.20  Cocaine abuse F14.10  
 SOB (shortness of breath) R06.02  
 Acute respiratory distress R06.03  
 Hypertensive emergency I16.1  Acute exacerbation of CHF (congestive heart failure) ([facility]) I50.9  COPD with exacerbation ([facility] J44.1  Respiratory failure with hypoxia and hypercapnia ([facility]) J96.91, J96.92  
 Sleep apnea G47.30  
  
 
 
 
60 yo male with recurrent admissions to hospital admitted for COPD exacerbation. Acute on chronic hypoxemic/ hypercarbic respiratory failure due to decompensated CHF and COPD exacerbation Not compliance of treatment and appointment Used BiPAP at night. Cannot arrange CPAP or BiPAP without sleep study. Discussed with Dr. Lou Minaya. 
  
 COPD exac:  
 on Levaquin and breathing tx, po prednisone  
   
Acute on chronic diastolic CHF: 
 -continue IV lasix 40 mg BID Echo on 3/2018. BR 15 :diastolic dysfunction  Increased filling pressure    
   
HTN - clonidine ,hydralazine , norvasc/spirolactone lasix    
   
Hx Cocaine abuse:   
   
   
Uncontrolled DM:   
on SSI, add lantus for better control    
   
JIM: will try cpap tonight    
   
ckd3 - Stable  
monitor renal function in the setting of diuresis , monitor electrolytes  
   
Morbid obesity/BMI 55 
   
 
Disposition : likely tomorrow Review of systems General: No fevers or chills. Cardiovascular: No chest pain or pressure. No palpitations. Pulmonary: No shortness of breath. Gastrointestinal: No nausea, vomiting. Physical Exam: 
General: Awake, cooperative, no acute distress   
HEENT: NC, Atraumatic. PERRLA, anicteric sclerae. Lungs: CTA Bilaterally. No Wheezing/Rhonchi/Rales. Heart:  Regular  rhythm,  No murmur, No Rubs, No Gallops Abdomen: Soft, Non distended, Non tender.  +Bowel sounds, Extremities: No c/c/e Psych:   Not anxious or agitated. Neurologic:  No acute neurological deficit. Vital signs/Intake and Output: 
Visit Vitals  /70 (BP 1 Location: Left arm, BP Patient Position: At rest;Sitting)  Pulse 81  Temp 97.7 °F (36.5 °C)  Resp 20  
 Ht 5' 7\" (1.702 m)  Wt (!) 175 kg (385 lb 12.9 oz)  SpO2 96%  BMI 60.43 kg/m2 Current Shift:    
Last three shifts:  07/10 0701 - 07/11 1900 In: 1980 [P.O.:1980] Out: 6650 [OVYMN:9679] Labs: Results:  
   
Chemistry Recent Labs  
   07/11/18 
 4457  07/10/18 
 0404  07/09/18 
 3321 GLU  160*  158*  272* NA  140  138  138  
K  4.2  4.1  4.4 CL  102  101  100 CO2  37*  34*  31 BUN  30*  28*  23* CREA  1.46*  1.41*  1.71* CA  9.4  9.4  9.3 AGAP  1*  3  7  
BUCR  21*  20  13 AP  71  74  86  
TP  6.4  6.4  7.1 ALB  2.8*  2.7*  2.9*  
GLOB  3.6  3.7  4.2* AGRAT  0.8  0.7*  0.7* CBC w/Diff Recent Labs  
   07/11/18 
 0204  07/10/18 
 0404  07/09/18 
 0056 WBC  9.9  13.1  8.1  
RBC  4.67*  4.84  4.86  
HGB  13.4  14.1  14.4 HCT  43.9  45.1  44.5 PLT  148  164  151 GRANS  82*  83*  90* LYMPH  12*  10*  10* EOS  0  0  0 Cardiac Enzymes Recent Labs  
   07/09/18 
 0056  07/08/18 
 1944 CPK  101  121 CKND1  1.2  0.9 Coagulation No results for input(s): PTP, INR, APTT in the last 72 hours. No lab exists for component: INREXT Lipid Panel Lab Results Component Value Date/Time Cholesterol, total 196 01/25/2018 02:51 AM  
 HDL Cholesterol 64 (H) 01/25/2018 02:51 AM  
 LDL, calculated 121.2 (H) 01/25/2018 02:51 AM  
 VLDL, calculated 10.8 01/25/2018 02:51 AM  
 Triglyceride 54 01/25/2018 02:51 AM  
 CHOL/HDL Ratio 3.1 01/25/2018 02:51 AM  
  
BNP No results for input(s): BNPP in the last 72 hours. Liver Enzymes Recent Labs  
   07/11/18 
 0204 TP  6.4 ALB  2.8* AP  71 SGOT  6* Thyroid Studies Lab Results Component Value Date/Time TSH 0.52 05/11/2018 08:06 AM  
    
Procedures/imaging: see electronic medical records for all procedures/Xrays and details which were not copied into this note but were reviewed prior to creation of Plan

## 2018-07-11 NOTE — PROGRESS NOTES
2508:  Attempted to treat pt during the pt requested time of 8:30-9:00. Pt sleeping sound, did not arouse when name called. Will follow up later today.

## 2018-07-11 NOTE — ROUTINE PROCESS
Bedside shift change report given to [address] (oncoming nurse) by John Rodriguez (offgoing nurse). Report included the following information SBAR, Procedure Summary and Recent Results.
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
## 2018-07-11 NOTE — PROGRESS NOTES
Problem: Mobility Impaired (Adult and Pediatric)  Goal: *Acute Goals and Plan of Care (Insert Text)  Physical Therapy Goals   Initiated 7/9/2018 and to be accomplished within 3-4 day(s)  1. Patient will move from supine <> sit with mod I in prep for out of bed activity and change of position. 2.  Patient will perform sit<> stand with mod I with LRAD in prep for transfers/ambulation. 3.  Patient will transfer from bed <> chair with S with LRAD for time up in chair for completion of ADL activity. 4.  Patient will ambulate 150 feet with mod I/LRAD for improved functional mobility/safe discharge. Outcome: Resolved/Met Date Met: 07/11/18  physical Therapy TREATMENT/DISCHARGE    Patient: Jacqlyn Boas (4 y.o. male)  Date: 7/11/2018  Diagnosis: Respiratory failure with hypoxia and hypercapnia (HCC)  COPD with exacerbation (HCC)  Acute exacerbation of CHF (congestive heart failure) (HCC) Acute exacerbation of CHF (congestive heart failure) ([facility] [address] [address]  Precautions: Fall  Chart, physical therapy assessment, plan of care and goals were reviewed. PLOF: lives alone, ambulatory without AD  ASSESSMENT:  No assistance needed for bed mobility or sit to stand. Ambulated 180ft on RA without AD, wide MORIS due to body habitus, increased pace, no LOB or path deviations. O2 walk test performed: SpO2 on RA 96-98%, ambulated 180ft on RA SpO2 90-94%. Notified nurse and care mgmt. Pt left sitting EOB on RA.  EDUCATION O2 walk test  Progression toward goals:  [x]      Goals met  []      Improving appropriately and progressing toward goals  []      Improving slowly and progressing toward goals  []      Not making progress toward goals and plan of care will be adjusted     PLAN:  Patient will be discharged from physical therapy at this time.   Rationale for discharge:  [x] Goals Achieved  [] Karen Rivera  [] Patient not participating in therapy  [] Other:  Discharge Recommendations:  none  Further Equipment Recommendations for Discharge:  N/A     SUBJECTIVE:   Patient stated I don't think this is a good time for this.     OBJECTIVE DATA SUMMARY:     G CODES: n/a    Critical Behavior:  Neurologic State: Alert, Appropriate for age  Orientation Level: Oriented X4  Cognition: Appropriate decision making, Appropriate for age attention/concentration, Appropriate safety awareness, Follows commands  Safety/Judgement: Awareness of environment, Fall prevention  Functional Mobility Training:  Bed Mobility:  Supine to Sit: Independent  Transfers:  Sit to Stand: Independent  Stand to Sit: Independent  Balance:  Sitting: Intact  Standing: Intact; Without support  Ambulation/Gait Training:  Distance (ft): 180 Feet (ft)  Assistive Device:  (none)  Ambulation - Level of Assistance: Independent  Gait Abnormalities: Trunk sway increased  Base of Support: Widened  Speed/Colette: Accelerated  Pain:  Pre treatment pain:  0  Post treatment pain:  0  Activity Tolerance:   Good  Please refer to the flowsheet for vital signs taken during this treatment.   After treatment:   [] Patient left in no apparent distress sitting up in chair  [x] Patient left in no apparent distress in bed  [x] Call bell left within reach  [x] Nursing notified  [] Caregiver present  [] Bed alarm activated  Kiel Kwan PTA   Time Calculation: 26 mins

## 2018-07-11 NOTE — ROUTINE PROCESS
Step 1)     Oxygen saturation at _______96-98______% on room air at rest.  96/98%  If less than 88%: STOP! No further documentation needed; Otherwise proceeded to step 2 and 3      Step 2)     Oxygen saturation at  _______90-94______% on room air with exertion     while walking for 6 minutes. Oxygen added to patient.       3)_______90-__99_____% Rest with O2    Step 4)     Oxygen saturation at _____________% while walking on Oxygen at _________LPM      Via:   [] Nasal Cannula         [] Simple Face Mask          [] Non-Re-Breather Mask        [] Partial Re-Breather Mask          [] Venturi Mask

## 2018-07-12 ENCOUNTER — HOME HEALTH ADMISSION (OUTPATIENT)
Dept: HOME HEALTH SERVICES | Facility: HOME HEALTH | Age: 58
End: 2018-07-12

## 2018-07-12 VITALS
RESPIRATION RATE: 19 BRPM | BODY MASS INDEX: 49.44 KG/M2 | OXYGEN SATURATION: 98 % | SYSTOLIC BLOOD PRESSURE: 135 MMHG | TEMPERATURE: 97.5 F | HEART RATE: 74 BPM | WEIGHT: 315 LBS | DIASTOLIC BLOOD PRESSURE: 75 MMHG | HEIGHT: 67 IN

## 2018-07-12 LAB
ARTERIAL PATENCY WRIST A: YES
BASE EXCESS BLD CALC-SCNC: 12 MMOL/L
BDY SITE: ABNORMAL
BODY TEMPERATURE: 97.4
GAS FLOW.O2 O2 DELIVERY SYS: ABNORMAL L/MIN
GAS FLOW.O2 SETTING OXYMISER: 3.5 L/M
GLUCOSE BLD STRIP.AUTO-MCNC: 131 MG/DL (ref 70–110)
GLUCOSE BLD STRIP.AUTO-MCNC: 148 MG/DL (ref 70–110)
HCO3 BLD-SCNC: 37.6 MMOL/L (ref 22–26)
PCO2 BLD: 62.9 MMHG (ref 35–45)
PH BLD: 7.38 [PH] (ref 7.35–7.45)
PO2 BLD: 83 MMHG (ref 80–100)
SAO2 % BLD: 96 % (ref 92–97)
SERVICE CMNT-IMP: ABNORMAL
SPECIMEN TYPE: ABNORMAL

## 2018-07-12 PROCEDURE — 94660 CPAP INITIATION&MGMT: CPT

## 2018-07-12 PROCEDURE — 74011000250 HC RX REV CODE- 250: Performed by: INTERNAL MEDICINE

## 2018-07-12 PROCEDURE — 74011250636 HC RX REV CODE- 250/636: Performed by: INTERNAL MEDICINE

## 2018-07-12 PROCEDURE — 74011636637 HC RX REV CODE- 636/637: Performed by: HOSPITALIST

## 2018-07-12 PROCEDURE — 82803 BLOOD GASES ANY COMBINATION: CPT

## 2018-07-12 PROCEDURE — 36600 WITHDRAWAL OF ARTERIAL BLOOD: CPT

## 2018-07-12 PROCEDURE — 74011250637 HC RX REV CODE- 250/637: Performed by: HOSPITALIST

## 2018-07-12 PROCEDURE — 82962 GLUCOSE BLOOD TEST: CPT

## 2018-07-12 PROCEDURE — 74011250637 HC RX REV CODE- 250/637: Performed by: INTERNAL MEDICINE

## 2018-07-12 PROCEDURE — 77010033678 HC OXYGEN DAILY

## 2018-07-12 PROCEDURE — 94760 N-INVAS EAR/PLS OXIMETRY 1: CPT

## 2018-07-12 PROCEDURE — 74011636637 HC RX REV CODE- 636/637: Performed by: INTERNAL MEDICINE

## 2018-07-12 PROCEDURE — 94640 AIRWAY INHALATION TREATMENT: CPT

## 2018-07-12 RX ADMIN — HYDRALAZINE HYDROCHLORIDE 50 MG: 50 TABLET, FILM COATED ORAL at 09:34

## 2018-07-12 RX ADMIN — FUROSEMIDE 40 MG: 10 INJECTION, SOLUTION INTRAMUSCULAR; INTRAVENOUS at 09:34

## 2018-07-12 RX ADMIN — PREDNISONE 20 MG: 20 TABLET ORAL at 09:34

## 2018-07-12 RX ADMIN — AMLODIPINE BESYLATE 5 MG: 5 TABLET ORAL at 09:34

## 2018-07-12 RX ADMIN — BUDESONIDE 500 MCG: 0.5 INHALANT RESPIRATORY (INHALATION) at 07:10

## 2018-07-12 RX ADMIN — SPIRONOLACTONE 25 MG: 25 TABLET, FILM COATED ORAL at 09:34

## 2018-07-12 RX ADMIN — CLONIDINE HYDROCHLORIDE 0.2 MG: 0.1 TABLET ORAL at 06:26

## 2018-07-12 RX ADMIN — INSULIN GLARGINE 5 UNITS: 100 INJECTION, SOLUTION SUBCUTANEOUS at 09:35

## 2018-07-12 RX ADMIN — IPRATROPIUM BROMIDE AND ALBUTEROL SULFATE 3 ML: .5; 3 SOLUTION RESPIRATORY (INHALATION) at 07:10

## 2018-07-12 RX ADMIN — IPRATROPIUM BROMIDE AND ALBUTEROL SULFATE 3 ML: .5; 3 SOLUTION RESPIRATORY (INHALATION) at 11:27

## 2018-07-12 NOTE — DISCHARGE INSTRUCTIONS
COPD Exacerbation Plan: Care Instructions  Your Care Instructions    If you have chronic obstructive pulmonary disease (COPD), your usual shortness of breath could suddenly get worse. You may start coughing more and have more mucus. This flare-up is called a COPD exacerbation (say \"eh-IZQ-vy-BAY-pablo\"). A lung infection or air pollution could set off an exacerbation. Sometimes it can happen after a quick change in temperature or being around chemicals. Work with your doctor to make a plan for dealing with an exacerbation. You can better manage it if you plan ahead. Follow-up care is a key part of your treatment and safety. Be sure to make and go to all appointments, and call your doctor if you are having problems. It's also a good idea to know your test results and keep a list of the medicines you take. How can you care for yourself at home? During an exacerbation  · Do not panic if you start to have one. Quick treatment at home may help you prevent serious breathing problems. If you have a COPD exacerbation plan that you developed with your doctor, follow it. · Take your medicines exactly as your doctor tells you. ¨ Use your inhaler as directed by your doctor. If your symptoms do not get better after you use your medicine, have someone take you to the emergency room. Call an ambulance if necessary. ¨ With inhaled medicines, a spacer or a nebulizer may help you get more medicine to your lungs. Ask your doctor or pharmacist how to use them properly. Practice using the spacer in front of a mirror before you have an exacerbation. This may help you get the medicine into your lungs quickly. ¨ If your doctor has given you steroid pills, take them as directed. ¨ Your doctor may have given you a prescription for antibiotics, which you can fill if you need it. ¨ Talk to your doctor if you have any problems with your medicine.  And call your doctor if you have to use your antibiotic or steroid pills.  Preventing an exacerbation  · Do not smoke. This is the most important step you can take to prevent more damage to your lungs and prevent problems. If you already smoke, it is never too late to stop. If you need help quitting, talk to your doctor about stop-smoking programs and medicines. These can increase your chances of quitting for good. · Take your daily medicines as prescribed. · Avoid colds and flu. ¨ Get a pneumococcal vaccine. ¨ Get a flu vaccine each year, as soon as it is available. Ask those you live or work with to do the same, so they will not get the flu and infect you. ¨ Try to stay away from people with colds or the flu. ¨ Wash your hands often. · Avoid secondhand smoke; air pollution; cold, dry air; hot, humid air; and high altitudes. Stay at home with your windows closed when air pollution is bad. · Learn breathing techniques for COPD, such as breathing through pursed lips. These techniques can help you breathe easier during an exacerbation. When should you call for help? Call 911 anytime you think you may need emergency care. For example, call if:    · You have severe trouble breathing.     · You have severe chest pain.    Call your doctor now or seek immediate medical care if:    · You have new or worse shortness of breath.     · You develop new chest pain.     · You are coughing more deeply or more often, especially if you notice more mucus or a change in the color of your mucus.     · You cough up blood.     · You have new or increased swelling in your legs or belly.     · You have a fever.    Watch closely for changes in your health, and be sure to contact your doctor if:    · You need to use your antibiotic or steroid pills.     · Your symptoms are getting worse. Where can you learn more? Go to http://bhargav-mily.info/. Enter V921 in the search box to learn more about \"COPD Exacerbation Plan: Care Instructions. \"  Current as of: December 6, 2017  Content Version: 11.7  © 9228-7058 [employer]. Care instructions adapted under license by [employer] (which disclaims liability or warranty for this information). If you have questions about a medical condition or this instruction, always ask your healthcare professional. [address] any warranty or liability for your use of this information. Patient armband removed and shredded    DISCHARGE SUMMARY from Nurse    PATIENT INSTRUCTIONS:    After general anesthesia or intravenous sedation, for 24 hours or while taking prescription Narcotics:  · Limit your activities  · Do not drive and operate hazardous machinery  · Do not make important personal or business decisions  · Do  not drink alcoholic beverages  · If you have not urinated within 8 hours after discharge, please contact your surgeon on call. Report the following to your surgeon:  · Excessive pain, swelling, redness or odor of or around the surgical area  · Temperature over 100.5  · Nausea and vomiting lasting longer than 4 hours or if unable to take medications  · Any signs of decreased circulation or nerve impairment to extremity: change in color, persistent  numbness, tingling, coldness or increase pain  · Any questions    What to do at Home:  Recommended activity: Activity as tolerated. If you experience any of the following symptoms swelling in feet not relieved with elevation, please follow up with your primary care provider. *  Please give a list of your current medications to your Primary Care Provider. *  Please update this list whenever your medications are discontinued, doses are      changed, or new medications (including over-the-counter products) are added. *  Please carry medication information at all times in case of emergency situations.     These are general instructions for a healthy lifestyle:    No smoking/ No tobacco products/ Avoid exposure to second hand smoke  Surgeon General's Warning:  Quitting smoking now greatly reduces serious risk to your health. Obesity, smoking, and sedentary lifestyle greatly increases your risk for illness    A healthy diet, regular physical exercise & weight monitoring are important for maintaining a healthy lifestyle    You may be retaining fluid if you have a history of heart failure or if you experience any of the following symptoms:  Weight gain of 3 pounds or more overnight or 5 pounds in a week, increased swelling in our hands or feet or shortness of breath while lying flat in bed. Please call your doctor as soon as you notice any of these symptoms; do not wait until your next office visit. Recognize signs and symptoms of STROKE:    F-face looks uneven    A-arms unable to move or move unevenly    S-speech slurred or non-existent    T-time-call 911 as soon as signs and symptoms begin-DO NOT go       Back to bed or wait to see if you get better-TIME IS BRAIN. Warning Signs of HEART ATTACK     Call 911 if you have these symptoms:   Chest discomfort. Most heart attacks involve discomfort in the center of the chest that lasts more than a few minutes, or that goes away and comes back. It can feel like uncomfortable pressure, squeezing, fullness, or pain.  Discomfort in other areas of the upper body. Symptoms can include pain or discomfort in one or both arms, the back, neck, jaw, or stomach.  Shortness of breath with or without chest discomfort.  Other signs may include breaking out in a cold sweat, nausea, or lightheadedness. Don't wait more than five minutes to call 911 - MINUTES MATTER! Fast action can save your life. Calling 911 is almost always the fastest way to get lifesaving treatment. Emergency Medical Services staff can begin treatment when they arrive -- up to an hour sooner than if someone gets to the hospital by car. The discharge information has been reviewed with the patient.   The patient verbalized understanding. Discharge medications reviewed with the patient and appropriate educational materials and side effects teaching were provided.   ___________________________________________________________________________________________________________________________________

## 2018-07-12 NOTE — ROUTINE PROCESS
Bedside and Verbal shift change report given to Barrera Ospina LPN (oncoming nurse) by Ynes Lozoya RN   (offgoing nurse). Report included the following information SBAR, Kardex, Intake/Output, MAR, Recent Results and Med Rec Status.

## 2018-07-12 NOTE — ROUTINE PROCESS
Bedside and Verbal shift change report given to OCTAVIA Fernandes RN (oncoming nurse) by Peter Walton RN   (offgoing nurse). Report included the following information SBAR, Kardex, Intake/Output, MAR, Recent Results and Med Rec Status.

## 2018-07-12 NOTE — DISCHARGE SUMMARY
Discharge Summary    Patient: Chris Stanley MRN: [id number]  CSN: [id number]    YOB: 1960  Age: 59 y.o.   Sex: male    DOA: 7/8/2018 LOS:  LOS: 4 days   Discharge Date:      Primary Care Provider:  Abida Leary MD    Admission Diagnoses: Respiratory failure with hypoxia and hypercapnia (HCC)  COPD with exacerbation ([facility]  Acute exacerbation of CHF (congestive heart failure) ([facility]    Discharge Diagnoses:    Problem List as of 7/12/2018  Date Reviewed: 7/8/2018          Codes Class Noted - Resolved    Sleep apnea ICD-10-CM: G47.30  ICD-9-CM: 780.57  7/9/2018 - Present        * (Principal)Acute exacerbation of CHF (congestive heart failure) ([place] [id number].) ICD-10-CM: I50.9  ICD-9-CM: 428.0  7/8/2018 - Present        COPD with exacerbation ([place] [address].) ICD-10-CM: J44.1  ICD-9-CM: 491.21  7/8/2018 - Present        Respiratory failure with hypoxia and hypercapnia ([place] [address].) ICD-10-CM: J96.91, J96.92  ICD-9-CM: 518.81  7/8/2018 - Present        SOB (shortness of breath) ICD-10-CM: R06.02  ICD-9-CM: 786.05  5/11/2018 - Present        Acute respiratory distress ICD-10-CM: R06.03  ICD-9-CM: 518.82  5/11/2018 - Present        Hypertensive emergency ICD-10-CM: I16.1  ICD-9-CM: 401.9  5/11/2018 - Present        COPD (chronic obstructive pulmonary disease) ([facility] ICD-10-CM: J44.9  ICD-9-CM: 496  4/22/2018 - Present        Acute on chronic respiratory failure ([facility] ICD-10-CM: J96.20  ICD-9-CM: 518.84  4/22/2018 - Present        Cocaine abuse ICD-10-CM: F14.10  ICD-9-CM: 305.60  4/22/2018 - Present        Acute kidney injury [facility]) ICD-10-CM: N17.9  ICD-9-CM: 584.9  3/7/2018 - Present        CHF (congestive heart failure) (HCC) (Chronic) ICD-10-CM: I50.9  ICD-9-CM: 428.0  3/3/2018 - Present        Chest pain ICD-10-CM: R07.9  ICD-9-CM: 786.50  1/25/2018 - Present        Acute on chronic combined systolic and diastolic ACC/AHA stage C congestive heart failure (HCC) (Chronic) ICD-10-CM: I50.43  ICD-9-CM: 428.43, 428.0  1/25/2018 - Present COPD exacerbation ([place] [address].) ICD-10-CM: J44.1  ICD-9-CM: 491.21  2/19/2013 - Present        Type II diabetes mellitus with peripheral autonomic neuropathy ([place] [address].) ICD-10-CM: E11.43  ICD-9-CM: 250.60, 337.1  2/19/2013 - Present        Hypertension ICD-10-CM: I10  ICD-9-CM: 401.9  Unknown - Present        JIM (obstructive sleep apnea) ICD-10-CM: G47.33  ICD-9-CM: 327.23  2/19/2013 - Present        Hypoxia ICD-10-CM: R09.02  ICD-9-CM: 799.02  2/19/2013 - Present        Morbid obesity ([place] [address].) ICD-10-CM: E66.01  ICD-9-CM: 278.01  2/19/2013 - Present              Discharge Medications:     Current Discharge Medication List      CONTINUE these medications which have NOT CHANGED    Details   gabapentin (NEURONTIN) 800 mg tablet Take  by mouth three (3) times daily. predniSONE (DELTASONE) 10 mg tablet Days 1 & 2: Take FOUR tabs. Days 3 & 4: Take THREE tabs. Days 5 & 6: Take TWO tabs. Days 7 & 8: Take ONE tab. Qty: 21 Tab, Refills: 0      fluticasone-vilanterol (BREO ELLIPTA) 100-25 mcg/dose inhaler Take 1 Puff by inhalation daily. Qty: 1 Inhaler, Refills: 0      cloNIDine HCl (CATAPRES) 0.2 mg tablet Take 1 Tab by mouth every eight (8) hours. Qty: 90 Tab, Refills: 0      furosemide (LASIX) 40 mg tablet Take 1 Tab by mouth daily. Qty: 30 Tab, Refills: 0      hydrALAZINE (APRESOLINE) 25 mg tablet Take 1 Tab by mouth three (3) times daily. Qty: 90 Tab, Refills: 0      glipiZIDE (GLUCOTROL) 10 mg tablet Take 1 Tab by mouth two (2) times a day. Qty: 60 Tab, Refills: 0      spironolactone (ALDACTONE) 25 mg tablet Take 25 mg by mouth daily. amLODIPine (NORVASC) 5 mg tablet Take  by mouth daily. albuterol (PROVENTIL HFA, VENTOLIN HFA) 90 mcg/actuation inhaler Take 1 Puff by inhalation.  1 puff in am and 1 puff in pm              Discharge Condition: Good        Consults: Pulmonary/Critical Care      PHYSICAL EXAM   Visit Vitals    /75    Pulse 74    Temp 97.5 °F (36.4 °C)    Resp 19    Ht 5' 7\" (1.702 m)    Wt (!) 178.8 kg (394 lb 2.9 oz)    SpO2 98%    BMI 61.74 kg/m2     General: Awake, cooperative, no acute distress    HEENT: NC, Atraumatic. PERRLA, EOMI. Anicteric sclerae. Lungs:  CTA Bilaterally. No Wheezing/Rhonchi/Rales. Heart:  Regular  rhythm,  No murmur, No Rubs, No Gallops  Abdomen: Soft, Non distended, Non tender. +Bowel sounds,   Extremities: No c/c/e  Psych:   Not anxious or agitated. Neurologic:  No acute neurological deficits. Admission HPI :   History is obtained primarily from chart review as he is unable to provide.      Jacqlyn Boas is a 59 y.o. male with past medical history significant for CHF, COPD, Dm2, JIM w multiple hospitalizations for complications related to this presents to the ER with worsening shortness of breath and lower extremity edema which had began about 4 days ago and was progressively worsening. He had denied dietary indiscretion.     On presentaiton to the ER he was afebrile, hypertensive and in respiratory distress. Exam demonstrated bilateral wheezes and rales. He had noted LE edema. ABG demonstrated respiratory acidosis. CXR was clear, BNP slightly elevated. Medicine is asked to admit for further management.         On my evaluation of the patient he is seen in ER on bipap. He aruoses to verbal and tactile stimuli but does not answer questions. He follows some commands    Hospital Course :   Patient initially admitted to ICU, he was seen and followed by pulmonary critical care. Acute hypoxemic/ hypercarbic respiratory failure due to decompensated CHF and COPD exacerbation  He was placed on BiPAP and his symptoms improved. He was weaned off oxygen and he is now stable at room air. He does not qualify for home oxygen as his oxygen saturations are above 92% on ambulation. JIM - he was placed on BiPAP at night, he refused BiPAP intermittently.  He was educated on importance of using the BiPAP at night. he needs CPAP machine at home, Cannot arrange CPAP or BiPAP without sleep study. He had sleep study long time ago. He had sleep study scheduled for him in the past but he is not compliant with his appointments. Pulmonary recommended arranging Trilogy with settings of IPAP 20, EPAP 7 with back up rate of 10. Without PAP, he is at high risk of readmission.       COPD exac - Initially started on solumedrol, pulmicort, inhaled BD. His steroids switched to oral. Will discharge on prednisone taper dose.       Acute on chronic diastolic CHF -  -continue IV lasix 40 mg BID  Echo on 3/2018. MN 55 :diastolic dysfunction  Increased filling pressure.          HTN - controlled on clonidine ,hydralazine , norvasc/spirolactone and lasix.         Hx Cocaine abuse:            Patient is back at his baseline. He was educated on loosing weight, healthy diet and compliance with treatment and follow up. Activity: Activity as tolerated    Diet: Diabetic Diet    Follow-up: PCP, pulmonary    Disposition: home     Minutes spent on discharge: 55       Labs: Results:       Chemistry Recent Labs      07/11/18   0204  07/10/18   0404   GLU  160*  158*   NA  140  138   K  4.2  4.1   CL  102  101   CO2  37*  34*   BUN  30*  28*   CREA  1.46*  1.41*   CA  9.4  9.4   AGAP  1*  3   BUCR  21*  20   AP  71  74   TP  6.4  6.4   ALB  2.8*  2.7*   GLOB  3.6  3.7   AGRAT  0.8  0.7*      CBC w/Diff Recent Labs      07/11/18   0204  07/10/18   0404   WBC  9.9  13.1   RBC  4.67*  4.84   HGB  13.4  14.1   HCT  43.9  45.1   PLT  148  164   GRANS  82*  83*   LYMPH  12*  10*   EOS  0  0      Cardiac Enzymes No results for input(s): CPK, CKND1, TISHA in the last 72 hours. No lab exists for component: CKRMB, TROIP   Coagulation No results for input(s): PTP, INR, APTT in the last 72 hours.     No lab exists for component: INREXT    Lipid Panel Lab Results   Component Value Date/Time    Cholesterol, total 196 01/25/2018 02:51 AM    HDL Cholesterol 64 (H) 01/25/2018 02:51 AM    LDL, calculated 121.2 (H) 01/25/2018 02:51 AM    VLDL, calculated 10.8 01/25/2018 02:51 AM    Triglyceride 54 01/25/2018 02:51 AM    CHOL/HDL Ratio 3.1 01/25/2018 02:51 AM      BNP No results for input(s): BNPP in the last 72 hours. Liver Enzymes Recent Labs      07/11/18   0204   TP  6.4   ALB  2.8*   AP  71   SGOT  6*      Thyroid Studies Lab Results   Component Value Date/Time    TSH 0.52 05/11/2018 08:06 AM            Significant Diagnostic Studies: Xr Chest Port    Result Date: 7/9/2018  Chest, single view Indication: Shortness of breath Comparison: Several prior exams, most recently June 8, 2018 Findings:  Portable upright AP view of the chest was obtained on 2 exposures. Mild central vascular congestion is noted. No focal pneumonic opacity, pneumothorax or pleural effusion. Cardiac size and mediastinal contours remain stable, with mild enlargement of the cardiac silhouette. No acute osseous abnormality. Impression: Mild cardiac enlargement similar to prior with minor central vascular congestion. Xr Chest Port    Result Date: 7/8/2018  Chest, single view Indication: Shortness of breath Comparison: Several prior exams, most recently May 12, 2018 Findings:  Portable upright AP view of the chest was obtained. The lungs are mildly underexpanded with associated bronchovascular crowding. No focal pneumonic opacity, pneumothorax, or pleural effusion. Cardiac size is enlarged. Mediastinal contours are stable. No acute osseous abnormality. Impression: Stable enlarged cardiac silhouette with mildly underexpanded lungs. No superimposed acute radiographic abnormality. No results found for this or any previous visit.         CC: Jeanna Avila MD

## 2018-07-12 NOTE — PROGRESS NOTES
Went to adjust mask due to excessive leak but patient pulled took mask off. Placed patient on 3.5L oxygen via nasal cannula.

## 2018-07-12 NOTE — PROGRESS NOTES
[id number] Shift Summary: Pt rested well overnight. He used the BiPAP for only a few hours then refused. He also was aggressive about receiving two healthy choice meals at bedtime. Only one was available. No new clinical concerns noted.

## 2018-07-12 NOTE — WOUND CARE
Patient seen during hospital wide pressure injury prevalence. Skin intact; patient repositions self in bed. Spoke with patient concerning pressure relieving strategies. Wound care will continue to monitor during admission.

## 2018-07-12 NOTE — PROGRESS NOTES
Problem: Falls - Risk of  Goal: *Absence of Falls  Document Richard Fall Risk and appropriate interventions in the flowsheet.    Outcome: Progressing Towards Goal  Fall Risk Interventions:  Mobility Interventions: Patient to call before getting OOB         Medication Interventions: Evaluate medications/consider consulting pharmacy, Patient to call before getting OOB, Teach patient to arise slowly
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
## 2018-07-12 NOTE — PROGRESS NOTES
[id number] Bedside report received from OCTAVIA Fernandes RN.     1200 Pt is awaiting for discharge. [id number] [facility] Police aware that pt states he will be leaving [facility].

## 2018-07-12 NOTE — PROGRESS NOTES
D/c plan Home today once trilogy has arrived  Met with patient at bedside along with Dr. Frederic Mock. Patient states he is aware that he is ready for d/c today. Dr. Britton Conklin would like to have a trilogy for this patient. Patient had asked for nebulizer as well but today he informs cm he has a nebulizer. Bertha Jon has sent order for trilogy to [facility]. Waiting to hear from [facility]. 1400 telephone call from Adrianne Bhagat at [facility] she will fax order to cm and will have MD sign order. Waiting on order. Dr. Frederic Mock will sign once patient has trilogy he will be d/c home with [facility] to follow up for [facility] visit. See avs patient has an appointment for follow up with pulmonologist for sleep apnea test  1545 patient asked to meet with cm. He informed cm he is leaving with or without the trilogy today. States he is going to stay with his sister and she is going to take care of him when he gets out of the hospital his address is [place] [address] [address] [facility]  He also informed cm that if he does not get the trilogy today he is leaving [facility]. CM did inform sandoval his nurse of this. Informed her if he chooses to leave [facility] she needs to call Dr. Frederic Mock and let him know if he wants to leave and does not have trilogy. Patient has medicaid and once has trilogy he can be d/c and nurse can call medicaid cab for patient. Ervin Bliss RN is aware  Care Management Interventions  PCP Verified by CM: Yes  Mode of Transport at Discharge:  (lyft)  Transition of Care Consult (CM Consult): [address]:  Yes  Reason Outside [place]:  (O8D)  Physical Therapy Consult: Yes  Occupational Therapy Consult: Yes  Current Support Network: Lives Alone  Confirm Follow Up Transport: Family  Plan discussed with Pt/Family/Caregiver: Yes  Freedom of Choice Offered: Yes  Discharge Location  Discharge Placement: Home with home health (h)

## 2018-07-13 ENCOUNTER — HOME CARE VISIT (OUTPATIENT)
Dept: HOME HEALTH SERVICES | Facility: HOME HEALTH | Age: 58
End: 2018-07-13

## 2018-07-16 ENCOUNTER — HOME CARE VISIT (OUTPATIENT)
Dept: HOME HEALTH SERVICES | Facility: HOME HEALTH | Age: 58
End: 2018-07-16

## 2018-07-17 ENCOUNTER — HOME CARE VISIT (OUTPATIENT)
Dept: HOME HEALTH SERVICES | Facility: HOME HEALTH | Age: 58
End: 2018-07-17

## 2018-11-27 NOTE — DIABETES MGMT
Diabetes Patient/Family Education Record    Factors That  May Influence Patients Ability  to Learn or  Comply with Recommendations   []   Language barrier    []   Cultural needs   [x]   Motivation    []   Cognitive limitation    []   Physical   []   Education    []   Physiological factors   []   Hearing/vision/speaking impairment   []   Rastafarian beliefs    []   Financial factors   []  Other:   []  No factors identified at this time.      Person Instructed:   [x]   Patient   []   Family   []  Other     Preference for Learning:   [x]   Verbal   []   Written   []  Demonstration     Level of Comprehension & Competence:    []  Good                                      [x] Fair                                     []  Poor                             []  Needs Reinforcement   [x]  Teachback completed    Education Component:   [x]  Medication management, including confirmation of home regimen; glipizide dose recently increased according to pt    []  Nutritional management    []  Exercise   []  Signs, symptoms, and treatment of hyperglycemia and hypoglycemia   [x] Prevention, recognition and treatment of hypoglycemia; pt denies   [x]  Importance of blood glucose monitoring; SMBG 2-3x week    []  Instruction on use of the blood glucose meter   [x]  Discuss the importance of HbA1C monitoring    []  Sick day guidelines   []  Proper use and disposal of lancets, needles, syringes or insulin pens (if appropriate)   []  Potential long-term complications (retinopathy, kidney disease, neuropathy, foot care)   [] Information about whom to contact in case of emergency or for more information    [x]  Goal:  Patient/family will demonstrate understanding of Diabetes Self Management Skills by: 4/15/18  Plan for post-discharge education or self-management support:    [x] Outpatient class schedule provided            [] Patient Declined    [] Scheduled for outpatient classes (date) _______         Chrystal Cordova RN, Pt is a 32 y o  [place]ina Do with Patient's last menstrual period was 2018 (exact date)  using condoms (male) for [facility] presents for preventive care  She notes the same partner since her last STI evaluation  In her lifetime she has been involved with 1 partner   Safe sexual practices (monogomy, condoms) are followed consistently  · She does  feel safe in the relationship  She does feel safe in her home  · Her calcium intake encompasses yogurt for a total of 1 servings daily on average  She does take additional Vitamin D (MVI or supplement)  · She exercises 2-3 times per week  · Her menses occur every 25-28 Days, last 3-4 days and require regular tampons every 5-6 hours  Menstrual History:  OB History      Para Term  AB Living    1 1 1     1    SAB TAB Ectopic Multiple Live Births          0 1        Obstetric Comments    Menarche: 10  23-/4/regular tampon every 6 hours         Menarche age: 13  Patient's last menstrual period was 2018 (exact date)  ·      · She has completed the HPV vaccine series appropriate for age    · tobacco use : does not use tobacco              · colonoscopy : not indicated  · Last pap: --wnl, repeat 1 year    Past Medical History:   Diagnosis Date    Gestational hypertension     Need for HPV vaccination     completed series    Varicella     Visual impairment        Past Surgical History:   Procedure Laterality Date    WISDOM TOOTH EXTRACTION         OB History    Para Term  AB Living   1 1 1     1   SAB TAB Ectopic Multiple Live Births         0 1      # Outcome Date GA Lbr George/2nd Weight Sex Delivery Anes PTL Lv   1 Term 17 38w4d / 00:44 2977 g (6 lb 9 oz) M Vag-Spont EPI N JOE      Obstetric Comments   Menarche: 10   -/4/regular tampon every 6 hours       Gyn HX:  denies STD, abnormal pap, significant dysmenorrhea or irregular menses       Current Outpatient Prescriptions:     Prenatal MV-Min-Fe Fum-FA-DHA (PRENATAL 1 PO), Take 1 tablet by mouth daily, Disp: , Rfl:     No Known Allergies    Social History     Social History    Marital status: /Civil Union     Spouse name: Tiarra Hsu Number of children: 1    Years of education: bachelor's     Occupational History    clincal analyst      Social History Main Topics    Smoking status: Never Smoker    Smokeless tobacco: Never Used    Alcohol use Yes      Comment: occasionally     Drug use: No    Sexual activity: Yes     Partners: Male     Birth control/ protection: Condom      Comment: lifetime partners: 1     Other Topics Concern    None     Social History Narrative    Anglican: no preference    Accepts blood products        Exercise: 3x/week elliptical    Calcium: 1 yogurt daily, occ cheese, PNV       Family History   Problem Relation Age of Onset    Hearing loss Father     Hearing loss Paternal Aunt     No Known Problems Mother     No Known Problems Sister     No Known Problems Maternal Grandmother     Heart failure Maternal Grandfather     No Known Problems Paternal Grandmother     Heart failure Paternal Grandfather     Diabetes Paternal Grandfather        Blood pressure 120/72, pulse 97, weight 73 kg (161 lb), last menstrual period 11/23/2018, SpO2 99 %, currently breastfeeding  and Body mass index is 30 42 kg/m²  Physical Exam   Constitutional: She is oriented to person, place, and time  She appears well-developed and well-nourished  HENT:   Head: Normocephalic and atraumatic  Eyes: Conjunctivae and EOM are normal    Neck: Normal range of motion  Neck supple  No tracheal deviation present  No thyromegaly present  Cardiovascular: Normal rate, regular rhythm and normal heart sounds  Pulmonary/Chest: Effort normal and breath sounds normal  No stridor  No respiratory distress  She has no wheezes  She has no rales  Abdominal: Soft  Bowel sounds are normal  She exhibits no distension and no mass  There is no tenderness   There is no MS  Glycemic Control Team rebound and no guarding  Musculoskeletal: Normal range of motion  She exhibits no edema or tenderness  Lymphadenopathy:     She has no cervical adenopathy  Neurological: She is alert and oriented to person, place, and time  Skin: Skin is warm  No rash noted  No erythema  Psychiatric: She has a normal mood and affect  Her behavior is normal  Judgment and thought content normal      Breasts: breasts appear normal, no suspicious masses, no skin or nipple changes or axillary nodes  vulva: normal external genitalia for age and no lesions, masses, epithelial changes, or exudate  vagina: color pink and rugae  well formed rugae  cervix: parous and no lesions   uterus: NSSC, AF, NT, mobile  adnexa: no masses or tenderness      A/P:  Pt is a 32 y o   with      Diagnoses and all orders for this visit:    Encounter for annual routine gynecological examination    Obesity (BMI 30 0-34  9)    Dietary calcium deficiency    Patient advised recommendation of daily dietary calcium of  1000 mg calcium  Patient advised recommendation of exercise 5 times per week for 30 minutes  Patient advised recommendation of BMI to be between 19-25  Pt planning to attempt pregnancy in about 6 months

## 2019-03-18 ENCOUNTER — APPOINTMENT (OUTPATIENT)
Dept: GENERAL RADIOLOGY | Age: 59
DRG: 133 | End: 2019-03-18
Attending: EMERGENCY MEDICINE
Payer: MEDICAID

## 2019-03-18 ENCOUNTER — HOSPITAL ENCOUNTER (INPATIENT)
Age: 59
LOS: 2 days | Discharge: HOME | DRG: 133 | End: 2019-03-20
Attending: EMERGENCY MEDICINE | Admitting: HOSPITALIST
Payer: MEDICAID

## 2019-03-18 DIAGNOSIS — J96.21 ACUTE ON CHRONIC RESPIRATORY FAILURE WITH HYPOXIA AND HYPERCAPNIA (HCC): Primary | ICD-10-CM

## 2019-03-18 DIAGNOSIS — J96.22 ACUTE ON CHRONIC RESPIRATORY FAILURE WITH HYPOXIA AND HYPERCAPNIA (HCC): Primary | ICD-10-CM

## 2019-03-18 DIAGNOSIS — G47.33 OSA (OBSTRUCTIVE SLEEP APNEA): ICD-10-CM

## 2019-03-18 DIAGNOSIS — I50.9 CHRONIC CONGESTIVE HEART FAILURE, UNSPECIFIED HEART FAILURE TYPE (HCC): ICD-10-CM

## 2019-03-18 PROBLEM — J96.01 ACUTE RESPIRATORY FAILURE WITH HYPOXIA AND HYPERCAPNIA (HCC): Status: ACTIVE | Noted: 2019-03-18

## 2019-03-18 PROBLEM — J96.02 ACUTE RESPIRATORY FAILURE WITH HYPOXIA AND HYPERCAPNIA (HCC): Status: ACTIVE | Noted: 2019-03-18

## 2019-03-18 LAB
AMPHET UR QL SCN: NEGATIVE
ANION GAP SERPL CALC-SCNC: 4 MMOL/L (ref 3–18)
ARTERIAL PATENCY WRIST A: YES
ARTERIAL PATENCY WRIST A: YES
AVAPS, IAVAPS: YES
BARBITURATES UR QL SCN: NEGATIVE
BASE EXCESS BLD CALC-SCNC: 5 MMOL/L
BASE EXCESS BLD CALC-SCNC: 5 MMOL/L
BASOPHILS # BLD: 0 K/UL (ref 0–0.1)
BASOPHILS NFR BLD: 0 % (ref 0–2)
BDY SITE: ABNORMAL
BDY SITE: ABNORMAL
BENZODIAZ UR QL: NEGATIVE
BNP SERPL-MCNC: 1534 PG/ML (ref 0–900)
BODY TEMPERATURE: 98.6
BODY TEMPERATURE: 98.7
BUN SERPL-MCNC: 32 MG/DL (ref 7–18)
BUN/CREAT SERPL: 22 (ref 12–20)
CALCIUM SERPL-MCNC: 8.8 MG/DL (ref 8.5–10.1)
CANNABINOIDS UR QL SCN: NEGATIVE
CHLORIDE SERPL-SCNC: 107 MMOL/L (ref 100–108)
CK MB CFR SERPL CALC: 2.4 % (ref 0–4)
CK MB CFR SERPL CALC: 2.7 % (ref 0–4)
CK MB CFR SERPL CALC: 2.8 % (ref 0–4)
CK MB SERPL-MCNC: 1.9 NG/ML (ref 5–25)
CK MB SERPL-MCNC: 2.1 NG/ML (ref 5–25)
CK MB SERPL-MCNC: 2.3 NG/ML (ref 5–25)
CK SERPL-CCNC: 67 U/L (ref 39–308)
CK SERPL-CCNC: 78 U/L (ref 39–308)
CK SERPL-CCNC: 94 U/L (ref 39–308)
CO2 SERPL-SCNC: 32 MMOL/L (ref 21–32)
COCAINE UR QL SCN: POSITIVE
CREAT SERPL-MCNC: 1.47 MG/DL (ref 0.6–1.3)
DIFFERENTIAL METHOD BLD: ABNORMAL
EOSINOPHIL # BLD: 0.4 K/UL (ref 0–0.4)
EOSINOPHIL NFR BLD: 3 % (ref 0–5)
ERYTHROCYTE [DISTWIDTH] IN BLOOD BY AUTOMATED COUNT: 15.5 % (ref 11.6–14.5)
GAS FLOW.O2 O2 DELIVERY SYS: ABNORMAL L/MIN
GAS FLOW.O2 O2 DELIVERY SYS: ABNORMAL L/MIN
GAS FLOW.O2 SETTING OXYMISER: 3 L/M
GLUCOSE BLD STRIP.AUTO-MCNC: 163 MG/DL (ref 70–110)
GLUCOSE BLD STRIP.AUTO-MCNC: 168 MG/DL (ref 70–110)
GLUCOSE BLD STRIP.AUTO-MCNC: 224 MG/DL (ref 70–110)
GLUCOSE BLD STRIP.AUTO-MCNC: 283 MG/DL (ref 70–110)
GLUCOSE SERPL-MCNC: 110 MG/DL (ref 74–99)
HCO3 BLD-SCNC: 31.4 MMOL/L (ref 22–26)
HCO3 BLD-SCNC: 31.6 MMOL/L (ref 22–26)
HCT VFR BLD AUTO: 47.1 % (ref 36–48)
HDSCOM,HDSCOM: ABNORMAL
HGB BLD-MCNC: 14.6 G/DL (ref 13–16)
LYMPHOCYTES # BLD: 2.8 K/UL (ref 0.9–3.6)
LYMPHOCYTES NFR BLD: 22 % (ref 21–52)
MAGNESIUM SERPL-MCNC: 2.4 MG/DL (ref 1.6–2.6)
MCH RBC QN AUTO: 28.9 PG (ref 24–34)
MCHC RBC AUTO-ENTMCNC: 31 G/DL (ref 31–37)
MCV RBC AUTO: 93.1 FL (ref 74–97)
METHADONE UR QL: NEGATIVE
MONOCYTES # BLD: 0.8 K/UL (ref 0.05–1.2)
MONOCYTES NFR BLD: 6 % (ref 3–10)
NEUTS SEG # BLD: 8.7 K/UL (ref 1.8–8)
NEUTS SEG NFR BLD: 69 % (ref 40–73)
O2/TOTAL GAS SETTING VFR VENT: 0.3 %
O2/TOTAL GAS SETTING VFR VENT: 32 %
OPIATES UR QL: NEGATIVE
PCO2 BLD: 60.5 MMHG (ref 35–45)
PCO2 BLD: 66 MMHG (ref 35–45)
PCP UR QL: NEGATIVE
PEEP RESPIRATORY: 6 CMH2O
PH BLD: 7.29 [PH] (ref 7.35–7.45)
PH BLD: 7.32 [PH] (ref 7.35–7.45)
PHOSPHATE SERPL-MCNC: 4.1 MG/DL (ref 2.5–4.9)
PIP ISTAT,IPIP: 12
PLATELET # BLD AUTO: 170 K/UL (ref 135–420)
PMV BLD AUTO: 12.7 FL (ref 9.2–11.8)
PO2 BLD: 118 MMHG (ref 80–100)
PO2 BLD: 83 MMHG (ref 80–100)
POTASSIUM SERPL-SCNC: 4.4 MMOL/L (ref 3.5–5.5)
RBC # BLD AUTO: 5.06 M/UL (ref 4.7–5.5)
SAO2 % BLD: 94 % (ref 92–97)
SAO2 % BLD: 98 % (ref 92–97)
SERVICE CMNT-IMP: ABNORMAL
SERVICE CMNT-IMP: ABNORMAL
SODIUM SERPL-SCNC: 143 MMOL/L (ref 136–145)
SPECIMEN TYPE: ABNORMAL
SPECIMEN TYPE: ABNORMAL
TOTAL RESP. RATE, ITRR: 20
TOTAL RESP. RATE, ITRR: 29
TROPONIN I SERPL-MCNC: 0.02 NG/ML (ref 0–0.04)
TROPONIN I SERPL-MCNC: 0.04 NG/ML (ref 0–0.04)
TROPONIN I SERPL-MCNC: <0.02 NG/ML (ref 0–0.04)
WBC # BLD AUTO: 12.8 K/UL (ref 4.6–13.2)

## 2019-03-18 PROCEDURE — 94640 AIRWAY INHALATION TREATMENT: CPT

## 2019-03-18 PROCEDURE — 71045 X-RAY EXAM CHEST 1 VIEW: CPT

## 2019-03-18 PROCEDURE — 77010033678 HC OXYGEN DAILY

## 2019-03-18 PROCEDURE — 74011250636 HC RX REV CODE- 250/636: Performed by: HOSPITALIST

## 2019-03-18 PROCEDURE — 96374 THER/PROPH/DIAG INJ IV PUSH: CPT

## 2019-03-18 PROCEDURE — 74011000250 HC RX REV CODE- 250: Performed by: HOSPITALIST

## 2019-03-18 PROCEDURE — 74011250637 HC RX REV CODE- 250/637: Performed by: HOSPITALIST

## 2019-03-18 PROCEDURE — 80048 BASIC METABOLIC PNL TOTAL CA: CPT

## 2019-03-18 PROCEDURE — 96375 TX/PRO/DX INJ NEW DRUG ADDON: CPT

## 2019-03-18 PROCEDURE — 82962 GLUCOSE BLOOD TEST: CPT

## 2019-03-18 PROCEDURE — 77030035694 HC MSK BIPAP FLL FAC PERFMAX PHIL -B

## 2019-03-18 PROCEDURE — 65610000006 HC RM INTENSIVE CARE

## 2019-03-18 PROCEDURE — 93005 ELECTROCARDIOGRAM TRACING: CPT

## 2019-03-18 PROCEDURE — 83735 ASSAY OF MAGNESIUM: CPT

## 2019-03-18 PROCEDURE — 74011636637 HC RX REV CODE- 636/637: Performed by: HOSPITALIST

## 2019-03-18 PROCEDURE — 77030013140 HC MSK NEB VYRM -A

## 2019-03-18 PROCEDURE — 36415 COLL VENOUS BLD VENIPUNCTURE: CPT

## 2019-03-18 PROCEDURE — 74011000250 HC RX REV CODE- 250: Performed by: EMERGENCY MEDICINE

## 2019-03-18 PROCEDURE — 84100 ASSAY OF PHOSPHORUS: CPT

## 2019-03-18 PROCEDURE — 82550 ASSAY OF CK (CPK): CPT

## 2019-03-18 PROCEDURE — 85025 COMPLETE CBC W/AUTO DIFF WBC: CPT

## 2019-03-18 PROCEDURE — 94660 CPAP INITIATION&MGMT: CPT

## 2019-03-18 PROCEDURE — 99285 EMERGENCY DEPT VISIT HI MDM: CPT

## 2019-03-18 PROCEDURE — 74011250636 HC RX REV CODE- 250/636: Performed by: EMERGENCY MEDICINE

## 2019-03-18 PROCEDURE — 36600 WITHDRAWAL OF ARTERIAL BLOOD: CPT

## 2019-03-18 PROCEDURE — 82803 BLOOD GASES ANY COMBINATION: CPT

## 2019-03-18 PROCEDURE — 76450000000

## 2019-03-18 PROCEDURE — 80307 DRUG TEST PRSMV CHEM ANLYZR: CPT

## 2019-03-18 PROCEDURE — 83880 ASSAY OF NATRIURETIC PEPTIDE: CPT

## 2019-03-18 RX ORDER — GABAPENTIN 400 MG/1
800 CAPSULE ORAL 3 TIMES DAILY
Status: DISCONTINUED | OUTPATIENT
Start: 2019-03-18 | End: 2019-03-18

## 2019-03-18 RX ORDER — ATORVASTATIN CALCIUM 20 MG/1
40 TABLET, FILM COATED ORAL DAILY
Status: DISCONTINUED | OUTPATIENT
Start: 2019-03-18 | End: 2019-03-20 | Stop reason: HOSPADM

## 2019-03-18 RX ORDER — DEXTROSE 50 % IN WATER (D50W) INTRAVENOUS SYRINGE
50 AS NEEDED
Status: DISCONTINUED | OUTPATIENT
Start: 2019-03-18 | End: 2019-03-20 | Stop reason: HOSPADM

## 2019-03-18 RX ORDER — MAGNESIUM SULFATE 100 %
16 CRYSTALS MISCELLANEOUS AS NEEDED
Status: DISCONTINUED | OUTPATIENT
Start: 2019-03-18 | End: 2019-03-20 | Stop reason: HOSPADM

## 2019-03-18 RX ORDER — PANTOPRAZOLE SODIUM 40 MG/1
40 TABLET, DELAYED RELEASE ORAL DAILY
Status: DISCONTINUED | OUTPATIENT
Start: 2019-03-18 | End: 2019-03-20 | Stop reason: HOSPADM

## 2019-03-18 RX ORDER — CLONIDINE HYDROCHLORIDE 0.1 MG/1
0.2 TABLET ORAL EVERY 8 HOURS
Status: DISCONTINUED | OUTPATIENT
Start: 2019-03-18 | End: 2019-03-20 | Stop reason: HOSPADM

## 2019-03-18 RX ORDER — AMLODIPINE BESYLATE 5 MG/1
10 TABLET ORAL DAILY
Status: DISCONTINUED | OUTPATIENT
Start: 2019-03-18 | End: 2019-03-20 | Stop reason: HOSPADM

## 2019-03-18 RX ORDER — METHOCARBAMOL 750 MG/1
TABLET, FILM COATED ORAL 4 TIMES DAILY
COMMUNITY
End: 2020-04-22

## 2019-03-18 RX ORDER — IPRATROPIUM BROMIDE AND ALBUTEROL SULFATE 2.5; .5 MG/3ML; MG/3ML
3 SOLUTION RESPIRATORY (INHALATION)
Status: DISCONTINUED | OUTPATIENT
Start: 2019-03-18 | End: 2019-03-20 | Stop reason: HOSPADM

## 2019-03-18 RX ORDER — IPRATROPIUM BROMIDE AND ALBUTEROL SULFATE 2.5; .5 MG/3ML; MG/3ML
3 SOLUTION RESPIRATORY (INHALATION)
Status: COMPLETED | OUTPATIENT
Start: 2019-03-18 | End: 2019-03-18

## 2019-03-18 RX ORDER — HEPARIN SODIUM 5000 [USP'U]/ML
5000 INJECTION, SOLUTION INTRAVENOUS; SUBCUTANEOUS EVERY 8 HOURS
Status: DISCONTINUED | OUTPATIENT
Start: 2019-03-18 | End: 2019-03-20 | Stop reason: HOSPADM

## 2019-03-18 RX ORDER — ATORVASTATIN CALCIUM 40 MG/1
40 TABLET, FILM COATED ORAL DAILY
COMMUNITY
End: 2020-04-22

## 2019-03-18 RX ORDER — ASPIRIN 81 MG/1
81 TABLET ORAL DAILY
Status: DISCONTINUED | OUTPATIENT
Start: 2019-03-18 | End: 2019-03-20 | Stop reason: HOSPADM

## 2019-03-18 RX ORDER — ASPIRIN 81 MG/1
81 TABLET ORAL DAILY
COMMUNITY
End: 2020-04-22

## 2019-03-18 RX ORDER — CARVEDILOL 25 MG/1
25 TABLET ORAL 2 TIMES DAILY WITH MEALS
COMMUNITY
End: 2020-04-22

## 2019-03-18 RX ORDER — HYDRALAZINE HYDROCHLORIDE 20 MG/ML
10 INJECTION INTRAMUSCULAR; INTRAVENOUS
Status: DISCONTINUED | OUTPATIENT
Start: 2019-03-18 | End: 2019-03-20 | Stop reason: HOSPADM

## 2019-03-18 RX ORDER — AMITRIPTYLINE HYDROCHLORIDE 50 MG/1
50 TABLET, FILM COATED ORAL
COMMUNITY
End: 2020-04-22

## 2019-03-18 RX ORDER — INSULIN LISPRO 100 [IU]/ML
INJECTION, SOLUTION INTRAVENOUS; SUBCUTANEOUS
Status: DISCONTINUED | OUTPATIENT
Start: 2019-03-18 | End: 2019-03-20 | Stop reason: HOSPADM

## 2019-03-18 RX ORDER — NITROGLYCERIN 0.4 MG/1
0.4 TABLET SUBLINGUAL
COMMUNITY
End: 2020-04-22

## 2019-03-18 RX ORDER — CARVEDILOL 12.5 MG/1
25 TABLET ORAL 2 TIMES DAILY WITH MEALS
Status: DISCONTINUED | OUTPATIENT
Start: 2019-03-18 | End: 2019-03-20 | Stop reason: HOSPADM

## 2019-03-18 RX ORDER — AMITRIPTYLINE HYDROCHLORIDE 50 MG/1
50 TABLET, FILM COATED ORAL
Status: DISCONTINUED | OUTPATIENT
Start: 2019-03-18 | End: 2019-03-20 | Stop reason: HOSPADM

## 2019-03-18 RX ORDER — FUROSEMIDE 10 MG/ML
40 INJECTION INTRAMUSCULAR; INTRAVENOUS
Status: COMPLETED | OUTPATIENT
Start: 2019-03-18 | End: 2019-03-18

## 2019-03-18 RX ORDER — FUROSEMIDE 40 MG/1
40 TABLET ORAL DAILY
Status: DISCONTINUED | OUTPATIENT
Start: 2019-03-18 | End: 2019-03-20 | Stop reason: HOSPADM

## 2019-03-18 RX ADMIN — IPRATROPIUM BROMIDE AND ALBUTEROL SULFATE 3 ML: .5; 3 SOLUTION RESPIRATORY (INHALATION) at 15:39

## 2019-03-18 RX ADMIN — ASPIRIN 81 MG: 81 TABLET ORAL at 08:55

## 2019-03-18 RX ADMIN — CEFTRIAXONE 1 G: 1 INJECTION, POWDER, FOR SOLUTION INTRAMUSCULAR; INTRAVENOUS at 07:41

## 2019-03-18 RX ADMIN — CARVEDILOL 25 MG: 12.5 TABLET, FILM COATED ORAL at 07:43

## 2019-03-18 RX ADMIN — HEPARIN SODIUM 5000 UNITS: 5000 INJECTION INTRAVENOUS; SUBCUTANEOUS at 23:35

## 2019-03-18 RX ADMIN — METHYLPREDNISOLONE SODIUM SUCCINATE 40 MG: 40 INJECTION, POWDER, FOR SOLUTION INTRAMUSCULAR; INTRAVENOUS at 21:24

## 2019-03-18 RX ADMIN — CLONIDINE HYDROCHLORIDE 0.2 MG: 0.1 TABLET ORAL at 16:50

## 2019-03-18 RX ADMIN — IPRATROPIUM BROMIDE AND ALBUTEROL SULFATE 3 ML: .5; 3 SOLUTION RESPIRATORY (INHALATION) at 07:32

## 2019-03-18 RX ADMIN — METHYLPREDNISOLONE SODIUM SUCCINATE 125 MG: 125 INJECTION, POWDER, FOR SOLUTION INTRAMUSCULAR; INTRAVENOUS at 01:55

## 2019-03-18 RX ADMIN — INSULIN LISPRO 4 UNITS: 100 INJECTION, SOLUTION INTRAVENOUS; SUBCUTANEOUS at 21:24

## 2019-03-18 RX ADMIN — SODIUM CHLORIDE 500 MG: 900 INJECTION, SOLUTION INTRAVENOUS at 07:44

## 2019-03-18 RX ADMIN — PANTOPRAZOLE SODIUM 40 MG: 40 TABLET, DELAYED RELEASE ORAL at 08:55

## 2019-03-18 RX ADMIN — HEPARIN SODIUM 5000 UNITS: 5000 INJECTION INTRAVENOUS; SUBCUTANEOUS at 07:42

## 2019-03-18 RX ADMIN — ATORVASTATIN CALCIUM 40 MG: 20 TABLET, FILM COATED ORAL at 08:55

## 2019-03-18 RX ADMIN — HEPARIN SODIUM 5000 UNITS: 5000 INJECTION INTRAVENOUS; SUBCUTANEOUS at 16:50

## 2019-03-18 RX ADMIN — INSULIN LISPRO 2 UNITS: 100 INJECTION, SOLUTION INTRAVENOUS; SUBCUTANEOUS at 11:44

## 2019-03-18 RX ADMIN — IPRATROPIUM BROMIDE AND ALBUTEROL SULFATE 3 ML: .5; 3 SOLUTION RESPIRATORY (INHALATION) at 19:39

## 2019-03-18 RX ADMIN — IPRATROPIUM BROMIDE AND ALBUTEROL SULFATE 3 ML: .5; 3 SOLUTION RESPIRATORY (INHALATION) at 11:16

## 2019-03-18 RX ADMIN — IPRATROPIUM BROMIDE AND ALBUTEROL SULFATE 3 ML: .5; 3 SOLUTION RESPIRATORY (INHALATION) at 01:48

## 2019-03-18 RX ADMIN — CLONIDINE HYDROCHLORIDE 0.2 MG: 0.1 TABLET ORAL at 23:36

## 2019-03-18 RX ADMIN — CARVEDILOL 25 MG: 12.5 TABLET, FILM COATED ORAL at 16:50

## 2019-03-18 RX ADMIN — INSULIN LISPRO 2 UNITS: 100 INJECTION, SOLUTION INTRAVENOUS; SUBCUTANEOUS at 07:43

## 2019-03-18 RX ADMIN — FUROSEMIDE 40 MG: 40 TABLET ORAL at 08:55

## 2019-03-18 RX ADMIN — METHYLPREDNISOLONE SODIUM SUCCINATE 40 MG: 40 INJECTION, POWDER, FOR SOLUTION INTRAMUSCULAR; INTRAVENOUS at 14:00

## 2019-03-18 RX ADMIN — INSULIN LISPRO 6 UNITS: 100 INJECTION, SOLUTION INTRAVENOUS; SUBCUTANEOUS at 16:51

## 2019-03-18 RX ADMIN — METHYLPREDNISOLONE SODIUM SUCCINATE 40 MG: 40 INJECTION, POWDER, FOR SOLUTION INTRAMUSCULAR; INTRAVENOUS at 07:42

## 2019-03-18 RX ADMIN — CLONIDINE HYDROCHLORIDE 0.2 MG: 0.1 TABLET ORAL at 07:43

## 2019-03-18 RX ADMIN — AMLODIPINE BESYLATE 10 MG: 5 TABLET ORAL at 08:55

## 2019-03-18 RX ADMIN — FUROSEMIDE 40 MG: 10 INJECTION, SOLUTION INTRAMUSCULAR; INTRAVENOUS at 01:55

## 2019-03-18 RX ADMIN — AMITRIPTYLINE HYDROCHLORIDE 50 MG: 50 TABLET, FILM COATED ORAL at 21:24
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
## 2019-03-18 NOTE — PROGRESS NOTES
Problem: Falls - Risk of  Goal: *Absence of Falls  Document Richard Fall Risk and appropriate interventions in the flowsheet.   Outcome: Progressing Towards Goal  Fall Risk Interventions:  Mobility Interventions: Patient to call before getting OOB

## 2019-03-18 NOTE — PROGRESS NOTES
Reason for Admission:   C/o SOB               RRAT Score:  28                Resources/supports as identified by patient/family:  TBD                 Top Challenges facing patient (as identified by patient/family and CM): Stop using cocaine                      Finances/Medication cost?                    Transportation? Support system or lack thereof? Living arrangements? Self-care/ADLs/Cognition? Chart reviewed and attended IDR's pt admitted for respiratory failure with hypoxia and hypercapnia,positive UDS for cocaine, cm did attempt to visit pt,was on Bi-pap cm will revisit at another time to discuss d/c planning. Care Management Interventions  PCP Verified by CM:  Yes  Palliative Care Criteria Met (RRAT>21 & CHF Dx)?: Yes  Palliative Consult Recommended?: Yes                          Plan for utilizing home health:    TBD                      Likelihood of readmission:   no               Transition of Care Plan:

## 2019-03-18 NOTE — PROGRESS NOTES
0700 Bedside shift change report received from Costa Parsons RN. Report included the following information SBAR, Kardex, ED Summary, Intake/Output, MAR, Recent Results, Cardiac Rhythm SB and Alarm Parameters .      0800 Assessment complete    1200 Reassessment complete    1600 Reassessment complete

## 2019-03-18 NOTE — PROGRESS NOTES
0430  Received pt to room via stretcher, pt is wide awake and has various concerns and requests of staff. Immediately needs to use BSC to void, then back to bed. Pt is morbidly obese, refusing Bipap for now. On 3l NC, lungs wheezy and coarse. Constantly in motion so BP readings are high. Continues to ask for various items/services at the same time, tone of requests rude. Wants breakfast on time, wants to get up and does not want to be on monitor. Dr Jodie Montoya paged for orders. 0530  No orders yet. Pt has been dozing on 3l NC, obvious sleep apneas with no desats but will wake self up every few minutes. Skin slightly damp, pt appears to be breathing hard. Good UOP per urinal. 2 fans given. 0605  BP remains elevated due to pt coughing periodically and moaning. RN informed pt that he would have to be on Bipap for a while, pt at first agrees but when mask is being held to face refuses. When asked why he came to hospital pt did not answer, and stated he did not want to intubated if the need arose. 3358  Asking for a menu and food items. Dr Jodie Montoya paged again for orders. 0700  Pt gets increasingly upset and belligerent regarding food. Sitting at bedside. Repeats that he does not want to be intubated if he deteriorates. Dr Jodie Montoya arriving to unit. 0710  {[facility]_Bedside shift change report given to OCTAVIA Gamble RN (oncoming nurse) by OCTAVIA Petit RN (offgoing nurse). Report included the following information SBAR, Kardex, Intake/Output, MAR, Recent Results, Cardiac Rhythm sr and Alarm Parameters .

## 2019-03-18 NOTE — DIABETES MGMT
GLYCEMIC CONTROL PROGRESS NOTE:    - discussed in rounds, known h/o T2DM, HbA1c within recommended range for age + comorbids on oral home regimen  - BG in target range ICU: 140-180 mg/dL  - TDD = 2 units - Humalog Normal Insulin Sensitivity Corrective Coverage  - recommend continue with - Humalog Normal Insulin Sensitivity Corrective Coverage    Recent Glucose Results:   Lab Results   Component Value Date/Time     (H) 03/18/2019 01:20 AM    GLUCPOC 163 (H) 03/18/2019 07:29 AM     Renetta Parker MS, RN, CDE  Glycemic Control Team  [phone]  Pager [phone] (M-TH 8:00-4:30P)  *After Hours pager [phone]

## 2019-03-18 NOTE — ED NOTES
TRANSFER - OUT REPORT:    Verbal report given to KOURTNEY Logan(name) on Carlos Barroso  being transferred to ICU(unit) for routine progression of care       Report consisted of patients Situation, Background, Assessment and   Recommendations(SBAR). Information from the following report(s) SBAR, ED Summary, Intake/Output, MAR, Recent Results and Cardiac Rhythm NSR was reviewed with the receiving nurse. Lines:   Peripheral IV 03/18/19 Right Forearm (Active)   Site Assessment Clean, dry, & intact 3/18/2019  1:34 AM   Phlebitis Assessment 0 3/18/2019  1:34 AM   Infiltration Assessment 0 3/18/2019  1:34 AM   Dressing Status Clean, dry, & intact 3/18/2019  1:34 AM   Alcohol Cap Used Yes 3/18/2019  1:34 AM        Opportunity for questions and clarification was provided.       Patient transported with:   Monitor  Registered Nurse   RT CARRION

## 2019-03-18 NOTE — PROGRESS NOTES
[address] [facility] [phone] (COPE)    Patient Name: Ruben Leong  YOB: 1960    Reason for Consult: goals of care  Requesting Provider: Dr. Camilo Reed   Primary Care Physician: Yon Benjamin MD     SUMMARY:   Ruben Leong is a 59 y.o. with a past history of COPD, JIM, CHF, CKD, cocaine abuse presents, who was admitted on 3/18/2019 from home with a diagnosis of Acute on chronic hypoxic hypercapnia respiratory failure, COPD exacerbation, HTN  Chronic systolic CHF, DM. Current medical issues leading to Palliative Medicine involvement include: worsening respiratory status. Palliative medicine consult noted and appreciated. PM team members Mariaelena MOMIN and this writer met with patient at bedside. He was initially alert and oriented and very talkative. PM team was attempting to clarify goals of care with Mr. Elyse Jay, however he very quickly became  lethargic requiring the BIPAP again. PM team left to allow him to recover his respiratory status. We will return at another time to try to establish goals of care. RECOMMENDATIONS:   1. Continue current care   2. continue attempts at clarification of code status   3. Currently remains full code, would recommend DNI if patient agrees     GOALS OF CARE / TREATMENT PREFERENCES:     GOALS OF CARE:  Patient/Health Care Proxy Stated Goals:  Other (comment)(I just want to breathe better)    TREATMENT PREFERENCES:   Code Status: Prior    Advance Care Planning:    Advance Care Planning 3/18/2019   Patient's Healthcare Decision Maker is: Legal Next of Kin   Primary Decision Maker Name -   Primary Decision [facility] Phone Number -   Primary Decision Maker Relationship to Patient -   Confirm Advance Directive None   Patient Would Like to Complete Advance Directive No       Medical Interventions: Full interventions   Other Instructions: needs further discussion regarding code status and intubation        CLINICAL ASSESSMENT:   Palliative Performance Scale (PPS):  PPS: 50    Modified ESAS Completed by: provider     Drowsiness: 6     Pain: 0   Anxiety: 3 Nausea: 0     Dyspnea: 7     Constipation: No           Clinical Pain Assessment (nonverbal scale for severity on nonverbal patients):   Clinical Pain Assessment  Severity: 0     Activity (Movement): Lying quietly, normal position     PSYCHOSOCIAL/SPIRITUAL ASSESSMENT:   Palliative IDT has assessed this patient for cultural preferences / practices and a referral made as appropriate to needs (Cultural Services, Patient Advocacy, Ethics, etc.)    Any spiritual / Taoist concerns:  [] Yes /  [x] No    Caregiver Burnout:  [] Yes /  [x] No /  [] No Caregiver Present      Anticipatory grief assessment:   [x] Normal  / [] Maladaptive

## 2019-03-18 NOTE — PROGRESS NOTES
Hospitalist Progress Note    Patient: Meseret Gaspar MRN: [id number]  [facility]: [id number]    YOB: 1960  Age: 59 y.o.   Sex: male    DOA: 3/18/2019 LOS:  LOS: 0 days          Seen on rounds  Just admitted this am  Stable off bipap, in chair, talking on phone, no issues noted by nurse  Continue plan of care already outlined  Patient Active Problem List   Diagnosis Code    COPD exacerbation ([facility] J44.1    Type II diabetes mellitus with peripheral autonomic neuropathy ([facility] E11.43    Hypertension I10    JIM (obstructive sleep apnea) G47.33    Hypoxia R09.02    Morbid obesity ([facility] E66.01    Chest pain R07.9    Acute on chronic combined systolic and diastolic ACC/AHA stage C congestive heart failure (HCC) I50.43    CHF (congestive heart failure) ([facility]) I50.9    Acute kidney injury ([facility] N17.9    COPD (chronic obstructive pulmonary disease) ([facility] J44.9    Acute on chronic respiratory failure (HCC) J96.20    Cocaine abuse ([facility] F14.10    SOB (shortness of breath) R06.02    Acute respiratory distress R06.03    Hypertensive emergency I16.1    Acute exacerbation of CHF (congestive heart failure) ([facility]) I50.9    COPD with exacerbation (HCC) J44.1    Respiratory failure with hypoxia and hypercapnia (HCC) J96.91, J96.92    Sleep apnea G47.30    Acute respiratory failure with hypoxia and hypercapnia (HCC) J96.01, J96.02
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
## 2019-03-18 NOTE — PROGRESS NOTES
03/18/19 0134   CPAP/BIPAP   CPAP/BIPAP Start/Stop On   Device Mode AVAP   AVAP Set Resp Rate 12   AVAP Set VT (ml) 450   Bio-Med ID # [id number]   Mask Type and Size Full face; Medium   Skin Condition INTACT   PIP Observed 11 cm H20   IPAP (cm H2O) (Pmin 10/ Pmax 20)   EPAP (cm H2O) 6 cm H2O   Inspiratory Time (sec) 0.9 seconds   Vt Spont (ml) 450 ml   Ve Observed (l/min) 8.3 l/min   Backup Rate 10   Total RR (Spontaneous) 26 breaths per minute   Insp Rise Time (sec) 2   Leak (Estimated) 30 L/min   Pt's Home Machine No   Biomedical Check Performed Yes   Settings Verified Yes     7190- Patient arrived via EMS with c/o shortness of breath. Patient placed on bipap via MD order. Suction is set up at the bedside and working appropriately. 0461- ABG obtained via MD order. 0207- FIO2 titrated to 25% s/p ABG. Sats 100%    0355- Patient transferred to ICU on 3LNC. Patient does not wish to be placed back on bipap.  Sats 100%

## 2019-03-19 ENCOUNTER — APPOINTMENT (OUTPATIENT)
Dept: VASCULAR SURGERY | Age: 59
DRG: 133 | End: 2019-03-19
Attending: INTERNAL MEDICINE
Payer: MEDICAID

## 2019-03-19 LAB
ANION GAP SERPL CALC-SCNC: 5 MMOL/L (ref 3–18)
ARTERIAL PATENCY WRIST A: ABNORMAL
BASE EXCESS BLD CALC-SCNC: 8 MMOL/L
BDY SITE: ABNORMAL
BODY TEMPERATURE: 97.6
BUN SERPL-MCNC: 35 MG/DL (ref 7–18)
BUN/CREAT SERPL: 27 (ref 12–20)
CALCIUM SERPL-MCNC: 8.5 MG/DL (ref 8.5–10.1)
CHLORIDE SERPL-SCNC: 100 MMOL/L (ref 100–108)
CO2 SERPL-SCNC: 32 MMOL/L (ref 21–32)
CREAT SERPL-MCNC: 1.28 MG/DL (ref 0.6–1.3)
ERYTHROCYTE [DISTWIDTH] IN BLOOD BY AUTOMATED COUNT: 15.1 % (ref 11.6–14.5)
GAS FLOW.O2 O2 DELIVERY SYS: ABNORMAL L/MIN
GAS FLOW.O2 SETTING OXYMISER: 4 L/M
GLUCOSE BLD STRIP.AUTO-MCNC: 147 MG/DL (ref 70–110)
GLUCOSE BLD STRIP.AUTO-MCNC: 180 MG/DL (ref 70–110)
GLUCOSE BLD STRIP.AUTO-MCNC: 187 MG/DL (ref 70–110)
GLUCOSE BLD STRIP.AUTO-MCNC: 194 MG/DL (ref 70–110)
GLUCOSE SERPL-MCNC: 205 MG/DL (ref 74–99)
HCO3 BLD-SCNC: 33.1 MMOL/L (ref 22–26)
HCT VFR BLD AUTO: 45.2 % (ref 36–48)
HGB BLD-MCNC: 14.1 G/DL (ref 13–16)
MCH RBC QN AUTO: 28.7 PG (ref 24–34)
MCHC RBC AUTO-ENTMCNC: 31.2 G/DL (ref 31–37)
MCV RBC AUTO: 91.9 FL (ref 74–97)
O2/TOTAL GAS SETTING VFR VENT: 0.36 %
PCO2 BLD: 55.1 MMHG (ref 35–45)
PH BLD: 7.38 [PH] (ref 7.35–7.45)
PLATELET # BLD AUTO: 154 K/UL (ref 135–420)
PMV BLD AUTO: 12.6 FL (ref 9.2–11.8)
PO2 BLD: 84 MMHG (ref 80–100)
POTASSIUM SERPL-SCNC: 4.6 MMOL/L (ref 3.5–5.5)
RBC # BLD AUTO: 4.92 M/UL (ref 4.7–5.5)
SAO2 % BLD: 96 % (ref 92–97)
SERVICE CMNT-IMP: ABNORMAL
SODIUM SERPL-SCNC: 137 MMOL/L (ref 136–145)
SPECIMEN TYPE: ABNORMAL
TOTAL RESP. RATE, ITRR: 22
TSH SERPL DL<=0.05 MIU/L-ACNC: 0.11 UIU/ML (ref 0.36–3.74)
WBC # BLD AUTO: 12.8 K/UL (ref 4.6–13.2)

## 2019-03-19 PROCEDURE — 74011250636 HC RX REV CODE- 250/636: Performed by: HOSPITALIST

## 2019-03-19 PROCEDURE — 82962 GLUCOSE BLOOD TEST: CPT

## 2019-03-19 PROCEDURE — 85027 COMPLETE CBC AUTOMATED: CPT

## 2019-03-19 PROCEDURE — 65660000000 HC RM CCU STEPDOWN

## 2019-03-19 PROCEDURE — 94760 N-INVAS EAR/PLS OXIMETRY 1: CPT

## 2019-03-19 PROCEDURE — 94640 AIRWAY INHALATION TREATMENT: CPT

## 2019-03-19 PROCEDURE — 36415 COLL VENOUS BLD VENIPUNCTURE: CPT

## 2019-03-19 PROCEDURE — 77010033678 HC OXYGEN DAILY

## 2019-03-19 PROCEDURE — 82803 BLOOD GASES ANY COMBINATION: CPT

## 2019-03-19 PROCEDURE — 74011250637 HC RX REV CODE- 250/637: Performed by: HOSPITALIST

## 2019-03-19 PROCEDURE — 84443 ASSAY THYROID STIM HORMONE: CPT

## 2019-03-19 PROCEDURE — 74011000250 HC RX REV CODE- 250: Performed by: HOSPITALIST

## 2019-03-19 PROCEDURE — 93970 EXTREMITY STUDY: CPT

## 2019-03-19 PROCEDURE — 77030013140 HC MSK NEB VYRM -A

## 2019-03-19 PROCEDURE — 36600 WITHDRAWAL OF ARTERIAL BLOOD: CPT

## 2019-03-19 PROCEDURE — 74011636637 HC RX REV CODE- 636/637: Performed by: HOSPITALIST

## 2019-03-19 PROCEDURE — 80048 BASIC METABOLIC PNL TOTAL CA: CPT

## 2019-03-19 PROCEDURE — 74011250636 HC RX REV CODE- 250/636: Performed by: INTERNAL MEDICINE

## 2019-03-19 RX ORDER — OXYCODONE AND ACETAMINOPHEN 5; 325 MG/1; MG/1
1 TABLET ORAL
Status: DISCONTINUED | OUTPATIENT
Start: 2019-03-19 | End: 2019-03-20 | Stop reason: HOSPADM

## 2019-03-19 RX ORDER — GABAPENTIN 300 MG/1
300 CAPSULE ORAL 3 TIMES DAILY
Status: DISCONTINUED | OUTPATIENT
Start: 2019-03-19 | End: 2019-03-19

## 2019-03-19 RX ADMIN — ATORVASTATIN CALCIUM 40 MG: 20 TABLET, FILM COATED ORAL at 08:43

## 2019-03-19 RX ADMIN — AMLODIPINE BESYLATE 10 MG: 5 TABLET ORAL at 08:43

## 2019-03-19 RX ADMIN — CEFTRIAXONE 1 G: 1 INJECTION, POWDER, FOR SOLUTION INTRAMUSCULAR; INTRAVENOUS at 08:42

## 2019-03-19 RX ADMIN — FUROSEMIDE 40 MG: 40 TABLET ORAL at 08:43

## 2019-03-19 RX ADMIN — INSULIN LISPRO 2 UNITS: 100 INJECTION, SOLUTION INTRAVENOUS; SUBCUTANEOUS at 22:06

## 2019-03-19 RX ADMIN — PANTOPRAZOLE SODIUM 40 MG: 40 TABLET, DELAYED RELEASE ORAL at 08:44

## 2019-03-19 RX ADMIN — METHYLPREDNISOLONE SODIUM SUCCINATE 40 MG: 40 INJECTION, POWDER, FOR SOLUTION INTRAMUSCULAR; INTRAVENOUS at 14:24

## 2019-03-19 RX ADMIN — ASPIRIN 81 MG: 81 TABLET ORAL at 08:43

## 2019-03-19 RX ADMIN — CLONIDINE HYDROCHLORIDE 0.2 MG: 0.1 TABLET ORAL at 15:25

## 2019-03-19 RX ADMIN — IPRATROPIUM BROMIDE AND ALBUTEROL SULFATE 3 ML: .5; 3 SOLUTION RESPIRATORY (INHALATION) at 11:14

## 2019-03-19 RX ADMIN — INSULIN LISPRO 2 UNITS: 100 INJECTION, SOLUTION INTRAVENOUS; SUBCUTANEOUS at 06:42

## 2019-03-19 RX ADMIN — METHYLPREDNISOLONE SODIUM SUCCINATE 40 MG: 40 INJECTION, POWDER, FOR SOLUTION INTRAMUSCULAR; INTRAVENOUS at 06:42

## 2019-03-19 RX ADMIN — CARVEDILOL 25 MG: 12.5 TABLET, FILM COATED ORAL at 16:06

## 2019-03-19 RX ADMIN — IPRATROPIUM BROMIDE AND ALBUTEROL SULFATE 3 ML: .5; 3 SOLUTION RESPIRATORY (INHALATION) at 07:14

## 2019-03-19 RX ADMIN — CARVEDILOL 25 MG: 12.5 TABLET, FILM COATED ORAL at 08:42

## 2019-03-19 RX ADMIN — INSULIN LISPRO 2 UNITS: 100 INJECTION, SOLUTION INTRAVENOUS; SUBCUTANEOUS at 16:46

## 2019-03-19 RX ADMIN — AMITRIPTYLINE HYDROCHLORIDE 50 MG: 50 TABLET, FILM COATED ORAL at 22:04

## 2019-03-19 RX ADMIN — SODIUM CHLORIDE 500 MG: 900 INJECTION, SOLUTION INTRAVENOUS at 09:07

## 2019-03-19 RX ADMIN — METHYLPREDNISOLONE SODIUM SUCCINATE 40 MG: 40 INJECTION, POWDER, FOR SOLUTION INTRAMUSCULAR; INTRAVENOUS at 22:07

## 2019-03-19 RX ADMIN — OXYCODONE AND ACETAMINOPHEN 1 TABLET: 5; 325 TABLET ORAL at 22:04

## 2019-03-19 RX ADMIN — HEPARIN SODIUM 5000 UNITS: 5000 INJECTION INTRAVENOUS; SUBCUTANEOUS at 08:42

## 2019-03-19 RX ADMIN — CLONIDINE HYDROCHLORIDE 0.2 MG: 0.1 TABLET ORAL at 08:44

## 2019-03-19 RX ADMIN — IPRATROPIUM BROMIDE AND ALBUTEROL SULFATE 3 ML: .5; 3 SOLUTION RESPIRATORY (INHALATION) at 00:21

## 2019-03-19 RX ADMIN — IPRATROPIUM BROMIDE AND ALBUTEROL SULFATE 3 ML: .5; 3 SOLUTION RESPIRATORY (INHALATION) at 15:42

## 2019-03-19 RX ADMIN — HYDRALAZINE HYDROCHLORIDE 10 MG: 20 INJECTION INTRAMUSCULAR; INTRAVENOUS at 00:29

## 2019-03-19 RX ADMIN — OXYCODONE AND ACETAMINOPHEN 1 TABLET: 5; 325 TABLET ORAL at 16:06

## 2019-03-19 RX ADMIN — IPRATROPIUM BROMIDE AND ALBUTEROL SULFATE 3 ML: .5; 3 SOLUTION RESPIRATORY (INHALATION) at 20:09

## 2019-03-19 RX ADMIN — HEPARIN SODIUM 5000 UNITS: 5000 INJECTION INTRAVENOUS; SUBCUTANEOUS at 15:25

## 2019-03-19 NOTE — PROGRESS NOTES
Neb tx given,pt very rude calling staff members names complaining about everything and barking out orders.

## 2019-03-19 NOTE — PROGRESS NOTES
@1900 pt taken over awake alert oriented x4 on 4L/nc. Denies pain at present. Vital signs fluctuates. Pt ambulates with steady gait. Voids via urinal.  Assessment done and charted in relevant flow sheets. Nursing management continues. @2200 pt continuously asking for food. Had a tray at bedside which he ate 100%, requested frozen dinner ate 100%. Requested 10 packs of gram crackers ate 100%. Requested 2 orange juice drank 100%. Pt was educated of his already elevated blood sugar and being on steroids  but insists on having what he request. Pt requested more juice was explained that the sugar content is not within his ordered diet and was offered diet coke or diet ginger-gerri which he accepted. Pt very non compliant with ordered diet. Observation continues. @2330 pt reassessed no new developments, Pt refused BIPAP,  Continues to demand gram crackers 4 gram crackers given to pt,care continues. @0026 RT at bedside pt refused BIPAP   @0230 pt HR in the 30's he was waken up and explained that his heart rate is in the 30's and he finally agreed to wear bipap. Bipap placed on pt observation continues. @0300 pt reassessed no changes. Pt demanding gram crackers and juices ,explained   @0630 BIPAP is off pt is back on 4L/nc. RT at bedside explaining the importance of BIPAP. Care continues. @9211 Bedside and Verbal shift change report given to Melissa Wright (oncoming nurse) by Safia Li (offgoing nurse). Report included the following information SBAR, Kardex, Intake/Output, MAR, Recent Results, Med Rec Status and Alarm Parameters .

## 2019-03-19 NOTE — DIABETES MGMT
GLYCEMIC CONTROL PROGRESS NOTE: 
 
- discussed in rounds, known h/o T2DM, HbA1c within recommended range for age + comorbids on oral home regimen - Solumedrol 40 mg Q 8hrs, steroid induced hyperglycemia - BG in target range ICU: 140-180 mg/dL - TDD = 14 units - Humalog Normal Insulin Sensitivity Corrective Coverage 
- recommend continue with - Humalog Normal Insulin Sensitivity Corrective Coverage, pt may benefit from mealtime insulin while on steroids *Humalog 4 units qac Recent Glucose Results:  
Lab Results Component Value Date/Time  (H) 03/19/2019 06:50 AM  
 GLUCPOC 187 (H) 03/19/2019 06:30 AM  
 GLUCPOC 224 (H) 03/18/2019 08:57 PM  
 GLUCPOC 283 (H) 03/18/2019 04:41 PM  
 
Jacquie Pressley MS, RN, CDE Glycemic Control Team 
[phone] Pager [phone] (M-TH 8:00-4:30P) *After Hours pager [phone]

## 2019-03-19 NOTE — PROGRESS NOTES
PM team attempted twice to initiate consult with patient. First attempt pt was preoccupied with RT receiving breathing treatment then was going to finish lunch. Second attempt pt had just been transferred to 3N and was getting settled into room. He also was repeatedly in restroom. PM team will follow up tomorrow with plan to establish goals of care. LILIANE [facility]

## 2019-03-19 NOTE — PROGRESS NOTES
Patient refused to wear BIPAP after multiple attempts to reason with him regarding the benefits as well as risks if he doesn't. Marcella Garcia

## 2019-03-19 NOTE — PROGRESS NOTES
0700 Bedside shift change report received from Elda Moreira RN. Report included the following information SBAR, Kardex, ED Summary, Intake/Output, MAR, Recent Results, Cardiac Rhythm SB and Alarm Parameters . 0800 Assessment complete    1030  MDR completed with critical care team.  Plan of care discussed and orders received.     1200 Reassessment complete

## 2019-03-19 NOTE — ROUTINE PROCESS
TRANSFER - IN REPORT: 
 
Verbal report received from Luz Gabriel RN(name) on Mamie Cartagena  being received from ICU(unit) for routine progression of care Report consisted of patients Situation, Background, Assessment and  
Recommendations(SBAR). Information from the following report(s) SBAR, Kardex, Intake/Output and MAR was reviewed with the receiving nurse. Opportunity for questions and clarification was provided. Assessment completed upon patients arrival to unit and care assumed.

## 2019-03-19 NOTE — ROUTINE PROCESS
Bedside and Verbal shift change report given to EBONY Cannon (oncoming nurse) by PORFIRIO Hubbard (offgoing nurse). Report included the following information SBAR, Kardex, Intake/Output and MAR.
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
## 2019-03-19 NOTE — PROGRESS NOTES
Cm met with pt at bedside,explained cm role as d/c planner,reports from staff that pt has been very rude to staff, after explaining cm role and possible d/c recommendations home health etc.. Pt declined stated he uses [facility] and they provide everything he needs, pt does use home 02 when asked DME company name pt loudly stated\"I DON'T KNOW'. At this time pt  Remains to be rude when being spoken to, cm will attempt to speak with pt at another time when he's calm, bedside nurse aware.

## 2019-03-19 NOTE — PROGRESS NOTES
Hospitalist Progress Note Patient: Christie Maharaj MRN: [id number]  [facility]: [id number] YOB: 1960  Age: 59 y.o. Sex: male DOA: 3/18/2019 LOS:  LOS: 1 day Assessment/Plan Patient Active Problem List  
Diagnosis Code  COPD exacerbation ([facility] J44.1  Type II diabetes mellitus with peripheral autonomic neuropathy ([facility]) E11.43  
 Hypertension I10  
 JIM (obstructive sleep apnea) G47.33  
 Hypoxia R09.02  
 Morbid obesity ([facility]) E66.01  
 Chest pain R07.9  Acute on chronic combined systolic and diastolic ACC/AHA stage C congestive heart failure ([facility]) I50.43  
 CHF (congestive heart failure) ([facility]) I50.9  Acute kidney injury ([facility] N17.9  COPD (chronic obstructive pulmonary disease) ([facility]) J44.9  Acute on chronic respiratory failure ([facility]) J96.20  Cocaine abuse ([facility]) F14.10  
 SOB (shortness of breath) R06.02  
 Acute respiratory distress R06.03  
 Hypertensive emergency I16.1  Acute exacerbation of CHF (congestive heart failure) ([facility]) I50.9  COPD with exacerbation ([facility] J44.1  Respiratory failure with hypoxia and hypercapnia ([facility]) J96.91, J96.92  
 Sleep apnea G47.30  Acute respiratory failure with hypoxia and hypercapnia ([facility]) J96.01, J96.02  
  
 
60 yo male admitted for COPD exacerbation. 
 
 
  
Resp - Acute on chronic hypoxic hypercapnic respiratory failure - off BiPAP, secondary to COPD exacerbation. COPD exacerbation Intravenous steroids. Inhaled short acting beta 2 agonist and anticholinergics. Empiric antibiotics. Follow ABG 
JIM - not compliant with Trilogy at home,  
  
  
ID - no evidence of infiltrate on CXR. ANTIBIOTICS ceftriaxone, azithromycin.  
  
  
[facility] - Monitor HD. HTN - better controlled. Continue on home medications. Chronic systolic CHF - continue with diuresis Last echo 03/2018 with EF of 45% 
  Heme/onc - Follow H&H, plts.  
  
Renal - Trend BUN, Cr, follow I/O,  Check and replace Mg, K, phos. CKD - stage 3, monitor renal function 
  
Endocrine -   
DM - diabetic diet, start on SSI. 
  
GI - diabetic diet. UDS positive for cocaine 
  
Prophylaxis - DVT: heparin, GI: protonix Disposition : 1-2 days Review of systems General: No fevers or chills. Cardiovascular: No chest pain or pressure. No palpitations. Pulmonary: see above. Gastrointestinal: No nausea, vomiting. Physical Exam: 
General: Awake, cooperative, no acute distress   
HEENT: NC, Atraumatic. PERRLA, anicteric sclerae. Lungs: CTA Bilaterally. No Wheezing/Rhonchi/Rales. Heart:  S1 S2,  No murmur, No Rubs, No Gallops Abdomen: Soft, Non distended, Non tender.  +Bowel sounds, Extremities: No c/c/e Psych:   Not anxious or agitated. Neurologic:  No acute neurological deficit. Vital signs/Intake and Output: 
Visit Vitals BP (!) 148/120 Pulse 67 Temp 97.4 °F (36.3 °C) Resp 15 Ht 5' 9\" (1.753 m) Wt (!) 164.2 kg (361 lb 15.9 oz) SpO2 100% BMI 53.46 kg/m² Current Shift:  03/19 0701 - 03/19 1900 In: 500 [P.O.:500] Out: 6268 [SCWZP:3470] Last three shifts:  03/17 1901 - 03/19 0700 In: -  
Out: 4250 [HIIJZ:9613] Labs: Results:  
   
Chemistry Recent Labs  
  03/19/19 
0650 03/18/19 
0120 * 110*  143  
K 4.6 4.4  
 107 CO2 32 32 BUN 35* 32* CREA 1.28 1.47* CA 8.5 8.8 AGAP 5 4 BUCR 27* 22* CBC w/Diff Recent Labs  
  03/19/19 
0650 03/18/19 
0120 WBC 7.6 12.8 RBC 4.92 5.06  
HGB 14.1 14.6 HCT 45.2 47.1  170 GRANS  --  69  
LYMPH  --  22 EOS  --  3 Cardiac Enzymes Recent Labs  
  03/18/19 
1720 03/18/19 
1046 CPK 67 78 CKND1 2.8 2.7 Coagulation No results for input(s): PTP, INR, APTT in the last 72 hours. No lab exists for component: INREXT Lipid Panel Lab Results Component Value Date/Time  Cholesterol, total 196 01/25/2018 02:51 AM  
 HDL Cholesterol 64 (H) 01/25/2018 02:51 AM  
 LDL, calculated 121.2 (H) 01/25/2018 02:51 AM  
 VLDL, calculated 10.8 01/25/2018 02:51 AM  
 Triglyceride 54 01/25/2018 02:51 AM  
 CHOL/HDL Ratio 3.1 01/25/2018 02:51 AM  
  
BNP No results for input(s): BNPP in the last 72 hours. Liver Enzymes No results for input(s): TP, ALB, TBIL, AP, SGOT, GPT in the last 72 hours. No lab exists for component: DBIL Thyroid Studies Lab Results Component Value Date/Time TSH 0.11 (L) 03/19/2019 06:50 AM  
    
Procedures/imaging: see electronic medical records for all procedures/Xrays and details which were not copied into this note but were reviewed prior to creation of Plan

## 2019-03-20 VITALS
DIASTOLIC BLOOD PRESSURE: 96 MMHG | HEIGHT: 69 IN | BODY MASS INDEX: 46.65 KG/M2 | RESPIRATION RATE: 20 BRPM | SYSTOLIC BLOOD PRESSURE: 175 MMHG | TEMPERATURE: 97.7 F | OXYGEN SATURATION: 99 % | HEART RATE: 63 BPM | WEIGHT: 315 LBS

## 2019-03-20 LAB
ANION GAP SERPL CALC-SCNC: 5 MMOL/L (ref 3–18)
BUN SERPL-MCNC: 43 MG/DL (ref 7–18)
BUN/CREAT SERPL: 28 (ref 12–20)
CALCIUM SERPL-MCNC: 8.5 MG/DL (ref 8.5–10.1)
CHLORIDE SERPL-SCNC: 97 MMOL/L (ref 100–108)
CO2 SERPL-SCNC: 31 MMOL/L (ref 21–32)
CREAT SERPL-MCNC: 1.55 MG/DL (ref 0.6–1.3)
ERYTHROCYTE [DISTWIDTH] IN BLOOD BY AUTOMATED COUNT: 15.1 % (ref 11.6–14.5)
GLUCOSE BLD STRIP.AUTO-MCNC: 146 MG/DL (ref 70–110)
GLUCOSE BLD STRIP.AUTO-MCNC: 209 MG/DL (ref 70–110)
GLUCOSE SERPL-MCNC: 270 MG/DL (ref 74–99)
HCT VFR BLD AUTO: 45.6 % (ref 36–48)
HGB BLD-MCNC: 14.4 G/DL (ref 13–16)
MCH RBC QN AUTO: 29 PG (ref 24–34)
MCHC RBC AUTO-ENTMCNC: 31.6 G/DL (ref 31–37)
MCV RBC AUTO: 91.8 FL (ref 74–97)
PLATELET # BLD AUTO: 136 K/UL (ref 135–420)
PMV BLD AUTO: 13.3 FL (ref 9.2–11.8)
POTASSIUM SERPL-SCNC: 4.6 MMOL/L (ref 3.5–5.5)
RBC # BLD AUTO: 4.97 M/UL (ref 4.7–5.5)
SODIUM SERPL-SCNC: 133 MMOL/L (ref 136–145)
T3FREE SERPL-MCNC: 2.2 PG/ML (ref 2.18–3.98)
T4 FREE SERPL-MCNC: 0.8 NG/DL (ref 0.7–1.5)
WBC # BLD AUTO: 14.8 K/UL (ref 4.6–13.2)

## 2019-03-20 PROCEDURE — 36415 COLL VENOUS BLD VENIPUNCTURE: CPT

## 2019-03-20 PROCEDURE — 74011250636 HC RX REV CODE- 250/636: Performed by: HOSPITALIST

## 2019-03-20 PROCEDURE — 74011000250 HC RX REV CODE- 250: Performed by: HOSPITALIST

## 2019-03-20 PROCEDURE — 94640 AIRWAY INHALATION TREATMENT: CPT

## 2019-03-20 PROCEDURE — 84481 FREE ASSAY (FT-3): CPT

## 2019-03-20 PROCEDURE — 94760 N-INVAS EAR/PLS OXIMETRY 1: CPT

## 2019-03-20 PROCEDURE — 82962 GLUCOSE BLOOD TEST: CPT

## 2019-03-20 PROCEDURE — 77010033678 HC OXYGEN DAILY

## 2019-03-20 PROCEDURE — 74011250637 HC RX REV CODE- 250/637: Performed by: HOSPITALIST

## 2019-03-20 PROCEDURE — 84439 ASSAY OF FREE THYROXINE: CPT

## 2019-03-20 PROCEDURE — 85027 COMPLETE CBC AUTOMATED: CPT

## 2019-03-20 PROCEDURE — 84480 ASSAY TRIIODOTHYRONINE (T3): CPT

## 2019-03-20 PROCEDURE — 80048 BASIC METABOLIC PNL TOTAL CA: CPT

## 2019-03-20 RX ORDER — AMLODIPINE BESYLATE 10 MG/1
10 TABLET ORAL DAILY
Qty: 30 TAB | Refills: 2 | Status: SHIPPED | OUTPATIENT
Start: 2019-03-21 | End: 2020-04-22

## 2019-03-20 RX ADMIN — FUROSEMIDE 40 MG: 40 TABLET ORAL at 08:16

## 2019-03-20 RX ADMIN — ASPIRIN 81 MG: 81 TABLET ORAL at 08:13

## 2019-03-20 RX ADMIN — OXYCODONE AND ACETAMINOPHEN 1 TABLET: 5; 325 TABLET ORAL at 04:05

## 2019-03-20 RX ADMIN — AMLODIPINE BESYLATE 10 MG: 5 TABLET ORAL at 08:15

## 2019-03-20 RX ADMIN — METHYLPREDNISOLONE SODIUM SUCCINATE 40 MG: 40 INJECTION, POWDER, FOR SOLUTION INTRAMUSCULAR; INTRAVENOUS at 06:20

## 2019-03-20 RX ADMIN — CLONIDINE HYDROCHLORIDE 0.2 MG: 0.1 TABLET ORAL at 08:14

## 2019-03-20 RX ADMIN — IPRATROPIUM BROMIDE AND ALBUTEROL SULFATE 3 ML: .5; 3 SOLUTION RESPIRATORY (INHALATION) at 07:04

## 2019-03-20 RX ADMIN — CARVEDILOL 25 MG: 12.5 TABLET, FILM COATED ORAL at 08:16

## 2019-03-20 RX ADMIN — IPRATROPIUM BROMIDE AND ALBUTEROL SULFATE 3 ML: .5; 3 SOLUTION RESPIRATORY (INHALATION) at 00:02

## 2019-03-20 RX ADMIN — CEFTRIAXONE 1 G: 1 INJECTION, POWDER, FOR SOLUTION INTRAMUSCULAR; INTRAVENOUS at 08:17

## 2019-03-20 RX ADMIN — HEPARIN SODIUM 5000 UNITS: 5000 INJECTION INTRAVENOUS; SUBCUTANEOUS at 00:53

## 2019-03-20 RX ADMIN — ATORVASTATIN CALCIUM 40 MG: 20 TABLET, FILM COATED ORAL at 08:14

## 2019-03-20 RX ADMIN — CLONIDINE HYDROCHLORIDE 0.2 MG: 0.1 TABLET ORAL at 00:53

## 2019-03-20 RX ADMIN — IPRATROPIUM BROMIDE AND ALBUTEROL SULFATE 3 ML: .5; 3 SOLUTION RESPIRATORY (INHALATION) at 04:15

## 2019-03-20 RX ADMIN — HEPARIN SODIUM 5000 UNITS: 5000 INJECTION INTRAVENOUS; SUBCUTANEOUS at 08:17

## 2019-03-20 RX ADMIN — PANTOPRAZOLE SODIUM 40 MG: 40 TABLET, DELAYED RELEASE ORAL at 08:15

## 2019-03-20 NOTE — ROUTINE PROCESS
Discharge instructions reviewed with the patient. Patient verbalized understanding. All questions answered. IV discontinued, no redness, swelling or pain noted. Patient requesting LYFT. Patient discharged off the unit via wheelchair . Patient armband removed and shredded.

## 2019-03-20 NOTE — ACP (ADVANCE CARE PLANNING)
Patient completed AMD to only name a medical healthcare agent. He named sister Robyn Julien as primary healthcare agent. Patient wasn't open to discussing end of life wishes or organ donation. He was fixated on complaints regarding his care.      Code status: FULL    MPOA: Robyn Julien (sister)  [phone]

## 2019-03-20 NOTE — PROGRESS NOTES
Verbal bedside received from Gema Whiteside off going nurse. Assumed patient care, bed in low position, call light within reach, white board updated. 2000 Patient assessment completed. Patient very rude and yelling about his phone. Patient asking for food, given snacks. 0600 Patient kept asking for food through the night. Was given snacks and frozen diners but kept asking for more. Nurse supervisor brought some more frozen diner for the patient. Still asking for food this am.  
 
Bedside and Verbal shift change report given to Isabel NOLASCO RN (oncoming nurse) by Bobbi Rene (offgoing nurse). Report included the following information SBAR, Kardex and MAR.

## 2019-03-20 NOTE — DISCHARGE INSTRUCTIONS
Patient Education     Chronic Obstructive Pulmonary Disease (COPD) Flare-Ups: Care Instructions  Your Care Instructions    Chronic obstructive pulmonary disease (COPD) is a lung disease that makes it hard to breathe. It is caused by damage to the lungs over many years, usually from smoking. COPD is often a mix of two diseases:  · Chronic bronchitis: The airways that carry air to the lungs (bronchial tubes) get inflamed and make a lot of mucus. This can narrow or block the airways. · Emphysema: In a healthy person, the tiny air sacs in the lungs are like balloons. As you breathe in and out, they get bigger and smaller to move air through your lungs. But with emphysema, these air sacs are damaged and lose their stretch. Less air gets in and out of the lungs. Many people with COPD have attacks called flare-ups or exacerbations. This is when your usual symptoms quickly get worse and stay worse. The doctor has checked you carefully. But problems can develop later. If you notice any problems or new symptoms, get medical treatment right away. Follow-up care is a key part of your treatment and safety. Be sure to make and go to all appointments, and call your doctor if you are having problems. It's also a good idea to know your test results and keep a list of the medicines you take. How can you care for yourself at home? · Be safe with medicines. Take your medicines exactly as prescribed. Call your doctor if you think you are having a problem with your medicine. You may be taking medicines such as:  ? Bronchodilators. These help open your airways and make breathing easier. ? Corticosteroids. These reduce airway inflammation. They may be given as pills, in a vein, or in an inhaled form. You may go home with pills in addition to an inhaler that you already use. · A spacer may help you get more inhaled medicine to your lungs. Ask your doctor or pharmacist if a spacer is right for you.  If it is, ask how to use it properly. · If your doctor prescribed antibiotics, take them as directed. Do not stop taking them just because you feel better. You need to take the full course of antibiotics. · If your doctor prescribed oxygen, use the flow rate your doctor has recommended. Do not change it without talking to your doctor first.  · Do not smoke. Smoking makes COPD worse. If you need help quitting, talk to your doctor about stop-smoking programs and medicines. These can increase your chances of quitting for good. When should you call for help? Call 911 anytime you think you may need emergency care. For example, call if:    · You have severe trouble breathing.    Call your doctor now or seek immediate medical care if:    · You have new or worse trouble breathing.     · Your coughing or wheezing gets worse.     · You cough up dark brown or bloody mucus (sputum).     · You have a new or higher fever.    Watch closely for changes in your health, and be sure to contact your doctor if:    · You notice more mucus or a change in the color of your mucus.     · You need to use your antibiotic or steroid pills.     · You do not get better as expected. Where can you learn more? Go to http://bhargav-mily.info/. Enter W053 in the search box to learn more about \"Chronic Obstructive Pulmonary Disease (COPD) Flare-Ups: Care Instructions. \"  Current as of: September 5, 2018  Content Version: 11.9  © 6349-8353 [employer]. Care instructions adapted under license by [employer] (which disclaims liability or warranty for this information). If you have questions about a medical condition or this instruction, always ask your healthcare professional. [place] [address] any warranty or liability for your use of this information.

## 2019-03-20 NOTE — PROGRESS NOTES
Transition of care: d/c home today Met with ela and Dr. Lisa Pisano at bedside. Patient to d/c home today. States he has his own trilogy. He does not want any other assistance from cm except a ride home. Lisa Morfin, ADT nurse will place lyft ride home for this patient. Patient refuses to provide any other information to CM as to his oxygen and if he needs [address] he will not answer any other questions Dr. Lisa Pisano addressed with patient that he has been at different hospitals every month since August and in January he was there twice and was just d/c from [facility] on 3/13/2019. Dr. Lisa Pisano addressed his cocaine usage state he knows his body and he is going to do what he wants when he wants and nobody is going to tell him what to do. Patient does have pcp and he does have medicaid. michel asked Dr. Lisa Pisano for prescription for percocet dr. Lisa Pisano discussed opoid use with him. He also encouraged him to loose weight. Patient states the medications is what causes him to gain weight. Patient was very rude and demanding and states' give me my papers now and set up for lyft to take me home' he refuses cm to set up any after dc appointments he states he will take care of this. informed him appointment set up for him as below Follow up with Bharat Landis MD on 4/5/2019 Specialty: Internal Medicine [address] [place] [address]  
[phone] Follow-up appointment @ 1:45 p.m. Care Management Interventions PCP Verified by CM: Yes 
Palliative Care Criteria Met (RRAT>21 & CHF Dx)?: Yes 
Palliative Consult Recommended?: Yes Transition of Care Consult (CM Consult): Discharge Planning Current Support Network: Relative's Home Confirm Follow Up Transport: (lyft) Plan discussed with Pt/Family/Caregiver: Yes Freedom of Choice Offered: Yes Discharge Location Discharge Placement: Home with family assistance

## 2019-03-20 NOTE — DISCHARGE SUMMARY
Discharge Summary    Patient: Berny Padron MRN: [id number]  CSN: [id number]    YOB: 1960  Age: 59 y.o.   Sex: male    DOA: 3/18/2019 LOS:  LOS: 2 days   Discharge Date:      Primary Care Provider:  Kourtney Oliva MD    Admission Diagnoses: Acute respiratory failure with hypoxia and hypercapnia ([facility] [J96.01, J96.02]    Discharge Diagnoses:    Problem List as of 3/20/2019 Date Reviewed: 7/8/2018          Codes Class Noted - Resolved    Acute respiratory failure with hypoxia and hypercapnia ([facility] ICD-10-CM: J96.01, J96.02  ICD-9-CM: 518.81  3/18/2019 - Present        Sleep apnea ICD-10-CM: G47.30  ICD-9-CM: 780.57  7/9/2018 - Present        Acute exacerbation of CHF (congestive heart failure) (HCC) ICD-10-CM: I50.9  ICD-9-CM: 428.0  7/8/2018 - Present        COPD with exacerbation ([facility] ICD-10-CM: J44.1  ICD-9-CM: 491.21  7/8/2018 - Present        Respiratory failure with hypoxia and hypercapnia (HCC) ICD-10-CM: J96.91, J96.92  ICD-9-CM: 518.81  7/8/2018 - Present        SOB (shortness of breath) ICD-10-CM: R06.02  ICD-9-CM: 786.05  5/11/2018 - Present        Acute respiratory distress ICD-10-CM: R06.03  ICD-9-CM: 518.82  5/11/2018 - Present        Hypertensive emergency ICD-10-CM: I16.1  ICD-9-CM: 401.9  5/11/2018 - Present        COPD (chronic obstructive pulmonary disease) ([facility] ICD-10-CM: J44.9  ICD-9-CM: 496  4/22/2018 - Present        Acute on chronic respiratory failure ([facility] ICD-10-CM: J96.20  ICD-9-CM: 518.84  4/22/2018 - Present        Cocaine abuse ([facility] ICD-10-CM: F14.10  ICD-9-CM: 305.60  4/22/2018 - Present        Acute kidney injury ([facility] ICD-10-CM: N17.9  ICD-9-CM: 584.9  3/7/2018 - Present        CHF (congestive heart failure) (HCC) (Chronic) ICD-10-CM: I50.9  ICD-9-CM: 428.0  3/3/2018 - Present        Chest pain ICD-10-CM: R07.9  ICD-9-CM: 786.50  1/25/2018 - Present        Acute on chronic combined systolic and diastolic ACC/AHA stage C congestive heart failure (HCC) (Chronic) ICD-10-CM: I50.43  ICD-9-CM: 428.43, 428.0  1/25/2018 - Present        COPD exacerbation ([facility] ICD-10-CM: J44.1  ICD-9-CM: 491.21  2/19/2013 - Present        Type II diabetes mellitus with peripheral autonomic neuropathy ([facility] ICD-10-CM: E11.43  ICD-9-CM: 250.60, 337.1  2/19/2013 - Present        Hypertension ICD-10-CM: I10  ICD-9-CM: 401.9  Unknown - Present        JIM (obstructive sleep apnea) ICD-10-CM: G47.33  ICD-9-CM: 327.23  2/19/2013 - Present        Hypoxia ICD-10-CM: R09.02  ICD-9-CM: 799.02  2/19/2013 - Present        Morbid obesity ([facility] ICD-10-CM: E66.01  ICD-9-CM: 278.01  2/19/2013 - Present              Discharge Medications:     Current Discharge Medication List      CONTINUE these medications which have CHANGED    Details   amLODIPine (NORVASC) 10 mg tablet Take 1 Tab by mouth daily. Qty: 30 Tab, Refills: 2         CONTINUE these medications which have NOT CHANGED    Details   amitriptyline (ELAVIL) 50 mg tablet Take 50 mg by mouth nightly. methocarbamol (ROBAXIN) 750 mg tablet Take  by mouth four (4) times daily. atorvastatin (LIPITOR) 40 mg tablet Take 40 mg by mouth daily. carvedilol (COREG) 25 mg tablet Take 25 mg by mouth two (2) times daily (with meals). aspirin delayed-release 81 mg tablet Take 81 mg by mouth daily. nitroglycerin (NITROSTAT) 0.4 mg SL tablet 0.4 mg by SubLINGual route every five (5) minutes as needed for Chest Pain. Up to 3 doses. gabapentin (NEURONTIN) 800 mg tablet Take  by mouth three (3) times daily. predniSONE (DELTASONE) 10 mg tablet Days 1 & 2: Take FOUR tabs. Days 3 & 4: Take THREE tabs. Days 5 & 6: Take TWO tabs. Days 7 & 8: Take ONE tab. Qty: 21 Tab, Refills: 0      fluticasone-vilanterol (BREO ELLIPTA) 100-25 mcg/dose inhaler Take 1 Puff by inhalation daily. Qty: 1 Inhaler, Refills: 0      cloNIDine HCl (CATAPRES) 0.2 mg tablet Take 1 Tab by mouth every eight (8) hours.   Qty: 90 Tab, Refills: 0      furosemide (LASIX) 40 mg tablet Take 1 Tab by mouth daily. Qty: 30 Tab, Refills: 0      hydrALAZINE (APRESOLINE) 25 mg tablet Take 1 Tab by mouth three (3) times daily. Qty: 90 Tab, Refills: 0      glipiZIDE (GLUCOTROL) 10 mg tablet Take 1 Tab by mouth two (2) times a day. Qty: 60 Tab, Refills: 0      spironolactone (ALDACTONE) 25 mg tablet Take 25 mg by mouth daily. albuterol (PROVENTIL HFA, VENTOLIN HFA) 90 mcg/actuation inhaler Take 1 Puff by inhalation. 1 puff in am and 1 puff in pm              Discharge Condition: Good      Consults: Pulmonary/Critical Care      PHYSICAL EXAM   Visit Vitals  BP (!) 175/96   Pulse 63   Temp 97.7 °F (36.5 °C)   Resp 20   Ht 5' 9\" (1.753 m)   Wt (!) 163.3 kg (360 lb)   SpO2 99%   BMI 53.16 kg/m²     General: Awake, cooperative, no acute distress    HEENT: NC, Atraumatic. PERRLA, EOMI. Anicteric sclerae. Lungs:  CTA Bilaterally. No Wheezing/Rhonchi/Rales. Heart:  Regular  rhythm,  No murmur, No Rubs, No Gallops  Abdomen: Soft, Non distended, Non tender. +Bowel sounds,   Extremities: No c/c/e  Psych:   Not anxious or agitated. Neurologic:  No acute neurological deficits. Admission HPI :   Matilde Green is a 59 y.o. male who has past history of COPD, JIM, CHF, ckd, cocaine abuse presents to ER with concerns of SOB for the past 3-4 days that is progressively getting worse. Associated with cough and wheezing. He denies any chest pain, fever/chills, N/V. He has multiple admissions to this hospital. He had 4 admissions at [facility] for SOB since he was last admitted to this hospital in July 2018. He reports that he was intubated last month. He has history of non compliance and he was discharged on trilogy last admission here. He also has history of cocaine abuse. In ER he is noted to be SOB, ABG showed elevated pCO2.  He was placed on BiPAP and admission requested        Hospital Course :   Patient initially admitted to ICU, he was seen and followed by pulmonary critical care, once his respiratory status improved, he was transferred to telemetry.     Acute hypoxemic/ hypercarbic respiratory failure due to COPD exacerbation  He was placed on BiPAP and his symptoms improved. He was weaned off oxygen and he is now stable at room air. He does have oxygen at home which he uses as needed       JIM - he was placed on BiPAP at night, he refused BiPAP intermittently. He was educated on importance of using the BiPAP at night. He has not been using BiPAP during hospitalization. He has Trilogy at home with settings of IPAP 20, EPAP 7 with back up rate of 10. Without PAP, he is at high risk of readmission as he does not use Trilogy at home.       COPD exac - Initially started on solumedrol, inhaled BD. His steroids switched to oral. Will discharge on prednisone.       Acute on chronic diastolic CHF -  -continued lasix 40 mg BID  Echo on 3/2018. ZY 15 :diastolic dysfunction  Increased filling pressure.          HTN - controlled on clonidine ,hydralazine , norvasc/spirolactone and lasix. Increased his norvasc to 10 mg.      Cocaine abuse - UDS positive for cocaine. Counseled on drug use, advised to join rehab program.    He has multiple hospitalizations in the past few months. He has 4 hospitalizations since this year at [facility].         Activity: Activity as tolerated    Diet: Diabetic Diet    Follow-up: PCP    Disposition: home    Minutes spent on discharge: 45       Labs: Results:       Chemistry Recent Labs     03/20/19  0313 03/19/19  0650 03/18/19  0120   * 205* 110*   * 137 143   K 4.6 4.6 4.4   CL 97* 100 107   CO2 31 32 32   BUN 43* 35* 32*   CREA 1.55* 1.28 1.47*   CA 8.5 8.5 8.8   AGAP 5 5 4   BUCR 28* 27* 22*      CBC w/Diff Recent Labs     03/20/19  0313 03/19/19  0650 03/18/19  0120   WBC 14.8* 12.8 12.8   RBC 4.97 4.92 5.06   HGB 14.4 14.1 14.6   HCT 45.6 45.2 47.1    154 170   GRANS  --   --  69   LYMPH  --   --  22   EOS  --   --  3      Cardiac Enzymes Recent Labs 03/18/19  1720 03/18/19  1046   CPK 67 78   CKND1 2.8 2.7      Coagulation No results for input(s): PTP, INR, APTT in the last 72 hours. No lab exists for component: INREXT    Lipid Panel Lab Results   Component Value Date/Time    Cholesterol, total 196 01/25/2018 02:51 AM    HDL Cholesterol 64 (H) 01/25/2018 02:51 AM    LDL, calculated 121.2 (H) 01/25/2018 02:51 AM    VLDL, calculated 10.8 01/25/2018 02:51 AM    Triglyceride 54 01/25/2018 02:51 AM    CHOL/HDL Ratio 3.1 01/25/2018 02:51 AM      BNP No results for input(s): BNPP in the last 72 hours. Liver Enzymes No results for input(s): TP, ALB, TBIL, AP, SGOT, GPT in the last 72 hours. No lab exists for component: DBIL   Thyroid Studies Lab Results   Component Value Date/Time    TSH 0.11 (L) 03/19/2019 06:50 AM            Significant Diagnostic Studies: Xr Chest Port    Result Date: 3/18/2019  --------------------------------------------------------------------------- <<<<<<<<<           [address] [facility]           >>>>>>>>> --------------------------------------------------------------------------- CLINICAL HISTORY:  Shortness of breath. COMPARISON EXAMINATIONS:  July 9, 2018. ---  SINGLE FRONTAL VIEW OF THE CHEST  --- There is some limitation related to large body habitus and AP portable technique. The cardiac silhouette is enlarged but stable. There is no focal consolidation or evidence for significant pleural effusion. No significant osseous abnormalities are identified.  --------------    Impression: -------------- Somewhat limited study. Note definite active pulmonary disease. No results found for this or any previous visit.         CC: Sera Whyte MD

## 2019-03-20 NOTE — PROGRESS NOTES
Problem: Falls - Risk of 
Goal: *Absence of Falls Document Chaz Manleys Fall Risk and appropriate interventions in the flowsheet. Outcome: Progressing Towards Goal 
Fall Risk Interventions: 
Mobility Interventions: Patient to call before getting OOB, Utilize walker, cane, or other assistive device, Utilize gait belt for transfers/ambulation Medication Interventions: Patient to call before getting OOB, Teach patient to arise slowly, Utilize gait belt for transfers/ambulation History of Falls Interventions: Evaluate medications/consider consulting pharmacy, Utilize gait belt for transfer/ambulation

## 2019-03-21 LAB — T3 SERPL-MCNC: 63 NG/DL (ref 71–180)

## 2019-05-14 LAB
ATRIAL RATE: 85 BPM
CALCULATED P AXIS, ECG09: 56 DEGREES
CALCULATED R AXIS, ECG10: -35 DEGREES
CALCULATED T AXIS, ECG11: 99 DEGREES
DIAGNOSIS, 93000: NORMAL
P-R INTERVAL, ECG05: 160 MS
Q-T INTERVAL, ECG07: 388 MS
QRS DURATION, ECG06: 92 MS
QTC CALCULATION (BEZET), ECG08: 461 MS
VENTRICULAR RATE, ECG03: 85 BPM

## 2019-06-30 ENCOUNTER — APPOINTMENT (OUTPATIENT)
Dept: GENERAL RADIOLOGY | Age: 59
End: 2019-06-30
Attending: PHYSICIAN ASSISTANT
Payer: MEDICAID

## 2019-06-30 ENCOUNTER — HOSPITAL ENCOUNTER (EMERGENCY)
Age: 59
Discharge: HOME | End: 2019-06-30
Attending: EMERGENCY MEDICINE
Payer: MEDICAID

## 2019-06-30 VITALS
BODY MASS INDEX: 50.62 KG/M2 | SYSTOLIC BLOOD PRESSURE: 140 MMHG | DIASTOLIC BLOOD PRESSURE: 82 MMHG | HEART RATE: 52 BPM | WEIGHT: 315 LBS | HEIGHT: 66 IN | OXYGEN SATURATION: 100 % | TEMPERATURE: 97.5 F | RESPIRATION RATE: 17 BRPM

## 2019-06-30 DIAGNOSIS — J44.1 COPD EXACERBATION (HCC): Primary | ICD-10-CM

## 2019-06-30 LAB
ALBUMIN SERPL-MCNC: 3.3 G/DL (ref 3.4–5)
ALBUMIN/GLOB SERPL: 1.1 {RATIO} (ref 0.8–1.7)
ALP SERPL-CCNC: 79 U/L (ref 45–117)
ALT SERPL-CCNC: 25 U/L (ref 16–61)
ANION GAP SERPL CALC-SCNC: 5 MMOL/L (ref 3–18)
AST SERPL-CCNC: 24 U/L (ref 15–37)
ATRIAL RATE: 52 BPM
BASOPHILS # BLD: 0 K/UL (ref 0–0.1)
BASOPHILS NFR BLD: 0 % (ref 0–2)
BILIRUB SERPL-MCNC: 0.3 MG/DL (ref 0.2–1)
BNP SERPL-MCNC: 831 PG/ML (ref 0–900)
BUN SERPL-MCNC: 28 MG/DL (ref 7–18)
BUN/CREAT SERPL: 19 (ref 12–20)
CALCIUM SERPL-MCNC: 8.5 MG/DL (ref 8.5–10.1)
CALCULATED P AXIS, ECG09: 61 DEGREES
CALCULATED R AXIS, ECG10: -13 DEGREES
CALCULATED T AXIS, ECG11: 118 DEGREES
CHLORIDE SERPL-SCNC: 108 MMOL/L (ref 100–108)
CK MB CFR SERPL CALC: 1.6 % (ref 0–4)
CK MB SERPL-MCNC: 1.9 NG/ML (ref 5–25)
CK SERPL-CCNC: 116 U/L (ref 39–308)
CO2 SERPL-SCNC: 29 MMOL/L (ref 21–32)
CREAT SERPL-MCNC: 1.51 MG/DL (ref 0.6–1.3)
DIAGNOSIS, 93000: NORMAL
DIFFERENTIAL METHOD BLD: ABNORMAL
EOSINOPHIL # BLD: 0.2 K/UL (ref 0–0.4)
EOSINOPHIL NFR BLD: 2 % (ref 0–5)
ERYTHROCYTE [DISTWIDTH] IN BLOOD BY AUTOMATED COUNT: 15.3 % (ref 11.6–14.5)
GLOBULIN SER CALC-MCNC: 3.1 G/DL (ref 2–4)
GLUCOSE SERPL-MCNC: 104 MG/DL (ref 74–99)
HCT VFR BLD AUTO: 44.1 % (ref 36–48)
HGB BLD-MCNC: 13.7 G/DL (ref 13–16)
LYMPHOCYTES # BLD: 1.5 K/UL (ref 0.9–3.6)
LYMPHOCYTES NFR BLD: 23 % (ref 21–52)
MAGNESIUM SERPL-MCNC: 2.1 MG/DL (ref 1.6–2.6)
MCH RBC QN AUTO: 28.3 PG (ref 24–34)
MCHC RBC AUTO-ENTMCNC: 31.1 G/DL (ref 31–37)
MCV RBC AUTO: 91.1 FL (ref 74–97)
MONOCYTES # BLD: 0.3 K/UL (ref 0.05–1.2)
MONOCYTES NFR BLD: 5 % (ref 3–10)
NEUTS SEG # BLD: 4.6 K/UL (ref 1.8–8)
NEUTS SEG NFR BLD: 70 % (ref 40–73)
P-R INTERVAL, ECG05: 174 MS
PLATELET # BLD AUTO: 161 K/UL (ref 135–420)
PMV BLD AUTO: 12.8 FL (ref 9.2–11.8)
POTASSIUM SERPL-SCNC: 4.5 MMOL/L (ref 3.5–5.5)
PROT SERPL-MCNC: 6.4 G/DL (ref 6.4–8.2)
Q-T INTERVAL, ECG07: 460 MS
QRS DURATION, ECG06: 102 MS
QTC CALCULATION (BEZET), ECG08: 427 MS
RBC # BLD AUTO: 4.84 M/UL (ref 4.7–5.5)
SODIUM SERPL-SCNC: 142 MMOL/L (ref 136–145)
TROPONIN I SERPL-MCNC: <0.02 NG/ML (ref 0–0.04)
VENTRICULAR RATE, ECG03: 52 BPM
WBC # BLD AUTO: 6.5 K/UL (ref 4.6–13.2)

## 2019-06-30 PROCEDURE — 77010033678 HC OXYGEN DAILY

## 2019-06-30 PROCEDURE — 83735 ASSAY OF MAGNESIUM: CPT

## 2019-06-30 PROCEDURE — 80053 COMPREHEN METABOLIC PANEL: CPT

## 2019-06-30 PROCEDURE — 82550 ASSAY OF CK (CPK): CPT

## 2019-06-30 PROCEDURE — 74011000250 HC RX REV CODE- 250: Performed by: PHYSICIAN ASSISTANT

## 2019-06-30 PROCEDURE — 83880 ASSAY OF NATRIURETIC PEPTIDE: CPT

## 2019-06-30 PROCEDURE — 71046 X-RAY EXAM CHEST 2 VIEWS: CPT

## 2019-06-30 PROCEDURE — 99285 EMERGENCY DEPT VISIT HI MDM: CPT

## 2019-06-30 PROCEDURE — 85025 COMPLETE CBC W/AUTO DIFF WBC: CPT

## 2019-06-30 PROCEDURE — 99284 EMERGENCY DEPT VISIT MOD MDM: CPT

## 2019-06-30 PROCEDURE — 71045 X-RAY EXAM CHEST 1 VIEW: CPT

## 2019-06-30 PROCEDURE — 94640 AIRWAY INHALATION TREATMENT: CPT

## 2019-06-30 PROCEDURE — 93005 ELECTROCARDIOGRAM TRACING: CPT

## 2019-06-30 RX ORDER — PREDNISONE 50 MG/1
50 TABLET ORAL DAILY
Qty: 3 TAB | Refills: 0 | Status: SHIPPED | OUTPATIENT
Start: 2019-06-30 | End: 2019-07-03

## 2019-06-30 RX ORDER — SODIUM CHLORIDE 0.9 % (FLUSH) 0.9 %
5-40 SYRINGE (ML) INJECTION AS NEEDED
Status: DISCONTINUED | OUTPATIENT
Start: 2019-06-30 | End: 2019-06-30 | Stop reason: HOSPADM

## 2019-06-30 RX ORDER — IPRATROPIUM BROMIDE AND ALBUTEROL SULFATE 2.5; .5 MG/3ML; MG/3ML
3 SOLUTION RESPIRATORY (INHALATION) ONCE
Status: COMPLETED | OUTPATIENT
Start: 2019-06-30 | End: 2019-06-30

## 2019-06-30 RX ORDER — SODIUM CHLORIDE 0.9 % (FLUSH) 0.9 %
5-40 SYRINGE (ML) INJECTION EVERY 8 HOURS
Status: DISCONTINUED | OUTPATIENT
Start: 2019-06-30 | End: 2019-06-30 | Stop reason: HOSPADM

## 2019-06-30 RX ADMIN — IPRATROPIUM BROMIDE AND ALBUTEROL SULFATE 3 ML: .5; 3 SOLUTION RESPIRATORY (INHALATION) at 11:14
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
## 2019-06-30 NOTE — DISCHARGE INSTRUCTIONS

## 2020-01-06 ENCOUNTER — HOSPITAL ENCOUNTER (EMERGENCY)
Age: 60
Discharge: HOME | End: 2020-01-06
Attending: EMERGENCY MEDICINE
Payer: MEDICAID

## 2020-01-06 ENCOUNTER — APPOINTMENT (OUTPATIENT)
Dept: CT IMAGING | Age: 60
End: 2020-01-06
Attending: EMERGENCY MEDICINE
Payer: MEDICAID

## 2020-01-06 ENCOUNTER — APPOINTMENT (OUTPATIENT)
Dept: GENERAL RADIOLOGY | Age: 60
End: 2020-01-06
Attending: EMERGENCY MEDICINE
Payer: MEDICAID

## 2020-01-06 VITALS
TEMPERATURE: 98.3 F | WEIGHT: 315 LBS | RESPIRATION RATE: 23 BRPM | HEART RATE: 94 BPM | SYSTOLIC BLOOD PRESSURE: 183 MMHG | BODY MASS INDEX: 50.62 KG/M2 | OXYGEN SATURATION: 94 % | HEIGHT: 66 IN | DIASTOLIC BLOOD PRESSURE: 95 MMHG

## 2020-01-06 DIAGNOSIS — S20.219A CONTUSION OF CHEST WALL, UNSPECIFIED LATERALITY, INITIAL ENCOUNTER: ICD-10-CM
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
## 2020-01-06 DIAGNOSIS — G89.4 CHRONIC PAIN SYNDROME: ICD-10-CM

## 2020-01-06 DIAGNOSIS — R07.89 CHEST WALL PAIN: ICD-10-CM

## 2020-01-06 PROCEDURE — 70450 CT HEAD/BRAIN W/O DYE: CPT

## 2020-01-06 PROCEDURE — 74011250637 HC RX REV CODE- 250/637: Performed by: EMERGENCY MEDICINE

## 2020-01-06 PROCEDURE — 99285 EMERGENCY DEPT VISIT HI MDM: CPT

## 2020-01-06 PROCEDURE — 71045 X-RAY EXAM CHEST 1 VIEW: CPT

## 2020-01-06 RX ORDER — HYDROCODONE BITARTRATE AND ACETAMINOPHEN 5; 325 MG/1; MG/1
1 TABLET ORAL ONCE
Status: DISCONTINUED | OUTPATIENT
Start: 2020-01-06 | End: 2020-01-06

## 2020-01-06 RX ORDER — IBUPROFEN 400 MG/1
400 TABLET ORAL ONCE
Status: COMPLETED | OUTPATIENT
Start: 2020-01-06 | End: 2020-01-06

## 2020-01-06 RX ORDER — OXYCODONE AND ACETAMINOPHEN 5; 325 MG/1; MG/1
1 TABLET ORAL
Status: DISCONTINUED | OUTPATIENT
Start: 2020-01-06 | End: 2020-01-06

## 2020-01-06 RX ORDER — ACETAMINOPHEN 500 MG
1000 TABLET ORAL ONCE
Status: COMPLETED | OUTPATIENT
Start: 2020-01-06 | End: 2020-01-06

## 2020-01-06 RX ADMIN — IBUPROFEN 400 MG: 400 TABLET, FILM COATED ORAL at 13:12

## 2020-01-06 RX ADMIN — ACETAMINOPHEN 1000 MG: 500 TABLET ORAL at 13:12

## 2020-01-06 NOTE — ED NOTES
Patient sleeping while Dr. Siddhartha Pressley attempts to discharge, prior to falling asleep pt demanding percocet.

## 2020-01-06 NOTE — ED TRIAGE NOTES
Pt arrives per EMS w/ c/o LEFT rib pain starting yesterday after tripping on a handicap ramp and [employer], falling forward onto concrete. Pt reports being seen by medics yesterday but denied going to the hospital and took tylenol for pain relief. Pt w/ pain on palpation and LEFT side of abdomen and rib area.
Normal/Other

## 2020-01-06 NOTE — ED NOTES
Patient refused Vicodin stating \"it gives me hives\" after being twice during triage for allergy review. Pt states \"I can only take percocet. \" MD made aware, MD will verify  for percocet prescription and if he is prescribed he will admin 1 dose; when this RN went to update patient, patient sleeping, resting comfortable in stretcher with bilat siderails, VSS on monitor, call light within reach.
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
## 2020-04-03 ENCOUNTER — APPOINTMENT (OUTPATIENT)
Dept: GENERAL RADIOLOGY | Age: 60
DRG: 005 | End: 2020-04-03
Attending: PHYSICIAN ASSISTANT
Payer: MEDICAID

## 2020-04-03 ENCOUNTER — HOSPITAL ENCOUNTER (INPATIENT)
Age: 60
LOS: 19 days | Discharge: NURSING, REHAB OR LONG-TERM CARE | DRG: 005 | End: 2020-04-22
Attending: EMERGENCY MEDICINE | Admitting: FAMILY MEDICINE
Payer: MEDICAID

## 2020-04-03 DIAGNOSIS — J96.01 ACUTE RESPIRATORY FAILURE WITH HYPOXIA AND HYPERCARBIA (HCC): Primary | ICD-10-CM

## 2020-04-03 DIAGNOSIS — R79.89 ELEVATED LFTS: ICD-10-CM

## 2020-04-03 DIAGNOSIS — J44.1 ACUTE EXACERBATION OF CHRONIC OBSTRUCTIVE PULMONARY DISEASE (COPD) (HCC): ICD-10-CM

## 2020-04-03 DIAGNOSIS — J96.02 ACUTE RESPIRATORY FAILURE WITH HYPOXIA AND HYPERCARBIA (HCC): Primary | ICD-10-CM

## 2020-04-03 DIAGNOSIS — I50.9 ACUTE ON CHRONIC CONGESTIVE HEART FAILURE, UNSPECIFIED HEART FAILURE TYPE (HCC): ICD-10-CM

## 2020-04-03 PROBLEM — N28.9 ACUTE ON CHRONIC RENAL INSUFFICIENCY: Status: ACTIVE | Noted: 2020-04-03

## 2020-04-03 PROBLEM — N18.9 ACUTE ON CHRONIC RENAL INSUFFICIENCY: Status: ACTIVE | Noted: 2020-04-03

## 2020-04-03 PROBLEM — J96.92 RESPIRATORY FAILURE WITH HYPERCAPNIA (HCC): Status: ACTIVE | Noted: 2020-04-03

## 2020-04-03 PROBLEM — R74.01 TRANSAMINITIS: Status: ACTIVE | Noted: 2020-04-03

## 2020-04-03 LAB
ALBUMIN SERPL-MCNC: 3.2 G/DL (ref 3.4–5)
ALBUMIN/GLOB SERPL: 0.9 {RATIO} (ref 0.8–1.7)
ALP SERPL-CCNC: 162 U/L (ref 45–117)
ALT SERPL-CCNC: 366 U/L (ref 16–61)
AMPHET UR QL SCN: NEGATIVE
ANION GAP SERPL CALC-SCNC: 4 MMOL/L (ref 3–18)
APAP SERPL-MCNC: <2 UG/ML (ref 10–30)
APPEARANCE UR: CLEAR
ARTERIAL PATENCY WRIST A: YES
ARTERIAL PATENCY WRIST A: YES
AST SERPL-CCNC: 285 U/L (ref 10–38)
BACTERIA URNS QL MICRO: NEGATIVE /HPF
BARBITURATES UR QL SCN: NEGATIVE
BASE EXCESS BLD CALC-SCNC: 2 MMOL/L
BASE EXCESS BLD CALC-SCNC: 5 MMOL/L
BASOPHILS # BLD: 0 K/UL (ref 0–0.1)
BASOPHILS NFR BLD: 0 % (ref 0–2)
BDY SITE: ABNORMAL
BDY SITE: ABNORMAL
BENZODIAZ UR QL: NEGATIVE
BILIRUB SERPL-MCNC: 0.5 MG/DL (ref 0.2–1)
BILIRUB UR QL: NEGATIVE
BNP SERPL-MCNC: 5891 PG/ML (ref 0–900)
BODY TEMPERATURE: 37
BODY TEMPERATURE: 37
BUN SERPL-MCNC: 35 MG/DL (ref 7–18)
BUN/CREAT SERPL: 21 (ref 12–20)
CALCIUM SERPL-MCNC: 8.4 MG/DL (ref 8.5–10.1)
CANNABINOIDS UR QL SCN: NEGATIVE
CHLORIDE SERPL-SCNC: 107 MMOL/L (ref 100–111)
CK MB CFR SERPL CALC: 1.4 % (ref 0–4)
CK MB SERPL-MCNC: 8.6 NG/ML (ref 5–25)
CK SERPL-CCNC: 635 U/L (ref 39–308)
CO2 SERPL-SCNC: 31 MMOL/L (ref 21–32)
COCAINE UR QL SCN: POSITIVE
COLOR UR: YELLOW
CREAT SERPL-MCNC: 1.64 MG/DL (ref 0.6–1.3)
DIFFERENTIAL METHOD BLD: ABNORMAL
EOSINOPHIL # BLD: 0.1 K/UL (ref 0–0.4)
EOSINOPHIL NFR BLD: 1 % (ref 0–5)
EPITH CASTS URNS QL MICRO: NORMAL /LPF (ref 0–5)
ERYTHROCYTE [DISTWIDTH] IN BLOOD BY AUTOMATED COUNT: 16.2 % (ref 11.6–14.5)
GAS FLOW.O2 O2 DELIVERY SYS: ABNORMAL L/MIN
GAS FLOW.O2 O2 DELIVERY SYS: ABNORMAL L/MIN
GAS FLOW.O2 SETTING OXYMISER: 5 L/M
GLOBULIN SER CALC-MCNC: 3.4 G/DL (ref 2–4)
GLUCOSE BLD STRIP.AUTO-MCNC: 109 MG/DL (ref 70–110)
GLUCOSE SERPL-MCNC: 110 MG/DL (ref 74–99)
GLUCOSE UR STRIP.AUTO-MCNC: NEGATIVE MG/DL
HCO3 BLD-SCNC: 29.7 MMOL/L (ref 22–26)
HCO3 BLD-SCNC: 33.4 MMOL/L (ref 22–26)
HCT VFR BLD AUTO: 38.9 % (ref 36–48)
HDSCOM,HDSCOM: ABNORMAL
HGB BLD-MCNC: 12.4 G/DL (ref 13–16)
HGB UR QL STRIP: NEGATIVE
KETONES UR QL STRIP.AUTO: NEGATIVE MG/DL
LEUKOCYTE ESTERASE UR QL STRIP.AUTO: NEGATIVE
LYMPHOCYTES # BLD: 1.5 K/UL (ref 0.9–3.6)
LYMPHOCYTES NFR BLD: 18 % (ref 21–52)
MCH RBC QN AUTO: 27.9 PG (ref 24–34)
MCHC RBC AUTO-ENTMCNC: 31.9 G/DL (ref 31–37)
MCV RBC AUTO: 87.4 FL (ref 74–97)
METHADONE UR QL: NEGATIVE
MONOCYTES # BLD: 0.6 K/UL (ref 0.05–1.2)
MONOCYTES NFR BLD: 7 % (ref 3–10)
NEUTS SEG # BLD: 6.3 K/UL (ref 1.8–8)
NEUTS SEG NFR BLD: 74 % (ref 40–73)
NITRITE UR QL STRIP.AUTO: NEGATIVE
O2/TOTAL GAS SETTING VFR VENT: 40 %
O2/TOTAL GAS SETTING VFR VENT: 40 %
OPIATES UR QL: NEGATIVE
PCO2 BLD: 68.1 MMHG (ref 35–45)
PCO2 BLD: 91.7 MMHG (ref 35–45)
PCP UR QL: NEGATIVE
PEEP RESPIRATORY: 5 CMH2O
PH BLD: 7.17 [PH] (ref 7.35–7.45)
PH BLD: 7.25 [PH] (ref 7.35–7.45)
PH UR STRIP: 5 [PH] (ref 5–8)
PIP ISTAT,IPIP: 18
PLATELET # BLD AUTO: 191 K/UL (ref 135–420)
PMV BLD AUTO: 12.1 FL (ref 9.2–11.8)
PO2 BLD: 79 MMHG (ref 80–100)
PO2 BLD: 96 MMHG (ref 80–100)
POTASSIUM SERPL-SCNC: 4.2 MMOL/L (ref 3.5–5.5)
PROT SERPL-MCNC: 6.6 G/DL (ref 6.4–8.2)
PROT UR STRIP-MCNC: ABNORMAL MG/DL
RBC # BLD AUTO: 4.45 M/UL (ref 4.7–5.5)
RBC #/AREA URNS HPF: NORMAL /HPF (ref 0–5)
SAO2 % BLD: 93 % (ref 92–97)
SAO2 % BLD: 94 % (ref 92–97)
SERVICE CMNT-IMP: ABNORMAL
SERVICE CMNT-IMP: ABNORMAL
SODIUM SERPL-SCNC: 142 MMOL/L (ref 136–145)
SP GR UR REFRACTOMETRY: 1.01 (ref 1–1.03)
SPECIMEN TYPE: ABNORMAL
SPECIMEN TYPE: ABNORMAL
TOTAL RESP. RATE, ITRR: 14
TOTAL RESP. RATE, ITRR: 24
TROPONIN I SERPL-MCNC: 0.05 NG/ML (ref 0–0.04)
UROBILINOGEN UR QL STRIP.AUTO: 1 EU/DL (ref 0.2–1)
WBC # BLD AUTO: 8.6 K/UL (ref 4.6–13.2)
WBC URNS QL MICRO: NORMAL /HPF (ref 0–5)

## 2020-04-03 PROCEDURE — 80053 COMPREHEN METABOLIC PANEL: CPT

## 2020-04-03 PROCEDURE — 93005 ELECTROCARDIOGRAM TRACING: CPT

## 2020-04-03 PROCEDURE — 96374 THER/PROPH/DIAG INJ IV PUSH: CPT

## 2020-04-03 PROCEDURE — 74011250636 HC RX REV CODE- 250/636: Performed by: NURSE ANESTHETIST, CERTIFIED REGISTERED

## 2020-04-03 PROCEDURE — 74011000250 HC RX REV CODE- 250: Performed by: NURSE ANESTHETIST, CERTIFIED REGISTERED

## 2020-04-03 PROCEDURE — 75810000455 HC PLCMT CENT VENOUS CATH LVL 2 5182

## 2020-04-03 PROCEDURE — 81001 URINALYSIS AUTO W/SCOPE: CPT

## 2020-04-03 PROCEDURE — 96375 TX/PRO/DX INJ NEW DRUG ADDON: CPT

## 2020-04-03 PROCEDURE — 83880 ASSAY OF NATRIURETIC PEPTIDE: CPT

## 2020-04-03 PROCEDURE — 80307 DRUG TEST PRSMV CHEM ANLYZR: CPT

## 2020-04-03 PROCEDURE — 99285 EMERGENCY DEPT VISIT HI MDM: CPT

## 2020-04-03 PROCEDURE — 65270000029 HC RM PRIVATE

## 2020-04-03 PROCEDURE — 85379 FIBRIN DEGRADATION QUANT: CPT

## 2020-04-03 PROCEDURE — 82962 GLUCOSE BLOOD TEST: CPT

## 2020-04-03 PROCEDURE — 74011250636 HC RX REV CODE- 250/636: Performed by: FAMILY MEDICINE

## 2020-04-03 PROCEDURE — 85025 COMPLETE CBC W/AUTO DIFF WBC: CPT

## 2020-04-03 PROCEDURE — 82803 BLOOD GASES ANY COMBINATION: CPT

## 2020-04-03 PROCEDURE — 83735 ASSAY OF MAGNESIUM: CPT

## 2020-04-03 PROCEDURE — 82550 ASSAY OF CK (CPK): CPT

## 2020-04-03 PROCEDURE — 80074 ACUTE HEPATITIS PANEL: CPT

## 2020-04-03 PROCEDURE — 84100 ASSAY OF PHOSPHORUS: CPT

## 2020-04-03 PROCEDURE — 74011250636 HC RX REV CODE- 250/636: Performed by: PHYSICIAN ASSISTANT

## 2020-04-03 PROCEDURE — 74011000250 HC RX REV CODE- 250: Performed by: FAMILY MEDICINE

## 2020-04-03 PROCEDURE — 36600 WITHDRAWAL OF ARTERIAL BLOOD: CPT

## 2020-04-03 PROCEDURE — 71045 X-RAY EXAM CHEST 1 VIEW: CPT

## 2020-04-03 RX ORDER — DEXTROSE MONOHYDRATE 100 MG/ML
125-250 INJECTION, SOLUTION INTRAVENOUS AS NEEDED
Status: DISCONTINUED | OUTPATIENT
Start: 2020-04-03 | End: 2020-04-22 | Stop reason: HOSPADM

## 2020-04-03 RX ORDER — IPRATROPIUM BROMIDE AND ALBUTEROL SULFATE 2.5; .5 MG/3ML; MG/3ML
3 SOLUTION RESPIRATORY (INHALATION)
Status: DISPENSED | OUTPATIENT
Start: 2020-04-03 | End: 2020-04-04

## 2020-04-03 RX ORDER — SODIUM CHLORIDE 0.9 % (FLUSH) 0.9 %
5-40 SYRINGE (ML) INJECTION AS NEEDED
Status: DISCONTINUED | OUTPATIENT
Start: 2020-04-03 | End: 2020-04-22 | Stop reason: HOSPADM

## 2020-04-03 RX ORDER — SODIUM CHLORIDE 0.9 % (FLUSH) 0.9 %
5-40 SYRINGE (ML) INJECTION EVERY 8 HOURS
Status: DISCONTINUED | OUTPATIENT
Start: 2020-04-03 | End: 2020-04-22 | Stop reason: HOSPADM

## 2020-04-03 RX ORDER — PROPOFOL 10 MG/ML
0-50 VIAL (ML) INTRAVENOUS
Status: DISCONTINUED | OUTPATIENT
Start: 2020-04-03 | End: 2020-04-04

## 2020-04-03 RX ORDER — INSULIN LISPRO 100 [IU]/ML
INJECTION, SOLUTION INTRAVENOUS; SUBCUTANEOUS EVERY 6 HOURS
Status: DISCONTINUED | OUTPATIENT
Start: 2020-04-04 | End: 2020-04-22 | Stop reason: HOSPADM

## 2020-04-03 RX ORDER — MAGNESIUM SULFATE 100 %
16 CRYSTALS MISCELLANEOUS AS NEEDED
Status: DISCONTINUED | OUTPATIENT
Start: 2020-04-03 | End: 2020-04-22 | Stop reason: HOSPADM

## 2020-04-03 RX ORDER — ONDANSETRON 2 MG/ML
4 INJECTION INTRAMUSCULAR; INTRAVENOUS
Status: DISCONTINUED | OUTPATIENT
Start: 2020-04-03 | End: 2020-04-22 | Stop reason: HOSPADM

## 2020-04-03 RX ORDER — HEPARIN SODIUM 5000 [USP'U]/ML
5000 INJECTION, SOLUTION INTRAVENOUS; SUBCUTANEOUS EVERY 8 HOURS
Status: DISCONTINUED | OUTPATIENT
Start: 2020-04-03 | End: 2020-04-04

## 2020-04-03 RX ORDER — SODIUM BICARBONATE IN D5W 150/1000ML
PLASTIC BAG, INJECTION (ML) INTRAVENOUS CONTINUOUS
Status: DISCONTINUED | OUTPATIENT
Start: 2020-04-03 | End: 2020-04-04

## 2020-04-03 RX ORDER — FUROSEMIDE 10 MG/ML
60 INJECTION INTRAMUSCULAR; INTRAVENOUS
Status: COMPLETED | OUTPATIENT
Start: 2020-04-03 | End: 2020-04-03

## 2020-04-03 RX ADMIN — METHYLPREDNISOLONE SODIUM SUCCINATE 125 MG: 125 INJECTION, POWDER, FOR SOLUTION INTRAMUSCULAR; INTRAVENOUS at 20:43

## 2020-04-03 RX ADMIN — Medication 100 MG: at 23:55

## 2020-04-03 RX ADMIN — PROPOFOL 200 MG: 10 INJECTION, EMULSION INTRAVENOUS at 23:55

## 2020-04-03 RX ADMIN — FUROSEMIDE 60 MG: 10 INJECTION, SOLUTION INTRAMUSCULAR; INTRAVENOUS at 21:38
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
## 2020-04-03 NOTE — ED TRIAGE NOTES
Pt arrives via EMS c/o shortness of breath related to CHF exacerbation. Pt states that he is on lasix but that our works better than his. He is chronically on 4L 02 NC.

## 2020-04-04 ENCOUNTER — APPOINTMENT (OUTPATIENT)
Dept: GENERAL RADIOLOGY | Age: 60
DRG: 005 | End: 2020-04-04
Attending: EMERGENCY MEDICINE
Payer: MEDICAID

## 2020-04-04 ENCOUNTER — ANESTHESIA EVENT (OUTPATIENT)
Dept: EMERGENCY DEPT | Age: 60
DRG: 005 | End: 2020-04-04
Payer: MEDICAID

## 2020-04-04 ENCOUNTER — APPOINTMENT (OUTPATIENT)
Dept: GENERAL RADIOLOGY | Age: 60
DRG: 005 | End: 2020-04-04
Attending: FAMILY MEDICINE
Payer: MEDICAID

## 2020-04-04 ENCOUNTER — ANESTHESIA (OUTPATIENT)
Dept: EMERGENCY DEPT | Age: 60
DRG: 005 | End: 2020-04-04
Payer: MEDICAID

## 2020-04-04 PROBLEM — E66.01 MORBID OBESITY WITH BODY MASS INDEX OF 50 OR HIGHER (HCC): Status: ACTIVE | Noted: 2020-04-04

## 2020-04-04 PROBLEM — Z20.822 SUSPECTED COVID-19 VIRUS INFECTION: Status: ACTIVE | Noted: 2020-04-04

## 2020-04-04 LAB
ALBUMIN SERPL-MCNC: 3.2 G/DL (ref 3.4–5)
ALBUMIN/GLOB SERPL: 0.9 {RATIO} (ref 0.8–1.7)
ALP SERPL-CCNC: 170 U/L (ref 45–117)
ALT SERPL-CCNC: 368 U/L (ref 16–61)
ANION GAP SERPL CALC-SCNC: 5 MMOL/L (ref 3–18)
ARTERIAL PATENCY WRIST A: ABNORMAL
ARTERIAL PATENCY WRIST A: YES
AST SERPL-CCNC: 236 U/L (ref 10–38)
ATRIAL RATE: 83 BPM
BASE EXCESS BLD CALC-SCNC: 6 MMOL/L
BASE EXCESS BLD CALC-SCNC: 7 MMOL/L
BDY SITE: ABNORMAL
BDY SITE: ABNORMAL
BILIRUB SERPL-MCNC: 0.5 MG/DL (ref 0.2–1)
BNP SERPL-MCNC: 4201 PG/ML (ref 0–900)
BODY TEMPERATURE: 37
BODY TEMPERATURE: 98.8
BUN SERPL-MCNC: 33 MG/DL (ref 7–18)
BUN/CREAT SERPL: 20 (ref 12–20)
CA-I SERPL-SCNC: 1.08 MMOL/L (ref 1.12–1.32)
CALCIUM SERPL-MCNC: 8.4 MG/DL (ref 8.5–10.1)
CALCULATED P AXIS, ECG09: 44 DEGREES
CALCULATED R AXIS, ECG10: -47 DEGREES
CALCULATED T AXIS, ECG11: 92 DEGREES
CHLORIDE SERPL-SCNC: 104 MMOL/L (ref 100–111)
CK MB CFR SERPL CALC: 1.1 % (ref 0–4)
CK MB CFR SERPL CALC: 1.1 % (ref 0–4)
CK MB CFR SERPL CALC: 1.2 % (ref 0–4)
CK MB SERPL-MCNC: 2.9 NG/ML (ref 5–25)
CK MB SERPL-MCNC: 3.4 NG/ML (ref 5–25)
CK MB SERPL-MCNC: 6.5 NG/ML (ref 5–25)
CK SERPL-CCNC: 275 U/L (ref 39–308)
CK SERPL-CCNC: 303 U/L (ref 39–308)
CK SERPL-CCNC: 524 U/L (ref 39–308)
CO2 SERPL-SCNC: 33 MMOL/L (ref 21–32)
CREAT SERPL-MCNC: 1.66 MG/DL (ref 0.6–1.3)
CRP SERPL HS-MCNC: >9.5 MG/L
D DIMER PPP FEU-MCNC: 2.52 UG/ML(FEU)
D DIMER PPP FEU-MCNC: 3.07 UG/ML(FEU)
DIAGNOSIS, 93000: NORMAL
ERYTHROCYTE [DISTWIDTH] IN BLOOD BY AUTOMATED COUNT: 16.4 % (ref 11.6–14.5)
ETHANOL SERPL-MCNC: <3 MG/DL (ref 0–3)
FERRITIN SERPL-MCNC: 115 NG/ML (ref 8–388)
FLUAV AG NPH QL IA: NEGATIVE
FLUBV AG NOSE QL IA: NEGATIVE
GAS FLOW.O2 O2 DELIVERY SYS: ABNORMAL L/MIN
GAS FLOW.O2 O2 DELIVERY SYS: ABNORMAL L/MIN
GAS FLOW.O2 SETTING OXYMISER: 20 BPM
GAS FLOW.O2 SETTING OXYMISER: 20 BPM
GLOBULIN SER CALC-MCNC: 3.7 G/DL (ref 2–4)
GLUCOSE BLD STRIP.AUTO-MCNC: 125 MG/DL (ref 70–110)
GLUCOSE BLD STRIP.AUTO-MCNC: 138 MG/DL (ref 70–110)
GLUCOSE SERPL-MCNC: 184 MG/DL (ref 74–99)
HCO3 BLD-SCNC: 32.3 MMOL/L (ref 22–26)
HCO3 BLD-SCNC: 34.3 MMOL/L (ref 22–26)
HCT VFR BLD AUTO: 42.9 % (ref 36–48)
HGB BLD-MCNC: 13.4 G/DL (ref 13–16)
INSPIRATION.DURATION SETTING TIME VENT: 0.9 SEC
LACTATE SERPL-SCNC: 0.6 MMOL/L (ref 0.4–2)
MAGNESIUM SERPL-MCNC: 2.1 MG/DL (ref 1.6–2.6)
MAGNESIUM SERPL-MCNC: 2.2 MG/DL (ref 1.6–2.6)
MCH RBC QN AUTO: 27.8 PG (ref 24–34)
MCHC RBC AUTO-ENTMCNC: 31.2 G/DL (ref 31–37)
MCV RBC AUTO: 89 FL (ref 74–97)
O2/TOTAL GAS SETTING VFR VENT: 0.5 %
O2/TOTAL GAS SETTING VFR VENT: 50 %
P-R INTERVAL, ECG05: 154 MS
PCO2 BLD: 54.7 MMHG (ref 35–45)
PCO2 BLD: 85 MMHG (ref 35–45)
PEEP RESPIRATORY: 10 CMH2O
PEEP RESPIRATORY: 5 CMH2O
PH BLD: 7.21 [PH] (ref 7.35–7.45)
PH BLD: 7.38 [PH] (ref 7.35–7.45)
PHOSPHATE SERPL-MCNC: 3.4 MG/DL (ref 2.5–4.9)
PIP ISTAT,IPIP: 29
PLATELET # BLD AUTO: 180 K/UL (ref 135–420)
PMV BLD AUTO: 11.4 FL (ref 9.2–11.8)
PO2 BLD: 71 MMHG (ref 80–100)
PO2 BLD: 76 MMHG (ref 80–100)
POTASSIUM SERPL-SCNC: 4.8 MMOL/L (ref 3.5–5.5)
PROCALCITONIN SERPL-MCNC: 0.4 NG/ML
PROT SERPL-MCNC: 6.9 G/DL (ref 6.4–8.2)
Q-T INTERVAL, ECG07: 406 MS
QRS DURATION, ECG06: 98 MS
QTC CALCULATION (BEZET), ECG08: 477 MS
RBC # BLD AUTO: 4.82 M/UL (ref 4.7–5.5)
SAO2 % BLD: 91 % (ref 92–97)
SAO2 % BLD: 93 % (ref 92–97)
SERVICE CMNT-IMP: ABNORMAL
SERVICE CMNT-IMP: ABNORMAL
SODIUM SERPL-SCNC: 142 MMOL/L (ref 136–145)
SPECIMEN TYPE: ABNORMAL
SPECIMEN TYPE: ABNORMAL
TOTAL RESP. RATE, ITRR: 20
TOTAL RESP. RATE, ITRR: 20
TROPONIN I SERPL-MCNC: 0.02 NG/ML (ref 0–0.04)
TROPONIN I SERPL-MCNC: 0.04 NG/ML (ref 0–0.04)
TROPONIN I SERPL-MCNC: <0.02 NG/ML (ref 0–0.04)
VENTILATION MODE VENT: ABNORMAL
VENTILATION MODE VENT: ABNORMAL
VENTRICULAR RATE, ECG03: 83 BPM
VOLUME CONTROL PLUS IVLCP: YES
VT SETTING VENT: 480 ML
VT SETTING VENT: 500 ML
WBC # BLD AUTO: 8 K/UL (ref 4.6–13.2)

## 2020-04-04 PROCEDURE — 87804 INFLUENZA ASSAY W/OPTIC: CPT

## 2020-04-04 PROCEDURE — 5A1955Z RESPIRATORY VENTILATION, GREATER THAN 96 CONSECUTIVE HOURS: ICD-10-PCS | Performed by: EMERGENCY MEDICINE

## 2020-04-04 PROCEDURE — 74011000250 HC RX REV CODE- 250: Performed by: INTERNAL MEDICINE

## 2020-04-04 PROCEDURE — 71045 X-RAY EXAM CHEST 1 VIEW: CPT

## 2020-04-04 PROCEDURE — 74011250636 HC RX REV CODE- 250/636

## 2020-04-04 PROCEDURE — 74011250636 HC RX REV CODE- 250/636: Performed by: FAMILY MEDICINE

## 2020-04-04 PROCEDURE — 74011000258 HC RX REV CODE- 258: Performed by: INTERNAL MEDICINE

## 2020-04-04 PROCEDURE — 83520 IMMUNOASSAY QUANT NOS NONAB: CPT

## 2020-04-04 PROCEDURE — 85027 COMPLETE CBC AUTOMATED: CPT

## 2020-04-04 PROCEDURE — 82803 BLOOD GASES ANY COMBINATION: CPT

## 2020-04-04 PROCEDURE — 82728 ASSAY OF FERRITIN: CPT

## 2020-04-04 PROCEDURE — 02HV33Z INSERTION OF INFUSION DEVICE INTO SUPERIOR VENA CAVA, PERCUTANEOUS APPROACH: ICD-10-PCS | Performed by: EMERGENCY MEDICINE

## 2020-04-04 PROCEDURE — 82330 ASSAY OF CALCIUM: CPT

## 2020-04-04 PROCEDURE — P9047 ALBUMIN (HUMAN), 25%, 50ML: HCPCS | Performed by: FAMILY MEDICINE

## 2020-04-04 PROCEDURE — 65610000006 HC RM INTENSIVE CARE

## 2020-04-04 PROCEDURE — 77010033678 HC OXYGEN DAILY

## 2020-04-04 PROCEDURE — 80053 COMPREHEN METABOLIC PANEL: CPT

## 2020-04-04 PROCEDURE — 82550 ASSAY OF CK (CPK): CPT

## 2020-04-04 PROCEDURE — 86141 C-REACTIVE PROTEIN HS: CPT

## 2020-04-04 PROCEDURE — 74011000250 HC RX REV CODE- 250: Performed by: FAMILY MEDICINE

## 2020-04-04 PROCEDURE — 83880 ASSAY OF NATRIURETIC PEPTIDE: CPT

## 2020-04-04 PROCEDURE — 74011250637 HC RX REV CODE- 250/637: Performed by: INTERNAL MEDICINE

## 2020-04-04 PROCEDURE — 85379 FIBRIN DEGRADATION QUANT: CPT

## 2020-04-04 PROCEDURE — 82962 GLUCOSE BLOOD TEST: CPT

## 2020-04-04 PROCEDURE — 87635 SARS-COV-2 COVID-19 AMP PRB: CPT

## 2020-04-04 PROCEDURE — 0BH17EZ INSERTION OF ENDOTRACHEAL AIRWAY INTO TRACHEA, VIA NATURAL OR ARTIFICIAL OPENING: ICD-10-PCS | Performed by: EMERGENCY MEDICINE

## 2020-04-04 PROCEDURE — 74011000250 HC RX REV CODE- 250

## 2020-04-04 PROCEDURE — 74011250637 HC RX REV CODE- 250/637: Performed by: FAMILY MEDICINE

## 2020-04-04 PROCEDURE — C9113 INJ PANTOPRAZOLE SODIUM, VIA: HCPCS | Performed by: FAMILY MEDICINE

## 2020-04-04 PROCEDURE — 36600 WITHDRAWAL OF ARTERIAL BLOOD: CPT

## 2020-04-04 PROCEDURE — 74018 RADEX ABDOMEN 1 VIEW: CPT

## 2020-04-04 PROCEDURE — 74011250636 HC RX REV CODE- 250/636: Performed by: EMERGENCY MEDICINE

## 2020-04-04 PROCEDURE — 83605 ASSAY OF LACTIC ACID: CPT

## 2020-04-04 PROCEDURE — 84145 PROCALCITONIN (PCT): CPT

## 2020-04-04 PROCEDURE — 74011250636 HC RX REV CODE- 250/636: Performed by: INTERNAL MEDICINE

## 2020-04-04 PROCEDURE — 83735 ASSAY OF MAGNESIUM: CPT

## 2020-04-04 PROCEDURE — 94002 VENT MGMT INPAT INIT DAY: CPT

## 2020-04-04 RX ORDER — CHLORHEXIDINE GLUCONATE 1.2 MG/ML
10 RINSE ORAL EVERY 12 HOURS
Status: DISCONTINUED | OUTPATIENT
Start: 2020-04-04 | End: 2020-04-22 | Stop reason: HOSPADM

## 2020-04-04 RX ORDER — HYDROXYCHLOROQUINE SULFATE 200 MG/1
400 TABLET, FILM COATED ORAL EVERY 12 HOURS
Status: DISCONTINUED | OUTPATIENT
Start: 2020-04-04 | End: 2020-04-04

## 2020-04-04 RX ORDER — FENTANYL CITRATE 50 UG/ML
INJECTION, SOLUTION INTRAMUSCULAR; INTRAVENOUS
Status: DISPENSED
Start: 2020-04-04 | End: 2020-04-04

## 2020-04-04 RX ORDER — AMLODIPINE BESYLATE 5 MG/1
10 TABLET ORAL DAILY
Status: DISCONTINUED | OUTPATIENT
Start: 2020-04-04 | End: 2020-04-15

## 2020-04-04 RX ORDER — CARVEDILOL 12.5 MG/1
25 TABLET ORAL 2 TIMES DAILY WITH MEALS
Status: DISCONTINUED | OUTPATIENT
Start: 2020-04-04 | End: 2020-04-10

## 2020-04-04 RX ORDER — CHOLECALCIFEROL (VITAMIN D3) 125 MCG
10 CAPSULE ORAL
Status: DISCONTINUED | OUTPATIENT
Start: 2020-04-04 | End: 2020-04-06

## 2020-04-04 RX ORDER — ROCURONIUM BROMIDE 10 MG/ML
INJECTION, SOLUTION INTRAVENOUS AS NEEDED
Status: DISCONTINUED | OUTPATIENT
Start: 2020-04-03 | End: 2020-04-04 | Stop reason: HOSPADM

## 2020-04-04 RX ORDER — ALBUTEROL SULFATE 0.83 MG/ML
2.5 SOLUTION RESPIRATORY (INHALATION)
Status: DISCONTINUED | OUTPATIENT
Start: 2020-04-04 | End: 2020-04-04

## 2020-04-04 RX ORDER — ASCORBIC ACID 250 MG
500 TABLET ORAL 2 TIMES DAILY
Status: DISCONTINUED | OUTPATIENT
Start: 2020-04-04 | End: 2020-04-06

## 2020-04-04 RX ORDER — VANCOMYCIN 2 GRAM/500 ML IN 0.9 % SODIUM CHLORIDE INTRAVENOUS
2000 ONCE
Status: COMPLETED | OUTPATIENT
Start: 2020-04-04 | End: 2020-04-04

## 2020-04-04 RX ORDER — LORAZEPAM 2 MG/ML
1 INJECTION INTRAMUSCULAR
Status: DISCONTINUED | OUTPATIENT
Start: 2020-04-04 | End: 2020-04-14

## 2020-04-04 RX ORDER — FENTANYL CITRATE 50 UG/ML
100 INJECTION, SOLUTION INTRAMUSCULAR; INTRAVENOUS ONCE
Status: COMPLETED | OUTPATIENT
Start: 2020-04-04 | End: 2020-04-04

## 2020-04-04 RX ORDER — ALBUMIN HUMAN 250 G/1000ML
25 SOLUTION INTRAVENOUS EVERY 6 HOURS
Status: DISCONTINUED | OUTPATIENT
Start: 2020-04-04 | End: 2020-04-04

## 2020-04-04 RX ORDER — CALCIUM GLUCONATE 20 MG/ML
2 INJECTION, SOLUTION INTRAVENOUS ONCE
Status: COMPLETED | OUTPATIENT
Start: 2020-04-04 | End: 2020-04-04

## 2020-04-04 RX ORDER — HYDROXYCHLOROQUINE SULFATE 200 MG/1
200 TABLET, FILM COATED ORAL EVERY 12 HOURS
Status: DISCONTINUED | OUTPATIENT
Start: 2020-04-05 | End: 2020-04-04

## 2020-04-04 RX ORDER — ASPIRIN 325 MG/1
200 TABLET, FILM COATED ORAL
Status: DISCONTINUED | OUTPATIENT
Start: 2020-04-04 | End: 2020-04-05

## 2020-04-04 RX ORDER — PROPOFOL 10 MG/ML
INJECTION, EMULSION INTRAVENOUS AS NEEDED
Status: DISCONTINUED | OUTPATIENT
Start: 2020-04-03 | End: 2020-04-04 | Stop reason: HOSPADM

## 2020-04-04 RX ORDER — ZINC SULFATE 50(220)MG
2 CAPSULE ORAL DAILY
Status: DISCONTINUED | OUTPATIENT
Start: 2020-04-04 | End: 2020-04-06

## 2020-04-04 RX ORDER — FUROSEMIDE 10 MG/ML
20 INJECTION INTRAMUSCULAR; INTRAVENOUS 2 TIMES DAILY
Status: DISCONTINUED | OUTPATIENT
Start: 2020-04-04 | End: 2020-04-10

## 2020-04-04 RX ORDER — ENOXAPARIN SODIUM 100 MG/ML
1 INJECTION SUBCUTANEOUS EVERY 12 HOURS
Status: DISCONTINUED | OUTPATIENT
Start: 2020-04-04 | End: 2020-04-06

## 2020-04-04 RX ORDER — MIDAZOLAM HYDROCHLORIDE 1 MG/ML
INJECTION, SOLUTION INTRAMUSCULAR; INTRAVENOUS
Status: COMPLETED
Start: 2020-04-04 | End: 2020-04-04

## 2020-04-04 RX ORDER — FENTANYL CITRATE 50 UG/ML
100 INJECTION, SOLUTION INTRAMUSCULAR; INTRAVENOUS
Status: DISCONTINUED | OUTPATIENT
Start: 2020-04-04 | End: 2020-04-04

## 2020-04-04 RX ORDER — SODIUM BICARBONATE 1 MEQ/ML
SYRINGE (ML) INTRAVENOUS
Status: COMPLETED
Start: 2020-04-04 | End: 2020-04-04

## 2020-04-04 RX ORDER — VANCOMYCIN 2 GRAM/500 ML IN 0.9 % SODIUM CHLORIDE INTRAVENOUS
2000 EVERY 12 HOURS
Status: DISCONTINUED | OUTPATIENT
Start: 2020-04-05 | End: 2020-04-07

## 2020-04-04 RX ORDER — SODIUM BICARBONATE 1 MEQ/ML
100 SYRINGE (ML) INTRAVENOUS ONCE
Status: COMPLETED | OUTPATIENT
Start: 2020-04-04 | End: 2020-04-04

## 2020-04-04 RX ORDER — MIDAZOLAM HYDROCHLORIDE 1 MG/ML
2 INJECTION, SOLUTION INTRAMUSCULAR; INTRAVENOUS ONCE
Status: COMPLETED | OUTPATIENT
Start: 2020-04-04 | End: 2020-04-04

## 2020-04-04 RX ADMIN — FUROSEMIDE 20 MG: 10 INJECTION, SOLUTION INTRAMUSCULAR; INTRAVENOUS at 09:10

## 2020-04-04 RX ADMIN — AMLODIPINE BESYLATE 10 MG: 5 TABLET ORAL at 11:56

## 2020-04-04 RX ADMIN — CALCIUM GLUCONATE 2 G: 20 INJECTION, SOLUTION INTRAVENOUS at 19:36

## 2020-04-04 RX ADMIN — Medication 50 MCG/HR: at 06:17

## 2020-04-04 RX ADMIN — Medication 100 MCG/HR: at 14:48

## 2020-04-04 RX ADMIN — Medication 100 MEQ: at 00:19

## 2020-04-04 RX ADMIN — MIDAZOLAM HYDROCHLORIDE: 1 INJECTION, SOLUTION INTRAMUSCULAR; INTRAVENOUS at 08:00

## 2020-04-04 RX ADMIN — FENTANYL CITRATE 100 MCG: 50 INJECTION, SOLUTION INTRAMUSCULAR; INTRAVENOUS at 02:15

## 2020-04-04 RX ADMIN — ENOXAPARIN SODIUM 200 MG: 100 INJECTION SUBCUTANEOUS at 04:27

## 2020-04-04 RX ADMIN — CEFTRIAXONE 1 G: 1 INJECTION, POWDER, FOR SOLUTION INTRAMUSCULAR; INTRAVENOUS at 00:15

## 2020-04-04 RX ADMIN — PIPERACILLIN AND TAZOBACTAM 3.38 G: 3; .375 INJECTION, POWDER, LYOPHILIZED, FOR SOLUTION INTRAVENOUS at 17:37

## 2020-04-04 RX ADMIN — Medication 10 ML: at 14:30

## 2020-04-04 RX ADMIN — Medication 10 ML: at 05:50

## 2020-04-04 RX ADMIN — MIDAZOLAM HYDROCHLORIDE 3 MG/HR: 5 INJECTION, SOLUTION INTRAMUSCULAR; INTRAVENOUS at 22:40

## 2020-04-04 RX ADMIN — FUROSEMIDE 20 MG: 10 INJECTION, SOLUTION INTRAMUSCULAR; INTRAVENOUS at 20:04

## 2020-04-04 RX ADMIN — ALBUMIN (HUMAN) 25 G: 0.25 INJECTION, SOLUTION INTRAVENOUS at 04:26

## 2020-04-04 RX ADMIN — Medication 500 MG: at 09:13

## 2020-04-04 RX ADMIN — VANCOMYCIN HYDROCHLORIDE 2000 MG: 10 INJECTION, POWDER, LYOPHILIZED, FOR SOLUTION INTRAVENOUS at 12:01

## 2020-04-04 RX ADMIN — MIDAZOLAM HYDROCHLORIDE 1 MG/HR: 5 INJECTION, SOLUTION INTRAMUSCULAR; INTRAVENOUS at 06:13

## 2020-04-04 RX ADMIN — HYDROXYCHLOROQUINE SULFATE 400 MG: 200 TABLET, FILM COATED ORAL at 09:00

## 2020-04-04 RX ADMIN — Medication 10 ML: at 22:32

## 2020-04-04 RX ADMIN — PIPERACILLIN AND TAZOBACTAM 3.38 G: 3; .375 INJECTION, POWDER, LYOPHILIZED, FOR SOLUTION INTRAVENOUS at 11:57

## 2020-04-04 RX ADMIN — CHLORHEXIDINE GLUCONATE 10 ML: 1.2 RINSE ORAL at 13:37

## 2020-04-04 RX ADMIN — Medication 500 MG: at 20:04

## 2020-04-04 RX ADMIN — SODIUM CHLORIDE 40 MG: 9 INJECTION, SOLUTION INTRAMUSCULAR; INTRAVENOUS; SUBCUTANEOUS at 09:01

## 2020-04-04 RX ADMIN — PROPOFOL 25 MCG/KG/MIN: 10 INJECTION, EMULSION INTRAVENOUS at 00:30

## 2020-04-04 RX ADMIN — CHLORHEXIDINE GLUCONATE 10 ML: 1.2 RINSE ORAL at 20:04

## 2020-04-04 RX ADMIN — CARVEDILOL 25 MG: 12.5 TABLET, FILM COATED ORAL at 09:13

## 2020-04-04 RX ADMIN — MIDAZOLAM HYDROCHLORIDE 2 MG: 1 INJECTION, SOLUTION INTRAMUSCULAR; INTRAVENOUS at 07:32

## 2020-04-04 RX ADMIN — PROPOFOL 40 MCG/KG/MIN: 10 INJECTION, EMULSION INTRAVENOUS at 04:36

## 2020-04-04 RX ADMIN — ZINC SULFATE 220 MG (50 MG) CAPSULE 2 CAPSULE: CAPSULE at 09:13

## 2020-04-04 RX ADMIN — ENOXAPARIN SODIUM 200 MG: 100 INJECTION SUBCUTANEOUS at 14:29

## 2020-04-04 RX ADMIN — METHYLPREDNISOLONE SODIUM SUCCINATE 60 MG: 40 INJECTION, POWDER, FOR SOLUTION INTRAMUSCULAR; INTRAVENOUS at 09:01

## 2020-04-04 NOTE — PROGRESS NOTES
Patient Name: Marek Don   Age: 62 y.o.   Sex:  male  YOB: 1960   Medical Record Number: [id number]      Antimicrobial  Vancomycin   Indication HAP   Dose / Interval           Current Antimicrobial Therapy (168h ago, onward)      Ordered     Start Stop    04/04/20 0150  hydroxychloroquine (PLAQUENIL) 25 mg/ml oral suspension 200 mg  200 mg,   Oral,   2 TIMES DAILY      04/05/20 0200 04/09/20 0159    04/04/20 1242  vancomycin (VANCOCIN) 2000 mg in  ml infusion  2,000 mg,   IntraVENous,   EVERY 12 HOURS      04/05/20 0000 --    04/04/20 1119  piperacillin-tazobactam (ZOSYN) 3.375 g in 0.9% sodium chloride (MBP/ADV) 100 mL MBP  3.375 g,   IntraVENous,   EVERY 6 HOURS      04/04/20 1200 04/11/20 1159    04/04/20 1133  vancomycin (VANCOCIN) 2000 mg in  ml infusion  2,000 mg,   IntraVENous,   ONCE      04/04/20 1200 04/04/20 2359    04/04/20 0148  hydroxychloroquine (PLAQUENIL) 25 mg/ml oral suspension 400 mg  400 mg,   Oral,   2 TIMES DAILY      04/04/20 0149 04/04/20 2059 04/04/20 0117  azithromycin (ZITHROMAX) 500 mg in 0.9% sodium chloride 250 mL (VIAL-MATE)  500 mg,   IntraVENous,   EVERY 24 HOURS      04/04/20 0118 --               Last Level (if applicable) No results found for: DOSE, TMG, DTG  Vancomycin   No results found for: VANCP, VANCT, VANCR   Gentamicin   No results found for: GENP, GENT, GENR]  Tobramycin   No results found for: TOBP, TOBT, TOBR   Amikacin   No results found for: AMIKP, DAMIKP, AMIKT, DAMIKT, DAMIKR     Cultures (7 most recent)   GRAM STAIN   Date Value Ref Range Status   03/03/2018 >25 WBC/lpf   Final   03/03/2018 10-25 WBC/lpf   Final   03/03/2018 MUCUS PRESENT   Final   03/03/2018 MODERATE GRAM POSITIVE COCCI IN PAIRS IN CHAINS   Final   03/03/2018 FEW GRAM POSITIVE RODS   Final     Culture result:   Date Value Ref Range Status   05/11/2018 NO GROWTH 6 DAYS   Final   05/11/2018 NO GROWTH 6 DAYS   Final   04/22/2018 NO GROWTH 6 DAYS   Final 2018 NO GROWTH 6 DAYS   Final   2018 MODERATE NORMAL RESPIRATORY KATHYA   Final   06/15/2011 NO GROWTH 6 DAYS  Final   2011 NO GROWTH 6 DAYS  Final      Renal Overview (72 hr)         Recent Labs     20   BUN 33* 35*   CREA 1.66* 1.64*       CrCl (Current): Estimated Creatinine Clearance: 83 mL/min (A) (based on SCr of 1.66 mg/dL (H)). Lactic Acid    (Sepsis Criteria: >2.0 mmol/L) Lab Results   Component Value Date/Time    Lactic acid 0.6 2020 12:10 AM    Lactic acid 0.7 2018 01:05 PM      Procalcitonin (0.10-0.49 NG/ML) No results found for: PCT   Hepatic Overview (72 hr) Recent Labs     20   * 366*   SGOT 236* 285*   * 162*      Temp (24-hr Max) Temp (24hrs), Av.2 °F (36.2 °C), Min:96.6 °F (35.9 °C), Max:98.9 °F (37.2 °C)       Hematology Recent Labs     20  0010 20   WBC 8.0  --  8.6   HGB 13.4  --  12.4*   HCT 42.9  --  38.9     --  191   GRANS  --   --  74*   ANEU  --   --  6.3   LAC  --  0.6  --         DOT  01     Notes Steadily increasing SCr  Vancomycin 2 gm IV q12, beginning @ 12:00 20  Daily PCT ordered.          KELLE James  Clinical Pharmacist  [phone]

## 2020-04-04 NOTE — PROGRESS NOTES
0132-Verbal shift change report given to Lula Rodríguez RN (oncoming nurse) by Karina Ibarra RN (offgoing nurse). Report included the following information SBAR, Kardex, ED Summary, Intake/Output, MAR, Recent Results, Cardiac Rhythm sinus rhythm and Alarm Parameters . Pt to be transferred to ICU 4 after completion of central line placement. 0318-Pt received in ICU 4 via stretcher with ED RN and RT. Transferred to bed without difficulty with Slide board and x5 person assist. Lungs are coarse and very diminished. Pt is waking up on propofol, coughing on vent. Titrated Propofol per Rass goal. CHG bath performed and x2 RN skin assessment done. Skin intact , dry/leathery on lower extremities. 0545-Pt at maxed rate of propofol at this time, current rate 66.4ml/hr. Orders received from Dr. Brenda Burch to start Fentanyl/Versed drip and DC Propofol. 0730-Bedside verbal report given to Hannah Swanson RN. Sbar, mar, labs, kardex and patient status reviewed. Pt is waking up at this time. Titrated up Sedation per orders and gave 1x 2mg Versed order verbally received from Dr. Brenda Burch. Pt is resting quietly at this time and appears comfortable. Relinquished care of patient.
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
## 2020-04-04 NOTE — PROGRESS NOTES
Hospitalist Progress Note    Patient: Marcella Bhakta MRN: [id number]  CSN: [id number]    YOB: 1960  Age: 62 y.o. Sex: male    DOA: 4/3/2020 LOS:  LOS: 1 day          Chief Complaint:      61 y/o male w/ hx of COPD on 4L home O2, systolic/diastolic CHF and EF of 57%, and super morbid obesity with recent admission to [facility] who presents in acute hypercapneic respiratory failure. He also has transaminitis and acute on chronic renal insufficiency. Required intubation due to hypercapnic renal failure  Urine drug screen positive for cocaine obtunded    1. Cardiovascular:  Hypertension avoiding beta-blocker and hydralazine currently on amlodipine echocardiogram ordered and pending last EF was 30    2. Pulmonary  Hypercapnic respiratory failure with obesity hypoventilation syndrome currently on a vent with evidence of pneumonia on  Zosyn and vancomycin since recently discharged from hospital on Solu-Medrol IV due to history of COPD    3. ID: Pneumonia possible COV ID [id number] will recheck pending sending to health department await confirmation  On empiric Plaquenil /Zithromax and melatonin, zinc, and vitamin C    4. Endocrine  On sliding scale insulin    5. FEN n.p.o. for now ICU protocol for electrolytes    6. Renal:  Chronic kidney disease baseline creatinine of 1.2-1.5 we will monitor for now consider early renal o evaluation strict I's and O's    7. Heme  On full dose Lovenox for DVT treatment high risk    8.   Psych  Currently sedated with fentanyl watch for cocaine withdrawal avoiding beta-blocker using Versed as needed  Patient Active Problem List   Diagnosis Code    COPD exacerbation ([facility] [address] J44.1    Type II diabetes mellitus with peripheral autonomic neuropathy (HCC) E11.43    Hypertension I10    JIM (obstructive sleep apnea) G47.33    Hypoxia R09.02    Morbid obesity (HCC) E66.01    Chest pain R07.9    Acute on chronic combined systolic and diastolic ACC/AHA stage C congestive heart failure ([facility]) I50.43    CHF (congestive heart failure) ([facility]) I50.9    Acute kidney injury ([facility]) N17.9    COPD (chronic obstructive pulmonary disease) ([facility]) J44.9    Acute on chronic respiratory failure ([facility]) J96.20    Cocaine abuse ([facility]) F14.10    SOB (shortness of breath) R06.02    Acute respiratory distress R06.03    Hypertensive emergency I16.1    Acute exacerbation of CHF (congestive heart failure) ([facility]) I50.9    COPD with exacerbation ([facility]) J44.1    Respiratory failure with hypoxia and hypercapnia ([facility]) J96.91, J96.92    Sleep apnea G47.30    Acute respiratory failure with hypoxia and hypercapnia ([facility]) J96.01, J96.02    COPD with acute exacerbation ([facility]) J44.1    Respiratory failure with hypercapnia ([facility]) J96.92    Transaminitis R74.0    Acute on chronic renal insufficiency N28.9, N18.9    Suspected Covid-19 Virus Infection R68.89    Morbid obesity with body mass index of 50 or higher ([facility]) E66.01       Subjective:      Hypercapnic respiratory failure overnight  Review of systems:    Limited as patient is intubated    Vital signs/Intake and Output:  Visit Vitals  BP (!) 173/95   Pulse 76   Temp 97 °F (36.1 °C)   Resp 20   Ht 5' 7\" (1.702 m)   Wt (!) 214.6 kg (473 lb)   SpO2 97%   BMI 74.08 kg/m²     Current Shift:  No intake/output data recorded.   Last three shifts:  04/02 1901 - 04/04 0700  In: 394.6 [I.V.:394.6]  Out: 3775 [Urine:3575]    Exam:    General: Well developed, alert, obese comfortable on ventilator  Head/Neck: NCAT, supple, No masses, No lymphadenopathy  CVS:Regular rate and rhythm, no M/R/G, S1/S2 heard, no thrill  Extremities: Large with SCDs +  Skin:normal texture and turgor, no rashes, no lesions  Neuro: sedated on vent                  Labs: Results:       Chemistry Recent Labs     04/04/20  0331 04/03/20 2035   * 110*    142   K 4.8 4.2    107   CO2 33* 31   BUN 33* 35*   CREA 1.66* 1.64*   CA 8.4* 8.4*   AGAP 5 4   BUCR 20 21*   * 162*   TP 6.9 6. 6   ALB 3.2* 3.2*   GLOB 3.7 3.4   AGRAT 0.9 0.9      CBC w/Diff Recent Labs     04/04/20  0331 04/03/20 2035   WBC 8.0 8.6   RBC 4.82 4.45*   HGB 13.4 12.4*   HCT 42.9 38.9    191   GRANS  --  74*   LYMPH  --  18*   EOS  --  1      Cardiac Enzymes Recent Labs     04/04/20  0216 04/03/20 2035   * 635*   CKND1 1.2 1.4      Coagulation No results for input(s): PTP, INR, APTT, INREXT in the last 72 hours. Lipid Panel Lab Results   Component Value Date/Time    Cholesterol, total 196 01/25/2018 02:51 AM    HDL Cholesterol 64 (H) 01/25/2018 02:51 AM    LDL, calculated 121.2 (H) 01/25/2018 02:51 AM    VLDL, calculated 10.8 01/25/2018 02:51 AM    Triglyceride 54 01/25/2018 02:51 AM    CHOL/HDL Ratio 3.1 01/25/2018 02:51 AM      BNP No results for input(s): BNPP in the last 72 hours.    Liver Enzymes Recent Labs     04/04/20 0331   TP 6.9   ALB 3.2*   *   SGOT 236*      Thyroid Studies Lab Results   Component Value Date/Time    TSH 0.11 (L) 03/19/2019 06:50 AM        Procedures/imaging: see electronic medical records for all procedures/Xrays and details which were not copied into this note but were reviewed prior to creation of Baron Be MD
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
## 2020-04-04 NOTE — PROGRESS NOTES
0730-Received report from [address] [place] [address] included SBAR MAR and Kardex. Shift Summary- Sister Bunny Gates notified of pt hospitalization. Maintained on vent with versed and fentanyl to RASS of -2. Bathed with CHG wipes and NG tube to low intermittent suction maintained. Bedside and Verbal shift change report given to Yuriy Grimaldo (oncoming nurse) by Jasen Vilchis RN (offgoing nurse). Report included the following information SBAR, Kardex and MAR.

## 2020-04-04 NOTE — PROGRESS NOTES
Pt transported to 104 on portable vent & then placed on ; all alarms on & functioning with ambu bag @ hob

## 2020-04-04 NOTE — PROGRESS NOTES
Called COVID 19 alert for anesthesia to intubate. Tried to contact emergency contact and the number is disconnected.

## 2020-04-04 NOTE — ANESTHESIA PROCEDURE NOTES
Emergent Intubation  Performed by: Damon Alvarez CRNA  Authorized by: Damon Alvarez CRNA     Emergent Intubation:   Location:  ED  Date/Time:  4/3/2020 11:55 PM  Indications:  Respiratory failure  Spontaneous Ventilation: present    Expected sedation level: lethargic. Preoxygenated:  Yes      Airway Documentation:   Airway:  ETT - Cuffed  Technique:  Intubating stylet  Advanced Technique:  Avina  Blade Type:  Atul  Blade Size:  4  ETT size (mm):  7.5  ETT Line Osei:  Lips  ETT Insertion depth (cm):  23  Placement verified by: auscultation, EtCO2 and BBS    Attempts:  1  Difficult airway: No

## 2020-04-04 NOTE — PROGRESS NOTES
Noted admission of high risk patient with report of negative COVID result from [facility]; have sent request to [facility] for approval to test at state lab rather than send to [facility]; will notify lab to hold specimen until response from [facility] and will note location where specimen will be sent.

## 2020-04-04 NOTE — PROGRESS NOTES
Chart reviewed as CM on call. Pt admitted to ICU. Noted to be on vent. Pts PCP listed as MD Elijah Almaraz. From previous CM notes from previous hospital admissions it appears pt should have home oxygen and possibly triology, however this will need to be confirmed. CM will follow for transition of care needs and will be available via hospital  on weekends.

## 2020-04-04 NOTE — ED NOTES
Pt less responsive. Responds to tactile stimuli by opening eyes, mumbles, responses unclear. Provider notified and in room to assess pt.

## 2020-04-04 NOTE — ED NOTES
Pt transported to ICU per stretcher on ventilator and cardiac monitor with myself and RT as escort. Pt sedated.  Respiratory support per ventilator

## 2020-04-05 ENCOUNTER — APPOINTMENT (OUTPATIENT)
Dept: NON INVASIVE DIAGNOSTICS | Age: 60
DRG: 005 | End: 2020-04-05
Attending: FAMILY MEDICINE
Payer: MEDICAID

## 2020-04-05 ENCOUNTER — APPOINTMENT (OUTPATIENT)
Dept: GENERAL RADIOLOGY | Age: 60
DRG: 005 | End: 2020-04-05
Attending: FAMILY MEDICINE
Payer: MEDICAID

## 2020-04-05 LAB
ALBUMIN SERPL-MCNC: 2.9 G/DL (ref 3.4–5)
ALBUMIN/GLOB SERPL: 0.9 {RATIO} (ref 0.8–1.7)
ALP SERPL-CCNC: 127 U/L (ref 45–117)
ALT SERPL-CCNC: 289 U/L (ref 16–61)
ANION GAP SERPL CALC-SCNC: 6 MMOL/L (ref 3–18)
APPEARANCE UR: ABNORMAL
ARTERIAL PATENCY WRIST A: ABNORMAL
AST SERPL-CCNC: 122 U/L (ref 10–38)
ATRIAL RATE: 57 BPM
AV VELOCITY RATIO: 0.55
AV VTI RATIO: 0.5
BACTERIA URNS QL MICRO: ABNORMAL /HPF
BASE EXCESS BLD CALC-SCNC: 8 MMOL/L
BDY SITE: ABNORMAL
BILIRUB SERPL-MCNC: 0.7 MG/DL (ref 0.2–1)
BILIRUB UR QL: NEGATIVE
BNP SERPL-MCNC: 1277 PG/ML (ref 0–900)
BODY TEMPERATURE: 98.5
BUN SERPL-MCNC: 33 MG/DL (ref 7–18)
BUN/CREAT SERPL: 20 (ref 12–20)
CA-I SERPL-SCNC: 1.13 MMOL/L (ref 1.12–1.32)
CALCIUM SERPL-MCNC: 8.7 MG/DL (ref 8.5–10.1)
CALCULATED P AXIS, ECG09: 58 DEGREES
CALCULATED R AXIS, ECG10: -10 DEGREES
CALCULATED T AXIS, ECG11: 99 DEGREES
CHLORIDE SERPL-SCNC: 107 MMOL/L (ref 100–111)
CK MB CFR SERPL CALC: 1.1 % (ref 0–4)
CK MB SERPL-MCNC: 1.6 NG/ML (ref 5–25)
CK SERPL-CCNC: 143 U/L (ref 39–308)
CO2 SERPL-SCNC: 33 MMOL/L (ref 21–32)
COLOR UR: ABNORMAL
CREAT SERPL-MCNC: 1.65 MG/DL (ref 0.6–1.3)
CRP SERPL HS-MCNC: >9.5 MG/L
D DIMER PPP FEU-MCNC: 3.02 UG/ML(FEU)
DIAGNOSIS, 93000: NORMAL
ECHO AV AREA PEAK VELOCITY: 2.1 CM2
ECHO AV AREA VTI: 2.1 CM2
ECHO AV AREA/BSA PEAK VELOCITY: 0.7 CM2/M2
ECHO AV AREA/BSA VTI: 0.7 CM2/M2
ECHO AV MEAN GRADIENT: 2.8 MMHG
ECHO AV MEAN VELOCITY: 0.79 M/S
ECHO AV PEAK GRADIENT: 4.7 MMHG
ECHO AV PEAK VELOCITY: 108.35 CM/S
ECHO AV VTI: 22.14 CM
ECHO IVC PROX: 2.7 CM
ECHO LA AREA 2C: 36.77 CM2
ECHO LA AREA 4C: 30.2 CM2
ECHO LA VOL 2C: 147.2 ML (ref 18–58)
ECHO LA VOL 4C: 99.37 ML (ref 18–58)
ECHO LA VOL BP: 129.41 ML (ref 18–58)
ECHO LA VOLUME INDEX A2C: 50.49 ML/M2 (ref 16–28)
ECHO LA VOLUME INDEX A4C: 34.09 ML/M2 (ref 16–28)
ECHO LV E' LATERAL VELOCITY: 3 CM/S
ECHO LV E' SEPTAL VELOCITY: 4 CM/S
ECHO LV EDV A2C: 139 ML
ECHO LV EDV A4C: 187.5 ML
ECHO LV EDV BP: 163 ML (ref 67–155)
ECHO LV EDV INDEX A4C: 64.3 ML/M2
ECHO LV EDV INDEX BP: 55.9 ML/M2
ECHO LV EDV NDEX A2C: 47.7 ML/M2
ECHO LV EDV TEICHHOLZ: 1.02 ML
ECHO LV EJECTION FRACTION A2C: 35 %
ECHO LV EJECTION FRACTION A4C: 28 %
ECHO LV EJECTION FRACTION BIPLANE: 31.7 % (ref 55–100)
ECHO LV ESV A2C: 90.7 ML
ECHO LV ESV A4C: 135.3 ML
ECHO LV ESV BP: 111.3 ML (ref 22–58)
ECHO LV ESV INDEX A2C: 31.1 ML/M2
ECHO LV ESV INDEX A4C: 46.4 ML/M2
ECHO LV ESV INDEX BP: 38.2 ML/M2
ECHO LV ESV TEICHHOLZ: 0.75 ML
ECHO LV INTERNAL DIMENSION DIASTOLIC: 5.91 CM (ref 4.2–5.9)
ECHO LV INTERNAL DIMENSION SYSTOLIC: 5.18 CM
ECHO LV IVSD: 1.39 CM (ref 0.6–1)
ECHO LV MASS 2D: 426.2 G (ref 88–224)
ECHO LV MASS INDEX 2D: 146.2 G/M2 (ref 49–115)
ECHO LV POSTERIOR WALL DIASTOLIC: 1.27 CM (ref 0.6–1)
ECHO LVOT DIAM: 2.22 CM
ECHO LVOT PEAK GRADIENT: 1.4 MMHG
ECHO LVOT PEAK VELOCITY: 60.09 CM/S
ECHO LVOT VTI: 12.05 CM
ECHO MV A VELOCITY: 97.39 CM/S
ECHO MV E DECELERATION TIME (DT): 281.1 MS
ECHO MV E VELOCITY: 64.48 CM/S
ECHO MV E/A RATIO: 0.66
ECHO MV E/E' LATERAL: 21.49
ECHO MV E/E' RATIO (AVERAGED): 18.81
ECHO MV E/E' SEPTAL: 16.12
ECHO MV PRESSURE HALF TIME (PHT): 40.8 MS
ECHO RA AREA 4C: 32.42 CM2
ECHO RA VOLUME: 133.2 ML
ECHO RV INTERNAL DIMENSION: 4.88 CM
ECHO TRICUSPID ANNULAR PEAK SYSTOLIC VELOCITY: 2.1 CM/S
ECHO TV REGURGITANT MAX VELOCITY: 275.33 CM/S
ECHO TV REGURGITANT PEAK GRADIENT: 30.3 MMHG
EPITH CASTS URNS QL MICRO: ABNORMAL /LPF (ref 0–5)
ERYTHROCYTE [DISTWIDTH] IN BLOOD BY AUTOMATED COUNT: 16.6 % (ref 11.6–14.5)
ERYTHROCYTE [DISTWIDTH] IN BLOOD BY AUTOMATED COUNT: 16.7 % (ref 11.6–14.5)
GAS FLOW.O2 O2 DELIVERY SYS: ABNORMAL L/MIN
GAS FLOW.O2 SETTING OXYMISER: 20 BPM
GLOBULIN SER CALC-MCNC: 3.3 G/DL (ref 2–4)
GLUCOSE BLD STRIP.AUTO-MCNC: 116 MG/DL (ref 70–110)
GLUCOSE BLD STRIP.AUTO-MCNC: 119 MG/DL (ref 70–110)
GLUCOSE BLD STRIP.AUTO-MCNC: 119 MG/DL (ref 70–110)
GLUCOSE BLD STRIP.AUTO-MCNC: 131 MG/DL (ref 70–110)
GLUCOSE BLD STRIP.AUTO-MCNC: 142 MG/DL (ref 70–110)
GLUCOSE SERPL-MCNC: 117 MG/DL (ref 74–99)
GLUCOSE UR STRIP.AUTO-MCNC: NEGATIVE MG/DL
HCO3 BLD-SCNC: 32.1 MMOL/L (ref 22–26)
HCT VFR BLD AUTO: 40.6 % (ref 36–48)
HCT VFR BLD AUTO: 41.3 % (ref 36–48)
HGB BLD-MCNC: 12.7 G/DL (ref 13–16)
HGB BLD-MCNC: 13.1 G/DL (ref 13–16)
HGB UR QL STRIP: ABNORMAL
IL6 SERPL-MCNC: 70.9 PG/ML (ref 0–15.5)
INSPIRATION.DURATION SETTING TIME VENT: 0.9 SEC
KETONES UR QL STRIP.AUTO: NEGATIVE MG/DL
LEUKOCYTE ESTERASE UR QL STRIP.AUTO: ABNORMAL
LVOT MG: 0.75 MMHG
LVOT MV: 0.41 CM/S
LVSV (MOD BI): 16.23 ML
LVSV (MOD SINGLE 4C): 16.39 ML
LVSV (MOD SINGLE): 15.19 ML
MAGNESIUM SERPL-MCNC: 2.3 MG/DL (ref 1.6–2.6)
MCH RBC QN AUTO: 27.2 PG (ref 24–34)
MCH RBC QN AUTO: 27.6 PG (ref 24–34)
MCHC RBC AUTO-ENTMCNC: 31.3 G/DL (ref 31–37)
MCHC RBC AUTO-ENTMCNC: 31.7 G/DL (ref 31–37)
MCV RBC AUTO: 86.9 FL (ref 74–97)
MCV RBC AUTO: 87.1 FL (ref 74–97)
MV DEC SLOPE: 2.29
NITRITE UR QL STRIP.AUTO: NEGATIVE
O2/TOTAL GAS SETTING VFR VENT: 0.5 %
P-R INTERVAL, ECG05: 166 MS
PCO2 BLD: 46.2 MMHG (ref 35–45)
PEEP RESPIRATORY: 10 CMH2O
PH BLD: 7.45 [PH] (ref 7.35–7.45)
PH UR STRIP: 5.5 [PH] (ref 5–8)
PHOSPHATE SERPL-MCNC: 2.8 MG/DL (ref 2.5–4.9)
PIP ISTAT,IPIP: 30
PLATELET # BLD AUTO: 177 K/UL (ref 135–420)
PLATELET # BLD AUTO: 192 K/UL (ref 135–420)
PMV BLD AUTO: 12.4 FL (ref 9.2–11.8)
PMV BLD AUTO: 12.8 FL (ref 9.2–11.8)
PO2 BLD: 81 MMHG (ref 80–100)
POTASSIUM SERPL-SCNC: 4 MMOL/L (ref 3.5–5.5)
PROCALCITONIN SERPL-MCNC: 0.26 NG/ML
PROT SERPL-MCNC: 6.2 G/DL (ref 6.4–8.2)
PROT UR STRIP-MCNC: 30 MG/DL
Q-T INTERVAL, ECG07: 534 MS
QRS DURATION, ECG06: 106 MS
QTC CALCULATION (BEZET), ECG08: 519 MS
RBC # BLD AUTO: 4.67 M/UL (ref 4.7–5.5)
RBC # BLD AUTO: 4.74 M/UL (ref 4.7–5.5)
RBC #/AREA URNS HPF: ABNORMAL /HPF (ref 0–5)
SAO2 % BLD: 96 % (ref 92–97)
SERVICE CMNT-IMP: ABNORMAL
SODIUM SERPL-SCNC: 146 MMOL/L (ref 136–145)
SP GR UR REFRACTOMETRY: 1.03 (ref 1–1.03)
SPECIMEN TYPE: ABNORMAL
TOTAL RESP. RATE, ITRR: 20
TROPONIN I SERPL-MCNC: 0.02 NG/ML (ref 0–0.04)
URATE CRY URNS QL MICRO: ABNORMAL
UROBILINOGEN UR QL STRIP.AUTO: 1 EU/DL (ref 0.2–1)
VENTILATION MODE VENT: ABNORMAL
VENTRICULAR RATE, ECG03: 57 BPM
VOLUME CONTROL PLUS IVLCP: YES
VT SETTING VENT: 480 ML
WBC # BLD AUTO: 7.4 K/UL (ref 4.6–13.2)
WBC # BLD AUTO: 8.7 K/UL (ref 4.6–13.2)
WBC URNS QL MICRO: ABNORMAL /HPF (ref 0–5)

## 2020-04-05 PROCEDURE — 82803 BLOOD GASES ANY COMBINATION: CPT

## 2020-04-05 PROCEDURE — 74011250637 HC RX REV CODE- 250/637: Performed by: INTERNAL MEDICINE

## 2020-04-05 PROCEDURE — 85379 FIBRIN DEGRADATION QUANT: CPT

## 2020-04-05 PROCEDURE — 74011250636 HC RX REV CODE- 250/636: Performed by: FAMILY MEDICINE

## 2020-04-05 PROCEDURE — C8929 TTE W OR WO FOL WCON,DOPPLER: HCPCS

## 2020-04-05 PROCEDURE — 36600 WITHDRAWAL OF ARTERIAL BLOOD: CPT

## 2020-04-05 PROCEDURE — 74011000250 HC RX REV CODE- 250: Performed by: INTERNAL MEDICINE

## 2020-04-05 PROCEDURE — 74011250637 HC RX REV CODE- 250/637: Performed by: FAMILY MEDICINE

## 2020-04-05 PROCEDURE — 77010033678 HC OXYGEN DAILY

## 2020-04-05 PROCEDURE — 74011000250 HC RX REV CODE- 250: Performed by: FAMILY MEDICINE

## 2020-04-05 PROCEDURE — 74011250636 HC RX REV CODE- 250/636: Performed by: INTERNAL MEDICINE

## 2020-04-05 PROCEDURE — 86141 C-REACTIVE PROTEIN HS: CPT

## 2020-04-05 PROCEDURE — 93005 ELECTROCARDIOGRAM TRACING: CPT

## 2020-04-05 PROCEDURE — C9113 INJ PANTOPRAZOLE SODIUM, VIA: HCPCS | Performed by: FAMILY MEDICINE

## 2020-04-05 PROCEDURE — 81001 URINALYSIS AUTO W/SCOPE: CPT

## 2020-04-05 PROCEDURE — 83735 ASSAY OF MAGNESIUM: CPT

## 2020-04-05 PROCEDURE — 85027 COMPLETE CBC AUTOMATED: CPT

## 2020-04-05 PROCEDURE — 83520 IMMUNOASSAY QUANT NOS NONAB: CPT

## 2020-04-05 PROCEDURE — 74011000258 HC RX REV CODE- 258: Performed by: INTERNAL MEDICINE

## 2020-04-05 PROCEDURE — 82330 ASSAY OF CALCIUM: CPT

## 2020-04-05 PROCEDURE — 65610000006 HC RM INTENSIVE CARE

## 2020-04-05 PROCEDURE — 82550 ASSAY OF CK (CPK): CPT

## 2020-04-05 PROCEDURE — 84145 PROCALCITONIN (PCT): CPT

## 2020-04-05 PROCEDURE — 82962 GLUCOSE BLOOD TEST: CPT

## 2020-04-05 PROCEDURE — 71045 X-RAY EXAM CHEST 1 VIEW: CPT

## 2020-04-05 PROCEDURE — 84100 ASSAY OF PHOSPHORUS: CPT

## 2020-04-05 PROCEDURE — 74011250636 HC RX REV CODE- 250/636

## 2020-04-05 PROCEDURE — 83880 ASSAY OF NATRIURETIC PEPTIDE: CPT

## 2020-04-05 PROCEDURE — 94003 VENT MGMT INPAT SUBQ DAY: CPT

## 2020-04-05 PROCEDURE — 80053 COMPREHEN METABOLIC PANEL: CPT

## 2020-04-05 RX ORDER — MIDAZOLAM HYDROCHLORIDE 1 MG/ML
INJECTION, SOLUTION INTRAMUSCULAR; INTRAVENOUS
Status: COMPLETED
Start: 2020-04-05 | End: 2020-04-05

## 2020-04-05 RX ORDER — FENTANYL CITRATE 50 UG/ML
100 INJECTION, SOLUTION INTRAMUSCULAR; INTRAVENOUS
Status: DISCONTINUED | OUTPATIENT
Start: 2020-04-05 | End: 2020-04-14

## 2020-04-05 RX ORDER — FENTANYL CITRATE 50 UG/ML
100 INJECTION, SOLUTION INTRAMUSCULAR; INTRAVENOUS
Status: DISCONTINUED | OUTPATIENT
Start: 2020-04-05 | End: 2020-04-05

## 2020-04-05 RX ORDER — MIDAZOLAM HYDROCHLORIDE 1 MG/ML
2 INJECTION, SOLUTION INTRAMUSCULAR; INTRAVENOUS ONCE
Status: COMPLETED | OUTPATIENT
Start: 2020-04-05 | End: 2020-04-05

## 2020-04-05 RX ORDER — ASPIRIN 325 MG/1
200 TABLET, FILM COATED ORAL 2 TIMES DAILY
Status: DISCONTINUED | OUTPATIENT
Start: 2020-04-05 | End: 2020-04-22 | Stop reason: HOSPADM

## 2020-04-05 RX ORDER — SCOLOPAMINE TRANSDERMAL SYSTEM 1 MG/1
1 PATCH, EXTENDED RELEASE TRANSDERMAL
Status: DISCONTINUED | OUTPATIENT
Start: 2020-04-05 | End: 2020-04-09

## 2020-04-05 RX ADMIN — FENTANYL CITRATE 100 MCG: 50 INJECTION INTRAMUSCULAR; INTRAVENOUS at 20:26

## 2020-04-05 RX ADMIN — VANCOMYCIN HYDROCHLORIDE 2000 MG: 10 INJECTION, POWDER, LYOPHILIZED, FOR SOLUTION INTRAVENOUS at 00:53

## 2020-04-05 RX ADMIN — MIDAZOLAM HYDROCHLORIDE 10 MG/HR: 5 INJECTION, SOLUTION INTRAMUSCULAR; INTRAVENOUS at 15:04

## 2020-04-05 RX ADMIN — PIPERACILLIN AND TAZOBACTAM 3.38 G: 3; .375 INJECTION, POWDER, LYOPHILIZED, FOR SOLUTION INTRAVENOUS at 06:25

## 2020-04-05 RX ADMIN — LORAZEPAM 1 MG: 2 INJECTION INTRAMUSCULAR; INTRAVENOUS at 00:59

## 2020-04-05 RX ADMIN — Medication 50 MCG/HR: at 00:07

## 2020-04-05 RX ADMIN — ZINC SULFATE 220 MG (50 MG) CAPSULE 2 CAPSULE: CAPSULE at 08:06

## 2020-04-05 RX ADMIN — LORAZEPAM 1 MG: 2 INJECTION INTRAMUSCULAR; INTRAVENOUS at 20:15

## 2020-04-05 RX ADMIN — LORAZEPAM 1 MG: 2 INJECTION INTRAMUSCULAR; INTRAVENOUS at 15:04

## 2020-04-05 RX ADMIN — FENTANYL CITRATE 100 MCG: 50 INJECTION INTRAMUSCULAR; INTRAVENOUS at 17:00

## 2020-04-05 RX ADMIN — VANCOMYCIN HYDROCHLORIDE 2000 MG: 10 INJECTION, POWDER, LYOPHILIZED, FOR SOLUTION INTRAVENOUS at 12:35

## 2020-04-05 RX ADMIN — Medication 10 ML: at 13:04

## 2020-04-05 RX ADMIN — FUROSEMIDE 20 MG: 10 INJECTION, SOLUTION INTRAMUSCULAR; INTRAVENOUS at 08:07

## 2020-04-05 RX ADMIN — METHYLPREDNISOLONE SODIUM SUCCINATE 60 MG: 40 INJECTION, POWDER, FOR SOLUTION INTRAMUSCULAR; INTRAVENOUS at 08:06

## 2020-04-05 RX ADMIN — MIDAZOLAM HYDROCHLORIDE 8 MG/HR: 5 INJECTION, SOLUTION INTRAMUSCULAR; INTRAVENOUS at 09:10

## 2020-04-05 RX ADMIN — Medication 500 MG: at 20:29

## 2020-04-05 RX ADMIN — ENOXAPARIN SODIUM 200 MG: 100 INJECTION SUBCUTANEOUS at 12:36

## 2020-04-05 RX ADMIN — CHLORHEXIDINE GLUCONATE 10 ML: 1.2 RINSE ORAL at 08:06

## 2020-04-05 RX ADMIN — Medication 10 MG: at 20:30

## 2020-04-05 RX ADMIN — MIDAZOLAM HYDROCHLORIDE 2 MG: 1 INJECTION, SOLUTION INTRAMUSCULAR; INTRAVENOUS at 20:55

## 2020-04-05 RX ADMIN — LORAZEPAM 1 MG: 2 INJECTION INTRAMUSCULAR; INTRAVENOUS at 22:26

## 2020-04-05 RX ADMIN — ONDANSETRON 4 MG: 2 INJECTION INTRAMUSCULAR; INTRAVENOUS at 23:08

## 2020-04-05 RX ADMIN — Medication 500 MG: at 08:07

## 2020-04-05 RX ADMIN — FUROSEMIDE 20 MG: 10 INJECTION, SOLUTION INTRAMUSCULAR; INTRAVENOUS at 20:28

## 2020-04-05 RX ADMIN — THIAMINE HCL TAB 100 MG 200 MG: 100 TAB at 08:07

## 2020-04-05 RX ADMIN — LORAZEPAM 1 MG: 2 INJECTION INTRAMUSCULAR; INTRAVENOUS at 10:29

## 2020-04-05 RX ADMIN — Medication 10 ML: at 07:23

## 2020-04-05 RX ADMIN — PERFLUTREN 1 ML: 6.52 INJECTION, SUSPENSION INTRAVENOUS at 10:55

## 2020-04-05 RX ADMIN — AMLODIPINE BESYLATE 10 MG: 5 TABLET ORAL at 08:07

## 2020-04-05 RX ADMIN — PIPERACILLIN AND TAZOBACTAM 3.38 G: 3; .375 INJECTION, POWDER, LYOPHILIZED, FOR SOLUTION INTRAVENOUS at 13:04

## 2020-04-05 RX ADMIN — ENOXAPARIN SODIUM 200 MG: 100 INJECTION SUBCUTANEOUS at 01:01

## 2020-04-05 RX ADMIN — PIPERACILLIN AND TAZOBACTAM 3.38 G: 3; .375 INJECTION, POWDER, LYOPHILIZED, FOR SOLUTION INTRAVENOUS at 20:32

## 2020-04-05 RX ADMIN — AZITHROMYCIN MONOHYDRATE 500 MG: 500 INJECTION, POWDER, LYOPHILIZED, FOR SOLUTION INTRAVENOUS at 01:47

## 2020-04-05 RX ADMIN — MIDAZOLAM HYDROCHLORIDE 10 MG/HR: 5 INJECTION, SOLUTION INTRAMUSCULAR; INTRAVENOUS at 20:49

## 2020-04-05 RX ADMIN — PIPERACILLIN AND TAZOBACTAM 3.38 G: 3; .375 INJECTION, POWDER, LYOPHILIZED, FOR SOLUTION INTRAVENOUS at 00:52

## 2020-04-05 RX ADMIN — LORAZEPAM 1 MG: 2 INJECTION INTRAMUSCULAR; INTRAVENOUS at 13:02

## 2020-04-05 RX ADMIN — LORAZEPAM 1 MG: 2 INJECTION INTRAMUSCULAR; INTRAVENOUS at 08:07

## 2020-04-05 RX ADMIN — CHLORHEXIDINE GLUCONATE 10 ML: 1.2 RINSE ORAL at 20:28

## 2020-04-05 RX ADMIN — SODIUM CHLORIDE 40 MG: 9 INJECTION, SOLUTION INTRAMUSCULAR; INTRAVENOUS; SUBCUTANEOUS at 08:06

## 2020-04-05 RX ADMIN — THIAMINE HCL TAB 100 MG 200 MG: 100 TAB at 20:29

## 2020-04-05 NOTE — PROGRESS NOTES
Verbal shift change report received from RONY Arredondo RN (offgoing nurse). Report included the following information SBAR, Kardex, Intake/Output, MAR and Recent Results. 2015 Shift assessment completed, see EMR. Patient    [phone] Notified dr. Zaheer Law that patient's HR in 50's and intermittently drop into the 40's. Informed Dr. Juan Francisco Sepulveda Patient of QT 0.60s and QTc 0.56s. Order was given to titrate off fentanyl drip, continue with versed for sedation and prn ativan, and hold  0200 dose hydroxychloroquine and order 12 lead EKG in AM.     0015 Reassessment completed, see EMR. Patient following commands and open eyes to voice. 0100 Patient RASS +1, versed drip @ 6 mg/hr and 1 mg prn ativan was given. Krupa Payanks Dr. Lio Salas to clarify if azithromycin should be given with patient's QTc 0.54s. Order given to administer medication as ordered. 0430 Reassessment completed, see EMR      0630 Patent's 12 lead EKG was completed and transmitted and was reviewed by Dr. Lio Salas. Verbal shift change report given to EMMANUEL Diaz RN (oncoming nurse). Report included the following information SBAR, Kardex, MAR and Recent Results.

## 2020-04-05 NOTE — PROGRESS NOTES
0700  Bedside, Verbal and Written shift change report given to EMMANUEL [facility] (oncoming nurse) by RONY Campos (offgoing nurse). Report included the following information SBAR, Kardex, Intake/Output and Recent Results. 0830  Pt assessed, vented with sedation, following commands, SB on monitor, butler, OG tube to suction. [id number]  Dr. Kinjal Cortes at bedside assessing pt.  1200  Reassessed pt. Status unchanged, scheduled meds' given. VSS, remains vented with sedation. See flow sheet. 1600  NO change. VSS. See flow sheet. 1900 Bedside, Verbal and Written shift change report given to Renny Gustafson (oncoming nurse) by Adithya Heath [facility] (offgoing nurse). Report included the following information SBAR, Kardex, Intake/Output and Recent Results.

## 2020-04-05 NOTE — PROGRESS NOTES
Hospitalist Progress Note    Patient: Marcella Bhakta MRN: [id number]  CSN: [id number]    YOB: 1960  Age: 62 y.o. Sex: male    DOA: 4/3/2020 LOS:  LOS: 2 days          Chief Complaint:      61 y/o male w/ hx of COPD on 4L home O2, systolic/diastolic CHF and EF of 40%, and super morbid obesity with recent admission to [facility] who presents in acute hypercapneic respiratory failure. He also has transaminitis and acute on chronic renal insufficiency. Required intubation due to hypercapnic renal failure  Urine drug screen positive for cocaine obtunded    1. Cardiovascular:  Hypertension avoiding beta-blocker and hydralazine currently on amlodipine echocardiogram ordered ef 30-35 percent  With moderate Pulmonry HTN noted no vegatation blood cultures not done     2. Pulmonary  Hypercapnic respiratory failure with obesity hypoventilation syndrome currently on a vent with evidence of pneumonia on  Zosyn and vancomycin since recently discharged from hospital on Solu-Medrol IV due to history of COPD    3. ID: Pneumonia possible COV ID [id number] will recheck pending sending to health department await confirmation  On empiric Plaquenil /Zithromax held due to prolonged QT    4. Endocrine  On sliding scale insulin    5. FEN n.p.o. for now ICU protocol for electrolytes    6. Renal:  Chronic kidney disease baseline creatinine of 1.2-1.5 we will monitor for now consider early renal o evaluation strict I's and O's    7. Heme  On full dose Lovenox for DVT treatment high risk due to weight and possible covid    8.   Psych  Currently sedated with fentanyl watch for cocaine withdrawal avoiding beta-blocker using Versed as needed  Patient Active Problem List   Diagnosis Code    COPD exacerbation ([facility] [address] J44.1    Type II diabetes mellitus with peripheral autonomic neuropathy (HCC) E11.43    Hypertension I10    JIM (obstructive sleep apnea) G47.33    Hypoxia R09.02    Morbid obesity (HCC) E66.01    Chest pain R07.9    Acute on chronic combined systolic and diastolic ACC/AHA stage C congestive heart failure ([facility]) I50.43    CHF (congestive heart failure) ([facility]) I50.9    Acute kidney injury ([facility]) N17.9    COPD (chronic obstructive pulmonary disease) ([facility]) J44.9    Acute on chronic respiratory failure ([facility]) J96.20    Cocaine abuse ([facility]) F14.10    SOB (shortness of breath) R06.02    Acute respiratory distress R06.03    Hypertensive emergency I16.1    Acute exacerbation of CHF (congestive heart failure) ([facility]) I50.9    COPD with exacerbation ([facility]) J44.1    Respiratory failure with hypoxia and hypercapnia ([facility]) J96.91, J96.92    Sleep apnea G47.30    Acute respiratory failure with hypoxia and hypercapnia ([facility]) J96.01, J96.02    COPD with acute exacerbation ([facility]) J44.1    Respiratory failure with hypercapnia ([facility]) J96.92    Transaminitis R74.0    Acute on chronic renal insufficiency N28.9, N18.9    Suspected Covid-19 Virus Infection R68.89    Morbid obesity with body mass index of 50 or higher ([facility]) E66.01       Subjective:      Hypercapnic respiratory failure overnight  Review of systems:    Limited as patient is intubated    Vital signs/Intake and Output:  Visit Vitals  /77   Pulse (!) 54   Temp 95.9 °F (35.5 °C)   Resp 18   Ht 5' 7\" (1.702 m)   Wt (!) 214.6 kg (473 lb)   SpO2 95%   BMI 74.08 kg/m²     Current Shift:  04/05 0701 - 04/05 1900  In: 600 [I.V.:600]  Out: 300 [Urine:300]  Last three shifts:  04/03 1901 - 04/05 0700  In: 1340.6 [I.V.:1150.6]  Out: 6775 [Urine:6575]    Exam:    General: Well developed, alert, obese comfortable on ventilator  Head/Neck: NCAT, supple, No masses, No lymphadenopathy  CVS:Regular rate and rhythm, no M/R/G, S1/S2 heard, no thrill  Extremities: Large with SCDs +  Skin:normal texture and turgor, no rashes, no lesions  Neuro: sedated on vent                  Labs: Results:       Chemistry Recent Labs     04/05/20  0445 04/04/20  0331 04/03/20  2035   * 184* 110*   * 142 142   K 4.0 4.8 4.2    104 107   CO2 33* 33* 31   BUN 33* 33* 35*   CREA 1.65* 1.66* 1.64*   CA 8.7 8.4* 8.4*   AGAP 6 5 4   BUCR 20 20 21*   * 170* 162*   TP 6.2* 6.9 6.6   ALB 2.9* 3.2* 3.2*   GLOB 3.3 3.7 3.4   AGRAT 0.9 0.9 0.9      CBC w/Diff Recent Labs     04/05/20 0445 04/04/20  0331 04/03/20  2035   WBC 8.7 8.0 8.6   RBC 4.67* 4.82 4.45*   HGB 12.7* 13.4 12.4*   HCT 40.6 42.9 38.9    180 191   GRANS  --   --  74*   LYMPH  --   --  18*   EOS  --   --  1      Cardiac Enzymes Recent Labs     04/05/20 0445 04/04/20  1425    275   CKND1 1.1 1.1      Coagulation No results for input(s): PTP, INR, APTT, INREXT, INREXT in the last 72 hours. Lipid Panel Lab Results   Component Value Date/Time    Cholesterol, total 196 01/25/2018 02:51 AM    HDL Cholesterol 64 (H) 01/25/2018 02:51 AM    LDL, calculated 121.2 (H) 01/25/2018 02:51 AM    VLDL, calculated 10.8 01/25/2018 02:51 AM    Triglyceride 54 01/25/2018 02:51 AM    CHOL/HDL Ratio 3.1 01/25/2018 02:51 AM      BNP No results for input(s): BNPP in the last 72 hours.    Liver Enzymes Recent Labs     04/05/20 0445   TP 6.2*   ALB 2.9*   *   SGOT 122*      Thyroid Studies Lab Results   Component Value Date/Time    TSH 0.11 (L) 03/19/2019 06:50 AM        Procedures/imaging: see electronic medical records for all procedures/Xrays and details which were not copied into this note but were reviewed prior to creation of Baldemar Aguilar MD
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
## 2020-04-06 ENCOUNTER — APPOINTMENT (OUTPATIENT)
Dept: GENERAL RADIOLOGY | Age: 60
DRG: 005 | End: 2020-04-06
Attending: FAMILY MEDICINE
Payer: MEDICAID

## 2020-04-06 LAB
ALBUMIN SERPL-MCNC: 2.8 G/DL (ref 3.4–5)
ALBUMIN/GLOB SERPL: 0.8 {RATIO} (ref 0.8–1.7)
ALP SERPL-CCNC: 116 U/L (ref 45–117)
ALT SERPL-CCNC: 231 U/L (ref 16–61)
ANION GAP SERPL CALC-SCNC: 5 MMOL/L (ref 3–18)
ARTERIAL PATENCY WRIST A: ABNORMAL
AST SERPL-CCNC: 68 U/L (ref 10–38)
ATRIAL RATE: 63 BPM
BASE EXCESS BLD CALC-SCNC: 7 MMOL/L
BDY SITE: ABNORMAL
BILIRUB SERPL-MCNC: 0.6 MG/DL (ref 0.2–1)
BNP SERPL-MCNC: 617 PG/ML (ref 0–900)
BODY TEMPERATURE: 35.8
BUN SERPL-MCNC: 35 MG/DL (ref 7–18)
BUN/CREAT SERPL: 21 (ref 12–20)
CA-I SERPL-SCNC: 1.14 MMOL/L (ref 1.12–1.32)
CALCIUM SERPL-MCNC: 8.7 MG/DL (ref 8.5–10.1)
CALCULATED P AXIS, ECG09: 89 DEGREES
CALCULATED R AXIS, ECG10: 11 DEGREES
CALCULATED T AXIS, ECG11: 111 DEGREES
CHLORIDE SERPL-SCNC: 109 MMOL/L (ref 100–111)
CO2 SERPL-SCNC: 33 MMOL/L (ref 21–32)
CREAT SERPL-MCNC: 1.63 MG/DL (ref 0.6–1.3)
CRP SERPL HS-MCNC: >9.5 MG/L
D DIMER PPP FEU-MCNC: 2.57 UG/ML(FEU)
DIAGNOSIS, 93000: NORMAL
ERYTHROCYTE [DISTWIDTH] IN BLOOD BY AUTOMATED COUNT: 16.7 % (ref 11.6–14.5)
EST. AVERAGE GLUCOSE BLD GHB EST-MCNC: 192 MG/DL
GAS FLOW.O2 O2 DELIVERY SYS: ABNORMAL L/MIN
GAS FLOW.O2 SETTING OXYMISER: 18 BPM
GLOBULIN SER CALC-MCNC: 3.3 G/DL (ref 2–4)
GLUCOSE BLD STRIP.AUTO-MCNC: 105 MG/DL (ref 70–110)
GLUCOSE BLD STRIP.AUTO-MCNC: 125 MG/DL (ref 70–110)
GLUCOSE BLD STRIP.AUTO-MCNC: 133 MG/DL (ref 70–110)
GLUCOSE BLD STRIP.AUTO-MCNC: 94 MG/DL (ref 70–110)
GLUCOSE SERPL-MCNC: 119 MG/DL (ref 74–99)
HBA1C MFR BLD: 8.3 % (ref 4.2–5.6)
HCO3 BLD-SCNC: 32 MMOL/L (ref 22–26)
HCT VFR BLD AUTO: 41 % (ref 36–48)
HGB BLD-MCNC: 13 G/DL (ref 13–16)
INSPIRATION.DURATION SETTING TIME VENT: 0.9 SEC
MAGNESIUM SERPL-MCNC: 2.3 MG/DL (ref 1.6–2.6)
MCH RBC QN AUTO: 27.7 PG (ref 24–34)
MCHC RBC AUTO-ENTMCNC: 31.7 G/DL (ref 31–37)
MCV RBC AUTO: 87.2 FL (ref 74–97)
O2/TOTAL GAS SETTING VFR VENT: 0.4 %
P-R INTERVAL, ECG05: 184 MS
PCO2 BLD: 47.1 MMHG (ref 35–45)
PEEP RESPIRATORY: 10 CMH2O
PH BLD: 7.43 [PH] (ref 7.35–7.45)
PHOSPHATE SERPL-MCNC: 3.4 MG/DL (ref 2.5–4.9)
PIP ISTAT,IPIP: 29
PLATELET # BLD AUTO: 189 K/UL (ref 135–420)
PMV BLD AUTO: 11.9 FL (ref 9.2–11.8)
PO2 BLD: 82 MMHG (ref 80–100)
POTASSIUM SERPL-SCNC: 4 MMOL/L (ref 3.5–5.5)
PROCALCITONIN SERPL-MCNC: 0.1 NG/ML
PROT SERPL-MCNC: 6.1 G/DL (ref 6.4–8.2)
Q-T INTERVAL, ECG07: 464 MS
QRS DURATION, ECG06: 108 MS
QTC CALCULATION (BEZET), ECG08: 474 MS
RBC # BLD AUTO: 4.7 M/UL (ref 4.7–5.5)
SAO2 % BLD: 97 % (ref 92–97)
SARS-COV-2, COV2NT: NOT DETECTED
SERVICE CMNT-IMP: ABNORMAL
SODIUM SERPL-SCNC: 147 MMOL/L (ref 136–145)
SPECIMEN TYPE: ABNORMAL
TOTAL RESP. RATE, ITRR: 18
VENTILATION MODE VENT: ABNORMAL
VENTRICULAR RATE, ECG03: 63 BPM
VOLUME CONTROL PLUS IVLCP: YES
VT SETTING VENT: 480 ML
WBC # BLD AUTO: 7.4 K/UL (ref 4.6–13.2)

## 2020-04-06 PROCEDURE — 82330 ASSAY OF CALCIUM: CPT

## 2020-04-06 PROCEDURE — 84100 ASSAY OF PHOSPHORUS: CPT

## 2020-04-06 PROCEDURE — 82962 GLUCOSE BLOOD TEST: CPT

## 2020-04-06 PROCEDURE — 71045 X-RAY EXAM CHEST 1 VIEW: CPT

## 2020-04-06 PROCEDURE — 36600 WITHDRAWAL OF ARTERIAL BLOOD: CPT

## 2020-04-06 PROCEDURE — 76450000000

## 2020-04-06 PROCEDURE — C9113 INJ PANTOPRAZOLE SODIUM, VIA: HCPCS | Performed by: FAMILY MEDICINE

## 2020-04-06 PROCEDURE — 86141 C-REACTIVE PROTEIN HS: CPT

## 2020-04-06 PROCEDURE — 85027 COMPLETE CBC AUTOMATED: CPT

## 2020-04-06 PROCEDURE — 74011250636 HC RX REV CODE- 250/636: Performed by: FAMILY MEDICINE

## 2020-04-06 PROCEDURE — 74011000250 HC RX REV CODE- 250: Performed by: INTERNAL MEDICINE

## 2020-04-06 PROCEDURE — 74011250637 HC RX REV CODE- 250/637: Performed by: INTERNAL MEDICINE

## 2020-04-06 PROCEDURE — 65610000006 HC RM INTENSIVE CARE

## 2020-04-06 PROCEDURE — 82803 BLOOD GASES ANY COMBINATION: CPT

## 2020-04-06 PROCEDURE — 93005 ELECTROCARDIOGRAM TRACING: CPT

## 2020-04-06 PROCEDURE — 74011000250 HC RX REV CODE- 250: Performed by: FAMILY MEDICINE

## 2020-04-06 PROCEDURE — 83036 HEMOGLOBIN GLYCOSYLATED A1C: CPT

## 2020-04-06 PROCEDURE — 83520 IMMUNOASSAY QUANT NOS NONAB: CPT

## 2020-04-06 PROCEDURE — 74011000258 HC RX REV CODE- 258: Performed by: INTERNAL MEDICINE

## 2020-04-06 PROCEDURE — 85379 FIBRIN DEGRADATION QUANT: CPT

## 2020-04-06 PROCEDURE — 74011250637 HC RX REV CODE- 250/637: Performed by: FAMILY MEDICINE

## 2020-04-06 PROCEDURE — 84145 PROCALCITONIN (PCT): CPT

## 2020-04-06 PROCEDURE — 74011250636 HC RX REV CODE- 250/636: Performed by: INTERNAL MEDICINE

## 2020-04-06 PROCEDURE — 83735 ASSAY OF MAGNESIUM: CPT

## 2020-04-06 PROCEDURE — 80053 COMPREHEN METABOLIC PANEL: CPT

## 2020-04-06 PROCEDURE — 80202 ASSAY OF VANCOMYCIN: CPT

## 2020-04-06 PROCEDURE — 83880 ASSAY OF NATRIURETIC PEPTIDE: CPT

## 2020-04-06 PROCEDURE — 94003 VENT MGMT INPAT SUBQ DAY: CPT

## 2020-04-06 PROCEDURE — 77010033678 HC OXYGEN DAILY

## 2020-04-06 RX ORDER — SODIUM CHLORIDE 900 MG/100ML
INJECTION INTRAVENOUS
Status: DISPENSED
Start: 2020-04-06 | End: 2020-04-06

## 2020-04-06 RX ORDER — ENOXAPARIN SODIUM 100 MG/ML
40 INJECTION SUBCUTANEOUS EVERY 12 HOURS
Status: DISCONTINUED | OUTPATIENT
Start: 2020-04-07 | End: 2020-04-15

## 2020-04-06 RX ORDER — DEXTROSE MONOHYDRATE 50 MG/ML
50 INJECTION, SOLUTION INTRAVENOUS CONTINUOUS
Status: DISCONTINUED | OUTPATIENT
Start: 2020-04-06 | End: 2020-04-08

## 2020-04-06 RX ORDER — FUROSEMIDE 10 MG/ML
40 INJECTION INTRAMUSCULAR; INTRAVENOUS ONCE
Status: COMPLETED | OUTPATIENT
Start: 2020-04-06 | End: 2020-04-06

## 2020-04-06 RX ORDER — PROPOFOL 10 MG/ML
0-50 VIAL (ML) INTRAVENOUS
Status: DISCONTINUED | OUTPATIENT
Start: 2020-04-06 | End: 2020-04-12

## 2020-04-06 RX ADMIN — FUROSEMIDE 40 MG: 10 INJECTION, SOLUTION INTRAMUSCULAR; INTRAVENOUS at 10:28

## 2020-04-06 RX ADMIN — DEXTROSE MONOHYDRATE 50 ML/HR: 5 INJECTION, SOLUTION INTRAVENOUS at 15:08

## 2020-04-06 RX ADMIN — SODIUM CHLORIDE 40 MG: 9 INJECTION, SOLUTION INTRAMUSCULAR; INTRAVENOUS; SUBCUTANEOUS at 08:11

## 2020-04-06 RX ADMIN — THIAMINE HCL TAB 100 MG 200 MG: 100 TAB at 22:28

## 2020-04-06 RX ADMIN — CHLORHEXIDINE GLUCONATE 10 ML: 1.2 RINSE ORAL at 20:26

## 2020-04-06 RX ADMIN — Medication 10 ML: at 22:29

## 2020-04-06 RX ADMIN — MIDAZOLAM HYDROCHLORIDE 10 MG/HR: 5 INJECTION, SOLUTION INTRAMUSCULAR; INTRAVENOUS at 18:27

## 2020-04-06 RX ADMIN — ZINC SULFATE 220 MG (50 MG) CAPSULE 2 CAPSULE: CAPSULE at 08:12

## 2020-04-06 RX ADMIN — Medication 10 ML: at 15:08

## 2020-04-06 RX ADMIN — VANCOMYCIN HYDROCHLORIDE 2000 MG: 10 INJECTION, POWDER, LYOPHILIZED, FOR SOLUTION INTRAVENOUS at 00:33

## 2020-04-06 RX ADMIN — MIDAZOLAM HYDROCHLORIDE 7 MG/HR: 5 INJECTION, SOLUTION INTRAMUSCULAR; INTRAVENOUS at 04:43

## 2020-04-06 RX ADMIN — METHYLPREDNISOLONE SODIUM SUCCINATE 60 MG: 40 INJECTION, POWDER, FOR SOLUTION INTRAMUSCULAR; INTRAVENOUS at 08:12

## 2020-04-06 RX ADMIN — AMLODIPINE BESYLATE 10 MG: 5 TABLET ORAL at 08:12

## 2020-04-06 RX ADMIN — LORAZEPAM 1 MG: 2 INJECTION INTRAMUSCULAR; INTRAVENOUS at 10:44

## 2020-04-06 RX ADMIN — LORAZEPAM 1 MG: 2 INJECTION INTRAMUSCULAR; INTRAVENOUS at 06:00

## 2020-04-06 RX ADMIN — PROPOFOL 25 MCG/KG/MIN: 10 INJECTION, EMULSION INTRAVENOUS at 12:09

## 2020-04-06 RX ADMIN — LORAZEPAM 1 MG: 2 INJECTION INTRAMUSCULAR; INTRAVENOUS at 23:32

## 2020-04-06 RX ADMIN — PIPERACILLIN AND TAZOBACTAM 3.38 G: 3; .375 INJECTION, POWDER, LYOPHILIZED, FOR SOLUTION INTRAVENOUS at 06:08

## 2020-04-06 RX ADMIN — CHLORHEXIDINE GLUCONATE 10 ML: 1.2 RINSE ORAL at 08:13

## 2020-04-06 RX ADMIN — FUROSEMIDE 20 MG: 10 INJECTION, SOLUTION INTRAMUSCULAR; INTRAVENOUS at 20:27

## 2020-04-06 RX ADMIN — THIAMINE HCL TAB 100 MG 200 MG: 100 TAB at 08:12

## 2020-04-06 RX ADMIN — LORAZEPAM 1 MG: 2 INJECTION INTRAMUSCULAR; INTRAVENOUS at 00:20

## 2020-04-06 RX ADMIN — VANCOMYCIN HYDROCHLORIDE 2000 MG: 10 INJECTION, POWDER, LYOPHILIZED, FOR SOLUTION INTRAVENOUS at 11:20

## 2020-04-06 RX ADMIN — Medication 500 MG: at 08:12

## 2020-04-06 RX ADMIN — PIPERACILLIN AND TAZOBACTAM 3.38 G: 3; .375 INJECTION, POWDER, LYOPHILIZED, FOR SOLUTION INTRAVENOUS at 22:28

## 2020-04-06 RX ADMIN — LORAZEPAM 1 MG: 2 INJECTION INTRAMUSCULAR; INTRAVENOUS at 08:12

## 2020-04-06 RX ADMIN — PIPERACILLIN AND TAZOBACTAM 3.38 G: 3; .375 INJECTION, POWDER, LYOPHILIZED, FOR SOLUTION INTRAVENOUS at 15:08

## 2020-04-06 RX ADMIN — MIDAZOLAM HYDROCHLORIDE 10 MG/HR: 5 INJECTION, SOLUTION INTRAMUSCULAR; INTRAVENOUS at 12:05

## 2020-04-06 RX ADMIN — LORAZEPAM 1 MG: 2 INJECTION INTRAMUSCULAR; INTRAVENOUS at 02:00

## 2020-04-06 RX ADMIN — FUROSEMIDE 20 MG: 10 INJECTION, SOLUTION INTRAMUSCULAR; INTRAVENOUS at 08:11

## 2020-04-06 RX ADMIN — LORAZEPAM 1 MG: 2 INJECTION INTRAMUSCULAR; INTRAVENOUS at 04:06

## 2020-04-06 RX ADMIN — FENTANYL CITRATE 100 MCG: 50 INJECTION INTRAMUSCULAR; INTRAVENOUS at 11:19

## 2020-04-06 RX ADMIN — LORAZEPAM 1 MG: 2 INJECTION INTRAMUSCULAR; INTRAVENOUS at 17:46
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
## 2020-04-06 NOTE — PROGRESS NOTES
1900 - Bedside and Verbal shift change report given to Bobbi Castro RN (oncoming nurse) by Shawanda Mohan RN (offgoing nurse). Report included the following information SBAR, Kardex, ED Summary, Procedure Summary, Intake/Output, MAR, Recent Results, Med Rec Status and [facility]iac R[facility]. 2000 - Shift assessment completed. Patient intubated and sedated, arouses to voice, follows simple commands. Bilateral wrist restraints in place to protect integrity of tubes, lines, and drains. Patients urine in butler drainage bag, bright red at this time. Orders received for urinalysis and CBC.     2100 - Attempted to titrate Versed drip down due to patients HR in 40's/50's. Patient became extremely restless/agitiated. PRN Ativan and PRN Fentanyl administered. 2230 - Precedex drip ordered for continuous restlessness/agitiation, but held due to continuous low HR.     0000 - Reassessment completed, no changes to previous assessment. 0400 - Reassessment completed, no changes to previous assessment. 0715 - Bedside and Verbal shift change report given to Trever Fine RN (oncoming nurse) by Bobbi Castro RN (offgoing nurse). Report included the following information SBAR, Kardex, ED Summary, Procedure Summary, Intake/Output, MAR, Recent Results, Med Rec Status and Cardiac Rhythm Sinus Arrhythmia .
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
## 2020-04-06 NOTE — DIABETES MGMT
GLYCEMIC CONTROL PROGRESS NOTE:    - chart reviewed, known h/o T2DM, HbA1C ordered per protocol, not within recommended range on oral home regimen  - Solumedrol 40 mg daily  - NPO, intubated  -- Humalog Normal Insulin Sensitivity Corrective Coverage orders in place recommend continue  - pt may benefit from adjustment to home regimen    Recent Glucose Results:   Lab Results   Component Value Date/Time     (H) 04/06/2020 04:00 AM    GLUCPOC 125 (H) 04/06/2020 11:21 AM    GLUCPOC 105 04/06/2020 05:57 AM    GLUCPOC 131 (H) 04/05/2020 11:27 PM     Octavio Chanel MS, RN, CDE  Glycemic Control Team  [phone]  Pager [phone] (M-TH 8:00-4:30P)  *After Hours pager [phone]
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
## 2020-04-06 NOTE — PROGRESS NOTES
Noted pt is negative for COVID. Pt remains on the vent and is also in restraints. Pt is not medically stable at this time to transition from an acute care setting. CM contacted pt's sister, Bea Rodriguez ([phone]), and discussed pt's prior level of function. Pt's sister has indicated pt lives alone has home O2 and a nebulizer. She had been checking on him daily prior to admission. Please consider ordering therapy services when appropriate to assist with identifying an appropriate transition of care. CM to continue to follow and assist.      Care Management Interventions  PCP Verified by CM:  Yes  Transition of Care Consult (CM Consult): Discharge Planning  Health Maintenance Reviewed: Yes  Current Support Network: Family Lives [place], Lives Alone

## 2020-04-06 NOTE — PROGRESS NOTES
RESPIRATORY NOTE:  Pt cannot tolerate SBT at this time, low volumes with large amts of secretions with violent coughing episodes. RN aware.

## 2020-04-06 NOTE — PROGRESS NOTES
INITIAL NUTRITION ASSESSMENT     RECOMMENDATIONS/PLAN:   Recc Initiation of enteral nutrition: Will order vital Edgara@[email or web address] increase q6hrs until goal of 75ml/hr is reached. This will provide:1650kcal, 144g protein, 1338 ml free water, 185g CHO, 100% DRIS    Recc additional: 312ml free water daily minimum. Recc increase secondary to elevated Na. Will order [id number] for additional total: 600ml free water daily. Monitor enteral feed tolerance, bowel function, labs/lytes, glycemic control    Will continue to follow and adjust reccs as needed    REASON FOR ASSESSMENT:     [x] ICU admission  NUTRITION ASSESSMENT:   Client History: 62 yrs old Male admitted with acute hypercapnic respiratory failure, also has transaminitis and acute on chronic renal insufficiency. Pt is intubated in the ICU at this time. Spoke with MD today   PMHx: asthma, DM, HF, HTN, sleep apnea  Cultural/Jehovah's witness Food Preferences: None Identified    FOOD/NUTRITION HISTORY  Diet History: unable to obtain  Food Allergies:  [x] NKFA      Pertinent PTA Medications: lasix, aldactone, glucotrol     NUTRITION INTAKE   Diet Order:  NPO   Average PO Intake:       No data found.    Pertinent Medications:  [x] Reviewed; vitamin C500mg BID, lasix, methyprednisolone, protonix, lispro, [id number] BID  Electrolyte Replacement Protocol: []K  []Mg  []PO4    Insulin:  [] SSI  [x] Pre-meal   []  Basal   [] Drip  [] None  Pt expected to meet estimated nutrient needs through next review:          []  Yes   -enteral nutrition at goal will provide estimated needs ANTHROPOMETRICS  Height: 5' 7\" (170.2 cm)       Weight: (!) 175.2 kg (386 lb 3.9 oz)    BMI: 60.5 kg/m^2  -  morbidly obese (Greater than or = to 40% BMI)        Weight change: wt fluctuates based on hx, likely fluid related, no evidence of significant wt loss                                  Comparison to Reference Standards:  IBW: 148 lbs      %IBW: 261%      AdjBW: 83 kg    NUTRITION-FOCUSED PHYSICAL ASSESSMENT  Skin: no pressure area per documentation     GI: no BM per documentation    BIOCHEMICAL DATA & MEDICAL TESTS  Pertinent Labs:  [x] Reviewed; Na-147, ALT-231, AST-68     NUTRITION PRESCRIPTION  Calories: 1682 kcal/day based on 25kcal/kg IBW  Protein: 134 g/day based on 2 g/kg IBW  CHO: 210 g/day based on 50% of total energy  Fluid: 1682 ml/day based on 1 kcal/ml      NUTRITION DIAGNOSES:   1. Inadequate oral intake related to NPO status as evidenced by diet order NPO x 2 days  NUTRITION INTERVENTIONS:   INTERVENTIONS:        GOALS:  1. Initiation of enteral nutrition: Vital HP Goal of 75ml/hr to provide: 1650kcal, 144g protein, 1338g ml free water, 185g CHO, 100% DRIS 1. Meet estimated needs via enteral nutrition at goal within the next 24 hours     LEARNING NEEDS (Diet, Supplementation, Food/Nutrient-Drug Interaction):   [x] None Identified  [] Inpatient education provided/documented    [] Identified and patient:  [] Declined     [] Was not appropriate/indicated  NUTRITION MONITORING /EVALUATION:   Adjust EN/PN as appropriate  Monitor wt  Monitor renal labs, electrolytes, fluid status  Monitor for additional supplement needs    [] Participated in Interdisciplinary Rounds  [x] [address] [place] [address] Reviewed/Documented  DISCHARGE NUTRITION RECOMMENDATIONS ADDRESSED:        [x] To be determined closer to discharge    NUTRITION RISK:     [x]  At risk                     []  Not currently at risk     Will follow-up per policy.   Kunal Cunha 1

## 2020-04-06 NOTE — PROGRESS NOTES
RESPIRATORY NOTE:  Pt COVID Negative, just notified, advanced ETT 3 cm per MD/X-RAY results to 30cm at lower lip, suctioned moderate amt of thick clear secretions, coughing continues, wheezes and coarseness noted, RN aware, will continue to monitor.

## 2020-04-06 NOTE — PROGRESS NOTES
Received notification from lab of DCLS result that COVID is not detected; requested unit notify hospitalist of results and isolation flag will be discontinued.

## 2020-04-06 NOTE — PROGRESS NOTES
04/06/20 0504   Vent Settings   FIO2 (%) 40 %   CMV Rate Set 18   Back-Up Rate 18   Vt Set (ml) 480 ml   PEEP/VENT (cm H2O) 8 cm H20  (s/p abg)   Insp Time (sec) 0.9 sec   Insp Rise Time % 50 %   Flow Trigger 3

## 2020-04-06 NOTE — PROGRESS NOTES
SBAR report received from Carlos Faulkner Rn Assume care. PT sedated on ventilator via ETT tube, OG Tube in place. Versed Gtt's, Sinus arrhythmia on the monitor, butler cath in place draining pink, hazy urine, soft restrains to bilateral wrist to protect lines/ tubes. 0800 hrs Shift assessment completed. see flow sheet    1000 hrs Droplet isolations discontinued, covid19 test negative. 1005 hrs Sedation vacation, SBT per intensives. 1025 hrs Pt agitated, restless, strong coughs, Rt @ bedside. Failed SBT. Intensives Aware. Restarted sedation. 1115 hrs Pt appears in pain PRN med given See Flow sheet. 1200 hrs Reassessed pt, pt restless, agitated, diprivan order in place. see Flow sheet    1600 hrs Reassessed pt     Bedside and Verbal shift change report given to Viridiana Gallo (oncoming nurse) by Michoacano Brewer (offgoing nurse).  Report included the following information SBAR, Kardex, ED Summary, Intake/Output, Med Rec Status and Cardiac Rhythm SB/BBB

## 2020-04-06 NOTE — PROGRESS NOTES
Hospitalist Progress Note    Patient: Emory Reed MRN: [id number]  CSN: [id number]    YOB: 1960  Age: 62 y.o. Sex: male    DOA: 4/3/2020 LOS:  LOS: 3 days          Chief Complaint:      63 y/o male w/ hx of COPD on 4L home O2, systolic/diastolic CHF and EF of 56%, and super morbid obesity with recent admission to [facility] who presents in acute hypercapneic respiratory failure. He also has transaminitis and acute on chronic renal insufficiency. Required intubation due to hypercapnic renal failure  Urine drug screen positive for cocaine obtunded    Hematuria overnight  Covid negative  Bradycardia noted down to 40  Difficulty with sedation over night     1. Cardiovascular:  Hypertension avoiding beta-blocker and hydralazine currently on amlodipine echocardiogram ordered ef 30-35 percent  With moderate Pulmonry HTN noted no vegatation blood cultures not done   Periodic bradycardia Prolonged QT improving repeat EKG    2. Pulmonary  Hypercapnic respiratory failure with obesity hypoventilation syndrome currently on a vent with evidence of pneumonia on  Zosyn and vancomycin since recently discharged from hospital on Solu-Medrol IV due to history of COPD    3. ID: Pneumonia possible COV ID [id number]   Repeat COVID negative today  Now with 2 negatives    4. Endocrine  On sliding scale insulin    5. FEN n.p.o. for now ICU protocol for electrolytes    6. Renal:  Chronic kidney disease baseline creatinine of 1.2-1.5 we will monitor for now consider early renal o evaluation strict I's and O's    7. Heme  On full dose Lovenox for DVT prevemtion duplex scan negative at [facility] 2/23  Will stop due to hematuria and no COVID SCD started     8.   Psych  Currently sedated with fentanyl watch for cocaine withdrawal avoiding beta-blocker using Versed as needed  Patient Active Problem List   Diagnosis Code    COPD exacerbation ([facility] [address] J44.1    Type II diabetes mellitus with peripheral autonomic neuropathy (HCC) E11.43  Hypertension I10    JIM (obstructive sleep apnea) G47.33    Hypoxia R09.02    Morbid obesity ([facility]) E66.01    Chest pain R07.9    Acute on chronic combined systolic and diastolic ACC/AHA stage C congestive heart failure ([facility]) I50.43    CHF (congestive heart failure) ([facility]) I50.9    Acute kidney injury ([facility]) N17.9    COPD (chronic obstructive pulmonary disease) ([facility]) J44.9    Acute on chronic respiratory failure ([facility]) J96.20    Cocaine abuse ([facility]) F14.10    SOB (shortness of breath) R06.02    Acute respiratory distress R06.03    Hypertensive emergency I16.1    Acute exacerbation of CHF (congestive heart failure) ([facility]) I50.9    COPD with exacerbation ([facility]) J44.1    Respiratory failure with hypoxia and hypercapnia ([facility]) J96.91, J96.92    Sleep apnea G47.30    Acute respiratory failure with hypoxia and hypercapnia ([facility]) J96.01, J96.02    COPD with acute exacerbation ([facility]) J44.1    Respiratory failure with hypercapnia ([facility]) J96.92    Transaminitis R74.0    Acute on chronic renal insufficiency N28.9, N18.9    Suspected Covid-19 Virus Infection R68.89    Morbid obesity with body mass index of 50 or higher ([facility]) E66.01       Subjective:      Hypercapnic respiratory failure overnight  Review of systems:    Limited as patient is intubated    Vital signs/Intake and Output:  Visit Vitals  /66   Pulse (!) 52   Temp 97.6 °F (36.4 °C)   Resp 18   Ht 5' 7\" (1.702 m)   Wt (!) 175.2 kg (386 lb 3.9 oz)   SpO2 100%   BMI 60.49 kg/m²     Current Shift:  No intake/output data recorded.   Last three shifts:  04/04 1901 - 04/06 0700  In: 1514.5 [I.V.:1294.5]  Out: 4000 [Urine:3800]    Exam:    General: Well developed, alert, obese comfortable on ventilator starting to track some responds to nurse  Coughing on vent   Head/Neck: NCAT, supple, No masses, No lymphadenopathy  CVS:Regular rate and rhythm, no M/R/G, S1/S2 heard, no thrill bradycardia   Extremities: Large with no clubbing  ;plus 2 edema noted Skin:normal texture and turgor, no rashes, no lesions  Neuro: not sedated   Abdomen : normal sounds                     Labs: Results:       Chemistry Recent Labs     04/06/20 0400 04/05/20 0445 04/04/20  0331   * 117* 184*   * 146* 142   K 4.0 4.0 4.8    107 104   CO2 33* 33* 33*   BUN 35* 33* 33*   CREA 1.63* 1.65* 1.66*   CA 8.7 8.7 8.4*   AGAP 5 6 5   BUCR 21* 20 20    127* 170*   TP 6.1* 6.2* 6.9   ALB 2.8* 2.9* 3.2*   GLOB 3.3 3.3 3.7   AGRAT 0.8 0.9 0.9      CBC w/Diff Recent Labs     04/06/20 0400 04/05/20 2020 04/05/20 0445 04/03/20 2035   WBC 7.4 7.4 8.7   < > 8.6   RBC 4.70 4.74 4.67*   < > 4.45*   HGB 13.0 13.1 12.7*   < > 12.4*   HCT 41.0 41.3 40.6   < > 38.9    177 192   < > 191   GRANS  --   --   --   --  74*   LYMPH  --   --   --   --  18*   EOS  --   --   --   --  1    < > = values in this interval not displayed. Cardiac Enzymes Recent Labs     04/05/20 0445 04/04/20  1425    275   CKND1 1.1 1.1      Coagulation No results for input(s): PTP, INR, APTT, INREXT, INREXT in the last 72 hours. Lipid Panel Lab Results   Component Value Date/Time    Cholesterol, total 196 01/25/2018 02:51 AM    HDL Cholesterol 64 (H) 01/25/2018 02:51 AM    LDL, calculated 121.2 (H) 01/25/2018 02:51 AM    VLDL, calculated 10.8 01/25/2018 02:51 AM    Triglyceride 54 01/25/2018 02:51 AM    CHOL/HDL Ratio 3.1 01/25/2018 02:51 AM      BNP No results for input(s): BNPP in the last 72 hours.    Liver Enzymes Recent Labs     04/06/20  0400   TP 6.1*   ALB 2.8*      SGOT 68*      Thyroid Studies Lab Results   Component Value Date/Time    TSH 0.11 (L) 03/19/2019 06:50 AM        Procedures/imaging: see electronic medical records for all procedures/Xrays and details which were not copied into this note but were reviewed prior to creation of Joanie Dwyer MD

## 2020-04-06 NOTE — PROGRESS NOTES
2129-Primary RN in room with patient. Patient currently has bright red urine. Paged Dr. Lena Newby. Orders received for Urinalysis, CBC and hold next dose of Lovenox. Primary RN aware.

## 2020-04-07 ENCOUNTER — APPOINTMENT (OUTPATIENT)
Dept: VASCULAR SURGERY | Age: 60
DRG: 005 | End: 2020-04-07
Attending: INTERNAL MEDICINE
Payer: MEDICAID

## 2020-04-07 ENCOUNTER — APPOINTMENT (OUTPATIENT)
Dept: GENERAL RADIOLOGY | Age: 60
DRG: 005 | End: 2020-04-07
Attending: INTERNAL MEDICINE
Payer: MEDICAID

## 2020-04-07 PROBLEM — J18.9 HCAP (HEALTHCARE-ASSOCIATED PNEUMONIA): Status: ACTIVE | Noted: 2020-04-07

## 2020-04-07 LAB
ALBUMIN SERPL-MCNC: 2.9 G/DL (ref 3.4–5)
ALBUMIN/GLOB SERPL: 0.9 {RATIO} (ref 0.8–1.7)
ALP SERPL-CCNC: 112 U/L (ref 45–117)
ALT SERPL-CCNC: 186 U/L (ref 16–61)
ANION GAP SERPL CALC-SCNC: 6 MMOL/L (ref 3–18)
ARTERIAL PATENCY WRIST A: ABNORMAL
AST SERPL-CCNC: 49 U/L (ref 10–38)
BASE EXCESS BLD CALC-SCNC: 12 MMOL/L
BDY SITE: ABNORMAL
BILIRUB SERPL-MCNC: 0.7 MG/DL (ref 0.2–1)
BNP SERPL-MCNC: 596 PG/ML (ref 0–900)
BODY TEMPERATURE: 98.3
BUN SERPL-MCNC: 36 MG/DL (ref 7–18)
BUN/CREAT SERPL: 21 (ref 12–20)
CA-I SERPL-SCNC: 1.13 MMOL/L (ref 1.12–1.32)
CALCIUM SERPL-MCNC: 9.1 MG/DL (ref 8.5–10.1)
CHLORIDE SERPL-SCNC: 106 MMOL/L (ref 100–111)
CO2 SERPL-SCNC: 34 MMOL/L (ref 21–32)
CREAT SERPL-MCNC: 1.7 MG/DL (ref 0.6–1.3)
CRP SERPL HS-MCNC: >9.5 MG/L
D DIMER PPP FEU-MCNC: 2.57 UG/ML(FEU)
DATE LAST DOSE: ABNORMAL
ERYTHROCYTE [DISTWIDTH] IN BLOOD BY AUTOMATED COUNT: 16.5 % (ref 11.6–14.5)
GAS FLOW.O2 O2 DELIVERY SYS: ABNORMAL L/MIN
GAS FLOW.O2 SETTING OXYMISER: 18 BPM
GLOBULIN SER CALC-MCNC: 3.2 G/DL (ref 2–4)
GLUCOSE BLD STRIP.AUTO-MCNC: 124 MG/DL (ref 70–110)
GLUCOSE BLD STRIP.AUTO-MCNC: 136 MG/DL (ref 70–110)
GLUCOSE BLD STRIP.AUTO-MCNC: 77 MG/DL (ref 70–110)
GLUCOSE SERPL-MCNC: 109 MG/DL (ref 74–99)
HCO3 BLD-SCNC: 35.4 MMOL/L (ref 22–26)
HCT VFR BLD AUTO: 42.3 % (ref 36–48)
HGB BLD-MCNC: 13.4 G/DL (ref 13–16)
IL6 SERPL-MCNC: 15.5 PG/ML (ref 0–15.5)
IL6 SERPL-MCNC: 9.4 PG/ML (ref 0–15.5)
INSPIRATION.DURATION SETTING TIME VENT: 0.9 SEC
MAGNESIUM SERPL-MCNC: 2.3 MG/DL (ref 1.6–2.6)
MCH RBC QN AUTO: 27.3 PG (ref 24–34)
MCHC RBC AUTO-ENTMCNC: 31.7 G/DL (ref 31–37)
MCV RBC AUTO: 86.2 FL (ref 74–97)
O2/TOTAL GAS SETTING VFR VENT: 0.4 %
PCO2 BLD: 48.7 MMHG (ref 35–45)
PEEP RESPIRATORY: 8 CMH2O
PH BLD: 7.47 [PH] (ref 7.35–7.45)
PHOSPHATE SERPL-MCNC: 2.9 MG/DL (ref 2.5–4.9)
PIP ISTAT,IPIP: 32
PLATELET # BLD AUTO: 202 K/UL (ref 135–420)
PMV BLD AUTO: 12.5 FL (ref 9.2–11.8)
PO2 BLD: 73 MMHG (ref 80–100)
POTASSIUM SERPL-SCNC: 3.3 MMOL/L (ref 3.5–5.5)
POTASSIUM SERPL-SCNC: 4 MMOL/L (ref 3.5–5.5)
PROCALCITONIN SERPL-MCNC: 0.08 NG/ML
PROT SERPL-MCNC: 6.1 G/DL (ref 6.4–8.2)
RBC # BLD AUTO: 4.91 M/UL (ref 4.7–5.5)
REPORTED DOSE,DOSE: ABNORMAL UNITS
REPORTED DOSE/TIME,TMG: ABNORMAL
SAO2 % BLD: 95 % (ref 92–97)
SERVICE CMNT-IMP: ABNORMAL
SODIUM SERPL-SCNC: 146 MMOL/L (ref 136–145)
SPECIMEN TYPE: ABNORMAL
TOTAL RESP. RATE, ITRR: 18
VANCOMYCIN TROUGH SERPL-MCNC: 34.1 UG/ML (ref 10–20)
VENTILATION MODE VENT: ABNORMAL
VOLUME CONTROL PLUS IVLCP: YES
VT SETTING VENT: 480 ML
WBC # BLD AUTO: 6.7 K/UL (ref 4.6–13.2)

## 2020-04-07 PROCEDURE — 74011250637 HC RX REV CODE- 250/637: Performed by: FAMILY MEDICINE

## 2020-04-07 PROCEDURE — 85027 COMPLETE CBC AUTOMATED: CPT

## 2020-04-07 PROCEDURE — 71045 X-RAY EXAM CHEST 1 VIEW: CPT

## 2020-04-07 PROCEDURE — 83735 ASSAY OF MAGNESIUM: CPT

## 2020-04-07 PROCEDURE — 84132 ASSAY OF SERUM POTASSIUM: CPT

## 2020-04-07 PROCEDURE — 74011000250 HC RX REV CODE- 250: Performed by: INTERNAL MEDICINE

## 2020-04-07 PROCEDURE — 82962 GLUCOSE BLOOD TEST: CPT

## 2020-04-07 PROCEDURE — 86141 C-REACTIVE PROTEIN HS: CPT

## 2020-04-07 PROCEDURE — 94003 VENT MGMT INPAT SUBQ DAY: CPT

## 2020-04-07 PROCEDURE — 74011250636 HC RX REV CODE- 250/636: Performed by: INTERNAL MEDICINE

## 2020-04-07 PROCEDURE — 74011250636 HC RX REV CODE- 250/636: Performed by: FAMILY MEDICINE

## 2020-04-07 PROCEDURE — 65610000006 HC RM INTENSIVE CARE

## 2020-04-07 PROCEDURE — 84100 ASSAY OF PHOSPHORUS: CPT

## 2020-04-07 PROCEDURE — 74011250637 HC RX REV CODE- 250/637: Performed by: INTERNAL MEDICINE

## 2020-04-07 PROCEDURE — 84145 PROCALCITONIN (PCT): CPT

## 2020-04-07 PROCEDURE — 36600 WITHDRAWAL OF ARTERIAL BLOOD: CPT

## 2020-04-07 PROCEDURE — 77030040361 HC SLV COMPR DVT MDII -B

## 2020-04-07 PROCEDURE — 82803 BLOOD GASES ANY COMBINATION: CPT

## 2020-04-07 PROCEDURE — 85379 FIBRIN DEGRADATION QUANT: CPT

## 2020-04-07 PROCEDURE — C9113 INJ PANTOPRAZOLE SODIUM, VIA: HCPCS | Performed by: FAMILY MEDICINE

## 2020-04-07 PROCEDURE — 87070 CULTURE OTHR SPECIMN AEROBIC: CPT

## 2020-04-07 PROCEDURE — 82330 ASSAY OF CALCIUM: CPT

## 2020-04-07 PROCEDURE — 74011000250 HC RX REV CODE- 250: Performed by: FAMILY MEDICINE

## 2020-04-07 PROCEDURE — 74011000258 HC RX REV CODE- 258: Performed by: INTERNAL MEDICINE

## 2020-04-07 PROCEDURE — 93970 EXTREMITY STUDY: CPT

## 2020-04-07 PROCEDURE — 77010033678 HC OXYGEN DAILY

## 2020-04-07 PROCEDURE — 83520 IMMUNOASSAY QUANT NOS NONAB: CPT

## 2020-04-07 PROCEDURE — 83880 ASSAY OF NATRIURETIC PEPTIDE: CPT

## 2020-04-07 RX ADMIN — VANCOMYCIN HYDROCHLORIDE 1250 MG: 1.25 INJECTION, POWDER, LYOPHILIZED, FOR SOLUTION INTRAVENOUS at 04:04

## 2020-04-07 RX ADMIN — PIPERACILLIN AND TAZOBACTAM 3.38 G: 3; .375 INJECTION, POWDER, LYOPHILIZED, FOR SOLUTION INTRAVENOUS at 05:30

## 2020-04-07 RX ADMIN — VANCOMYCIN HYDROCHLORIDE 1250 MG: 1.25 INJECTION, POWDER, LYOPHILIZED, FOR SOLUTION INTRAVENOUS at 17:03

## 2020-04-07 RX ADMIN — DEXTROSE MONOHYDRATE 50 ML/HR: 5 INJECTION, SOLUTION INTRAVENOUS at 12:06

## 2020-04-07 RX ADMIN — PROPOFOL 25 MCG/KG/MIN: 10 INJECTION, EMULSION INTRAVENOUS at 17:12

## 2020-04-07 RX ADMIN — FUROSEMIDE 20 MG: 10 INJECTION, SOLUTION INTRAMUSCULAR; INTRAVENOUS at 22:55

## 2020-04-07 RX ADMIN — CHLORHEXIDINE GLUCONATE 10 ML: 1.2 RINSE ORAL at 09:25

## 2020-04-07 RX ADMIN — ENOXAPARIN SODIUM 40 MG: 40 INJECTION SUBCUTANEOUS at 14:20

## 2020-04-07 RX ADMIN — MIDAZOLAM HYDROCHLORIDE 3 MG/HR: 5 INJECTION, SOLUTION INTRAMUSCULAR; INTRAVENOUS at 07:03

## 2020-04-07 RX ADMIN — Medication 10 ML: at 22:52

## 2020-04-07 RX ADMIN — Medication 10 ML: at 14:21

## 2020-04-07 RX ADMIN — AMLODIPINE BESYLATE 10 MG: 5 TABLET ORAL at 09:26

## 2020-04-07 RX ADMIN — PIPERACILLIN AND TAZOBACTAM 3.38 G: 3; .375 INJECTION, POWDER, LYOPHILIZED, FOR SOLUTION INTRAVENOUS at 14:20

## 2020-04-07 RX ADMIN — THIAMINE HCL TAB 100 MG 200 MG: 100 TAB at 22:52

## 2020-04-07 RX ADMIN — SODIUM CHLORIDE 40 MG: 9 INJECTION, SOLUTION INTRAMUSCULAR; INTRAVENOUS; SUBCUTANEOUS at 09:25

## 2020-04-07 RX ADMIN — ENOXAPARIN SODIUM 40 MG: 40 INJECTION SUBCUTANEOUS at 04:04

## 2020-04-07 RX ADMIN — PIPERACILLIN AND TAZOBACTAM 3.38 G: 3; .375 INJECTION, POWDER, LYOPHILIZED, FOR SOLUTION INTRAVENOUS at 22:51

## 2020-04-07 RX ADMIN — CHLORHEXIDINE GLUCONATE 10 ML: 1.2 RINSE ORAL at 22:52

## 2020-04-07 RX ADMIN — POTASSIUM BICARBONATE 40 MEQ: 782 TABLET, EFFERVESCENT ORAL at 09:25

## 2020-04-07 RX ADMIN — FUROSEMIDE 20 MG: 10 INJECTION, SOLUTION INTRAMUSCULAR; INTRAVENOUS at 09:25

## 2020-04-07 RX ADMIN — THIAMINE HCL TAB 100 MG 200 MG: 100 TAB at 09:26

## 2020-04-07 RX ADMIN — METHYLPREDNISOLONE SODIUM SUCCINATE 40 MG: 40 INJECTION, POWDER, FOR SOLUTION INTRAMUSCULAR; INTRAVENOUS at 09:25

## 2020-04-07 NOTE — PROGRESS NOTES
RESPIRATORY NOTE:  Pt awake, RSBI X 15 MINUTES, coughing, scowling, trying to speak, uncooperative, pounding on bed rails, RSBI variable, better today than yesterday, BACK ON FULL SUPPORT, previous settings, will continue to monitor.

## 2020-04-07 NOTE — PROGRESS NOTES
RESPIRATORY NOTE:  Attempted SBT, still too sleepy with high unacceptable RSBI, not able to cooperate.

## 2020-04-07 NOTE — PROGRESS NOTES
Hospitalist Progress Note-critical care note     Patient: Marcella Bhakta MRN: [id number]  [facility]: [id number]    YOB: 1960  Age: 62 y.o. Sex: male    DOA: 4/3/2020 LOS:  LOS: 4 days            Chief complaint:  Respiratory failure, joann on ckd3      Assessment/Plan         Hospital Problems  Date Reviewed: 4/3/2020          Codes Class Noted POA    HCAP (healthcare-associated pneumonia) ICD-10-CM: J18.9  ICD-9-CM: 486  4/7/2020 Unknown        Goals of care, counseling/discussion ICD-10-CM: Z71.89  ICD-9-CM: V65.49  Unknown Unknown        Suspected Covid-19 Virus Infection ICD-10-CM: R68.89  4/4/2020 Clinically Undetermined        Morbid obesity with body mass index of 50 or higher ([facility] ICD-10-CM: E66.01  ICD-9-CM: 278.01  4/4/2020 Yes        COPD with acute exacerbation ([facility] ICD-10-CM: J44.1  ICD-9-CM: 491.21  4/3/2020 Yes        * (Principal) Respiratory failure with hypercapnia ([facility] ICD-10-CM: J96.92  ICD-9-CM: 518.81  4/3/2020 Yes        Transaminitis ICD-10-CM: R74.0  ICD-9-CM: 790.4  4/3/2020 Yes        Acute on chronic renal insufficiency ICD-10-CM: N28.9, N18.9  ICD-9-CM: 593.9, 585.9  4/3/2020 Yes        Sleep apnea ICD-10-CM: G47.30  ICD-9-CM: 780.57  7/9/2018 Yes        Cocaine abuse ([facility] ICD-10-CM: F14.10  ICD-9-CM: 305.60  4/22/2018 Yes        CHF (congestive heart failure) (HCC) (Chronic) ICD-10-CM: I50.9  ICD-9-CM: 428.0  3/3/2018 Yes              61 y/o male w/ hx of COPD on 4L home O2-trilogy at home ,  EF of 35%, super morbid obesity with recent admission to [facility] who presents in acute hypercapneic respiratory failure, he was admitted tue to acute on chronic respiration failure-intubated on admission. Urine drug screen positive for cocaine, covid 19 negative       Respiratory   Hypercapnic respiratory failure with obesity hypoventilation syndrome   On  vent , still need 8 peep AM  Continue weaning off, sedation vacation AM  Breathing trail   Possible end of trach     Hcap:  On vanc and zosyn    scopalamine on due to a lot of secretion     Copd exacerbation   On iv steroid a, breathing tx     shyam : on trilogy at home         Cardiovascular:  Hypertension  On norvasc , bbb was hold due to isael    Isael-resolved after holding bbb       chf  Combined diastolic and systolic   Ef 30 - 98%. Mild (grade 1) left ventricular diastolic dysfunction from echo   On lasix   bbb was hold due to isael     Periodic bradycardia Prolonged QT improving repeat EKG         ID: Pneumonia  Repeat COVID negative twice       Endocrine  On sliding scale insulin     FEN n.p.o. for now ICU protocol for electrolytes-icu replacement protocol   Hypokalemia -replace as protocol      Renal:  joann on ckd3  Cr stable now,   Hematuria: h/h stable, might due to trauma from catheter, will continue monitoring        Neuro : On versed and propofol     Heme  On full dose Lovenox for DVT        Psych  Cocaine abuse     GI   Elevated lft -improving  Start tube feeding today        Poor prognosis, palliative care on board     rn : off sedation now     30 minutes of critical care time spent in the direct evaluation and treatment of this high risk patient. The reason for providing this level of medical care for this critically ill patient was due a critical illness that impaired one or more vital organ systems such that there was a high probability of imminent or life threatening deterioration in the patients condition. This care involved high complexity decision making to assess, manipulate, and support vital system functions, to treat this degreee vital organ system failure and to prevent further life threatening deterioration of the patients condition.     Disposition :tbd,   Review of systems:  Unable to obtain due to intubated   Vital signs/Intake and Output:  Visit Vitals  BP (!) 163/93   Pulse 60   Temp 99.1 °F (37.3 °C)   Resp 28   Ht 5' 7\" (1.702 m)   Wt (!) 169.5 kg (373 lb 10.9 oz)   SpO2 99%   BMI 58.53 kg/m²     Current Shift:  04/07 0701 - 04/07 1900  In: -   Out: 700 [Urine:700]  Last three shifts:  04/05 1901 - 04/07 0700  In: 2652.8 [I.V.:2432.8]  Out: 9076 [Urine:6300]    Physical Exam:  General: intubated   HEENT: NC, Atraumatic. PERRLA, anicteric sclerae. Et tube noted   Lungs: CTA Bilaterally. No Wheezing/Rhonchi/Rales. Heart:  Regular  rhythm,  No murmur, No Rubs, No Gallops  Abdomen: Soft, obesity , Non tender. +Bowel sounds,   Extremities: No c/c/e  Psych:   Calm   Neurologic:  On sedation, moving extremities              Labs: Results:       Chemistry Recent Labs     04/07/20 0540 04/06/20 0400 04/05/20 0445   * 119* 117*   * 147* 146*   K 3.3* 4.0 4.0    109 107   CO2 34* 33* 33*   BUN 36* 35* 33*   CREA 1.70* 1.63* 1.65*   CA 9.1 8.7 8.7   AGAP 6 5 6   BUCR 21* 21* 20    116 127*   TP 6.1* 6.1* 6.2*   ALB 2.9* 2.8* 2.9*   GLOB 3.2 3.3 3.3   AGRAT 0.9 0.8 0.9      CBC w/Diff Recent Labs     04/07/20 0540 04/06/20 0400 04/05/20 2020   WBC 6.7 7.4 7.4   RBC 4.91 4.70 4.74   HGB 13.4 13.0 13.1   HCT 42.3 41.0 41.3    189 177      Cardiac Enzymes Recent Labs     04/05/20 0445 04/04/20  1425    275   CKND1 1.1 1.1      Coagulation No results for input(s): PTP, INR, APTT, INREXT, INREXT in the last 72 hours. Lipid Panel Lab Results   Component Value Date/Time    Cholesterol, total 196 01/25/2018 02:51 AM    HDL Cholesterol 64 (H) 01/25/2018 02:51 AM    LDL, calculated 121.2 (H) 01/25/2018 02:51 AM    VLDL, calculated 10.8 01/25/2018 02:51 AM    Triglyceride 54 01/25/2018 02:51 AM    CHOL/HDL Ratio 3.1 01/25/2018 02:51 AM      BNP No results for input(s): BNPP in the last 72 hours.    Liver Enzymes Recent Labs     04/07/20  0540   TP 6.1*   ALB 2.9*      SGOT 49*      Thyroid Studies Lab Results   Component Value Date/Time    TSH 0.11 (L) 03/19/2019 06:50 AM        Procedures/imaging: see electronic medical records for all procedures/Xrays and details which were not copied into this note but were reviewed prior to creation of Plan    Xr Abd (kub)    Result Date: 4/4/2020  EXAM: Single view of the abdomen CLINICAL INDICATION/HISTORY: Nasogastric tube placement    --Additional history: None. COMPARISON: None TECHNIQUE: Single supine view _______________ FINDINGS: The impression. _______________     IMPRESSION: Study technically limited by patient's body habitus. Nasogastric tube tip appears to project over the gastric antrum. Xr Chest Port    Result Date: 4/7/2020  EXAM: XR CHEST PORT CLINICAL INDICATION/HISTORY: OG tube in place. -Additional: None COMPARISON: Earlier the same day TECHNIQUE: Portable frontal view of the chest _______________ FINDINGS: SUPPORT DEVICES: Endotracheal tube approximately 1 cm above the juan miguel. Enteric tube tip out of field of view possibly in the distal stomach. HEART AND MEDIASTINUM: Cardiomegaly LUNGS AND PLEURAL SPACES: Hypoinflated lungs. Probable small to moderate left effusion. Mild interstitial edema. No pneumothorax. _______________     IMPRESSION: Endotracheal tube approximately 1 cm above the juan miguel. Enteric tube tip out of field of view possibly in the distal stomach. Xr Chest Port    Result Date: 4/7/2020  EXAM: XR CHEST PORT CLINICAL INDICATION/HISTORY: pneumonia, intubated -Additional: None COMPARISON: One day prior TECHNIQUE: Portable frontal view of the chest _______________ FINDINGS: SUPPORT DEVICES: Enteric tube and endotracheal tube unchanged. [place] HEART AND MEDIASTINUM: cardiomegaly LUNGS AND PLEURAL SPACES: Hypoinflated lungs. Probable small to moderate left effusion. Mild interstitial edema. No pneumothorax. _______________     IMPRESSION: Hypoinflated lungs. Probable small to moderate left effusion. Mild interstitial edema. No pneumothorax.      Xr Chest Port    Result Date: 4/6/2020  EXAM: XR CHEST PORT CLINICAL INDICATION/HISTORY: While intubated -Additional: None COMPARISON: One day prior TECHNIQUE: Portable frontal view of the chest _______________ FINDINGS: SUPPORT DEVICES: Enteric tube and endotracheal tube unchanged. [place] HEART AND MEDIASTINUM: cardiomegaly LUNGS AND PLEURAL SPACES: Hypoinflated lungs. Probable small left effusion. Mild interstitial edema. No pneumothorax. _______________     IMPRESSION: Hypoinflated lungs. Probable small left effusion. Mild interstitial edema. Xr Chest Port    Result Date: 4/5/2020  EXAM: CHEST RADIOGRAPH CLINICAL INDICATION/HISTORY: Respiratory failure -Additional: None COMPARISON: April 4, 2020 TECHNIQUE: Portable frontal view of the chest _______________ FINDINGS: SUPPORT DEVICES: The tip of an endotracheal tube is 10 cm above the juan miguel. A nasogastric tube crosses the gastroesophageal junction. A left IJ central venous catheter terminates in the proximal SVC. HEART AND MEDIASTINUM: The heart is enlarged. LUNGS AND PLEURAL SPACES: Interstitial lung markings are increased. Lung volumes are hypoinflated and there are opacities in the lung bases. No pneumothorax. BONY THORAX AND SOFT TISSUES: Unremarkable. _______________     IMPRESSION: 1. Mild pulmonary edema 2. Bibasilar opacities that may represent a combination of atelectasis and small pleural effusions     Xr Chest Port    Result Date: 4/4/2020  EXAM: PORTABLE CHEST HISTORY: Central line placement. Acute respiratory failure. COMPARISON: 4/4/2020 at 12:42 AM TECHNIQUE: Single portable view. _______________ FINDINGS: SUPPORT DEVICES: Endotracheal tube tip lies about 4.2 cm above juan miguel. Left central venous catheter tip in upper superior vena cava. HEART AND MEDIASTINUM: Cardiomegaly. LUNGS AND PLEURAL SPACES: Patchy airspace disease right lung base may represent developing subsegmental atelectasis or pneumonia. Evaluation limited by large body habitus. BONES AND SOFT TISSUES: Bony structures are normal. _______________     IMPRESSION: Interval placement left central venous catheter. No pneumothorax. Cardiomegaly without change.  Patchy airspace disease right lung base either subsegmental atelectasis or pneumonia appears slightly worse. Xr Chest Port    Result Date: 4/4/2020  EXAM: Chest radiograph  CLINICAL INDICATION/HISTORY: Chemical ventilation    --Additional history: None. COMPARISON: 04/03/2020 TECHNIQUE: Frontal view of the chest _______________ FINDINGS: SUPPORT DEVICES: There is an endotracheal tube with tip approximately 5.5 cm above the juan miguel. There is a nasogastric tube descends below the diaphragm. HEART AND MEDIASTINUM: React silhouette is enlarged. Stable mediastinal contours. . Normal pulmonary vasculature. LUNGS AND PLEURAL SPACES: No convincing focal airspace opacity given the limitations of the study and superimposed body habitus. Sharp costophrenic sulci. No pneumothoraces. BONY THORAX AND SOFT TISSUES: Questionable fracture deformity of the right lateral ninth rib. _______________     IMPRESSION: 1. Support lines and tubes as detailed above. 2. Technically limited study secondary to body habitus without significant change from the prior study. Xr Chest Port    Result Date: 4/3/2020  EXAM:  AP Portable Chest X-ray 1 view INDICATION: Chest pain shortness of breath COMPARISON: None _______________ FINDINGS:  Heart size is enlarged and mediastinal contours are within normal limits for portable radiograph. There are increased interstitial markings in the right lung. No focal airspace disease seen. . There are no pleural effusions. No acute osseous findings. ________________      IMPRESSION: Increased interstitial markings in the right lung. No focal airspace disease or effusion.       Sera Carolina MD

## 2020-04-07 NOTE — PROGRESS NOTES
SBAR report received from [employer] . Assume care, Pt Remain sedated, on the ventilator via ETT Tube. OG tube in situ, placement verified. Versed gtt's, propofol gtt;s, IVF via pump. butler cath, SCD's to BLE, soft restrains to bilateral wrist to protect lines,tubes. NAD.    0735 hrs Shift assessment completed. 0745 hrs Sedation vacation. 1030 hrs Pt back to full support, Failed SBT, Rt @ bedside. 1045 hrs Pt agitated, restless,pounding on bed rails,pulled OG tube , reinserted  CXR for placement verified. Restarted Sedation. Dr Magi Rosas @ bedside. 1200 hrs Reassessed pt     1600 hrs Reassessed pt Without change. 1720 hrs Wasted 5cc propofol with A tirso,Rn    Bedside and Verbal shift change report given to [employer] (oncoming nurse) by Tiffanie Del Rio (offgoing nurse). Report included the following information SBAR, Kardex, ED Summary, Intake/Output, Recent Results, Med Rec Status and Cardiac Rhythm NSR.

## 2020-04-07 NOTE — DIABETES MGMT
GLYCEMIC CONTROL PROGRESS NOTE:    - chart reviewed, known h/o T2DM, HbA1C ordered per protocol, not within recommended range on oral home regimen  - Solumedrol 30 mg daily  - NPO, intubated  -- Humalog Normal Insulin Sensitivity Corrective Coverage orders in place recommend continue  - pt may benefit from adjustment to home regimen    Recent Glucose Results:   Lab Results   Component Value Date/Time     (H) 04/07/2020 05:40 AM    GLUCPOC 124 (H) 04/07/2020 11:41 AM    GLUCPOC 77 04/07/2020 05:48 AM    GLUCPOC 94 04/06/2020 11:43 PM     Anthony Arvizu MS, RN, CDE  Glycemic Control Team  [phone]  Pager [phone] (M-TH 8:00-4:30P)  *After Hours pager [phone]

## 2020-04-07 NOTE — ROUTINE PROCESS
1900 - Bedside and Verbal shift change report given to Willian Nettles RN (oncoming nurse) by Perez Gilliland RN (offgoing nurse). Report included the following information SBAR, Kardex, ED Summary, Procedure Summary, Intake/Output, MAR, Recent Results, Med Rec Status and Cardiac Rhythm Sinus Bernardine Fey. 2000 - Shift assessment completed, patient follows simple commands, with frequent periods of restlessness. Patient has thick, moderate secretions. 0000 - Reassessment completed, no changes to previous assessment. 0400 - Reassessment completed, no changes to previous assessment. 0500 - CHG bath performed. 0715 - Bedside and Verbal shift change report given to Perez Gilliland RN (oncoming nurse) by Willian Nettles RN (offgoing nurse). Report included the following information SBAR, Kardex, ED Summary, Procedure Summary, Intake/Output, MAR, Recent Results, Med Rec Status and Cardiac Rhythm Sinus Bernardine Fey. Deni Griffin

## 2020-04-07 NOTE — PROGRESS NOTES
Patient Name: Silvana Hancock   Age: 62 y.o.   Sex:  male  YOB: 1960   Medical Record Number: [id number]      Antimicrobial  Vancomycin   Indication N/A   Dose / Interval           Current Antimicrobial Therapy (168h ago, onward)      Ordered     Start Stop    04/05/20 0725  piperacillin-tazobactam (ZOSYN) 3.375 g in 0.9% sodium chloride (MBP/ADV) 100 mL MBP  3.375 g,   IntraVENous,   EVERY 8 HOURS      04/05/20 1400 04/14/20 1359    04/04/20 1242  vancomycin (VANCOCIN) 2000 mg in  ml infusion  2,000 mg,   IntraVENous,   EVERY 12 HOURS      04/05/20 0000 --    04/04/20 0148  hydroxychloroquine (PLAQUENIL) 25 mg/ml oral suspension 400 mg  400 mg,   Oral,   2 TIMES DAILY      04/04/20 0149 04/04/20 2059               Last Level (if applicable) Lab Results   Component Value Date/Time    Reported dose: UNKNOWN 04/06/2020 11:44 PM    Reported dose time: UNKNOWN 04/06/2020 11:44 PM    Reported dose date: UNKNOWN 04/06/2020 11:44 PM     Vancomycin   Lab Results   Component Value Date/Time    Vancomycin,trough 34.1 (HH) 04/06/2020 11:44 PM      Gentamicin   No results found for: GENP, GENT, GENR]  Tobramycin   No results found for: TOBP, TOBT, TOBR   Amikacin   No results found for: AMIKP, DAMIKP, AMIKT, DAMIKT, DAMIKR     Cultures (7 most recent)   GRAM STAIN   Date Value Ref Range Status   03/03/2018 >25 WBC/lpf   Final   03/03/2018 10-25 WBC/lpf   Final   03/03/2018 MUCUS PRESENT   Final   03/03/2018 MODERATE GRAM POSITIVE COCCI IN PAIRS IN CHAINS   Final   03/03/2018 FEW GRAM POSITIVE RODS   Final     Culture result:   Date Value Ref Range Status   05/11/2018 NO GROWTH 6 DAYS   Final   05/11/2018 NO GROWTH 6 DAYS   Final   04/22/2018 NO GROWTH 6 DAYS   Final   04/22/2018 NO GROWTH 6 DAYS   Final   03/03/2018 MODERATE NORMAL RESPIRATORY KATHYA   Final   06/15/2011 NO GROWTH 6 DAYS  Final   06/14/2011 NO GROWTH 6 DAYS  Final      Renal Overview (72 hr)         Recent Labs     04/06/20  0400 04/05/20  0445 20  033   BUN 35* 33* 33*   CREA 1.63* 1.65* 1.66*       CrCl (Current): Estimated Creatinine Clearance: 73.8 mL/min (A) (based on SCr of 1.63 mg/dL (H)). Lactic Acid    (Sepsis Criteria: >2.0 mmol/L) Lab Results   Component Value Date/Time    Lactic acid 0.6 2020 12:10 AM    Lactic acid 0.7 2018 01:05 PM      Procalcitonin (0.10-0.49 NG/ML) Lab Results   Component Value Date/Time    Procalcitonin 0.10 2020 04:00 AM    Procalcitonin 0.26 2020 04:45 AM    Procalcitonin 0.40 2020 02:16 AM      Hepatic Overview (72 hr) Recent Labs     205 20   * 289* 368*   SGOT 68* 122* 236*    127* 170*      Temp (24-hr Max) Temp (24hrs), Av.7 °F (35.9 °C), Min:95.5 °F (35.3 °C), Max:97.7 °F (36.5 °C)       Hematology Recent Labs     20   WBC 7.4 7.4 8.7   HGB 13.0 13.1 12.7*   HCT 41.0 41.3 40.6    177 192        DOT  4   Notes  Vancomycin trough level = 34.1 @ 2344 on 2020  new dosing regimen:       Estimated Pharmacokinetic Parameters (based on one level)  Vd: 123 L (0.7 L/kg)   Issac: 0.037 hr-1 (T1/2 = 18.7 hrs)     Dosing Recommendations   Vancomycin dose: 1250 mg IV Q12hrs (infused over 1 hr)   Estimated peak: 27.4 mcg/mL   Estimated trough: 18.9 mcg/mL   Estimated AUC:SULMA: 550 mcg*hr/mL (assumed SULMA 1 mcg/mL)     A/P:   1. Recommend vancomycin 1250 mg IV Q12hrs (7 mg/kg)   2. Consider a vancomycin trough level prior to the 4th dose. 3. Please monitor renal function (urine output, BUN/SCr). Dose adjustments may be necessary with a significant change in renal function.         Jenny Akins [facility]  Clinical Pharmacist  [phone]
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
## 2020-04-08 ENCOUNTER — APPOINTMENT (OUTPATIENT)
Dept: GENERAL RADIOLOGY | Age: 60
DRG: 005 | End: 2020-04-08
Attending: INTERNAL MEDICINE
Payer: MEDICAID

## 2020-04-08 LAB
ALBUMIN SERPL-MCNC: 2.9 G/DL (ref 3.4–5)
ALBUMIN/GLOB SERPL: 0.9 {RATIO} (ref 0.8–1.7)
ALP SERPL-CCNC: 105 U/L (ref 45–117)
ALT SERPL-CCNC: 145 U/L (ref 16–61)
ANION GAP SERPL CALC-SCNC: 4 MMOL/L (ref 3–18)
ARTERIAL PATENCY WRIST A: ABNORMAL
AST SERPL-CCNC: 57 U/L (ref 10–38)
BASE EXCESS BLD CALC-SCNC: 11 MMOL/L
BDY SITE: ABNORMAL
BILIRUB SERPL-MCNC: 0.8 MG/DL (ref 0.2–1)
BODY TEMPERATURE: 99.2
BUN SERPL-MCNC: 35 MG/DL (ref 7–18)
BUN/CREAT SERPL: 21 (ref 12–20)
CA-I SERPL-SCNC: 1.11 MMOL/L (ref 1.12–1.32)
CALCIUM SERPL-MCNC: 8.6 MG/DL (ref 8.5–10.1)
CHLORIDE SERPL-SCNC: 105 MMOL/L (ref 100–111)
CO2 SERPL-SCNC: 34 MMOL/L (ref 21–32)
CREAT SERPL-MCNC: 1.7 MG/DL (ref 0.6–1.3)
DATE LAST DOSE: ABNORMAL
ERYTHROCYTE [DISTWIDTH] IN BLOOD BY AUTOMATED COUNT: 16.6 % (ref 11.6–14.5)
GAS FLOW.O2 O2 DELIVERY SYS: ABNORMAL L/MIN
GAS FLOW.O2 SETTING OXYMISER: 18 BPM
GLOBULIN SER CALC-MCNC: 3.3 G/DL (ref 2–4)
GLUCOSE BLD STRIP.AUTO-MCNC: 124 MG/DL (ref 70–110)
GLUCOSE BLD STRIP.AUTO-MCNC: 140 MG/DL (ref 70–110)
GLUCOSE BLD STRIP.AUTO-MCNC: 79 MG/DL (ref 70–110)
GLUCOSE BLD STRIP.AUTO-MCNC: 84 MG/DL (ref 70–110)
GLUCOSE SERPL-MCNC: 97 MG/DL (ref 74–99)
HCO3 BLD-SCNC: 34.3 MMOL/L (ref 22–26)
HCT VFR BLD AUTO: 42.4 % (ref 36–48)
HGB BLD-MCNC: 13.5 G/DL (ref 13–16)
IL6 SERPL-MCNC: 11.3 PG/ML (ref 0–15.5)
INSPIRATION.DURATION SETTING TIME VENT: 0.9 SEC
MAGNESIUM SERPL-MCNC: 2.2 MG/DL (ref 1.6–2.6)
MCH RBC QN AUTO: 27.4 PG (ref 24–34)
MCHC RBC AUTO-ENTMCNC: 31.8 G/DL (ref 31–37)
MCV RBC AUTO: 86.2 FL (ref 74–97)
O2/TOTAL GAS SETTING VFR VENT: 0.35 %
PCO2 BLD: 46.2 MMHG (ref 35–45)
PEEP RESPIRATORY: 8 CMH2O
PH BLD: 7.48 [PH] (ref 7.35–7.45)
PHOSPHATE SERPL-MCNC: 3.3 MG/DL (ref 2.5–4.9)
PIP ISTAT,IPIP: 24
PLATELET # BLD AUTO: 170 K/UL (ref 135–420)
PMV BLD AUTO: 12 FL (ref 9.2–11.8)
PO2 BLD: 61 MMHG (ref 80–100)
POTASSIUM SERPL-SCNC: 3.3 MMOL/L (ref 3.5–5.5)
POTASSIUM SERPL-SCNC: 4 MMOL/L (ref 3.5–5.5)
PROT SERPL-MCNC: 6.2 G/DL (ref 6.4–8.2)
RBC # BLD AUTO: 4.92 M/UL (ref 4.7–5.5)
REPORTED DOSE,DOSE: ABNORMAL UNITS
REPORTED DOSE/TIME,TMG: 340
SAO2 % BLD: 92 % (ref 92–97)
SERVICE CMNT-IMP: ABNORMAL
SODIUM SERPL-SCNC: 143 MMOL/L (ref 136–145)
SPECIMEN TYPE: ABNORMAL
TOTAL RESP. RATE, ITRR: 18
VANCOMYCIN TROUGH SERPL-MCNC: 32.2 UG/ML (ref 10–20)
VENTILATION MODE VENT: ABNORMAL
VOLUME CONTROL PLUS IVLCP: YES
VT SETTING VENT: 480 ML
WBC # BLD AUTO: 7.1 K/UL (ref 4.6–13.2)

## 2020-04-08 PROCEDURE — 82803 BLOOD GASES ANY COMBINATION: CPT

## 2020-04-08 PROCEDURE — 74011250636 HC RX REV CODE- 250/636: Performed by: INTERNAL MEDICINE

## 2020-04-08 PROCEDURE — 74011250636 HC RX REV CODE- 250/636: Performed by: FAMILY MEDICINE

## 2020-04-08 PROCEDURE — C9113 INJ PANTOPRAZOLE SODIUM, VIA: HCPCS | Performed by: FAMILY MEDICINE

## 2020-04-08 PROCEDURE — 84132 ASSAY OF SERUM POTASSIUM: CPT

## 2020-04-08 PROCEDURE — 74011250637 HC RX REV CODE- 250/637: Performed by: INTERNAL MEDICINE

## 2020-04-08 PROCEDURE — 74011250637 HC RX REV CODE- 250/637: Performed by: FAMILY MEDICINE

## 2020-04-08 PROCEDURE — 36600 WITHDRAWAL OF ARTERIAL BLOOD: CPT

## 2020-04-08 PROCEDURE — 94003 VENT MGMT INPAT SUBQ DAY: CPT

## 2020-04-08 PROCEDURE — 71045 X-RAY EXAM CHEST 1 VIEW: CPT

## 2020-04-08 PROCEDURE — 74011000250 HC RX REV CODE- 250: Performed by: INTERNAL MEDICINE

## 2020-04-08 PROCEDURE — 84100 ASSAY OF PHOSPHORUS: CPT

## 2020-04-08 PROCEDURE — 65610000006 HC RM INTENSIVE CARE

## 2020-04-08 PROCEDURE — 82330 ASSAY OF CALCIUM: CPT

## 2020-04-08 PROCEDURE — 74011000250 HC RX REV CODE- 250: Performed by: FAMILY MEDICINE

## 2020-04-08 PROCEDURE — 85027 COMPLETE CBC AUTOMATED: CPT

## 2020-04-08 PROCEDURE — 82962 GLUCOSE BLOOD TEST: CPT

## 2020-04-08 PROCEDURE — 77010033678 HC OXYGEN DAILY

## 2020-04-08 PROCEDURE — 74011000258 HC RX REV CODE- 258: Performed by: INTERNAL MEDICINE

## 2020-04-08 PROCEDURE — 83735 ASSAY OF MAGNESIUM: CPT

## 2020-04-08 PROCEDURE — 80202 ASSAY OF VANCOMYCIN: CPT

## 2020-04-08 RX ORDER — HYDRALAZINE HYDROCHLORIDE 20 MG/ML
10 INJECTION INTRAMUSCULAR; INTRAVENOUS
Status: DISCONTINUED | OUTPATIENT
Start: 2020-04-08 | End: 2020-04-22 | Stop reason: HOSPADM

## 2020-04-08 RX ORDER — FUROSEMIDE 10 MG/ML
40 INJECTION INTRAMUSCULAR; INTRAVENOUS ONCE
Status: COMPLETED | OUTPATIENT
Start: 2020-04-08 | End: 2020-04-08

## 2020-04-08 RX ORDER — HYDRALAZINE HYDROCHLORIDE 25 MG/1
25 TABLET, FILM COATED ORAL 3 TIMES DAILY
Status: DISCONTINUED | OUTPATIENT
Start: 2020-04-08 | End: 2020-04-10

## 2020-04-08 RX ADMIN — CHLORHEXIDINE GLUCONATE 10 ML: 1.2 RINSE ORAL at 08:12

## 2020-04-08 RX ADMIN — MIDAZOLAM HYDROCHLORIDE 3 MG/HR: 5 INJECTION, SOLUTION INTRAMUSCULAR; INTRAVENOUS at 03:40

## 2020-04-08 RX ADMIN — PROPOFOL 30 MCG/KG/MIN: 10 INJECTION, EMULSION INTRAVENOUS at 22:34

## 2020-04-08 RX ADMIN — PROPOFOL 25 MCG/KG/MIN: 10 INJECTION, EMULSION INTRAVENOUS at 12:13

## 2020-04-08 RX ADMIN — POTASSIUM BICARBONATE 40 MEQ: 782 TABLET, EFFERVESCENT ORAL at 07:17

## 2020-04-08 RX ADMIN — HYDRALAZINE HYDROCHLORIDE 25 MG: 25 TABLET, FILM COATED ORAL at 17:36

## 2020-04-08 RX ADMIN — PROPOFOL 20 MCG/KG/MIN: 10 INJECTION, EMULSION INTRAVENOUS at 07:00

## 2020-04-08 RX ADMIN — Medication 10 ML: at 13:50

## 2020-04-08 RX ADMIN — PIPERACILLIN AND TAZOBACTAM 3.38 G: 3; .375 INJECTION, POWDER, LYOPHILIZED, FOR SOLUTION INTRAVENOUS at 13:49

## 2020-04-08 RX ADMIN — LORAZEPAM 1 MG: 2 INJECTION INTRAMUSCULAR; INTRAVENOUS at 02:10

## 2020-04-08 RX ADMIN — THIAMINE HCL TAB 100 MG 200 MG: 100 TAB at 08:12

## 2020-04-08 RX ADMIN — PROPOFOL 25 MCG/KG/MIN: 10 INJECTION, EMULSION INTRAVENOUS at 17:57

## 2020-04-08 RX ADMIN — FUROSEMIDE 20 MG: 10 INJECTION, SOLUTION INTRAMUSCULAR; INTRAVENOUS at 08:12

## 2020-04-08 RX ADMIN — FUROSEMIDE 40 MG: 10 INJECTION, SOLUTION INTRAMUSCULAR; INTRAVENOUS at 12:02

## 2020-04-08 RX ADMIN — AMLODIPINE BESYLATE 10 MG: 5 TABLET ORAL at 08:12

## 2020-04-08 RX ADMIN — ONDANSETRON 4 MG: 2 INJECTION INTRAMUSCULAR; INTRAVENOUS at 22:41

## 2020-04-08 RX ADMIN — SODIUM CHLORIDE 40 MG: 9 INJECTION, SOLUTION INTRAMUSCULAR; INTRAVENOUS; SUBCUTANEOUS at 08:12

## 2020-04-08 RX ADMIN — LORAZEPAM 1 MG: 2 INJECTION INTRAMUSCULAR; INTRAVENOUS at 21:19

## 2020-04-08 RX ADMIN — PIPERACILLIN AND TAZOBACTAM 3.38 G: 3; .375 INJECTION, POWDER, LYOPHILIZED, FOR SOLUTION INTRAVENOUS at 20:38

## 2020-04-08 RX ADMIN — Medication 10 ML: at 20:40

## 2020-04-08 RX ADMIN — HYDRALAZINE HYDROCHLORIDE 25 MG: 25 TABLET, FILM COATED ORAL at 12:01

## 2020-04-08 RX ADMIN — FUROSEMIDE 20 MG: 10 INJECTION, SOLUTION INTRAMUSCULAR; INTRAVENOUS at 20:40

## 2020-04-08 RX ADMIN — PROPOFOL 20 MCG/KG/MIN: 10 INJECTION, EMULSION INTRAVENOUS at 02:09

## 2020-04-08 RX ADMIN — ENOXAPARIN SODIUM 40 MG: 40 INJECTION SUBCUTANEOUS at 13:49

## 2020-04-08 RX ADMIN — MIDAZOLAM HYDROCHLORIDE 4 MG/HR: 5 INJECTION, SOLUTION INTRAMUSCULAR; INTRAVENOUS at 22:48

## 2020-04-08 RX ADMIN — METHYLPREDNISOLONE SODIUM SUCCINATE 30 MG: 40 INJECTION, POWDER, FOR SOLUTION INTRAMUSCULAR; INTRAVENOUS at 08:11

## 2020-04-08 RX ADMIN — THIAMINE HCL TAB 100 MG 200 MG: 100 TAB at 20:40

## 2020-04-08 RX ADMIN — CHLORHEXIDINE GLUCONATE 10 ML: 1.2 RINSE ORAL at 20:40

## 2020-04-08 RX ADMIN — HYDRALAZINE HYDROCHLORIDE 25 MG: 25 TABLET, FILM COATED ORAL at 20:40

## 2020-04-08 RX ADMIN — PIPERACILLIN AND TAZOBACTAM 3.38 G: 3; .375 INJECTION, POWDER, LYOPHILIZED, FOR SOLUTION INTRAVENOUS at 06:03

## 2020-04-08 RX ADMIN — ENOXAPARIN SODIUM 40 MG: 40 INJECTION SUBCUTANEOUS at 01:03

## 2020-04-08 RX ADMIN — VANCOMYCIN HYDROCHLORIDE 1250 MG: 1.25 INJECTION, POWDER, LYOPHILIZED, FOR SOLUTION INTRAVENOUS at 03:40

## 2020-04-08 NOTE — PROGRESS NOTES
Bedside and Verbal shift change report given to Maxx Rodriguez (oncoming nurse) by Charlee Adair, Rn (offgoing nurse). Report included the following information SBAR, Kardex, ED Summary, Recent Results, Med Rec Status and Cardiac Rhythm NSR.     0730 hrs Shift assessment completed. 0800 hrs  SBT started. 0950 hrs SBT failed, placed back on full support , sedated. Rt and Dr Gonzalez Forward at bedside. 1200 hrs Reassessed pt .    1320 hrs  Updated Sister \"Jennifer licona' about pt's conditions. Answered all questions. 1600 hrs Reassessed pt . Bedside and Verbal shift change report given to Kailyn Lynne (oncoming nurse) by Zackery Hayes (offgoing nurse). Report included the following information SBAR, Kardex, ED Summary, Intake/Output, Med Rec Status and Cardiac Rhythm NSR.
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
## 2020-04-08 NOTE — PROGRESS NOTES
04/08/20 0344   Vent Settings   CMV Rate Set 12  (to allow patient to  spontaneous rate)   Back-Up Rate 12

## 2020-04-08 NOTE — PROGRESS NOTES
[address] [facility] [phone]  [facility] [phone]    Palliative medicine team members Samanta Mosley Np and this writer provided follow up. Patient remains intubated and on the ventilator. PM team updated by Dr. Carlos Fraga that he has discussed need for early trach with patient's sister, Andrei Arita. She plans to discuss further with patient's wife. Awaiting decision regarding possible trach placement. PM team remains available for support.     Ruth East RN

## 2020-04-08 NOTE — PROGRESS NOTES
Sister Natalia Yung called back to ICU, I spoke with her and discussed the multiple failed attempts of SBT and consideration for early tracheostomy. Sister also agreed that pt is non compliant to CPAP./BiPAP at home. Sister with d/w pt's wife and get back to us with their decision of early tracheostomy.      Lexie Jansen MD 4/8/2020 11:22 AM

## 2020-04-08 NOTE — PROGRESS NOTES
NUTRITION FOLLOW-UP    RECOMMENDATIONS/PLAN:   -EN: Recc changing tube feeds to Osmolite 1.2 @ 65ml/hr to provide 1716kcal 79g PRO 1173ml H20 225g CHO 56g Fat which meets 100% RDIs  Recc starting @ 10ml/hr an increasing by 10ml/hr Q6 until goal rate met  Will defer Free H20 to MD      Monitor labs/lytes, tube feed tolerance, skin integrity, wt, fluid status, BM    NUTRITION ASSESSMENT:   Client Update: 62 yrs old Male with acute hypercapneic respiratory failure, acute on chronic respiration failure-intubated in ICU, hx of cocaine abuse, covid 19 negative, COPD exacerbation, HTN, CHF, periodic bradycardia, pna, hypokalemia, elevated lft,       FOOD/NUTRITION INTAKE   Diet Order:  Vital High Protein @ 75ml/hr provides 1650kcal, 144g protein, 1338 ml free water, 185g CHO, 100% RDIs   Food Allergies: NKFA  Average PO Intake:      No data found. Pertinent Medications:  [x] Reviewed; d5%, lasix, methylprednisolone, zofran, protonix, propofol, thiamine  Electrolyte Replacement Protocol: [x]K [x]Mg [x]PO4  Insulin:  [x]SSI  []Pre-meal   []Basal    []Drip  []None  Cultural/Adventism Food Preferences: None Identified    BIOCHEMICAL DATA & MEDICAL TESTS  Pertinent Labs:  [x] Reviewed  K-3.3 GFR est AA-50 AST-57 ALT-145  ANTHROPOMETRICS  Height: 5' 7\" (170.2 cm)       Weight: (!) 167.5 kg (369 lb 4.3 oz)         BMI: 57.8 kg/m^2 morbidly obese (Greater than or = to 40% BMI)   Adm Weight:386 lbs                Weight change: -17lbs   Adjusted Body Weight: 83kg    NUTRITION-FOCUSED PHYSICAL ASSESSMENT  Skin: No PU     GI:+BM 4/7/20  NUTRITION PRESCRIPTION  Calories: 1682 kcal/day based on 25kcal/kg IBW  Protein: 101-134 g/day based on 1.5- 2 g/kg IBW  CHO: 210 g/day based on 50% of total energy  Fluid: 1682 ml/day based on 1 kcal/ml        NUTRITION DIAGNOSES:   1. Inadequate oral intake related to NPO status as evidenced by diet order NPO x 2 days    NUTRITION INTERVENTIONS:   INTERVENTIONS:        GOALS:  1. -EN: Recc changing tube feeds to Osmolite 1.2 @ 65ml/hr to provide 1716kcal 79g PRO 1173ml H20 225g CHO 56g Fat  Recc starting @ 10ml/hr an increasing by 10ml/hr Q6 until goal rate met  Will defer Free H20 to MD 1. Change tube feed by next review 3 days     LEARNING NEEDS (Diet, Supplementation, Food/Nutrient-Drug Interaction):   [] None Identified   [] Education provided/documented      Identified and patient: [] Declined   [x] Was not appropriate/indicated        NUTRITION MONITORING /EVALUATION:   Adjust EN/PN as appropriate  Monitor wt  Monitor renal labs, electrolytes, fluid status     Previous Recommendations Implemented: yes        Previous Goals Met:  yes -tube feeds started       [] Participated in Interdisciplinary Rounds    [x] Interdisciplinary Care Plan Reviewed  DISCHARGE NUTRITION RECOMMENDATIONS ADDRESSED:      [x] To be determined closer to discharge    NUTRITION RISK:           [x] At risk                        [] Not currently at risk        Will follow-up per policy.   Kunal Carmen 1

## 2020-04-08 NOTE — PROGRESS NOTES
1900 - Bedside and Verbal shift change report given to Javi Segal RN (oncoming nurse) by Tre Bernstein RN (offgoing nurse). Report included the following information SBAR, Kardex, ED Summary, Procedure Summary, Intake/Output, MAR, Recent Results, Med Rec Status and Cardiac Rhythm NSR.     2000 - Shift assessment completed. Patient drowsy, able to be aroused, following simple commands, frequent restlessness. 0000 - Reassessment completed, no changes to previous assessment. 0400 - Reassessment completed, no changes to previous assessment. 0715 - Bedside and Verbal shift change report given to Tre Bernstein RN (oncoming nurse) by Javi Segal RN (offgoing nurse). Report included the following information SBAR, Kardex, ED Summary, Procedure Summary, Intake/Output, MAR, Recent Results, Med Rec Status and Cardiac Rhythm NSR.

## 2020-04-08 NOTE — PROGRESS NOTES
Failed SBT with low Vt in <200 and tachypnea. He likely will need early trach due to underlying JIM/OHS and noncompliance and risk for hypoxic and hypercarbic respiratory failure. Trying to reach family, not reachable on the listed phone. Will keep trying. Placed back on CVC ventilator and sedation.    Megha Champagne MD 4/8/2020 9:57 AM

## 2020-04-08 NOTE — PROGRESS NOTES
Hospitalist Progress Note-critical care note     Patient: Pedrito Oshea MRN: [id number]  CSN: [id number]    YOB: 1960  Age: 62 y.o. Sex: male    DOA: 4/3/2020 LOS:  LOS: 5 days            Chief complaint:  Respiratory failure, joann on ckd3      Assessment/Plan         Hospital Problems  Date Reviewed: 4/3/2020          Codes Class Noted POA    HCAP (healthcare-associated pneumonia) ICD-10-CM: J18.9  ICD-9-CM: 486  4/7/2020 Unknown        Goals of care, counseling/discussion ICD-10-CM: Z71.89  ICD-9-CM: V65.49  Unknown Unknown        Suspected Covid-19 Virus Infection ICD-10-CM: R68.89  4/4/2020 Clinically Undetermined        Morbid obesity with body mass index of 50 or higher ([facility] ICD-10-CM: E66.01  ICD-9-CM: 278.01  4/4/2020 Yes        COPD with acute exacerbation ([facility] ICD-10-CM: J44.1  ICD-9-CM: 491.21  4/3/2020 Yes        * (Principal) Respiratory failure with hypercapnia ([facility] ICD-10-CM: J96.92  ICD-9-CM: 518.81  4/3/2020 Yes        Transaminitis ICD-10-CM: R74.0  ICD-9-CM: 790.4  4/3/2020 Yes        Acute on chronic renal insufficiency ICD-10-CM: N28.9, N18.9  ICD-9-CM: 593.9, 585.9  4/3/2020 Yes        Sleep apnea ICD-10-CM: G47.30  ICD-9-CM: 780.57  7/9/2018 Yes        Cocaine abuse ([facility] ICD-10-CM: F14.10  ICD-9-CM: 305.60  4/22/2018 Yes        CHF (congestive heart failure) (HCC) (Chronic) ICD-10-CM: I50.9  ICD-9-CM: 428.0  3/3/2018 Yes              63 y/o male w/ hx of COPD on 4L home O2-trilogy at home ,  EF of 35%, super morbid obesity with [facility] admission to [facility] who presents in acute hypercapneic respiratory failure, he was admitted tue to acute on chronic respiration failure-intubated on admission.    Urine drug screen positive for cocaine, covid 19 negative       Respiratory   Hypercapnic respiratory failure with obesity hypoventilation syndrome   On  vent ,Continue weaning off, sedation vacation AM,   Dr. Kinjal Cortes discussed with family for earlier trach   Breathing trail Hcap: On vanc and zosyn ,will continue   scopalamine on due to a lot of secretion     Copd exacerbation   On iv steroid a, breathing tx     shyam : on trilogy at home , not compliance per family reported       Cardiovascular:  Hypertension  On norvasc ,     Isael-resolved after holding bbb       chf  Combined diastolic and systolic   Ef 30 - 84%. Mild (grade 1) left ventricular diastolic dysfunction from echo   On lasix   bbb was hold due to isael     Periodic bradycardia Prolonged QT improving repeat EKG-will continue optimize electrolytes          ID: Pneumonia  Repeat COVID negative twice       Endocrine  On sliding scale insulin     FEN for now ICU protocol for electrolytes-icu replacement protocol   Hypokalemia -replace as protocol      Renal:  joann on ckd3  Cr stable now,      Neuro : On versed and propofol     Heme  On full dose Lovenox for DVT        Psych  Cocaine abuse     GI   Elevated lft -improving  Start tube feeding today        Poor prognosis, palliative care on board     rn : agitated last night     30 minutes of critical care time spent in the direct evaluation and treatment of this high risk patient. The reason for providing this level of medical care for this critically ill patient was due a critical illness that impaired one or more vital organ systems such that there was a high probability of imminent or life threatening deterioration in the patients condition. This care involved high complexity decision making to assess, manipulate, and support vital system functions, to treat this degreee vital organ system failure and to prevent further life threatening deterioration of the patients condition.     Disposition :tbd,   Review of systems:  Unable to obtain due to intubation   Vital signs/Intake and Output:  Visit Vitals  /82   Pulse 65   Temp 99 °F (37.2 °C)   Resp 16   Ht 5' 7\" (1.702 m)   Wt (!) 167.5 kg (369 lb 4.3 oz)   SpO2 96%   BMI 57.84 kg/m²     Current Shift:  04/08 0701 - 04/08 1900  In: 260   Out: 1350 [UOCIX:9007]  Last three shifts:  04/06 1901 - 04/08 0700  In: 3544.6 [I.V.:2694.6]  Out: 5600 [Urine:5600]    Physical Exam:  General: intubated   HEENT: NC, Atraumatic. PERRLA, anicteric sclerae. Et tube noted   Lungs: CTA Bilaterally. No Wheezing/Rhonchi/Rales. Heart:  Regular  rhythm,  No murmur, No Rubs, No Gallops  Abdomen: Soft, obesity , Non tender. +Bowel sounds,   Extremities: No c/c/e  Psych:   Calm   Neurologic:  On sedation,          Labs: Results:       Chemistry Recent Labs     04/08/20  0540 04/07/20  1430 04/07/20  0540 04/06/20  0400   GLU 97  --  109* 119*     --  146* 147*   K 3.3* 4.0 3.3* 4.0     --  106 109   CO2 34*  --  34* 33*   BUN 35*  --  36* 35*   CREA 1.70*  --  1.70* 1.63*   CA 8.6  --  9.1 8.7   AGAP 4  --  6 5   BUCR 21*  --  21* 21*     --  112 116   TP 6.2*  --  6.1* 6.1*   ALB 2.9*  --  2.9* 2.8*   GLOB 3.3  --  3.2 3.3   AGRAT 0.9  --  0.9 0.8      CBC w/Diff Recent Labs     04/08/20  0540 04/07/20  0540 04/06/20  0400   WBC 7.1 6.7 7.4   RBC 4.92 4.91 4.70   HGB 13.5 13.4 13.0   HCT 42.4 42.3 41.0    202 189      Cardiac Enzymes No results for input(s): CPK, CKND1, TISHA in the last 72 hours. No lab exists for component: CKRMB, TROIP   Coagulation No results for input(s): PTP, INR, APTT, INREXT, INREXT in the last 72 hours. Lipid Panel Lab Results   Component Value Date/Time    Cholesterol, total 196 01/25/2018 02:51 AM    HDL Cholesterol 64 (H) 01/25/2018 02:51 AM    LDL, calculated 121.2 (H) 01/25/2018 02:51 AM    VLDL, calculated 10.8 01/25/2018 02:51 AM    Triglyceride 54 01/25/2018 02:51 AM    CHOL/HDL Ratio 3.1 01/25/2018 02:51 AM      BNP No results for input(s): BNPP in the last 72 hours.    Liver Enzymes Recent Labs     04/08/20  0540   TP 6.2*   ALB 2.9*      SGOT 57*      Thyroid Studies Lab Results   Component Value Date/Time    TSH 0.11 (L) 03/19/2019 06:50 AM        Procedures/imaging: see electronic medical records for all procedures/Xrays and details which were not copied into this note but were reviewed prior to creation of Plan    Xr Abd (kub)    Result Date: 4/4/2020  EXAM: Single view of the abdomen CLINICAL INDICATION/HISTORY: Nasogastric tube placement    --Additional history: None. COMPARISON: None TECHNIQUE: Single supine view _______________ FINDINGS: The impression. _______________     IMPRESSION: Study technically limited by patient's body habitus. Nasogastric tube tip appears to project over the gastric antrum. Xr Chest Port    Result Date: 4/7/2020  EXAM: XR CHEST PORT CLINICAL INDICATION/HISTORY: OG tube in place. -Additional: None COMPARISON: Earlier the same day TECHNIQUE: Portable frontal view of the chest _______________ FINDINGS: SUPPORT DEVICES: Endotracheal tube approximately 1 cm above the juan miguel. Enteric tube tip out of field of view possibly in the distal stomach. HEART AND MEDIASTINUM: Cardiomegaly LUNGS AND PLEURAL SPACES: Hypoinflated lungs. Probable small to moderate left effusion. Mild interstitial edema. No pneumothorax. _______________     IMPRESSION: Endotracheal tube approximately 1 cm above the juan miguel. Enteric tube tip out of field of view possibly in the distal stomach. Xr Chest Port    Result Date: 4/7/2020  EXAM: XR CHEST PORT CLINICAL INDICATION/HISTORY: pneumonia, intubated -Additional: None COMPARISON: One day prior TECHNIQUE: Portable frontal view of the chest _______________ FINDINGS: SUPPORT DEVICES: Enteric tube and endotracheal tube unchanged. Chana Simpson HEART AND MEDIASTINUM: cardiomegaly LUNGS AND PLEURAL SPACES: Hypoinflated lungs. Probable small to moderate left effusion. Mild interstitial edema. No pneumothorax. _______________     IMPRESSION: Hypoinflated lungs. Probable small to moderate left effusion. Mild interstitial edema. No pneumothorax.      Xr Chest Port    Result Date: 4/6/2020  EXAM: XR CHEST PORT CLINICAL INDICATION/HISTORY: While intubated -Additional: None COMPARISON: One day prior TECHNIQUE: Portable frontal view of the chest _______________ FINDINGS: SUPPORT DEVICES: Enteric tube and endotracheal tube unchanged. Joanna Nunes HEART AND MEDIASTINUM: cardiomegaly LUNGS AND PLEURAL SPACES: Hypoinflated lungs. Probable small left effusion. Mild interstitial edema. No pneumothorax. _______________     IMPRESSION: Hypoinflated lungs. Probable small left effusion. Mild interstitial edema. Xr Chest Port    Result Date: 4/5/2020  EXAM: CHEST RADIOGRAPH CLINICAL INDICATION/HISTORY: Respiratory failure -Additional: None COMPARISON: April 4, 2020 TECHNIQUE: Portable frontal view of the chest _______________ FINDINGS: SUPPORT DEVICES: The tip of an endotracheal tube is 10 cm above the juan miguel. A nasogastric tube crosses the gastroesophageal junction. A left IJ central venous catheter terminates in the proximal SVC. HEART AND MEDIASTINUM: The heart is enlarged. LUNGS AND PLEURAL SPACES: Interstitial lung markings are increased. Lung volumes are hypoinflated and there are opacities in the lung bases. No pneumothorax. BONY THORAX AND SOFT TISSUES: Unremarkable. _______________     IMPRESSION: 1. Mild pulmonary edema 2. Bibasilar opacities that may represent a combination of atelectasis and small pleural effusions     Xr Chest Port    Result Date: 4/4/2020  EXAM: PORTABLE CHEST HISTORY: Central line placement. Acute respiratory failure. COMPARISON: 4/4/2020 at 12:42 AM TECHNIQUE: Single portable view. _______________ FINDINGS: SUPPORT DEVICES: Endotracheal tube tip lies about 4.2 cm above juan miguel. Left central venous catheter tip in upper superior vena cava. HEART AND MEDIASTINUM: Cardiomegaly. LUNGS AND PLEURAL SPACES: Patchy airspace disease right lung base may represent developing subsegmental atelectasis or pneumonia. Evaluation limited by large body habitus.  BONES AND SOFT TISSUES: Bony structures are normal. _______________     IMPRESSION: Interval placement left central venous catheter. No pneumothorax. Cardiomegaly without change. Patchy airspace disease right lung base either subsegmental atelectasis or pneumonia appears slightly worse. Xr Chest Port    Result Date: 4/4/2020  EXAM: Chest radiograph  CLINICAL INDICATION/HISTORY: Chemical ventilation    --Additional history: None. COMPARISON: 04/03/2020 TECHNIQUE: Frontal view of the chest _______________ FINDINGS: SUPPORT DEVICES: There is an endotracheal tube with tip approximately 5.5 cm above the juan miguel. There is a nasogastric tube descends below the diaphragm. HEART AND MEDIASTINUM: React silhouette is enlarged. Stable mediastinal contours. . Normal pulmonary vasculature. LUNGS AND PLEURAL SPACES: No convincing focal airspace opacity given the limitations of the study and superimposed body habitus. Sharp costophrenic sulci. No pneumothoraces. BONY THORAX AND SOFT TISSUES: Questionable fracture deformity of the right lateral ninth rib. _______________     IMPRESSION: 1. Support lines and tubes as detailed above. 2. Technically limited study secondary to body habitus without significant change from the prior study. Xr Chest Port    Result Date: 4/3/2020  EXAM:  AP Portable Chest X-ray 1 view INDICATION: Chest pain shortness of breath COMPARISON: None _______________ FINDINGS:  Heart size is enlarged and mediastinal contours are within normal limits for portable radiograph. There are increased interstitial markings in the right lung. No focal airspace disease seen. . There are no pleural effusions. No acute osseous findings. ________________      IMPRESSION: Increased interstitial markings in the right lung. No focal airspace disease or effusion.       Marlyn Lanes, MD

## 2020-04-08 NOTE — PROGRESS NOTES
Pharmacy Dosing Services: Vancomycin    Indication: HAP  Day of therapy: 5 of 7  Scr = 1.7   CrCl = 68.9 ml/min       Vancomycin Trough:  32.2 (4/8/20 at 15:10)   Hold Vancomycin dose.  (prior dose:  Vancomycin 1250 mg IV q12h)    Vancomycin Random Level ordered for 4/9/20 at 08:00. Subsequent dosing to be determined based on results of Random Level and renal function. Pharmacy to follow daily and will make changes to dose and/or frequency based on clinical status.     Pharmacist Tamara Huynh, [facility]

## 2020-04-08 NOTE — PROGRESS NOTES
Chart reviewed, pt remains intubated. Pt sister is point of contact and MPOA:  Osvaldo Pelletier   Noted discussion of early trach for this pt. Sister states pt has home oxygen, nebulizer and per pulmonology not compliant with cpap/biapap at home. CM will continue to follow, may need LTAC placement for vent weaning.

## 2020-04-09 ENCOUNTER — APPOINTMENT (OUTPATIENT)
Dept: GENERAL RADIOLOGY | Age: 60
DRG: 005 | End: 2020-04-09
Attending: INTERNAL MEDICINE
Payer: MEDICAID

## 2020-04-09 LAB
ALBUMIN SERPL-MCNC: 3.1 G/DL (ref 3.4–5)
ALBUMIN/GLOB SERPL: 0.9 {RATIO} (ref 0.8–1.7)
ALP SERPL-CCNC: 106 U/L (ref 45–117)
ALT SERPL-CCNC: 141 U/L (ref 16–61)
ANION GAP SERPL CALC-SCNC: 7 MMOL/L (ref 3–18)
ANION GAP SERPL CALC-SCNC: 8 MMOL/L (ref 3–18)
ARTERIAL PATENCY WRIST A: YES
AST SERPL-CCNC: 40 U/L (ref 10–38)
BACTERIA SPEC CULT: NORMAL
BASE EXCESS BLD CALC-SCNC: 12 MMOL/L
BASOPHILS # BLD: 0 K/UL (ref 0–0.1)
BASOPHILS NFR BLD: 0 % (ref 0–2)
BDY SITE: ABNORMAL
BILIRUB SERPL-MCNC: 0.6 MG/DL (ref 0.2–1)
BODY TEMPERATURE: 99
BUN SERPL-MCNC: 39 MG/DL (ref 7–18)
BUN SERPL-MCNC: 39 MG/DL (ref 7–18)
BUN/CREAT SERPL: 19 (ref 12–20)
BUN/CREAT SERPL: 20 (ref 12–20)
CA-I SERPL-SCNC: 1.13 MMOL/L (ref 1.12–1.32)
CALCIUM SERPL-MCNC: 9 MG/DL (ref 8.5–10.1)
CALCIUM SERPL-MCNC: 9.1 MG/DL (ref 8.5–10.1)
CHLORIDE SERPL-SCNC: 102 MMOL/L (ref 100–111)
CHLORIDE SERPL-SCNC: 103 MMOL/L (ref 100–111)
CO2 SERPL-SCNC: 32 MMOL/L (ref 21–32)
CO2 SERPL-SCNC: 34 MMOL/L (ref 21–32)
CREAT SERPL-MCNC: 1.94 MG/DL (ref 0.6–1.3)
CREAT SERPL-MCNC: 2.05 MG/DL (ref 0.6–1.3)
DIFFERENTIAL METHOD BLD: ABNORMAL
EOSINOPHIL # BLD: 0.1 K/UL (ref 0–0.4)
EOSINOPHIL NFR BLD: 2 % (ref 0–5)
ERYTHROCYTE [DISTWIDTH] IN BLOOD BY AUTOMATED COUNT: 16.6 % (ref 11.6–14.5)
ERYTHROCYTE [DISTWIDTH] IN BLOOD BY AUTOMATED COUNT: 16.6 % (ref 11.6–14.5)
GAS FLOW.O2 O2 DELIVERY SYS: ABNORMAL L/MIN
GAS FLOW.O2 SETTING OXYMISER: 12 BPM
GLOBULIN SER CALC-MCNC: 3.6 G/DL (ref 2–4)
GLUCOSE BLD STRIP.AUTO-MCNC: 112 MG/DL (ref 70–110)
GLUCOSE BLD STRIP.AUTO-MCNC: 113 MG/DL (ref 70–110)
GLUCOSE BLD STRIP.AUTO-MCNC: 148 MG/DL (ref 70–110)
GLUCOSE BLD STRIP.AUTO-MCNC: 157 MG/DL (ref 70–110)
GLUCOSE BLD STRIP.AUTO-MCNC: 62 MG/DL (ref 70–110)
GLUCOSE BLD STRIP.AUTO-MCNC: 88 MG/DL (ref 70–110)
GLUCOSE SERPL-MCNC: 111 MG/DL (ref 74–99)
GLUCOSE SERPL-MCNC: 138 MG/DL (ref 74–99)
GRAM STN SPEC: NORMAL
HAV IGM SER QL: NEGATIVE
HBV CORE IGM SER QL: NEGATIVE
HBV SURFACE AG SER QL: <0.1 INDEX
HBV SURFACE AG SER QL: NEGATIVE
HCO3 BLD-SCNC: 34.3 MMOL/L (ref 22–26)
HCT VFR BLD AUTO: 44.8 % (ref 36–48)
HCT VFR BLD AUTO: 45.9 % (ref 36–48)
HCV AB SER IA-ACNC: <0.02 INDEX
HCV AB SERPL QL IA: NEGATIVE
HCV COMMENT,HCGAC: NORMAL
HGB BLD-MCNC: 14.2 G/DL (ref 13–16)
HGB BLD-MCNC: 14.6 G/DL (ref 13–16)
INR PPP: 1.2 (ref 0.8–1.2)
INSPIRATION.DURATION SETTING TIME VENT: 0.9 SEC
LYMPHOCYTES # BLD: 1.7 K/UL (ref 0.9–3.6)
LYMPHOCYTES NFR BLD: 27 % (ref 21–52)
MAGNESIUM SERPL-MCNC: 2.2 MG/DL (ref 1.6–2.6)
MAGNESIUM SERPL-MCNC: 2.5 MG/DL (ref 1.6–2.6)
MCH RBC QN AUTO: 27.6 PG (ref 24–34)
MCH RBC QN AUTO: 27.7 PG (ref 24–34)
MCHC RBC AUTO-ENTMCNC: 31.7 G/DL (ref 31–37)
MCHC RBC AUTO-ENTMCNC: 31.8 G/DL (ref 31–37)
MCV RBC AUTO: 86.9 FL (ref 74–97)
MCV RBC AUTO: 87 FL (ref 74–97)
MONOCYTES # BLD: 0.8 K/UL (ref 0.05–1.2)
MONOCYTES NFR BLD: 12 % (ref 3–10)
NEUTS SEG # BLD: 3.8 K/UL (ref 1.8–8)
NEUTS SEG NFR BLD: 59 % (ref 40–73)
O2/TOTAL GAS SETTING VFR VENT: 0.35 %
PCO2 BLD: 38.3 MMHG (ref 35–45)
PEEP RESPIRATORY: 8 CMH2O
PH BLD: 7.56 [PH] (ref 7.35–7.45)
PHOSPHATE SERPL-MCNC: 3 MG/DL (ref 2.5–4.9)
PIP ISTAT,IPIP: 23
PLATELET # BLD AUTO: 166 K/UL (ref 135–420)
PLATELET # BLD AUTO: 188 K/UL (ref 135–420)
PMV BLD AUTO: 12.2 FL (ref 9.2–11.8)
PMV BLD AUTO: 12.3 FL (ref 9.2–11.8)
PO2 BLD: 56 MMHG (ref 80–100)
POTASSIUM SERPL-SCNC: 3 MMOL/L (ref 3.5–5.5)
POTASSIUM SERPL-SCNC: 3 MMOL/L (ref 3.5–5.5)
POTASSIUM SERPL-SCNC: 3.7 MMOL/L (ref 3.5–5.5)
PROT SERPL-MCNC: 6.7 G/DL (ref 6.4–8.2)
PROTHROMBIN TIME: 14.6 SEC (ref 11.5–15.2)
RBC # BLD AUTO: 5.15 M/UL (ref 4.7–5.5)
RBC # BLD AUTO: 5.28 M/UL (ref 4.7–5.5)
SAO2 % BLD: 92 % (ref 92–97)
SERVICE CMNT-IMP: ABNORMAL
SERVICE CMNT-IMP: NORMAL
SODIUM SERPL-SCNC: 143 MMOL/L (ref 136–145)
SODIUM SERPL-SCNC: 143 MMOL/L (ref 136–145)
SP1: NORMAL
SP2: NORMAL
SP3: NORMAL
SPECIMEN TYPE: ABNORMAL
TOTAL RESP. RATE, ITRR: 21
VANCOMYCIN SERPL-MCNC: 19.3 UG/ML (ref 5–40)
VENTILATION MODE VENT: ABNORMAL
VT SETTING VENT: 505 ML
WBC # BLD AUTO: 6.4 K/UL (ref 4.6–13.2)
WBC # BLD AUTO: 7.9 K/UL (ref 4.6–13.2)

## 2020-04-09 PROCEDURE — 74011250637 HC RX REV CODE- 250/637: Performed by: INTERNAL MEDICINE

## 2020-04-09 PROCEDURE — 74011250637 HC RX REV CODE- 250/637: Performed by: FAMILY MEDICINE

## 2020-04-09 PROCEDURE — 36600 WITHDRAWAL OF ARTERIAL BLOOD: CPT

## 2020-04-09 PROCEDURE — 85610 PROTHROMBIN TIME: CPT

## 2020-04-09 PROCEDURE — 65610000006 HC RM INTENSIVE CARE

## 2020-04-09 PROCEDURE — 84100 ASSAY OF PHOSPHORUS: CPT

## 2020-04-09 PROCEDURE — 84132 ASSAY OF SERUM POTASSIUM: CPT

## 2020-04-09 PROCEDURE — 77010033678 HC OXYGEN DAILY

## 2020-04-09 PROCEDURE — 74011000250 HC RX REV CODE- 250: Performed by: FAMILY MEDICINE

## 2020-04-09 PROCEDURE — 74011000258 HC RX REV CODE- 258: Performed by: FAMILY MEDICINE

## 2020-04-09 PROCEDURE — 74011250636 HC RX REV CODE- 250/636: Performed by: INTERNAL MEDICINE

## 2020-04-09 PROCEDURE — 85025 COMPLETE CBC W/AUTO DIFF WBC: CPT

## 2020-04-09 PROCEDURE — 74011000258 HC RX REV CODE- 258: Performed by: INTERNAL MEDICINE

## 2020-04-09 PROCEDURE — 85027 COMPLETE CBC AUTOMATED: CPT

## 2020-04-09 PROCEDURE — 94003 VENT MGMT INPAT SUBQ DAY: CPT

## 2020-04-09 PROCEDURE — 74011250636 HC RX REV CODE- 250/636: Performed by: FAMILY MEDICINE

## 2020-04-09 PROCEDURE — 36591 DRAW BLOOD OFF VENOUS DEVICE: CPT

## 2020-04-09 PROCEDURE — 83735 ASSAY OF MAGNESIUM: CPT

## 2020-04-09 PROCEDURE — 71045 X-RAY EXAM CHEST 1 VIEW: CPT

## 2020-04-09 PROCEDURE — 80053 COMPREHEN METABOLIC PANEL: CPT

## 2020-04-09 PROCEDURE — 82803 BLOOD GASES ANY COMBINATION: CPT

## 2020-04-09 PROCEDURE — 82962 GLUCOSE BLOOD TEST: CPT

## 2020-04-09 PROCEDURE — C9113 INJ PANTOPRAZOLE SODIUM, VIA: HCPCS | Performed by: FAMILY MEDICINE

## 2020-04-09 PROCEDURE — 82330 ASSAY OF CALCIUM: CPT

## 2020-04-09 PROCEDURE — 74011000250 HC RX REV CODE- 250: Performed by: INTERNAL MEDICINE

## 2020-04-09 PROCEDURE — 80202 ASSAY OF VANCOMYCIN: CPT

## 2020-04-09 RX ORDER — POTASSIUM CHLORIDE 7.45 MG/ML
10 INJECTION INTRAVENOUS
Status: COMPLETED | OUTPATIENT
Start: 2020-04-09 | End: 2020-04-09

## 2020-04-09 RX ORDER — POTASSIUM CHLORIDE 20 MEQ/1
40 TABLET, EXTENDED RELEASE ORAL ONCE
Status: COMPLETED | OUTPATIENT
Start: 2020-04-09 | End: 2020-04-09

## 2020-04-09 RX ORDER — POTASSIUM CHLORIDE 7.45 MG/ML
10 INJECTION INTRAVENOUS ONCE
Status: COMPLETED | OUTPATIENT
Start: 2020-04-09 | End: 2020-04-09

## 2020-04-09 RX ADMIN — PROPOFOL 35 MCG/KG/MIN: 10 INJECTION, EMULSION INTRAVENOUS at 15:39

## 2020-04-09 RX ADMIN — FUROSEMIDE 20 MG: 10 INJECTION, SOLUTION INTRAMUSCULAR; INTRAVENOUS at 20:26

## 2020-04-09 RX ADMIN — POTASSIUM CHLORIDE 10 MEQ: 7.46 INJECTION, SOLUTION INTRAVENOUS at 13:07

## 2020-04-09 RX ADMIN — PIPERACILLIN AND TAZOBACTAM 3.38 G: 3; .375 INJECTION, POWDER, LYOPHILIZED, FOR SOLUTION INTRAVENOUS at 13:06

## 2020-04-09 RX ADMIN — PROPOFOL 30 MCG/KG/MIN: 10 INJECTION, EMULSION INTRAVENOUS at 05:07

## 2020-04-09 RX ADMIN — PROPOFOL 30 MCG/KG/MIN: 10 INJECTION, EMULSION INTRAVENOUS at 21:46

## 2020-04-09 RX ADMIN — HYDRALAZINE HYDROCHLORIDE 25 MG: 25 TABLET, FILM COATED ORAL at 21:37

## 2020-04-09 RX ADMIN — CHLORHEXIDINE GLUCONATE 10 ML: 1.2 RINSE ORAL at 09:14

## 2020-04-09 RX ADMIN — CHLORHEXIDINE GLUCONATE 10 ML: 1.2 RINSE ORAL at 20:27

## 2020-04-09 RX ADMIN — PIPERACILLIN AND TAZOBACTAM 3.38 G: 3; .375 INJECTION, POWDER, LYOPHILIZED, FOR SOLUTION INTRAVENOUS at 05:07

## 2020-04-09 RX ADMIN — THIAMINE HCL TAB 100 MG 200 MG: 100 TAB at 09:14

## 2020-04-09 RX ADMIN — METHYLPREDNISOLONE SODIUM SUCCINATE 20 MG: 40 INJECTION, POWDER, FOR SOLUTION INTRAMUSCULAR; INTRAVENOUS at 09:14

## 2020-04-09 RX ADMIN — Medication 10 ML: at 21:48

## 2020-04-09 RX ADMIN — POTASSIUM BICARBONATE 40 MEQ: 782 TABLET, EFFERVESCENT ORAL at 07:02

## 2020-04-09 RX ADMIN — Medication 10 ML: at 15:39

## 2020-04-09 RX ADMIN — THIAMINE HCL TAB 100 MG 200 MG: 100 TAB at 20:27

## 2020-04-09 RX ADMIN — PROPOFOL 30 MCG/KG/MIN: 10 INJECTION, EMULSION INTRAVENOUS at 02:26

## 2020-04-09 RX ADMIN — SODIUM CHLORIDE 40 MG: 9 INJECTION, SOLUTION INTRAMUSCULAR; INTRAVENOUS; SUBCUTANEOUS at 09:13

## 2020-04-09 RX ADMIN — HYDRALAZINE HYDROCHLORIDE 10 MG: 20 INJECTION INTRAMUSCULAR; INTRAVENOUS at 00:50

## 2020-04-09 RX ADMIN — PROPOFOL 30 MCG/KG/MIN: 10 INJECTION, EMULSION INTRAVENOUS at 17:43

## 2020-04-09 RX ADMIN — AMLODIPINE BESYLATE 10 MG: 5 TABLET ORAL at 09:14

## 2020-04-09 RX ADMIN — POTASSIUM CHLORIDE 10 MEQ: 7.46 INJECTION, SOLUTION INTRAVENOUS at 21:36

## 2020-04-09 RX ADMIN — POTASSIUM CHLORIDE 10 MEQ: 7.46 INJECTION, SOLUTION INTRAVENOUS at 15:00

## 2020-04-09 RX ADMIN — POTASSIUM CHLORIDE 40 MEQ: 1500 TABLET, EXTENDED RELEASE ORAL at 12:14

## 2020-04-09 RX ADMIN — DEXTROSE MONOHYDRATE 125 ML: 10 INJECTION, SOLUTION INTRAVENOUS at 05:10

## 2020-04-09 RX ADMIN — POTASSIUM CHLORIDE 10 MEQ: 7.46 INJECTION, SOLUTION INTRAVENOUS at 14:00

## 2020-04-09 RX ADMIN — PROPOFOL 30 MCG/KG/MIN: 10 INJECTION, EMULSION INTRAVENOUS at 09:13

## 2020-04-09 RX ADMIN — ENOXAPARIN SODIUM 40 MG: 40 INJECTION SUBCUTANEOUS at 13:06

## 2020-04-09 RX ADMIN — PIPERACILLIN AND TAZOBACTAM 3.38 G: 3; .375 INJECTION, POWDER, LYOPHILIZED, FOR SOLUTION INTRAVENOUS at 21:36

## 2020-04-09 RX ADMIN — HYDRALAZINE HYDROCHLORIDE 25 MG: 25 TABLET, FILM COATED ORAL at 09:14

## 2020-04-09 RX ADMIN — HYDRALAZINE HYDROCHLORIDE 25 MG: 25 TABLET, FILM COATED ORAL at 15:39

## 2020-04-09 RX ADMIN — FUROSEMIDE 20 MG: 10 INJECTION, SOLUTION INTRAMUSCULAR; INTRAVENOUS at 09:14

## 2020-04-09 RX ADMIN — POTASSIUM CHLORIDE 10 MEQ: 7.46 INJECTION, SOLUTION INTRAVENOUS at 12:15

## 2020-04-09 RX ADMIN — MIDAZOLAM HYDROCHLORIDE 4 MG/HR: 5 INJECTION, SOLUTION INTRAMUSCULAR; INTRAVENOUS at 13:30

## 2020-04-09 RX ADMIN — ENOXAPARIN SODIUM 40 MG: 40 INJECTION SUBCUTANEOUS at 00:54

## 2020-04-09 NOTE — PROGRESS NOTES
1900 - Bedside and Verbal shift change report given to Marina Niño RN (oncoming nurse) by Tara Pierce RN (offgoing nurse). Report included the following information SBAR, Kardex, ED Summary, Procedure Summary, Intake/Output, MAR, Recent Results, Med Rec Status and Cardiac Rhythm NSR.     2000 - Shift assessment completed, patient restless. Sedation drips titrated to make patient more comfortable. 0000 - Reassessment completed, patient resting comfortably at this time. 0400 - Reassessment completed, no changes to previous assessment. 0715 - Bedside and Verbal shift change report given to Lexus Allan RN (oncoming nurse) by Marina Niño RN (offgoing nurse). Report included the following information SBAR, Kardex, ED Summary, Procedure Summary, Intake/Output, MAR, Recent Results, Med Rec Status and Cardiac Rhythm NSR.
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
## 2020-04-09 NOTE — PROGRESS NOTES
Chart reviewed and spoke with bedside nurse Ghulam Newman, at this time pt remains on vent, pending decision from family regarding trach placement, pt likely will need LTAC if trach is placed, pt does have medicaid will speak with family regarding d/c planning after care decisions are made.

## 2020-04-09 NOTE — DIABETES MGMT
GLYCEMIC CONTROL PROGRESS NOTE:    - chart reviewed, known h/o T2DM, HbA1C ordered per protocol, not within recommended range on oral home regimen  - Solumedrol 20 mg daily  - NPO, intubated  -- Humalog Normal Insulin Sensitivity Corrective Coverage orders in place recommend continue  - pt may benefit from adjustment to home regimen    Recent Glucose Results:   Lab Results   Component Value Date/Time     (H) 04/09/2020 09:30 AM     (H) 04/09/2020 04:00 AM    GLUCPOC 148 (H) 04/09/2020 11:44 AM    GLUCPOC 157 (H) 04/09/2020 05:42 AM    GLUCPOC 62 (L) 04/09/2020 05:08 AM     Thompson Diaz MS, RN, CDE  Glycemic Control Team  [phone]  Pager [phone] (M-TH 8:00-4:30P)  *After Hours pager [phone]

## 2020-04-09 NOTE — PROGRESS NOTES
Pharmacy Note: Vancomycin     Pt was receiving Vancomycin for indication of HAP. Vancomycin had been on hold. Vancomycin Random level resulted = 19.3 (4/9/20 at 09:30)    Currently Vancomycin Consult is discontinued per Dr. Monique Elias.

## 2020-04-09 NOTE — CDMP QUERY
Documentation of Noni Bates is noted in the progress notes. Currently the patient does not meet RIFLE criteria (BSV approved) to support this diagnosis. If you are using another criteria to support this diagnosis, please document this in your progress note. Otherwise, please document in the progress notes the clinical indicators that support this diagnosis or state that the diagnosis has been ruled out. RIFLE  (BSV Approved) RISK:  Increased SCr x 1.5 or GFR decrease > 25% (within 7 days) INJURY:  Increased SCr x 2.0 or GFR decreased > 50% FAILURE:  Increased SCr x 3.0 or GFR decrease > 75% or SCr >4.0 mg/dL or acute increase >0.5 mg/dL LOSS:  Persistent acute renal failure = complete loss of kidney function > 4 weeks END STAGE:  End stage of kidney disease > 3 months AKIN 
 
STAGE  1:  Increase in SCr >/= 0.3 mg/dL or >/= 150% to 200% (1.5 to 2-fold) from baseline (within 48 hours) STAGE  2:  Increase in SCr to more than 200% to 300% (>2-3 fold) from baseline STAGE  3:  Increase in SCr to more than 300% (>3-fold) from baseline or SCr >/= 4.0 mg/dL with an acute increase of at least 0.5 mg/dL or initiation of renal replacement therapy KDIGO 
 
STAGE  1:  Increase in SCr by >/= 0.3 mg/dL within 48 hours or increase in SCr 1.5 to 1.9 times baseline which is known or presumed to have occurred within the prior 7 days STAGE  2:  Increase in SCr to 2.0 to 2.9 times baseline STAGE  3:  Increase in SCr to 3.0 times baseline or increase in SCr to >/= baseline or increase in SCr to >/= 4.0 mg/dL or initiation of renal replacement therapy Please clarify and document your clinical opinion in the progress notes and discharge summary including the definitive and/or presumptive diagnosis, (suspected or probable), related to the above clinical findings. Please include clinical findings supporting your diagnosis. Breonna Virgen RN, BSN, [address] [place] [address] 
[phone]

## 2020-04-09 NOTE — PROGRESS NOTES
NUTRITION UPDATE    - EN: change tube feed to Osmolite 1.2 @ 35ml/hr to provide 924kcal 43g PRO 631ml H20 121g CHO 30g Fat  Propofol @ 31.5ml/hr to provide 832kcal 92g Fat  Total: 1756kcal 43g PRO 631ml H20 121g CHO 122g Fat    Currently monitoring kidney function; know that current tube does not meet RDI's or protein estimated needs; concerned with increasing protein due to decreasing renal function; will monitor if renal function improves will add protein supplement to help meet estimated needs        Nahid Sinclair RD  PAGER:  [phone]

## 2020-04-09 NOTE — WOUND CARE
Chart audited for low ziggy score, patient with high risk for skin breakdown. Pt assessed, no issues noted Skin Care & Pressure Relief Recommendations Minimize layers of linen Pads under patient to optimize support surface Turn/reposition approximately every 2 hours Pillow wedges Manage incontinence Promote continence; Skin Protective lotion/cream to buttocks and sacrum daily and as needed with incontinence care Offload heels pillows

## 2020-04-09 NOTE — PROGRESS NOTES
Hospitalist Progress Note-critical care note     Patient: Tay Rodriguez MRN: [id number]  CSN: [id number]    YOB: 1960  Age: 62 y.o. Sex: male    DOA: 4/3/2020 LOS:  LOS: 6 days            Chief complaint:  Respiratory failure, joann on ckd3      Assessment/Plan         Hospital Problems  Date Reviewed: 4/3/2020          Codes Class Noted POA    HCAP (healthcare-associated pneumonia) ICD-10-CM: J18.9  ICD-9-CM: 486  4/7/2020 Unknown        Goals of care, counseling/discussion ICD-10-CM: Z71.89  ICD-9-CM: V65.49  Unknown Unknown        Suspected Covid-19 Virus Infection ICD-10-CM: R68.89  4/4/2020 Clinically Undetermined        Morbid obesity with body mass index of 50 or higher ([facility] ICD-10-CM: E66.01  ICD-9-CM: 278.01  4/4/2020 Yes        COPD with acute exacerbation ([facility] ICD-10-CM: J44.1  ICD-9-CM: 491.21  4/3/2020 Yes        * (Principal) Respiratory failure with hypercapnia ([facility] ICD-10-CM: J96.92  ICD-9-CM: 518.81  4/3/2020 Yes        Transaminitis ICD-10-CM: R74.0  ICD-9-CM: 790.4  4/3/2020 Yes        Acute on chronic renal insufficiency ICD-10-CM: N28.9, N18.9  ICD-9-CM: 593.9, 585.9  4/3/2020 Yes        Sleep apnea ICD-10-CM: G47.30  ICD-9-CM: 780.57  7/9/2018 Yes        Cocaine abuse ([facility] ICD-10-CM: F14.10  ICD-9-CM: 305.60  4/22/2018 Yes        CHF (congestive heart failure) (HCC) (Chronic) ICD-10-CM: I50.9  ICD-9-CM: 428.0  3/3/2018 Yes              63 y/o male w/ hx of COPD on 4L home O2-trilogy at home ,  EF of 35%, super morbid obesity with recent admission to [facility] who presents in acute hypercapneic respiratory failure, he was admitted tue to acute on chronic respiration failure-intubated on admission.    Urine drug screen positive for cocaine, covid 19 negative       Respiratory   Hypercapnic respiratory failure with obesity hypoventilation syndrome   On  vent ,still need peep 10 this AM  Vent managed per intensive   Cxr: no improving       Hcap: On vanc and zosyn   Still having secretion   cxr not improving     Copd exacerbation   On iv steroid a, breathing tx     shyam : on trilogy at home , not compliance per family reported       Cardiovascular:  Hypertension  On norvasc ,     chf  Combined diastolic and systolic   Ef 30 - 41%. Mild (grade 1) left ventricular diastolic dysfunction from echo   On lasix -will replace K per renal protocol   bbb was hold due to isael     Periodic bradycardia Prolonged QT improving repeat EKG-will continue optimize electrolytes          ID: Pneumonia  Repeat COVID negative twice       Endocrine  On sliding scale insulin     FEN for now ICU protocol for electrolytes-icu replacement protocol   Hypokalemia -replace as protocol      Renal:  joann on ckd3, mild bump up this AM,   Cr stable now,      Neuro : On versed and propofol     Heme  H/h so far stable        Psych  Cocaine abuse     GI   Elevated lft -improving  On tube feeding        Poor prognosis, palliative care on board     rn : stable overnight,     30 minutes of critical care time spent in the direct evaluation and treatment of this high risk patient. The reason for providing this level of medical care for this critically ill patient was due a critical illness that impaired one or more vital organ systems such that there was a high probability of imminent or life threatening deterioration in the patients condition. This care involved high complexity decision making to assess, manipulate, and support vital system functions, to treat this degreee vital organ system failure and to prevent further life threatening deterioration of the patients condition.     Disposition :tbd,   Review of systems:  Unable to obtain due to intubation   Vital signs/Intake and Output:  Visit Vitals  /90   Pulse 65   Temp 98.4 °F (36.9 °C)   Resp 15   Ht 5' 7\" (1.702 m)   Wt (!) 162.9 kg (359 lb 2.1 oz)   SpO2 94%   BMI 56.25 kg/m²     Current Shift:  04/09 0701 - 04/09 1900  In: 100 [I.V.:100]  Out: 625 [Urine:625]  Last three shifts:  04/07 1901 - 04/09 0700  In: 2464.5 [I.V.:904.5]  Out: 7525 [Urine:7525]    Physical Exam:  General: intubated   HEENT: NC, Atraumatic. PERRLA, anicteric sclerae. Et tube noted   Lungs: CTA Bilaterally. No Wheezing/Rhonchi/Rales. Heart:  Regular  rhythm,  No murmur, No Rubs, No Gallops  Abdomen: Soft, obesity , Non tender. +Bowel sounds,   Extremities: No c/c/e  Psych:   Calm   Neurologic:  On sedation,          Labs: Results:       Chemistry Recent Labs     04/09/20  0930 04/09/20  0400 04/08/20  1510 04/08/20  0540  04/07/20  0540   * 138*  --  97  --  109*    143  --  143  --  146*   K 3.0* 3.0* 4.0 3.3*   < > 3.3*    103  --  105  --  106   CO2 34* 32  --  34*  --  34*   BUN 39* 39*  --  35*  --  36*   CREA 1.94* 2.05*  --  1.70*  --  1.70*   CA 9.0 9.1  --  8.6  --  9.1   AGAP 7 8  --  4  --  6   BUCR 20 19  --  21*  --  21*   AP  --  106  --  105  --  112   TP  --  6.7  --  6.2*  --  6.1*   ALB  --  3.1*  --  2.9*  --  2.9*   GLOB  --  3.6  --  3.3  --  3.2   AGRAT  --  0.9  --  0.9  --  0.9    < > = values in this interval not displayed. CBC w/Diff Recent Labs     04/09/20  0930 04/09/20  0400 04/08/20  0540   WBC 6.4 7.9 7.1   RBC 5.15 5.28 4.92   HGB 14.2 14.6 13.5   HCT 44.8 45.9 42.4    188 170   GRANS 59  --   --    LYMPH 27  --   --    EOS 2  --   --       Cardiac Enzymes No results for input(s): CPK, CKND1, TISHA in the last 72 hours. No lab exists for component: CKRMB, TROIP   Coagulation No results for input(s): PTP, INR, APTT, INREXT, INREXT in the last 72 hours. Lipid Panel Lab Results   Component Value Date/Time    Cholesterol, total 196 01/25/2018 02:51 AM    HDL Cholesterol 64 (H) 01/25/2018 02:51 AM    LDL, calculated 121.2 (H) 01/25/2018 02:51 AM    VLDL, calculated 10.8 01/25/2018 02:51 AM    Triglyceride 54 01/25/2018 02:51 AM    CHOL/HDL Ratio 3.1 01/25/2018 02:51 AM      BNP No results for input(s): BNPP in the last 72 hours.    Liver Enzymes Recent Labs     04/09/20  0400   TP 6.7   ALB 3.1*      SGOT 40*      Thyroid Studies Lab Results   Component Value Date/Time    TSH 0.11 (L) 03/19/2019 06:50 AM        Procedures/imaging: see electronic medical records for all procedures/Xrays and details which were not copied into this note but were reviewed prior to creation of Plan    Xr Abd (kub)    Result Date: 4/4/2020  EXAM: Single view of the abdomen CLINICAL INDICATION/HISTORY: Nasogastric tube placement    --Additional history: None. COMPARISON: None TECHNIQUE: Single supine view _______________ FINDINGS: The impression. _______________     IMPRESSION: Study technically limited by patient's body habitus. Nasogastric tube tip appears to project over the gastric antrum. Xr Chest Port    Result Date: 4/7/2020  EXAM: XR CHEST PORT CLINICAL INDICATION/HISTORY: OG tube in place. -Additional: None COMPARISON: Earlier the same day TECHNIQUE: Portable frontal view of the chest _______________ FINDINGS: SUPPORT DEVICES: Endotracheal tube approximately 1 cm above the juan miguel. Enteric tube tip out of field of view possibly in the distal stomach. HEART AND MEDIASTINUM: Cardiomegaly LUNGS AND PLEURAL SPACES: Hypoinflated lungs. Probable small to moderate left effusion. Mild interstitial edema. No pneumothorax. _______________     IMPRESSION: Endotracheal tube approximately 1 cm above the juan miguel. Enteric tube tip out of field of view possibly in the distal stomach. Xr Chest Port    Result Date: 4/7/2020  EXAM: XR CHEST PORT CLINICAL INDICATION/HISTORY: pneumonia, intubated -Additional: None COMPARISON: One day prior TECHNIQUE: Portable frontal view of the chest _______________ FINDINGS: SUPPORT DEVICES: Enteric tube and endotracheal tube unchanged. Cleophus McIntyre HEART AND MEDIASTINUM: cardiomegaly LUNGS AND PLEURAL SPACES: Hypoinflated lungs. Probable small to moderate left effusion. Mild interstitial edema.  No pneumothorax. _______________     IMPRESSION: Hypoinflated lungs. Probable small to moderate left effusion. Mild interstitial edema. No pneumothorax. Xr Chest Port    Result Date: 4/6/2020  EXAM: XR CHEST PORT CLINICAL INDICATION/HISTORY: While intubated -Additional: None COMPARISON: One day prior TECHNIQUE: Portable frontal view of the chest _______________ FINDINGS: SUPPORT DEVICES: Enteric tube and endotracheal tube unchanged. Deni Griffin HEART AND MEDIASTINUM: cardiomegaly LUNGS AND PLEURAL SPACES: Hypoinflated lungs. Probable small left effusion. Mild interstitial edema. No pneumothorax. _______________     IMPRESSION: Hypoinflated lungs. Probable small left effusion. Mild interstitial edema. Xr Chest Port    Result Date: 4/5/2020  EXAM: CHEST RADIOGRAPH CLINICAL INDICATION/HISTORY: Respiratory failure -Additional: None COMPARISON: April 4, 2020 TECHNIQUE: Portable frontal view of the chest _______________ FINDINGS: SUPPORT DEVICES: The tip of an endotracheal tube is 10 cm above the juan miguel. A nasogastric tube crosses the gastroesophageal junction. A left IJ central venous catheter terminates in the proximal SVC. HEART AND MEDIASTINUM: The heart is enlarged. LUNGS AND PLEURAL SPACES: Interstitial lung markings are increased. Lung volumes are hypoinflated and there are opacities in the lung bases. No pneumothorax. BONY THORAX AND SOFT TISSUES: Unremarkable. _______________     IMPRESSION: 1. Mild pulmonary edema 2. Bibasilar opacities that may represent a combination of atelectasis and small pleural effusions     Xr Chest Port    Result Date: 4/4/2020  EXAM: PORTABLE CHEST HISTORY: Central line placement. Acute respiratory failure. COMPARISON: 4/4/2020 at 12:42 AM TECHNIQUE: Single portable view. _______________ FINDINGS: SUPPORT DEVICES: Endotracheal tube tip lies about 4.2 cm above juan miguel. Left central venous catheter tip in upper superior vena cava. HEART AND MEDIASTINUM: Cardiomegaly.  LUNGS AND PLEURAL SPACES: Patchy airspace disease right lung base may represent developing subsegmental atelectasis or pneumonia. Evaluation limited by large body habitus. BONES AND SOFT TISSUES: Bony structures are normal. _______________     IMPRESSION: Interval placement left central venous catheter. No pneumothorax. Cardiomegaly without change. Patchy airspace disease right lung base either subsegmental atelectasis or pneumonia appears slightly worse. Xr Chest Port    Result Date: 4/4/2020  EXAM: Chest radiograph  CLINICAL INDICATION/HISTORY: Chemical ventilation    --Additional history: None. COMPARISON: 04/03/2020 TECHNIQUE: Frontal view of the chest _______________ FINDINGS: SUPPORT DEVICES: There is an endotracheal tube with tip approximately 5.5 cm above the juan miguel. There is a nasogastric tube descends below the diaphragm. HEART AND MEDIASTINUM: React silhouette is enlarged. Stable mediastinal contours. . Normal pulmonary vasculature. LUNGS AND PLEURAL SPACES: No convincing focal airspace opacity given the limitations of the study and superimposed body habitus. Sharp costophrenic sulci. No pneumothoraces. BONY THORAX AND SOFT TISSUES: Questionable fracture deformity of the right lateral ninth rib. _______________     IMPRESSION: 1. Support lines and tubes as detailed above. 2. Technically limited study secondary to body habitus without significant change from the prior study. Xr Chest Port    Result Date: 4/3/2020  EXAM:  AP Portable Chest X-ray 1 view INDICATION: Chest pain shortness of breath COMPARISON: None _______________ FINDINGS:  Heart size is enlarged and mediastinal contours are within normal limits for portable radiograph. There are increased interstitial markings in the right lung. No focal airspace disease seen. . There are no pleural effusions. No acute osseous findings. ________________      IMPRESSION: Increased interstitial markings in the right lung. No focal airspace disease or effusion.       Bhavana Murray MD

## 2020-04-09 NOTE — PROGRESS NOTES
Bedside, Verbal and Written shift change report given to EMMANUEL Srhestha (oncoming nurse) by Heron Mcneill (offgoing nurse). Report included the following information SBAR, Kardex, Intake/Output and Recent Results. 0830  Dr. Oliva Hobbs at bedside  0900  Dr. Lakia Terry at bedside. 9[facility] pt family for consent, no answer, message left. 1400  No change in pt status. VSS, See flow sheet. 1600  Pt sister on phone, wants to hear from MD on pt EF. Concerned about EF during procedure. Sister will call again around 0900 tomorrow. 1900  Bedside, Verbal and Written shift change report given to Pamela Cooley (oncoming nurse) by John Pablo. Zackary Shrestha (offgoing nurse). Report included the following information SBAR, Kardex, Intake/Output and Recent Results.

## 2020-04-10 ENCOUNTER — APPOINTMENT (OUTPATIENT)
Dept: GENERAL RADIOLOGY | Age: 60
DRG: 005 | End: 2020-04-10
Attending: INTERNAL MEDICINE
Payer: MEDICAID

## 2020-04-10 LAB
ALBUMIN SERPL-MCNC: 3.1 G/DL (ref 3.4–5)
ALBUMIN/GLOB SERPL: 0.9 {RATIO} (ref 0.8–1.7)
ALP SERPL-CCNC: 96 U/L (ref 45–117)
ALT SERPL-CCNC: 96 U/L (ref 16–61)
ANION GAP SERPL CALC-SCNC: 6 MMOL/L (ref 3–18)
ARTERIAL PATENCY WRIST A: YES
AST SERPL-CCNC: 23 U/L (ref 10–38)
BASE EXCESS BLD CALC-SCNC: 7 MMOL/L
BDY SITE: ABNORMAL
BILIRUB SERPL-MCNC: 0.7 MG/DL (ref 0.2–1)
BODY TEMPERATURE: 37
BUN SERPL-MCNC: 37 MG/DL (ref 7–18)
BUN/CREAT SERPL: 20 (ref 12–20)
CA-I SERPL-SCNC: 1.12 MMOL/L (ref 1.12–1.32)
CA-I SERPL-SCNC: 1.17 MMOL/L (ref 1.12–1.32)
CALCIUM SERPL-MCNC: 8.5 MG/DL (ref 8.5–10.1)
CHLORIDE SERPL-SCNC: 102 MMOL/L (ref 100–111)
CO2 SERPL-SCNC: 32 MMOL/L (ref 21–32)
CREAT SERPL-MCNC: 1.82 MG/DL (ref 0.6–1.3)
ERYTHROCYTE [DISTWIDTH] IN BLOOD BY AUTOMATED COUNT: 16.8 % (ref 11.6–14.5)
GAS FLOW.O2 O2 DELIVERY SYS: ABNORMAL L/MIN
GAS FLOW.O2 SETTING OXYMISER: 12 BPM
GLOBULIN SER CALC-MCNC: 3.3 G/DL (ref 2–4)
GLUCOSE BLD STRIP.AUTO-MCNC: 103 MG/DL (ref 70–110)
GLUCOSE BLD STRIP.AUTO-MCNC: 120 MG/DL (ref 70–110)
GLUCOSE BLD STRIP.AUTO-MCNC: 125 MG/DL (ref 70–110)
GLUCOSE BLD STRIP.AUTO-MCNC: 147 MG/DL (ref 70–110)
GLUCOSE SERPL-MCNC: 124 MG/DL (ref 74–99)
HCO3 BLD-SCNC: 30.4 MMOL/L (ref 22–26)
HCT VFR BLD AUTO: 45.1 % (ref 36–48)
HGB BLD-MCNC: 14.4 G/DL (ref 13–16)
MAGNESIUM SERPL-MCNC: 2.5 MG/DL (ref 1.6–2.6)
MCH RBC QN AUTO: 27.6 PG (ref 24–34)
MCHC RBC AUTO-ENTMCNC: 31.9 G/DL (ref 31–37)
MCV RBC AUTO: 86.6 FL (ref 74–97)
O2/TOTAL GAS SETTING VFR VENT: 40 %
PCO2 BLD: 38 MMHG (ref 35–45)
PEEP RESPIRATORY: 10 CMH2O
PH BLD: 7.51 [PH] (ref 7.35–7.45)
PHOSPHATE SERPL-MCNC: 3.4 MG/DL (ref 2.5–4.9)
PLATELET # BLD AUTO: 162 K/UL (ref 135–420)
PMV BLD AUTO: 11.6 FL (ref 9.2–11.8)
PO2 BLD: 77 MMHG (ref 80–100)
POTASSIUM SERPL-SCNC: 3.3 MMOL/L (ref 3.5–5.5)
POTASSIUM SERPL-SCNC: 3.5 MMOL/L (ref 3.5–5.5)
PROT SERPL-MCNC: 6.4 G/DL (ref 6.4–8.2)
RBC # BLD AUTO: 5.21 M/UL (ref 4.7–5.5)
SAO2 % BLD: 96 % (ref 92–97)
SERVICE CMNT-IMP: ABNORMAL
SODIUM SERPL-SCNC: 140 MMOL/L (ref 136–145)
SPECIMEN TYPE: ABNORMAL
TOTAL RESP. RATE, ITRR: 19
VENTILATION MODE VENT: ABNORMAL
VT SETTING VENT: 480 ML
WBC # BLD AUTO: 6.5 K/UL (ref 4.6–13.2)

## 2020-04-10 PROCEDURE — 74011250636 HC RX REV CODE- 250/636: Performed by: FAMILY MEDICINE

## 2020-04-10 PROCEDURE — 74011250637 HC RX REV CODE- 250/637: Performed by: FAMILY MEDICINE

## 2020-04-10 PROCEDURE — 71045 X-RAY EXAM CHEST 1 VIEW: CPT

## 2020-04-10 PROCEDURE — 36592 COLLECT BLOOD FROM PICC: CPT

## 2020-04-10 PROCEDURE — 36600 WITHDRAWAL OF ARTERIAL BLOOD: CPT

## 2020-04-10 PROCEDURE — 74011000250 HC RX REV CODE- 250: Performed by: INTERNAL MEDICINE

## 2020-04-10 PROCEDURE — 85027 COMPLETE CBC AUTOMATED: CPT

## 2020-04-10 PROCEDURE — 82330 ASSAY OF CALCIUM: CPT

## 2020-04-10 PROCEDURE — 74011000258 HC RX REV CODE- 258: Performed by: INTERNAL MEDICINE

## 2020-04-10 PROCEDURE — 82962 GLUCOSE BLOOD TEST: CPT

## 2020-04-10 PROCEDURE — 84100 ASSAY OF PHOSPHORUS: CPT

## 2020-04-10 PROCEDURE — 74011000250 HC RX REV CODE- 250: Performed by: FAMILY MEDICINE

## 2020-04-10 PROCEDURE — 74011250637 HC RX REV CODE- 250/637: Performed by: INTERNAL MEDICINE

## 2020-04-10 PROCEDURE — 82803 BLOOD GASES ANY COMBINATION: CPT

## 2020-04-10 PROCEDURE — 84132 ASSAY OF SERUM POTASSIUM: CPT

## 2020-04-10 PROCEDURE — 94003 VENT MGMT INPAT SUBQ DAY: CPT

## 2020-04-10 PROCEDURE — 74011250636 HC RX REV CODE- 250/636: Performed by: INTERNAL MEDICINE

## 2020-04-10 PROCEDURE — 77010033678 HC OXYGEN DAILY

## 2020-04-10 PROCEDURE — 65610000006 HC RM INTENSIVE CARE

## 2020-04-10 PROCEDURE — 36591 DRAW BLOOD OFF VENOUS DEVICE: CPT

## 2020-04-10 PROCEDURE — 83735 ASSAY OF MAGNESIUM: CPT

## 2020-04-10 PROCEDURE — 74011250637 HC RX REV CODE- 250/637: Performed by: HOSPITALIST

## 2020-04-10 PROCEDURE — C9113 INJ PANTOPRAZOLE SODIUM, VIA: HCPCS | Performed by: FAMILY MEDICINE

## 2020-04-10 RX ORDER — CALCIUM GLUCONATE 20 MG/ML
2 INJECTION, SOLUTION INTRAVENOUS ONCE
Status: COMPLETED | OUTPATIENT
Start: 2020-04-10 | End: 2020-04-10

## 2020-04-10 RX ORDER — POTASSIUM CHLORIDE 7.45 MG/ML
10 INJECTION INTRAVENOUS
Status: COMPLETED | OUTPATIENT
Start: 2020-04-10 | End: 2020-04-10

## 2020-04-10 RX ORDER — CARVEDILOL 3.12 MG/1
3.12 TABLET ORAL 2 TIMES DAILY WITH MEALS
Status: DISCONTINUED | OUTPATIENT
Start: 2020-04-10 | End: 2020-04-22 | Stop reason: HOSPADM

## 2020-04-10 RX ORDER — HYDRALAZINE HYDROCHLORIDE 50 MG/1
50 TABLET, FILM COATED ORAL 3 TIMES DAILY
Status: DISCONTINUED | OUTPATIENT
Start: 2020-04-10 | End: 2020-04-15

## 2020-04-10 RX ORDER — HYDRALAZINE HYDROCHLORIDE 20 MG/ML
20 INJECTION INTRAMUSCULAR; INTRAVENOUS
Status: DISCONTINUED | OUTPATIENT
Start: 2020-04-10 | End: 2020-04-22 | Stop reason: HOSPADM

## 2020-04-10 RX ADMIN — HYDRALAZINE HYDROCHLORIDE 50 MG: 50 TABLET, FILM COATED ORAL at 22:09

## 2020-04-10 RX ADMIN — CHLORHEXIDINE GLUCONATE 10 ML: 1.2 RINSE ORAL at 09:34

## 2020-04-10 RX ADMIN — WATER 250 MG: 1 INJECTION INTRAMUSCULAR; INTRAVENOUS; SUBCUTANEOUS at 09:00

## 2020-04-10 RX ADMIN — MIDAZOLAM HYDROCHLORIDE 4 MG/HR: 5 INJECTION, SOLUTION INTRAMUSCULAR; INTRAVENOUS at 04:05

## 2020-04-10 RX ADMIN — Medication 10 ML: at 22:09

## 2020-04-10 RX ADMIN — PIPERACILLIN AND TAZOBACTAM 3.38 G: 3; .375 INJECTION, POWDER, LYOPHILIZED, FOR SOLUTION INTRAVENOUS at 06:26

## 2020-04-10 RX ADMIN — ENOXAPARIN SODIUM 40 MG: 40 INJECTION SUBCUTANEOUS at 01:00

## 2020-04-10 RX ADMIN — HYDRALAZINE HYDROCHLORIDE 50 MG: 50 TABLET, FILM COATED ORAL at 15:22

## 2020-04-10 RX ADMIN — HYDRALAZINE HYDROCHLORIDE 25 MG: 25 TABLET, FILM COATED ORAL at 09:33

## 2020-04-10 RX ADMIN — CALCIUM GLUCONATE 2 G: 20 INJECTION, SOLUTION INTRAVENOUS at 05:08

## 2020-04-10 RX ADMIN — PROPOFOL 30 MCG/KG/MIN: 10 INJECTION, EMULSION INTRAVENOUS at 00:59

## 2020-04-10 RX ADMIN — POTASSIUM CHLORIDE 10 MEQ: 7.46 INJECTION, SOLUTION INTRAVENOUS at 07:42

## 2020-04-10 RX ADMIN — PROPOFOL 30 MCG/KG/MIN: 10 INJECTION, EMULSION INTRAVENOUS at 19:56

## 2020-04-10 RX ADMIN — PROPOFOL 25 MCG/KG/MIN: 10 INJECTION, EMULSION INTRAVENOUS at 08:22

## 2020-04-10 RX ADMIN — POTASSIUM CHLORIDE 10 MEQ: 7.46 INJECTION, SOLUTION INTRAVENOUS at 05:08

## 2020-04-10 RX ADMIN — PROPOFOL 35 MCG/KG/MIN: 10 INJECTION, EMULSION INTRAVENOUS at 13:54

## 2020-04-10 RX ADMIN — MIDAZOLAM HYDROCHLORIDE 4 MG/HR: 5 INJECTION, SOLUTION INTRAMUSCULAR; INTRAVENOUS at 19:31

## 2020-04-10 RX ADMIN — HYDRALAZINE HYDROCHLORIDE 10 MG: 20 INJECTION INTRAMUSCULAR; INTRAVENOUS at 15:22

## 2020-04-10 RX ADMIN — POTASSIUM CHLORIDE 10 MEQ: 7.46 INJECTION, SOLUTION INTRAVENOUS at 06:26

## 2020-04-10 RX ADMIN — CHLORHEXIDINE GLUCONATE 10 ML: 1.2 RINSE ORAL at 22:09

## 2020-04-10 RX ADMIN — SODIUM CHLORIDE 40 MG: 9 INJECTION, SOLUTION INTRAMUSCULAR; INTRAVENOUS; SUBCUTANEOUS at 09:34

## 2020-04-10 RX ADMIN — AMLODIPINE BESYLATE 10 MG: 5 TABLET ORAL at 09:33

## 2020-04-10 RX ADMIN — THIAMINE HCL TAB 100 MG 200 MG: 100 TAB at 22:09

## 2020-04-10 RX ADMIN — CARVEDILOL 3.12 MG: 3.12 TABLET, FILM COATED ORAL at 16:04

## 2020-04-10 RX ADMIN — PROPOFOL 30 MCG/KG/MIN: 10 INJECTION, EMULSION INTRAVENOUS at 23:09

## 2020-04-10 RX ADMIN — Medication 10 ML: at 06:27

## 2020-04-10 RX ADMIN — POTASSIUM CHLORIDE 10 MEQ: 7.46 INJECTION, SOLUTION INTRAVENOUS at 15:47

## 2020-04-10 RX ADMIN — WATER 250 MG: 1 INJECTION INTRAMUSCULAR; INTRAVENOUS; SUBCUTANEOUS at 22:42

## 2020-04-10 RX ADMIN — PROPOFOL 30 MCG/KG/MIN: 10 INJECTION, EMULSION INTRAVENOUS at 04:45

## 2020-04-10 RX ADMIN — PIPERACILLIN AND TAZOBACTAM 3.38 G: 3; .375 INJECTION, POWDER, LYOPHILIZED, FOR SOLUTION INTRAVENOUS at 22:00

## 2020-04-10 RX ADMIN — POTASSIUM CHLORIDE 10 MEQ: 7.46 INJECTION, SOLUTION INTRAVENOUS at 15:06

## 2020-04-10 RX ADMIN — Medication 10 ML: at 14:01

## 2020-04-10 RX ADMIN — PROPOFOL 30 MCG/KG/MIN: 10 INJECTION, EMULSION INTRAVENOUS at 16:10

## 2020-04-10 RX ADMIN — THIAMINE HCL TAB 100 MG 200 MG: 100 TAB at 09:34

## 2020-04-10 RX ADMIN — PIPERACILLIN AND TAZOBACTAM 3.38 G: 3; .375 INJECTION, POWDER, LYOPHILIZED, FOR SOLUTION INTRAVENOUS at 13:55

## 2020-04-10 RX ADMIN — METHYLPREDNISOLONE SODIUM SUCCINATE 10 MG: 40 INJECTION, POWDER, FOR SOLUTION INTRAMUSCULAR; INTRAVENOUS at 09:34

## 2020-04-10 RX ADMIN — B-COMPLEX W/ C & FOLIC ACID TAB 1 MG 1 TABLET: 1 TAB at 09:34

## 2020-04-10 RX ADMIN — ENOXAPARIN SODIUM 40 MG: 40 INJECTION SUBCUTANEOUS at 13:54

## 2020-04-10 RX ADMIN — PROPOFOL 35 MCG/KG/MIN: 10 INJECTION, EMULSION INTRAVENOUS at 10:50

## 2020-04-10 NOTE — PROGRESS NOTES
Pt remains orally intubated with size 8.0 mm et tube secured with tube cummins @ 23 cm brittany at the lip; breath sounds were decreased throughout with pt suctioned for small amount thin, clear, blood-tinged secretions; all alarms on & functioning with ambu bag @ hob

## 2020-04-10 NOTE — PROGRESS NOTES
[address] [phone]    Palliative follow up note. Patient remains sedated on ventilator. Family has not returned phone calls. Spoke with Dr. Harjit Hogan and bedsdide nurse, Neri Godfrey, they are continuing attempts to reach sister for decision regarding trach. PM team remains available for support.
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
## 2020-04-10 NOTE — PROGRESS NOTES
Family/HCP called back and agreed to proceed with early tracheostomy. Discussed with RN. Dr. Abbi Walton consulted for tracheostomy placement.     Jet Joya MD 4/10/2020 11:30 AM

## 2020-04-10 NOTE — PROGRESS NOTES
1900: Bedside and Verbal shift change report given to Amie Donnelly RN (oncoming nurse) by Rachna Lawson RN (offgoing nurse). Report included the following information SBAR, Kardex, Intake/Output, MAR, Recent Results, Med Rec Status, Cardiac Rhythm NSR and Alarm Parameters . 2000: Shift assessment completed. 2245: Paged Dr. Carlton Aleman for restraint order renewal. Page returned, physician at bedside. 0000: Reassessment completed. 0400: Reassessment completed. 0700: Bedside and Verbal shift change report given to Rachna Lawson RN (oncoming nurse) by Amie Donnelly RN (offgoing nurse). Report included the following information SBAR, Kardex, Intake/Output, MAR, Recent Results, Med Rec Status, Cardiac Rhythm NSR and Alarm Parameters .

## 2020-04-10 NOTE — PROGRESS NOTES
Hospitalist Progress Note-critical care note     Patient: Tay Rodriguez MRN: [id number]  [facility]: [id number]    YOB: 1960  Age: 62 y.o. Sex: male    DOA: 4/3/2020 LOS:  LOS: 7 days            Chief complaint:  Respiratory failure, joann on ckd3      Assessment/Plan         Hospital Problems  Date Reviewed: 4/3/2020          Codes Class Noted POA    HCAP (healthcare-associated pneumonia) ICD-10-CM: J18.9  ICD-9-CM: 486  4/7/2020 Unknown        Goals of care, counseling/discussion ICD-10-CM: Z71.89  ICD-9-CM: V65.49  Unknown Unknown        Suspected Covid-19 Virus Infection ICD-10-CM: R68.89  4/4/2020 Clinically Undetermined        Morbid obesity with body mass index of 50 or higher ([facility] ICD-10-CM: E66.01  ICD-9-CM: 278.01  4/4/2020 Yes        COPD with acute exacerbation ([facility].) ICD-10-CM: J44.1  ICD-9-CM: 491.21  4/3/2020 Yes        * (Principal) Respiratory failure with hypercapnia ([facility] ICD-10-CM: J96.92  ICD-9-CM: 518.81  4/3/2020 Yes        Transaminitis ICD-10-CM: R74.0  ICD-9-CM: 790.4  4/3/2020 Yes        Acute on chronic renal insufficiency ICD-10-CM: N28.9, N18.9  ICD-9-CM: 593.9, 585.9  4/3/2020 Yes        Sleep apnea ICD-10-CM: G47.30  ICD-9-CM: 780.57  7/9/2018 Yes        Cocaine abuse ([facility] ICD-10-CM: F14.10  ICD-9-CM: 305.60  4/22/2018 Yes        CHF (congestive heart failure) (HCC) (Chronic) ICD-10-CM: I50.9  ICD-9-CM: 428.0  3/3/2018 Yes              61 y/o male w/ hx of COPD on 4L home O2-trilogy at home ,  EF of 35%, super morbid obesity with recent admission to [facility] who presents in acute hypercapneic respiratory failure, he was admitted tue to acute on chronic respiration failure-intubated on admission. Urine drug screen positive for cocaine, covid 19 negative       Respiratory   Hypercapnic respiratory failure with obesity hypoventilation syndrome   On  vent at peep 10 and O2 40 % , family decided trach per Dr. Foster Sepulveda managed per intensive   Cxr: no improving       Hcap:  On vanc and zosyn   Still having secretion   cxr not improving     Copd exacerbation   On iv steroid a, breathing tx     shyam : on trilogy at home , not compliance per family reported       Cardiovascular:  Hypertension  On norvasc ,     chf  Combined diastolic and systolic   Ef 30 - 18%. Mild (grade 1) left ventricular diastolic dysfunction from echo   On lasix -will replace K per renal protocol   bbb was hold due to isael     Periodic bradycardia Prolonged QT improving repeat EKG-will continue optimize electrolytes          ID: Pneumonia  Repeat COVID negative twice       Endocrine  On sliding scale insulin     FEN for now ICU protocol for electrolytes-icu replacement protocol   Hypokalemia -replace as protocol      Renal:  joann on ckd3, will continue monitoring, so far  Stable   Cr stable now,      Neuro : On versed and propofol     Heme  H/h so far stable        Psych  Cocaine abuse     GI   Elevated lft -improving  On tube feeding        Poor prognosis, palliative care on board     rn : stable overnight,     30 minutes of critical care time spent in the direct evaluation and treatment of this high risk patient. The reason for providing this level of medical care for this critically ill patient was due a critical illness that impaired one or more vital organ systems such that there was a high probability of imminent or life threatening deterioration in the patients condition. This care involved high complexity decision making to assess, manipulate, and support vital system functions, to treat this degreee vital organ system failure and to prevent further life threatening deterioration of the patients condition.     Disposition :tbd,   Review of systems:  Unable to obtain due to intubation   Vital signs/Intake and Output:  Visit Vitals  BP (!) 173/103   Pulse 67   Temp 98.5 °F (36.9 °C)   Resp 18   Ht 5' 7\" (1.702 m)   Wt (!) 171 kg (376 lb 15.8 oz)   SpO2 97%   BMI 59.04 kg/m²     Current Shift:  04/10 0701 - 04/10 1900  In: -   Out: 350 [Urine:350]  Last three shifts:  04/08 1901 - 04/10 0700  In: 2835 [I.V.:1815]  Out: 1848 [Urine:3625]    Physical Exam:  General: intubated   HEENT: NC, Atraumatic. PERRLA, anicteric sclerae. Et tube noted   Lungs: CTA Bilaterally. No Wheezing/Rhonchi/Rales. Heart:  Regular  rhythm,  No murmur, No Rubs, No Gallops  Abdomen: Soft, obesity , Non tender. +Bowel sounds,   Extremities: No c/c/e  Psych:   Calm   Neurologic:  On sedation,          Labs: Results:       Chemistry Recent Labs     04/10/20  1100 04/10/20  0330 04/09/20  2031 04/09/20  0930 04/09/20  0400  04/08/20  0540   GLU  --  124*  --  111* 138*  --  97   NA  --  140  --  143 143  --  143   K 3.5 3.3* 3.7 3.0* 3.0*   < > 3.3*   CL  --  102  --  102 103  --  105   CO2  --  32  --  34* 32  --  34*   BUN  --  37*  --  39* 39*  --  35*   CREA  --  1.82*  --  1.94* 2.05*  --  1.70*   CA  --  8.5  --  9.0 9.1  --  8.6   AGAP  --  6  --  7 8  --  4   BUCR  --  20  --  20 19  --  21*   AP  --  96  --   --  106  --  105   TP  --  6.4  --   --  6.7  --  6.2*   ALB  --  3.1*  --   --  3.1*  --  2.9*   GLOB  --  3.3  --   --  3.6  --  3.3   AGRAT  --  0.9  --   --  0.9  --  0.9    < > = values in this interval not displayed. CBC w/Diff Recent Labs     04/10/20  0330 04/09/20 0930 04/09/20 0400   WBC 6.5 6.4 7.9   RBC 5.21 5.15 5.28   HGB 14.4 14.2 14.6   HCT 45.1 44.8 45.9    166 188   GRANS  --  59  --    LYMPH  --  27  --    EOS  --  2  --       Cardiac Enzymes No results for input(s): CPK, CKND1, TISHA in the last 72 hours.     No lab exists for component: CKRMB, TROIP   Coagulation Recent Labs     04/09/20  0930   PTP 14.6   INR 1.2       Lipid Panel Lab Results   Component Value Date/Time    Cholesterol, total 196 01/25/2018 02:51 AM    HDL Cholesterol 64 (H) 01/25/2018 02:51 AM    LDL, calculated 121.2 (H) 01/25/2018 02:51 AM    VLDL, calculated 10.8 01/25/2018 02:51 AM    Triglyceride 54 01/25/2018 02:51 AM    CHOL/HDL Ratio 3.1 01/25/2018 02:51 AM      BNP No results for input(s): BNPP in the last 72 hours. Liver Enzymes Recent Labs     04/10/20  0330   TP 6.4   ALB 3.1*   AP 96   SGOT 23      Thyroid Studies Lab Results   Component Value Date/Time    TSH 0.11 (L) 03/19/2019 06:50 AM        Procedures/imaging: see electronic medical records for all procedures/Xrays and details which were not copied into this note but were reviewed prior to creation of Plan    Xr Abd (kub)    Result Date: 4/4/2020  EXAM: Single view of the abdomen CLINICAL INDICATION/HISTORY: Nasogastric tube placement    --Additional history: None. COMPARISON: None TECHNIQUE: Single supine view _______________ FINDINGS: The impression. _______________     IMPRESSION: Study technically limited by patient's body habitus. Nasogastric tube tip appears to project over the gastric antrum. Xr Chest Port    Result Date: 4/7/2020  EXAM: XR CHEST PORT CLINICAL INDICATION/HISTORY: OG tube in place. -Additional: None COMPARISON: Earlier the same day TECHNIQUE: Portable frontal view of the chest _______________ FINDINGS: SUPPORT DEVICES: Endotracheal tube approximately 1 cm above the juan miguel. Enteric tube tip out of field of view possibly in the distal stomach. HEART AND MEDIASTINUM: Cardiomegaly LUNGS AND PLEURAL SPACES: Hypoinflated lungs. Probable small to moderate left effusion. Mild interstitial edema. No pneumothorax. _______________     IMPRESSION: Endotracheal tube approximately 1 cm above the juan miguel. Enteric tube tip out of field of view possibly in the distal stomach. Xr Chest Port    Result Date: 4/7/2020  EXAM: XR CHEST PORT CLINICAL INDICATION/HISTORY: pneumonia, intubated -Additional: None COMPARISON: One day prior TECHNIQUE: Portable frontal view of the chest _______________ FINDINGS: SUPPORT DEVICES: Enteric tube and endotracheal tube unchanged. Arvind Confer HEART AND MEDIASTINUM: cardiomegaly LUNGS AND PLEURAL SPACES: Hypoinflated lungs.  Probable small to moderate left effusion. Mild interstitial edema. No pneumothorax. _______________     IMPRESSION: Hypoinflated lungs. Probable small to moderate left effusion. Mild interstitial edema. No pneumothorax. Xr Chest Port    Result Date: 4/6/2020  EXAM: XR CHEST PORT CLINICAL INDICATION/HISTORY: While intubated -Additional: None COMPARISON: One day prior TECHNIQUE: Portable frontal view of the chest _______________ FINDINGS: SUPPORT DEVICES: Enteric tube and endotracheal tube unchanged. Lili Money HEART AND MEDIASTINUM: cardiomegaly LUNGS AND PLEURAL SPACES: Hypoinflated lungs. Probable small left effusion. Mild interstitial edema. No pneumothorax. _______________     IMPRESSION: Hypoinflated lungs. Probable small left effusion. Mild interstitial edema. Xr Chest Port    Result Date: 4/5/2020  EXAM: CHEST RADIOGRAPH CLINICAL INDICATION/HISTORY: Respiratory failure -Additional: None COMPARISON: April 4, 2020 TECHNIQUE: Portable frontal view of the chest _______________ FINDINGS: SUPPORT DEVICES: The tip of an endotracheal tube is 10 cm above the juan miguel. A nasogastric tube crosses the gastroesophageal junction. A left IJ central venous catheter terminates in the proximal SVC. HEART AND MEDIASTINUM: The heart is enlarged. LUNGS AND PLEURAL SPACES: Interstitial lung markings are increased. Lung volumes are hypoinflated and there are opacities in the lung bases. No pneumothorax. BONY THORAX AND SOFT TISSUES: Unremarkable. _______________     IMPRESSION: 1. Mild pulmonary edema 2. Bibasilar opacities that may represent a combination of atelectasis and small pleural effusions     Xr Chest Port    Result Date: 4/4/2020  EXAM: PORTABLE CHEST HISTORY: Central line placement. Acute respiratory failure. COMPARISON: 4/4/2020 at 12:42 AM TECHNIQUE: Single portable view. _______________ FINDINGS: SUPPORT DEVICES: Endotracheal tube tip lies about 4.2 cm above juan miguel. Left central venous catheter tip in upper superior vena cava.  HEART AND MEDIASTINUM: Cardiomegaly. LUNGS AND PLEURAL SPACES: Patchy airspace disease right lung base may represent developing subsegmental atelectasis or pneumonia. Evaluation limited by large body habitus. BONES AND SOFT TISSUES: Bony structures are normal. _______________     IMPRESSION: Interval placement left central venous catheter. No pneumothorax. Cardiomegaly without change. Patchy airspace disease right lung base either subsegmental atelectasis or pneumonia appears slightly worse. Xr Chest Port    Result Date: 4/4/2020  EXAM: Chest radiograph  CLINICAL INDICATION/HISTORY: Chemical ventilation    --Additional history: None. COMPARISON: 04/03/2020 TECHNIQUE: Frontal view of the chest _______________ FINDINGS: SUPPORT DEVICES: There is an endotracheal tube with tip approximately 5.5 cm above the juan miguel. There is a nasogastric tube descends below the diaphragm. HEART AND MEDIASTINUM: React silhouette is enlarged. Stable mediastinal contours. . Normal pulmonary vasculature. LUNGS AND PLEURAL SPACES: No convincing focal airspace opacity given the limitations of the study and superimposed body habitus. Sharp costophrenic sulci. No pneumothoraces. BONY THORAX AND SOFT TISSUES: Questionable fracture deformity of the right lateral ninth rib. _______________     IMPRESSION: 1. Support lines and tubes as detailed above. 2. Technically limited study secondary to body habitus without significant change from the prior study. Xr Chest Port    Result Date: 4/3/2020  EXAM:  AP Portable Chest X-ray 1 view INDICATION: Chest pain shortness of breath COMPARISON: None _______________ FINDINGS:  Heart size is enlarged and mediastinal contours are within normal limits for portable radiograph. There are increased interstitial markings in the right lung. No focal airspace disease seen. . There are no pleural effusions. No acute osseous findings. ________________      IMPRESSION: Increased interstitial markings in the right lung.  No focal airspace disease or effusion.       Violetta Valladares MD

## 2020-04-10 NOTE — PROGRESS NOTES
Bedside, Verbal and Written shift change report given to EMMANUEL Espitia (oncoming nurse) by Lucrecia Lion (offgoing nurse). Report included the following information SBAR, Kardex, Intake/Output and Recent Results. 0800  Dr. Ijeoma Jones at bedside assessing pt.  0830  Sedation holiday per order. Pt awake, following commands, coughing on ET tube. Sedation restarted. 0900  Rafael at bedside assessing pt. Orders received. 1700 [place] [facility]  Dr. Theresa Nettles consult place per Dr. Debbi Charles. 1400  Dr. Debbi Charles at bedside assessing pt. No new orders at this time. 1500  Pt SBP >160. Dr. Prince Wilkins given SBAR. Orders received. 1915  Bedside, Verbal and Written shift change report given to KOURTNEY Krause (oncoming nurse) by EMMANUEL Espitia (offgoing nurse). Report included the following information SBAR, Kardex, Intake/Output and Recent Results.

## 2020-04-10 NOTE — PROGRESS NOTES
NUTRITION FOLLOW-UP    RECOMMENDATIONS/PLAN:   -EN: Continue w/ current tube feeds  Other: Add MVI   Monitor labs/lytes, tube feed tolerance, skin integrity, wt, fluid status, BM    NUTRITION ASSESSMENT:   Client Update: 62 yrs old Male with acute hypercapneic respiratory failure, acute on chronic respiration failure-intubated in ICU, hx of cocaine abuse, covid 19 negative, COPD exacerbation, HTN, CHF, periodic bradycardia, pna, hypokalemia, elevated lft    FOOD/NUTRITION INTAKE   Diet Order:  Osmolite 1.2 @ 35ml/hr to provide 924kcal 43g PRO 631ml H20 121g CHO 30g Fat  Food Allergies: NKFA/  Average PO Intake:      No data found. Pertinent Medications:  [x] Reviewed; lasix, methylprednisolone, zofran, protonix, propofol, thiamine    Electrolyte Replacement Protocol: [x]K [x]Mg [x]PO4  Insulin:  [x]SSI  []Pre-meal   []Basal    []Drip  []None  Cultural/Orthodoxy Food Preferences: None Identified    BIOCHEMICAL DATA & MEDICAL TESTS  Pertinent Labs:  [x] Reviewed; K-3.3 GFR est AA-46 BUN-37 Creatinine-1.82   ANTHROPOMETRICS  Height: 5' 7\" (170.2 cm)       Weight: (!) 171 kg (376 lb 15.8 oz)         BMI: 59 kg/m^2 morbidly obese (Greater than or = to 40% BMI)   Adm Weight: 386 lbs                Weight change: -10lbs   Adjusted Body Weight: 83kg     NUTRITION-FOCUSED PHYSICAL ASSESSMENT  Skin: No PU     GI: No BM    NUTRITION PRESCRIPTION  Calories: 1682 kcal/day based on 25kcal/kg IBW  Protein: 101-134 g/day based on 1.5- 2 g/kg IBW  CHO: 210 g/day based on 50% of total energy  Fluid: 1682 ml/day based on 1 kcal/ml       NUTRITION DIAGNOSES:   1. Inadequate oral intake related to intubation as evidence by need for tube feeds     NUTRITION INTERVENTIONS:   INTERVENTIONS:        GOALS:  1. -EN: Continue w/ current tube feeds 1.  Continue w/ tube feeds by next review 3 days     LEARNING NEEDS (Diet, Supplementation, Food/Nutrient-Drug Interaction):   [] None Identified   [] Education provided/documented      Identified and patient: [] Declined   [x] Was not appropriate/indicated        NUTRITION MONITORING /EVALUATION:   Adjust EN/PN as appropriate  Monitor wt  Monitor renal labs, electrolytes, fluid status     Previous Recommendations Implemented: yes        Previous Goals Met:  yes -tube feeds at goal rate       [] Participated in Interdisciplinary Rounds    [x] Interdisciplinary Care Plan Reviewed  DISCHARGE NUTRITION RECOMMENDATIONS ADDRESSED:      [x] To be determined closer to discharge    NUTRITION RISK:           [x] At risk                        [] Not currently at risk        Will follow-up per policy.   Kunal Huerta 1

## 2020-04-10 NOTE — PROGRESS NOTES
Chart reviewed pt remains on vent at this time, weekend cm available thru [facility]  for immediate needs.

## 2020-04-11 ENCOUNTER — APPOINTMENT (OUTPATIENT)
Dept: GENERAL RADIOLOGY | Age: 60
DRG: 005 | End: 2020-04-11
Attending: INTERNAL MEDICINE
Payer: MEDICAID

## 2020-04-11 LAB
ALBUMIN SERPL-MCNC: 3 G/DL (ref 3.4–5)
ALBUMIN/GLOB SERPL: 0.9 {RATIO} (ref 0.8–1.7)
ALP SERPL-CCNC: 95 U/L (ref 45–117)
ALT SERPL-CCNC: 77 U/L (ref 16–61)
ANION GAP SERPL CALC-SCNC: 7 MMOL/L (ref 3–18)
ARTERIAL PATENCY WRIST A: YES
AST SERPL-CCNC: 19 U/L (ref 10–38)
BASE EXCESS BLD CALC-SCNC: 1 MMOL/L
BDY SITE: ABNORMAL
BILIRUB SERPL-MCNC: 0.6 MG/DL (ref 0.2–1)
BODY TEMPERATURE: 37
BUN SERPL-MCNC: 35 MG/DL (ref 7–18)
BUN/CREAT SERPL: 21 (ref 12–20)
CA-I SERPL-SCNC: 1.17 MMOL/L (ref 1.12–1.32)
CALCIUM SERPL-MCNC: 8.7 MG/DL (ref 8.5–10.1)
CHLORIDE SERPL-SCNC: 106 MMOL/L (ref 100–111)
CO2 SERPL-SCNC: 27 MMOL/L (ref 21–32)
CREAT SERPL-MCNC: 1.63 MG/DL (ref 0.6–1.3)
ERYTHROCYTE [DISTWIDTH] IN BLOOD BY AUTOMATED COUNT: 16.8 % (ref 11.6–14.5)
GAS FLOW.O2 O2 DELIVERY SYS: ABNORMAL L/MIN
GAS FLOW.O2 SETTING OXYMISER: 12 BPM
GLOBULIN SER CALC-MCNC: 3.3 G/DL (ref 2–4)
GLUCOSE BLD STRIP.AUTO-MCNC: 116 MG/DL (ref 70–110)
GLUCOSE BLD STRIP.AUTO-MCNC: 125 MG/DL (ref 70–110)
GLUCOSE SERPL-MCNC: 121 MG/DL (ref 74–99)
HCO3 BLD-SCNC: 27.4 MMOL/L (ref 22–26)
HCT VFR BLD AUTO: 44.8 % (ref 36–48)
HGB BLD-MCNC: 14 G/DL (ref 13–16)
MAGNESIUM SERPL-MCNC: 2.7 MG/DL (ref 1.6–2.6)
MCH RBC QN AUTO: 27.2 PG (ref 24–34)
MCHC RBC AUTO-ENTMCNC: 31.3 G/DL (ref 31–37)
MCV RBC AUTO: 87.2 FL (ref 74–97)
O2/TOTAL GAS SETTING VFR VENT: 40 %
PCO2 BLD: 51.8 MMHG (ref 35–45)
PEEP RESPIRATORY: 10 CMH2O
PH BLD: 7.33 [PH] (ref 7.35–7.45)
PHOSPHATE SERPL-MCNC: 4.3 MG/DL (ref 2.5–4.9)
PLATELET # BLD AUTO: 173 K/UL (ref 135–420)
PO2 BLD: 94 MMHG (ref 80–100)
POTASSIUM SERPL-SCNC: 3.4 MMOL/L (ref 3.5–5.5)
POTASSIUM SERPL-SCNC: 3.5 MMOL/L (ref 3.5–5.5)
PROT SERPL-MCNC: 6.3 G/DL (ref 6.4–8.2)
RBC # BLD AUTO: 5.14 M/UL (ref 4.7–5.5)
SAO2 % BLD: 97 % (ref 92–97)
SERVICE CMNT-IMP: ABNORMAL
SODIUM SERPL-SCNC: 140 MMOL/L (ref 136–145)
SPECIMEN TYPE: ABNORMAL
TOTAL RESP. RATE, ITRR: 12
VENTILATION MODE VENT: ABNORMAL
VT SETTING VENT: 400 ML
WBC # BLD AUTO: 7.5 K/UL (ref 4.6–13.2)

## 2020-04-11 PROCEDURE — 82803 BLOOD GASES ANY COMBINATION: CPT

## 2020-04-11 PROCEDURE — 85027 COMPLETE CBC AUTOMATED: CPT

## 2020-04-11 PROCEDURE — 84100 ASSAY OF PHOSPHORUS: CPT

## 2020-04-11 PROCEDURE — 74011000250 HC RX REV CODE- 250: Performed by: FAMILY MEDICINE

## 2020-04-11 PROCEDURE — C9113 INJ PANTOPRAZOLE SODIUM, VIA: HCPCS | Performed by: FAMILY MEDICINE

## 2020-04-11 PROCEDURE — 74011000258 HC RX REV CODE- 258: Performed by: INTERNAL MEDICINE

## 2020-04-11 PROCEDURE — 84132 ASSAY OF SERUM POTASSIUM: CPT

## 2020-04-11 PROCEDURE — 74011000250 HC RX REV CODE- 250: Performed by: INTERNAL MEDICINE

## 2020-04-11 PROCEDURE — 71045 X-RAY EXAM CHEST 1 VIEW: CPT

## 2020-04-11 PROCEDURE — 74011250637 HC RX REV CODE- 250/637: Performed by: FAMILY MEDICINE

## 2020-04-11 PROCEDURE — 74011250637 HC RX REV CODE- 250/637: Performed by: HOSPITALIST

## 2020-04-11 PROCEDURE — 74011250637 HC RX REV CODE- 250/637: Performed by: INTERNAL MEDICINE

## 2020-04-11 PROCEDURE — 74011250636 HC RX REV CODE- 250/636: Performed by: FAMILY MEDICINE

## 2020-04-11 PROCEDURE — 82330 ASSAY OF CALCIUM: CPT

## 2020-04-11 PROCEDURE — 36600 WITHDRAWAL OF ARTERIAL BLOOD: CPT

## 2020-04-11 PROCEDURE — 74011250636 HC RX REV CODE- 250/636: Performed by: INTERNAL MEDICINE

## 2020-04-11 PROCEDURE — 83735 ASSAY OF MAGNESIUM: CPT

## 2020-04-11 PROCEDURE — 77030020291 HC FLEXSEAL FMS BMS -C

## 2020-04-11 PROCEDURE — 94003 VENT MGMT INPAT SUBQ DAY: CPT

## 2020-04-11 PROCEDURE — 65610000006 HC RM INTENSIVE CARE

## 2020-04-11 PROCEDURE — 82962 GLUCOSE BLOOD TEST: CPT

## 2020-04-11 PROCEDURE — 77010033678 HC OXYGEN DAILY

## 2020-04-11 RX ORDER — POTASSIUM CHLORIDE 20 MEQ/1
40 TABLET, EXTENDED RELEASE ORAL ONCE
Status: COMPLETED | OUTPATIENT
Start: 2020-04-11 | End: 2020-04-11

## 2020-04-11 RX ORDER — POTASSIUM CHLORIDE 20 MEQ/1
20 TABLET, EXTENDED RELEASE ORAL ONCE
Status: DISCONTINUED | OUTPATIENT
Start: 2020-04-11 | End: 2020-04-11 | Stop reason: CLARIF

## 2020-04-11 RX ADMIN — PROPOFOL 30 MCG/KG/MIN: 10 INJECTION, EMULSION INTRAVENOUS at 02:39

## 2020-04-11 RX ADMIN — PROPOFOL 30 MCG/KG/MIN: 10 INJECTION, EMULSION INTRAVENOUS at 08:45

## 2020-04-11 RX ADMIN — HYDRALAZINE HYDROCHLORIDE 50 MG: 50 TABLET, FILM COATED ORAL at 16:30

## 2020-04-11 RX ADMIN — SODIUM CHLORIDE 40 MG: 9 INJECTION, SOLUTION INTRAMUSCULAR; INTRAVENOUS; SUBCUTANEOUS at 08:45

## 2020-04-11 RX ADMIN — AMLODIPINE BESYLATE 10 MG: 5 TABLET ORAL at 08:45

## 2020-04-11 RX ADMIN — PIPERACILLIN AND TAZOBACTAM 3.38 G: 3; .375 INJECTION, POWDER, LYOPHILIZED, FOR SOLUTION INTRAVENOUS at 05:38

## 2020-04-11 RX ADMIN — PROPOFOL 30 MCG/KG/MIN: 10 INJECTION, EMULSION INTRAVENOUS at 11:52

## 2020-04-11 RX ADMIN — CARVEDILOL 3.12 MG: 3.12 TABLET, FILM COATED ORAL at 08:45

## 2020-04-11 RX ADMIN — PROPOFOL 30 MCG/KG/MIN: 10 INJECTION, EMULSION INTRAVENOUS at 18:39

## 2020-04-11 RX ADMIN — HYDRALAZINE HYDROCHLORIDE 50 MG: 50 TABLET, FILM COATED ORAL at 22:19

## 2020-04-11 RX ADMIN — THIAMINE HCL TAB 100 MG 200 MG: 100 TAB at 08:45

## 2020-04-11 RX ADMIN — POTASSIUM BICARBONATE 20 MEQ: 782 TABLET, EFFERVESCENT ORAL at 06:35

## 2020-04-11 RX ADMIN — THIAMINE HCL TAB 100 MG 200 MG: 100 TAB at 22:19

## 2020-04-11 RX ADMIN — Medication 10 ML: at 06:35

## 2020-04-11 RX ADMIN — CHLORHEXIDINE GLUCONATE 10 ML: 1.2 RINSE ORAL at 08:45

## 2020-04-11 RX ADMIN — CARVEDILOL 3.12 MG: 3.12 TABLET, FILM COATED ORAL at 16:30

## 2020-04-11 RX ADMIN — PROPOFOL 30 MCG/KG/MIN: 10 INJECTION, EMULSION INTRAVENOUS at 15:30

## 2020-04-11 RX ADMIN — ENOXAPARIN SODIUM 40 MG: 40 INJECTION SUBCUTANEOUS at 14:00

## 2020-04-11 RX ADMIN — ENOXAPARIN SODIUM 40 MG: 40 INJECTION SUBCUTANEOUS at 02:39

## 2020-04-11 RX ADMIN — HYDRALAZINE HYDROCHLORIDE 50 MG: 50 TABLET, FILM COATED ORAL at 08:45

## 2020-04-11 RX ADMIN — B-COMPLEX W/ C & FOLIC ACID TAB 1 MG 1 TABLET: 1 TAB at 08:45

## 2020-04-11 RX ADMIN — PROPOFOL 30 MCG/KG/MIN: 10 INJECTION, EMULSION INTRAVENOUS at 22:13

## 2020-04-11 RX ADMIN — POTASSIUM CHLORIDE 40 MEQ: 1500 TABLET, EXTENDED RELEASE ORAL at 22:19

## 2020-04-11 RX ADMIN — PIPERACILLIN AND TAZOBACTAM 3.38 G: 3; .375 INJECTION, POWDER, LYOPHILIZED, FOR SOLUTION INTRAVENOUS at 22:19

## 2020-04-11 RX ADMIN — WATER 250 MG: 1 INJECTION INTRAMUSCULAR; INTRAVENOUS; SUBCUTANEOUS at 09:00

## 2020-04-11 RX ADMIN — PIPERACILLIN AND TAZOBACTAM 3.38 G: 3; .375 INJECTION, POWDER, LYOPHILIZED, FOR SOLUTION INTRAVENOUS at 14:00

## 2020-04-11 RX ADMIN — CHLORHEXIDINE GLUCONATE 10 ML: 1.2 RINSE ORAL at 22:19

## 2020-04-11 RX ADMIN — MIDAZOLAM HYDROCHLORIDE 4 MG/HR: 5 INJECTION, SOLUTION INTRAMUSCULAR; INTRAVENOUS at 11:51
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
## 2020-04-11 NOTE — PROGRESS NOTES
Bedside shift change report received from BASIL Krause RN. Report included the following information SBAR, Kardex, Intake/Output, MAR, Recent Results, Med Rec Status and Cardiac Rhythm NSR and plan of care. Restraints in place. Versed drip verified at 2ml/hr. 0800: Shift assessment complete. See flowsheet. 1200: Reasessment complete. See flowsheet. 1600: Reassessment complete. See flowsheet. Bedside shift change report given to LUCIANO Serrano RN. Report included the following information SBAR, Kardex, Intake/Output, MAR, Recent Results, Med Rec Status and Cardiac Rhythm NSR and plan of care. Versed verified at 2ml/hr.

## 2020-04-11 NOTE — PROGRESS NOTES
Hospitalist Progress Note-critical care note     Patient: Marilyn Tenorio MRN: [id number]  CSN: [id number]    YOB: 1960  Age: 62 y.o. Sex: male    DOA: 4/3/2020 LOS:  LOS: 8 days            Chief complaint:  Respiratory failure, joann on ckd3      Assessment/Plan         Hospital Problems  Date Reviewed: 4/3/2020          Codes Class Noted POA    HCAP (healthcare-associated pneumonia) ICD-10-CM: J18.9  ICD-9-CM: 486  4/7/2020 Unknown        Goals of care, counseling/discussion ICD-10-CM: Z71.89  ICD-9-CM: V65.49  Unknown Unknown        Suspected Covid-19 Virus Infection ICD-10-CM: R68.89  4/4/2020 Clinically Undetermined        Morbid obesity with body mass index of 50 or higher ([facility] ICD-10-CM: E66.01  ICD-9-CM: 278.01  4/4/2020 Yes        COPD with acute exacerbation ([facility] ICD-10-CM: J44.1  ICD-9-CM: 491.21  4/3/2020 Yes        * (Principal) Respiratory failure with hypercapnia ([facility] ICD-10-CM: J96.92  ICD-9-CM: 518.81  4/3/2020 Yes        Transaminitis ICD-10-CM: R74.0  ICD-9-CM: 790.4  4/3/2020 Yes        Acute on chronic renal insufficiency ICD-10-CM: N28.9, N18.9  ICD-9-CM: 593.9, 585.9  4/3/2020 Yes        Sleep apnea ICD-10-CM: G47.30  ICD-9-CM: 780.57  7/9/2018 Yes        Cocaine abuse ([facility] ICD-10-CM: F14.10  ICD-9-CM: 305.60  4/22/2018 Yes        CHF (congestive heart failure) (HCC) (Chronic) ICD-10-CM: I50.9  ICD-9-CM: 428.0  3/3/2018 Yes              61 y/o male w/ hx of COPD on 4L home O2-trilogy at home ,  EF of 35%, super morbid obesity with recent admission to [facility] who presents in acute hypercapneic respiratory failure, he was admitted tue to acute on chronic respiration failure-intubated on admission.    Urine drug screen positive for cocaine, covid 19 negative, planning trach on Monday       Respiratory   Hypercapnic respiratory failure with obesity hypoventilation syndrome   On  Vent: peep 10 and O2 40 % ,   Trach on Monday   Vent managed per intensive         Hcap:  Off vanc, on zosyn now     Copd exacerbation   On iv steroid a, breathing tx     shyam : on trilogy at home , not compliance       Cardiovascular:  Hypertension  On norvasc and lasix     chf  Combined diastolic and systolic   Ef 30 - 44%. Mild (grade 1) left ventricular diastolic dysfunction from echo   On lasix and low dose coreg      ID: Pneumonia  Repeat COVID negative twice       Endocrine  On sliding scale insulin     FEN for now ICU protocol for electrolytes-icu replacement protocol   Hypokalemia -replace as protocol      Renal:  joann on ckd3, will continue monitoring, so far  Stable      Neuro : On versed and propofol     Heme  H/h so far stable        Psych  Cocaine abuse     GI   Elevated lft -improving  On tube feeding   Diarrhea: due to tube feeding and iv abx , continue monitoring      Poor prognosis, palliative care on board     rn : stable overnight,     30 minutes of critical care time spent in the direct evaluation and treatment of this high risk patient. The reason for providing this level of medical care for this critically ill patient was due a critical illness that impaired one or more vital organ systems such that there was a high probability of imminent or life threatening deterioration in the patients condition. This care involved high complexity decision making to assess, manipulate, and support vital system functions, to treat this degreee vital organ system failure and to prevent further life threatening deterioration of the patients condition. Disposition :tbd,   Review of systems:  Unable to obtain due to intubation   Vital signs/Intake and Output:  Visit Vitals  /73   Pulse 71   Temp 98.4 °F (36.9 °C)   Resp 16   Ht 5' 7\" (1.702 m)   Wt (!) 171.3 kg (377 lb 10.4 oz)   SpO2 100%   BMI 59.15 kg/m²     Current Shift:  No intake/output data recorded.   Last three shifts:  04/09 1901 - 04/11 0700  In: 2789.5 [I.V.:1619.5]  Out: 5050 [Urine:5050]    Physical Exam:  General: intubated   HEENT: NC, Atraumatic. anicteric sclerae. Et tube noted. Left IJ noted   Lungs: CTA Bilaterally. No Wheezing/Rhonchi/Rales. Heart:  Regular  rhythm,  No murmur, No Rubs, No Gallops  Abdomen: Soft, obesity , Non tender. +Bowel sounds,   Extremities: No c/c/e  Psych:   Calm   Neurologic:  On sedation          Labs: Results:       Chemistry Recent Labs     04/11/20  0415 04/10/20  1100 04/10/20  0330  04/09/20  0930 04/09/20  0400   *  --  124*  --  111* 138*     --  140  --  143 143   K 3.5 3.5 3.3*   < > 3.0* 3.0*     --  102  --  102 103   CO2 27  --  32  --  34* 32   BUN 35*  --  37*  --  39* 39*   CREA 1.63*  --  1.82*  --  1.94* 2.05*   CA 8.7  --  8.5  --  9.0 9.1   AGAP 7  --  6  --  7 8   BUCR 21*  --  20  --  20 19   AP 95  --  96  --   --  106   TP 6.3*  --  6.4  --   --  6.7   ALB 3.0*  --  3.1*  --   --  3.1*   GLOB 3.3  --  3.3  --   --  3.6   AGRAT 0.9  --  0.9  --   --  0.9    < > = values in this interval not displayed. CBC w/Diff Recent Labs     04/11/20  0415 04/10/20  0330 04/09/20  0930   WBC 7.5 6.5 6.4   RBC 5.14 5.21 5.15   HGB 14.0 14.4 14.2   HCT 44.8 45.1 44.8    162 166   GRANS  --   --  59   LYMPH  --   --  27   EOS  --   --  2      Cardiac Enzymes No results for input(s): CPK, CKND1, TISHA in the last 72 hours. No lab exists for component: CKRMB, TROIP   Coagulation Recent Labs     04/09/20 0930   PTP 14.6   INR 1.2       Lipid Panel Lab Results   Component Value Date/Time    Cholesterol, total 196 01/25/2018 02:51 AM    HDL Cholesterol 64 (H) 01/25/2018 02:51 AM    LDL, calculated 121.2 (H) 01/25/2018 02:51 AM    VLDL, calculated 10.8 01/25/2018 02:51 AM    Triglyceride 54 01/25/2018 02:51 AM    CHOL/HDL Ratio 3.1 01/25/2018 02:51 AM      BNP No results for input(s): BNPP in the last 72 hours.    Liver Enzymes Recent Labs     04/11/20  0415   TP 6.3*   ALB 3.0*   AP 95   SGOT 19      Thyroid Studies Lab Results   Component Value Date/Time    TSH 0.11 (L) 03/19/2019 06:50 AM        Procedures/imaging: see electronic medical records for all procedures/Xrays and details which were not copied into this note but were reviewed prior to creation of Plan    Xr Abd (kub)    Result Date: 4/4/2020  EXAM: Single view of the abdomen CLINICAL INDICATION/HISTORY: Nasogastric tube placement    --Additional history: None. COMPARISON: None TECHNIQUE: Single supine view _______________ FINDINGS: The impression. _______________     IMPRESSION: Study technically limited by patient's body habitus. Nasogastric tube tip appears to project over the gastric antrum. Xr Chest Port    Result Date: 4/7/2020  EXAM: XR CHEST PORT CLINICAL INDICATION/HISTORY: OG tube in place. -Additional: None COMPARISON: Earlier the same day TECHNIQUE: Portable frontal view of the chest _______________ FINDINGS: SUPPORT DEVICES: Endotracheal tube approximately 1 cm above the juan miguel. Enteric tube tip out of field of view possibly in the distal stomach. HEART AND MEDIASTINUM: Cardiomegaly LUNGS AND PLEURAL SPACES: Hypoinflated lungs. Probable small to moderate left effusion. Mild interstitial edema. No pneumothorax. _______________     IMPRESSION: Endotracheal tube approximately 1 cm above the juan miguel. Enteric tube tip out of field of view possibly in the distal stomach. Xr Chest Port    Result Date: 4/7/2020  EXAM: XR CHEST PORT CLINICAL INDICATION/HISTORY: pneumonia, intubated -Additional: None COMPARISON: One day prior TECHNIQUE: Portable frontal view of the chest _______________ FINDINGS: SUPPORT DEVICES: Enteric tube and endotracheal tube unchanged. Nahid Segovia HEART AND MEDIASTINUM: cardiomegaly LUNGS AND PLEURAL SPACES: Hypoinflated lungs. Probable small to moderate left effusion. Mild interstitial edema. No pneumothorax. _______________     IMPRESSION: Hypoinflated lungs. Probable small to moderate left effusion. Mild interstitial edema. No pneumothorax.      Xr Chest Port    Result Date: 4/6/2020  EXAM: XR CHEST PORT CLINICAL INDICATION/HISTORY: While intubated -Additional: None COMPARISON: One day prior TECHNIQUE: Portable frontal view of the chest _______________ FINDINGS: SUPPORT DEVICES: Enteric tube and endotracheal tube unchanged. Lili Money HEART AND MEDIASTINUM: cardiomegaly LUNGS AND PLEURAL SPACES: Hypoinflated lungs. Probable small left effusion. Mild interstitial edema. No pneumothorax. _______________     IMPRESSION: Hypoinflated lungs. Probable small left effusion. Mild interstitial edema. Xr Chest Port    Result Date: 4/5/2020  EXAM: CHEST RADIOGRAPH CLINICAL INDICATION/HISTORY: Respiratory failure -Additional: None COMPARISON: April 4, 2020 TECHNIQUE: Portable frontal view of the chest _______________ FINDINGS: SUPPORT DEVICES: The tip of an endotracheal tube is 10 cm above the juan miguel. A nasogastric tube crosses the gastroesophageal junction. A left IJ central venous catheter terminates in the proximal SVC. HEART AND MEDIASTINUM: The heart is enlarged. LUNGS AND PLEURAL SPACES: Interstitial lung markings are increased. Lung volumes are hypoinflated and there are opacities in the lung bases. No pneumothorax. BONY THORAX AND SOFT TISSUES: Unremarkable. _______________     IMPRESSION: 1. Mild pulmonary edema 2. Bibasilar opacities that may represent a combination of atelectasis and small pleural effusions     Xr Chest Port    Result Date: 4/4/2020  EXAM: PORTABLE CHEST HISTORY: Central line placement. Acute respiratory failure. COMPARISON: 4/4/2020 at 12:42 AM TECHNIQUE: Single portable view. _______________ FINDINGS: SUPPORT DEVICES: Endotracheal tube tip lies about 4.2 cm above juan miguel. Left central venous catheter tip in upper superior vena cava. HEART AND MEDIASTINUM: Cardiomegaly. LUNGS AND PLEURAL SPACES: Patchy airspace disease right lung base may represent developing subsegmental atelectasis or pneumonia. Evaluation limited by large body habitus.  BONES AND SOFT TISSUES: Bony structures are normal. _______________ IMPRESSION: Interval placement left central venous catheter. No pneumothorax. Cardiomegaly without change. Patchy airspace disease right lung base either subsegmental atelectasis or pneumonia appears slightly worse. Xr Chest Port    Result Date: 4/4/2020  EXAM: Chest radiograph  CLINICAL INDICATION/HISTORY: Chemical ventilation    --Additional history: None. COMPARISON: 04/03/2020 TECHNIQUE: Frontal view of the chest _______________ FINDINGS: SUPPORT DEVICES: There is an endotracheal tube with tip approximately 5.5 cm above the juan miguel. There is a nasogastric tube descends below the diaphragm. HEART AND MEDIASTINUM: React silhouette is enlarged. Stable mediastinal contours. . Normal pulmonary vasculature. LUNGS AND PLEURAL SPACES: No convincing focal airspace opacity given the limitations of the study and superimposed body habitus. Sharp costophrenic sulci. No pneumothoraces. BONY THORAX AND SOFT TISSUES: Questionable fracture deformity of the right lateral ninth rib. _______________     IMPRESSION: 1. Support lines and tubes as detailed above. 2. Technically limited study secondary to body habitus without significant change from the prior study. Xr Chest Port    Result Date: 4/3/2020  EXAM:  AP Portable Chest X-ray 1 view INDICATION: Chest pain shortness of breath COMPARISON: None _______________ FINDINGS:  Heart size is enlarged and mediastinal contours are within normal limits for portable radiograph. There are increased interstitial markings in the right lung. No focal airspace disease seen. . There are no pleural effusions. No acute osseous findings. ________________      IMPRESSION: Increased interstitial markings in the right lung. No focal airspace disease or effusion.       Yue Christensen MD

## 2020-04-12 ENCOUNTER — APPOINTMENT (OUTPATIENT)
Dept: GENERAL RADIOLOGY | Age: 60
DRG: 005 | End: 2020-04-12
Attending: INTERNAL MEDICINE
Payer: MEDICAID

## 2020-04-12 LAB
ALBUMIN SERPL-MCNC: 2.9 G/DL (ref 3.4–5)
ALBUMIN/GLOB SERPL: 0.8 {RATIO} (ref 0.8–1.7)
ALP SERPL-CCNC: 94 U/L (ref 45–117)
ALT SERPL-CCNC: 58 U/L (ref 16–61)
ANION GAP SERPL CALC-SCNC: 7 MMOL/L (ref 3–18)
ARTERIAL PATENCY WRIST A: ABNORMAL
AST SERPL-CCNC: 18 U/L (ref 10–38)
BASE EXCESS BLD CALC-SCNC: 1 MMOL/L
BDY SITE: ABNORMAL
BILIRUB SERPL-MCNC: 0.4 MG/DL (ref 0.2–1)
BODY TEMPERATURE: 98.9
BUN SERPL-MCNC: 32 MG/DL (ref 7–18)
BUN/CREAT SERPL: 23 (ref 12–20)
CA-I SERPL-SCNC: 1.21 MMOL/L (ref 1.12–1.32)
CALCIUM SERPL-MCNC: 8.5 MG/DL (ref 8.5–10.1)
CHLORIDE SERPL-SCNC: 106 MMOL/L (ref 100–111)
CO2 SERPL-SCNC: 27 MMOL/L (ref 21–32)
CREAT SERPL-MCNC: 1.38 MG/DL (ref 0.6–1.3)
ERYTHROCYTE [DISTWIDTH] IN BLOOD BY AUTOMATED COUNT: 16.9 % (ref 11.6–14.5)
GAS FLOW.O2 O2 DELIVERY SYS: ABNORMAL L/MIN
GAS FLOW.O2 SETTING OXYMISER: 14 BPM
GLOBULIN SER CALC-MCNC: 3.5 G/DL (ref 2–4)
GLUCOSE BLD STRIP.AUTO-MCNC: 119 MG/DL (ref 70–110)
GLUCOSE BLD STRIP.AUTO-MCNC: 119 MG/DL (ref 70–110)
GLUCOSE BLD STRIP.AUTO-MCNC: 79 MG/DL (ref 70–110)
GLUCOSE BLD STRIP.AUTO-MCNC: 88 MG/DL (ref 70–110)
GLUCOSE SERPL-MCNC: 110 MG/DL (ref 74–99)
HCO3 BLD-SCNC: 26 MMOL/L (ref 22–26)
HCT VFR BLD AUTO: 44.1 % (ref 36–48)
HGB BLD-MCNC: 13.7 G/DL (ref 13–16)
INSPIRATION.DURATION SETTING TIME VENT: 1 SEC
MAGNESIUM SERPL-MCNC: 2.6 MG/DL (ref 1.6–2.6)
MCH RBC QN AUTO: 27.1 PG (ref 24–34)
MCHC RBC AUTO-ENTMCNC: 31.1 G/DL (ref 31–37)
MCV RBC AUTO: 87.3 FL (ref 74–97)
O2/TOTAL GAS SETTING VFR VENT: 0.4 %
PCO2 BLD: 42.8 MMHG (ref 35–45)
PEEP RESPIRATORY: 10 CMH2O
PH BLD: 7.39 [PH] (ref 7.35–7.45)
PHOSPHATE SERPL-MCNC: 3.2 MG/DL (ref 2.5–4.9)
PIP ISTAT,IPIP: 24
PLATELET # BLD AUTO: 156 K/UL (ref 135–420)
PO2 BLD: 110 MMHG (ref 80–100)
POTASSIUM SERPL-SCNC: 3.5 MMOL/L (ref 3.5–5.5)
POTASSIUM SERPL-SCNC: 3.9 MMOL/L (ref 3.5–5.5)
PROT SERPL-MCNC: 6.4 G/DL (ref 6.4–8.2)
RBC # BLD AUTO: 5.05 M/UL (ref 4.7–5.5)
SAO2 % BLD: 98 % (ref 92–97)
SERVICE CMNT-IMP: ABNORMAL
SODIUM SERPL-SCNC: 140 MMOL/L (ref 136–145)
SPECIMEN TYPE: ABNORMAL
TOTAL RESP. RATE, ITRR: 17
VENTILATION MODE VENT: ABNORMAL
VOLUME CONTROL PLUS IVLCP: YES
VT SETTING VENT: 480 ML
WBC # BLD AUTO: 8.8 K/UL (ref 4.6–13.2)

## 2020-04-12 PROCEDURE — 82330 ASSAY OF CALCIUM: CPT

## 2020-04-12 PROCEDURE — 36600 WITHDRAWAL OF ARTERIAL BLOOD: CPT

## 2020-04-12 PROCEDURE — 83735 ASSAY OF MAGNESIUM: CPT

## 2020-04-12 PROCEDURE — 94003 VENT MGMT INPAT SUBQ DAY: CPT

## 2020-04-12 PROCEDURE — 74011000258 HC RX REV CODE- 258: Performed by: INTERNAL MEDICINE

## 2020-04-12 PROCEDURE — 74011250637 HC RX REV CODE- 250/637: Performed by: HOSPITALIST

## 2020-04-12 PROCEDURE — 74011000250 HC RX REV CODE- 250: Performed by: INTERNAL MEDICINE

## 2020-04-12 PROCEDURE — 74011000250 HC RX REV CODE- 250: Performed by: FAMILY MEDICINE

## 2020-04-12 PROCEDURE — 74011250636 HC RX REV CODE- 250/636: Performed by: INTERNAL MEDICINE

## 2020-04-12 PROCEDURE — 82962 GLUCOSE BLOOD TEST: CPT

## 2020-04-12 PROCEDURE — 74018 RADEX ABDOMEN 1 VIEW: CPT

## 2020-04-12 PROCEDURE — 74011250636 HC RX REV CODE- 250/636: Performed by: FAMILY MEDICINE

## 2020-04-12 PROCEDURE — 84100 ASSAY OF PHOSPHORUS: CPT

## 2020-04-12 PROCEDURE — 65610000006 HC RM INTENSIVE CARE

## 2020-04-12 PROCEDURE — 82803 BLOOD GASES ANY COMBINATION: CPT

## 2020-04-12 PROCEDURE — 74011250637 HC RX REV CODE- 250/637: Performed by: INTERNAL MEDICINE

## 2020-04-12 PROCEDURE — C9113 INJ PANTOPRAZOLE SODIUM, VIA: HCPCS | Performed by: FAMILY MEDICINE

## 2020-04-12 PROCEDURE — 74011250637 HC RX REV CODE- 250/637: Performed by: FAMILY MEDICINE

## 2020-04-12 PROCEDURE — 85027 COMPLETE CBC AUTOMATED: CPT

## 2020-04-12 PROCEDURE — 84132 ASSAY OF SERUM POTASSIUM: CPT

## 2020-04-12 PROCEDURE — 77010033678 HC OXYGEN DAILY

## 2020-04-12 PROCEDURE — 71045 X-RAY EXAM CHEST 1 VIEW: CPT

## 2020-04-12 RX ORDER — POTASSIUM CHLORIDE 20 MEQ/1
20 TABLET, EXTENDED RELEASE ORAL ONCE
Status: COMPLETED | OUTPATIENT
Start: 2020-04-12 | End: 2020-04-12

## 2020-04-12 RX ADMIN — Medication 10 ML: at 05:09

## 2020-04-12 RX ADMIN — MIDAZOLAM HYDROCHLORIDE 4 MG/HR: 5 INJECTION, SOLUTION INTRAMUSCULAR; INTRAVENOUS at 02:33

## 2020-04-12 RX ADMIN — CHLORHEXIDINE GLUCONATE 10 ML: 1.2 RINSE ORAL at 21:46

## 2020-04-12 RX ADMIN — ENOXAPARIN SODIUM 40 MG: 40 INJECTION SUBCUTANEOUS at 01:42

## 2020-04-12 RX ADMIN — CHLORHEXIDINE GLUCONATE 10 ML: 1.2 RINSE ORAL at 08:32

## 2020-04-12 RX ADMIN — PIPERACILLIN AND TAZOBACTAM 3.38 G: 3; .375 INJECTION, POWDER, LYOPHILIZED, FOR SOLUTION INTRAVENOUS at 13:55

## 2020-04-12 RX ADMIN — HYDRALAZINE HYDROCHLORIDE 50 MG: 50 TABLET, FILM COATED ORAL at 08:32

## 2020-04-12 RX ADMIN — Medication 10 ML: at 00:05

## 2020-04-12 RX ADMIN — THIAMINE HCL TAB 100 MG 200 MG: 100 TAB at 08:32

## 2020-04-12 RX ADMIN — WATER 250 MG: 1 INJECTION INTRAMUSCULAR; INTRAVENOUS; SUBCUTANEOUS at 00:04

## 2020-04-12 RX ADMIN — PROPOFOL 30 MCG/KG/MIN: 10 INJECTION, EMULSION INTRAVENOUS at 01:42

## 2020-04-12 RX ADMIN — Medication 100 MCG/HR: at 18:45

## 2020-04-12 RX ADMIN — B-COMPLEX W/ C & FOLIC ACID TAB 1 MG 1 TABLET: 1 TAB at 08:32

## 2020-04-12 RX ADMIN — Medication 10 ML: at 21:46

## 2020-04-12 RX ADMIN — CARVEDILOL 3.12 MG: 3.12 TABLET, FILM COATED ORAL at 08:32

## 2020-04-12 RX ADMIN — CARVEDILOL 3.12 MG: 3.12 TABLET, FILM COATED ORAL at 16:00

## 2020-04-12 RX ADMIN — THIAMINE HCL TAB 100 MG 200 MG: 100 TAB at 21:46

## 2020-04-12 RX ADMIN — HYDRALAZINE HYDROCHLORIDE 50 MG: 50 TABLET, FILM COATED ORAL at 21:46

## 2020-04-12 RX ADMIN — PIPERACILLIN AND TAZOBACTAM 3.38 G: 3; .375 INJECTION, POWDER, LYOPHILIZED, FOR SOLUTION INTRAVENOUS at 21:46

## 2020-04-12 RX ADMIN — MIDAZOLAM HYDROCHLORIDE 4 MG/HR: 5 INJECTION, SOLUTION INTRAMUSCULAR; INTRAVENOUS at 16:39

## 2020-04-12 RX ADMIN — PIPERACILLIN AND TAZOBACTAM 3.38 G: 3; .375 INJECTION, POWDER, LYOPHILIZED, FOR SOLUTION INTRAVENOUS at 05:09

## 2020-04-12 RX ADMIN — AMLODIPINE BESYLATE 10 MG: 5 TABLET ORAL at 08:32

## 2020-04-12 RX ADMIN — PROPOFOL 30 MCG/KG/MIN: 10 INJECTION, EMULSION INTRAVENOUS at 05:08

## 2020-04-12 RX ADMIN — HYDRALAZINE HYDROCHLORIDE 50 MG: 50 TABLET, FILM COATED ORAL at 15:59

## 2020-04-12 RX ADMIN — Medication 50 MCG/HR: at 10:45

## 2020-04-12 RX ADMIN — SODIUM CHLORIDE 40 MG: 9 INJECTION, SOLUTION INTRAMUSCULAR; INTRAVENOUS; SUBCUTANEOUS at 08:32

## 2020-04-12 RX ADMIN — POTASSIUM CHLORIDE 20 MEQ: 1500 TABLET, EXTENDED RELEASE ORAL at 06:33
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
## 2020-04-12 NOTE — PROGRESS NOTES
Bedside shift change report received from Dasha Dubosewood Heide S. Report included the following information SBAR, Kardex, Intake/Output, MAR, Recent Results, Med Rec Status and Cardiac Rhythm NSR and plan of care. 0800:  OG tube clogged, no current tube feeding infusing. Will attempt to unclog. 0830: Shift assessment complete. See flowsheet. Pt having very green discolored urine, MD orders to d/c propofol and start fentanyl drip in addition to current versed drip. 0845: Multiple attempts made by multiple staff to unclog OG tube. Unable pulled. New OG being inserted. 1200: Reassessment complete. See flowsheet. 1600: Reassessment complete. See flowsheet. Bedside shift change report given to BASIL Krause RN. Report included the following information SBAR, Kardex, Intake/Output, MAR, Recent Results, Med Rec Status and Cardiac Rhythm NSR and plan of care.

## 2020-04-12 NOTE — PROGRESS NOTES
Hospitalist Progress Note-critical care note     Patient: Caden Mckinley MRN: [id number]  [facility]: [id number]    YOB: 1960  Age: 62 y.o. Sex: male    DOA: 4/3/2020 LOS:  LOS: 9 days            Chief complaint:  Respiratory failure, joann on ckd3      Assessment/Plan         Hospital Problems  Date Reviewed: 4/3/2020          Codes Class Noted POA    HCAP (healthcare-associated pneumonia) ICD-10-CM: J18.9  ICD-9-CM: 486  4/7/2020 Unknown        Goals of care, counseling/discussion ICD-10-CM: Z71.89  ICD-9-CM: V65.49  Unknown Unknown        Suspected Covid-19 Virus Infection ICD-10-CM: R68.89  4/4/2020 Clinically Undetermined        Morbid obesity with body mass index of 50 or higher ([facility] ICD-10-CM: E66.01  ICD-9-CM: 278.01  4/4/2020 Yes        COPD with acute exacerbation ([facility] ICD-10-CM: J44.1  ICD-9-CM: 491.21  4/3/2020 Yes        * (Principal) Respiratory failure with hypercapnia ([facility] ICD-10-CM: J96.92  ICD-9-CM: 518.81  4/3/2020 Yes        Transaminitis ICD-10-CM: R74.0  ICD-9-CM: 790.4  4/3/2020 Yes        Acute on chronic renal insufficiency ICD-10-CM: N28.9, N18.9  ICD-9-CM: 593.9, 585.9  4/3/2020 Yes        Sleep apnea ICD-10-CM: G47.30  ICD-9-CM: 780.57  7/9/2018 Yes        Cocaine abuse ([facility] ICD-10-CM: F14.10  ICD-9-CM: 305.60  4/22/2018 Yes        CHF (congestive heart failure) (HCC) (Chronic) ICD-10-CM: I50.9  ICD-9-CM: 428.0  3/3/2018 Yes              63 y/o male w/ hx of COPD on 4L home O2-trilogy at home ,  EF of 35%, super morbid obesity with recent admission to [place] who presents in acute hypercapneic respiratory failure, he was admitted tue to [facility] on chronic respiration failure-intubated on admission.    Urine drug screen positive for cocaine, covid 19 negative, planning trach on Monday         Respiratory   Hypercapnic respiratory failure with obesity hypoventilation syndrome   On  Vent: peep 10 and O2 40 % ,   Trach on Monday   Discussed with sister per phone, she agrees PEG tube placement   Vent managed per intensive         Hcap:  Off vanc, on zosyn     Copd exacerbation   On iv steroid a, breathing tx     shyam : on trilogy at home , not compliance       Cardiovascular:  Hypertension  On norvasc and lasix     chf  Combined diastolic and systolic   Ef 30 - 96%. Mild (grade 1) left ventricular diastolic dysfunction from echo   On lasix and low dose coreg      ID: Pneumonia  Repeat COVID negative twice       Endocrine  On sliding scale insulin     FEN for now ICU protocol for electrolytes-icu replacement protocol   Hypokalemia -replace as protocol      Renal:  joann on ckd3, improving      Neuro : On versed and fentanyl     Heme  H/h so far stable        Psych  Cocaine abuse     GI   Elevated lft -improving  On tube feeding will hold tube overnight , for peg and trach   Diarrhea: due to tube feeding and iv abx , continue monitoring      Poor prognosis, palliative care on board     rn : stable overnight,     30 minutes of critical care time spent in the direct evaluation and treatment of this high risk patient. The reason for providing this level of medical care for this critically ill patient was due a critical illness that impaired one or more vital organ systems such that there was a high probability of imminent or life threatening deterioration in the patients condition. This care involved high complexity decision making to assess, manipulate, and support vital system functions, to treat this degreee vital organ system failure and to prevent further life threatening deterioration of the patients condition. [address] [facility], [facility] [address] [phone]     Discussed about the peg tube placement. She agrees.  She would like to have pt wife called if she is unable to be contacted     rn : new og tube placed , sable on vent   Disposition :tbd,   Review of systems:  Unable to obtain due to intubation   Vital signs/Intake and Output:  Visit Vitals  /64   Pulse 73   Temp 99.3 °F (37.4 °C)   Resp 18   Ht 5' 7\" (1.702 m)   Wt (!) 171.3 kg (377 lb 10.4 oz)   SpO2 100%   BMI 59.15 kg/m²     Current Shift:  No intake/output data recorded. Last three shifts:  04/10 1901 - 04/12 0700  In: 3572.7 [I.V.:1612.7]  Out: 1700 [Urine:1700]    Physical Exam:  General: intubated   HEENT: NC, Atraumatic. anicteric sclerae. Et tube noted. Left IJ noted   Lungs: CTA Bilaterally. No Wheezing/Rhonchi/Rales. Heart:  Regular  rhythm,  No murmur, No Rubs, No Gallops  Abdomen: Soft, obesity , Non tender. +Bowel sounds,   Extremities: No c/c/e  Psych:   Calm   Neurologic:  On sedation          Labs: Results:       Chemistry Recent Labs     04/12/20  0430 04/11/20  1700 04/11/20  0415  04/10/20  0330   *  --  121*  --  124*     --  140  --  140   K 3.5 3.4* 3.5   < > 3.3*     --  106  --  102   CO2 27  --  27  --  32   BUN 32*  --  35*  --  37*   CREA 1.38*  --  1.63*  --  1.82*   CA 8.5  --  8.7  --  8.5   AGAP 7  --  7  --  6   BUCR 23*  --  21*  --  20   AP 94  --  95  --  96   TP 6.4  --  6.3*  --  6.4   ALB 2.9*  --  3.0*  --  3.1*   GLOB 3.5  --  3.3  --  3.3   AGRAT 0.8  --  0.9  --  0.9    < > = values in this interval not displayed. CBC w/Diff Recent Labs     04/12/20  0430 04/11/20  0415 04/10/20  0330   WBC 8.8 7.5 6.5   RBC 5.05 5.14 5.21   HGB 13.7 14.0 14.4   HCT 44.1 44.8 45.1    173 162      Cardiac Enzymes No results for input(s): CPK, CKND1, TISHA in the last 72 hours. No lab exists for component: CKRMB, TROIP   Coagulation No results for input(s): PTP, INR, APTT, INREXT, INREXT in the last 72 hours.     Lipid Panel Lab Results   Component Value Date/Time    Cholesterol, total 196 01/25/2018 02:51 AM    HDL Cholesterol 64 (H) 01/25/2018 02:51 AM    LDL, calculated 121.2 (H) 01/25/2018 02:51 AM    VLDL, calculated 10.8 01/25/2018 02:51 AM    Triglyceride 54 01/25/2018 02:51 AM    CHOL/HDL Ratio 3.1 01/25/2018 02:51 AM      BNP No results for input(s): BNPP in the last 72 hours. Liver Enzymes Recent Labs     04/12/20  0430   TP 6.4   ALB 2.9*   AP 94   SGOT 18      Thyroid Studies Lab Results   Component Value Date/Time    TSH 0.11 (L) 03/19/2019 06:50 AM        Procedures/imaging: see electronic medical records for all procedures/Xrays and details which were not copied into this note but were reviewed prior to creation of Plan    Xr Abd (kub)    Result Date: 4/4/2020  EXAM: Single view of the abdomen CLINICAL INDICATION/HISTORY: Nasogastric tube placement    --Additional history: None. COMPARISON: None TECHNIQUE: Single supine view _______________ FINDINGS: The impression. _______________     IMPRESSION: Study technically limited by patient's body habitus. Nasogastric tube tip appears to project over the gastric antrum. Xr Chest Port    Result Date: 4/7/2020  EXAM: XR CHEST PORT CLINICAL INDICATION/HISTORY: OG tube in place. -Additional: None COMPARISON: Earlier the same day TECHNIQUE: Portable frontal view of the chest _______________ FINDINGS: SUPPORT DEVICES: Endotracheal tube approximately 1 cm above the juan miguel. Enteric tube tip out of field of view possibly in the distal stomach. HEART AND MEDIASTINUM: Cardiomegaly LUNGS AND PLEURAL SPACES: Hypoinflated lungs. Probable small to moderate left effusion. Mild interstitial edema. No pneumothorax. _______________     IMPRESSION: Endotracheal tube approximately 1 cm above the juan miguel. Enteric tube tip out of field of view possibly in the distal stomach. Xr Chest Port    Result Date: 4/7/2020  EXAM: XR CHEST PORT CLINICAL INDICATION/HISTORY: pneumonia, intubated -Additional: None COMPARISON: One day prior TECHNIQUE: Portable frontal view of the chest _______________ FINDINGS: SUPPORT DEVICES: Enteric tube and endotracheal tube unchanged. [place] HEART AND MEDIASTINUM: cardiomegaly LUNGS AND PLEURAL SPACES: Hypoinflated lungs. Probable small to moderate left effusion. Mild interstitial edema. No pneumothorax. _______________     IMPRESSION: Hypoinflated lungs. Probable small to moderate left effusion. Mild interstitial edema. No pneumothorax. Xr Chest Port    Result Date: 4/6/2020  EXAM: XR CHEST PORT CLINICAL INDICATION/HISTORY: While intubated -Additional: None COMPARISON: One day prior TECHNIQUE: Portable frontal view of the chest _______________ FINDINGS: SUPPORT DEVICES: Enteric tube and endotracheal tube unchanged. [place] HEART AND MEDIASTINUM: cardiomegaly LUNGS AND PLEURAL SPACES: Hypoinflated lungs. Probable small left effusion. Mild interstitial edema. No pneumothorax. _______________     IMPRESSION: Hypoinflated lungs. Probable small left effusion. Mild interstitial edema. Xr Chest Port    Result Date: 4/5/2020  EXAM: CHEST RADIOGRAPH CLINICAL INDICATION/HISTORY: Respiratory failure -Additional: None COMPARISON: April 4, 2020 TECHNIQUE: Portable frontal view of the chest _______________ FINDINGS: SUPPORT DEVICES: The tip of an endotracheal tube is 10 cm above the juan miguel. A nasogastric tube crosses the gastroesophageal junction. A left IJ central venous catheter terminates in the proximal SVC. HEART AND MEDIASTINUM: The heart is enlarged. LUNGS AND PLEURAL SPACES: Interstitial lung markings are increased. Lung volumes are hypoinflated and there are opacities in the lung bases. No pneumothorax. BONY THORAX AND SOFT TISSUES: Unremarkable. _______________     IMPRESSION: 1. Mild pulmonary edema 2. Bibasilar opacities that may represent a combination of atelectasis and small pleural effusions     Xr Chest Port    Result Date: 4/4/2020  EXAM: PORTABLE CHEST HISTORY: Central line placement. Acute respiratory failure. COMPARISON: 4/4/2020 at 12:42 AM TECHNIQUE: Single portable view. _______________ FINDINGS: SUPPORT DEVICES: Endotracheal tube tip lies about 4.2 cm above juan miguel. Left central venous catheter tip in upper superior vena cava. HEART AND MEDIASTINUM: Cardiomegaly.  LUNGS AND PLEURAL SPACES: Patchy airspace disease right lung base may represent developing subsegmental atelectasis or pneumonia. Evaluation limited by large body habitus. BONES AND SOFT TISSUES: Bony structures are normal. _______________     IMPRESSION: Interval placement left central venous catheter. No pneumothorax. Cardiomegaly without change. Patchy airspace disease right lung base either subsegmental atelectasis or pneumonia appears slightly worse. Xr Chest Port    Result Date: 4/4/2020  EXAM: Chest radiograph  CLINICAL INDICATION/HISTORY: Chemical ventilation    --Additional history: None. COMPARISON: 04/03/2020 TECHNIQUE: Frontal view of the chest _______________ FINDINGS: SUPPORT DEVICES: There is an endotracheal tube with tip approximately 5.5 cm above the juan miguel. There is a nasogastric tube descends below the diaphragm. HEART AND MEDIASTINUM: React silhouette is enlarged. Stable mediastinal contours. . Normal pulmonary vasculature. LUNGS AND PLEURAL SPACES: No convincing focal airspace opacity given the limitations of the study and superimposed body habitus. Sharp costophrenic sulci. No pneumothoraces. BONY THORAX AND SOFT TISSUES: Questionable fracture deformity of the right lateral ninth rib. _______________     IMPRESSION: 1. Support lines and tubes as detailed above. 2. Technically limited study secondary to body habitus without significant change from the prior study. Xr Chest Port    Result Date: 4/3/2020  EXAM:  AP Portable Chest X-ray 1 view INDICATION: Chest pain shortness of breath COMPARISON: None _______________ FINDINGS:  Heart size is enlarged and mediastinal contours are within normal limits for portable radiograph. There are increased interstitial markings in the right lung. No focal airspace disease seen. . There are no pleural effusions. No acute osseous findings. ________________      IMPRESSION: Increased interstitial markings in the right lung. No focal airspace disease or effusion.       Nik Barrientos MD

## 2020-04-13 ENCOUNTER — APPOINTMENT (OUTPATIENT)
Dept: GENERAL RADIOLOGY | Age: 60
DRG: 005 | End: 2020-04-13
Attending: INTERNAL MEDICINE
Payer: MEDICAID

## 2020-04-13 ENCOUNTER — APPOINTMENT (OUTPATIENT)
Dept: INTERVENTIONAL RADIOLOGY/VASCULAR | Age: 60
DRG: 005 | End: 2020-04-13
Attending: HOSPITALIST
Payer: MEDICAID

## 2020-04-13 ENCOUNTER — ANESTHESIA EVENT (OUTPATIENT)
Dept: SURGERY | Age: 60
DRG: 005 | End: 2020-04-13
Payer: MEDICAID

## 2020-04-13 ENCOUNTER — ANESTHESIA (OUTPATIENT)
Dept: SURGERY | Age: 60
DRG: 005 | End: 2020-04-13
Payer: MEDICAID

## 2020-04-13 LAB
ALBUMIN SERPL-MCNC: 2.7 G/DL (ref 3.4–5)
ALBUMIN/GLOB SERPL: 0.8 {RATIO} (ref 0.8–1.7)
ALP SERPL-CCNC: 170 U/L (ref 45–117)
ALT SERPL-CCNC: 143 U/L (ref 16–61)
ANION GAP SERPL CALC-SCNC: 7 MMOL/L (ref 3–18)
ARTERIAL PATENCY WRIST A: ABNORMAL
AST SERPL-CCNC: 163 U/L (ref 10–38)
BASE DEFICIT BLD-SCNC: 2 MMOL/L
BASOPHILS # BLD: 0 K/UL (ref 0–0.1)
BASOPHILS NFR BLD: 0 % (ref 0–2)
BDY SITE: ABNORMAL
BILIRUB SERPL-MCNC: 0.8 MG/DL (ref 0.2–1)
BODY TEMPERATURE: 98.5
BUN SERPL-MCNC: 30 MG/DL (ref 7–18)
BUN/CREAT SERPL: 23 (ref 12–20)
CA-I SERPL-SCNC: 1.23 MMOL/L (ref 1.12–1.32)
CALCIUM SERPL-MCNC: 8.6 MG/DL (ref 8.5–10.1)
CHLORIDE SERPL-SCNC: 107 MMOL/L (ref 100–111)
CO2 SERPL-SCNC: 25 MMOL/L (ref 21–32)
CREAT SERPL-MCNC: 1.32 MG/DL (ref 0.6–1.3)
DIFFERENTIAL METHOD BLD: ABNORMAL
EOSINOPHIL # BLD: 0.8 K/UL (ref 0–0.4)
EOSINOPHIL NFR BLD: 8 % (ref 0–5)
ERYTHROCYTE [DISTWIDTH] IN BLOOD BY AUTOMATED COUNT: 16.8 % (ref 11.6–14.5)
GAS FLOW.O2 O2 DELIVERY SYS: ABNORMAL L/MIN
GAS FLOW.O2 SETTING OXYMISER: 14 BPM
GLOBULIN SER CALC-MCNC: 3.6 G/DL (ref 2–4)
GLUCOSE BLD STRIP.AUTO-MCNC: 103 MG/DL (ref 70–110)
GLUCOSE BLD STRIP.AUTO-MCNC: 116 MG/DL (ref 70–110)
GLUCOSE BLD STRIP.AUTO-MCNC: 117 MG/DL (ref 70–110)
GLUCOSE BLD STRIP.AUTO-MCNC: 84 MG/DL (ref 70–110)
GLUCOSE SERPL-MCNC: 126 MG/DL (ref 74–99)
HCO3 BLD-SCNC: 24.3 MMOL/L (ref 22–26)
HCT VFR BLD AUTO: 43.4 % (ref 36–48)
HGB BLD-MCNC: 13.4 G/DL (ref 13–16)
INSPIRATION.DURATION SETTING TIME VENT: 1 SEC
LYMPHOCYTES # BLD: 0.8 K/UL (ref 0.9–3.6)
LYMPHOCYTES NFR BLD: 9 % (ref 21–52)
MAGNESIUM SERPL-MCNC: 2.5 MG/DL (ref 1.6–2.6)
MCH RBC QN AUTO: 27.2 PG (ref 24–34)
MCHC RBC AUTO-ENTMCNC: 30.9 G/DL (ref 31–37)
MCV RBC AUTO: 88.2 FL (ref 74–97)
MONOCYTES # BLD: 0.9 K/UL (ref 0.05–1.2)
MONOCYTES NFR BLD: 9 % (ref 3–10)
NEUTS SEG # BLD: 7 K/UL (ref 1.8–8)
NEUTS SEG NFR BLD: 74 % (ref 40–73)
O2/TOTAL GAS SETTING VFR VENT: 0.4 %
PCO2 BLD: 50.5 MMHG (ref 35–45)
PEEP RESPIRATORY: 10 CMH2O
PH BLD: 7.29 [PH] (ref 7.35–7.45)
PHOSPHATE SERPL-MCNC: 3.5 MG/DL (ref 2.5–4.9)
PIP ISTAT,IPIP: 35
PLATELET # BLD AUTO: 143 K/UL (ref 135–420)
PO2 BLD: 105 MMHG (ref 80–100)
POTASSIUM SERPL-SCNC: 3.8 MMOL/L (ref 3.5–5.5)
POTASSIUM SERPL-SCNC: 3.9 MMOL/L (ref 3.5–5.5)
POTASSIUM SERPL-SCNC: 3.9 MMOL/L (ref 3.5–5.5)
PROT SERPL-MCNC: 6.3 G/DL (ref 6.4–8.2)
RBC # BLD AUTO: 4.92 M/UL (ref 4.7–5.5)
SAO2 % BLD: 97 % (ref 92–97)
SERVICE CMNT-IMP: ABNORMAL
SODIUM SERPL-SCNC: 139 MMOL/L (ref 136–145)
SPECIMEN TYPE: ABNORMAL
TOTAL RESP. RATE, ITRR: 14
VENTILATION MODE VENT: ABNORMAL
VOLUME CONTROL PLUS IVLCP: YES
VT SETTING VENT: 480 ML
WBC # BLD AUTO: 9.5 K/UL (ref 4.6–13.2)

## 2020-04-13 PROCEDURE — 85025 COMPLETE CBC W/AUTO DIFF WBC: CPT

## 2020-04-13 PROCEDURE — 77030040361 HC SLV COMPR DVT MDII -B: Performed by: OTOLARYNGOLOGY

## 2020-04-13 PROCEDURE — 74011000250 HC RX REV CODE- 250

## 2020-04-13 PROCEDURE — 82803 BLOOD GASES ANY COMBINATION: CPT

## 2020-04-13 PROCEDURE — 74011250636 HC RX REV CODE- 250/636: Performed by: INTERNAL MEDICINE

## 2020-04-13 PROCEDURE — C9113 INJ PANTOPRAZOLE SODIUM, VIA: HCPCS | Performed by: FAMILY MEDICINE

## 2020-04-13 PROCEDURE — 77030006998: Performed by: OTOLARYNGOLOGY

## 2020-04-13 PROCEDURE — 77010033678 HC OXYGEN DAILY

## 2020-04-13 PROCEDURE — 74011250636 HC RX REV CODE- 250/636: Performed by: FAMILY MEDICINE

## 2020-04-13 PROCEDURE — 74011000250 HC RX REV CODE- 250: Performed by: INTERNAL MEDICINE

## 2020-04-13 PROCEDURE — 77030018626 HC TU TRACH CUF3 COVD -B: Performed by: OTOLARYNGOLOGY

## 2020-04-13 PROCEDURE — 84132 ASSAY OF SERUM POTASSIUM: CPT

## 2020-04-13 PROCEDURE — 74011000250 HC RX REV CODE- 250: Performed by: FAMILY MEDICINE

## 2020-04-13 PROCEDURE — 77030018836 HC SOL IRR NACL ICUM -A: Performed by: OTOLARYNGOLOGY

## 2020-04-13 PROCEDURE — 36600 WITHDRAWAL OF ARTERIAL BLOOD: CPT

## 2020-04-13 PROCEDURE — 94003 VENT MGMT INPAT SUBQ DAY: CPT

## 2020-04-13 PROCEDURE — 76060000033 HC ANESTHESIA 1 TO 1.5 HR: Performed by: OTOLARYNGOLOGY

## 2020-04-13 PROCEDURE — 84100 ASSAY OF PHOSPHORUS: CPT

## 2020-04-13 PROCEDURE — 71045 X-RAY EXAM CHEST 1 VIEW: CPT

## 2020-04-13 PROCEDURE — 65610000006 HC RM INTENSIVE CARE

## 2020-04-13 PROCEDURE — 82962 GLUCOSE BLOOD TEST: CPT

## 2020-04-13 PROCEDURE — 74011000258 HC RX REV CODE- 258: Performed by: INTERNAL MEDICINE

## 2020-04-13 PROCEDURE — 77030013079 HC BLNKT BAIR HGGR 3M -A: Performed by: SPECIALIST

## 2020-04-13 PROCEDURE — 74011250637 HC RX REV CODE- 250/637: Performed by: INTERNAL MEDICINE

## 2020-04-13 PROCEDURE — 74011000250 HC RX REV CODE- 250: Performed by: NURSE ANESTHETIST, CERTIFIED REGISTERED

## 2020-04-13 PROCEDURE — 74018 RADEX ABDOMEN 1 VIEW: CPT

## 2020-04-13 PROCEDURE — 77030020010 HC FCPS BPLR DISP INLC -E: Performed by: OTOLARYNGOLOGY

## 2020-04-13 PROCEDURE — 77030018390 HC SPNG HEMSTAT2 J&J -B: Performed by: OTOLARYNGOLOGY

## 2020-04-13 PROCEDURE — 77030002996 HC SUT SLK J&J -A: Performed by: OTOLARYNGOLOGY

## 2020-04-13 PROCEDURE — 76010000149 HC OR TIME 1 TO 1.5 HR: Performed by: OTOLARYNGOLOGY

## 2020-04-13 PROCEDURE — 82330 ASSAY OF CALCIUM: CPT

## 2020-04-13 PROCEDURE — 83735 ASSAY OF MAGNESIUM: CPT

## 2020-04-13 PROCEDURE — 74011000250 HC RX REV CODE- 250: Performed by: OTOLARYNGOLOGY

## 2020-04-13 PROCEDURE — 74011250636 HC RX REV CODE- 250/636: Performed by: NURSE ANESTHETIST, CERTIFIED REGISTERED

## 2020-04-13 PROCEDURE — 74011250637 HC RX REV CODE- 250/637: Performed by: FAMILY MEDICINE

## 2020-04-13 DEVICE — TRACHEOSTOMY TUBE XLT CUFFED,PROXIMAL WITH DISPOSABLE INNER CANNULA
Type: IMPLANTABLE DEVICE | Site: NECK | Status: FUNCTIONAL
Brand: SHILEY

## 2020-04-13 RX ORDER — MIDAZOLAM HYDROCHLORIDE 1 MG/ML
INJECTION, SOLUTION INTRAMUSCULAR; INTRAVENOUS AS NEEDED
Status: DISCONTINUED | OUTPATIENT
Start: 2020-04-13 | End: 2020-04-13 | Stop reason: HOSPADM

## 2020-04-13 RX ORDER — SODIUM CHLORIDE 9 MG/ML
INJECTION, SOLUTION INTRAVENOUS
Status: DISCONTINUED | OUTPATIENT
Start: 2020-04-13 | End: 2020-04-13 | Stop reason: HOSPADM

## 2020-04-13 RX ORDER — NOREPINEPHRINE BIT/0.9 % NACL 8 MG/250ML
INFUSION BOTTLE (ML) INTRAVENOUS
Status: DISCONTINUED
Start: 2020-04-13 | End: 2020-04-13 | Stop reason: WASHOUT

## 2020-04-13 RX ORDER — GLYCOPYRROLATE 0.2 MG/ML
INJECTION INTRAMUSCULAR; INTRAVENOUS AS NEEDED
Status: DISCONTINUED | OUTPATIENT
Start: 2020-04-13 | End: 2020-04-13 | Stop reason: HOSPADM

## 2020-04-13 RX ORDER — NOREPINEPHRINE BIT/0.9 % NACL 8 MG/250ML
.5-16 INFUSION BOTTLE (ML) INTRAVENOUS
Status: DISCONTINUED | OUTPATIENT
Start: 2020-04-13 | End: 2020-04-14

## 2020-04-13 RX ORDER — POTASSIUM CHLORIDE 750 MG/1
10 TABLET, EXTENDED RELEASE ORAL ONCE
Status: DISCONTINUED | OUTPATIENT
Start: 2020-04-13 | End: 2020-04-13

## 2020-04-13 RX ORDER — POTASSIUM CHLORIDE 7.45 MG/ML
10 INJECTION INTRAVENOUS ONCE
Status: COMPLETED | OUTPATIENT
Start: 2020-04-13 | End: 2020-04-13

## 2020-04-13 RX ORDER — EPHEDRINE SULFATE/0.9% NACL/PF 50 MG/5 ML
SYRINGE (ML) INTRAVENOUS AS NEEDED
Status: DISCONTINUED | OUTPATIENT
Start: 2020-04-13 | End: 2020-04-13 | Stop reason: HOSPADM

## 2020-04-13 RX ORDER — ROCURONIUM BROMIDE 10 MG/ML
INJECTION, SOLUTION INTRAVENOUS AS NEEDED
Status: DISCONTINUED | OUTPATIENT
Start: 2020-04-13 | End: 2020-04-13 | Stop reason: HOSPADM

## 2020-04-13 RX ADMIN — PIPERACILLIN AND TAZOBACTAM 3.38 G: 3; .375 INJECTION, POWDER, LYOPHILIZED, FOR SOLUTION INTRAVENOUS at 13:37

## 2020-04-13 RX ADMIN — Medication 5 MCG/MIN: at 01:58

## 2020-04-13 RX ADMIN — Medication 10 ML: at 15:55

## 2020-04-13 RX ADMIN — Medication 100 MCG/HR: at 04:17

## 2020-04-13 RX ADMIN — Medication 150 MCG/HR: at 11:43

## 2020-04-13 RX ADMIN — MIDAZOLAM HYDROCHLORIDE 2 MG: 1 INJECTION, SOLUTION INTRAMUSCULAR; INTRAVENOUS at 14:24

## 2020-04-13 RX ADMIN — CARVEDILOL 3.12 MG: 3.12 TABLET, FILM COATED ORAL at 08:38

## 2020-04-13 RX ADMIN — POTASSIUM CHLORIDE 10 MEQ: 7.46 INJECTION, SOLUTION INTRAVENOUS at 15:49

## 2020-04-13 RX ADMIN — Medication 3 MCG/MIN: at 06:53

## 2020-04-13 RX ADMIN — MIDAZOLAM HYDROCHLORIDE 4 MG/HR: 5 INJECTION, SOLUTION INTRAMUSCULAR; INTRAVENOUS at 06:04

## 2020-04-13 RX ADMIN — SODIUM CHLORIDE 40 MG: 9 INJECTION, SOLUTION INTRAMUSCULAR; INTRAVENOUS; SUBCUTANEOUS at 08:38

## 2020-04-13 RX ADMIN — PIPERACILLIN AND TAZOBACTAM 3.38 G: 3; .375 INJECTION, POWDER, LYOPHILIZED, FOR SOLUTION INTRAVENOUS at 06:04

## 2020-04-13 RX ADMIN — Medication 10 MG: at 14:20

## 2020-04-13 RX ADMIN — B-COMPLEX W/ C & FOLIC ACID TAB 1 MG 1 TABLET: 1 TAB at 08:38

## 2020-04-13 RX ADMIN — THIAMINE HCL TAB 100 MG 200 MG: 100 TAB at 08:38

## 2020-04-13 RX ADMIN — MIDAZOLAM HYDROCHLORIDE 3 MG: 1 INJECTION, SOLUTION INTRAMUSCULAR; INTRAVENOUS at 13:54

## 2020-04-13 RX ADMIN — Medication 150 MCG/HR: at 23:03

## 2020-04-13 RX ADMIN — POTASSIUM CHLORIDE 10 MEQ: 10 INJECTION, SOLUTION INTRAVENOUS at 06:52

## 2020-04-13 RX ADMIN — Medication 10 ML: at 06:52

## 2020-04-13 RX ADMIN — SODIUM CHLORIDE: 900 INJECTION, SOLUTION INTRAVENOUS at 14:05

## 2020-04-13 RX ADMIN — GLYCOPYRROLATE 0.2 MG: 0.2 INJECTION INTRAMUSCULAR; INTRAVENOUS at 14:22

## 2020-04-13 RX ADMIN — Medication 10 ML: at 21:55

## 2020-04-13 RX ADMIN — MIDAZOLAM HYDROCHLORIDE 5 MG/HR: 5 INJECTION, SOLUTION INTRAMUSCULAR; INTRAVENOUS at 17:35

## 2020-04-13 RX ADMIN — PIPERACILLIN AND TAZOBACTAM 3.38 G: 3; .375 INJECTION, POWDER, LYOPHILIZED, FOR SOLUTION INTRAVENOUS at 22:46

## 2020-04-13 RX ADMIN — Medication 25 MG: at 14:53

## 2020-04-13 RX ADMIN — CHLORHEXIDINE GLUCONATE 10 ML: 1.2 RINSE ORAL at 08:39

## 2020-04-13 RX ADMIN — Medication 50 MG: at 14:12

## 2020-04-13 RX ADMIN — Medication 150 MCG/HR: at 17:08

## 2020-04-13 NOTE — BRIEF OP NOTE
Brief Postoperative Note    Patient: Tay Rodriguez  YOB: 1960  MRN: [id number]    Date of Procedure: 4/13/2020     Pre-Op Diagnosis: RESPIRTORY FAILURE    Post-Op Diagnosis: Same as preoperative diagnosis. Procedure(s):  TRACHEOSTOMY #8 XLT    Surgeon(s):  Adia Barrett MD    Surgical Assistant: None    Anesthesia: General     Estimated Blood Loss (mL): Minimal    Complications: None    Specimens: * No specimens in log *     Implants:   Implant Name Type Inv.  Item Serial No.  Lot No. LRB No. Used Action   TUBE TRACH CUF PROX EXT 8MM -- SHILEY - [id number]  TUBE TRACH CUF PROX EXT 8MM -- SHILEY  [employer] [facility] RESPIRATORY &amp; MONITOR [id number] N/A 1 Implanted       Drains:   Rectal Tube (Active)   Site Assessment Clean, dry, & intact 4/13/2020 12:00 PM   Drainage Description Brown 4/13/2020 12:00 PM       [REMOVED] Nasogastric Tube 04/04/20 (Removed)   Site Assessment Clean, dry, & intact 4/12/2020  8:30 AM   Securement Device Tape 4/12/2020  8:30 AM   G Port Status Clamped 4/12/2020  8:30 AM   Action Taken Placement verified (comment) 4/12/2020  8:30 AM   Drainage Description Tan 4/12/2020  8:30 AM   Gastric Residual (mL) 30 ml 4/12/2020 12:00 AM   Tube Feeding/Formula Options Osmolite 1.2 4/12/2020  6:00 AM   Tube Feeding/Verify Rate (mL/hr) 35 4/12/2020  6:00 AM   Water Flush Volume (mL) 200 mL 4/12/2020 12:00 AM   Intake (ml) 140 ml 4/12/2020  6:00 AM   Medication Volume 60 ml 4/12/2020 12:00 AM   Drainage Chamber Level (ml) 100 ml 4/11/2020  8:00 AM   Output (ml) 200 ml 4/5/2020  8:00 PM       [REMOVED] Orogastric Tube 04/12/20 (Removed)   Site Assessment Clean, dry, & intact 4/13/2020 12:00 PM   Securement Device Tape 4/13/2020 12:00 PM   G Port Status Clamped 4/13/2020 12:00 PM   Action Taken Placement verified (comment) 4/13/2020 12:00 PM   Drainage Description Black 4/13/2020  7:45 AM   Gastric Residual (mL) 25 ml 4/13/2020  7:45 AM   Tube Feeding/Formula Options Osmolite 1.2 4/13/2020 12:00 AM   Tube Feeding/Verify Rate (mL/hr) 0 4/13/2020  4:00 AM   Water Flush Volume (mL) 60 mL 4/13/2020  7:45 AM   Intake (ml) 140 ml 4/12/2020  4:00 PM   Medication Volume 60 ml 4/12/2020  4:00 PM       [REMOVED] Condom Catheter 04/22/18 (Removed)       Findings: trach #8 prox XLT placed trach #2*    Electronically Signed by Berenice Ovalle MD on 4/13/2020 at 4:05 PM

## 2020-04-13 NOTE — PROGRESS NOTES
NUTRITION FOLLOW-UP    RECOMMENDATIONS/PLAN:   - restart enteral feeds as soon as clinically feasible. Start at 30ml/hr and increase 10ml/hr q6hrs until goal of 60ml/hr is met. This will provide: 1584kcal, 73g protein, 1082ml free water, 208g CHO, 52g Fat. Monitor weight/fluid status, will trend for wt loss  Monitor labs/lytes, skin integrity, bowel function    NUTRITION ASSESSMENT:   Client Update: 62 yrs old Male with acute hypercapneic respiratory failure, acute on chronic respiration failure-intubated in ICU, hx of cocaine abuse, covid 19 negative, COPD exacerbation, HTN, CHF, periodic bradycardia, pna, hypokalemia, elevated lft. Remains intubated for trach today. Enteral nutrition on hold for procedure at this time. FOOD/NUTRITION INTAKE   Diet Order:  Esther [email or web address] provides: 942kcal, 43g protein, 631ml free water, 121g CHO  Supplements: n/a  Food Allergies: NKFA  Average PO Intake:      No data found.     Pertinent Medications:  [x] Reviewed; lispro, protonix, thiamine, nephrovite, thiamine  Electrolyte Replacement Protocol: []K []Mg []PO4  Insulin:  []SSI  []Pre-meal   []Basal    []Drip  []None  Cultural/Faith Food Preferences: None Identified    BIOCHEMICAL DATA & MEDICAL TESTS  Pertinent Labs:  [x] Reviewed; ALT-143, AST-163, Alk phos-170 ANTHROPOMETRICS  Height: 5' 7\" (170.2 cm)       Weight: (!) 171.3 kg (377 lb 10.4 oz)         BMI: 59.1 kg/m^2 morbidly obese (Greater than or = to 40% BMI)   Adm Weight: 386 lbs                Weight change: -9lbs  Adjusted Body Weight: 83kg    NUTRITION-FOCUSED PHYSICAL ASSESSMENT  Skin: no pressure area per documentation  GI: BM 4/12    NUTRITION PRESCRIPTION  Calories: 1682 kcal/day based on 25kcal/kg IBW  Protein: 101-134 g/day based on 1.5- 2 g/kg IBW  CHO: 210 g/day based on 50% of total energy  Fluid: 1682 ml/day based on 1 kcal/ml       NUTRITION DIAGNOSES:   1. suboptimal energy intake related to propofol d/c and need increase in enteral feeds as evidenced by enteral nutrition currently meeting 56% of estimated needs  NUTRITION INTERVENTIONS:   INTERVENTIONS:        GOALS:  1. Enteral Nutrition: Increase enteral nutrition to goal rate of 60ml/hr 1. Goal rate increase to 60ml/hr within the next 3 days. LEARNING NEEDS (Diet, Supplementation, Food/Nutrient-Drug Interaction):   [x] None Identified   [] Education provided/documented      Identified and patient: [] Declined   [] Was not appropriate/indicated        NUTRITION MONITORING /EVALUATION:   Adjust EN/PN as appropriate  Monitor wt  Monitor renal labs, electrolytes, fluid status     Previous Recommendations Implemented: yes        Previous Goals Met:  yes      [] Participated in Interdisciplinary Rounds    [x] Interdisciplinary Care Plan Reviewed  DISCHARGE NUTRITION RECOMMENDATIONS ADDRESSED:        [x] To be determined closer to discharge    NUTRITION RISK:           [x] At risk                        [] Not currently at risk        Will follow-up per policy.   Kunal Huffman 1

## 2020-04-13 NOTE — PROGRESS NOTES
SBAR report received from [facility]. PT remain on the ventilator via ETT , restless at this time. titrate sedation. Fentanyl gtt;s, versed gtt's, levophed gtt's  Via pump. OG tube in situ,NSR on the monitor. Boo cath, FMS in place, SCD's to BLE, soft restrains to bilateral wrist to protect lines/tubes. Nurses care continues. 0745 hrs Shift assessment completed. TF on hold for a  procedure. 0900 hrs Held B/P meds. MD aware. See flow sheet. 1035 hrs telephone consent obtained for a tracheostomy procedure. Spoke with Wallace Santos' Sister,dual verification with A tirso,Rn/Post Dr Abbi Walton detailed explanation of the procedure. 1045 hrs Pt restless, agitated, suctioned, titrated sedation. increased Saturation, pt resting quietly. Nurses care continues. 1200 hrs reassessed pt.    1320 hrs During CHG bath and repositioned,PT pulled OG tube out. Some bleeding noted. Dr Robin Bajwa. 1335 hrs Dr Abbi Walton at bedside. ,Sbar report given to [place], [place]     1430 hrs PT transferred to OR.    1535 hrs Pt back to the unit. Remain on the vent  Via trach # 8.0 Shiley,proximal, sedated, NAD Dr Abbi Walton, Dr Lucho Jack at bedside. nurses care continues. 1600 hrs Reassessed pt     1730  Hrs New NG tube being inserted. CXR for placement verification. Bedside and Verbal shift change report given to Olimpia Heller, and Cheri Lake (oncoming nurse) by Baldomero Lopez (offgoing nurse). Report included the following information SBAR, Kardex, ED Summary, Med Rec Status and Cardiac Rhythm NSR.

## 2020-04-13 NOTE — PROGRESS NOTES
0140-Entered patients room d/t hypotension, BP 84/47 at this time. Primary RN off unit at this time. Repositioned cuff and BP remains low. Sedation held at this time. Paged Dr. Maria Guadalupe Ac. Pt's EF 31.5%, no bolus at this time.  Orders received for levophed to titrate for goal of Map 65 and to hold AM BP meds and re-evaluate in AM.

## 2020-04-13 NOTE — PROGRESS NOTES
Spoke with Malathi Edwards RN. Consent for procedure was just obtained with his sister (darlin) Aspen Gerard. Pt currently heading to OR for tracheostomy. Due to IR schedule, unable to go up to OR to place G-tube today. Will plan for Thursday 4/16/20 when IR MD is back.

## 2020-04-13 NOTE — OP NOTES
TRANSFER - IN REPORT:    Verbal report received from Ronaldo Calabrese6 name) on Lizzette Tripp  being received from ICU (unit) for ordered procedure      Report consisted of patients Situation, Background, Assessment and   Recommendations(SBAR). Information from the following report(s) SBAR was reviewed with the receiving nurse. Opportunity for questions and clarification was provided. Assessment completed upon patients arrival to unit and care assumed.
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
## 2020-04-13 NOTE — PROGRESS NOTES
SPO2 100% ON FIO2 40%. DECREASED TO 35%. LARGE AMT BLOOD PRESENT IN AXEL TUBE AND SUCTIONED FROM RIGHT NARE. NURSING AWARE.

## 2020-04-13 NOTE — PROGRESS NOTES
Patient resting quietly, sedated on ventilator and vasopressor support. Boo catheter and fecal management system in place. Patient to have tracheostomy placed in OR today.  Tube feeds held at 0000,

## 2020-04-13 NOTE — ANESTHESIA POSTPROCEDURE EVALUATION
Procedure(s):  TRACHEOSTOMY #8 XLT. general    Anesthesia Post Evaluation        Comments: Post-Anesthesia Evaluation and Assessment    Cardiovascular Function/Vital Signs  /88   Pulse 73   Temp 36.8 °C (98.3 °F)   Resp 18   Ht 5' 7\" (1.702 m)   Wt (!) 171.3 kg (377 lb 10.4 oz)   SpO2 96%   BMI 59.15 kg/m²     Patient is status post Procedure(s):  TRACHEOSTOMY #8 XLT. Nausea/Vomiting: none    Postoperative hydration reviewed and adequate. Pain:  Unconscious    Neurological Status: At baseline. Pulmonary Status:   O2 Device: trach tube. Adequate oxygenation and airway patent. Complications related to anesthesia: None    Post-anesthesia assessment completed. No concerns. Patient has met all discharge requirements. Signed By: Susanna Cotton MD    April 13, 2020                       Vitals Value Taken Time   BP     Temp     Pulse 67 4/13/2020  5:15 PM   Resp 17 4/13/2020  5:15 PM   SpO2 97 % 4/13/2020  5:15 PM   Vitals shown include unvalidated device data.

## 2020-04-13 NOTE — ANESTHESIA PREPROCEDURE EVALUATION
Relevant Problems   No relevant active problems       Anesthetic History   No history of anesthetic complications            Review of Systems / Medical History  Patient summary reviewed, nursing notes reviewed and pertinent labs reviewed    Pulmonary    COPD: moderate    Sleep apnea    Asthma        Neuro/Psych   Within defined limits           Cardiovascular  Within defined limits  Hypertension      CHF          Comments: H/o cocaine abuse    Was in [place] about a month ago with similar dx. chf  Combined diastolic and systolic   Ef 30 - 45%. Mild (grade 1) left ventricular diastolic dysfunction from echo   Received lasix . on low dose coreg     Probably [place] -> elevated baseline CO2   GI/Hepatic/Renal  Within defined limits              Endo/Other    Diabetes    Morbid obesity     Other Findings              Physical Exam    Airway          Intubated   Cardiovascular               Dental         Pulmonary                 Abdominal    Distended     Other Findings            Anesthetic Plan    ASA: 4  Anesthesia type: general          Induction: Intravenous and Inhalational

## 2020-04-13 NOTE — PROGRESS NOTES
Chart reviewed, noted pt for trach placement today, will place referrals for [facility] and [facility][note 3] tomorrow morning once procedure completed.

## 2020-04-14 ENCOUNTER — APPOINTMENT (OUTPATIENT)
Dept: GENERAL RADIOLOGY | Age: 60
DRG: 005 | End: 2020-04-14
Attending: INTERNAL MEDICINE
Payer: MEDICAID

## 2020-04-14 LAB
ALBUMIN SERPL-MCNC: 2.7 G/DL (ref 3.4–5)
ALBUMIN/GLOB SERPL: 0.8 {RATIO} (ref 0.8–1.7)
ALP SERPL-CCNC: 180 U/L (ref 45–117)
ALT SERPL-CCNC: 110 U/L (ref 16–61)
ANION GAP SERPL CALC-SCNC: 7 MMOL/L (ref 3–18)
ARTERIAL PATENCY WRIST A: ABNORMAL
AST SERPL-CCNC: 52 U/L (ref 10–38)
BASE DEFICIT BLD-SCNC: 2 MMOL/L
BASOPHILS # BLD: 0 K/UL (ref 0–0.1)
BASOPHILS NFR BLD: 0 % (ref 0–2)
BDY SITE: ABNORMAL
BILIRUB SERPL-MCNC: 0.8 MG/DL (ref 0.2–1)
BODY TEMPERATURE: 98.7
BUN SERPL-MCNC: 25 MG/DL (ref 7–18)
BUN/CREAT SERPL: 22 (ref 12–20)
CA-I SERPL-SCNC: 1.37 MMOL/L (ref 1.12–1.32)
CALCIUM SERPL-MCNC: 8.9 MG/DL (ref 8.5–10.1)
CHLORIDE SERPL-SCNC: 106 MMOL/L (ref 100–111)
CO2 SERPL-SCNC: 27 MMOL/L (ref 21–32)
CREAT SERPL-MCNC: 1.14 MG/DL (ref 0.6–1.3)
DIFFERENTIAL METHOD BLD: ABNORMAL
EOSINOPHIL # BLD: 0.9 K/UL (ref 0–0.4)
EOSINOPHIL NFR BLD: 11 % (ref 0–5)
ERYTHROCYTE [DISTWIDTH] IN BLOOD BY AUTOMATED COUNT: 16.7 % (ref 11.6–14.5)
GAS FLOW.O2 O2 DELIVERY SYS: ABNORMAL L/MIN
GAS FLOW.O2 SETTING OXYMISER: 16 BPM
GLOBULIN SER CALC-MCNC: 3.5 G/DL (ref 2–4)
GLUCOSE BLD STRIP.AUTO-MCNC: 80 MG/DL (ref 70–110)
GLUCOSE BLD STRIP.AUTO-MCNC: 86 MG/DL (ref 70–110)
GLUCOSE BLD STRIP.AUTO-MCNC: 90 MG/DL (ref 70–110)
GLUCOSE SERPL-MCNC: 88 MG/DL (ref 74–99)
HCO3 BLD-SCNC: 24.5 MMOL/L (ref 22–26)
HCT VFR BLD AUTO: 42.2 % (ref 36–48)
HGB BLD-MCNC: 12.8 G/DL (ref 13–16)
INSPIRATION.DURATION SETTING TIME VENT: 1 SEC
LYMPHOCYTES # BLD: 0.9 K/UL (ref 0.9–3.6)
LYMPHOCYTES NFR BLD: 10 % (ref 21–52)
MAGNESIUM SERPL-MCNC: 2.3 MG/DL (ref 1.6–2.6)
MCH RBC QN AUTO: 26.9 PG (ref 24–34)
MCHC RBC AUTO-ENTMCNC: 30.3 G/DL (ref 31–37)
MCV RBC AUTO: 88.7 FL (ref 74–97)
MONOCYTES # BLD: 0.8 K/UL (ref 0.05–1.2)
MONOCYTES NFR BLD: 9 % (ref 3–10)
NEUTS SEG # BLD: 6.2 K/UL (ref 1.8–8)
NEUTS SEG NFR BLD: 70 % (ref 40–73)
O2/TOTAL GAS SETTING VFR VENT: 0.35 %
PCO2 BLD: 50 MMHG (ref 35–45)
PEEP RESPIRATORY: 10 CMH2O
PH BLD: 7.3 [PH] (ref 7.35–7.45)
PHOSPHATE SERPL-MCNC: 3.1 MG/DL (ref 2.5–4.9)
PIP ISTAT,IPIP: 30
PLATELET # BLD AUTO: 143 K/UL (ref 135–420)
PO2 BLD: 76 MMHG (ref 80–100)
POTASSIUM SERPL-SCNC: 4 MMOL/L (ref 3.5–5.5)
PROT SERPL-MCNC: 6.2 G/DL (ref 6.4–8.2)
RBC # BLD AUTO: 4.76 M/UL (ref 4.7–5.5)
SAO2 % BLD: 93 % (ref 92–97)
SERVICE CMNT-IMP: ABNORMAL
SODIUM SERPL-SCNC: 140 MMOL/L (ref 136–145)
SPECIMEN TYPE: ABNORMAL
TOTAL RESP. RATE, ITRR: 16
VENTILATION MODE VENT: ABNORMAL
VOLUME CONTROL PLUS IVLCP: YES
VT SETTING VENT: 480 ML
WBC # BLD AUTO: 8.8 K/UL (ref 4.6–13.2)

## 2020-04-14 PROCEDURE — 82330 ASSAY OF CALCIUM: CPT

## 2020-04-14 PROCEDURE — 74011250637 HC RX REV CODE- 250/637: Performed by: INTERNAL MEDICINE

## 2020-04-14 PROCEDURE — 94003 VENT MGMT INPAT SUBQ DAY: CPT

## 2020-04-14 PROCEDURE — 36600 WITHDRAWAL OF ARTERIAL BLOOD: CPT

## 2020-04-14 PROCEDURE — 65610000006 HC RM INTENSIVE CARE

## 2020-04-14 PROCEDURE — 82962 GLUCOSE BLOOD TEST: CPT

## 2020-04-14 PROCEDURE — 74011250636 HC RX REV CODE- 250/636: Performed by: INTERNAL MEDICINE

## 2020-04-14 PROCEDURE — 71045 X-RAY EXAM CHEST 1 VIEW: CPT

## 2020-04-14 PROCEDURE — 84100 ASSAY OF PHOSPHORUS: CPT

## 2020-04-14 PROCEDURE — 36591 DRAW BLOOD OFF VENOUS DEVICE: CPT

## 2020-04-14 PROCEDURE — 85025 COMPLETE CBC W/AUTO DIFF WBC: CPT

## 2020-04-14 PROCEDURE — 74011000250 HC RX REV CODE- 250: Performed by: INTERNAL MEDICINE

## 2020-04-14 PROCEDURE — 83735 ASSAY OF MAGNESIUM: CPT

## 2020-04-14 PROCEDURE — C9113 INJ PANTOPRAZOLE SODIUM, VIA: HCPCS | Performed by: FAMILY MEDICINE

## 2020-04-14 PROCEDURE — 82803 BLOOD GASES ANY COMBINATION: CPT

## 2020-04-14 PROCEDURE — 74011250637 HC RX REV CODE- 250/637: Performed by: FAMILY MEDICINE

## 2020-04-14 PROCEDURE — 74011000258 HC RX REV CODE- 258: Performed by: INTERNAL MEDICINE

## 2020-04-14 PROCEDURE — 74011000250 HC RX REV CODE- 250: Performed by: FAMILY MEDICINE

## 2020-04-14 PROCEDURE — 74011250636 HC RX REV CODE- 250/636: Performed by: FAMILY MEDICINE

## 2020-04-14 PROCEDURE — 77010033678 HC OXYGEN DAILY

## 2020-04-14 PROCEDURE — 80053 COMPREHEN METABOLIC PANEL: CPT

## 2020-04-14 RX ORDER — MIDAZOLAM HYDROCHLORIDE 1 MG/ML
2 INJECTION, SOLUTION INTRAMUSCULAR; INTRAVENOUS
Status: DISCONTINUED | OUTPATIENT
Start: 2020-04-14 | End: 2020-04-14

## 2020-04-14 RX ORDER — MIDAZOLAM HYDROCHLORIDE 1 MG/ML
2 INJECTION, SOLUTION INTRAMUSCULAR; INTRAVENOUS
Status: DISCONTINUED | OUTPATIENT
Start: 2020-04-14 | End: 2020-04-15

## 2020-04-14 RX ORDER — FENTANYL CITRATE 50 UG/ML
50 INJECTION, SOLUTION INTRAMUSCULAR; INTRAVENOUS
Status: DISCONTINUED | OUTPATIENT
Start: 2020-04-14 | End: 2020-04-18

## 2020-04-14 RX ORDER — QUETIAPINE FUMARATE 25 MG/1
25 TABLET, FILM COATED ORAL 2 TIMES DAILY
Status: DISCONTINUED | OUTPATIENT
Start: 2020-04-14 | End: 2020-04-22 | Stop reason: HOSPADM

## 2020-04-14 RX ADMIN — FENTANYL CITRATE 50 MCG: 50 INJECTION INTRAMUSCULAR; INTRAVENOUS at 20:10

## 2020-04-14 RX ADMIN — Medication 10 ML: at 21:35

## 2020-04-14 RX ADMIN — SODIUM CHLORIDE 40 MG: 9 INJECTION, SOLUTION INTRAMUSCULAR; INTRAVENOUS; SUBCUTANEOUS at 09:30

## 2020-04-14 RX ADMIN — B-COMPLEX W/ C & FOLIC ACID TAB 1 MG 1 TABLET: 1 TAB at 09:30

## 2020-04-14 RX ADMIN — THIAMINE HCL TAB 100 MG 200 MG: 100 TAB at 09:30

## 2020-04-14 RX ADMIN — MIDAZOLAM HYDROCHLORIDE 5 MG/HR: 5 INJECTION, SOLUTION INTRAMUSCULAR; INTRAVENOUS at 05:49

## 2020-04-14 RX ADMIN — THIAMINE HCL TAB 100 MG 200 MG: 100 TAB at 21:35

## 2020-04-14 RX ADMIN — Medication 150 MCG/HR: at 05:06

## 2020-04-14 RX ADMIN — MIDAZOLAM HYDROCHLORIDE 2 MG: 1 INJECTION, SOLUTION INTRAMUSCULAR; INTRAVENOUS at 13:54

## 2020-04-14 RX ADMIN — CHLORHEXIDINE GLUCONATE 10 ML: 1.2 RINSE ORAL at 21:35

## 2020-04-14 RX ADMIN — ENOXAPARIN SODIUM 40 MG: 40 INJECTION SUBCUTANEOUS at 02:03

## 2020-04-14 RX ADMIN — MIDAZOLAM HYDROCHLORIDE 2 MG: 1 INJECTION, SOLUTION INTRAMUSCULAR; INTRAVENOUS at 20:10

## 2020-04-14 RX ADMIN — CHLORHEXIDINE GLUCONATE 10 ML: 1.2 RINSE ORAL at 09:30

## 2020-04-14 RX ADMIN — CARVEDILOL 3.12 MG: 3.12 TABLET, FILM COATED ORAL at 16:47

## 2020-04-14 RX ADMIN — QUETIAPINE FUMARATE 25 MG: 25 TABLET ORAL at 21:35

## 2020-04-14 RX ADMIN — Medication 10 ML: at 15:09

## 2020-04-14 RX ADMIN — DEXMEDETOMIDINE HYDROCHLORIDE 0.7 MCG/KG/HR: 100 INJECTION, SOLUTION INTRAVENOUS at 22:10

## 2020-04-14 RX ADMIN — CARVEDILOL 3.12 MG: 3.12 TABLET, FILM COATED ORAL at 09:30

## 2020-04-14 RX ADMIN — Medication 10 ML: at 06:20

## 2020-04-14 RX ADMIN — PIPERACILLIN AND TAZOBACTAM 3.38 G: 3; .375 INJECTION, POWDER, LYOPHILIZED, FOR SOLUTION INTRAVENOUS at 06:07

## 2020-04-14 RX ADMIN — QUETIAPINE FUMARATE 25 MG: 25 TABLET ORAL at 15:04

## 2020-04-14 NOTE — PROGRESS NOTES
Hospitalist Progress Note-critical care note     Patient: Marek Don MRN: [id number]  CSN: [id number]    YOB: 1960  Age: 62 y.o. Sex: male    DOA: 4/3/2020 LOS:  LOS: 11 days            Chief complaint:  Respiratory failure, joann on ckd3      Assessment/Plan         Hospital Problems  Date Reviewed: 4/3/2020          Codes Class Noted POA    HCAP (healthcare-associated pneumonia) ICD-10-CM: J18.9  ICD-9-CM: 486  4/7/2020 Unknown        Goals of care, counseling/discussion ICD-10-CM: Z71.89  ICD-9-CM: V65.49  Unknown Unknown        Suspected Covid-19 Virus Infection ICD-10-CM: R68.89  4/4/2020 Clinically Undetermined        Morbid obesity with body mass index of 50 or higher ([facility] ICD-10-CM: E66.01  ICD-9-CM: 278.01  4/4/2020 Yes        COPD with acute exacerbation ([facility] ICD-10-CM: J44.1  ICD-9-CM: 491.21  4/3/2020 Yes        * (Principal) Respiratory failure with hypercapnia ([facility] ICD-10-CM: J96.92  ICD-9-CM: 518.81  4/3/2020 Yes        Transaminitis ICD-10-CM: R74.0  ICD-9-CM: 790.4  4/3/2020 Yes        Acute on chronic renal insufficiency ICD-10-CM: N28.9, N18.9  ICD-9-CM: 593.9, 585.9  4/3/2020 Yes        Sleep apnea ICD-10-CM: G47.30  ICD-9-CM: 780.57  7/9/2018 Yes        Cocaine abuse ([facility] ICD-10-CM: F14.10  ICD-9-CM: 305.60  4/22/2018 Yes        CHF (congestive heart failure) (HCC) (Chronic) ICD-10-CM: I50.9  ICD-9-CM: 428.0  3/3/2018 Yes              61 y/o male w/ hx of COPD on 4L home O2-trilogy at home ,  EF of 35%, super morbid obesity with recent admission to [facility] who presents in acute hypercapneic respiratory failure, he was admitted tue to acute on chronic respiration failure-intubated on admission.    Urine drug screen positive for cocaine, covid 19 negative, planning trach on Monday         Respiratory   Hypercapnic respiratory failure with obesity hypoventilation syndrome   On  Vent: peep 10 and O2 35 % today,   Trach yesterday, so far doing well , local trach care   Vent managed per intensive       Hcap:  Off vanc, on zosyn     Copd exacerbation   On iv steroid a, breathing tx     shyam : on trilogy at home , not compliance     Cardiovascular:  Hypotension now   Hold anti-htn meds on levophed to keep MAP >65  Continue levophed and weaning off     chf  Combined diastolic and systolic   Ef 30 - 46%. Mild (grade 1) left ventricular diastolic dysfunction from echo   Received lasix .     ID: Pneumonia  Repeat COVID negative twice       Endocrine  On sliding scale insulin     FEN   Continue icu replacement protocol   Hypokalemia -replace as protocol      Renal:  joann on ckd3, improving and stable       Neuro : On versed and fentanyl     Heme  H/h so far stable        Psych  Cocaine abuse     GI   Elevated lft -improving  Planning peg tube on Thursday      Poor prognosis, palliative care on board       30 minutes of critical care time spent in the direct evaluation and treatment of this high risk patient. The reason for providing this level of medical care for this critically ill patient was due a critical illness that impaired one or more vital organ systems such that there was a high probability of imminent or life threatening deterioration in the patients condition. This care involved high complexity decision making to assess, manipulate, and support vital system functions, to treat this degreee vital organ system failure and to prevent further life threatening deterioration of the patients condition. Disposition :tbd,   Review of systems:  Unable to obtain due to intubation   Vital signs/Intake and Output:  Visit Vitals  /84   Pulse 77   Temp 99.3 °F (37.4 °C)   Resp 18   Ht 5' 7\" (1.702 m)   Wt (!) 168.8 kg (372 lb 2.2 oz)   SpO2 98%   BMI 58.28 kg/m²     Current Shift:  No intake/output data recorded.   Last three shifts:  04/12 1901 - 04/14 0700  In: 1187 [I.V.:877]  Out: 2305 [Urine:2300]    Physical Exam:  General:          Eyes open,not following the command,   HEENT: NC, Atraumatic. anicteric sclerae. Trach -on vent   Lungs: CTA Bilaterally. No Wheezing/Rhonchi/Rales. Heart:  Regular  rhythm,  No murmur, No Rubs, No Gallops  Abdomen: Soft, obesity , Non tender. +Bowel sounds,   Extremities: No c/c, mild edema of feet   Psych:   Calm   Neurologic:  On sedation          Labs: Results:       Chemistry Recent Labs     04/14/20  0443 04/13/20  1830 04/13/20  1200 04/13/20  0430 04/12/20 0430   GLU 88  --   --  126*  --  110*     --   --  139  --  140   K 4.0 3.9 3.8 3.9   < > 3.5     --   --  107  --  106   CO2 27  --   --  25  --  27   BUN 25*  --   --  30*  --  32*   CREA 1.14  --   --  1.32*  --  1.38*   CA 8.9  --   --  8.6  --  8.5   AGAP 7  --   --  7  --  7   BUCR 22*  --   --  23*  --  23*   *  --   --  170*  --  94   TP 6.2*  --   --  6.3*  --  6.4   ALB 2.7*  --   --  2.7*  --  2.9*   GLOB 3.5  --   --  3.6  --  3.5   AGRAT 0.8  --   --  0.8  --  0.8    < > = values in this interval not displayed. CBC w/Diff Recent Labs     04/14/20 0443 04/13/20  0430 04/12/20  0430   WBC 8.8 9.5 8.8   RBC 4.76 4.92 5.05   HGB 12.8* 13.4 13.7   HCT 42.2 43.4 44.1    143 156   GRANS 70 74*  --    LYMPH 10* 9*  --    EOS 11* 8*  --       Cardiac Enzymes No results for input(s): CPK, CKND1, TISHA in the last 72 hours. No lab exists for component: CKRMB, TROIP   Coagulation No results for input(s): PTP, INR, APTT, INREXT, INREXT in the last 72 hours. Lipid Panel Lab Results   Component Value Date/Time    Cholesterol, total 196 01/25/2018 02:51 AM    HDL Cholesterol 64 (H) 01/25/2018 02:51 AM    LDL, calculated 121.2 (H) 01/25/2018 02:51 AM    VLDL, calculated 10.8 01/25/2018 02:51 AM    Triglyceride 54 01/25/2018 02:51 AM    CHOL/HDL Ratio 3.1 01/25/2018 02:51 AM      BNP No results for input(s): BNPP in the last 72 hours.    Liver Enzymes Recent Labs     04/14/20  0443   TP 6.2*   ALB 2.7*   *   SGOT 52*      Thyroid Studies Lab Results   Component Value Date/Time    TSH 0.11 (L) 03/19/2019 06:50 AM        Procedures/imaging: see electronic medical records for all procedures/Xrays and details which were not copied into this note but were reviewed prior to creation of Plan    Xr Abd (kub)    Result Date: 4/4/2020  EXAM: Single view of the abdomen CLINICAL INDICATION/HISTORY: Nasogastric tube placement    --Additional history: None. COMPARISON: None TECHNIQUE: Single supine view _______________ FINDINGS: The impression. _______________     IMPRESSION: Study technically limited by patient's body habitus. Nasogastric tube tip appears to project over the gastric antrum. Xr Chest Port    Result Date: 4/7/2020  EXAM: XR CHEST PORT CLINICAL INDICATION/HISTORY: OG tube in place. -Additional: None COMPARISON: Earlier the same day TECHNIQUE: Portable frontal view of the chest _______________ FINDINGS: SUPPORT DEVICES: Endotracheal tube approximately 1 cm above the juan miguel. Enteric tube tip out of field of view possibly in the distal stomach. HEART AND MEDIASTINUM: Cardiomegaly LUNGS AND PLEURAL SPACES: Hypoinflated lungs. Probable small to moderate left effusion. Mild interstitial edema. No pneumothorax. _______________     IMPRESSION: Endotracheal tube approximately 1 cm above the juan miguel. Enteric tube tip out of field of view possibly in the distal stomach. Xr Chest Port    Result Date: 4/7/2020  EXAM: XR CHEST PORT CLINICAL INDICATION/HISTORY: pneumonia, intubated -Additional: None COMPARISON: One day prior TECHNIQUE: Portable frontal view of the chest _______________ FINDINGS: SUPPORT DEVICES: Enteric tube and endotracheal tube unchanged. Ankita Treynor HEART AND MEDIASTINUM: cardiomegaly LUNGS AND PLEURAL SPACES: Hypoinflated lungs. Probable small to moderate left effusion. Mild interstitial edema. No pneumothorax. _______________     IMPRESSION: Hypoinflated lungs. Probable small to moderate left effusion. Mild interstitial edema. No pneumothorax.      Xr Chest Port    Result Date: 4/6/2020  EXAM: XR CHEST PORT CLINICAL INDICATION/HISTORY: While intubated -Additional: None COMPARISON: One day prior TECHNIQUE: Portable frontal view of the chest _______________ FINDINGS: SUPPORT DEVICES: Enteric tube and endotracheal tube unchanged. Rudy Rathwily HEART AND MEDIASTINUM: cardiomegaly LUNGS AND PLEURAL SPACES: Hypoinflated lungs. Probable small left effusion. Mild interstitial edema. No pneumothorax. _______________     IMPRESSION: Hypoinflated lungs. Probable small left effusion. Mild interstitial edema. Xr Chest Port    Result Date: 4/5/2020  EXAM: CHEST RADIOGRAPH CLINICAL INDICATION/HISTORY: Respiratory failure -Additional: None COMPARISON: April 4, 2020 TECHNIQUE: Portable frontal view of the chest _______________ FINDINGS: SUPPORT DEVICES: The tip of an endotracheal tube is 10 cm above the juan miguel. A nasogastric tube crosses the gastroesophageal junction. A left IJ central venous catheter terminates in the proximal SVC. HEART AND MEDIASTINUM: The heart is enlarged. LUNGS AND PLEURAL SPACES: Interstitial lung markings are increased. Lung volumes are hypoinflated and there are opacities in the lung bases. No pneumothorax. BONY THORAX AND SOFT TISSUES: Unremarkable. _______________     IMPRESSION: 1. Mild pulmonary edema 2. Bibasilar opacities that may represent a combination of atelectasis and small pleural effusions     Xr Chest Port    Result Date: 4/4/2020  EXAM: PORTABLE CHEST HISTORY: Central line placement. Acute respiratory failure. COMPARISON: 4/4/2020 at 12:42 AM TECHNIQUE: Single portable view. _______________ FINDINGS: SUPPORT DEVICES: Endotracheal tube tip lies about 4.2 cm above juan miguel. Left central venous catheter tip in upper superior vena cava. HEART AND MEDIASTINUM: Cardiomegaly. LUNGS AND PLEURAL SPACES: Patchy airspace disease right lung base may represent developing subsegmental atelectasis or pneumonia. Evaluation limited by large body habitus.  BONES AND SOFT TISSUES: Bony structures are normal. _______________     IMPRESSION: Interval placement left central venous catheter. No pneumothorax. Cardiomegaly without change. Patchy airspace disease right lung base either subsegmental atelectasis or pneumonia appears slightly worse. Xr Chest Port    Result Date: 4/4/2020  EXAM: Chest radiograph  CLINICAL INDICATION/HISTORY: Chemical ventilation    --Additional history: None. COMPARISON: 04/03/2020 TECHNIQUE: Frontal view of the chest _______________ FINDINGS: SUPPORT DEVICES: There is an endotracheal tube with tip approximately 5.5 cm above the juan miguel. There is a nasogastric tube descends below the diaphragm. HEART AND MEDIASTINUM: React silhouette is enlarged. Stable mediastinal contours. . Normal pulmonary vasculature. LUNGS AND PLEURAL SPACES: No convincing focal airspace opacity given the limitations of the study and superimposed body habitus. Sharp costophrenic sulci. No pneumothoraces. BONY THORAX AND SOFT TISSUES: Questionable fracture deformity of the right lateral ninth rib. _______________     IMPRESSION: 1. Support lines and tubes as detailed above. 2. Technically limited study secondary to body habitus without significant change from the prior study. Xr Chest Port    Result Date: 4/3/2020  EXAM:  AP Portable Chest X-ray 1 view INDICATION: Chest pain shortness of breath COMPARISON: None _______________ FINDINGS:  Heart size is enlarged and mediastinal contours are within normal limits for portable radiograph. There are increased interstitial markings in the right lung. No focal airspace disease seen. . There are no pleural effusions. No acute osseous findings. ________________      IMPRESSION: Increased interstitial markings in the right lung. No focal airspace disease or effusion.       See Palomo MD

## 2020-04-14 NOTE — DIABETES MGMT
GLYCEMIC CONTROL PROGRESS NOTE:    - chart reviewed, known h/o T2DM, HbA1C ordered per protocol, not within recommended range on oral home regimen  - NPO, intubated  -- Humalog Normal Insulin Sensitivity Corrective Coverage orders in place recommend continue      Recent Glucose Results:   Lab Results   Component Value Date/Time    GLU 88 04/14/2020 04:43 AM    GLUCPOC 86 04/14/2020 05:58 AM    GLUCPOC 84 04/13/2020 11:07 PM    GLUCPOC 103 04/13/2020 05:53 PM     Ugo Talbot MS, RN, CDE  Glycemic Control Team  [phone]  Pager [phone] (M-TH 8:00-4:30P)  *After Hours pager [phone]

## 2020-04-14 NOTE — PROGRESS NOTES
Bedside and Verbal shift change report given to CHAZ castro Rn (oncoming nurse) by Judi Rowe (offgoing nurse). Report included the following information SBAR, Kardex, ED Summary, Med Rec Status and Cardiac Rhythm NSR.     0730 hrs Shift assessment completed. S/P new trach. nurses care continues. 1000 hrs PT on Sedation vacation,moderated blood clots suctioned. Nurses care continues. TF in hold     1200 hrs Reassessed pt    1325 hrs SBT started Rt at bedside. 1400  Hrs Pt failed SBT, back to full support, PT restless, large amount of blood clots suctioned. Hold Lovenox SQ for 24 Hrs. Per Dr Dallin Parmar. restless PRN med given. See flow sheet. 1420 hrs PT restless, agitated. Per Dr Haim Green  Seroquel 25 mg BID via NG tube. 1455 hrs Wasted 30 ml PCA fentanyl, and 14 ml PCa versed with SMITHA kerr Rn    1600 hrs Reassessed pt     Bedside and Verbal shift change report given to Sarah Ochoa (oncoming nurse) by Arpit Farnsworth (offgoing nurse). Report included the following information SBAR, Kardex, ED Summary, Intake/Output, Med Rec Status and Cardiac Rhythm NSR.

## 2020-04-14 NOTE — PROGRESS NOTES
Chart reviewed and spoke with bedside nurse earlier,noted tracheostomy placed yesterday, referral placed out to [facility], [facility] [place] and [employer], [facility] declined, cm did reach out to patients sister Rey Grey [phone] cm introduced self and explained cm role as discharge planner, sister agreeable to [facility] plan and at this time no accepting facilities, cm did provide sister with cm contact information and will keep family updated. [facility]

## 2020-04-14 NOTE — PROGRESS NOTES
1915: Bedside and Verbal shift change report given to Ulises Finney RN (oncoming nurse) by Esdras Paez RN (offgoing nurse). Report included the following information SBAR, Kardex, Intake/Output, MAR, Recent Results, Med Rec Status, Cardiac Rhythm Sinus rhythm and Alarm Parameters . 2000: Shift assessment completed. 0000: Reassessment completed. 0400: Reassessment completed. 0700: Bedside and Verbal shift change report given to Esdras Paez RN (oncoming nurse) by Ulises Finney RN (offgoing nurse). Report included the following information SBAR, Kardex, Intake/Output, MAR, Recent Results, Med Rec Status, Cardiac Rhythm NSR and Alarm Parameters .

## 2020-04-14 NOTE — PROGRESS NOTES
SBT started at 1325 with PS 12 and PEEP 10. Patient tolerated 25 minutes . Lower VT and coughing. Patient placed on previous settings. Large amount of blood clots suctioned from ETT and mouth before SBT performed.
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
## 2020-04-15 ENCOUNTER — APPOINTMENT (OUTPATIENT)
Dept: GENERAL RADIOLOGY | Age: 60
DRG: 005 | End: 2020-04-15
Attending: INTERNAL MEDICINE
Payer: MEDICAID

## 2020-04-15 PROBLEM — Z93.0 TRACHEOSTOMY IN PLACE (HCC): Status: ACTIVE | Noted: 2020-04-15

## 2020-04-15 LAB
ALBUMIN SERPL-MCNC: 2.6 G/DL (ref 3.4–5)
ALBUMIN/GLOB SERPL: 0.7 {RATIO} (ref 0.8–1.7)
ALP SERPL-CCNC: 170 U/L (ref 45–117)
ALT SERPL-CCNC: 83 U/L (ref 16–61)
ANION GAP SERPL CALC-SCNC: 9 MMOL/L (ref 3–18)
AST SERPL-CCNC: 32 U/L (ref 10–38)
BASOPHILS # BLD: 0 K/UL (ref 0–0.1)
BASOPHILS NFR BLD: 0 % (ref 0–2)
BILIRUB SERPL-MCNC: 0.5 MG/DL (ref 0.2–1)
BUN SERPL-MCNC: 24 MG/DL (ref 7–18)
BUN/CREAT SERPL: 23 (ref 12–20)
CA-I SERPL-SCNC: 1.27 MMOL/L (ref 1.12–1.32)
CALCIUM SERPL-MCNC: 8.9 MG/DL (ref 8.5–10.1)
CHLORIDE SERPL-SCNC: 109 MMOL/L (ref 100–111)
CO2 SERPL-SCNC: 23 MMOL/L (ref 21–32)
CREAT SERPL-MCNC: 1.03 MG/DL (ref 0.6–1.3)
DIFFERENTIAL METHOD BLD: ABNORMAL
EOSINOPHIL # BLD: 0.5 K/UL (ref 0–0.4)
EOSINOPHIL NFR BLD: 5 % (ref 0–5)
ERYTHROCYTE [DISTWIDTH] IN BLOOD BY AUTOMATED COUNT: 16.7 % (ref 11.6–14.5)
GLOBULIN SER CALC-MCNC: 3.6 G/DL (ref 2–4)
GLUCOSE BLD STRIP.AUTO-MCNC: 110 MG/DL (ref 70–110)
GLUCOSE BLD STRIP.AUTO-MCNC: 113 MG/DL (ref 70–110)
GLUCOSE BLD STRIP.AUTO-MCNC: 114 MG/DL (ref 70–110)
GLUCOSE BLD STRIP.AUTO-MCNC: 123 MG/DL (ref 70–110)
GLUCOSE BLD STRIP.AUTO-MCNC: 93 MG/DL (ref 70–110)
GLUCOSE SERPL-MCNC: 102 MG/DL (ref 74–99)
HCT VFR BLD AUTO: 40.6 % (ref 36–48)
HGB BLD-MCNC: 12.5 G/DL (ref 13–16)
LYMPHOCYTES # BLD: 0.7 K/UL (ref 0.9–3.6)
LYMPHOCYTES NFR BLD: 8 % (ref 21–52)
MAGNESIUM SERPL-MCNC: 2.2 MG/DL (ref 1.6–2.6)
MCH RBC QN AUTO: 26.9 PG (ref 24–34)
MCHC RBC AUTO-ENTMCNC: 30.8 G/DL (ref 31–37)
MCV RBC AUTO: 87.5 FL (ref 74–97)
MONOCYTES # BLD: 1 K/UL (ref 0.05–1.2)
MONOCYTES NFR BLD: 11 % (ref 3–10)
NEUTS SEG # BLD: 6.5 K/UL (ref 1.8–8)
NEUTS SEG NFR BLD: 76 % (ref 40–73)
PHOSPHATE SERPL-MCNC: 2.8 MG/DL (ref 2.5–4.9)
PLATELET # BLD AUTO: 134 K/UL (ref 135–420)
POTASSIUM SERPL-SCNC: 4 MMOL/L (ref 3.5–5.5)
PROT SERPL-MCNC: 6.2 G/DL (ref 6.4–8.2)
RBC # BLD AUTO: 4.64 M/UL (ref 4.7–5.5)
SODIUM SERPL-SCNC: 141 MMOL/L (ref 136–145)
WBC # BLD AUTO: 8.6 K/UL (ref 4.6–13.2)

## 2020-04-15 PROCEDURE — 74011250636 HC RX REV CODE- 250/636: Performed by: FAMILY MEDICINE

## 2020-04-15 PROCEDURE — 94003 VENT MGMT INPAT SUBQ DAY: CPT

## 2020-04-15 PROCEDURE — 83735 ASSAY OF MAGNESIUM: CPT

## 2020-04-15 PROCEDURE — 74011250637 HC RX REV CODE- 250/637: Performed by: FAMILY MEDICINE

## 2020-04-15 PROCEDURE — 74011250637 HC RX REV CODE- 250/637: Performed by: INTERNAL MEDICINE

## 2020-04-15 PROCEDURE — 74011000250 HC RX REV CODE- 250: Performed by: INTERNAL MEDICINE

## 2020-04-15 PROCEDURE — 74011250636 HC RX REV CODE- 250/636: Performed by: HOSPITALIST

## 2020-04-15 PROCEDURE — C9113 INJ PANTOPRAZOLE SODIUM, VIA: HCPCS | Performed by: FAMILY MEDICINE

## 2020-04-15 PROCEDURE — 80053 COMPREHEN METABOLIC PANEL: CPT

## 2020-04-15 PROCEDURE — 74011250636 HC RX REV CODE- 250/636: Performed by: INTERNAL MEDICINE

## 2020-04-15 PROCEDURE — 82330 ASSAY OF CALCIUM: CPT

## 2020-04-15 PROCEDURE — 82962 GLUCOSE BLOOD TEST: CPT

## 2020-04-15 PROCEDURE — 74011000258 HC RX REV CODE- 258: Performed by: INTERNAL MEDICINE

## 2020-04-15 PROCEDURE — 77010033678 HC OXYGEN DAILY

## 2020-04-15 PROCEDURE — 85025 COMPLETE CBC W/AUTO DIFF WBC: CPT

## 2020-04-15 PROCEDURE — 71045 X-RAY EXAM CHEST 1 VIEW: CPT

## 2020-04-15 PROCEDURE — 65610000006 HC RM INTENSIVE CARE

## 2020-04-15 PROCEDURE — 84100 ASSAY OF PHOSPHORUS: CPT

## 2020-04-15 PROCEDURE — 74011000250 HC RX REV CODE- 250: Performed by: FAMILY MEDICINE

## 2020-04-15 RX ORDER — LORAZEPAM 2 MG/ML
2 INJECTION INTRAMUSCULAR
Status: DISCONTINUED | OUTPATIENT
Start: 2020-04-15 | End: 2020-04-16

## 2020-04-15 RX ORDER — FUROSEMIDE 10 MG/ML
40 INJECTION INTRAMUSCULAR; INTRAVENOUS DAILY
Status: DISCONTINUED | OUTPATIENT
Start: 2020-04-15 | End: 2020-04-22 | Stop reason: HOSPADM

## 2020-04-15 RX ORDER — HYDRALAZINE HYDROCHLORIDE 10 MG/1
10 TABLET, FILM COATED ORAL 3 TIMES DAILY
Status: DISCONTINUED | OUTPATIENT
Start: 2020-04-15 | End: 2020-04-19

## 2020-04-15 RX ORDER — FUROSEMIDE 10 MG/ML
40 INJECTION INTRAMUSCULAR; INTRAVENOUS DAILY
Status: DISCONTINUED | OUTPATIENT
Start: 2020-04-16 | End: 2020-04-15

## 2020-04-15 RX ORDER — ENOXAPARIN SODIUM 100 MG/ML
40 INJECTION SUBCUTANEOUS EVERY 24 HOURS
Status: DISCONTINUED | OUTPATIENT
Start: 2020-04-16 | End: 2020-04-22 | Stop reason: HOSPADM

## 2020-04-15 RX ORDER — CHOLECALCIFEROL (VITAMIN D3) 125 MCG
5 CAPSULE ORAL
Status: DISCONTINUED | OUTPATIENT
Start: 2020-04-15 | End: 2020-04-22 | Stop reason: HOSPADM

## 2020-04-15 RX ADMIN — B-COMPLEX W/ C & FOLIC ACID TAB 1 MG 1 TABLET: 1 TAB at 08:12

## 2020-04-15 RX ADMIN — CHLORHEXIDINE GLUCONATE 10 ML: 1.2 RINSE ORAL at 08:04

## 2020-04-15 RX ADMIN — THIAMINE HCL TAB 100 MG 200 MG: 100 TAB at 08:12

## 2020-04-15 RX ADMIN — QUETIAPINE FUMARATE 25 MG: 25 TABLET ORAL at 08:12

## 2020-04-15 RX ADMIN — HYDRALAZINE HYDROCHLORIDE 10 MG: 10 TABLET, FILM COATED ORAL at 21:37

## 2020-04-15 RX ADMIN — DEXMEDETOMIDINE HYDROCHLORIDE 0.7 MCG/KG/HR: 100 INJECTION, SOLUTION INTRAVENOUS at 01:28

## 2020-04-15 RX ADMIN — THIAMINE HCL TAB 100 MG 200 MG: 100 TAB at 21:37

## 2020-04-15 RX ADMIN — FENTANYL CITRATE 50 MCG: 50 INJECTION INTRAMUSCULAR; INTRAVENOUS at 18:05

## 2020-04-15 RX ADMIN — CHLORHEXIDINE GLUCONATE 10 ML: 1.2 RINSE ORAL at 21:37

## 2020-04-15 RX ADMIN — LORAZEPAM 2 MG: 2 INJECTION, SOLUTION INTRAMUSCULAR; INTRAVENOUS at 18:05

## 2020-04-15 RX ADMIN — HYDRALAZINE HYDROCHLORIDE 10 MG: 10 TABLET, FILM COATED ORAL at 16:16

## 2020-04-15 RX ADMIN — LORAZEPAM 2 MG: 2 INJECTION, SOLUTION INTRAMUSCULAR; INTRAVENOUS at 14:02

## 2020-04-15 RX ADMIN — HYDRALAZINE HYDROCHLORIDE 20 MG: 20 INJECTION INTRAMUSCULAR; INTRAVENOUS at 00:32

## 2020-04-15 RX ADMIN — FENTANYL CITRATE 50 MCG: 50 INJECTION INTRAMUSCULAR; INTRAVENOUS at 23:47

## 2020-04-15 RX ADMIN — FENTANYL CITRATE 50 MCG: 50 INJECTION INTRAMUSCULAR; INTRAVENOUS at 12:10

## 2020-04-15 RX ADMIN — QUETIAPINE FUMARATE 25 MG: 25 TABLET ORAL at 21:37

## 2020-04-15 RX ADMIN — FUROSEMIDE 40 MG: 10 INJECTION, SOLUTION INTRAMUSCULAR; INTRAVENOUS at 14:02

## 2020-04-15 RX ADMIN — CARVEDILOL 3.12 MG: 3.12 TABLET, FILM COATED ORAL at 08:12

## 2020-04-15 RX ADMIN — DEXMEDETOMIDINE HYDROCHLORIDE 0.3 MCG/KG/HR: 100 INJECTION, SOLUTION INTRAVENOUS at 06:09

## 2020-04-15 RX ADMIN — Medication 5 MG: at 23:46

## 2020-04-15 RX ADMIN — LORAZEPAM 2 MG: 2 INJECTION, SOLUTION INTRAMUSCULAR; INTRAVENOUS at 23:47

## 2020-04-15 RX ADMIN — HYDRALAZINE HYDROCHLORIDE 20 MG: 20 INJECTION INTRAMUSCULAR; INTRAVENOUS at 13:19

## 2020-04-15 RX ADMIN — FENTANYL CITRATE 50 MCG: 50 INJECTION INTRAMUSCULAR; INTRAVENOUS at 15:13

## 2020-04-15 RX ADMIN — CARVEDILOL 3.12 MG: 3.12 TABLET, FILM COATED ORAL at 16:16

## 2020-04-15 RX ADMIN — Medication 10 ML: at 06:09

## 2020-04-15 RX ADMIN — Medication 10 ML: at 21:36

## 2020-04-15 RX ADMIN — SODIUM CHLORIDE 40 MG: 9 INJECTION, SOLUTION INTRAMUSCULAR; INTRAVENOUS; SUBCUTANEOUS at 08:12

## 2020-04-15 RX ADMIN — HYDRALAZINE HYDROCHLORIDE 20 MG: 20 INJECTION INTRAMUSCULAR; INTRAVENOUS at 16:29

## 2020-04-15 NOTE — PROGRESS NOTES
Plan: [facility] pending insurance authorization and PEG placement tomorrow (Thursday). Spoke with RN to clarify pending insurance authorization and pt planning for peg tomorrow. CM following to finalize discharge plan.
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
## 2020-04-15 NOTE — DIABETES MGMT
GLYCEMIC CONTROL PROGRESS NOTE:    - chart reviewed, known h/o T2DM, HbA1C ordered per protocol, not within recommended range on oral home regimen  - NPO, intubated  -- Humalog Normal Insulin Sensitivity Corrective Coverage orders in place recommend continue      Recent Glucose Results:   Lab Results   Component Value Date/Time     (H) 04/15/2020 04:15 AM    GLUCPOC 110 04/15/2020 06:07 AM    GLUCPOC 93 04/15/2020 12:27 AM    GLUCPOC 90 04/14/2020 06:04 PM     Anthony Arvizu MS, RN, CDE  Glycemic Control Team  [phone]  Pager [phone] (M-TH 8:00-4:30P)  *After Hours pager [phone]

## 2020-04-15 NOTE — PROGRESS NOTES
Hospitalist Progress Note-critical care note     Patient: Betty Chavarria MRN: [id number]  CSN: [id number]    YOB: 1960  Age: 62 y.o. Sex: male    DOA: 4/3/2020 LOS:  LOS: 12 days            Chief complaint:  Respiratory failure, joann on ckd3      Assessment/Plan         Hospital Problems  Date Reviewed: 4/3/2020          Codes Class Noted POA    HCAP (healthcare-associated pneumonia) ICD-10-CM: J18.9  ICD-9-CM: 486  4/7/2020 Unknown        Goals of care, counseling/discussion ICD-10-CM: Z71.89  ICD-9-CM: V65.49  Unknown Unknown        Suspected Covid-19 Virus Infection ICD-10-CM: R68.89  4/4/2020 Clinically Undetermined        Morbid obesity with body mass index of 50 or higher ([facility] ICD-10-CM: E66.01  ICD-9-CM: 278.01  4/4/2020 Yes        COPD with acute exacerbation ([facility] ICD-10-CM: J44.1  ICD-9-CM: 491.21  4/3/2020 Yes        * (Principal) Respiratory failure with hypercapnia ([facility] ICD-10-CM: J96.92  ICD-9-CM: 518.81  4/3/2020 Yes        Transaminitis ICD-10-CM: R74.0  ICD-9-CM: 790.4  4/3/2020 Yes        Acute on chronic renal insufficiency ICD-10-CM: N28.9, N18.9  ICD-9-CM: 593.9, 585.9  4/3/2020 Yes        Sleep apnea ICD-10-CM: G47.30  ICD-9-CM: 780.57  7/9/2018 Yes        Cocaine abuse ([facility] ICD-10-CM: F14.10  ICD-9-CM: 305.60  4/22/2018 Yes        CHF (congestive heart failure) (HCC) (Chronic) ICD-10-CM: I50.9  ICD-9-CM: 428.0  3/3/2018 Yes              61 y/o male w/ hx of COPD on 4L home O2-trilogy at home ,  EF of 35%, super morbid obesity with recent admission to [facility] who presents in acute hypercapneic respiratory failure, he was admitted tue to acute on chronic respiration failure-intubated on admission.    Urine drug screen positive for cocaine, covid 19 negative, got trach, planning peg tomorrow         Respiratory   Hypercapnic respiratory failure with obesity hypoventilation syndrome   On  Vent: peep 10 and O2 35 % stable overnight   Trach 4/13 per Dr. Merlinda Govern local care Vent managed per intensive   On lasix and continue weaning off       Hcap:  Completed iv abx     Copd exacerbation   Received iv steroid     shyam : on trilogy at home , not compliance     Cardiovascular:  Hypotension resolved   Off levophed     chf  Combined diastolic and systolic   Ef 30 - 67%. Mild (grade 1) left ventricular diastolic dysfunction from echo   On lasix and coreg      ID: Pneumonia  Repeat COVID negative twice       Endocrine  On sliding scale insulin     FEN   Continue icu replacement protocol   Hypokalemia -replace as protocol      Renal:  joann on ckd3,resolved cr is good today        Neuro :   Open eyes per voice     Heme  H/h so far stable        Psych  Cocaine abuse     GI   Elevated lft -resolved   Planning peg tube on Thursday      Poor prognosis palliative care on board       30 minutes of critical care time spent in the direct evaluation and treatment of this high risk patient. The reason for providing this level of medical care for this critically ill patient was due a critical illness that impaired one or more vital organ systems such that there was a high probability of imminent or life threatening deterioration in the patients condition. This care involved high complexity decision making to assess, manipulate, and support vital system functions, to treat this degreee vital organ system failure and to prevent further life threatening deterioration of the patients condition.     Waiting for insurance authorization   Disposition :tbd,   Review of systems:  Unable to obtain due to vent   Vital signs/Intake and Output:  Visit Vitals  /69   Pulse 67   Temp 99.5 °F (37.5 °C)   Resp 21   Ht 5' 7\" (1.702 m)   Wt (!) 168.8 kg (372 lb 2.2 oz)   SpO2 94%   BMI 58.28 kg/m²     Current Shift:  04/15 0701 - 04/15 1900  In: -   Out: 250 [Urine:250]  Last three shifts:  04/13 1901 - 04/15 0700  In: 877 [I.V.:877]  Out: 1450 [Urine:1450]    Physical Exam:  General:         Open eyes per voice HEENT: NC, Atraumatic. anicteric sclerae. Trach -on vent   Lungs: CTA Bilaterally. No Wheezing/Rhonchi/Rales. Heart:  Regular  rhythm,  No murmur, No Rubs, No Gallops  Abdomen: Soft, obesity , Non tender. +Bowel sounds,   Extremities: No c/c, mild edema of feet   Psych:   Calm   Neurologic:  Open eyes per voice           Labs: Results:       Chemistry Recent Labs     04/15/20  0415 04/14/20  0443 04/13/20  1830  04/13/20 0430   * 88  --   --  126*    140  --   --  139   K 4.0 4.0 3.9   < > 3.9    106  --   --  107   CO2 23 27  --   --  25   BUN 24* 25*  --   --  30*   CREA 1.03 1.14  --   --  1.32*   CA 8.9 8.9  --   --  8.6   AGAP 9 7  --   --  7   BUCR 23* 22*  --   --  23*   * 180*  --   --  170*   TP 6.2* 6.2*  --   --  6.3*   ALB 2.6* 2.7*  --   --  2.7*   GLOB 3.6 3.5  --   --  3.6   AGRAT 0.7* 0.8  --   --  0.8    < > = values in this interval not displayed. CBC w/Diff Recent Labs     04/15/20  0415 04/14/20  0443 04/13/20  0430   WBC 8.6 8.8 9.5   RBC 4.64* 4.76 4.92   HGB 12.5* 12.8* 13.4   HCT 40.6 42.2 43.4   * 143 143   GRANS 76* 70 74*   LYMPH 8* 10* 9*   EOS 5 11* 8*      Cardiac Enzymes No results for input(s): CPK, CKND1, TISHA in the last 72 hours. No lab exists for component: CKRMB, TROIP   Coagulation No results for input(s): PTP, INR, APTT, INREXT, INREXT in the last 72 hours. Lipid Panel Lab Results   Component Value Date/Time    Cholesterol, total 196 01/25/2018 02:51 AM    HDL Cholesterol 64 (H) 01/25/2018 02:51 AM    LDL, calculated 121.2 (H) 01/25/2018 02:51 AM    VLDL, calculated 10.8 01/25/2018 02:51 AM    Triglyceride 54 01/25/2018 02:51 AM    CHOL/HDL Ratio 3.1 01/25/2018 02:51 AM      BNP No results for input(s): BNPP in the last 72 hours.    Liver Enzymes Recent Labs     04/15/20  0415   TP 6.2*   ALB 2.6*   *   SGOT 32      Thyroid Studies Lab Results   Component Value Date/Time    TSH 0.11 (L) 03/19/2019 06:50 AM Procedures/imaging: see electronic medical records for all procedures/Xrays and details which were not copied into this note but were reviewed prior to creation of Plan    Xr Abd (kub)    Result Date: 4/4/2020  EXAM: Single view of the abdomen CLINICAL INDICATION/HISTORY: Nasogastric tube placement    --Additional history: None. COMPARISON: None TECHNIQUE: Single supine view _______________ FINDINGS: The impression. _______________     IMPRESSION: Study technically limited by patient's body habitus. Nasogastric tube tip appears to project over the gastric antrum. Xr Chest Port    Result Date: 4/7/2020  EXAM: XR CHEST PORT CLINICAL INDICATION/HISTORY: OG tube in place. -Additional: None COMPARISON: Earlier the same day TECHNIQUE: Portable frontal view of the chest _______________ FINDINGS: SUPPORT DEVICES: Endotracheal tube approximately 1 cm above the juan miguel. Enteric tube tip out of field of view possibly in the distal stomach. HEART AND MEDIASTINUM: Cardiomegaly LUNGS AND PLEURAL SPACES: Hypoinflated lungs. Probable small to moderate left effusion. Mild interstitial edema. No pneumothorax. _______________     IMPRESSION: Endotracheal tube approximately 1 cm above the juan miguel. Enteric tube tip out of field of view possibly in the distal stomach. Xr Chest Port    Result Date: 4/7/2020  EXAM: XR CHEST PORT CLINICAL INDICATION/HISTORY: pneumonia, intubated -Additional: None COMPARISON: One day prior TECHNIQUE: Portable frontal view of the chest _______________ FINDINGS: SUPPORT DEVICES: Enteric tube and endotracheal tube unchanged. Debi Dye HEART AND MEDIASTINUM: cardiomegaly LUNGS AND PLEURAL SPACES: Hypoinflated lungs. Probable small to moderate left effusion. Mild interstitial edema. No pneumothorax. _______________     IMPRESSION: Hypoinflated lungs. Probable small to moderate left effusion. Mild interstitial edema. No pneumothorax.      Xr Chest Port    Result Date: 4/6/2020  EXAM: XR CHEST PORT CLINICAL INDICATION/HISTORY: While intubated -Additional: None COMPARISON: One day prior TECHNIQUE: Portable frontal view of the chest _______________ FINDINGS: SUPPORT DEVICES: Enteric tube and endotracheal tube unchanged. [place] HEART AND MEDIASTINUM: cardiomegaly LUNGS AND PLEURAL SPACES: Hypoinflated lungs. Probable small left effusion. Mild interstitial edema. No pneumothorax. _______________     IMPRESSION: Hypoinflated lungs. Probable small left effusion. Mild interstitial edema. Xr Chest Port    Result Date: 4/5/2020  EXAM: CHEST RADIOGRAPH CLINICAL INDICATION/HISTORY: Respiratory failure -Additional: None COMPARISON: April 4, 2020 TECHNIQUE: Portable frontal view of the chest _______________ FINDINGS: SUPPORT DEVICES: The tip of an endotracheal tube is 10 cm above the juan miguel. A nasogastric tube crosses the gastroesophageal junction. A left IJ central venous catheter terminates in the proximal SVC. HEART AND MEDIASTINUM: The heart is enlarged. LUNGS AND PLEURAL SPACES: Interstitial lung markings are increased. Lung volumes are hypoinflated and there are opacities in the lung bases. No pneumothorax. BONY THORAX AND SOFT TISSUES: Unremarkable. _______________     IMPRESSION: 1. Mild pulmonary edema 2. Bibasilar opacities that may represent a combination of atelectasis and small pleural effusions     Xr Chest Port    Result Date: 4/4/2020  EXAM: PORTABLE CHEST HISTORY: Central line placement. Acute respiratory failure. COMPARISON: 4/4/2020 at 12:42 AM TECHNIQUE: Single portable view. _______________ FINDINGS: SUPPORT DEVICES: Endotracheal tube tip lies about 4.2 cm above juan miguel. Left central venous catheter tip in upper superior vena cava. HEART AND MEDIASTINUM: Cardiomegaly. LUNGS AND PLEURAL SPACES: Patchy airspace disease right lung base may represent developing subsegmental atelectasis or pneumonia. Evaluation limited by large body habitus.  BONES AND SOFT TISSUES: Bony structures are normal. _______________ IMPRESSION: Interval placement left central venous catheter. No pneumothorax. Cardiomegaly without change. Patchy airspace disease right lung base either subsegmental atelectasis or pneumonia appears slightly worse. Xr Chest Port    Result Date: 4/4/2020  EXAM: Chest radiograph  CLINICAL INDICATION/HISTORY: Chemical ventilation    --Additional history: None. COMPARISON: 04/03/2020 TECHNIQUE: Frontal view of the chest _______________ FINDINGS: SUPPORT DEVICES: There is an endotracheal tube with tip approximately 5.5 cm above the juan miguel. There is a nasogastric tube descends below the diaphragm. HEART AND MEDIASTINUM: React silhouette is enlarged. Stable mediastinal contours. . Normal pulmonary vasculature. LUNGS AND PLEURAL SPACES: No convincing focal airspace opacity given the limitations of the study and superimposed body habitus. Sharp costophrenic sulci. No pneumothoraces. BONY THORAX AND SOFT TISSUES: Questionable fracture deformity of the right lateral ninth rib. _______________     IMPRESSION: 1. Support lines and tubes as detailed above. 2. Technically limited study secondary to body habitus without significant change from the prior study. Xr Chest Port    Result Date: 4/3/2020  EXAM:  AP Portable Chest X-ray 1 view INDICATION: Chest pain shortness of breath COMPARISON: None _______________ FINDINGS:  Heart size is enlarged and mediastinal contours are within normal limits for portable radiograph. There are increased interstitial markings in the right lung. No focal airspace disease seen. . There are no pleural effusions. No acute osseous findings. ________________      IMPRESSION: Increased interstitial markings in the right lung. No focal airspace disease or effusion.       Lashay Lynne MD

## 2020-04-16 ENCOUNTER — APPOINTMENT (OUTPATIENT)
Dept: INTERVENTIONAL RADIOLOGY/VASCULAR | Age: 60
DRG: 005 | End: 2020-04-16
Attending: INTERNAL MEDICINE
Payer: MEDICAID

## 2020-04-16 ENCOUNTER — APPOINTMENT (OUTPATIENT)
Dept: INTERVENTIONAL RADIOLOGY/VASCULAR | Age: 60
DRG: 005 | End: 2020-04-16
Attending: HOSPITALIST
Payer: MEDICAID

## 2020-04-16 LAB
ALBUMIN SERPL-MCNC: 2.7 G/DL (ref 3.4–5)
ALBUMIN/GLOB SERPL: 0.7 {RATIO} (ref 0.8–1.7)
ALP SERPL-CCNC: 181 U/L (ref 45–117)
ALT SERPL-CCNC: 85 U/L (ref 16–61)
ANION GAP SERPL CALC-SCNC: 9 MMOL/L (ref 3–18)
AST SERPL-CCNC: 55 U/L (ref 10–38)
BASOPHILS # BLD: 0 K/UL (ref 0–0.1)
BASOPHILS NFR BLD: 0 % (ref 0–2)
BILIRUB SERPL-MCNC: 0.5 MG/DL (ref 0.2–1)
BUN SERPL-MCNC: 27 MG/DL (ref 7–18)
BUN/CREAT SERPL: 22 (ref 12–20)
CA-I SERPL-SCNC: 1.28 MMOL/L (ref 1.12–1.32)
CALCIUM SERPL-MCNC: 9 MG/DL (ref 8.5–10.1)
CHLORIDE SERPL-SCNC: 106 MMOL/L (ref 100–111)
CO2 SERPL-SCNC: 26 MMOL/L (ref 21–32)
CREAT SERPL-MCNC: 1.23 MG/DL (ref 0.6–1.3)
DIFFERENTIAL METHOD BLD: ABNORMAL
EOSINOPHIL # BLD: 0.2 K/UL (ref 0–0.4)
EOSINOPHIL NFR BLD: 2 % (ref 0–5)
ERYTHROCYTE [DISTWIDTH] IN BLOOD BY AUTOMATED COUNT: 16.9 % (ref 11.6–14.5)
GLOBULIN SER CALC-MCNC: 3.8 G/DL (ref 2–4)
GLUCOSE BLD STRIP.AUTO-MCNC: 109 MG/DL (ref 70–110)
GLUCOSE BLD STRIP.AUTO-MCNC: 113 MG/DL (ref 70–110)
GLUCOSE BLD STRIP.AUTO-MCNC: 115 MG/DL (ref 70–110)
GLUCOSE BLD STRIP.AUTO-MCNC: 137 MG/DL (ref 70–110)
GLUCOSE SERPL-MCNC: 129 MG/DL (ref 74–99)
HCT VFR BLD AUTO: 40 % (ref 36–48)
HGB BLD-MCNC: 12.5 G/DL (ref 13–16)
LACTATE SERPL-SCNC: 0.8 MMOL/L (ref 0.4–2)
LYMPHOCYTES # BLD: 0.7 K/UL (ref 0.9–3.6)
LYMPHOCYTES NFR BLD: 6 % (ref 21–52)
MAGNESIUM SERPL-MCNC: 2.3 MG/DL (ref 1.6–2.6)
MCH RBC QN AUTO: 27.3 PG (ref 24–34)
MCHC RBC AUTO-ENTMCNC: 31.3 G/DL (ref 31–37)
MCV RBC AUTO: 87.3 FL (ref 74–97)
MONOCYTES # BLD: 1.4 K/UL (ref 0.05–1.2)
MONOCYTES NFR BLD: 13 % (ref 3–10)
NEUTS SEG # BLD: 8.4 K/UL (ref 1.8–8)
NEUTS SEG NFR BLD: 79 % (ref 40–73)
PHOSPHATE SERPL-MCNC: 4 MG/DL (ref 2.5–4.9)
PLATELET # BLD AUTO: 137 K/UL (ref 135–420)
POTASSIUM SERPL-SCNC: 4 MMOL/L (ref 3.5–5.5)
PROT SERPL-MCNC: 6.5 G/DL (ref 6.4–8.2)
RBC # BLD AUTO: 4.58 M/UL (ref 4.7–5.5)
SODIUM SERPL-SCNC: 141 MMOL/L (ref 136–145)
WBC # BLD AUTO: 10.7 K/UL (ref 4.6–13.2)

## 2020-04-16 PROCEDURE — 74011250637 HC RX REV CODE- 250/637: Performed by: FAMILY MEDICINE

## 2020-04-16 PROCEDURE — 87186 SC STD MICRODIL/AGAR DIL: CPT

## 2020-04-16 PROCEDURE — 80053 COMPREHEN METABOLIC PANEL: CPT

## 2020-04-16 PROCEDURE — 85025 COMPLETE CBC W/AUTO DIFF WBC: CPT

## 2020-04-16 PROCEDURE — 74011000250 HC RX REV CODE- 250: Performed by: FAMILY MEDICINE

## 2020-04-16 PROCEDURE — 74011636320 HC RX REV CODE- 636/320: Performed by: FAMILY MEDICINE

## 2020-04-16 PROCEDURE — 02HV33Z INSERTION OF INFUSION DEVICE INTO SUPERIOR VENA CAVA, PERCUTANEOUS APPROACH: ICD-10-PCS | Performed by: RADIOLOGY

## 2020-04-16 PROCEDURE — 74011250636 HC RX REV CODE- 250/636: Performed by: FAMILY MEDICINE

## 2020-04-16 PROCEDURE — 74011250636 HC RX REV CODE- 250/636: Performed by: HOSPITALIST

## 2020-04-16 PROCEDURE — 82330 ASSAY OF CALCIUM: CPT

## 2020-04-16 PROCEDURE — 74011000250 HC RX REV CODE- 250: Performed by: RADIOLOGY

## 2020-04-16 PROCEDURE — 87077 CULTURE AEROBIC IDENTIFY: CPT

## 2020-04-16 PROCEDURE — 0DH63UZ INSERTION OF FEEDING DEVICE INTO STOMACH, PERCUTANEOUS APPROACH: ICD-10-PCS | Performed by: RADIOLOGY

## 2020-04-16 PROCEDURE — 82962 GLUCOSE BLOOD TEST: CPT

## 2020-04-16 PROCEDURE — 74011250636 HC RX REV CODE- 250/636: Performed by: INTERNAL MEDICINE

## 2020-04-16 PROCEDURE — 77030003666 IR INSERT GASTROSTOMY TUBE PERC

## 2020-04-16 PROCEDURE — 74011250637 HC RX REV CODE- 250/637: Performed by: INTERNAL MEDICINE

## 2020-04-16 PROCEDURE — 77010033678 HC OXYGEN DAILY

## 2020-04-16 PROCEDURE — 83605 ASSAY OF LACTIC ACID: CPT

## 2020-04-16 PROCEDURE — C1751 CATH, INF, PER/CENT/MIDLINE: HCPCS

## 2020-04-16 PROCEDURE — 94003 VENT MGMT INPAT SUBQ DAY: CPT

## 2020-04-16 PROCEDURE — 74011000250 HC RX REV CODE- 250

## 2020-04-16 PROCEDURE — 77030019563 HC DEV ATTCH FEED HOLL -A

## 2020-04-16 PROCEDURE — 77030005100

## 2020-04-16 PROCEDURE — C9113 INJ PANTOPRAZOLE SODIUM, VIA: HCPCS | Performed by: FAMILY MEDICINE

## 2020-04-16 PROCEDURE — 84100 ASSAY OF PHOSPHORUS: CPT

## 2020-04-16 PROCEDURE — 0B110F4 BYPASS TRACHEA TO CUTANEOUS WITH TRACHEOSTOMY DEVICE, OPEN APPROACH: ICD-10-PCS | Performed by: OTOLARYNGOLOGY

## 2020-04-16 PROCEDURE — 74011000250 HC RX REV CODE- 250: Performed by: INTERNAL MEDICINE

## 2020-04-16 PROCEDURE — 65610000006 HC RM INTENSIVE CARE

## 2020-04-16 PROCEDURE — 74011250636 HC RX REV CODE- 250/636: Performed by: RADIOLOGY

## 2020-04-16 PROCEDURE — 87070 CULTURE OTHR SPECIMN AEROBIC: CPT

## 2020-04-16 PROCEDURE — 83735 ASSAY OF MAGNESIUM: CPT

## 2020-04-16 PROCEDURE — 87040 BLOOD CULTURE FOR BACTERIA: CPT

## 2020-04-16 PROCEDURE — 74011250636 HC RX REV CODE- 250/636

## 2020-04-16 PROCEDURE — 87086 URINE CULTURE/COLONY COUNT: CPT

## 2020-04-16 RX ORDER — NALOXONE HYDROCHLORIDE 0.4 MG/ML
INJECTION, SOLUTION INTRAMUSCULAR; INTRAVENOUS; SUBCUTANEOUS
Status: DISCONTINUED
Start: 2020-04-16 | End: 2020-04-16 | Stop reason: WASHOUT

## 2020-04-16 RX ORDER — FENTANYL CITRATE 50 UG/ML
25-200 INJECTION, SOLUTION INTRAMUSCULAR; INTRAVENOUS
Status: DISCONTINUED | OUTPATIENT
Start: 2020-04-16 | End: 2020-04-16 | Stop reason: ALTCHOICE

## 2020-04-16 RX ORDER — LIDOCAINE HYDROCHLORIDE 10 MG/ML
1-20 INJECTION INFILTRATION; PERINEURAL
Status: DISCONTINUED | OUTPATIENT
Start: 2020-04-16 | End: 2020-04-16 | Stop reason: ALTCHOICE

## 2020-04-16 RX ORDER — NALOXONE HYDROCHLORIDE 0.4 MG/ML
0.4 INJECTION, SOLUTION INTRAMUSCULAR; INTRAVENOUS; SUBCUTANEOUS AS NEEDED
Status: DISCONTINUED | OUTPATIENT
Start: 2020-04-16 | End: 2020-04-16 | Stop reason: ALTCHOICE

## 2020-04-16 RX ORDER — CEFAZOLIN SODIUM 2 G/50ML
2 SOLUTION INTRAVENOUS ONCE
Status: DISCONTINUED | OUTPATIENT
Start: 2020-04-16 | End: 2020-04-16 | Stop reason: DRUGHIGH

## 2020-04-16 RX ORDER — WATER FOR INJECTION,STERILE
VIAL (ML) INJECTION
Status: DISCONTINUED | OUTPATIENT
Start: 2020-04-16 | End: 2020-04-16 | Stop reason: ALTCHOICE

## 2020-04-16 RX ORDER — MIDAZOLAM HYDROCHLORIDE 1 MG/ML
INJECTION, SOLUTION INTRAMUSCULAR; INTRAVENOUS
Status: DISCONTINUED
Start: 2020-04-16 | End: 2020-04-16 | Stop reason: WASHOUT

## 2020-04-16 RX ORDER — CEFAZOLIN SODIUM 2 G/50ML
SOLUTION INTRAVENOUS
Status: COMPLETED
Start: 2020-04-16 | End: 2020-04-16

## 2020-04-16 RX ORDER — CEFAZOLIN SODIUM 2 G/50ML
2 SOLUTION INTRAVENOUS ONCE
Status: COMPLETED | OUTPATIENT
Start: 2020-04-16 | End: 2020-04-16

## 2020-04-16 RX ORDER — CEFAZOLIN SODIUM 1 G/3ML
INJECTION, POWDER, FOR SOLUTION INTRAMUSCULAR; INTRAVENOUS
Status: DISCONTINUED
Start: 2020-04-16 | End: 2020-04-16 | Stop reason: ALTCHOICE

## 2020-04-16 RX ORDER — FLUMAZENIL 0.1 MG/ML
0.2 INJECTION INTRAVENOUS
Status: DISCONTINUED | OUTPATIENT
Start: 2020-04-16 | End: 2020-04-16 | Stop reason: ALTCHOICE

## 2020-04-16 RX ORDER — MIDAZOLAM HYDROCHLORIDE 1 MG/ML
2 INJECTION, SOLUTION INTRAMUSCULAR; INTRAVENOUS
Status: DISCONTINUED | OUTPATIENT
Start: 2020-04-16 | End: 2020-04-18

## 2020-04-16 RX ORDER — FENTANYL CITRATE 50 UG/ML
INJECTION, SOLUTION INTRAMUSCULAR; INTRAVENOUS
Status: DISCONTINUED
Start: 2020-04-16 | End: 2020-04-16 | Stop reason: WASHOUT

## 2020-04-16 RX ORDER — SODIUM CHLORIDE 9 MG/ML
500 INJECTION, SOLUTION INTRAVENOUS
Status: COMPLETED | OUTPATIENT
Start: 2020-04-16 | End: 2020-04-16

## 2020-04-16 RX ORDER — WATER FOR INJECTION,STERILE
VIAL (ML) INJECTION
Status: DISCONTINUED
Start: 2020-04-16 | End: 2020-04-16 | Stop reason: ALTCHOICE

## 2020-04-16 RX ORDER — FLUMAZENIL 0.1 MG/ML
INJECTION INTRAVENOUS
Status: DISCONTINUED
Start: 2020-04-16 | End: 2020-04-16 | Stop reason: WASHOUT

## 2020-04-16 RX ORDER — LIDOCAINE HYDROCHLORIDE 10 MG/ML
1-20 INJECTION INFILTRATION; PERINEURAL
Status: COMPLETED | OUTPATIENT
Start: 2020-04-16 | End: 2020-04-16

## 2020-04-16 RX ORDER — WATER FOR INJECTION,STERILE
VIAL (ML) INJECTION
Status: COMPLETED | OUTPATIENT
Start: 2020-04-16 | End: 2020-04-16

## 2020-04-16 RX ORDER — ACETAMINOPHEN 325 MG/1
650 TABLET ORAL
Status: DISCONTINUED | OUTPATIENT
Start: 2020-04-16 | End: 2020-04-22 | Stop reason: HOSPADM

## 2020-04-16 RX ORDER — HEPARIN SODIUM (PORCINE) LOCK FLUSH IV SOLN 100 UNIT/ML 100 UNIT/ML
500 SOLUTION INTRAVENOUS
Status: DISCONTINUED | OUTPATIENT
Start: 2020-04-16 | End: 2020-04-22 | Stop reason: HOSPADM

## 2020-04-16 RX ORDER — MIDAZOLAM HYDROCHLORIDE 1 MG/ML
.5-4 INJECTION, SOLUTION INTRAMUSCULAR; INTRAVENOUS
Status: DISCONTINUED | OUTPATIENT
Start: 2020-04-16 | End: 2020-04-16 | Stop reason: ALTCHOICE

## 2020-04-16 RX ORDER — HEPARIN SODIUM 200 [USP'U]/100ML
500 INJECTION, SOLUTION INTRAVENOUS
Status: COMPLETED | OUTPATIENT
Start: 2020-04-16 | End: 2020-04-16

## 2020-04-16 RX ADMIN — IOPAMIDOL 10 ML: 612 INJECTION, SOLUTION INTRAVENOUS at 10:55

## 2020-04-16 RX ADMIN — QUETIAPINE FUMARATE 25 MG: 25 TABLET ORAL at 20:16

## 2020-04-16 RX ADMIN — THIAMINE HCL TAB 100 MG 200 MG: 100 TAB at 20:16

## 2020-04-16 RX ADMIN — FENTANYL CITRATE 50 MCG: 50 INJECTION INTRAMUSCULAR; INTRAVENOUS at 03:56

## 2020-04-16 RX ADMIN — Medication 10 ML: at 22:34

## 2020-04-16 RX ADMIN — FUROSEMIDE 40 MG: 10 INJECTION, SOLUTION INTRAMUSCULAR; INTRAVENOUS at 10:05

## 2020-04-16 RX ADMIN — LIDOCAINE HYDROCHLORIDE 18 ML: 10 INJECTION, SOLUTION INFILTRATION; PERINEURAL at 10:48

## 2020-04-16 RX ADMIN — SODIUM CHLORIDE 500 ML: 900 INJECTION, SOLUTION INTRAVENOUS at 10:29

## 2020-04-16 RX ADMIN — FENTANYL CITRATE 50 MCG: 50 INJECTION INTRAMUSCULAR; INTRAVENOUS at 10:06

## 2020-04-16 RX ADMIN — HYDRALAZINE HYDROCHLORIDE 20 MG: 20 INJECTION INTRAMUSCULAR; INTRAVENOUS at 17:51

## 2020-04-16 RX ADMIN — HEPARIN SODIUM (PORCINE) LOCK FLUSH IV SOLN 100 UNIT/ML 500 UNITS: 100 SOLUTION at 10:49

## 2020-04-16 RX ADMIN — CEFAZOLIN SODIUM 2 G: 2 SOLUTION INTRAVENOUS at 10:15

## 2020-04-16 RX ADMIN — SODIUM CHLORIDE 40 MG: 9 INJECTION, SOLUTION INTRAMUSCULAR; INTRAVENOUS; SUBCUTANEOUS at 10:10

## 2020-04-16 RX ADMIN — FENTANYL CITRATE 50 MCG: 50 INJECTION INTRAMUSCULAR; INTRAVENOUS at 21:40

## 2020-04-16 RX ADMIN — Medication 10 ML: at 06:22

## 2020-04-16 RX ADMIN — ACETAMINOPHEN 650 MG: 325 TABLET ORAL at 22:17

## 2020-04-16 RX ADMIN — MIDAZOLAM HYDROCHLORIDE 2 MG: 1 INJECTION, SOLUTION INTRAMUSCULAR; INTRAVENOUS at 20:14

## 2020-04-16 RX ADMIN — Medication 5 MG: at 22:17

## 2020-04-16 RX ADMIN — HYDRALAZINE HYDROCHLORIDE 20 MG: 20 INJECTION INTRAMUSCULAR; INTRAVENOUS at 02:35

## 2020-04-16 RX ADMIN — HYDRALAZINE HYDROCHLORIDE 10 MG: 10 TABLET, FILM COATED ORAL at 16:56

## 2020-04-16 RX ADMIN — CARVEDILOL 3.12 MG: 3.12 TABLET, FILM COATED ORAL at 16:56

## 2020-04-16 RX ADMIN — LORAZEPAM 2 MG: 2 INJECTION, SOLUTION INTRAMUSCULAR; INTRAVENOUS at 10:05

## 2020-04-16 RX ADMIN — CHLORHEXIDINE GLUCONATE 10 ML: 1.2 RINSE ORAL at 10:11

## 2020-04-16 RX ADMIN — FENTANYL CITRATE 50 MCG: 50 INJECTION INTRAMUSCULAR; INTRAVENOUS at 13:40

## 2020-04-16 RX ADMIN — LORAZEPAM 2 MG: 2 INJECTION, SOLUTION INTRAMUSCULAR; INTRAVENOUS at 13:41

## 2020-04-16 RX ADMIN — CHLORHEXIDINE GLUCONATE 10 ML: 1.2 RINSE ORAL at 20:16

## 2020-04-16 RX ADMIN — FENTANYL CITRATE 50 MCG: 50 INJECTION INTRAMUSCULAR; INTRAVENOUS at 17:57

## 2020-04-16 RX ADMIN — HEPARIN SODIUM IN SODIUM CHLORIDE 1000 UNITS: 200 INJECTION INTRAVENOUS at 10:29

## 2020-04-16 RX ADMIN — ENOXAPARIN SODIUM 40 MG: 40 INJECTION SUBCUTANEOUS at 13:41

## 2020-04-16 RX ADMIN — WATER: 1 INJECTION INTRAMUSCULAR; INTRAVENOUS; SUBCUTANEOUS at 10:00

## 2020-04-16 RX ADMIN — CEFAZOLIN 1 G: 1 INJECTION, POWDER, FOR SOLUTION INTRAMUSCULAR; INTRAVENOUS at 10:41

## 2020-04-16 RX ADMIN — HYDRALAZINE HYDROCHLORIDE 10 MG: 10 TABLET, FILM COATED ORAL at 22:17

## 2020-04-16 RX ADMIN — Medication: at 10:00

## 2020-04-16 RX ADMIN — LORAZEPAM 2 MG: 2 INJECTION, SOLUTION INTRAMUSCULAR; INTRAVENOUS at 03:56

## 2020-04-16 NOTE — PROGRESS NOTES
Patient resting quietly on ventilator support via trach. Tube feedings continued. Restraints continued.

## 2020-04-16 NOTE — PROGRESS NOTES
Pt arrived on unit; Pt sedated and trached; Telephone Consent obtained by Karthik ICU RN from pt's sister for both PICC line and Gtube; See MAC_lab for sedation report and/or vital signs. Yan Ames RT and Conner Duffy RRT medic accompanying pt/managing vent/airway. Pt transferred to treatment table for procedure.

## 2020-04-16 NOTE — DIABETES MGMT
GLYCEMIC CONTROL PROGRESS NOTE:    - chart reviewed, known h/o T2DM, HbA1C ordered per protocol, not within recommended range on oral home regimen  - NPO, intubated  -- Humalog Normal Insulin Sensitivity Corrective Coverage orders in place recommend continue      Recent Glucose Results:   Lab Results   Component Value Date/Time     (H) 04/16/2020 04:50 AM    GLUCPOC 115 (H) 04/16/2020 11:46 AM    GLUCPOC 137 (H) 04/16/2020 06:19 AM    GLUCPOC 114 (H) 04/15/2020 11:35 PM     Jania Frank MS, RN, CDE  Glycemic Control Team  [phone]  Pager [phone] (M-TH 8:00-4:30P)  *After Hours pager [phone]

## 2020-04-16 NOTE — PROGRESS NOTES
Hospitalist Progress Note-critical care note     Patient: Mariana Perla MRN: [id number]  CSN: [id number]    YOB: 1960  Age: 62 y.o. Sex: male    DOA: 4/3/2020 LOS:  LOS: 13 days            Chief complaint:  Respiratory failure, joann on ckd3      Assessment/Plan         Hospital Problems  Date Reviewed: 4/3/2020          Codes Class Noted POA    Tracheostomy in place [facility]) ICD-10-CM: Z93.0  ICD-9-CM: V44.0  4/15/2020 Unknown        HCAP (healthcare-associated pneumonia) ICD-10-CM: J18.9  ICD-9-CM: 486  4/7/2020 Unknown        Goals of care, counseling/discussion ICD-10-CM: Z71.89  ICD-9-CM: V65.49  Unknown Unknown        Suspected Covid-19 Virus Infection ICD-10-CM: R68.89  4/4/2020 Clinically Undetermined        Morbid obesity with body mass index of 50 or higher ([facility] ICD-10-CM: E66.01  ICD-9-CM: 278.01  4/4/2020 Yes        COPD with acute exacerbation ([facility] ICD-10-CM: J44.1  ICD-9-CM: 491.21  4/3/2020 Yes        * (Principal) Respiratory failure with hypercapnia ([facility] ICD-10-CM: J96.92  ICD-9-CM: 518.81  4/3/2020 Yes        Transaminitis ICD-10-CM: R74.0  ICD-9-CM: 790.4  4/3/2020 Yes        Acute on chronic renal insufficiency ICD-10-CM: N28.9, N18.9  ICD-9-CM: 593.9, 585.9  4/3/2020 Yes        Sleep apnea ICD-10-CM: G47.30  ICD-9-CM: 780.57  7/9/2018 Yes        Cocaine abuse ([facility] ICD-10-CM: F14.10  ICD-9-CM: 305.60  4/22/2018 Yes        CHF (congestive heart failure) (HCC) (Chronic) ICD-10-CM: I50.9  ICD-9-CM: 428.0  3/3/2018 Yes              63 y/o male w/ hx of COPD on 4L home O2-trilogy at home ,  EF of 35%, super morbid obesity with recent admission to [facility] who presents in acute hypercapneic respiratory failure, he was admitted tue to acute on chronic respiration failure-intubated on admission.    Urine drug screen positive for cocaine, covid 19 negative, got trach, planning peg and picc today         Respiratory   Hypercapnic respiratory failure with obesity hypoventilation syndrome   On Vent: peep 10 and O2 35 % stable overnight , will continue weaning   Trach 4/13 per Dr. Liu Vee local care   On lasix and continue weaning off     Hcap:  Completed iv abx     Copd exacerbation   Received iv steroid     shyam : on trilogy at home , not compliance     Cardiovascular:  Hypotension resolved       chf  Combined diastolic and systolic   Ef 30 - 27%. Mild (grade 1) left ventricular diastolic dysfunction from echo   On lasix and coreg      ID: Pneumonia-completed the treatment   Repeat COVID negative twice       Endocrine  On sliding scale insulin     FEN   Continue icu replacement protocol   Hypokalemia -resolved      Renal:  joann on ckd3,resolved -remain normal range       Neuro :   Open eyes per voice     Heme  H/h so far stable        Psych  Cocaine abuse     GI   Elevated lft -resolved   Planning peg tube today      Poor prognosis palliative care on board       30 minutes of critical care time spent in the direct evaluation and treatment of this high risk patient. The reason for providing this level of medical care for this critically ill patient was due a critical illness that impaired one or more vital organ systems such that there was a high probability of imminent or life threatening deterioration in the patients condition. This care involved high complexity decision making to assess, manipulate, and support vital system functions, to treat this degreee vital organ system failure and to prevent further life threatening deterioration of the patients condition.     Waiting for insurance authorization   Disposition :tbd,   Review of systems:  Unable to obtain due to vent   Vital signs/Intake and Output:  Visit Vitals  /64 (BP 1 Location: Left arm, BP Patient Position: At rest)   Pulse 87   Temp 98.4 °F (36.9 °C)   Resp 22   Ht 5' 7\" (1.702 m)   Wt (!) 168.8 kg (372 lb 2.2 oz)   SpO2 98%   BMI 58.28 kg/m²     Current Shift:  04/16 0701 - 04/16 1900  In: -   Out: 1100 [Urine:1100]  Last three shifts:  04/14 1901 - 04/16 0700  In: 400   Out: 2225 [Urine:2225]    Physical Exam:  General:         Open eyes per voice and follow command   HEENT: NC, Atraumatic. anicteric sclerae. Trach -on vent   Lungs: CTA Bilaterally. No Wheezing/Rhonchi/Rales. Heart:  Regular  rhythm,  No murmur, No Rubs, No Gallops  Abdomen: Soft, obesity , Non tender. +Bowel sounds,   Extremities: No c/c, mild edema of feet   Psych:   Calm   Neurologic:  Open eyes and follow the command           Labs: Results:       Chemistry Recent Labs     04/16/20  0450 04/15/20  0415 04/14/20  0443   * 102* 88    141 140   K 4.0 4.0 4.0    109 106   CO2 26 23 27   BUN 27* 24* 25*   CREA 1.23 1.03 1.14   CA 9.0 8.9 8.9   AGAP 9 9 7   BUCR 22* 23* 22*   * 170* 180*   TP 6.5 6.2* 6.2*   ALB 2.7* 2.6* 2.7*   GLOB 3.8 3.6 3.5   AGRAT 0.7* 0.7* 0.8      CBC w/Diff Recent Labs     04/16/20  0450 04/15/20  0415 04/14/20  0443   WBC 10.7 8.6 8.8   RBC 4.58* 4.64* 4.76   HGB 12.5* 12.5* 12.8*   HCT 40.0 40.6 42.2    134* 143   GRANS 79* 76* 70   LYMPH 6* 8* 10*   EOS 2 5 11*      Cardiac Enzymes No results for input(s): CPK, CKND1, TISHA in the last 72 hours. No lab exists for component: CKRMB, TROIP   Coagulation No results for input(s): PTP, INR, APTT, INREXT, INREXT in the last 72 hours. Lipid Panel Lab Results   Component Value Date/Time    Cholesterol, total 196 01/25/2018 02:51 AM    HDL Cholesterol 64 (H) 01/25/2018 02:51 AM    LDL, calculated 121.2 (H) 01/25/2018 02:51 AM    VLDL, calculated 10.8 01/25/2018 02:51 AM    Triglyceride 54 01/25/2018 02:51 AM    CHOL/HDL Ratio 3.1 01/25/2018 02:51 AM      BNP No results for input(s): BNPP in the last 72 hours.    Liver Enzymes Recent Labs     04/16/20  0450   TP 6.5   ALB 2.7*   *   SGOT 55*      Thyroid Studies Lab Results   Component Value Date/Time    TSH 0.11 (L) 03/19/2019 06:50 AM        Procedures/imaging: see electronic medical records for all procedures/Xrays and details which were not copied into this note but were reviewed prior to creation of Plan    Xr Abd (kub)    Result Date: 4/4/2020  EXAM: Single view of the abdomen CLINICAL INDICATION/HISTORY: Nasogastric tube placement    --Additional history: None. COMPARISON: None TECHNIQUE: Single supine view _______________ FINDINGS: The impression. _______________     IMPRESSION: Study technically limited by patient's body habitus. Nasogastric tube tip appears to project over the gastric antrum. Xr Chest Port    Result Date: 4/7/2020  EXAM: XR CHEST PORT CLINICAL INDICATION/HISTORY: OG tube in place. -Additional: None COMPARISON: Earlier the same day TECHNIQUE: Portable frontal view of the chest _______________ FINDINGS: SUPPORT DEVICES: Endotracheal tube approximately 1 cm above the juan miguel. Enteric tube tip out of field of view possibly in the distal stomach. HEART AND MEDIASTINUM: Cardiomegaly LUNGS AND PLEURAL SPACES: Hypoinflated lungs. Probable small to moderate left effusion. Mild interstitial edema. No pneumothorax. _______________     IMPRESSION: Endotracheal tube approximately 1 cm above the juan miguel. Enteric tube tip out of field of view possibly in the distal stomach. Xr Chest Port    Result Date: 4/7/2020  EXAM: XR CHEST PORT CLINICAL INDICATION/HISTORY: pneumonia, intubated -Additional: None COMPARISON: One day prior TECHNIQUE: Portable frontal view of the chest _______________ FINDINGS: SUPPORT DEVICES: Enteric tube and endotracheal tube unchanged. Todd Luis Daniel HEART AND MEDIASTINUM: cardiomegaly LUNGS AND PLEURAL SPACES: Hypoinflated lungs. Probable small to moderate left effusion. Mild interstitial edema. No pneumothorax. _______________     IMPRESSION: Hypoinflated lungs. Probable small to moderate left effusion. Mild interstitial edema. No pneumothorax.      Xr Chest Port    Result Date: 4/6/2020  EXAM: XR CHEST PORT CLINICAL INDICATION/HISTORY: While intubated -Additional: None COMPARISON: One day prior TECHNIQUE: Portable frontal view of the chest _______________ FINDINGS: SUPPORT DEVICES: Enteric tube and endotracheal tube unchanged. Edison Hilt HEART AND MEDIASTINUM: cardiomegaly LUNGS AND PLEURAL SPACES: Hypoinflated lungs. Probable small left effusion. Mild interstitial edema. No pneumothorax. _______________     IMPRESSION: Hypoinflated lungs. Probable small left effusion. Mild interstitial edema. Xr Chest Port    Result Date: 4/5/2020  EXAM: CHEST RADIOGRAPH CLINICAL INDICATION/HISTORY: Respiratory failure -Additional: None COMPARISON: April 4, 2020 TECHNIQUE: Portable frontal view of the chest _______________ FINDINGS: SUPPORT DEVICES: The tip of an endotracheal tube is 10 cm above the juan miguel. A nasogastric tube crosses the gastroesophageal junction. A left IJ central venous catheter terminates in the proximal SVC. HEART AND MEDIASTINUM: The heart is enlarged. LUNGS AND PLEURAL SPACES: Interstitial lung markings are increased. Lung volumes are hypoinflated and there are opacities in the lung bases. No pneumothorax. BONY THORAX AND SOFT TISSUES: Unremarkable. _______________     IMPRESSION: 1. Mild pulmonary edema 2. Bibasilar opacities that may represent a combination of atelectasis and small pleural effusions     Xr Chest Port    Result Date: 4/4/2020  EXAM: PORTABLE CHEST HISTORY: Central line placement. Acute respiratory failure. COMPARISON: 4/4/2020 at 12:42 AM TECHNIQUE: Single portable view. _______________ FINDINGS: SUPPORT DEVICES: Endotracheal tube tip lies about 4.2 cm above juan miguel. Left central venous catheter tip in upper superior vena cava. HEART AND MEDIASTINUM: Cardiomegaly. LUNGS AND PLEURAL SPACES: Patchy airspace disease right lung base may represent developing subsegmental atelectasis or pneumonia. Evaluation limited by large body habitus. BONES AND SOFT TISSUES: Bony structures are normal. _______________     IMPRESSION: Interval placement left central venous catheter.  No pneumothorax. Cardiomegaly without change. Patchy airspace disease right lung base either subsegmental atelectasis or pneumonia appears slightly worse. Xr Chest Port    Result Date: 4/4/2020  EXAM: Chest radiograph  CLINICAL INDICATION/HISTORY: Chemical ventilation    --Additional history: None. COMPARISON: 04/03/2020 TECHNIQUE: Frontal view of the chest _______________ FINDINGS: SUPPORT DEVICES: There is an endotracheal tube with tip approximately 5.5 cm above the juan miguel. There is a nasogastric tube descends below the diaphragm. HEART AND MEDIASTINUM: React silhouette is enlarged. Stable mediastinal contours. . Normal pulmonary vasculature. LUNGS AND PLEURAL SPACES: No convincing focal airspace opacity given the limitations of the study and superimposed body habitus. Sharp costophrenic sulci. No pneumothoraces. BONY THORAX AND SOFT TISSUES: Questionable fracture deformity of the right lateral ninth rib. _______________     IMPRESSION: 1. Support lines and tubes as detailed above. 2. Technically limited study secondary to body habitus without significant change from the prior study. Xr Chest Port    Result Date: 4/3/2020  EXAM:  AP Portable Chest X-ray 1 view INDICATION: Chest pain shortness of breath COMPARISON: None _______________ FINDINGS:  Heart size is enlarged and mediastinal contours are within normal limits for portable radiograph. There are increased interstitial markings in the right lung. No focal airspace disease seen. . There are no pleural effusions. No acute osseous findings. ________________      IMPRESSION: Increased interstitial markings in the right lung. No focal airspace disease or effusion.       Lacey Hernandez MD

## 2020-04-16 NOTE — PROGRESS NOTES
TRANSFER - OUT REPORT:    Verbal report given to KOURTNEY Angeles(name) on Alan Alert  being transferred to (unit) for routine progression of care       Report consisted of patients Situation, Background, Assessment and   Recommendations(SBAR). Information from the following report(s) SBAR, Kardex and MAR was reviewed with the receiving nurse. Lines:   PICC Double Lumen 53/82/69 Basilic (Active)   Central Line Being Utilized No 4/16/2020 10:44 AM   Criteria for Appropriate Use Limited/no vessel suitable for conventional peripheral access 4/16/2020 10:44 AM   Site Assessment Clean, dry, & intact 4/16/2020 10:44 AM   Phlebitis Assessment 0 4/16/2020 10:44 AM   Infiltration Assessment 0 4/16/2020 10:44 AM   Date of Last Dressing Change 04/16/20 4/16/2020 10:44 AM   Dressing Status Occlusive;New 4/16/2020 10:44 AM   Dressing Type Disk with Chlorhexadine gluconate (CHG); Transparent 4/16/2020 10:44 AM   Hub Color/Line Status Red;Patent;Capped;Flushed 4/16/2020 10:44 AM   Positive Blood Return (Site #1) Yes 4/16/2020 10:44 AM   Hub Color/Line Status Purple;Patent;Capped;Flushed 4/16/2020 10:44 AM   Positive Blood Return (Site #2) Yes 4/16/2020 10:44 AM   Alcohol Cap Used Yes 4/16/2020 10:44 AM       Triple Lumen 04/04/20 Left Internal jugular (Active)   Central Line Being Utilized Yes 4/16/2020  4:00 AM   Criteria for Appropriate Use Hemodynamically unstable, requiring monitoring lines, vasopressors, or volume resuscitation 4/16/2020  4:00 AM   Site Assessment Clean, dry, & intact 4/16/2020  4:00 AM   Infiltration Assessment 0 4/16/2020  4:00 AM   Affected Extremity/Extremities Color distal to insertion site pink (or appropriate for race); Pulses palpable 4/16/2020  4:00 AM   Date of Last Dressing Change 04/15/20 4/16/2020  4:00 AM   Dressing Status Clean, dry, & intact 4/16/2020  4:00 AM   Dressing Type Disk with Chlorhexadine gluconate (CHG); Transparent 4/16/2020  4:00 AM   Action Taken Dressing changed 4/15/2020 8:00 PM   Proximal Hub Color/Line Status White;Capped;Flushed;Patent 4/16/2020  4:00 AM   Positive Blood Return (Medial Site) Yes 4/16/2020  4:00 AM   Medial Hub Color/Line Status Blue;Capped;Flushed;Patent 4/16/2020  4:00 AM   Positive Blood Return (Lateral Site) Yes 4/16/2020  4:00 AM   Distal Hub Color/Line Status Brown;Capped;Flushed;Patent 4/16/2020  4:00 AM   Positive Blood Return (Site #3) Yes 4/16/2020 12:00 AM   Alcohol Cap Used Yes 4/16/2020 12:00 AM        Opportunity for questions and clarification was provided. PICC line in place and ready for use. Gtube ok for meds only in 6hrs (at 5pm), vent tube, may use for feeds tomorrow. See MD order.     Patient transported with:   Registered Nurse, Melyssa Bennett RRT medic

## 2020-04-16 NOTE — DISCHARGE INSTRUCTIONS
recc Vital High Protein @ 75ml/hr to provide 1650kcal 144g PRO 1379ml H20 185g CHO 38g Fat at d/c via PEG tube

## 2020-04-16 NOTE — PROGRESS NOTES
Spoke to Mohini Caballero. Elliewoodrow Marti is attempting multiple times to obtain consent for G-tube and PICC line placement from pt's sister. Elliewoodrow Figueroa also confirmed that tube feeds have been off since 0400. KOURTNEY Caballero to contact IR when consents are obtained.
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
## 2020-04-16 NOTE — PROGRESS NOTES
NUTRITION FOLLOW-UP    RECOMMENDATIONS/PLAN:   - EN: Once PEG tube placed recc Vital High Protein @ 75ml/hr to provide 1650kcal 144g PRO 1379ml H20 185g CHO 38g Fat  Recc starting @ 10ml/hr an increasing by 10ml/hr Q6 until goal rate met  Will defer Free H20 Flushes to MD  Monitor labs/lytes, tube feed tolerance, skin integrity, wt, fluid status, BM    NUTRITION ASSESSMENT:   Client Update: 62 yrs old Male with acute hypercapneic respiratory failure, acute on chronic respiration failure-trach placed scheduled for PEG tube today, hx of cocaine abuse, covid 19 negative, COPD exacerbation, HTN, CHF, periodic bradycardia, pna, hypokalemia, elevated lft    FOOD/NUTRITION INTAKE   Diet Order:  Osmolite 1.2 @ 35ml/hr to provide 924kcal 43g PRO 631ml H20 121g CHO 30g Fat  Food Allergies: NKFA/  Average PO Intake:      No data found. Pertinent Medications:  - Reviewed; lasix, melatonin, zofram, protonix, thiamine, nephro bhavya rx   Electrolyte Replacement Protocol: -K -Mg -PO4  Insulin:  -SSI    Cultural/[employer] Food Preferences: None Identified    BIOCHEMICAL DATA & MEDICAL TESTS  Pertinent Labs:  - Reviewed;  AST-55 ALT-85 ANTHROPOMETRICS  Height: 5' 7\" (170.2 cm)       Weight: (!) 168.8 kg (372 lb 2.2 oz)         BMI: 58.3 kg/m^2 morbidly obese (Greater than or = to 40% BMI)   Adm Weight: 386 lbs                Weight change: -14lbs  Adjusted Body Weight: 83kg     NUTRITION-FOCUSED PHYSICAL ASSESSMENT  Skin: No PU     GI: +BM 4/15/20    NUTRITION PRESCRIPTION  Calories: 1682 kcal/day based on 25kcal/kg IBW  Protein: 101-134 g/day based on 1.5- 2 g/kg IBW  CHO: 210 g/day based on 50% of total energy  Fluid: 1682 ml/day based on 1 kcal/ml        NUTRITION DIAGNOSES:   1.  Inadequate oral intake related to intubation as evidence by need for tube feeds        NUTRITION INTERVENTIONS:   INTERVENTIONS:        GOALS:  1. EN: Once PEG tube placed recc Vital High Protein @ 75ml/hr to provide 1650kcal 144g PRO 1379ml H20 185g CHO 38g Fat  Recc starting @ 10ml/hr an increasing by 10ml/hr Q6 until goal rate met  Will defer Free H20 Flushes to MD   1. Restart tube feeds when clinically possible by next review 4 days     LEARNING NEEDS (Diet, Supplementation, Food/Nutrient-Drug Interaction):   [] None Identified   [] Education provided/documented      Identified and patient: [] Declined   [x] Was not appropriate/indicated        NUTRITION MONITORING /EVALUATION:   Adjust EN/PN as appropriate  Monitor wt  Monitor renal labs, electrolytes, fluid status  Monitor for additional supplement needs     Previous Recommendations Implemented: yes        Previous Goals Met:  yes -tube feeds adv      [] Participated in Interdisciplinary Rounds    [x] Interdisciplinary Care Plan Reviewed  DISCHARGE NUTRITION RECOMMENDATIONS ADDRESSED:      [x] To be determined closer to discharge    NUTRITION RISK:           [x] At risk                        [] Not currently at risk        Will follow-up per policy.   Kunal Bentley 1

## 2020-04-16 NOTE — OP NOTES
[facility]    Name:  Carlotta Hernandes  MR#:   [id number]  :  1960  ACCOUNT #:  [de-identified]  DATE OF SERVICE:  2020    PREOPERATIVE DIAGNOSES:  1. Ventilatory dependency. 2.  Morbid obesity. 3.  Probable obstructive sleep apnea, secondary to #2. POSTOPERATIVE DIAGNOSES:  1. Ventilatory dependency. 2.  Morbid obesity. 3.  Probable obstructive sleep apnea, secondary to #2. PROCEDURE PERFORMED:  Skin flap tracheotomy. SURGEON:  Berenice Ovalle MD    ASSISTANT:  None. ANESTHESIA:  General endotracheal anesthesia. COMPLICATIONS:  None. DRAINS:  None. SPECIMENS REMOVED:  None. IMPLANTS:  See below    ESTIMATED BLOOD LOSS:  10 mL. FINDINGS:  Markedly obese patient with very difficult trach placement. Trach placed in tracheal ring #2. [place] noted to be an #8 Shiley proximal XLT trach tube. Please note this is a markedly difficult procedure secondary to the patient's poor anatomic findings as well as significant morbid obesity. PROCEDURE:  The patient was brought to the operating room and placed supine on the operating room table. General endotracheal anesthesia was given per Anesthesiology Department via the patient's previously placed trach tube. Once the patient was sufficiently anesthetized, 1% lidocaine with epinephrine was injected in the patient's anterior neck. It should be noted that the procedure was extremely difficult due to extremely short neck that the patient experienced as well as the marked morbid obesity. Optimization was attempted in hopes of extension of the patient's neck, however, this was limited due to the patient's short neck. The patient's neck was retracted as much as possible and the chest, skin, and muscle were taped inferiorly to aid in procedure. Once sufficient amount of time was given for the vasoconstrictive effects of the epinephrine, the patient was prepped in the usual sterile fashion.   A standard tracheotomy incision was made one fingerbreadth below the sternal notch. A skin flap trach was planned, hence once the incision was made, the incision was carried down through the skin, subcutaneous tissue, platysma and some of the underlying fat. The inferior skin flap was then created by removal of the subcutaneous fat, platysma and deeper fat down to the level of the strap musculature. This was to facilitate placement of the skin flap tracheotomy. The strap muscles were opened in the midline and taken down to what was thought to be the level of the trachea. The dissection was carried down through this tissue. However, it became clearly evident in this location, this was the superior aspect of the patient's larynx. The trachea was noted to be significantly deep in the patient's chest.  Dissection was now performed angulating the plane of dissection inferiorly. The cricoid was noted to be at the level just below the sternal notch. The cricoid itself was completely freed of any thyroid isthmus. The thyroid isthmus was then ligated. The procedure was exceedingly difficult due to the patient's significant morbid obesity as well as short neck and the markedly inferiorly-based trachea. A thyroid isthmusectomy was performed. The pretracheal fascia was then cleared as well. The trach hook was placed in the cricoid during the course of this dissection with elevation of the trachea up into the cervical wound. At this point, preparations were made for the inferior-based trapdoor incision and the subsequent placement of the skin to the trapdoor incision. With an 11-blade knife and tracheal scissors, the inferior trapdoor was made and the flap was then created. At this point, the endotracheal tube was visualized and slowly withdrawn just to the point above the tracheotomy wound. Trach  was utilized, and a proximal XLT trach was placed in the tracheotomy site itself. Good air exchange was noted. The patient was on a fair amount of PEEP, hence this was applied as well with gradual improvement of the patient's oxygenation levels. At no point, however, the patient dropped below 85 on the oxygen saturation. Once the above was complete, the trach was sutured to the skin. Significant amount of Surgicel was packed around the wound due to some oozing. No difficulties were encountered at this point. The patient was subsequently taken from the operating room back to the ICU in stable condition after correct needle and sponge count were obtained.       MD DANITA Hill/[email or web address]  D:  04/16/2020 6:31  T:  04/16/2020 7:58  JOB #:  [id number]

## 2020-04-16 NOTE — PROGRESS NOTES
[address] [phone]    Goals of care met. Patient has Advance Directive. Healthcare agent is his sister, Court Villegas [phone]. Russ Altman Awaiting placement in [facility]. Patient remains Full Code per Ms. Julisa Colbert. Palliative medicine team will sign off. Please consider new consult if additional palliative needs identified.     Lolita Green RN

## 2020-04-16 NOTE — PROCEDURES
Interventional Radiology Brief Procedure Note    Interventional Radiologist: Adriana Alex MD    Pre-operative Diagnosis: Enteric nutritoin    Post-operative Diagnosis: Same as pre-operative diagnosis    Procedure(s) Performed:  Percutaneous gastrostomy    Anesthesia:  Local lidocaine    Findings: 16 Fr tube placed; ready for immediate venting; 6 hours for meds; tomorrow AM for tube feeding.      Complications: None    Estimated Blood Loss:  minimal    Specimens: None    Condition: Good    Adriana Alex MD  [facility]  4/16/2020

## 2020-04-16 NOTE — PROGRESS NOTES
Cm updated [facility], still pending insurance auth at this time. 1250- spoke with [employer], at this time insurance auth still pending.
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
## 2020-04-17 ENCOUNTER — APPOINTMENT (OUTPATIENT)
Dept: GENERAL RADIOLOGY | Age: 60
DRG: 005 | End: 2020-04-17
Attending: INTERNAL MEDICINE
Payer: MEDICAID

## 2020-04-17 ENCOUNTER — APPOINTMENT (OUTPATIENT)
Dept: VASCULAR SURGERY | Age: 60
DRG: 005 | End: 2020-04-17
Attending: INTERNAL MEDICINE
Payer: MEDICAID

## 2020-04-17 LAB
ALBUMIN SERPL-MCNC: 2.8 G/DL (ref 3.4–5)
ALBUMIN/GLOB SERPL: 0.7 {RATIO} (ref 0.8–1.7)
ALP SERPL-CCNC: 185 U/L (ref 45–117)
ALT SERPL-CCNC: 90 U/L (ref 16–61)
ANION GAP SERPL CALC-SCNC: 6 MMOL/L (ref 3–18)
AST SERPL-CCNC: 65 U/L (ref 10–38)
BASOPHILS # BLD: 0 K/UL (ref 0–0.1)
BASOPHILS NFR BLD: 0 % (ref 0–2)
BILIRUB SERPL-MCNC: 0.9 MG/DL (ref 0.2–1)
BUN SERPL-MCNC: 29 MG/DL (ref 7–18)
BUN/CREAT SERPL: 24 (ref 12–20)
CA-I SERPL-SCNC: 1.28 MMOL/L (ref 1.12–1.32)
CALCIUM SERPL-MCNC: 9.4 MG/DL (ref 8.5–10.1)
CHLORIDE SERPL-SCNC: 109 MMOL/L (ref 100–111)
CO2 SERPL-SCNC: 28 MMOL/L (ref 21–32)
CREAT SERPL-MCNC: 1.2 MG/DL (ref 0.6–1.3)
DIFFERENTIAL METHOD BLD: ABNORMAL
EOSINOPHIL # BLD: 0.2 K/UL (ref 0–0.4)
EOSINOPHIL NFR BLD: 2 % (ref 0–5)
ERYTHROCYTE [DISTWIDTH] IN BLOOD BY AUTOMATED COUNT: 16.9 % (ref 11.6–14.5)
GLOBULIN SER CALC-MCNC: 4 G/DL (ref 2–4)
GLUCOSE BLD STRIP.AUTO-MCNC: 113 MG/DL (ref 70–110)
GLUCOSE BLD STRIP.AUTO-MCNC: 123 MG/DL (ref 70–110)
GLUCOSE BLD STRIP.AUTO-MCNC: 93 MG/DL (ref 70–110)
GLUCOSE SERPL-MCNC: 121 MG/DL (ref 74–99)
HCT VFR BLD AUTO: 39.1 % (ref 36–48)
HGB BLD-MCNC: 12.3 G/DL (ref 13–16)
LYMPHOCYTES # BLD: 0.8 K/UL (ref 0.9–3.6)
LYMPHOCYTES NFR BLD: 6 % (ref 21–52)
MAGNESIUM SERPL-MCNC: 2.1 MG/DL (ref 1.6–2.6)
MCH RBC QN AUTO: 27.4 PG (ref 24–34)
MCHC RBC AUTO-ENTMCNC: 31.5 G/DL (ref 31–37)
MCV RBC AUTO: 87.1 FL (ref 74–97)
MONOCYTES # BLD: 1.4 K/UL (ref 0.05–1.2)
MONOCYTES NFR BLD: 11 % (ref 3–10)
NEUTS SEG # BLD: 10.3 K/UL (ref 1.8–8)
NEUTS SEG NFR BLD: 81 % (ref 40–73)
PHOSPHATE SERPL-MCNC: 3.6 MG/DL (ref 2.5–4.9)
PLATELET # BLD AUTO: 158 K/UL (ref 135–420)
PMV BLD AUTO: 12.5 FL (ref 9.2–11.8)
POTASSIUM SERPL-SCNC: 3.8 MMOL/L (ref 3.5–5.5)
PROT SERPL-MCNC: 6.8 G/DL (ref 6.4–8.2)
RBC # BLD AUTO: 4.49 M/UL (ref 4.7–5.5)
SODIUM SERPL-SCNC: 143 MMOL/L (ref 136–145)
WBC # BLD AUTO: 12.7 K/UL (ref 4.6–13.2)

## 2020-04-17 PROCEDURE — 74011000250 HC RX REV CODE- 250: Performed by: FAMILY MEDICINE

## 2020-04-17 PROCEDURE — 77010033678 HC OXYGEN DAILY

## 2020-04-17 PROCEDURE — 77030018836 HC SOL IRR NACL ICUM -A

## 2020-04-17 PROCEDURE — 74011000250 HC RX REV CODE- 250: Performed by: INTERNAL MEDICINE

## 2020-04-17 PROCEDURE — 65610000006 HC RM INTENSIVE CARE

## 2020-04-17 PROCEDURE — 80053 COMPREHEN METABOLIC PANEL: CPT

## 2020-04-17 PROCEDURE — 74011250636 HC RX REV CODE- 250/636: Performed by: INTERNAL MEDICINE

## 2020-04-17 PROCEDURE — 74011250636 HC RX REV CODE- 250/636

## 2020-04-17 PROCEDURE — 82962 GLUCOSE BLOOD TEST: CPT

## 2020-04-17 PROCEDURE — 74011000258 HC RX REV CODE- 258: Performed by: INTERNAL MEDICINE

## 2020-04-17 PROCEDURE — 74011250637 HC RX REV CODE- 250/637: Performed by: INTERNAL MEDICINE

## 2020-04-17 PROCEDURE — 84100 ASSAY OF PHOSPHORUS: CPT

## 2020-04-17 PROCEDURE — 83735 ASSAY OF MAGNESIUM: CPT

## 2020-04-17 PROCEDURE — 82330 ASSAY OF CALCIUM: CPT

## 2020-04-17 PROCEDURE — 71045 X-RAY EXAM CHEST 1 VIEW: CPT

## 2020-04-17 PROCEDURE — 85025 COMPLETE CBC W/AUTO DIFF WBC: CPT

## 2020-04-17 PROCEDURE — 93970 EXTREMITY STUDY: CPT

## 2020-04-17 PROCEDURE — 94003 VENT MGMT INPAT SUBQ DAY: CPT

## 2020-04-17 PROCEDURE — C9113 INJ PANTOPRAZOLE SODIUM, VIA: HCPCS | Performed by: FAMILY MEDICINE

## 2020-04-17 PROCEDURE — 74011250637 HC RX REV CODE- 250/637: Performed by: FAMILY MEDICINE

## 2020-04-17 PROCEDURE — 74011250636 HC RX REV CODE- 250/636: Performed by: FAMILY MEDICINE

## 2020-04-17 RX ORDER — MIDAZOLAM HYDROCHLORIDE 1 MG/ML
INJECTION, SOLUTION INTRAMUSCULAR; INTRAVENOUS
Status: COMPLETED
Start: 2020-04-17 | End: 2020-04-17

## 2020-04-17 RX ORDER — FUROSEMIDE 10 MG/ML
40 INJECTION INTRAMUSCULAR; INTRAVENOUS ONCE
Status: COMPLETED | OUTPATIENT
Start: 2020-04-17 | End: 2020-04-17

## 2020-04-17 RX ORDER — MIDAZOLAM HYDROCHLORIDE 1 MG/ML
2 INJECTION, SOLUTION INTRAMUSCULAR; INTRAVENOUS ONCE
Status: COMPLETED | OUTPATIENT
Start: 2020-04-17 | End: 2020-04-17

## 2020-04-17 RX ORDER — ACETAMINOPHEN 500 MG
500 TABLET ORAL ONCE
Status: COMPLETED | OUTPATIENT
Start: 2020-04-17 | End: 2020-04-17

## 2020-04-17 RX ORDER — LORAZEPAM 2 MG/ML
2 INJECTION INTRAMUSCULAR ONCE
Status: COMPLETED | OUTPATIENT
Start: 2020-04-17 | End: 2020-04-17

## 2020-04-17 RX ADMIN — LORAZEPAM 2 MG: 2 INJECTION, SOLUTION INTRAMUSCULAR; INTRAVENOUS at 05:49

## 2020-04-17 RX ADMIN — THIAMINE HCL TAB 100 MG 200 MG: 100 TAB at 21:02

## 2020-04-17 RX ADMIN — DEXMEDETOMIDINE HYDROCHLORIDE 0.2 MCG/KG/HR: 100 INJECTION, SOLUTION INTRAVENOUS at 05:17

## 2020-04-17 RX ADMIN — CHLORHEXIDINE GLUCONATE 10 ML: 1.2 RINSE ORAL at 21:02

## 2020-04-17 RX ADMIN — QUETIAPINE FUMARATE 25 MG: 25 TABLET ORAL at 09:03

## 2020-04-17 RX ADMIN — MIDAZOLAM HYDROCHLORIDE 2 MG: 1 INJECTION, SOLUTION INTRAMUSCULAR; INTRAVENOUS at 08:59

## 2020-04-17 RX ADMIN — SODIUM CHLORIDE 40 MG: 9 INJECTION, SOLUTION INTRAMUSCULAR; INTRAVENOUS; SUBCUTANEOUS at 09:02

## 2020-04-17 RX ADMIN — FENTANYL CITRATE 50 MCG: 50 INJECTION INTRAMUSCULAR; INTRAVENOUS at 03:18

## 2020-04-17 RX ADMIN — Medication 5 MG: at 21:02

## 2020-04-17 RX ADMIN — FENTANYL CITRATE 50 MCG: 50 INJECTION INTRAMUSCULAR; INTRAVENOUS at 00:20

## 2020-04-17 RX ADMIN — QUETIAPINE FUMARATE 25 MG: 25 TABLET ORAL at 21:03

## 2020-04-17 RX ADMIN — HYDRALAZINE HYDROCHLORIDE 10 MG: 10 TABLET, FILM COATED ORAL at 16:11

## 2020-04-17 RX ADMIN — FENTANYL CITRATE 50 MCG: 50 INJECTION INTRAMUSCULAR; INTRAVENOUS at 18:39

## 2020-04-17 RX ADMIN — DEXMEDETOMIDINE HYDROCHLORIDE 0.2 MCG/KG/HR: 100 INJECTION, SOLUTION INTRAVENOUS at 19:15

## 2020-04-17 RX ADMIN — CARVEDILOL 3.12 MG: 3.12 TABLET, FILM COATED ORAL at 07:44

## 2020-04-17 RX ADMIN — CARVEDILOL 3.12 MG: 3.12 TABLET, FILM COATED ORAL at 16:11

## 2020-04-17 RX ADMIN — FENTANYL CITRATE 50 MCG: 50 INJECTION INTRAMUSCULAR; INTRAVENOUS at 16:09

## 2020-04-17 RX ADMIN — MIDAZOLAM HYDROCHLORIDE 2 MG: 1 INJECTION, SOLUTION INTRAMUSCULAR; INTRAVENOUS at 04:10

## 2020-04-17 RX ADMIN — CHLORHEXIDINE GLUCONATE 10 ML: 1.2 RINSE ORAL at 09:02

## 2020-04-17 RX ADMIN — ONDANSETRON 4 MG: 2 INJECTION INTRAMUSCULAR; INTRAVENOUS at 20:15

## 2020-04-17 RX ADMIN — MIDAZOLAM HYDROCHLORIDE 2 MG: 1 INJECTION, SOLUTION INTRAMUSCULAR; INTRAVENOUS at 15:19

## 2020-04-17 RX ADMIN — HYDRALAZINE HYDROCHLORIDE 10 MG: 10 TABLET, FILM COATED ORAL at 21:02

## 2020-04-17 RX ADMIN — MIDAZOLAM HYDROCHLORIDE 2 MG: 1 INJECTION, SOLUTION INTRAMUSCULAR; INTRAVENOUS at 02:09

## 2020-04-17 RX ADMIN — FENTANYL CITRATE 50 MCG: 50 INJECTION INTRAMUSCULAR; INTRAVENOUS at 08:59

## 2020-04-17 RX ADMIN — THIAMINE HCL TAB 100 MG 200 MG: 100 TAB at 07:44

## 2020-04-17 RX ADMIN — MIDAZOLAM HYDROCHLORIDE 2 MG: 1 INJECTION, SOLUTION INTRAMUSCULAR; INTRAVENOUS at 21:02

## 2020-04-17 RX ADMIN — B-COMPLEX W/ C & FOLIC ACID TAB 1 MG 1 TABLET: 1 TAB at 09:02

## 2020-04-17 RX ADMIN — ACETAMINOPHEN 650 MG: 325 TABLET ORAL at 07:44

## 2020-04-17 RX ADMIN — HYDRALAZINE HYDROCHLORIDE 10 MG: 10 TABLET, FILM COATED ORAL at 09:03

## 2020-04-17 RX ADMIN — FUROSEMIDE 40 MG: 10 INJECTION, SOLUTION INTRAMUSCULAR; INTRAVENOUS at 09:02

## 2020-04-17 RX ADMIN — ACETAMINOPHEN 500 MG: 500 TABLET ORAL at 01:19

## 2020-04-17 RX ADMIN — ENOXAPARIN SODIUM 40 MG: 40 INJECTION SUBCUTANEOUS at 15:28

## 2020-04-17 RX ADMIN — FENTANYL CITRATE 50 MCG: 50 INJECTION INTRAMUSCULAR; INTRAVENOUS at 21:29

## 2020-04-17 RX ADMIN — FUROSEMIDE 40 MG: 10 INJECTION, SOLUTION INTRAMUSCULAR; INTRAVENOUS at 05:02

## 2020-04-17 NOTE — PROGRESS NOTES
Called for fever and tachycardia  Patient post peg and trach  lovenox held and d/c restarted on lower dose  Given coreg  And sedatives with some improvement

## 2020-04-17 NOTE — PROGRESS NOTES
NUTRITION UPDATE    - EN: Recc restarting tube feeds of Vital High Protein @ 75ml/hr to provide 1650kcal 144g PRO 1379ml H20 185g CHO 38g Fat  Recc starting @ 10ml/hr an increasing by 10ml/hr Q6 until goal rate met  Will defer Free H20 Flushes to MD    Noted pt had fevers overnight      Aidee Blum RD  PAGER:  [phone]

## 2020-04-17 NOTE — PROGRESS NOTES
At this time auth obtained waiting on available bed at [facility]. 1120- call received from [facility] at this time no available bed for pt may have one over weekend, cm contacted [facility]. admissions left voice message, waiting on return call. Cm attempted to call patients sister left voice mail to return call to cm for update on d/c plan. 1154- return call received from patients sister that's listed under ED contact, aware that [facility] has accepted pt pending available bed and if no bed becomes one may become available over weekend, sister remains agreeable with plan at this time. [phone] spoke with Horace Jefferson from [facility]. at this time no available beds may have one early next week, cm informed tadeo donato we  Will follow up with them on Monday if [facility] has no beds over weekend.

## 2020-04-17 NOTE — PROGRESS NOTES
RESPIRATORY NOTE:  Trach care done, changed inner cannula, bloody thin secretions remain, suctioned small amt of bloody tenacious secretions.

## 2020-04-17 NOTE — PROGRESS NOTES
1920-Bedside verbal report received from Mara Khan RN. Sbar, mar, labs, kardex and patient status reviewed. Assumed care of patient. 2014-Assessment completed. Pt is very restless. HR elevated 118-125 at this time and moving about in bed. PRN Versed 2 Mg given per orders for sedation/agitation. Suctioned thick brown secretions from ET tube. Lungs sound coarse and diminished. Pt able to follow basic commands. Squeezes hands, and calms briefly to verbal soothing. Temp 99.3, room warm, fan turned on.     2200-Noted temp now 101.4, . Paged Dr. Monika Rocha. 2208-Dr. Snyder returned page. Orders received for Lactic Acid, x2 blood cultures, urine culture, respiratory culture, and PRN Tylenol 650mg Q6hrs for fever greater than 100.5F/mild pain. Discussed Tylenol order with MD d/t patient's allergy to Vicodin because of acetaminophen component. No documented report of allergy to tylenol by itself and patient has trach for airway protection; OK to give. 2217-Tylenol given per Peg tube for temp 101.7. x2 blood cultures drawn from E/E. Urine culture/tracheal sputum cultures sent. 2300-No reactions noted from tylenol. 0015-Reassessment completed. Pt's temp unchanged at this time. Ice packs and fan on at this time. . PRN Fentanyl 50mcg given for agitation/appearing uncomfortable. 0030-Hr Continues at 135 this time and no change in temp with Tylenol given. Paged Dr. Monika Rocha. Dr. Monika Rocha returned page. Orders received for additional 500mg Tylenol given Via peg tube. Per Dr. Monika Rocha, patient's body habitus does not allow for Ct of chest, but will obtain Bilateral Duplexes of lower extremities to assess for DVT's.     0209-HR sustaining 130's sinus tach. Pt restless. Temp 99.9 Axillary. Gave PRN Versed 2mg IVP at this time. 0215-Pt continues to be tachycardic 125-130's. Paged Dr. Monika Rocha.  Orders received for Stat EKG at this time to verify STach.     0225-Prior to being able to get EKG, pt in room getting bath and when turned, HR converted to NSR in the 80's. No ekg obtained d/t returning to baseline. 0430-Pt continues to be increasingly agitated, diaphoretic and kicking feet out of bed. Breath sounds course with increased copious brown secretions. . Not due for any sedation/HR medications at this time. Paged Dr. Kimmy Gardner. 0441-Dr. Snyder returned page. Order received to begin Precedex drip to maintain Rass 0/-1.    0545-Pt becoming increasingly more agitated, tachycardic, and diaphoretic. HR sustaining 150's. Paged Dr. Kimmy Gardner. Orders received for 1x dose 2mg Ativan for agitation. 0615-Pt resting quietly and compliant with vent at this time. HR 85-90 and appears comfortable. 0740-Bedside verbal report given to Josefina Tidwell RN. Sbar, mar, labs, kardex and patient status reviewed. Relinquished care of patient.
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
## 2020-04-17 NOTE — PROGRESS NOTES
Hospitalist Progress Note-critical care note     Patient: Marcella Bhakta MRN: [id number]  [facility]: [id number]    YOB: 1960  Age: 62 y.o. Sex: male    DOA: 4/3/2020 LOS:  LOS: 14 days            Chief complaint:  Respiratory failure, joann on ckd3      Assessment/Plan         Hospital Problems  Date Reviewed: 4/3/2020          Codes Class Noted POA    Tracheostomy in place [facility]) ICD-10-CM: Z93.0  ICD-9-CM: V44.0  4/15/2020 Unknown        HCAP (healthcare-associated pneumonia) ICD-10-CM: J18.9  ICD-9-CM: 486  4/7/2020 Unknown        Goals of care, counseling/discussion ICD-10-CM: Z71.89  ICD-9-CM: V65.49  Unknown Unknown        Suspected Covid-19 Virus Infection ICD-10-CM: R68.89  4/4/2020 Clinically Undetermined        Morbid obesity with body mass index of 50 or higher ([facility] ICD-10-CM: E66.01  ICD-9-CM: 278.01  4/4/2020 Yes        COPD with acute exacerbation ([facility] ICD-10-CM: J44.1  ICD-9-CM: 491.21  4/3/2020 Yes        * (Principal) Respiratory failure with hypercapnia ([facility] ICD-10-CM: J96.92  ICD-9-CM: 518.81  4/3/2020 Yes        Transaminitis ICD-10-CM: R74.0  ICD-9-CM: 790.4  4/3/2020 Yes        Acute on chronic renal insufficiency ICD-10-CM: N28.9, N18.9  ICD-9-CM: 593.9, 585.9  4/3/2020 Yes        Sleep apnea ICD-10-CM: G47.30  ICD-9-CM: 780.57  7/9/2018 Yes        Cocaine abuse ([facility] ICD-10-CM: F14.10  ICD-9-CM: 305.60  4/22/2018 Yes        CHF (congestive heart failure) (HCC) (Chronic) ICD-10-CM: I50.9  ICD-9-CM: 428.0  3/3/2018 Yes              63 y/o male w/ hx of COPD on 4L home O2-trilogy at home ,  EF of 35%, super morbid obesity with recent admission to [facility] who presents in acute hypercapneic respiratory failure, he was admitted tue to acute on chronic respiration failure-intubated on admission.    Urine drug screen positive for cocaine, covid 19 negative, got trach/peg and picc line, waiting for bed at [address],[place] [address]  Respiratory   Hypercapnic respiratory failure with obesity hypoventilation syndrome -stable on vent overnight   On  Vent: peep 10 and O2 35 %  Continue weaning and trach care   Trach 4/13 per Dr. Lim Marcello:  Completed iv abx     Copd exacerbation   Received iv steroid     shyam : on trilogy at home , not compliance     Cardiovascular:  Hypotension resolved       chf  Combined diastolic and systolic   Ef 30 - 62%. Mild (grade 1) left ventricular diastolic dysfunction from echo   On lasix and coreg      ID: Pneumonia-completed the treatment , Repeat COVID negative twice       Endocrine  On sliding scale insulin     FEN   Continue icu replacement protocol   Hypokalemia -resolved      Renal:  joann on ckd3,resolved -remain normal range       Neuro :   Open eyes per voice     Heme  H/h so far stable        Psych  Cocaine abuse     GI   Elevated lft -resolved   Peg tube 4/16 -function well      Poor prognosis palliative care on board       30 minutes of critical care time spent in the direct evaluation and treatment of this high risk patient. The reason for providing this level of medical care for this critically ill patient was due a critical illness that impaired one or more vital organ systems such that there was a high probability of imminent or life threatening deterioration in the patients condition. This care involved high complexity decision making to assess, manipulate, and support vital system functions, to treat this degreee vital organ system failure and to prevent further life threatening deterioration of the patients condition. Waiting for bed aviliable   Disposition :tbd,   Review of systems:  Unable to obtain due to vent   Vital signs/Intake and Output:  Visit Vitals  /60   Pulse 62   Temp 100.2 °F (37.9 °C)   Resp 18   Ht 5' 7\" (1.702 m)   Wt (!) 171.3 kg (377 lb 10.4 oz)   SpO2 95%   BMI 59.15 kg/m²     Current Shift:  No intake/output data recorded.   Last three shifts:  04/15 1901 - 04/17 0700  In: 400   Out: [address] [Urine:3750]    Physical Exam:  General:         Open eyes per voice and follow command   HEENT: NC, Atraumatic. anicteric sclerae. Trach -on vent   Lungs: CTA Bilaterally. No Wheezing/Rhonchi/Rales. Heart:  Regular  rhythm,  No murmur, No Rubs, No Gallops  Abdomen: Soft, obesity , Non tender. +Bowel sounds,  Peg tube noticed   Extremities: No c/c, mild edema of feet   Psych:   Calm   Neurologic:  Open eyes and follow some  command           Labs: Results:       Chemistry Recent Labs     04/17/20 0436 04/16/20 0450 04/15/20  0415   * 129* 102*    141 141   K 3.8 4.0 4.0    106 109   CO2 28 26 23   BUN 29* 27* 24*   CREA 1.20 1.23 1.03   CA 9.4 9.0 8.9   AGAP 6 9 9   BUCR 24* 22* 23*   * 181* 170*   TP 6.8 6.5 6.2*   ALB 2.8* 2.7* 2.6*   GLOB 4.0 3.8 3.6   AGRAT 0.7* 0.7* 0.7*      CBC w/Diff Recent Labs     04/17/20  0436 04/16/20  0450 04/15/20  0415   WBC 12.7 10.7 8.6   RBC 4.49* 4.58* 4.64*   HGB 12.3* 12.5* 12.5*   HCT 39.1 40.0 40.6    137 134*   GRANS 81* 79* 76*   LYMPH 6* 6* 8*   EOS 2 2 5      Cardiac Enzymes No results for input(s): CPK, CKND1, TISHA in the last 72 hours. No lab exists for component: CKRMB, TROIP   Coagulation No results for input(s): PTP, INR, APTT, INREXT, INREXT in the last 72 hours. Lipid Panel Lab Results   Component Value Date/Time    Cholesterol, total 196 01/25/2018 02:51 AM    HDL Cholesterol 64 (H) 01/25/2018 02:51 AM    LDL, calculated 121.2 (H) 01/25/2018 02:51 AM    VLDL, calculated 10.8 01/25/2018 02:51 AM    Triglyceride 54 01/25/2018 02:51 AM    CHOL/HDL Ratio 3.1 01/25/2018 02:51 AM      BNP No results for input(s): BNPP in the last 72 hours.    Liver Enzymes Recent Labs     04/17/20  0436   TP 6.8   ALB 2.8*   *   SGOT 65*      Thyroid Studies Lab Results   Component Value Date/Time    TSH 0.11 (L) 03/19/2019 06:50 AM        Procedures/imaging: see electronic medical records for all procedures/Xrays and details which were not copied into this note but were reviewed prior to creation of Plan    Xr Abd (kub)    Result Date: 4/4/2020  EXAM: Single view of the abdomen CLINICAL INDICATION/HISTORY: Nasogastric tube placement    --Additional history: None. COMPARISON: None TECHNIQUE: Single supine view _______________ FINDINGS: The impression. _______________     IMPRESSION: Study technically limited by patient's body habitus. Nasogastric tube tip appears to project over the gastric antrum. Xr Chest Port    Result Date: 4/7/2020  EXAM: XR CHEST PORT CLINICAL INDICATION/HISTORY: OG tube in place. -Additional: None COMPARISON: Earlier the same day TECHNIQUE: Portable frontal view of the chest _______________ FINDINGS: SUPPORT DEVICES: Endotracheal tube approximately 1 cm above the juan miguel. Enteric tube tip out of field of view possibly in the distal stomach. HEART AND MEDIASTINUM: Cardiomegaly LUNGS AND PLEURAL SPACES: Hypoinflated lungs. Probable small to moderate left effusion. Mild interstitial edema. No pneumothorax. _______________     IMPRESSION: Endotracheal tube approximately 1 cm above the juan miguel. Enteric tube tip out of field of view possibly in the distal stomach. Xr Chest Port    Result Date: 4/7/2020  EXAM: XR CHEST PORT CLINICAL INDICATION/HISTORY: pneumonia, intubated -Additional: None COMPARISON: One day prior TECHNIQUE: Portable frontal view of the chest _______________ FINDINGS: SUPPORT DEVICES: Enteric tube and endotracheal tube unchanged. [place] HEART AND MEDIASTINUM: cardiomegaly LUNGS AND PLEURAL SPACES: Hypoinflated lungs. Probable small to moderate left effusion. Mild interstitial edema. No pneumothorax. _______________     IMPRESSION: Hypoinflated lungs. Probable small to moderate left effusion. Mild interstitial edema. No pneumothorax.      Xr Chest Port    Result Date: 4/6/2020  EXAM: XR CHEST PORT CLINICAL INDICATION/HISTORY: While intubated -Additional: None COMPARISON: One day prior TECHNIQUE: Portable frontal view of the chest _______________ FINDINGS: SUPPORT DEVICES: Enteric tube and endotracheal tube unchanged. [place]bi [place] HEART AND MEDIASTINUM: cardiomegaly LUNGS AND PLEURAL SPACES: Hypoinflated lungs. Probable small left effusion. Mild interstitial edema. No pneumothorax. _______________     IMPRESSION: Hypoinflated lungs. Probable small left effusion. Mild interstitial edema. Xr Chest Port    Result Date: 4/5/2020  EXAM: CHEST RADIOGRAPH CLINICAL INDICATION/HISTORY: Respiratory failure -Additional: None COMPARISON: April 4, 2020 TECHNIQUE: Portable frontal view of the chest _______________ FINDINGS: SUPPORT DEVICES: The tip of an endotracheal tube is 10 cm above the juan miguel. A nasogastric tube crosses the gastroesophageal junction. A left IJ central venous catheter terminates in the proximal SVC. HEART AND MEDIASTINUM: The heart is enlarged. LUNGS AND PLEURAL SPACES: Interstitial lung markings are increased. Lung volumes are hypoinflated and there are opacities in the lung bases. No pneumothorax. BONY THORAX AND SOFT TISSUES: Unremarkable. _______________     IMPRESSION: 1. Mild pulmonary edema 2. Bibasilar opacities that may represent a combination of atelectasis and small pleural effusions     Xr Chest Port    Result Date: 4/4/2020  EXAM: PORTABLE CHEST HISTORY: Central line placement. Acute respiratory failure. COMPARISON: 4/4/2020 at 12:42 AM TECHNIQUE: Single portable view. _______________ FINDINGS: SUPPORT DEVICES: Endotracheal tube tip lies about 4.2 cm above juan miguel. Left central venous catheter tip in upper superior vena cava. HEART AND MEDIASTINUM: Cardiomegaly. LUNGS AND PLEURAL SPACES: Patchy airspace disease right lung base may represent developing subsegmental atelectasis or pneumonia. Evaluation limited by large body habitus. BONES AND SOFT TISSUES: Bony structures are normal. _______________     IMPRESSION: Interval placement left central venous catheter. No pneumothorax. Cardiomegaly without change.  Patchy airspace disease right lung base either subsegmental atelectasis or pneumonia appears slightly worse. Xr Chest Port    Result Date: 4/4/2020  EXAM: Chest radiograph  CLINICAL INDICATION/HISTORY: Chemical ventilation    --Additional history: None. COMPARISON: 04/03/2020 TECHNIQUE: Frontal view of the chest _______________ FINDINGS: SUPPORT DEVICES: There is an endotracheal tube with tip approximately 5.5 cm above the juan miguel. There is a nasogastric tube descends below the diaphragm. HEART AND MEDIASTINUM: React silhouette is enlarged. Stable mediastinal contours. . Normal pulmonary vasculature. LUNGS AND PLEURAL SPACES: No convincing focal airspace opacity given the limitations of the study and superimposed body habitus. Sharp costophrenic sulci. No pneumothoraces. BONY THORAX AND SOFT TISSUES: Questionable fracture deformity of the right lateral ninth rib. _______________     IMPRESSION: 1. Support lines and tubes as detailed above. 2. Technically limited study secondary to body habitus without significant change from the prior study. Xr Chest Port    Result Date: 4/3/2020  EXAM:  AP Portable Chest X-ray 1 view INDICATION: Chest pain shortness of breath COMPARISON: None _______________ FINDINGS:  Heart size is enlarged and mediastinal contours are within normal limits for portable radiograph. There are increased interstitial markings in the right lung. No focal airspace disease seen. . There are no pleural effusions. No acute osseous findings. ________________      IMPRESSION: Increased interstitial markings in the right lung. No focal airspace disease or effusion.       Robby Briscoe MD

## 2020-04-18 LAB
ALBUMIN SERPL-MCNC: 2.6 G/DL (ref 3.4–5)
ALBUMIN/GLOB SERPL: 0.7 {RATIO} (ref 0.8–1.7)
ALP SERPL-CCNC: 168 U/L (ref 45–117)
ALT SERPL-CCNC: 85 U/L (ref 16–61)
ANION GAP SERPL CALC-SCNC: 7 MMOL/L (ref 3–18)
AST SERPL-CCNC: 50 U/L (ref 10–38)
BACTERIA SPEC CULT: NORMAL
BASOPHILS # BLD: 0 K/UL (ref 0–0.1)
BASOPHILS NFR BLD: 0 % (ref 0–2)
BILIRUB SERPL-MCNC: 0.6 MG/DL (ref 0.2–1)
BUN SERPL-MCNC: 32 MG/DL (ref 7–18)
BUN/CREAT SERPL: 27 (ref 12–20)
CA-I SERPL-SCNC: 1.29 MMOL/L (ref 1.12–1.32)
CALCIUM SERPL-MCNC: 9.5 MG/DL (ref 8.5–10.1)
CHLORIDE SERPL-SCNC: 108 MMOL/L (ref 100–111)
CO2 SERPL-SCNC: 28 MMOL/L (ref 21–32)
CREAT SERPL-MCNC: 1.2 MG/DL (ref 0.6–1.3)
DIFFERENTIAL METHOD BLD: ABNORMAL
EOSINOPHIL # BLD: 0.7 K/UL (ref 0–0.4)
EOSINOPHIL NFR BLD: 6 % (ref 0–5)
ERYTHROCYTE [DISTWIDTH] IN BLOOD BY AUTOMATED COUNT: 16.7 % (ref 11.6–14.5)
GLOBULIN SER CALC-MCNC: 3.9 G/DL (ref 2–4)
GLUCOSE BLD STRIP.AUTO-MCNC: 101 MG/DL (ref 70–110)
GLUCOSE BLD STRIP.AUTO-MCNC: 106 MG/DL (ref 70–110)
GLUCOSE BLD STRIP.AUTO-MCNC: 113 MG/DL (ref 70–110)
GLUCOSE BLD STRIP.AUTO-MCNC: 123 MG/DL (ref 70–110)
GLUCOSE BLD STRIP.AUTO-MCNC: 93 MG/DL (ref 70–110)
GLUCOSE SERPL-MCNC: 107 MG/DL (ref 74–99)
HCT VFR BLD AUTO: 37.6 % (ref 36–48)
HGB BLD-MCNC: 11.8 G/DL (ref 13–16)
LYMPHOCYTES # BLD: 1 K/UL (ref 0.9–3.6)
LYMPHOCYTES NFR BLD: 9 % (ref 21–52)
MAGNESIUM SERPL-MCNC: 2.2 MG/DL (ref 1.6–2.6)
MCH RBC QN AUTO: 27.3 PG (ref 24–34)
MCHC RBC AUTO-ENTMCNC: 31.4 G/DL (ref 31–37)
MCV RBC AUTO: 86.8 FL (ref 74–97)
MONOCYTES # BLD: 0.8 K/UL (ref 0.05–1.2)
MONOCYTES NFR BLD: 7 % (ref 3–10)
NEUTS SEG # BLD: 8.4 K/UL (ref 1.8–8)
NEUTS SEG NFR BLD: 78 % (ref 40–73)
PHOSPHATE SERPL-MCNC: 3.2 MG/DL (ref 2.5–4.9)
PLATELET # BLD AUTO: 135 K/UL (ref 135–420)
PMV BLD AUTO: 12.2 FL (ref 9.2–11.8)
POTASSIUM SERPL-SCNC: 3.4 MMOL/L (ref 3.5–5.5)
POTASSIUM SERPL-SCNC: 3.7 MMOL/L (ref 3.5–5.5)
POTASSIUM SERPL-SCNC: 3.8 MMOL/L (ref 3.5–5.5)
PROT SERPL-MCNC: 6.5 G/DL (ref 6.4–8.2)
RBC # BLD AUTO: 4.33 M/UL (ref 4.7–5.5)
SERVICE CMNT-IMP: NORMAL
SODIUM SERPL-SCNC: 143 MMOL/L (ref 136–145)
WBC # BLD AUTO: 10.9 K/UL (ref 4.6–13.2)

## 2020-04-18 PROCEDURE — 74011000250 HC RX REV CODE- 250: Performed by: INTERNAL MEDICINE

## 2020-04-18 PROCEDURE — 36415 COLL VENOUS BLD VENIPUNCTURE: CPT

## 2020-04-18 PROCEDURE — 36592 COLLECT BLOOD FROM PICC: CPT

## 2020-04-18 PROCEDURE — 82330 ASSAY OF CALCIUM: CPT

## 2020-04-18 PROCEDURE — 82962 GLUCOSE BLOOD TEST: CPT

## 2020-04-18 PROCEDURE — 74011250637 HC RX REV CODE- 250/637: Performed by: INTERNAL MEDICINE

## 2020-04-18 PROCEDURE — 74011250636 HC RX REV CODE- 250/636: Performed by: INTERNAL MEDICINE

## 2020-04-18 PROCEDURE — 85025 COMPLETE CBC W/AUTO DIFF WBC: CPT

## 2020-04-18 PROCEDURE — 65610000006 HC RM INTENSIVE CARE

## 2020-04-18 PROCEDURE — 77030018836 HC SOL IRR NACL ICUM -A

## 2020-04-18 PROCEDURE — 77010033678 HC OXYGEN DAILY

## 2020-04-18 PROCEDURE — C9113 INJ PANTOPRAZOLE SODIUM, VIA: HCPCS | Performed by: FAMILY MEDICINE

## 2020-04-18 PROCEDURE — 84132 ASSAY OF SERUM POTASSIUM: CPT

## 2020-04-18 PROCEDURE — 74011250637 HC RX REV CODE- 250/637: Performed by: FAMILY MEDICINE

## 2020-04-18 PROCEDURE — 83735 ASSAY OF MAGNESIUM: CPT

## 2020-04-18 PROCEDURE — 84100 ASSAY OF PHOSPHORUS: CPT

## 2020-04-18 PROCEDURE — 74011000258 HC RX REV CODE- 258: Performed by: INTERNAL MEDICINE

## 2020-04-18 PROCEDURE — 74011000250 HC RX REV CODE- 250: Performed by: FAMILY MEDICINE

## 2020-04-18 PROCEDURE — 74011250636 HC RX REV CODE- 250/636: Performed by: FAMILY MEDICINE

## 2020-04-18 PROCEDURE — 94003 VENT MGMT INPAT SUBQ DAY: CPT

## 2020-04-18 RX ORDER — FENTANYL CITRATE 50 UG/ML
50 INJECTION, SOLUTION INTRAMUSCULAR; INTRAVENOUS ONCE
Status: COMPLETED | OUTPATIENT
Start: 2020-04-18 | End: 2020-04-18

## 2020-04-18 RX ORDER — MIDAZOLAM HYDROCHLORIDE 1 MG/ML
INJECTION, SOLUTION INTRAMUSCULAR; INTRAVENOUS
Status: DISPENSED
Start: 2020-04-18 | End: 2020-04-19

## 2020-04-18 RX ORDER — POTASSIUM CHLORIDE 7.45 MG/ML
10 INJECTION INTRAVENOUS ONCE
Status: COMPLETED | OUTPATIENT
Start: 2020-04-18 | End: 2020-04-18

## 2020-04-18 RX ORDER — FENTANYL CITRATE 50 UG/ML
50 INJECTION, SOLUTION INTRAMUSCULAR; INTRAVENOUS
Status: DISCONTINUED | OUTPATIENT
Start: 2020-04-18 | End: 2020-04-22 | Stop reason: HOSPADM

## 2020-04-18 RX ORDER — FENTANYL CITRATE 50 UG/ML
INJECTION, SOLUTION INTRAMUSCULAR; INTRAVENOUS
Status: DISPENSED
Start: 2020-04-18 | End: 2020-04-19

## 2020-04-18 RX ORDER — MIDAZOLAM HYDROCHLORIDE 1 MG/ML
2 INJECTION, SOLUTION INTRAMUSCULAR; INTRAVENOUS
Status: DISCONTINUED | OUTPATIENT
Start: 2020-04-18 | End: 2020-04-22 | Stop reason: HOSPADM

## 2020-04-18 RX ORDER — MIDAZOLAM HYDROCHLORIDE 1 MG/ML
2 INJECTION, SOLUTION INTRAMUSCULAR; INTRAVENOUS ONCE
Status: COMPLETED | OUTPATIENT
Start: 2020-04-18 | End: 2020-04-18

## 2020-04-18 RX ORDER — FENTANYL CITRATE 50 UG/ML
50 INJECTION, SOLUTION INTRAMUSCULAR; INTRAVENOUS
Status: DISCONTINUED | OUTPATIENT
Start: 2020-04-18 | End: 2020-04-18

## 2020-04-18 RX ADMIN — FENTANYL CITRATE 50 MCG: 50 INJECTION INTRAMUSCULAR; INTRAVENOUS at 06:58

## 2020-04-18 RX ADMIN — B-COMPLEX W/ C & FOLIC ACID TAB 1 MG 1 TABLET: 1 TAB at 09:36

## 2020-04-18 RX ADMIN — SODIUM CHLORIDE 40 MG: 9 INJECTION, SOLUTION INTRAMUSCULAR; INTRAVENOUS; SUBCUTANEOUS at 09:37

## 2020-04-18 RX ADMIN — CARVEDILOL 3.12 MG: 3.12 TABLET, FILM COATED ORAL at 09:36

## 2020-04-18 RX ADMIN — HYDRALAZINE HYDROCHLORIDE 10 MG: 10 TABLET, FILM COATED ORAL at 16:25

## 2020-04-18 RX ADMIN — FENTANYL CITRATE 50 MCG: 50 INJECTION INTRAMUSCULAR; INTRAVENOUS at 15:24

## 2020-04-18 RX ADMIN — THIAMINE HCL TAB 100 MG 200 MG: 100 TAB at 20:19

## 2020-04-18 RX ADMIN — DEXMEDETOMIDINE HYDROCHLORIDE 0.4 MCG/KG/HR: 100 INJECTION, SOLUTION INTRAVENOUS at 01:23

## 2020-04-18 RX ADMIN — THIAMINE HCL TAB 100 MG 200 MG: 100 TAB at 09:36

## 2020-04-18 RX ADMIN — HYDRALAZINE HYDROCHLORIDE 10 MG: 10 TABLET, FILM COATED ORAL at 20:19

## 2020-04-18 RX ADMIN — FENTANYL CITRATE 50 MCG: 50 INJECTION, SOLUTION INTRAMUSCULAR; INTRAVENOUS at 22:08

## 2020-04-18 RX ADMIN — Medication 20 ML: at 14:00

## 2020-04-18 RX ADMIN — QUETIAPINE FUMARATE 25 MG: 25 TABLET ORAL at 09:36

## 2020-04-18 RX ADMIN — DEXMEDETOMIDINE HYDROCHLORIDE 0.3 MCG/KG/HR: 100 INJECTION, SOLUTION INTRAVENOUS at 08:50

## 2020-04-18 RX ADMIN — FENTANYL CITRATE 50 MCG: 50 INJECTION INTRAMUSCULAR; INTRAVENOUS at 20:18

## 2020-04-18 RX ADMIN — FENTANYL CITRATE 50 MCG: 50 INJECTION INTRAMUSCULAR; INTRAVENOUS at 02:51

## 2020-04-18 RX ADMIN — Medication 10 ML: at 21:43

## 2020-04-18 RX ADMIN — CARVEDILOL 3.12 MG: 3.12 TABLET, FILM COATED ORAL at 16:25

## 2020-04-18 RX ADMIN — MIDAZOLAM HYDROCHLORIDE 2 MG: 1 INJECTION, SOLUTION INTRAMUSCULAR; INTRAVENOUS at 21:00

## 2020-04-18 RX ADMIN — MIDAZOLAM HYDROCHLORIDE 2 MG: 1 INJECTION, SOLUTION INTRAMUSCULAR; INTRAVENOUS at 04:30

## 2020-04-18 RX ADMIN — POTASSIUM CHLORIDE 10 MEQ: 10 INJECTION, SOLUTION INTRAVENOUS at 20:00

## 2020-04-18 RX ADMIN — FENTANYL CITRATE 50 MCG: 50 INJECTION, SOLUTION INTRAMUSCULAR; INTRAVENOUS at 23:30

## 2020-04-18 RX ADMIN — CHLORHEXIDINE GLUCONATE 10 ML: 1.2 RINSE ORAL at 09:37

## 2020-04-18 RX ADMIN — HYDRALAZINE HYDROCHLORIDE 10 MG: 10 TABLET, FILM COATED ORAL at 09:36

## 2020-04-18 RX ADMIN — MIDAZOLAM HYDROCHLORIDE 2 MG: 1 INJECTION, SOLUTION INTRAMUSCULAR; INTRAVENOUS at 15:24

## 2020-04-18 RX ADMIN — QUETIAPINE FUMARATE 25 MG: 25 TABLET ORAL at 20:19

## 2020-04-18 RX ADMIN — ONDANSETRON 4 MG: 2 INJECTION INTRAMUSCULAR; INTRAVENOUS at 20:18

## 2020-04-18 RX ADMIN — MIDAZOLAM HYDROCHLORIDE 2 MG: 1 INJECTION, SOLUTION INTRAMUSCULAR; INTRAVENOUS at 22:09

## 2020-04-18 RX ADMIN — POTASSIUM BICARBONATE 40 MEQ: 782 TABLET, EFFERVESCENT ORAL at 05:36

## 2020-04-18 RX ADMIN — FUROSEMIDE 40 MG: 10 INJECTION, SOLUTION INTRAMUSCULAR; INTRAVENOUS at 09:36

## 2020-04-18 RX ADMIN — Medication 5 MG: at 20:19

## 2020-04-18 RX ADMIN — MIDAZOLAM HYDROCHLORIDE 2 MG: 1 INJECTION, SOLUTION INTRAMUSCULAR; INTRAVENOUS at 23:31

## 2020-04-18 RX ADMIN — ENOXAPARIN SODIUM 40 MG: 40 INJECTION SUBCUTANEOUS at 15:22

## 2020-04-18 RX ADMIN — CHLORHEXIDINE GLUCONATE 10 ML: 1.2 RINSE ORAL at 20:18

## 2020-04-18 NOTE — PROGRESS NOTES
Chart reviewed as CM on call. Pt accepted @ [facility] pending bed availability. Spoke with Enedelia @ [facility] and no bed available today, potentially tomorrow. CM will cont to follow and will be available via hospital  on weekends.

## 2020-04-18 NOTE — PROGRESS NOTES
1106 Precedex stopped for weaning trial per DR. Saunders. Patient is calm at this time. 1200 Reassessment completed, no changes. Bed pad changed     1524 Patient restless, agitated, rocking self back and forth, Prn Fentanyl and Versed given     1600 Reassessment completed,no changes, Patient  coachable, following commands during assessment

## 2020-04-18 NOTE — PROGRESS NOTES
PATIENT WAS PLACED ON SBT BY DR. BROWNING @ 1100 WITH SETTINGS: CPAP 8/PS 12/35%. TO TRIAL X 2 HOURS AS TOLERATED AND THEN PLACE ON TRACH COLLAR. RETURN TO FULL SUPPORT IF NOT TOLERATED.

## 2020-04-18 NOTE — PROGRESS NOTES
PATIENT BECAME MORE AGITATED/RSBI > 100. RETURNED TO FULL SUPPORT @ 1515. CURRENT SETTINGS:  AC/VC+ 18/480/35%/PEEP 8.  COMPLETE TRACH CARE GIVEN.  BLOODY DRAINAGE AROUND STOMA. CLEANED WITH NORMAL SALINE. SUTURES REMAIN IN PLACE. INNER CANNULA REPLACED AND DRY SPONGES APPLIED AROUND SITE. WOOD INLINE SUCTION CATH WAS ALSO REPLACED DUE TO BUILD UP OF BLOODY SECRETIONS.
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
## 2020-04-18 NOTE — PROGRESS NOTES
1900 - Bedside and Verbal shift change report given to Evan Champagne RN (oncoming nurse) by Brandi John RN (offgoing nurse). Report included the following information SBAR, Kardex, ED Summary, Procedure Summary, Intake/Output, MAR, Recent Results, Med Rec Status and Cardiac Rhythm NSR/Sinus Ron: BBB. 2000 - Shift assessment completed. Patient increasingly restless/agitated. Ventilator alarming frequently. Patient O2 saturating declining due to increased restlessness. Precedex drip restarted at 1915 and titrated according to [facility] to decrease agitation. Bilateral soft wrist restraints in place to maintain trach site, lines, and drains; no signs/symptoms of injury. Trach site: clean, dry, and intact. 2100 - Gastric residuals: 10 ml at this time. Tube feeding rate increased to 20 ml/hr per order guidelines. 0000 - Reassessment completed. Patient is now resting calmly in bed, arouses to voice, follows simple commands, nods appropriately. 0300 - Gastric residuals: 15 ml at this time. Tube feeding rate increased to 30 ml/hr per order guidelines. 0400 - Reassessment completed, no changes to previous assessment. Labs drawn via RUE PICC line without difficulty. 0530 - Potassium replaced per electrolyte protocol. 3406 - Bedside and Verbal shift change report given to Lewis Rodriguez RN (oncoming nurse) by Evan Champagne RN (offgoing nurse). Report included the following information SBAR, Kardex, ED Summary, Procedure Summary, Intake/Output, MAR, Recent Results, Med Rec Status and Cardiac Rhythm NSR/Sinus Ron: BBB.

## 2020-04-18 NOTE — PROGRESS NOTES
Bedside shift change report given to John Morelos  (oncoming nurse) by Marychuy Melara  (offgoing nurse). Report included the following information SBAR, Kardex, ED Summary, MAR, Accordion, Recent Results, Med Rec Status, Cardiac Rhythm NSR with BBB and Quality Measures.

## 2020-04-18 NOTE — PROGRESS NOTES
Hospitalist Progress Note    Patient: Marcella Bhakta MRN: [id number]  CSN: [id number]    YOB: 1960  Age: 62 y.o. Sex: male    DOA: 4/3/2020 LOS:  LOS: 15 days                Assessment and Plan:    Principal Problem:    Respiratory failure with hypercapnia ([address] (4/3/2020)    Active Problems:    CHF (congestive heart failure) ([address] (3/3/2018)      Cocaine abuse ([address] (4/22/2018)      Sleep apnea (7/9/2018)      COPD with acute exacerbation ([address] (4/3/2020)      Transaminitis (4/3/2020)      Acute on chronic renal insufficiency (4/3/2020)      Suspected Covid-19 Virus Infection (4/4/2020)      Morbid obesity with body mass index of 50 or higher ([address] (4/4/2020)      Goals of care, counseling/discussion ()      HCAP (healthcare-associated pneumonia) (4/7/2020)      Tracheostomy in place [facility]) (4/15/2020)                 Respiratory   Hypercapnic respiratory failure with obesity hypoventilation syndrome -stable on vent overnight   On  Vent: peep 10 and O2 35 %  Continue weaning and trach care   Trach 4/13 per Dr. Mery Porter        Hcap:  Completed iv abx      Copd exacerbation   Received iv steroid      shyam : on trilogy at home , not compliance      Cardiovascular:  Hypotension resolved         chf  Combined diastolic and systolic   Ef 30 - 14%.  Mild (grade 1) left ventricular diastolic dysfunction from echo   On lasix and coreg       ID: Pneumonia-completed the treatment , Repeat COVID negative twice       Endocrine  On sliding scale insulin     FEN   Continue icu replacement protocol   Hypokalemia -resolved      Renal:  joann on ckd3,resolved -remain normal range       Neuro :   Open eyes per voice      Heme  H/h so far stable         Psych  Cocaine abuse      GI   Elevated lft -resolved   Peg tube 4/16 -function well      Urine drug screen positive for cocaine, covid 19 negative, got trach/peg and picc line, waiting for bed at [facility]      Poor prognosis palliative care on board       Chief complaint:  63 y/o male w/ hx of COPD on 4L home O2-trilogy at home ,  EF of 35%, super morbid obesity with recent admission to [facility] who presents in acute hypercapneic respiratory failure, he was admitted tue to acute on chronic respiration failure-intubated on admission. Subjective:    No complaints today        Review of systems:    General: No fevers or chills. Cardiovascular: No chest pain or pressure. No palpitations. Pulmonary: No shortness of breath. Gastrointestinal: No nausea, vomiting. Objective:    Vital signs/Intake and Output:  Visit Vitals  /83   Pulse 82   Temp 99.7 °F (37.6 °C)   Resp 27   Ht 5' 7\" (1.702 m)   Wt (!) 162.7 kg (358 lb 11 oz)   SpO2 97%   BMI 56.18 kg/m²     Current Shift:  04/18 0701 - 04/18 1900  In: 150   Out: 1150 [facility]  Last three shifts:  04/16 1901 - 04/18 0700  In: 922.5 [I.V.:182.5]  Out: 6147 [Urine:3000; Drains:500]    Physical Exam:  General: NAD, AAOx3. Non-toxic. HEENT: NC/AT. PERRLA, EOMI.  MMM. Lungs: Nml inspection. CTA B/L. No wheezing, rales or rhonchi. Heart:  S1S2 RRR,  PMI mid 5th IC space. No M/RG. Abdomen: Soft, NT/ND.  BS+. No peritoneal signs. Extremities: No C/C/E. Psych:   Nml affect. Neurologic:  2-12 intact. Strength 5/5 throughout.   Sensation symmetrical.          Labs: Results:       Chemistry Recent Labs     04/18/20  0915 04/18/20  0400 04/17/20  0436 04/16/20  0450   GLU  --  107* 121* 129*   NA  --  143 143 141   K 3.8 3.4* 3.8 4.0   CL  --  108 109 106   CO2  --  28 28 26   BUN  --  32* 29* 27*   CREA  --  1.20 1.20 1.23   CA  --  9.5 9.4 9.0   AGAP  --  7 6 9   BUCR  --  27* 24* 22*   AP  --  168* 185* 181*   TP  --  6.5 6.8 6.5   ALB  --  2.6* 2.8* 2.7*   GLOB  --  3.9 4.0 3.8   AGRAT  --  0.7* 0.7* 0.7*      CBC w/Diff Recent Labs     04/18/20  0400 04/17/20  0436 04/16/20  0450   WBC 10.9 12.7 10.7   RBC 4.33* 4.49* 4.58*   HGB 11.8* 12.3* 12.5*   HCT 37.6 39.1 40.0    158 137   GRANS 78* 81* 79*   LYMPH 9* 6* 6* EOS 6* 2 2      Cardiac Enzymes No results for input(s): CPK, CKND1, TISHA in the last 72 hours. No lab exists for component: CKRMB, TROIP   Coagulation No results for input(s): PTP, INR, APTT, INREXT in the last 72 hours. Lipid Panel Lab Results   Component Value Date/Time    Cholesterol, total 196 01/25/2018 02:51 AM    HDL Cholesterol 64 (H) 01/25/2018 02:51 AM    LDL, calculated 121.2 (H) 01/25/2018 02:51 AM    VLDL, calculated 10.8 01/25/2018 02:51 AM    Triglyceride 54 01/25/2018 02:51 AM    CHOL/HDL Ratio 3.1 01/25/2018 02:51 AM      BNP No results for input(s): BNPP in the last 72 hours.    Liver Enzymes Recent Labs     04/18/20  0400   TP 6.5   ALB 2.6*   *   SGOT 50*      Thyroid Studies Lab Results   Component Value Date/Time    TSH 0.11 (L) 03/19/2019 06:50 AM        Procedures/imaging: see electronic medical records for all procedures/Xrays and details which were not copied into this note but were reviewed prior to creation of Plan

## 2020-04-18 NOTE — PROGRESS NOTES
Problem: Non-Violent Restraints  Goal: *Removal from restraints as soon as assessed to be safe  Outcome: Progressing Towards Goal     Problem: Non-Violent Restraints  Goal: *No harm/injury to patient while restraints in use  Outcome: Progressing Towards Goal     Problem: Non-Violent Restraints  Goal: *Patient's dignity will be maintained  Outcome: Progressing Towards Goal     Problem: Pressure Injury - Risk of  Goal: *Prevention of pressure injury  Description: Document Axel Scale and appropriate interventions in the flowsheet. Outcome: Progressing Towards Goal  Note: Pressure Injury Interventions:  Sensory Interventions: Assess changes in LOC, Avoid rigorous massage over bony prominences, Check visual cues for pain, Float heels, Keep linens dry and wrinkle-free, Maintain/enhance activity level, Minimize linen layers, Monitor skin under medical devices, Pressure redistribution bed/mattress (bed type), Turn and reposition approx. every two hours (pillows and wedges if needed)    Moisture Interventions: Absorbent underpads, Apply protective barrier, creams and emollients, Check for incontinence Q2 hours and as needed, Internal/External fecal devices, Internal/External urinary devices, Maintain skin hydration (lotion/cream), Minimize layers, Moisture barrier    Activity Interventions: Pressure redistribution bed/mattress(bed type)    Mobility Interventions: HOB 30 degrees or less, Pressure redistribution bed/mattress (bed type), Turn and reposition approx. every two hours(pillow and wedges)    Nutrition Interventions: Document food/fluid/supplement intake, Discuss nutritional consult with provider    Friction and Shear Interventions: Apply protective barrier, creams and emollients, HOB 30 degrees or less, Minimize layers                Problem: Falls - Risk of  Goal: *Absence of Falls  Description: Document Richard Fall Risk and appropriate interventions in the flowsheet.   Outcome: Progressing Towards Goal  Note: Fall Risk Interventions:       Mentation Interventions: Adequate sleep, hydration, pain control, Bed/chair exit alarm, Door open when patient unattended, Evaluate medications/consider consulting pharmacy, More frequent rounding, Reorient patient, Room close to nurse's station, Toileting rounds, Update white board    Medication Interventions: Bed/chair exit alarm, Evaluate medications/consider consulting pharmacy    Elimination Interventions: Bed/chair exit alarm, Toileting schedule/hourly rounds

## 2020-04-18 NOTE — PROGRESS NOTES
SPOKE WITH DR. BROWNING REGARDING BLEEDING FROM AND AROUND TRACH STOMA. CONCERNED THAT IF CUFF IS DEFLATED IN ORDER TO PLACE PATIENT ON TRACH COLLAR, HE WILL ASPIRATE.  MAY CONTINUE SBT UNTIL THIS EVENING AND THEN RESUME FULL SUPPORT DURING NIGHT.

## 2020-04-18 NOTE — PROGRESS NOTES
Bedside shift change report received from Martha Oleary (offgoing nurse). Report included the following information SBAR, Kardex, ED Summary, Intake/Output, MAR, Accordion, Recent Results, Med Rec Status, Cardiac Rhythm Sinus Ron,RN and Quality Measures.

## 2020-04-19 LAB
ALBUMIN SERPL-MCNC: 2.7 G/DL (ref 3.4–5)
ALBUMIN/GLOB SERPL: 0.7 {RATIO} (ref 0.8–1.7)
ALP SERPL-CCNC: 182 U/L (ref 45–117)
ALT SERPL-CCNC: 97 U/L (ref 16–61)
ANION GAP SERPL CALC-SCNC: 5 MMOL/L (ref 3–18)
AST SERPL-CCNC: 72 U/L (ref 10–38)
BASOPHILS # BLD: 0 K/UL (ref 0–0.1)
BASOPHILS NFR BLD: 0 % (ref 0–2)
BILIRUB SERPL-MCNC: 0.5 MG/DL (ref 0.2–1)
BUN SERPL-MCNC: 33 MG/DL (ref 7–18)
BUN/CREAT SERPL: 30 (ref 12–20)
CA-I SERPL-SCNC: 1.26 MMOL/L (ref 1.12–1.32)
CALCIUM SERPL-MCNC: 9.3 MG/DL (ref 8.5–10.1)
CHLORIDE SERPL-SCNC: 108 MMOL/L (ref 100–111)
CO2 SERPL-SCNC: 30 MMOL/L (ref 21–32)
CREAT SERPL-MCNC: 1.11 MG/DL (ref 0.6–1.3)
DIFFERENTIAL METHOD BLD: ABNORMAL
EOSINOPHIL # BLD: 0.9 K/UL (ref 0–0.4)
EOSINOPHIL NFR BLD: 8 % (ref 0–5)
ERYTHROCYTE [DISTWIDTH] IN BLOOD BY AUTOMATED COUNT: 16.3 % (ref 11.6–14.5)
GLOBULIN SER CALC-MCNC: 4 G/DL (ref 2–4)
GLUCOSE BLD STRIP.AUTO-MCNC: 109 MG/DL (ref 70–110)
GLUCOSE BLD STRIP.AUTO-MCNC: 112 MG/DL (ref 70–110)
GLUCOSE BLD STRIP.AUTO-MCNC: 114 MG/DL (ref 70–110)
GLUCOSE BLD STRIP.AUTO-MCNC: 119 MG/DL (ref 70–110)
GLUCOSE SERPL-MCNC: 117 MG/DL (ref 74–99)
HCT VFR BLD AUTO: 37.5 % (ref 36–48)
HGB BLD-MCNC: 11.8 G/DL (ref 13–16)
LYMPHOCYTES # BLD: 0.9 K/UL (ref 0.9–3.6)
LYMPHOCYTES NFR BLD: 9 % (ref 21–52)
MAGNESIUM SERPL-MCNC: 2 MG/DL (ref 1.6–2.6)
MCH RBC QN AUTO: 27.6 PG (ref 24–34)
MCHC RBC AUTO-ENTMCNC: 31.5 G/DL (ref 31–37)
MCV RBC AUTO: 87.8 FL (ref 74–97)
MONOCYTES # BLD: 1 K/UL (ref 0.05–1.2)
MONOCYTES NFR BLD: 9 % (ref 3–10)
NEUTS SEG # BLD: 8.2 K/UL (ref 1.8–8)
NEUTS SEG NFR BLD: 74 % (ref 40–73)
PHOSPHATE SERPL-MCNC: 3.2 MG/DL (ref 2.5–4.9)
PLATELET # BLD AUTO: 150 K/UL (ref 135–420)
PMV BLD AUTO: 12 FL (ref 9.2–11.8)
POTASSIUM SERPL-SCNC: 3.5 MMOL/L (ref 3.5–5.5)
POTASSIUM SERPL-SCNC: 3.7 MMOL/L (ref 3.5–5.5)
POTASSIUM SERPL-SCNC: 3.7 MMOL/L (ref 3.5–5.5)
PROT SERPL-MCNC: 6.7 G/DL (ref 6.4–8.2)
RBC # BLD AUTO: 4.27 M/UL (ref 4.7–5.5)
SODIUM SERPL-SCNC: 143 MMOL/L (ref 136–145)
WBC # BLD AUTO: 11 K/UL (ref 4.6–13.2)

## 2020-04-19 PROCEDURE — 74011000250 HC RX REV CODE- 250: Performed by: FAMILY MEDICINE

## 2020-04-19 PROCEDURE — 65610000006 HC RM INTENSIVE CARE

## 2020-04-19 PROCEDURE — 74011250636 HC RX REV CODE- 250/636: Performed by: INTERNAL MEDICINE

## 2020-04-19 PROCEDURE — 84132 ASSAY OF SERUM POTASSIUM: CPT

## 2020-04-19 PROCEDURE — 82962 GLUCOSE BLOOD TEST: CPT

## 2020-04-19 PROCEDURE — 74011250637 HC RX REV CODE- 250/637: Performed by: INTERNAL MEDICINE

## 2020-04-19 PROCEDURE — 74011250637 HC RX REV CODE- 250/637: Performed by: FAMILY MEDICINE

## 2020-04-19 PROCEDURE — 74011250636 HC RX REV CODE- 250/636: Performed by: FAMILY MEDICINE

## 2020-04-19 PROCEDURE — 83735 ASSAY OF MAGNESIUM: CPT

## 2020-04-19 PROCEDURE — 84100 ASSAY OF PHOSPHORUS: CPT

## 2020-04-19 PROCEDURE — 82330 ASSAY OF CALCIUM: CPT

## 2020-04-19 PROCEDURE — C9113 INJ PANTOPRAZOLE SODIUM, VIA: HCPCS | Performed by: FAMILY MEDICINE

## 2020-04-19 PROCEDURE — 85025 COMPLETE CBC W/AUTO DIFF WBC: CPT

## 2020-04-19 PROCEDURE — 77010033678 HC OXYGEN DAILY

## 2020-04-19 PROCEDURE — 74011000250 HC RX REV CODE- 250: Performed by: INTERNAL MEDICINE

## 2020-04-19 PROCEDURE — 36415 COLL VENOUS BLD VENIPUNCTURE: CPT

## 2020-04-19 RX ORDER — HYDRALAZINE HYDROCHLORIDE 25 MG/1
25 TABLET, FILM COATED ORAL 3 TIMES DAILY
Status: DISCONTINUED | OUTPATIENT
Start: 2020-04-19 | End: 2020-04-22 | Stop reason: HOSPADM

## 2020-04-19 RX ADMIN — MIDAZOLAM HYDROCHLORIDE 2 MG: 1 INJECTION, SOLUTION INTRAMUSCULAR; INTRAVENOUS at 04:31

## 2020-04-19 RX ADMIN — FENTANYL CITRATE 50 MCG: 50 INJECTION, SOLUTION INTRAMUSCULAR; INTRAVENOUS at 20:00

## 2020-04-19 RX ADMIN — MIDAZOLAM HYDROCHLORIDE 2 MG: 1 INJECTION, SOLUTION INTRAMUSCULAR; INTRAVENOUS at 11:36

## 2020-04-19 RX ADMIN — HYDRALAZINE HYDROCHLORIDE 25 MG: 25 TABLET, FILM COATED ORAL at 17:07

## 2020-04-19 RX ADMIN — CARVEDILOL 3.12 MG: 3.12 TABLET, FILM COATED ORAL at 08:45

## 2020-04-19 RX ADMIN — FENTANYL CITRATE 50 MCG: 50 INJECTION, SOLUTION INTRAMUSCULAR; INTRAVENOUS at 06:01

## 2020-04-19 RX ADMIN — CHLORHEXIDINE GLUCONATE 10 ML: 1.2 RINSE ORAL at 08:45

## 2020-04-19 RX ADMIN — ENOXAPARIN SODIUM 40 MG: 40 INJECTION SUBCUTANEOUS at 13:50

## 2020-04-19 RX ADMIN — MIDAZOLAM HYDROCHLORIDE 2 MG: 1 INJECTION, SOLUTION INTRAMUSCULAR; INTRAVENOUS at 17:07

## 2020-04-19 RX ADMIN — SODIUM CHLORIDE 40 MG: 9 INJECTION, SOLUTION INTRAMUSCULAR; INTRAVENOUS; SUBCUTANEOUS at 08:45

## 2020-04-19 RX ADMIN — B-COMPLEX W/ C & FOLIC ACID TAB 1 MG 1 TABLET: 1 TAB at 08:45

## 2020-04-19 RX ADMIN — POTASSIUM BICARBONATE 20 MEQ: 782 TABLET, EFFERVESCENT ORAL at 06:00

## 2020-04-19 RX ADMIN — CHLORHEXIDINE GLUCONATE 10 ML: 1.2 RINSE ORAL at 20:00

## 2020-04-19 RX ADMIN — MIDAZOLAM HYDROCHLORIDE 2 MG: 1 INJECTION, SOLUTION INTRAMUSCULAR; INTRAVENOUS at 23:26

## 2020-04-19 RX ADMIN — THIAMINE HCL TAB 100 MG 200 MG: 100 TAB at 20:00

## 2020-04-19 RX ADMIN — POTASSIUM BICARBONATE 20 MEQ: 782 TABLET, EFFERVESCENT ORAL at 22:01

## 2020-04-19 RX ADMIN — ONDANSETRON 4 MG: 2 INJECTION INTRAMUSCULAR; INTRAVENOUS at 04:31

## 2020-04-19 RX ADMIN — QUETIAPINE FUMARATE 25 MG: 25 TABLET ORAL at 08:45

## 2020-04-19 RX ADMIN — QUETIAPINE FUMARATE 25 MG: 25 TABLET ORAL at 20:00

## 2020-04-19 RX ADMIN — CARVEDILOL 3.12 MG: 3.12 TABLET, FILM COATED ORAL at 17:07

## 2020-04-19 RX ADMIN — FENTANYL CITRATE 50 MCG: 50 INJECTION, SOLUTION INTRAMUSCULAR; INTRAVENOUS at 01:25

## 2020-04-19 RX ADMIN — FENTANYL CITRATE 50 MCG: 50 INJECTION, SOLUTION INTRAMUSCULAR; INTRAVENOUS at 23:26

## 2020-04-19 RX ADMIN — HYDRALAZINE HYDROCHLORIDE 25 MG: 25 TABLET, FILM COATED ORAL at 20:00

## 2020-04-19 RX ADMIN — HYDRALAZINE HYDROCHLORIDE 10 MG: 10 TABLET, FILM COATED ORAL at 08:45

## 2020-04-19 RX ADMIN — Medication 5 MG: at 20:00

## 2020-04-19 RX ADMIN — HYDRALAZINE HYDROCHLORIDE 10 MG: 20 INJECTION INTRAMUSCULAR; INTRAVENOUS at 03:07

## 2020-04-19 RX ADMIN — FENTANYL CITRATE 50 MCG: 50 INJECTION, SOLUTION INTRAMUSCULAR; INTRAVENOUS at 12:31

## 2020-04-19 RX ADMIN — FENTANYL CITRATE 50 MCG: 50 INJECTION, SOLUTION INTRAMUSCULAR; INTRAVENOUS at 08:45

## 2020-04-19 RX ADMIN — MIDAZOLAM HYDROCHLORIDE 2 MG: 1 INJECTION, SOLUTION INTRAMUSCULAR; INTRAVENOUS at 08:45

## 2020-04-19 RX ADMIN — MIDAZOLAM HYDROCHLORIDE 2 MG: 1 INJECTION, SOLUTION INTRAMUSCULAR; INTRAVENOUS at 13:59

## 2020-04-19 RX ADMIN — FENTANYL CITRATE 50 MCG: 50 INJECTION, SOLUTION INTRAMUSCULAR; INTRAVENOUS at 14:57

## 2020-04-19 RX ADMIN — FENTANYL CITRATE 50 MCG: 50 INJECTION, SOLUTION INTRAMUSCULAR; INTRAVENOUS at 04:31

## 2020-04-19 RX ADMIN — MIDAZOLAM HYDROCHLORIDE 2 MG: 1 INJECTION, SOLUTION INTRAMUSCULAR; INTRAVENOUS at 02:11

## 2020-04-19 RX ADMIN — FUROSEMIDE 40 MG: 10 INJECTION, SOLUTION INTRAMUSCULAR; INTRAVENOUS at 08:45

## 2020-04-19 RX ADMIN — Medication 10 ML: at 13:50

## 2020-04-19 RX ADMIN — FENTANYL CITRATE 50 MCG: 50 INJECTION, SOLUTION INTRAMUSCULAR; INTRAVENOUS at 03:01

## 2020-04-19 RX ADMIN — FENTANYL CITRATE 50 MCG: 50 INJECTION, SOLUTION INTRAMUSCULAR; INTRAVENOUS at 17:08

## 2020-04-19 RX ADMIN — POTASSIUM BICARBONATE 20 MEQ: 782 TABLET, EFFERVESCENT ORAL at 14:11

## 2020-04-19 RX ADMIN — THIAMINE HCL TAB 100 MG 200 MG: 100 TAB at 08:45

## 2020-04-19 RX ADMIN — FENTANYL CITRATE 50 MCG: 50 INJECTION, SOLUTION INTRAMUSCULAR; INTRAVENOUS at 10:31

## 2020-04-19 RX ADMIN — MIDAZOLAM HYDROCHLORIDE 2 MG: 1 INJECTION, SOLUTION INTRAMUSCULAR; INTRAVENOUS at 20:00

## 2020-04-19 NOTE — PROGRESS NOTES
COMPLETE TRACH CARE GIVEN. LARGE AMT DRIED BLOOD AND THICK BROWN DRAINAGE PRESENT AROUND STOMA AND FLANGE. CLEANED WITH NORMAL SALINE. SUTURES REMAIN IN PLACE. INNER CANNULA REPLACED AND NEW TRACH TIES WERE APPLIED. ALSO CHANGED SOILED VENT CIRCUIT AND WOOD SUCTION.

## 2020-04-19 NOTE — PROGRESS NOTES
ATTEMPTED TRACH COLLAR TRIAL. PATIENT TOLERATED LESS THAN 5 MINUTES.  RR >38/PATIENT AGITATED. RETURNED TO CPAP 8/PS 12/35%.

## 2020-04-19 NOTE — PROGRESS NOTES
Chart reviewed as CM on call. Pt accepted @ [facility] pending bed availability. Spoke with Enedelia @ [facility] and no bed available today, potentially tomorrow. CM will cont to follow and will be available via hospital  on weekends.

## 2020-04-19 NOTE — PROGRESS NOTES
NUTRITION UPDATE    - pt tolerating tube feeds; on SBT currently; will continue to monitor       Marysol Sharp, [address] [place] [address]  PAGER:  [phone]
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
## 2020-04-19 NOTE — PROGRESS NOTES
Hospitalist Progress Note    Patient: Silvana Hancock MRN: [id number]  [facility]: [id number]    YOB: 1960  Age: 62 y.o. Sex: male    DOA: 4/3/2020 LOS:  LOS: 16 days                Assessment and Plan:    Principal Problem:    Respiratory failure with hypercapnia ([address] (4/3/2020)    Active Problems:    CHF (congestive heart failure) ([address] (3/3/2018)      Cocaine abuse ([address] (4/22/2018)      Sleep apnea (7/9/2018)      COPD with acute exacerbation ([address] (4/3/2020)      Transaminitis (4/3/2020)      Acute on chronic renal insufficiency (4/3/2020)      Suspected Covid-19 Virus Infection (4/4/2020)      Morbid obesity with body mass index of 50 or higher ([address] (4/4/2020)      Goals of care, counseling/discussion ()      HCAP (healthcare-associated pneumonia) (4/7/2020)      Tracheostomy in place [facility]) (4/15/2020)      Respiratory   Hypercapnic respiratory failure with obesity hypoventilation syndrome -stable on vent overnight   On  Vent: peep 10 and O2 35 %  Continue weaning and trach care   Trach 4/13 per Dr. Carmen Oden        Hcap:  Completed iv abx      Copd exacerbation   Received iv steroid      shyam : on trilogy at home , not compliance      Cardiovascular:  Hypotension resolved         chf  Combined diastolic and systolic   Ef 30 - 21%.  Mild (grade 1) left ventricular diastolic dysfunction from echo   On lasix and coreg       ID: Pneumonia-completed the treatment , Repeat COVID negative twice       Endocrine  On sliding scale insulin     FEN   Continue icu replacement protocol   Hypokalemia -resolved      Renal:  joann on ckd3,resolved -remain normal range       Neuro :   Open eyes per voice      Heme  H/h so far stable         Psych  Cocaine abuse      GI   Elevated lft -resolved   Peg tube 4/16 -function well      Urine drug screen positive for cocaine, covid 19 negative, got trach/peg and picc line, waiting [facility] bed at [facility]     Poor prognosis palliative care on board         Chief complaint:  61 y/o male w/ hx of COPD on 4L home O2-trilogy at home ,  EF of 35%, super morbid obesity with recent admission to [facility] who presents in acute hypercapneic respiratory failure, he was admitted tue to acute on chronic respiration failure-intubated on admission. Subjective: Sherly Salazar had some discomfort in bed, no acute distress, resolved with some positioning      Review of systems:    General: No fevers or chills. Cardiovascular: No chest pain or pressure. No palpitations. Pulmonary: No shortness of breath. Gastrointestinal: No nausea, vomiting. Objective:    Vital signs/Intake and Output:  Visit Vitals  /71   Pulse 85   Temp 98.8 °F (37.1 °C)   Resp 16   Ht 5' 7\" (1.702 m)   Wt (!) 159.4 kg (351 lb 6.6 oz)   SpO2 96%   BMI 55.04 kg/m²     Current Shift:  04/19 0701 - 04/19 1900  In: -   Out: 2300 [MMLWB:3472]  Last three shifts:  04/17 1901 - 04/19 0700  In: 2022.5 [I.V.:182.5]  Out: 1077 [Urine:4675; Drains:550]    Physical Exam:  General: NAD, AAOx3. Non-toxic. HEENT: NC/AT. PERRLA, EOMI.  MMM. Lungs: Nml inspection. CTA B/L. No wheezing, rales or rhonchi. Heart:  S1S2 RRR,  PMI mid 5th IC space. No M/RG. Abdomen: Soft, NT/ND.  BS+. No peritoneal signs. Extremities: No C/C/E. Psych:   Nml affect. Neurologic:  2-12 intact. Strength 5/5 throughout.   Sensation symmetrical.          Labs: Results:       Chemistry Recent Labs     04/19/20  1300 04/19/20  0415 04/18/20  2000  04/18/20  0400 04/17/20  0436   GLU  --  117*  --   --  107* 121*   NA  --  143  --   --  143 143   K 3.5 3.7 3.7   < > 3.4* 3.8   CL  --  108  --   --  108 109   CO2  --  30  --   --  28 28   BUN  --  33*  --   --  32* 29*   CREA  --  1.11  --   --  1.20 1.20   CA  --  9.3  --   --  9.5 9.4   AGAP  --  5  --   --  7 6   BUCR  --  30*  --   --  27* 24*   AP  --  182*  --   --  168* 185*   TP  --  6.7  --   --  6.5 6.8   ALB  --  2.7*  --   --  2.6* 2.8*   GLOB  --  4.0  --   --  3.9 4.0   AGRAT  --  0.7*  --   --  0.7* 0.7*    < > = values in this interval not displayed. CBC w/Diff Recent Labs     04/19/20  0415 04/18/20  0400 04/17/20  0436   WBC 11.0 10.9 12.7   RBC 4.27* 4.33* 4.49*   HGB 11.8* 11.8* 12.3*   HCT 37.5 37.6 39.1    135 158   GRANS 74* 78* 81*   LYMPH 9* 9* 6*   EOS 8* 6* 2      Cardiac Enzymes No results for input(s): CPK, CKND1, TISHA in the last 72 hours. No lab exists for component: CKRMB, TROIP   Coagulation No results for input(s): PTP, INR, APTT, INREXT in the last 72 hours. Lipid Panel Lab Results   Component Value Date/Time    Cholesterol, total 196 01/25/2018 02:51 AM    HDL Cholesterol 64 (H) 01/25/2018 02:51 AM    LDL, calculated 121.2 (H) 01/25/2018 02:51 AM    VLDL, calculated 10.8 01/25/2018 02:51 AM    Triglyceride 54 01/25/2018 02:51 AM    CHOL/HDL Ratio 3.1 01/25/2018 02:51 AM      BNP No results for input(s): BNPP in the last 72 hours.    Liver Enzymes Recent Labs     04/19/20 0415   TP 6.7   ALB 2.7*   *   SGOT 72*      Thyroid Studies Lab Results   Component Value Date/Time    TSH 0.11 (L) 03/19/2019 06:50 AM        Procedures/imaging: see electronic medical records for all procedures/Xrays and details which were not copied into this note but were reviewed prior to creation of Plan

## 2020-04-19 NOTE — PROGRESS NOTES
1900 - Bedside and Verbal shift change report given to Conor Mccoy RN (oncoming nurse) by Rhonda Perez RN (offgoing nurse). Report included the following information SBAR, Kardex, ED Summary, Procedure Summary, Intake/Output, MAR, Recent Results, Med Rec Status and Cardiac Rhythm NSR:BBB. Patients tube feed rate increased at this time to 40 ml/hr. Gastric residuals: 0 ml.     2000 - Shift assessment completed. Patient is extremely agitated. Ventilator alarming frequently. PRN medications administered. Bilateral soft wrist restraints in place to protect integrity of tubes, lines, and drains; no signs/symptoms of restraints. 2200 - Patient banging wrists against side rails. Patient attempting to mouth words, this nurse and multiple other staff members are unsuccessful at interpreting patients communication. Patient repositioned and suctioned. Ventilator continuously alarming. Patient has received all available PRN medications and bedtime medications. Dr Michael Watts made aware of patients agitation and current PRN orders. Frequency of PRN Fentanyl and Versed changed. 0000 - Reassessment completed, no changes to previous assessment. CHG bath performed. Rectal tube removed due to patients discomfort. 0100 - Tube feeding rate increased to 50 ml/hr. Gastric residuals: 0 ml.     0400 - Reassessment completed, no changes to previous assessment. 0600 - Potassium replaced per electrolyte protocol.     0700 - Tube feeding rate increased to 60 ml/hr. Gastric residuals: 0 ml.     0720 - Bedside and Verbal shift change report given to Fiona Manning RN (oncoming nurse) by Conor Mccoy RN (offgoing nurse). Report included the following information SBAR, Kardex, ED Summary, Procedure Summary, Intake/Output, MAR, Recent Results, Med Rec Status and Cardiac Rhythm NSR: BBB.

## 2020-04-19 NOTE — PROGRESS NOTES
RSBI CONSISTENTLY > 100 AFTER A TOTAL OF APPROXIMATELY 3.5 HOURS ON SBT (FAILED TRACH COLLAR TRIAL. NOW RETURNED TO FULL SUPPORT WITH SETTINGS: AC/VC+ 12/480/35%/PEEP 8.

## 2020-04-20 ENCOUNTER — APPOINTMENT (OUTPATIENT)
Dept: GENERAL RADIOLOGY | Age: 60
DRG: 005 | End: 2020-04-20
Attending: INTERNAL MEDICINE
Payer: MEDICAID

## 2020-04-20 LAB
ALBUMIN SERPL-MCNC: 2.6 G/DL (ref 3.4–5)
ALBUMIN/GLOB SERPL: 0.7 {RATIO} (ref 0.8–1.7)
ALP SERPL-CCNC: 179 U/L (ref 45–117)
ALT SERPL-CCNC: 89 U/L (ref 16–61)
ANION GAP SERPL CALC-SCNC: 3 MMOL/L (ref 3–18)
AST SERPL-CCNC: 51 U/L (ref 10–38)
BASOPHILS # BLD: 0 K/UL (ref 0–0.1)
BASOPHILS NFR BLD: 0 % (ref 0–2)
BILIRUB SERPL-MCNC: 0.4 MG/DL (ref 0.2–1)
BUN SERPL-MCNC: 27 MG/DL (ref 7–18)
BUN/CREAT SERPL: 28 (ref 12–20)
CA-I SERPL-SCNC: 1.27 MMOL/L (ref 1.12–1.32)
CALCIUM SERPL-MCNC: 9 MG/DL (ref 8.5–10.1)
CHLORIDE SERPL-SCNC: 106 MMOL/L (ref 100–111)
CO2 SERPL-SCNC: 34 MMOL/L (ref 21–32)
CREAT SERPL-MCNC: 0.95 MG/DL (ref 0.6–1.3)
DIFFERENTIAL METHOD BLD: ABNORMAL
EOSINOPHIL # BLD: 1 K/UL (ref 0–0.4)
EOSINOPHIL NFR BLD: 11 % (ref 0–5)
ERYTHROCYTE [DISTWIDTH] IN BLOOD BY AUTOMATED COUNT: 16.2 % (ref 11.6–14.5)
GLOBULIN SER CALC-MCNC: 3.8 G/DL (ref 2–4)
GLUCOSE BLD STRIP.AUTO-MCNC: 103 MG/DL (ref 70–110)
GLUCOSE BLD STRIP.AUTO-MCNC: 111 MG/DL (ref 70–110)
GLUCOSE BLD STRIP.AUTO-MCNC: 129 MG/DL (ref 70–110)
GLUCOSE BLD STRIP.AUTO-MCNC: 141 MG/DL (ref 70–110)
GLUCOSE SERPL-MCNC: 108 MG/DL (ref 74–99)
HCT VFR BLD AUTO: 37.5 % (ref 36–48)
HGB BLD-MCNC: 11.5 G/DL (ref 13–16)
LYMPHOCYTES # BLD: 1.2 K/UL (ref 0.9–3.6)
LYMPHOCYTES NFR BLD: 14 % (ref 21–52)
MAGNESIUM SERPL-MCNC: 2.1 MG/DL (ref 1.6–2.6)
MCH RBC QN AUTO: 27.3 PG (ref 24–34)
MCHC RBC AUTO-ENTMCNC: 30.7 G/DL (ref 31–37)
MCV RBC AUTO: 88.9 FL (ref 74–97)
MONOCYTES # BLD: 0.5 K/UL (ref 0.05–1.2)
MONOCYTES NFR BLD: 6 % (ref 3–10)
NEUTS SEG # BLD: 6.1 K/UL (ref 1.8–8)
NEUTS SEG NFR BLD: 69 % (ref 40–73)
PHOSPHATE SERPL-MCNC: 3.5 MG/DL (ref 2.5–4.9)
PLATELET # BLD AUTO: 126 K/UL (ref 135–420)
PMV BLD AUTO: 11.8 FL (ref 9.2–11.8)
POTASSIUM SERPL-SCNC: 3.7 MMOL/L (ref 3.5–5.5)
PROT SERPL-MCNC: 6.4 G/DL (ref 6.4–8.2)
RBC # BLD AUTO: 4.22 M/UL (ref 4.7–5.5)
SODIUM SERPL-SCNC: 143 MMOL/L (ref 136–145)
WBC # BLD AUTO: 8.8 K/UL (ref 4.6–13.2)

## 2020-04-20 PROCEDURE — 94640 AIRWAY INHALATION TREATMENT: CPT

## 2020-04-20 PROCEDURE — 84100 ASSAY OF PHOSPHORUS: CPT

## 2020-04-20 PROCEDURE — 82330 ASSAY OF CALCIUM: CPT

## 2020-04-20 PROCEDURE — 74011000250 HC RX REV CODE- 250: Performed by: INTERNAL MEDICINE

## 2020-04-20 PROCEDURE — 94003 VENT MGMT INPAT SUBQ DAY: CPT

## 2020-04-20 PROCEDURE — 74011000250 HC RX REV CODE- 250: Performed by: FAMILY MEDICINE

## 2020-04-20 PROCEDURE — 80053 COMPREHEN METABOLIC PANEL: CPT

## 2020-04-20 PROCEDURE — C9113 INJ PANTOPRAZOLE SODIUM, VIA: HCPCS | Performed by: FAMILY MEDICINE

## 2020-04-20 PROCEDURE — 74011250637 HC RX REV CODE- 250/637: Performed by: INTERNAL MEDICINE

## 2020-04-20 PROCEDURE — 83735 ASSAY OF MAGNESIUM: CPT

## 2020-04-20 PROCEDURE — 74011250636 HC RX REV CODE- 250/636: Performed by: FAMILY MEDICINE

## 2020-04-20 PROCEDURE — 82962 GLUCOSE BLOOD TEST: CPT

## 2020-04-20 PROCEDURE — 65610000006 HC RM INTENSIVE CARE

## 2020-04-20 PROCEDURE — 85025 COMPLETE CBC W/AUTO DIFF WBC: CPT

## 2020-04-20 PROCEDURE — 77010033678 HC OXYGEN DAILY

## 2020-04-20 PROCEDURE — 77030018798 HC PMP KT ENTRL FED COVD -A

## 2020-04-20 PROCEDURE — 74011250636 HC RX REV CODE- 250/636: Performed by: INTERNAL MEDICINE

## 2020-04-20 PROCEDURE — 71045 X-RAY EXAM CHEST 1 VIEW: CPT

## 2020-04-20 PROCEDURE — 74011250637 HC RX REV CODE- 250/637: Performed by: FAMILY MEDICINE

## 2020-04-20 RX ORDER — IPRATROPIUM BROMIDE AND ALBUTEROL SULFATE 2.5; .5 MG/3ML; MG/3ML
3 SOLUTION RESPIRATORY (INHALATION)
Status: DISCONTINUED | OUTPATIENT
Start: 2020-04-20 | End: 2020-04-22 | Stop reason: HOSPADM

## 2020-04-20 RX ORDER — IPRATROPIUM BROMIDE AND ALBUTEROL SULFATE 2.5; .5 MG/3ML; MG/3ML
3 SOLUTION RESPIRATORY (INHALATION) 3 TIMES DAILY
Status: DISCONTINUED | OUTPATIENT
Start: 2020-04-20 | End: 2020-04-20

## 2020-04-20 RX ORDER — BUDESONIDE 0.5 MG/2ML
500 INHALANT ORAL
Status: DISCONTINUED | OUTPATIENT
Start: 2020-04-20 | End: 2020-04-22 | Stop reason: HOSPADM

## 2020-04-20 RX ADMIN — FENTANYL CITRATE 50 MCG: 50 INJECTION, SOLUTION INTRAMUSCULAR; INTRAVENOUS at 17:57

## 2020-04-20 RX ADMIN — QUETIAPINE FUMARATE 25 MG: 25 TABLET ORAL at 10:41

## 2020-04-20 RX ADMIN — Medication 5 MG: at 21:23

## 2020-04-20 RX ADMIN — MIDAZOLAM HYDROCHLORIDE 2 MG: 1 INJECTION, SOLUTION INTRAMUSCULAR; INTRAVENOUS at 17:56

## 2020-04-20 RX ADMIN — CARVEDILOL 3.12 MG: 3.12 TABLET, FILM COATED ORAL at 10:41

## 2020-04-20 RX ADMIN — FENTANYL CITRATE 50 MCG: 50 INJECTION, SOLUTION INTRAMUSCULAR; INTRAVENOUS at 05:00

## 2020-04-20 RX ADMIN — Medication 10 ML: at 18:01

## 2020-04-20 RX ADMIN — HYDRALAZINE HYDROCHLORIDE 25 MG: 25 TABLET, FILM COATED ORAL at 17:56

## 2020-04-20 RX ADMIN — THIAMINE HCL TAB 100 MG 200 MG: 100 TAB at 10:41

## 2020-04-20 RX ADMIN — B-COMPLEX W/ C & FOLIC ACID TAB 1 MG 1 TABLET: 1 TAB at 10:41

## 2020-04-20 RX ADMIN — HYDRALAZINE HYDROCHLORIDE 25 MG: 25 TABLET, FILM COATED ORAL at 10:41

## 2020-04-20 RX ADMIN — IPRATROPIUM BROMIDE AND ALBUTEROL SULFATE 3 ML: .5; 3 SOLUTION RESPIRATORY (INHALATION) at 15:12

## 2020-04-20 RX ADMIN — MIDAZOLAM HYDROCHLORIDE 2 MG: 1 INJECTION, SOLUTION INTRAMUSCULAR; INTRAVENOUS at 02:06

## 2020-04-20 RX ADMIN — ENOXAPARIN SODIUM 40 MG: 40 INJECTION SUBCUTANEOUS at 17:55

## 2020-04-20 RX ADMIN — THIAMINE HCL TAB 100 MG 200 MG: 100 TAB at 21:23

## 2020-04-20 RX ADMIN — IPRATROPIUM BROMIDE AND ALBUTEROL SULFATE 3 ML: .5; 3 SOLUTION RESPIRATORY (INHALATION) at 20:30

## 2020-04-20 RX ADMIN — MIDAZOLAM HYDROCHLORIDE 2 MG: 1 INJECTION, SOLUTION INTRAMUSCULAR; INTRAVENOUS at 21:24

## 2020-04-20 RX ADMIN — QUETIAPINE FUMARATE 25 MG: 25 TABLET ORAL at 21:23

## 2020-04-20 RX ADMIN — CARVEDILOL 3.12 MG: 3.12 TABLET, FILM COATED ORAL at 17:56

## 2020-04-20 RX ADMIN — FUROSEMIDE 40 MG: 10 INJECTION, SOLUTION INTRAMUSCULAR; INTRAVENOUS at 10:36

## 2020-04-20 RX ADMIN — BUDESONIDE 500 MCG: 0.5 INHALANT RESPIRATORY (INHALATION) at 20:30

## 2020-04-20 RX ADMIN — MIDAZOLAM HYDROCHLORIDE 2 MG: 1 INJECTION, SOLUTION INTRAMUSCULAR; INTRAVENOUS at 11:01

## 2020-04-20 RX ADMIN — POTASSIUM BICARBONATE 20 MEQ: 782 TABLET, EFFERVESCENT ORAL at 07:20

## 2020-04-20 RX ADMIN — HYDRALAZINE HYDROCHLORIDE 25 MG: 25 TABLET, FILM COATED ORAL at 21:23

## 2020-04-20 RX ADMIN — Medication 10 ML: at 21:23

## 2020-04-20 RX ADMIN — FENTANYL CITRATE 50 MCG: 50 INJECTION, SOLUTION INTRAMUSCULAR; INTRAVENOUS at 11:01

## 2020-04-20 RX ADMIN — FENTANYL CITRATE 50 MCG: 50 INJECTION, SOLUTION INTRAMUSCULAR; INTRAVENOUS at 21:23

## 2020-04-20 RX ADMIN — CHLORHEXIDINE GLUCONATE 10 ML: 1.2 RINSE ORAL at 10:41

## 2020-04-20 RX ADMIN — CHLORHEXIDINE GLUCONATE 10 ML: 1.2 RINSE ORAL at 21:23

## 2020-04-20 RX ADMIN — FENTANYL CITRATE 50 MCG: 50 INJECTION, SOLUTION INTRAMUSCULAR; INTRAVENOUS at 02:07

## 2020-04-20 RX ADMIN — FENTANYL CITRATE 50 MCG: 50 INJECTION, SOLUTION INTRAMUSCULAR; INTRAVENOUS at 07:20

## 2020-04-20 RX ADMIN — MIDAZOLAM HYDROCHLORIDE 2 MG: 1 INJECTION, SOLUTION INTRAMUSCULAR; INTRAVENOUS at 05:00

## 2020-04-20 RX ADMIN — SODIUM CHLORIDE 40 MG: 9 INJECTION, SOLUTION INTRAMUSCULAR; INTRAVENOUS; SUBCUTANEOUS at 10:36

## 2020-04-20 RX ADMIN — MIDAZOLAM HYDROCHLORIDE 2 MG: 1 INJECTION, SOLUTION INTRAMUSCULAR; INTRAVENOUS at 07:20

## 2020-04-20 NOTE — PROGRESS NOTES
1900 - Bedside and Verbal shift change report given to Ginny Franco RN (oncoming nurse) by Saul Charles RN (offgoing nurse). Report included the following information SBAR, Kardex, ED Summary, Procedure Summary, Intake/Output, MAR, Recent Results, Med Rec Status and Cardiac Rhythm NSR: BBB    2000 - Shift assessment completed, patient restless and agitated, follows commands. PRN medications administered for pain; BLE. Incontinence care provided for small BM. Tube feeding rate increased to goal of 75 ml/hr. Gastric residuals: 0 ml.     2100 - Potassium replaced per electrolyte protocol. 0000 - Reassessment completed, no changes to previous assessment. Gastric residuals 10 ml.     0400 - Reassessment completed, no changes to previous assessment. Gastric residuals 5 ml. 0740 - Bedside and Verbal shift change report given to Bob Hays RN (oncoming nurse) by Ginny Franco RN (offgoing nurse). Report included the following information SBAR, Kardex, ED Summary, Procedure Summary, Intake/Output, MAR, Recent Results, Med Rec Status and Cardiac Rhythm NSR: [facility].

## 2020-04-20 NOTE — PROGRESS NOTES
0740-received report from [place] RN included SBAR MAR and Kardex. Shift Summary-No change in pt status. Awaiting placement at [address] [place] [address], no beds at present. Bedside and Verbal shift change report given to Georgia Siegel RN (oncoming nurse) by Maryuri Ramos RN (offgoing nurse). Report included the following information SBAR, Kardex and MAR.

## 2020-04-20 NOTE — WOUND CARE
Chart audited for low ziggy score, patient with high risk for skin breakdown. Skin Care & Pressure Relief Recommendations Minimize layers of linen Pads under patient to optimize support surface Turn/reposition approximately every 2 hours Pillow wedges Manage incontinence Promote continence; Skin Protective lotion/cream to buttocks and sacrum daily and as needed with incontinence care Offload heels pillows

## 2020-04-20 NOTE — PROGRESS NOTES
Hospitalist Progress Note-critical care note     Patient: Saul Ambrocio MRN: [id number]  CSN: [id number]    YOB: 1960  Age: 62 y.o. Sex: male    DOA: 4/3/2020 LOS:  LOS: 17 days            Chief complaint:  Respiratory failure, joann on ckd3      Assessment/Plan         Hospital Problems  Date Reviewed: 4/3/2020          Codes Class Noted POA    Tracheostomy in place [facility]) ICD-10-CM: Z93.0  ICD-9-CM: V44.0  4/15/2020 Unknown        HCAP (healthcare-associated pneumonia) ICD-10-CM: J18.9  ICD-9-CM: 486  4/7/2020 Unknown        Goals of care, counseling/discussion ICD-10-CM: Z71.89  ICD-9-CM: V65.49  Unknown Unknown        Suspected Covid-19 Virus Infection ICD-10-CM: R68.89  4/4/2020 Clinically Undetermined        Morbid obesity with body mass index of 50 or higher ([facility] ICD-10-CM: E66.01  ICD-9-CM: 278.01  4/4/2020 Yes        COPD with acute exacerbation ([facility] ICD-10-CM: J44.1  ICD-9-CM: 491.21  4/3/2020 Yes        * (Principal) Respiratory failure with hypercapnia ([facility] ICD-10-CM: J96.92  ICD-9-CM: 518.81  4/3/2020 Yes        Transaminitis ICD-10-CM: R74.0  ICD-9-CM: 790.4  4/3/2020 Yes        Acute on chronic renal insufficiency ICD-10-CM: N28.9, N18.9  ICD-9-CM: 593.9, 585.9  4/3/2020 Yes        Sleep apnea ICD-10-CM: G47.30  ICD-9-CM: 780.57  7/9/2018 Yes        Cocaine abuse ([facility] ICD-10-CM: F14.10  ICD-9-CM: 305.60  4/22/2018 Yes        CHF (congestive heart failure) ([facility]) (Chronic) ICD-10-CM: I50.9  ICD-9-CM: 428.0  3/3/2018 Yes              61 y/o male w/ hx of COPD on 4L home O2-trilogy at home ,  EF of 35%, super morbid obesity with recent admission to [facility] who presents in acute hypercapneic respiratory failure, he was admitted tue to acute on chronic respiration failure-intubated on admission.    Urine drug screen positive for cocaine, covid 19 negative, got trach/peg and picc line, waiting for bed at [facility]- no significant change over the weekend         Respiratory   Hypercapnic respiratory failure with obesity hypoventilation syndrome -stable on vent overnight   On  Vent: peep 8 and O2 35 % AM need long time to wean   Continue weaning and trach care   Trach 4/13 per Dr. Arevalo Seats:  Completed iv abx     Copd exacerbation -resolved   Received iv steroid -completed     shyam : on trilogy at home , not compliance     Cardiovascular:      chf  Combined diastolic and systolic   Ef 30 - 52%. Mild (grade 1) left ventricular diastolic dysfunction from echo   On lasix and coreg       ID: Pneumonia-completed the treatment , Repeat COVID negative twice       Endocrine  On sliding scale insulin     FEN   Continue icu replacement protocol   Hypokalemia -resolved      Renal:  joann on ckd3,resolved      Neuro : Follow the commands     Heme  H/h so far stable        Psych  Cocaine abuse     GI   Elevated lft -resolved   Peg tube 4/16 -function well, tube feeding now, well tolerate      Poor prognosis palliative care on board       30 minutes of critical care time spent in the direct evaluation and treatment of this high risk patient. The reason for providing this level of medical care for this critically ill patient was due a critical illness that impaired one or more vital organ systems such that there was a high probability of imminent or life threatening deterioration in the patients condition. This care involved high complexity decision making to assess, manipulate, and support vital system functions, to treat this degreee vital organ system failure and to prevent further life threatening deterioration of the patients condition. Waiting for bed aviliable   Disposition :tbd,   Review of systems:  Unable to obtain due to vent   Vital signs/Intake and Output:  Visit Vitals  /72   Pulse 76   Temp 98.3 °F (36.8 °C)   Resp 28   Ht 5' 7\" (1.702 m)   Wt 157.5 kg (347 lb 3.6 oz)   SpO2 94%   BMI 54.38 kg/m²     Current Shift:  No intake/output data recorded.   Last three shifts:  04/18 1901 - 04/20 0700  In: 1300   Out: 3525 [Urine:3525]    Physical Exam:  General:         Open eyes per voice, follow command   HEENT: NC, Atraumatic. anicteric sclerae. Trach -on vent   Lungs: CTA Bilaterally. No Wheezing/Rhonchi/Rales. Heart:  Regular  rhythm,  No murmur, No Rubs, No Gallops  Abdomen: Soft, obesity , Non tender. +Bowel sounds,  Peg tube noticed   Extremities: No c/c, +edema of feet   Psych:   Calm   Neurologic:  Open eyes and follow  Commands           Labs: Results:       Chemistry Recent Labs     04/20/20 0445 04/19/20 2000 04/19/20 1300 04/19/20  0415 04/18/20  0400   *  --   --  117*  --  107*     --   --  143  --  143   K 3.7 3.7 3.5 3.7   < > 3.4*     --   --  108  --  108   CO2 34*  --   --  30  --  28   BUN 27*  --   --  33*  --  32*   CREA 0.95  --   --  1.11  --  1.20   CA 9.0  --   --  9.3  --  9.5   AGAP 3  --   --  5  --  7   BUCR 28*  --   --  30*  --  27*   *  --   --  182*  --  168*   TP 6.4  --   --  6.7  --  6.5   ALB 2.6*  --   --  2.7*  --  2.6*   GLOB 3.8  --   --  4.0  --  3.9   AGRAT 0.7*  --   --  0.7*  --  0.7*    < > = values in this interval not displayed. CBC w/Diff Recent Labs     04/20/20 0445 04/19/20 0415 04/18/20  0400   WBC 8.8 11.0 10.9   RBC 4.22* 4.27* 4.33*   HGB 11.5* 11.8* 11.8*   HCT 37.5 37.5 37.6   * 150 135   GRANS 69 74* 78*   LYMPH 14* 9* 9*   EOS 11* 8* 6*      Cardiac Enzymes No results for input(s): CPK, CKND1, TISHA in the last 72 hours. No lab exists for component: CKRMB, TROIP   Coagulation No results for input(s): PTP, INR, APTT, INREXT, INREXT in the last 72 hours.     Lipid Panel Lab Results   Component Value Date/Time    Cholesterol, total 196 01/25/2018 02:51 AM    HDL Cholesterol 64 (H) 01/25/2018 02:51 AM    LDL, calculated 121.2 (H) 01/25/2018 02:51 AM    VLDL, calculated 10.8 01/25/2018 02:51 AM    Triglyceride 54 01/25/2018 02:51 AM    CHOL/HDL Ratio 3.1 01/25/2018 02:51 AM      BNP No results for input(s): BNPP in the last 72 hours. Liver Enzymes Recent Labs     04/20/20  0445   TP 6.4   ALB 2.6*   *   SGOT 51*      Thyroid Studies Lab Results   Component Value Date/Time    TSH 0.11 (L) 03/19/2019 06:50 AM        Procedures/imaging: see electronic medical records for all procedures/Xrays and details which were not copied into this note but were reviewed prior to creation of Plan    Xr Abd (kub)    Result Date: 4/4/2020  EXAM: Single view of the abdomen CLINICAL INDICATION/HISTORY: Nasogastric tube placement    --Additional history: None. COMPARISON: None TECHNIQUE: Single supine view _______________ FINDINGS: The impression. _______________     IMPRESSION: Study technically limited by patient's body habitus. Nasogastric tube tip appears to project over the gastric antrum. Xr Chest Port    Result Date: 4/7/2020  EXAM: XR CHEST PORT CLINICAL INDICATION/HISTORY: OG tube in place. -Additional: None COMPARISON: Earlier the same day TECHNIQUE: Portable frontal view of the chest _______________ FINDINGS: SUPPORT DEVICES: Endotracheal tube approximately 1 cm above the juan miguel. Enteric tube tip out of field of view possibly in the distal stomach. HEART AND MEDIASTINUM: Cardiomegaly LUNGS AND PLEURAL SPACES: Hypoinflated lungs. Probable small to moderate left effusion. Mild interstitial edema. No pneumothorax. _______________     IMPRESSION: Endotracheal tube approximately 1 cm above the juan miguel. Enteric tube tip out of field of view possibly in the distal stomach. Xr Chest Port    Result Date: 4/7/2020  EXAM: XR CHEST PORT CLINICAL INDICATION/HISTORY: pneumonia, intubated -Additional: None COMPARISON: One day prior TECHNIQUE: Portable frontal view of the chest _______________ FINDINGS: SUPPORT DEVICES: Enteric tube and endotracheal tube unchanged. Debi Dye HEART AND MEDIASTINUM: cardiomegaly LUNGS AND PLEURAL SPACES: Hypoinflated lungs. Probable small to moderate left effusion. Mild interstitial edema.  No pneumothorax. _______________     IMPRESSION: Hypoinflated lungs. Probable small to moderate left effusion. Mild interstitial edema. No pneumothorax. Xr Chest Port    Result Date: 4/6/2020  EXAM: XR CHEST PORT CLINICAL INDICATION/HISTORY: While intubated -Additional: None COMPARISON: One day prior TECHNIQUE: Portable frontal view of the chest _______________ FINDINGS: SUPPORT DEVICES: Enteric tube and endotracheal tube unchanged. Marnell Robby HEART AND MEDIASTINUM: cardiomegaly LUNGS AND PLEURAL SPACES: Hypoinflated lungs. Probable small left effusion. Mild interstitial edema. No pneumothorax. _______________     IMPRESSION: Hypoinflated lungs. Probable small left effusion. Mild interstitial edema. Xr Chest Port    Result Date: 4/5/2020  EXAM: CHEST RADIOGRAPH CLINICAL INDICATION/HISTORY: Respiratory failure -Additional: None COMPARISON: April 4, 2020 TECHNIQUE: Portable frontal view of the chest _______________ FINDINGS: SUPPORT DEVICES: The tip of an endotracheal tube is 10 cm above the juan miguel. A nasogastric tube crosses the gastroesophageal junction. A left IJ central venous catheter terminates in the proximal SVC. HEART AND MEDIASTINUM: The heart is enlarged. LUNGS AND PLEURAL SPACES: Interstitial lung markings are increased. Lung volumes are hypoinflated and there are opacities in the lung bases. No pneumothorax. BONY THORAX AND SOFT TISSUES: Unremarkable. _______________     IMPRESSION: 1. Mild pulmonary edema 2. Bibasilar opacities that may represent a combination of atelectasis and small pleural effusions     Xr Chest Port    Result Date: 4/4/2020  EXAM: PORTABLE CHEST HISTORY: Central line placement. Acute respiratory failure. COMPARISON: 4/4/2020 at 12:42 AM TECHNIQUE: Single portable view. _______________ FINDINGS: SUPPORT DEVICES: Endotracheal tube tip lies about 4.2 cm above juan miguel. Left central venous catheter tip in upper superior vena cava. HEART AND MEDIASTINUM: Cardiomegaly.  LUNGS AND PLEURAL SPACES: Patchy airspace disease right lung base may represent developing subsegmental atelectasis or pneumonia. Evaluation limited by large body habitus. BONES AND SOFT TISSUES: Bony structures are normal. _______________     IMPRESSION: Interval placement left central venous catheter. No pneumothorax. Cardiomegaly without change. Patchy airspace disease right lung base either subsegmental atelectasis or pneumonia appears slightly worse. Xr Chest Port    Result Date: 4/4/2020  EXAM: Chest radiograph  CLINICAL INDICATION/HISTORY: Chemical ventilation    --Additional history: None. COMPARISON: 04/03/2020 TECHNIQUE: Frontal view of the chest _______________ FINDINGS: SUPPORT DEVICES: There is an endotracheal tube with tip approximately 5.5 cm above the juan miguel. There is a nasogastric tube descends below the diaphragm. HEART AND MEDIASTINUM: React silhouette is enlarged. Stable mediastinal contours. . Normal pulmonary vasculature. LUNGS AND PLEURAL SPACES: No convincing focal airspace opacity given the limitations of the study and superimposed body habitus. Sharp costophrenic sulci. No pneumothoraces. BONY THORAX AND SOFT TISSUES: Questionable fracture deformity of the right lateral ninth rib. _______________     IMPRESSION: 1. Support lines and tubes as detailed above. 2. Technically limited study secondary to body habitus without significant change from the prior study. Xr Chest Port    Result Date: 4/3/2020  EXAM:  AP Portable Chest X-ray 1 view INDICATION: Chest pain shortness of breath COMPARISON: None _______________ FINDINGS:  Heart size is enlarged and mediastinal contours are within normal limits for portable radiograph. There are increased interstitial markings in the right lung. No focal airspace disease seen. . There are no pleural effusions. No acute osseous findings. ________________      IMPRESSION: Increased interstitial markings in the right lung. No focal airspace disease or effusion.       Brandon Elena MD
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
## 2020-04-20 NOTE — PROGRESS NOTES
NUTRITION FOLLOW-UP    RECOMMENDATIONS/PLAN:   - EN: Continue w/ POC  Monitor labs/lytes, tube feed tolerance, skin integrity, wt, fluid status, BM    NUTRITION ASSESSMENT:   Client Update: 62 yrs old Male with acute hypercapneic respiratory failure, acute on chronic respiration failure-trach placed scheduled for PEG tube placed at goal rate for tube feeds and tolerating, hx of cocaine abuse, covid 19 negative, COPD exacerbation, HTN, CHF, periodic bradycardia,  hypokalemia, elevated lft, HCAP, BETHANY on CKD3 resolved    FOOD/NUTRITION INTAKE   Diet Order: Vital High Protein @ 75ml/hr to provide 1650kcal 144g PRO 1379ml H20 185g CHO 38g Fat  Food Allergies: NKFA  Average PO Intake:      No data found. Pertinent Medications:  [x] Reviewed; lasix, melatonin, zofran, protonix, thiamine, nephro bhavya rx   Electrolyte Replacement Protocol: [x]K [x]Mg [x]PO4  Insulin:  [x]SSI  []Pre-meal   []Basal    []Drip  []None  Cultural/Jainism Food Preferences: None Identified    BIOCHEMICAL DATA & MEDICAL TESTS  Pertinent Labs:  [x] Reviewed; ALT-89 AST-51   ANTHROPOMETRICS  Height: 5' 7\" (170.2 cm)       Weight: 157.5 kg (347 lb 3.6 oz)         BMI: 54.4 kg/m^2 morbidly obese (Greater than or = to 40% BMI)   Adm Weight: 386 lbs                Weight change: -39lbs since admission   Adjusted Body Weight:83kg     NUTRITION-FOCUSED PHYSICAL ASSESSMENT  Skin: No PU      GI: +BM 4/19/20    NUTRITION PRESCRIPTION  Calories: 1682 kcal/day based on 25kcal/kg IBW  Protein: 101-134 g/day based on 1.5- 2 g/kg IBW  CHO: 210 g/day based on 50% of total energy  Fluid: 1682 ml/day based on 1 kcal/ml                 NUTRITION DIAGNOSES:   1. Inadequate oral intake related to trach dependent as evidence by need for tube feeds via PEG tube         NUTRITION INTERVENTIONS:   INTERVENTIONS:        GOALS:  1. EN: continue w/ POC 1.  Continue w/ tube feeds by next review 3 days     LEARNING NEEDS (Diet, Supplementation, Food/Nutrient-Drug Interaction): [] None Identified   [] Education provided/documented      Identified and patient: [] Declined   [x] Was not appropriate/indicated        NUTRITION MONITORING /EVALUATION:   Adjust EN/PN as appropriate  Monitor wt  Monitor renal labs, electrolytes, fluid status     Previous Recommendations Implemented: yes        Previous Goals Met:  yes -tube feeds at goal rate       [] Participated in Interdisciplinary Rounds    [x] Interdisciplinary Care Plan Reviewed  DISCHARGE NUTRITION RECOMMENDATIONS ADDRESSED:      [x] To be determined closer to discharge    NUTRITION RISK:           [x] At risk                        [] Not currently at risk        Will follow-up per policy.   Kunal Sanchez 1

## 2020-04-20 NOTE — PROGRESS NOTES
conducted a Follow up consultation and Spiritual Assessment for Caden Mckinley, who is a 62 y.o.,male. Patient attempted to mouth words with me. I was only able to understand a few words. I got a clip board and paper. DEBI Hirsch came in and assisted. He wanted us to call his sister but not sure what he wanted us to tell her. Patient is expected to be transferred to [facility] once a bed opens. Will attempt to call the sister to offer support. 1053 - left short message for the sister. Continued the relationship of care and support. Listened empathically. Offered assurance of continued prayer on patients behalf. Chart reviewed. Chaplains will continue to follow and will provide pastoral care as needed or requested.  recommends bedside caregivers page the  on duty if patient shows signs of acute spiritual or emotional distress. [address] [facility], [place] [address].    Board Certified   [phone] - Office

## 2020-04-20 NOTE — PROGRESS NOTES
Spoke with [facility] [address] regarding bed availability after Enedelia with [facility] states no bed available at this time. She will check pt insurance, review, and have Jazmín contact this CM.

## 2020-04-20 NOTE — DIABETES MGMT
GLYCEMIC CONTROL PROGRESS NOTE:    - chart reviewed, known h/o T2DM, HbA1C ordered per protocol, not within recommended range on oral home regimen  - NPO, intubated  -- Humalog Normal Insulin Sensitivity Corrective Coverage orders in place recommend continue      Recent Glucose Results:   Lab Results   Component Value Date/Time     (H) 04/20/2020 04:45 AM    GLUCPOC 103 04/20/2020 05:15 AM    GLUCPOC 114 (H) 04/19/2020 11:28 PM    GLUCPOC 109 04/19/2020 05:50 PM     Jania Frank MS, RN, CDE  Glycemic Control Team  [phone]  Pager [phone] (M-TH 8:00-4:30P)  *After Hours pager [phone]

## 2020-04-21 LAB
ALBUMIN SERPL-MCNC: 2.6 G/DL (ref 3.4–5)
ALBUMIN/GLOB SERPL: 0.7 {RATIO} (ref 0.8–1.7)
ALP SERPL-CCNC: 167 U/L (ref 45–117)
ALT SERPL-CCNC: 74 U/L (ref 16–61)
ANION GAP SERPL CALC-SCNC: 5 MMOL/L (ref 3–18)
AST SERPL-CCNC: 37 U/L (ref 10–38)
BACTERIA SPEC CULT: ABNORMAL
BASOPHILS # BLD: 0 K/UL (ref 0–0.1)
BASOPHILS NFR BLD: 0 % (ref 0–2)
BILIRUB SERPL-MCNC: 0.4 MG/DL (ref 0.2–1)
BNP SERPL-MCNC: 434 PG/ML (ref 0–900)
BUN SERPL-MCNC: 31 MG/DL (ref 7–18)
BUN/CREAT SERPL: 31 (ref 12–20)
CA-I SERPL-SCNC: 1.24 MMOL/L (ref 1.12–1.32)
CALCIUM SERPL-MCNC: 9.2 MG/DL (ref 8.5–10.1)
CHLORIDE SERPL-SCNC: 104 MMOL/L (ref 100–111)
CO2 SERPL-SCNC: 34 MMOL/L (ref 21–32)
CREAT SERPL-MCNC: 1.01 MG/DL (ref 0.6–1.3)
DIFFERENTIAL METHOD BLD: ABNORMAL
EOSINOPHIL # BLD: 0.7 K/UL (ref 0–0.4)
EOSINOPHIL NFR BLD: 9 % (ref 0–5)
ERYTHROCYTE [DISTWIDTH] IN BLOOD BY AUTOMATED COUNT: 16.2 % (ref 11.6–14.5)
GLOBULIN SER CALC-MCNC: 4 G/DL (ref 2–4)
GLUCOSE BLD STRIP.AUTO-MCNC: 111 MG/DL (ref 70–110)
GLUCOSE BLD STRIP.AUTO-MCNC: 116 MG/DL (ref 70–110)
GLUCOSE BLD STRIP.AUTO-MCNC: 121 MG/DL (ref 70–110)
GLUCOSE BLD STRIP.AUTO-MCNC: 130 MG/DL (ref 70–110)
GLUCOSE BLD STRIP.AUTO-MCNC: 147 MG/DL (ref 70–110)
GLUCOSE SERPL-MCNC: 118 MG/DL (ref 74–99)
GRAM STN SPEC: ABNORMAL
GRAM STN SPEC: ABNORMAL
HCT VFR BLD AUTO: 39.8 % (ref 36–48)
HGB BLD-MCNC: 12.4 G/DL (ref 13–16)
LYMPHOCYTES # BLD: 1.3 K/UL (ref 0.9–3.6)
LYMPHOCYTES NFR BLD: 18 % (ref 21–52)
MAGNESIUM SERPL-MCNC: 2 MG/DL (ref 1.6–2.6)
MCH RBC QN AUTO: 27.7 PG (ref 24–34)
MCHC RBC AUTO-ENTMCNC: 31.2 G/DL (ref 31–37)
MCV RBC AUTO: 89 FL (ref 74–97)
MONOCYTES # BLD: 0.4 K/UL (ref 0.05–1.2)
MONOCYTES NFR BLD: 5 % (ref 3–10)
NEUTS SEG # BLD: 4.9 K/UL (ref 1.8–8)
NEUTS SEG NFR BLD: 68 % (ref 40–73)
PHOSPHATE SERPL-MCNC: 3.4 MG/DL (ref 2.5–4.9)
PLATELET # BLD AUTO: 176 K/UL (ref 135–420)
PMV BLD AUTO: 12.2 FL (ref 9.2–11.8)
POTASSIUM SERPL-SCNC: 3.8 MMOL/L (ref 3.5–5.5)
PROT SERPL-MCNC: 6.6 G/DL (ref 6.4–8.2)
RBC # BLD AUTO: 4.47 M/UL (ref 4.7–5.5)
SERVICE CMNT-IMP: ABNORMAL
SODIUM SERPL-SCNC: 143 MMOL/L (ref 136–145)
WBC # BLD AUTO: 7.2 K/UL (ref 4.6–13.2)

## 2020-04-21 PROCEDURE — 77010033678 HC OXYGEN DAILY

## 2020-04-21 PROCEDURE — 94003 VENT MGMT INPAT SUBQ DAY: CPT

## 2020-04-21 PROCEDURE — 74011250637 HC RX REV CODE- 250/637: Performed by: FAMILY MEDICINE

## 2020-04-21 PROCEDURE — 74011250636 HC RX REV CODE- 250/636: Performed by: INTERNAL MEDICINE

## 2020-04-21 PROCEDURE — 36415 COLL VENOUS BLD VENIPUNCTURE: CPT

## 2020-04-21 PROCEDURE — 82962 GLUCOSE BLOOD TEST: CPT

## 2020-04-21 PROCEDURE — 85025 COMPLETE CBC W/AUTO DIFF WBC: CPT

## 2020-04-21 PROCEDURE — 83735 ASSAY OF MAGNESIUM: CPT

## 2020-04-21 PROCEDURE — 74011250637 HC RX REV CODE- 250/637: Performed by: INTERNAL MEDICINE

## 2020-04-21 PROCEDURE — 74011000250 HC RX REV CODE- 250: Performed by: INTERNAL MEDICINE

## 2020-04-21 PROCEDURE — C9113 INJ PANTOPRAZOLE SODIUM, VIA: HCPCS | Performed by: FAMILY MEDICINE

## 2020-04-21 PROCEDURE — 92597 ORAL SPEECH DEVICE EVAL: CPT

## 2020-04-21 PROCEDURE — 65610000006 HC RM INTENSIVE CARE

## 2020-04-21 PROCEDURE — 82330 ASSAY OF CALCIUM: CPT

## 2020-04-21 PROCEDURE — 94640 AIRWAY INHALATION TREATMENT: CPT

## 2020-04-21 PROCEDURE — 83880 ASSAY OF NATRIURETIC PEPTIDE: CPT

## 2020-04-21 PROCEDURE — 80053 COMPREHEN METABOLIC PANEL: CPT

## 2020-04-21 PROCEDURE — 77030013140 HC MSK NEB VYRM -A

## 2020-04-21 PROCEDURE — 74011000250 HC RX REV CODE- 250: Performed by: FAMILY MEDICINE

## 2020-04-21 PROCEDURE — 84100 ASSAY OF PHOSPHORUS: CPT

## 2020-04-21 PROCEDURE — 74011250636 HC RX REV CODE- 250/636: Performed by: FAMILY MEDICINE

## 2020-04-21 RX ORDER — POTASSIUM CHLORIDE 750 MG/1
10 TABLET, EXTENDED RELEASE ORAL ONCE
Status: COMPLETED | OUTPATIENT
Start: 2020-04-21 | End: 2020-04-21

## 2020-04-21 RX ADMIN — FENTANYL CITRATE 50 MCG: 50 INJECTION, SOLUTION INTRAMUSCULAR; INTRAVENOUS at 10:05

## 2020-04-21 RX ADMIN — Medication 5 MG: at 21:46

## 2020-04-21 RX ADMIN — FENTANYL CITRATE 50 MCG: 50 INJECTION, SOLUTION INTRAMUSCULAR; INTRAVENOUS at 16:14

## 2020-04-21 RX ADMIN — MIDAZOLAM HYDROCHLORIDE 2 MG: 1 INJECTION, SOLUTION INTRAMUSCULAR; INTRAVENOUS at 06:00

## 2020-04-21 RX ADMIN — HYDRALAZINE HYDROCHLORIDE 25 MG: 25 TABLET, FILM COATED ORAL at 08:55

## 2020-04-21 RX ADMIN — HYDRALAZINE HYDROCHLORIDE 25 MG: 25 TABLET, FILM COATED ORAL at 17:53

## 2020-04-21 RX ADMIN — CARVEDILOL 3.12 MG: 3.12 TABLET, FILM COATED ORAL at 17:53

## 2020-04-21 RX ADMIN — ALTEPLASE 2 MG: 2.2 INJECTION, POWDER, LYOPHILIZED, FOR SOLUTION INTRAVENOUS at 08:48

## 2020-04-21 RX ADMIN — MIDAZOLAM HYDROCHLORIDE 2 MG: 1 INJECTION, SOLUTION INTRAMUSCULAR; INTRAVENOUS at 21:46

## 2020-04-21 RX ADMIN — POTASSIUM CHLORIDE 10 MEQ: 10 TABLET, EXTENDED RELEASE ORAL at 07:01

## 2020-04-21 RX ADMIN — BUDESONIDE 500 MCG: 0.5 INHALANT RESPIRATORY (INHALATION) at 07:43

## 2020-04-21 RX ADMIN — MIDAZOLAM HYDROCHLORIDE 2 MG: 1 INJECTION, SOLUTION INTRAMUSCULAR; INTRAVENOUS at 02:56

## 2020-04-21 RX ADMIN — BUDESONIDE 500 MCG: 0.5 INHALANT RESPIRATORY (INHALATION) at 20:09

## 2020-04-21 RX ADMIN — FUROSEMIDE 40 MG: 10 INJECTION, SOLUTION INTRAMUSCULAR; INTRAVENOUS at 08:54

## 2020-04-21 RX ADMIN — FENTANYL CITRATE 50 MCG: 50 INJECTION, SOLUTION INTRAMUSCULAR; INTRAVENOUS at 02:56

## 2020-04-21 RX ADMIN — Medication 10 ML: at 06:00

## 2020-04-21 RX ADMIN — SODIUM CHLORIDE 40 MG: 9 INJECTION, SOLUTION INTRAMUSCULAR; INTRAVENOUS; SUBCUTANEOUS at 08:54

## 2020-04-21 RX ADMIN — MIDAZOLAM HYDROCHLORIDE 2 MG: 1 INJECTION, SOLUTION INTRAMUSCULAR; INTRAVENOUS at 16:14

## 2020-04-21 RX ADMIN — CARVEDILOL 3.12 MG: 3.12 TABLET, FILM COATED ORAL at 08:55

## 2020-04-21 RX ADMIN — IPRATROPIUM BROMIDE AND ALBUTEROL SULFATE 3 ML: .5; 3 SOLUTION RESPIRATORY (INHALATION) at 07:43

## 2020-04-21 RX ADMIN — Medication 10 ML: at 15:36

## 2020-04-21 RX ADMIN — MIDAZOLAM HYDROCHLORIDE 2 MG: 1 INJECTION, SOLUTION INTRAMUSCULAR; INTRAVENOUS at 10:05

## 2020-04-21 RX ADMIN — THIAMINE HCL TAB 100 MG 200 MG: 100 TAB at 21:47

## 2020-04-21 RX ADMIN — CHLORHEXIDINE GLUCONATE 10 ML: 1.2 RINSE ORAL at 21:47

## 2020-04-21 RX ADMIN — IPRATROPIUM BROMIDE AND ALBUTEROL SULFATE 3 ML: .5; 3 SOLUTION RESPIRATORY (INHALATION) at 20:09

## 2020-04-21 RX ADMIN — HYDRALAZINE HYDROCHLORIDE 25 MG: 25 TABLET, FILM COATED ORAL at 21:47

## 2020-04-21 RX ADMIN — IPRATROPIUM BROMIDE AND ALBUTEROL SULFATE 3 ML: .5; 3 SOLUTION RESPIRATORY (INHALATION) at 14:59

## 2020-04-21 RX ADMIN — QUETIAPINE FUMARATE 25 MG: 25 TABLET ORAL at 21:47

## 2020-04-21 RX ADMIN — QUETIAPINE FUMARATE 25 MG: 25 TABLET ORAL at 08:55

## 2020-04-21 RX ADMIN — CHLORHEXIDINE GLUCONATE 10 ML: 1.2 RINSE ORAL at 08:55

## 2020-04-21 RX ADMIN — B-COMPLEX W/ C & FOLIC ACID TAB 1 MG 1 TABLET: 1 TAB at 08:55

## 2020-04-21 RX ADMIN — ENOXAPARIN SODIUM 40 MG: 40 INJECTION SUBCUTANEOUS at 15:36

## 2020-04-21 RX ADMIN — THIAMINE HCL TAB 100 MG 200 MG: 100 TAB at 08:55

## 2020-04-21 RX ADMIN — ALTEPLASE 2 MG: 2.2 INJECTION, POWDER, LYOPHILIZED, FOR SOLUTION INTRAVENOUS at 10:05

## 2020-04-21 RX ADMIN — FENTANYL CITRATE 50 MCG: 50 INJECTION, SOLUTION INTRAMUSCULAR; INTRAVENOUS at 06:00

## 2020-04-21 RX ADMIN — FENTANYL CITRATE 50 MCG: 50 INJECTION, SOLUTION INTRAMUSCULAR; INTRAVENOUS at 21:46
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
## 2020-04-21 NOTE — PROGRESS NOTES
04/20/20 2031   Vent Settings   FIO2 (%) 35 %   CMV Rate Set 12   Back-Up Rate 12   Vt Set (ml) 480 ml   PEEP/VENT (cm H2O) 8 cm H20   Insp Time (sec) 1 sec   Insp Rise Time % 50 %   Flow Trigger 3     Patient tolerated wean well, patient's RSBI was 37. Patient placed back on full support to rest overnight.

## 2020-04-21 NOTE — PROGRESS NOTES
Problem: Voice Impaired (Adult)  Goal: *Acute Goals and Plan of Care (Insert Text)  Description: PRECAUTIONS:  1. ONLY WEAR WHEN AWAKE/ALERT (REMOVE WHEN SLEEPING)  2. CUFF MUST BE IN DEFLATED POSITION  3. ONLY WEAR WITH SUPERVISION FROM SLP   4. REMOVE IF PT DEMONSTRATES ANY SIGNS OF DISTRESS     Pt will:  1. Produce voice to finger occlusion in 3/5 trials. 2. Initiate PMV placement by SLP when appropriate. 3. Tolerate PMV placement for 30 minute increments without compromise to vitals. 4. Utilize proper breath support/techniques for increasing tolerance of PMV. 5. Phonate 4-6 word utterances with adequate breath support for adequate intensity of speech/increased intelligibilty with PMV. Outcome: Progressing Towards Goal    SPEECH LANGUAGE PATHOLOGY PASSY MAJO VALVE EVALUATION    Patient: Sabine Garcia (13 y.o. male)  Date: 4/21/2020  Diagnosis: COPD with acute exacerbation ([facility]) [J44.1]  Acute exacerbation of CHF (congestive heart failure) ([facility]) [I50.9]   Respiratory failure with hypercapnia ([facility])  Procedure(s) (LRB):  TRACHEOSTOMY #8 XLT (N/A) 8 Days Post-Op  Precautions: Aspiration     ASSESSMENT:  Pt seen for PMV eval s/p tracheostomy. #8 Shiley proximal XLT trach tube in place. Pt A&Ox4. Y/N question responses intact. Discussed purpose of PMV with patient; restore subglottic air pressure, improve laryngeal/pharyngeal sensation, facilitate improved secretion management/cough reflex, and ultimately decrease aspiration risk. Pt tolerated finger occlusion with O2 remaining 100-97%. Patient able to voice \"ah\"; low volume. PMV placed by SLP for 3 separate trials. SpO2 remained stable on all trials. Pt able to voice \"ah\" and state name with improved volume, wet vocal quality noted. Utilized breathing exercises with modA. Patient became increasingly anxious after ~45-60 seconds of each trial, verbally requesting removal of PMV despite no change in vitals. No improvement with calming/breathing strategies.  SLP removed PMV. At this time, recommend PMV trials with SLP present only. Will complete swallow evaluation when patient is able to tolerate extended use of PMV. D/w RN, Adriana Eid. SLP will continue to follow as indicated. Patient will benefit from skilled intervention to address the above impairments. Patients rehabilitation potential is considered to be Fair  Factors which may influence rehabilitation potential include:   []            None noted  [x]            Mental ability/status  [x]            Medical condition  []            Home/family situation and support systems  [x]            Safety awareness  []            Pain tolerance/management  []            Other:     PLAN:  Recommendations and Planned Interventions:  PMV trials with SLP present  Frequency/Duration: Patient will be followed by speech-language pathology 1-2 times per day/4-7 days per week to address goals. Discharge Recommendations: LTAC     SUBJECTIVE:   Patient stated No one understands what I'm going through. OBJECTIVE:    Speak Valve:   Type of Assessment   Type of Assessment: Evaluation   Tracheostomy Data/Eval   Trach Size/Status: #8    Date of Placement: 04/16/20       Passy-Nidhi Placement: SLP   On Ventilator: No   Mental Status   Neurologic State: Alert   Orientation Level: Oriented X4   Cognition: Follows commands, Decreased attention/concentration   Perception: Appears intact   Perseveration: Perseverates during conversation   Safety/Judgement: Awareness of environment   Evidence of Comprehension   Meaningful Eye Movement/Gaze: Yes   Response to Basic Yes/No Questions (%): 100 %   Response to Complex Yes/No Questions (%) : 100 %   One-Step Basic Commands (%): 100 %   Two-Step Basic Commands (%): 100 %      Finger Occlusion   Application: SLP   Tolerance:  Tolerated without changes in vitals, breathing effort or level of anxiety   Vocal Quality: Breathy   Vocal Intensity: Too soft   PMV Trial   Application: SLP   Tolerance: Decreased O2 saturation, Increased anxiety, Increased coughing, Increased work of breathing   Vocal Quality: Low volume, Wet   Vocal Intensity: Too soft   Duration (minutes): 1 minutes   Oxygen Therapy   O2 Sat (%): 100 %   Pulse via Oximetry: 91 beats per minute   O2 Device: Tracheostomy, Ventilator   O2 Flow Rate (L/min): (5)   Airway Suction   Suction: Trach   Sputum Amount: Small   Sputum Color/Odor: Tan   Sputum Consistency:  Thick   Suction Device: [employer]       Treatment Diagnosis   Treatment Diagnosis: Voice impaired    PAIN:  Start of Eval: 0  End of Eval: 0     After evaluation:   []              Patient left in no apparent distress sitting up in chair  [x]              Patient left in no apparent distress in bed  [x]              Call bell left within reach  [x]              Nursing notified  []              Caregiver present  []              Bed alarm activated      COMMUNICATION/EDUCATION:   [x]              Aspiration precautions; compensatory swallow techniques  [x]              PMV use and care    TAWANDA Salazar  Time Calculation: 30 mins

## 2020-04-21 NOTE — DIABETES MGMT
GLYCEMIC CONTROL PROGRESS NOTE:    - chart reviewed, known h/o T2DM, HbA1C ordered per protocol, not within recommended range on oral home regimen  - NPO, on vent, TF Vital @ 75ml/hour  -- Humalog Normal Insulin Sensitivity Corrective Coverage orders in place recommend continue      Recent Glucose Results:   Lab Results   Component Value Date/Time     (H) 04/21/2020 05:10 AM    GLUCPOC 147 (H) 04/21/2020 11:48 AM    GLUCPOC 111 (H) 04/21/2020 05:08 AM    GLUCPOC 116 (H) 04/21/2020 03:20 AM     Fatemeh Chacon MS, RN, CDE  Glycemic Control Team  [phone]  Pager [phone] (M-TH 8:00-4:30P)  *After Hours pager [phone]

## 2020-04-21 NOTE — PROGRESS NOTES
Chart reviewed, contacted [facility], [facility]. and [facility] regarding bed availability, waiting on return call from both facilities. 1150- return call received from [facility] spoke with Eliza Garcia, at this time facility still has no available beds. 1315- call received from  [facility], [facility]. spoke with Adal Aguirre [phone] ex [phone] informed cm that she has been trying to contact patients sister as listed but has not been returning calls, at this time [facility]. still viewing case and finalizing information from patients insurance prior to accepting, after conversation cm was able to contact patients sister Mrs. Gisele Stokes [phone], sister made aware that [facility] has been attempting to call to speak regarding LTAC, pt did inform cm that pts wife prefers [facility], cm did explain that at this time no bed is available at this time so safe d/c planning must cont for safe d/c planning once pt is medically cleared for d/c and if there is an acceptable safe d/c plan in place insurance companies feels pt has a safe plan and can be offered, sister aware that insurance companies also can decide if they feel pt is medically cleared and safe plan available possibly they can deny further acute hospital stay, sister  understands at this time aware that cm will cont to work with both facilities for 1st available bed, at this time still waiting, sister provided with [facility], [facility]. contact information.

## 2020-04-21 NOTE — PROGRESS NOTES
Patient resting quietly. Vented via trach. Vital signs stable. Bilateral soft wrist restraints in use. PEG infusing TF.

## 2020-04-21 NOTE — PROGRESS NOTES
0715-report received from [address] [place] [address] included SBAR MAR and Kardex. Shift Summary-maintained on trach collar sats in high 90's. Refused [employer] valve. Pt has trouble commuicating, pen and paper given to pt to write down any needs or concerns. Bedside and Verbal shift change report given to Aneta Skaggs RN (oncoming nurse) by Shireen Cadena RN (offgoing nurse). Report included the following information SBAR, Kardex and MAR.

## 2020-04-21 NOTE — PROGRESS NOTES
Hospitalist Progress Note-critical care note     Patient: Eleazar Infante MRN: [id number]  CSN: [id number]    YOB: 1960  Age: 62 y.o. Sex: male    DOA: 4/3/2020 LOS:  LOS: 18 days            Chief complaint:  Respiratory failure, joann on ckd3      Assessment/Plan         Hospital Problems  Date Reviewed: 4/3/2020          Codes Class Noted POA    Tracheostomy in place [facility]) ICD-10-CM: Z93.0  ICD-9-CM: V44.0  4/15/2020 Unknown        HCAP (healthcare-associated pneumonia) ICD-10-CM: J18.9  ICD-9-CM: 486  4/7/2020 Unknown        Goals of care, counseling/discussion ICD-10-CM: Z71.89  ICD-9-CM: V65.49  Unknown Unknown        Suspected Covid-19 Virus Infection ICD-10-CM: R68.89  4/4/2020 Clinically Undetermined        Morbid obesity with body mass index of 50 or higher ([facility] ICD-10-CM: E66.01  ICD-9-CM: 278.01  4/4/2020 Yes        COPD with acute exacerbation ([facility] ICD-10-CM: J44.1  ICD-9-CM: 491.21  4/3/2020 Yes        * (Principal) Respiratory failure with hypercapnia ([facility] ICD-10-CM: J96.92  ICD-9-CM: 518.81  4/3/2020 Yes        Transaminitis ICD-10-CM: R74.0  ICD-9-CM: 790.4  4/3/2020 Yes        Acute on chronic renal insufficiency ICD-10-CM: N28.9, N18.9  ICD-9-CM: 593.9, 585.9  4/3/2020 Yes        Sleep apnea ICD-10-CM: G47.30  ICD-9-CM: 780.57  7/9/2018 Yes        Cocaine abuse ([facility] ICD-10-CM: F14.10  ICD-9-CM: 305.60  4/22/2018 Yes        CHF (congestive heart failure) ([facility]) (Chronic) ICD-10-CM: I50.9  ICD-9-CM: 428.0  3/3/2018 Yes              61 y/o male w/ hx of COPD on 4L home O2-trilogy at home ,  EF of 35%, super morbid obesity with recent admission to [facility] who presents in acute hypercapneic respiratory failure, he was admitted tue to acute on chronic respiration failure-intubated on admission.    Urine drug screen positive for cocaine, covid 19 negative, got trach/peg and picc line, waiting for bed at [facility]        Hypercapnic respiratory failure with obesity hypoventilation syndrome -stable on vent overnight   On  Vent at night,continue weaning,  Continue weaning and trach care   Trach 4/13 per Dr. Brittani Lambert  Will have speech on board      Hcap:  Completed iv abx     Copd exacerbation -resolved   Received iv steroid -completed     shyam : on trilogy at home , not compliance       chf  Combined diastolic and systolic   Ef 30 - 61%. Mild (grade 1) left ventricular diastolic dysfunction from echo   On lasix and coreg      Hypokalemia -resolved      joann on ckd3,resolved      Cocaine abuse       Elevated lft -resolved   Peg tube 4/16 -function well, tube feeding now, well tolerate -will have speech on board      Poor prognosis palliative care on board       Waiting for bed aviliable   Disposition :tbd,   Review of systems:  Unable to obtain due to trach   Vital signs/Intake and Output:  Visit Vitals  /59   Pulse 93   Temp 98.6 °F (37 °C)   Resp 21   Ht 5' 7\" (1.702 m)   Wt 157.5 kg (347 lb 3.6 oz)   SpO2 99%   BMI 54.38 kg/m²     Current Shift:  04/21 0701 - 04/21 1900  In: 200   Out: 1250 [AQGRE:4946]  Last three shifts:  04/19 1901 - 04/21 0700  In: 1950   Out: 3100 [Urine:3100]    Physical Exam:  General:         Alert and follow command   HEENT: NC, Atraumatic. anicteric sclerae. Trach noted   Lungs: CTA Bilaterally. No Wheezing/Rhonchi/Rales. Heart:  Regular  rhythm,  No murmur, No Rubs, No Gallops  Abdomen: Soft, obesity , Non tender.   +Bowel sounds,  Peg tube noticed   Extremities: No c/c, +edema of feet   Psych:   Anxious and agitated    Neurologic:   No acute  Neuro deficit           Labs: Results:       Chemistry Recent Labs     04/21/20  0510 04/20/20  0445 04/19/20  2000  04/19/20  0415   * 108*  --   --  117*    143  --   --  143   K 3.8 3.7 3.7   < > 3.7    106  --   --  108   CO2 34* 34*  --   --  30   BUN 31* 27*  --   --  33*   CREA 1.01 0.95  --   --  1.11   CA 9.2 9.0  --   --  9.3   AGAP 5 3  --   --  5   BUCR 31* 28*  --   --  30*   * 179*  --   -- 182*   TP 6.6 6.4  --   --  6.7   ALB 2.6* 2.6*  --   --  2.7*   GLOB 4.0 3.8  --   --  4.0   AGRAT 0.7* 0.7*  --   --  0.7*    < > = values in this interval not displayed. CBC w/Diff Recent Labs     04/21/20  0510 04/20/20  0445 04/19/20  0415   WBC 7.2 8.8 11.0   RBC 4.47* 4.22* 4.27*   HGB 12.4* 11.5* 11.8*   HCT 39.8 37.5 37.5    126* 150   GRANS 68 69 74*   LYMPH 18* 14* 9*   EOS 9* 11* 8*      Cardiac Enzymes No results for input(s): CPK, CKND1, TISHA in the last 72 hours. No lab exists for component: CKRMB, TROIP   Coagulation No results for input(s): PTP, INR, APTT, INREXT, INREXT in the last 72 hours. Lipid Panel Lab Results   Component Value Date/Time    Cholesterol, total 196 01/25/2018 02:51 AM    HDL Cholesterol 64 (H) 01/25/2018 02:51 AM    LDL, calculated 121.2 (H) 01/25/2018 02:51 AM    VLDL, calculated 10.8 01/25/2018 02:51 AM    Triglyceride 54 01/25/2018 02:51 AM    CHOL/HDL Ratio 3.1 01/25/2018 02:51 AM      BNP No results for input(s): BNPP in the last 72 hours. Liver Enzymes Recent Labs     04/21/20  0510   TP 6.6   ALB 2.6*   *   SGOT 37      Thyroid Studies Lab Results   Component Value Date/Time    TSH 0.11 (L) 03/19/2019 06:50 AM        Procedures/imaging: see electronic medical records for all procedures/Xrays and details which were not copied into this note but were reviewed prior to creation of Plan    Xr Abd (kub)    Result Date: 4/4/2020  EXAM: Single view of the abdomen CLINICAL INDICATION/HISTORY: Nasogastric tube placement    --Additional history: None. COMPARISON: None TECHNIQUE: Single supine view _______________ FINDINGS: The impression. _______________     IMPRESSION: Study technically limited by patient's body habitus. Nasogastric tube tip appears to project over the gastric antrum.     Xr Chest Port    Result Date: 4/7/2020  EXAM: XR CHEST PORT CLINICAL INDICATION/HISTORY: OG tube in place. -Additional: None COMPARISON: Earlier the same day TECHNIQUE: Portable frontal view of the chest _______________ FINDINGS: SUPPORT DEVICES: Endotracheal tube approximately 1 cm above the juan miguel. Enteric tube tip out of field of view possibly in the distal stomach. HEART AND MEDIASTINUM: Cardiomegaly LUNGS AND PLEURAL SPACES: Hypoinflated lungs. Probable small to moderate left effusion. Mild interstitial edema. No pneumothorax. _______________     IMPRESSION: Endotracheal tube approximately 1 cm above the juan miguel. Enteric tube tip out of field of view possibly in the distal stomach. Xr Chest Port    Result Date: 4/7/2020  EXAM: XR CHEST PORT CLINICAL INDICATION/HISTORY: pneumonia, intubated -Additional: None COMPARISON: One day prior TECHNIQUE: Portable frontal view of the chest _______________ FINDINGS: SUPPORT DEVICES: Enteric tube and endotracheal tube unchanged. Alyssa Bandar HEART AND MEDIASTINUM: cardiomegaly LUNGS AND PLEURAL SPACES: Hypoinflated lungs. Probable small to moderate left effusion. Mild interstitial edema. No pneumothorax. _______________     IMPRESSION: Hypoinflated lungs. Probable small to moderate left effusion. Mild interstitial edema. No pneumothorax. Xr Chest Port    Result Date: 4/6/2020  EXAM: XR CHEST PORT CLINICAL INDICATION/HISTORY: While intubated -Additional: None COMPARISON: One day prior TECHNIQUE: Portable frontal view of the chest _______________ FINDINGS: SUPPORT DEVICES: Enteric tube and endotracheal tube unchanged. Alyssa Bandar HEART AND MEDIASTINUM: cardiomegaly LUNGS AND PLEURAL SPACES: Hypoinflated lungs. Probable small left effusion. Mild interstitial edema. No pneumothorax. _______________     IMPRESSION: Hypoinflated lungs. Probable small left effusion. Mild interstitial edema.      Xr Chest Port    Result Date: 4/5/2020  EXAM: CHEST RADIOGRAPH CLINICAL INDICATION/HISTORY: Respiratory failure -Additional: None COMPARISON: April 4, 2020 TECHNIQUE: Portable frontal view of the chest _______________ FINDINGS: SUPPORT DEVICES: The tip of an endotracheal tube is 10 cm above the juan miguel. A nasogastric tube crosses the gastroesophageal junction. A left IJ central venous catheter terminates in the proximal SVC. HEART AND MEDIASTINUM: The heart is enlarged. LUNGS AND PLEURAL SPACES: Interstitial lung markings are increased. Lung volumes are hypoinflated and there are opacities in the lung bases. No pneumothorax. BONY THORAX AND SOFT TISSUES: Unremarkable. _______________     IMPRESSION: 1. Mild pulmonary edema 2. Bibasilar opacities that may represent a combination of atelectasis and small pleural effusions     Xr Chest Port    Result Date: 4/4/2020  EXAM: PORTABLE CHEST HISTORY: Central line placement. Acute respiratory failure. COMPARISON: 4/4/2020 at 12:42 AM TECHNIQUE: Single portable view. _______________ FINDINGS: SUPPORT DEVICES: Endotracheal tube tip lies about 4.2 cm above juan miguel. Left central venous catheter tip in upper superior vena cava. HEART AND MEDIASTINUM: Cardiomegaly. LUNGS AND PLEURAL SPACES: Patchy airspace disease right lung base may represent developing subsegmental atelectasis or pneumonia. Evaluation limited by large body habitus. BONES AND SOFT TISSUES: Bony structures are normal. _______________     IMPRESSION: Interval placement left central venous catheter. No pneumothorax. Cardiomegaly without change. Patchy airspace disease right lung base either subsegmental atelectasis or pneumonia appears slightly worse. Xr Chest Port    Result Date: 4/4/2020  EXAM: Chest radiograph  CLINICAL INDICATION/HISTORY: Chemical ventilation    --Additional history: None. COMPARISON: 04/03/2020 TECHNIQUE: Frontal view of the chest _______________ FINDINGS: SUPPORT DEVICES: There is an endotracheal tube with tip approximately 5.5 cm above the juan miguel. There is a nasogastric tube descends below the diaphragm. HEART AND MEDIASTINUM: React silhouette is enlarged. Stable mediastinal contours. . Normal pulmonary vasculature.  LUNGS AND PLEURAL SPACES: No convincing focal airspace opacity given the limitations of the study and superimposed body habitus. Sharp costophrenic sulci. No pneumothoraces. BONY THORAX AND SOFT TISSUES: Questionable fracture deformity of the right lateral ninth rib. _______________     IMPRESSION: 1. Support lines and tubes as detailed above. 2. Technically limited study secondary to body habitus without significant change from the prior study. Xr Chest Port    Result Date: 4/3/2020  EXAM:  AP Portable Chest X-ray 1 view INDICATION: Chest pain shortness of breath COMPARISON: None _______________ FINDINGS:  Heart size is enlarged and mediastinal contours are within normal limits for portable radiograph. There are increased interstitial markings in the right lung. No focal airspace disease seen. . There are no pleural effusions. No acute osseous findings. ________________      IMPRESSION: Increased interstitial markings in the right lung. No focal airspace disease or effusion.       Marilyn Smith MD

## 2020-04-22 ENCOUNTER — APPOINTMENT (OUTPATIENT)
Dept: GENERAL RADIOLOGY | Age: 60
DRG: 005 | End: 2020-04-22
Attending: INTERNAL MEDICINE
Payer: MEDICAID

## 2020-04-22 VITALS
TEMPERATURE: 99 F | DIASTOLIC BLOOD PRESSURE: 59 MMHG | WEIGHT: 315 LBS | SYSTOLIC BLOOD PRESSURE: 120 MMHG | HEART RATE: 79 BPM | OXYGEN SATURATION: 100 % | RESPIRATION RATE: 20 BRPM | BODY MASS INDEX: 49.44 KG/M2 | HEIGHT: 67 IN

## 2020-04-22 LAB
ALBUMIN SERPL-MCNC: 2.6 G/DL (ref 3.4–5)
ALBUMIN/GLOB SERPL: 0.7 {RATIO} (ref 0.8–1.7)
ALP SERPL-CCNC: 159 U/L (ref 45–117)
ALT SERPL-CCNC: 58 U/L (ref 16–61)
ANION GAP SERPL CALC-SCNC: 2 MMOL/L (ref 3–18)
AST SERPL-CCNC: 23 U/L (ref 10–38)
BACTERIA SPEC CULT: NORMAL
BACTERIA SPEC CULT: NORMAL
BASOPHILS # BLD: 0 K/UL (ref 0–0.1)
BASOPHILS NFR BLD: 0 % (ref 0–2)
BILIRUB SERPL-MCNC: 0.3 MG/DL (ref 0.2–1)
BUN SERPL-MCNC: 30 MG/DL (ref 7–18)
BUN/CREAT SERPL: 32 (ref 12–20)
CA-I SERPL-SCNC: 1.24 MMOL/L (ref 1.12–1.32)
CALCIUM SERPL-MCNC: 9.2 MG/DL (ref 8.5–10.1)
CHLORIDE SERPL-SCNC: 103 MMOL/L (ref 100–111)
CO2 SERPL-SCNC: 37 MMOL/L (ref 21–32)
CREAT SERPL-MCNC: 0.95 MG/DL (ref 0.6–1.3)
DIFFERENTIAL METHOD BLD: ABNORMAL
EOSINOPHIL # BLD: 0.8 K/UL (ref 0–0.4)
EOSINOPHIL NFR BLD: 10 % (ref 0–5)
ERYTHROCYTE [DISTWIDTH] IN BLOOD BY AUTOMATED COUNT: 16.1 % (ref 11.6–14.5)
GLOBULIN SER CALC-MCNC: 4 G/DL (ref 2–4)
GLUCOSE BLD STRIP.AUTO-MCNC: 107 MG/DL (ref 70–110)
GLUCOSE BLD STRIP.AUTO-MCNC: 154 MG/DL (ref 70–110)
GLUCOSE SERPL-MCNC: 132 MG/DL (ref 74–99)
HCT VFR BLD AUTO: 38.8 % (ref 36–48)
HGB BLD-MCNC: 11.7 G/DL (ref 13–16)
LYMPHOCYTES # BLD: 1.2 K/UL (ref 0.9–3.6)
LYMPHOCYTES NFR BLD: 14 % (ref 21–52)
MAGNESIUM SERPL-MCNC: 2.1 MG/DL (ref 1.6–2.6)
MCH RBC QN AUTO: 27.3 PG (ref 24–34)
MCHC RBC AUTO-ENTMCNC: 30.2 G/DL (ref 31–37)
MCV RBC AUTO: 90.7 FL (ref 74–97)
MONOCYTES # BLD: 0.6 K/UL (ref 0.05–1.2)
MONOCYTES NFR BLD: 8 % (ref 3–10)
NEUTS SEG # BLD: 5.6 K/UL (ref 1.8–8)
NEUTS SEG NFR BLD: 68 % (ref 40–73)
PHOSPHATE SERPL-MCNC: 3.3 MG/DL (ref 2.5–4.9)
PLATELET # BLD AUTO: 200 K/UL (ref 135–420)
PMV BLD AUTO: 13.6 FL (ref 9.2–11.8)
POTASSIUM SERPL-SCNC: 4 MMOL/L (ref 3.5–5.5)
PROT SERPL-MCNC: 6.6 G/DL (ref 6.4–8.2)
RBC # BLD AUTO: 4.28 M/UL (ref 4.7–5.5)
SERVICE CMNT-IMP: NORMAL
SERVICE CMNT-IMP: NORMAL
SODIUM SERPL-SCNC: 142 MMOL/L (ref 136–145)
WBC # BLD AUTO: 8.1 K/UL (ref 4.6–13.2)

## 2020-04-22 PROCEDURE — 74011250637 HC RX REV CODE- 250/637: Performed by: FAMILY MEDICINE

## 2020-04-22 PROCEDURE — 85025 COMPLETE CBC W/AUTO DIFF WBC: CPT

## 2020-04-22 PROCEDURE — 83735 ASSAY OF MAGNESIUM: CPT

## 2020-04-22 PROCEDURE — 77030018846 HC SOL IRR STRL H20 ICUM -A

## 2020-04-22 PROCEDURE — 74011000250 HC RX REV CODE- 250: Performed by: INTERNAL MEDICINE

## 2020-04-22 PROCEDURE — 94003 VENT MGMT INPAT SUBQ DAY: CPT

## 2020-04-22 PROCEDURE — 74011250637 HC RX REV CODE- 250/637: Performed by: INTERNAL MEDICINE

## 2020-04-22 PROCEDURE — C9113 INJ PANTOPRAZOLE SODIUM, VIA: HCPCS | Performed by: FAMILY MEDICINE

## 2020-04-22 PROCEDURE — 77030018836 HC SOL IRR NACL ICUM -A

## 2020-04-22 PROCEDURE — 74011000250 HC RX REV CODE- 250: Performed by: FAMILY MEDICINE

## 2020-04-22 PROCEDURE — 74011250636 HC RX REV CODE- 250/636: Performed by: INTERNAL MEDICINE

## 2020-04-22 PROCEDURE — 82962 GLUCOSE BLOOD TEST: CPT

## 2020-04-22 PROCEDURE — 77010033678 HC OXYGEN DAILY

## 2020-04-22 PROCEDURE — 74011250636 HC RX REV CODE- 250/636: Performed by: FAMILY MEDICINE

## 2020-04-22 PROCEDURE — 74011636637 HC RX REV CODE- 636/637: Performed by: FAMILY MEDICINE

## 2020-04-22 PROCEDURE — 82330 ASSAY OF CALCIUM: CPT

## 2020-04-22 PROCEDURE — 71045 X-RAY EXAM CHEST 1 VIEW: CPT

## 2020-04-22 PROCEDURE — 80053 COMPREHEN METABOLIC PANEL: CPT

## 2020-04-22 PROCEDURE — 84100 ASSAY OF PHOSPHORUS: CPT

## 2020-04-22 RX ORDER — CHOLECALCIFEROL (VITAMIN D3) 125 MCG
5 CAPSULE ORAL
Qty: 1 TAB | Refills: 0 | Status: SHIPPED
Start: 2020-04-22

## 2020-04-22 RX ORDER — QUETIAPINE FUMARATE 25 MG/1
25 TABLET, FILM COATED ORAL 2 TIMES DAILY
Qty: 2 TAB | Refills: 0 | Status: SHIPPED
Start: 2020-04-22

## 2020-04-22 RX ORDER — BUDESONIDE 0.5 MG/2ML
500 INHALANT ORAL 2 TIMES DAILY
Qty: 2 EACH | Refills: 0 | Status: SHIPPED
Start: 2020-04-22

## 2020-04-22 RX ORDER — CARVEDILOL 3.12 MG/1
3.12 TABLET ORAL 2 TIMES DAILY WITH MEALS
Qty: 60 TAB | Refills: 0 | Status: SHIPPED
Start: 2020-04-22

## 2020-04-22 RX ORDER — IPRATROPIUM BROMIDE AND ALBUTEROL SULFATE 2.5; .5 MG/3ML; MG/3ML
3 SOLUTION RESPIRATORY (INHALATION) EVERY 4 HOURS
Qty: 30 NEBULE | Refills: 0 | Status: SHIPPED
Start: 2020-04-22

## 2020-04-22 RX ORDER — FUROSEMIDE 10 MG/ML
40 INJECTION INTRAMUSCULAR; INTRAVENOUS DAILY
Qty: 1 VIAL | Refills: 0 | Status: SHIPPED
Start: 2020-04-23 | End: 2020-12-08

## 2020-04-22 RX ADMIN — HYDRALAZINE HYDROCHLORIDE 25 MG: 25 TABLET, FILM COATED ORAL at 08:33

## 2020-04-22 RX ADMIN — FUROSEMIDE 40 MG: 10 INJECTION, SOLUTION INTRAMUSCULAR; INTRAVENOUS at 08:34

## 2020-04-22 RX ADMIN — FENTANYL CITRATE 50 MCG: 50 INJECTION, SOLUTION INTRAMUSCULAR; INTRAVENOUS at 00:23

## 2020-04-22 RX ADMIN — FENTANYL CITRATE 50 MCG: 50 INJECTION, SOLUTION INTRAMUSCULAR; INTRAVENOUS at 09:29

## 2020-04-22 RX ADMIN — MIDAZOLAM HYDROCHLORIDE 2 MG: 1 INJECTION, SOLUTION INTRAMUSCULAR; INTRAVENOUS at 04:25

## 2020-04-22 RX ADMIN — THIAMINE HCL TAB 100 MG 200 MG: 100 TAB at 08:33

## 2020-04-22 RX ADMIN — MIDAZOLAM HYDROCHLORIDE 2 MG: 1 INJECTION, SOLUTION INTRAMUSCULAR; INTRAVENOUS at 09:29

## 2020-04-22 RX ADMIN — CHLORHEXIDINE GLUCONATE 10 ML: 1.2 RINSE ORAL at 08:33

## 2020-04-22 RX ADMIN — MIDAZOLAM HYDROCHLORIDE 2 MG: 1 INJECTION, SOLUTION INTRAMUSCULAR; INTRAVENOUS at 00:22

## 2020-04-22 RX ADMIN — B-COMPLEX W/ C & FOLIC ACID TAB 1 MG 1 TABLET: 1 TAB at 08:33

## 2020-04-22 RX ADMIN — Medication 10 ML: at 00:22

## 2020-04-22 RX ADMIN — CARVEDILOL 3.12 MG: 3.12 TABLET, FILM COATED ORAL at 08:33

## 2020-04-22 RX ADMIN — FENTANYL CITRATE 50 MCG: 50 INJECTION, SOLUTION INTRAMUSCULAR; INTRAVENOUS at 04:25

## 2020-04-22 RX ADMIN — Medication 10 ML: at 06:09

## 2020-04-22 RX ADMIN — FENTANYL CITRATE 50 MCG: 50 INJECTION, SOLUTION INTRAMUSCULAR; INTRAVENOUS at 12:43

## 2020-04-22 RX ADMIN — SODIUM CHLORIDE 40 MG: 9 INJECTION, SOLUTION INTRAMUSCULAR; INTRAVENOUS; SUBCUTANEOUS at 08:34

## 2020-04-22 RX ADMIN — INSULIN LISPRO 2 UNITS: 100 INJECTION, SOLUTION INTRAVENOUS; SUBCUTANEOUS at 12:21

## 2020-04-22 RX ADMIN — MIDAZOLAM HYDROCHLORIDE 2 MG: 1 INJECTION, SOLUTION INTRAMUSCULAR; INTRAVENOUS at 12:43

## 2020-04-22 RX ADMIN — QUETIAPINE FUMARATE 25 MG: 25 TABLET ORAL at 08:33

## 2020-04-22 NOTE — PROGRESS NOTES
0720-received report from [address] [place] [address] included SBAR MAR and Kardex. 1105-report called to Kay Lockwood RN at [facility] in [place]. Included SBAR MAR and Kardex. Vent settings supplied. Awaiting Transfer via medical transport. 1335-discharged and transferred to [facility] by medical transport.

## 2020-04-22 NOTE — PROGRESS NOTES
Speech Therapy Note:    Follow up attempted. Could not progress with ST intervention because patient:      [x]  Lethargic, unable to be alerted enough for safe PO intake  []  Refused participation  []  Off the unit  []  NPO for procedure  [x]  Other: remains on ventilator     SLP will re-attempt treatment later this day or as medically indicated.     Juliana Rai M.S., [address] [facility]  Speech Language Pathologist

## 2020-04-22 NOTE — PROGRESS NOTES
DC Plan: Discharge to [facility] today via medical transport    [address], [address]     Phone [phone]   Fax [phone]      Room 216B  Number for report: [phone]  Accepting physician: MD Armaan Landon    1606  Chart reviewed. Pt had been accepted @ [address] pending bed, insurance auth obtained. Spoke with Enedelia @ [facility] and she is in morning meeting and will get back with CM regarding bed availability. Referral has also been submitted to [facility], [facility] [address] will follow up with [facility], [facility].. CM will cont to follow for transition of care needs [phone] with Evonne Lake  @ [facility], they can take pt today. Request transport as early as possible. MD Kane Lew made aware. Primary RN Haroon Saenz made aware. 1045  Msg left for pts sister Ms Jv Menendez to inform her of dc to [facility] [employer] with pts sister Ms Jv Menendez, she is aware of dc to [facility] and agrees with plan    Care Management Interventions  PCP Verified by CM:  Yes  Mode of Transport at Discharge: ALS(medicaid)  Transition of Care Consult (CM Consult): Discharge Planning, LTAC  Health Maintenance Reviewed: Yes  Current Support Network: Family Lives [place], Lives Alone  The Plan for Transition of Care is Related to the Following Treatment Goals : LTACH  The Patient and/or Patient Representative was Provided with a Choice of Provider and Agrees with the Discharge Plan?: Yes  Name of the Patient Representative Who was Provided with a Choice of Provider and Agrees with the Discharge Plan: POA SISTER MS BOWELS  [place] of Choice List was Provided with Basic Dialogue that Supports the Patient's Individualized Plan of Care/Goals, Treatment Preferences and Shares the Quality Data Associated with the Providers?: Yes  Discharge Location  Discharge Placement: [address] [place] [address]

## 2020-04-22 NOTE — PROGRESS NOTES
COMPLETE TRACH CARE GIVEN. LARGE AMT CRUSTED BROWN SECRETIONS AS WELL AS THICK MUDDY BROWN SECRETIONS AROUND SITE AND FLANGE. CLEANED WITH NORMAL SALINE. INNER CANNULA AND TRACH TIES WERE REPLACED. SUTURES STILL REMAIN IN PLACE.
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
## 2020-04-22 NOTE — PROGRESS NOTES
PATIENT INITIALLY REFUSED TO BE PLACED ON TRACH COLLAR BUT THEN BEGAN REMOVING HIMSELF FROM VENT. PLACED ON 40% TRACH COLLAR. WITHIN LESS THAN 5 MINUTES, PATIENT EXTREMELY AGITATED AND SATS DROPPED TO 80%. RETURNED TO PS12/CPAP 8 WITH FIO2 30%. SATS IMPROVED % IN 30 SEC.

## 2020-04-22 NOTE — DISCHARGE SUMMARY
Discharge Summary    Patient: Silvana Hancock MRN: [id number]  CSN: [id number]    YOB: 1960  Age: 62 y.o. Sex: male    DOA: 4/3/2020 LOS:  LOS: 19 days   Discharge Date:      Primary Care Provider:  Katey Mooney MD    Admission Diagnoses: COPD with acute exacerbation [facility]) [J44.1]  Acute exacerbation of CHF (congestive heart failure) ([facility] [I50.9]    Discharge Diagnoses:    Hospital Problems  Date Reviewed: 4/3/2020          Codes Class Noted POA    Tracheostomy in place [facility]) ICD-10-CM: Z93.0  ICD-9-CM: V44.0  4/15/2020 Unknown        HCAP (healthcare-associated pneumonia) ICD-10-CM: J18.9  ICD-9-CM: 486  4/7/2020 Unknown        Goals of care, counseling/discussion ICD-10-CM: Z71.89  ICD-9-CM: V65.49  Unknown Unknown        Suspected Covid-19 Virus Infection ICD-10-CM: R68.89  4/4/2020 Clinically Undetermined        Morbid obesity with body mass index of 50 or higher ([facility] ICD-10-CM: E66.01  ICD-9-CM: 278.01  4/4/2020 Yes        COPD with acute exacerbation ([facility] ICD-10-CM: J44.1  ICD-9-CM: 491.21  4/3/2020 Yes        * (Principal) Respiratory failure with hypercapnia ([facility] ICD-10-CM: J96.92  ICD-9-CM: 518.81  4/3/2020 Yes        Transaminitis ICD-10-CM: R74.0  ICD-9-CM: 790.4  4/3/2020 Yes        Acute on chronic renal insufficiency ICD-10-CM: N28.9, N18.9  ICD-9-CM: 593.9, 585.9  4/3/2020 Yes        Sleep apnea ICD-10-CM: G47.30  ICD-9-CM: 780.57  7/9/2018 Yes        Cocaine abuse ([facility] ICD-10-CM: F14.10  ICD-9-CM: 305.60  4/22/2018 Yes        CHF (congestive heart failure) (H[facility]) (Chronic) ICD-10-CM: I50.9  ICD-9-CM: 428.0  3/3/2018 Yes              Discharge Condition: stable     Discharge Medications:     Current Discharge Medication List      START taking these medications    Details   albuterol-ipratropium (DUO-NEB) 2.5 mg-0.5 mg/3 ml nebu 3 mL by Nebulization route every four (4) hours.   Qty: 30 Nebule, Refills: 0      budesonide (PULMICORT) 0.5 mg/2 mL nbsp 2 mL by Nebulization route two (2) times a day. Qty: 2 Each, Refills: 0      furosemide (LASIX) 10 mg/mL injection 4 mL by IntraVENous route daily. Qty: 1 Vial, Refills: 0      melatonin 5 mg tablet Take 1 Tab by mouth nightly. Qty: 1 Tab, Refills: 0      QUEtiapine (SEROquel) 25 mg tablet 1 Tab by Per NG tube route two (2) times a day. Qty: 2 Tab, Refills: 0         CONTINUE these medications which have CHANGED    Details   carvediloL (COREG) 3.125 mg tablet Take 1 Tab by mouth two (2) times daily (with meals). Qty: 60 Tab, Refills: 0         CONTINUE these medications which have NOT CHANGED    Details   hydrALAZINE (APRESOLINE) 25 mg tablet Take 1 Tab by mouth three (3) times daily. Qty: 90 Tab, Refills: 0         STOP taking these medications       amLODIPine (NORVASC) 10 mg tablet Comments:   Reason for Stopping:         amitriptyline (ELAVIL) 50 mg tablet Comments:   Reason for Stopping:         methocarbamol (ROBAXIN) 750 mg tablet Comments:   Reason for Stopping:         atorvastatin (LIPITOR) 40 mg tablet Comments:   Reason for Stopping:         aspirin delayed-release 81 mg tablet Comments:   Reason for Stopping:         nitroglycerin (NITROSTAT) 0.4 mg SL tablet Comments:   Reason for Stopping:         gabapentin (NEURONTIN) 800 mg tablet Comments:   Reason for Stopping:         fluticasone-vilanterol (BREO ELLIPTA) 100-25 mcg/dose inhaler Comments:   Reason for Stopping:         cloNIDine HCl (CATAPRES) 0.2 mg tablet Comments:   Reason for Stopping:         furosemide (LASIX) 40 mg tablet Comments:   Reason for Stopping:         glipiZIDE (GLUCOTROL) 10 mg tablet Comments:   Reason for Stopping:         spironolactone (ALDACTONE) 25 mg tablet Comments:   Reason for Stopping:         albuterol (PROVENTIL HFA, VENTOLIN HFA) 90 mcg/actuation inhaler Comments:   Reason for Stopping:               Procedures : intubation. picc line, peg tube , tracheostomy     Consults: Otolaryngology.  [facility] EXAM   Visit Vitals  BP (!) 107/29   Pulse 82   Temp 99 °F (37.2 °C)   Resp 26   Ht 5' 7\" (1.702 m)   Wt 157.5 kg (347 lb 3.6 oz)   SpO2 92%   BMI 54.38 kg/m²     General: Awake, cooperative, no acute distress    HEENT: NC, Atraumatic. PERRLA, EOMI. Anicteric sclerae. Trach noted   Lungs:  CTA Bilaterally. No Wheezing/Rhonchi/Rales. Heart:  Regular  rhythm,  No murmur, No Rubs, No Gallops  Abdomen: Soft, Non distended, Non tender. +Bowel sounds,  Peg tube   Extremities: No c/c/e  Psych:   Not anxious or agitated. Neurologic:  No acute neurological deficits. Admission HPI :  Hitesh Holden is a 62 y.o. male who has a hx of morbid obesity, JIM, CHF, and COPD on 4 L home O2 who was transported by [facility] for SOB. He was alert and talking at that time. He was placed on bipap in the ER but at the time of my evaluation he was completely obtunded. I requested a subsequent ABG and it revealed significant hypercapnia and worsening acidosis. Decision was made to intubate him. No family available for collateral history. Unable to obtain any HPI from the pt. Hospital Course :    61 y/o male w/ hx of COPD on 4L home O2-trilogy at home ,  EF of 35%, super morbid obesity with recent admission to [facility] who presents in acute hypercapneic respiratory failure, he was admitted due to acute on chronic respiration failure and he was intubated on admission. Iv abx and steroid were administrated for pna and copd exacerbation. He had difficulty in weaning off the vent. ENT on board, received Trach 4/13 per Dr. Merritt Jewell. On discharge,  he completed iv abx for pna treatment and off steroid also on discharge. He will be dc to [facility] to wean off vent. On admission, his uds was positive for cocaine. covid 19 undetected twice. We continued treated his chf - Combined diastolic and systolic   Ef 30 - 06%. Mild (grade 1) left ventricular diastolic dysfunction with lasix and coreg. Potassium was replaced.  His joann resolved. He also get peg tube for his nutrition and picc line for iv medication. He remained hemodynamic stable before discharge. Activity: Activity as tolerated    Diet: PEG feeding     Follow-up: per future physician     Disposition: William Regan spent on discharge: 45 min       Labs: Results:       Chemistry Recent Labs     04/22/20 0420 04/21/20  0510 04/20/20  0445   * 118* 108*    143 143   K 4.0 3.8 3.7    104 106   CO2 37* 34* 34*   BUN 30* 31* 27*   CREA 0.95 1.01 0.95   CA 9.2 9.2 9.0   AGAP 2* 5 3   BUCR 32* 31* 28*   * 167* 179*   TP 6.6 6.6 6.4   ALB 2.6* 2.6* 2.6*   GLOB 4.0 4.0 3.8   AGRAT 0.7* 0.7* 0.7*      CBC w/Diff Recent Labs     04/22/20 0420 04/21/20  0510 04/20/20  0445   WBC 8.1 7.2 8.8   RBC 4.28* 4.47* 4.22*   HGB 11.7* 12.4* 11.5*   HCT 38.8 39.8 37.5    176 126*   GRANS 68 68 69   LYMPH 14* 18* 14*   EOS 10* 9* 11*      Cardiac Enzymes No results for input(s): CPK, CKND1, ITSHA in the last 72 hours. No lab exists for component: CKRMB, TROIP   Coagulation No results for input(s): PTP, INR, APTT, INREXT in the last 72 hours. Lipid Panel Lab Results   Component Value Date/Time    Cholesterol, total 196 01/25/2018 02:51 AM    HDL Cholesterol 64 (H) 01/25/2018 02:51 AM    LDL, calculated 121.2 (H) 01/25/2018 02:51 AM    VLDL, calculated 10.8 01/25/2018 02:51 AM    Triglyceride 54 01/25/2018 02:51 AM    CHOL/HDL Ratio 3.1 01/25/2018 02:51 AM      BNP No results for input(s): BNPP in the last 72 hours. Liver Enzymes Recent Labs     04/22/20 0420   TP 6.6   ALB 2.6*   *   SGOT 23      Thyroid Studies Lab Results   Component Value Date/Time    TSH 0.11 (L) 03/19/2019 06:50 AM          @micro    Significant Diagnostic Studies: Xr Abd (kub)    Result Date: 4/4/2020  EXAM: Single view of the abdomen CLINICAL INDICATION/HISTORY: Nasogastric tube placement    --Additional history: None.  COMPARISON: None TECHNIQUE: Single supine view _______________ FINDINGS: The impression. _______________     IMPRESSION: Study technically limited by patient's body habitus. Nasogastric tube tip appears to project over the gastric antrum. Ir Insert Gastrostomy Tube Perc    Result Date: 4/17/2020  PROCEDURE: PERCUTANEOUS GASTROSTOMY TUBE PLACEMENT ATTENDING: Elis Sheridan M.D. COMPLICATIONS: None MEDICATIONS: Local lidocaine. Fluoroscopic time: 1.7 minutes Fluoroscopic dose (reference air kerma): 243 mGy INDICATION: Enteral nutrition. PROCEDURE: Informed consent was obtained where the risks, benefits and alternatives to the procedure were explained. All elements of maximal sterile barrier technique followed including: cap and mask and sterile gown and sterile gloves and a large sterile sheet and hand hygiene and 2% chlorhexidine for cutaneous antisepsis (or acceptable alternative antiseptics, per current guidelines). The stomach was insufflated using indwelling enteric tube. Following some cutaneous infiltration of lidocaine, gastropexy was performed using 2 T-fasteners. Then, access was obtained using 18-gauge needle. Then, following tract dilation, over wire, a 12 [place] percutaneous balloon retention type gastrostomy tube was placed. Postplacement contrast injection confirmed intragastric position. Sterile dressing applied. The patient tolerated the procedure well and was transferred from the IR suite in stable condition. IMPRESSION: Percutaneous gastrostomy tube placement as above. Xr Chest Port    Result Date: 4/22/2020  EXAM: CHEST RADIOGRAPH, SINGLE VIEW CLINICAL INDICATION/HISTORY: Shortness of breath, hypoxia, follow-up COMPARISON: 4/20/2020 TECHNIQUE: Portable frontal view of the chest was obtained. _______________ FINDINGS: SUPPORT DEVICES: Tracheostomy appears unchanged in position, tip overlying midline thoracic inlet. Right upper extremity PICC is also unchanged in position.  HEART AND MEDIASTINUM: Cardiomediastinal silhouette appears unchanged, mildly enlarged. Tortuous and atherosclerotic thoracic aorta. Unchanged, mild central pulmonary vascular congestion. LUNGS AND PLEURAL SPACES: Low lung volumes with diffusely increased interstitial markings. Bandlike medial right lower lobe atelectasis. No new or confluent airspace opacity elsewhere throughout the lungs. No definite pleural effusion. No pneumothorax. BONY THORAX AND SOFT TISSUES: No acute osseous abnormality. _______________     IMPRESSION: 1. Tracheostomy and right upper extremity PICC appear unchanged. 2. Unchanged global cardiomegaly, central vascular congestion, and interstitial pulmonary edema. Xr Chest Port    Result Date: 4/20/2020  EXAM: XR CHEST PORT CLINICAL INDICATION/HISTORY: hypoxia -Additional: None COMPARISON: 4/17/2020 TECHNIQUE: Portable frontal view of the chest _______________ FINDINGS: SUPPORT DEVICES: Infection requiring PICC unchanged. Tracheostomy. HEART AND MEDIASTINUM: Cardiomegaly LUNGS AND PLEURAL SPACES: Central vascular congestion and interstitial edema. No large effusion. _______________     IMPRESSION: Central vascular congestion and interstitial edema. Xr Chest Port    Result Date: 4/17/2020  EXAM: CHEST RADIOGRAPH, SINGLE VIEW CLINICAL INDICATION/HISTORY: Shortness of breath, fever COMPARISON: 4/15/2020 TECHNIQUE: Portable frontal view of the chest was obtained. _______________ FINDINGS: SUPPORT DEVICES: Tracheostomy tube appears unchanged, adequately positioned with tip overlying thoracic inlet. Nasogastric tube seen previously is no longer visualized. Left internal jugular central venous catheter also appears removed in the interval. HEART AND MEDIASTINUM: Cardiomediastinal silhouette appears unchanged, mild to moderately enlarged. Mild persistent central pulmonary vascular congestion, unchanged. LUNGS AND PLEURAL SPACES: Lungs are hypoinflated with diffusely increased interstitial markings.  Subtle residual lateral right basilar opacity partially silhouettes the right hemidiaphragm. No new or confluent airspace opacity elsewhere throughout the lungs. Probable small right pleural effusion. No pneumothorax. BONY THORAX AND SOFT TISSUES: No acute osseous abnormality. _______________     IMPRESSION: 1. Tracheostomy tube appears adequately positioned. Interval removal of nasogastric tube and left jugular central venous catheter seen previously. 2. Unchanged cardiomegaly, central vascular congestion and interstitial pulmonary edema. 3. Right basilar patchy opacity, atelectasis versus infiltrate. Probable small right pleural effusion. Xr Chest Port    Result Date: 4/15/2020  EXAM: XR CHEST PORT CLINICAL INDICATION/HISTORY: pneumonia, ett   > Additional: None. COMPARISON: April 14, 2020 TECHNIQUE: Portable chest _______________ FINDINGS: SUPPORT DEVICES: Tracheostomy tube, nasogastric tube, and left IJ central venous catheter remain unchanged. Overlying monitor leads are again noted. HEART AND MEDIASTINUM: The heart is enlarged. Pulmonary vascular congestion is again noted with improved vascular clarity since the prior study. LUNGS AND PLEURAL SPACES: Lungs are underexpanded with bibasilar atelectasis and suspected trace pleural effusions. Increased hazy opacity partially obscures the right hemidiaphragm compatible with atelectasis or infiltrate. BONY THORAX AND SOFT TISSUES: No acute osseous abnormality. _______________     IMPRESSION: 1. Stable positioning of the support lines and tubes as above. 2. Stable cardiomegaly with decreasing pulmonary edema. 3. Slightly increased right basilar opacity, likely atelectasis versus pneumonia. Suspect bilateral pleural effusions.  * **  *     Xr Chest Port    Result Date: 4/14/2020  EXAM: XR CHEST PORT CLINICAL INDICATION/HISTORY: pneumonia, endotracheal tube placement -Additional: None COMPARISON: 4/13/2020 TECHNIQUE: Portable frontal view of the chest _______________ FINDINGS: SUPPORT DEVICES: Tracheostomy tube projects in stable position. Left IJ approach central venous catheter with tip projecting over the SVC. Nasogastric tube below the diaphragm, tip collimated from view. HEART AND MEDIASTINUM: Stable cardiac size and mediastinal contours. LUNGS AND PLEURAL SPACES: Underexpanded lungs with central vascular prominence noted. No pneumothorax or large pleural effusion demonstrated. BONY THORAX AND SOFT TISSUES: No acute osseous abnormality. _______________     IMPRESSION: 1. Enlarged cardiac silhouette and perihilar airspace disease or atelectasis with pulmonary hypoinflation. Overall findings similar to prior. Xr Chest Port    Result Date: 4/13/2020  --------------------------------------------------------------------------- <<<<<<<<<           [facility]           >>>>>>>>> --------------------------------------------------------------------------- CLINICAL HISTORY:  Respiratory failure. COMPARISON EXAMINATIONS:  April 12, 2020. ---  SINGLE FRONTAL VIEW OF THE CHEST  --- The lung volumes are low. The cardiomediastinal silhouette is stable. There is an endotracheal tube noted in good position above the juan miguel. A gastric tube extends below the diaphragm. The tip of the gastric tube is not seen. A left approach central line is in a stable position. There is apparent pulmonary vascular congestion and mild increased markings. There is no new focal consolidation. No significant osseous abnormalities are identified.  --------------    Impression: -------------- There is limitation related to low lung volumes as well as AP portable technique. Endotracheal tube, gastric tube and central line in place. There is pulmonary vascular congestion and mild increased markings which is similar to previous and may be partly technical. No new focal consolidation or pleural effusion. No significant interval change.      Xr Chest Port    Result Date: 4/12/2020  A portable AP radiograph of the chest was obtained at 0642 hours: INDICATION:  Respiratory insufficiency. COMPARISON:  Multiple prior studies most recent being one day earlier. FINDINGS: ET tube tip 2.8 cm above the juan miguel. NG/OG tube extends into the stomach but not visible beyond the GE junction partly due to underpenetration. Left IJ line tip likely in the upper SVC. Heart and mediastinum: Unremarkable. Lungs and pleura: The left lung base is poorly visualized also due to underpenetration. Rest of the lungs are clear. No pneumothorax. Aorta: Unremarkable. Bones: Within normal limits for age. Other: None. Impression: Lines and tubes as described. No pneumothorax. Poor visualization of the left lung base and the tip of the NG/OG tube due to underpenetration. Xr Chest Port    Result Date: 4/11/2020  A portable AP radiograph of the chest was obtained at 0623 hours: INDICATION:  Respiratory failure. COMPARISON:  Multiple prior studies most recent being one day earlier. FINDINGS:  Heart and mediastinum: ET tube tip 5 cm above the juan miguel. NG/OG tube extends into the mid stomach. Left-sided IJ line tip is likely in the upper SVC. There is cardiomegaly. Lungs and pleura: The left lung base is poorly visualized partly due to the overlying heart. No pneumothorax. Aorta: Partially calcified. Bones: Within normal limits for age. Other: None. Impression: Overall appearance is similar to the prior study one day earlier. The left retrocardiac area appears slightly improved however. Lines and tubes stable. Xr Chest Port    Result Date: 4/10/2020  EXAM: XR CHEST PORT CLINICAL INDICATION/HISTORY: pneumonia, intubated -Additional: None COMPARISON: One day prior TECHNIQUE: Portable frontal view of the chest _______________ FINDINGS: SUPPORT DEVICES: ETT and enteric tube unchanged. Left IJV central venous catheter unchanged. HEART AND MEDIASTINUM: Cardiomegaly. LUNGS AND PLEURAL SPACES: Interstitial opacities unchanged.  Retrocardiac density unchanged, effusions/atelectasis versus infiltrate. _______________     IMPRESSION: Cardiomegaly. Interstitial opacities unchanged. Retrocardiac density unchanged, effusions/atelectasis versus infiltrate. Xr Chest Port    Result Date: 4/9/2020  EXAM: CHEST RADIOGRAPH, SINGLE VIEW CLINICAL INDICATION/HISTORY: Shortness of breath, pneumonia, follow-up COMPARISON: 4/8/2020 TECHNIQUE: Portable frontal view of the chest was obtained. _______________ FINDINGS: SUPPORT DEVICES: Adequate interval proximal withdrawal of the endotracheal tube, tip now located approximately 3.2 cm above the juan miguel. Enteric tube appears unchanged, adequately positioned. Left jugular central venous catheter is also unchanged, tip in the region of the brachiocephalic confluence. HEART AND MEDIASTINUM: Cardiomediastinal silhouette appears unchanged, mild to moderately enlarged. Normal caliber thoracic aorta. No central vascular congestion. LUNGS AND PLEURAL SPACES: Slight interval progression of dense consolidation throughout retrocardiac left lower lobe, silhouetting left hemidiaphragm. Probable small left pleural effusion. Slightly more focal bandlike opacity is also now seen throughout the right lung base. The lungs are hypoinflated. No definite right sided pleural effusion. No pneumothorax. BONY THORAX AND SOFT TISSUES: No acute osseous abnormality. _______________     IMPRESSION: 1. Adequate interval repositioning of the endotracheal tube. 2. Slight interval worsening of retrocardiac left lower lobe dense consolidation, likely a combination of atelectasis and pulmonary infiltrate. Small left pleural effusion. Slightly more focal bandlike opacity, probably atelectasis within the hypoinflated right lung base. Xr Chest Port    Result Date: 4/8/2020  EXAM: One-view chest CLINICAL HISTORY: Respiratory distress.  pneumonia, intubated , COMPARISON: None FINDINGS: Frontal view of the chest demonstrate prominent confluent opacities in the right lung base. Nonspecific opacities in the left lung base are stable. ET tube is at the juan miguel. Enteric tube tip not visualized coursing towards the stomach. Left approach central line tip in the upper SVC. . Cardiac silhouette is normal in size and contour. No acute bony or soft tissue abnormality. IMPRESSION: ET tube tip at the juan miguel. Consider retracting. Increasing opacities in the right lung base. Stable opacities in the left lung base likely representing combination of small effusion and nonspecific airspace disease and/or atelectasis. Xr Chest Port    Result Date: 4/7/2020  EXAM: XR CHEST PORT CLINICAL INDICATION/HISTORY: OG tube in place. -Additional: None COMPARISON: Earlier the same day TECHNIQUE: Portable frontal view of the chest _______________ FINDINGS: SUPPORT DEVICES: Endotracheal tube approximately 1 cm above the juan miguel. Enteric tube tip out of field of view possibly in the distal stomach. HEART AND MEDIASTINUM: Cardiomegaly LUNGS AND PLEURAL SPACES: Hypoinflated lungs. Probable small to moderate left effusion. Mild interstitial edema. No pneumothorax. _______________     IMPRESSION: Endotracheal tube approximately 1 cm above the juan miguel. Enteric tube tip out of field of view possibly in the distal stomach. Xr Chest Port    Result Date: 4/7/2020  EXAM: XR CHEST PORT CLINICAL INDICATION/HISTORY: pneumonia, intubated -Additional: None COMPARISON: One day prior TECHNIQUE: Portable frontal view of the chest _______________ FINDINGS: SUPPORT DEVICES: Enteric tube and endotracheal tube unchanged. Anju Sharper HEART AND MEDIASTINUM: cardiomegaly LUNGS AND PLEURAL SPACES: Hypoinflated lungs. Probable small to moderate left effusion. Mild interstitial edema. No pneumothorax. _______________     IMPRESSION: Hypoinflated lungs. Probable small to moderate left effusion. Mild interstitial edema. No pneumothorax.      Xr Chest Port    Result Date: 4/6/2020  EXAM: XR CHEST PORT CLINICAL INDICATION/HISTORY: While intubated -Additional: None COMPARISON: One day prior TECHNIQUE: Portable frontal view of the chest _______________ FINDINGS: SUPPORT DEVICES: Enteric tube and endotracheal tube unchanged. Cleophus Seabrook HEART AND MEDIASTINUM: cardiomegaly LUNGS AND PLEURAL SPACES: Hypoinflated lungs. Probable small left effusion. Mild interstitial edema. No pneumothorax. _______________     IMPRESSION: Hypoinflated lungs. Probable small left effusion. Mild interstitial edema. Xr Chest Port    Result Date: 4/5/2020  EXAM: CHEST RADIOGRAPH CLINICAL INDICATION/HISTORY: Respiratory failure -Additional: None COMPARISON: April 4, 2020 TECHNIQUE: Portable frontal view of the chest _______________ FINDINGS: SUPPORT DEVICES: The tip of an endotracheal tube is 10 cm above the juan miguel. A nasogastric tube crosses the gastroesophageal junction. A left IJ central venous catheter terminates in the proximal SVC. HEART AND MEDIASTINUM: The heart is enlarged. LUNGS AND PLEURAL SPACES: Interstitial lung markings are increased. Lung volumes are hypoinflated and there are opacities in the lung bases. No pneumothorax. BONY THORAX AND SOFT TISSUES: Unremarkable. _______________     IMPRESSION: 1. Mild pulmonary edema 2. Bibasilar opacities that may represent a combination of atelectasis and small pleural effusions     Xr Chest Port    Result Date: 4/4/2020  EXAM: PORTABLE CHEST HISTORY: Central line placement. Acute respiratory failure. COMPARISON: 4/4/2020 at 12:42 AM TECHNIQUE: Single portable view. _______________ FINDINGS: SUPPORT DEVICES: Endotracheal tube tip lies about 4.2 cm above juan miguel. Left central venous catheter tip in upper superior vena cava. HEART AND MEDIASTINUM: Cardiomegaly. LUNGS AND PLEURAL SPACES: Patchy airspace disease right lung base may represent developing subsegmental atelectasis or pneumonia. Evaluation limited by large body habitus.  BONES AND SOFT TISSUES: Bony structures are normal. _______________     IMPRESSION: Interval placement left central venous catheter. No pneumothorax. Cardiomegaly without change. Patchy airspace disease right lung base either subsegmental atelectasis or pneumonia appears slightly worse. Xr Chest Port    Result Date: 4/4/2020  EXAM: Chest radiograph  CLINICAL INDICATION/HISTORY: Chemical ventilation    --Additional history: None. COMPARISON: 04/03/2020 TECHNIQUE: Frontal view of the chest _______________ FINDINGS: SUPPORT DEVICES: There is an endotracheal tube with tip approximately 5.5 cm above the juan miguel. There is a nasogastric tube descends below the diaphragm. HEART AND MEDIASTINUM: React silhouette is enlarged. Stable mediastinal contours. . Normal pulmonary vasculature. LUNGS AND PLEURAL SPACES: No convincing focal airspace opacity given the limitations of the study and superimposed body habitus. Sharp costophrenic sulci. No pneumothoraces. BONY THORAX AND SOFT TISSUES: Questionable fracture deformity of the right lateral ninth rib. _______________     IMPRESSION: 1. Support lines and tubes as detailed above. 2. Technically limited study secondary to body habitus without significant change from the prior study. Xr Chest Port    Result Date: 4/3/2020  EXAM:  AP Portable Chest X-ray 1 view INDICATION: Chest pain shortness of breath COMPARISON: None _______________ FINDINGS:  Heart size is enlarged and mediastinal contours are within normal limits for portable radiograph. There are increased interstitial markings in the right lung. No focal airspace disease seen. . There are no pleural effusions. No acute osseous findings. ________________      IMPRESSION: Increased interstitial markings in the right lung. No focal airspace disease or effusion. Xr Abd Port  1 V    Result Date: 4/13/2020  EXAM:  AP Portable Chest X-ray 1 view INDICATION: NG tube placement COMPARISON: Same date 419 and _______________ FINDINGS: There is an orogastric sump pump tube with tip below the diaphragm with tip overlying the stomach. Heart size is enlarged. There is left retrocardiac atelectasis or infiltrate. Significant portion of the right lung and left upper lobe are not imaged. No acute osseous findings. Left central line seen with tip overlying the superior vena cava. ________________      IMPRESSION: There is an orogastric sump pump tube with tip below the diaphragm with tip overlying the stomach. Left lower lobe atelectasis or infiltrate. Xr Abd Port  1 V    Result Date: 4/12/2020  EXAM: XR ABD PORT  1 V 0946 hours INDICATION: OG tube placement COMPARISON: Abdomen 4/4/2020. FINDINGS: A supine radiograph of the abdomen shows a NG/OG tube with tip in the distal stomach. The visualized bowel gas pattern is unremarkable. The bones and soft tissues are within normal limits. IMPRESSION: NG/OG tube tip is in the region of the distal stomach. Ir Guide Insert Picc Over 5 Yrs    Result Date: 4/17/2020  PROCEDURE: PICC placement ATTENDING: Hector Black M.D. INDICATION: Long-term central venous access. COMPLICATIONS: None. PROCEDURE: Informed consent was obtained. Physician lead timeout was performed. The arm was prepped and draped in sterile fashion. The right basilic was compressed to exclude thrombus. A permanent US recording of the vein was created for the patients file. The vein was then accessed with a 21-gauge needle and ultrasound guidance. Guidewire was passed centrally using fluoroscopic guidance. A peel-away sheath was inserted over the guidewire. The intravascular distance was measured. The PICC was trimmed to the appropriate length, 49 cm. The double lumen PICC was placed into the vein with its tip in the SVC/RA junction and both ports aspirated and flushed easily. The PICC was secured to the skin, a sterile dressing including Biopatch was applied. Fluoroscopic time: 0.4 minutes Fluoroscopic dose (reference air kerma): 36 mGy     IMPRESSION: Dual lumen right PICC placement, ready for immediate use.              Lizz Seed JAIMEE,Internal Medicine     CC: Sindi Clay MD

## 2020-04-24 LAB
BACTERIA ISLT: ABNORMAL
OTHER ANTIBIOTIC SUSC ISLT: ABNORMAL
OTHER ANTIBIOTIC SUSC ISLT: NORMAL
SOURCE, RSRC70: ABNORMAL
SPECIMEN SOURCE: NORMAL

## 2020-04-28 NOTE — DISCHARGE SUMMARY
[facility] SUMMARY    Name:  Luigi Nolen  MR#:   [id number]  :  1960  ACCOUNT #:  [de-identified]  ADMIT DATE:  2020  DISCHARGE DATE:  2020    ADDENDUM to [facility] created note #[id number]    Received fax about updated trachea aspiration culture results. Results handed to . Recommended to fax to the  facility which the patient is at there now.       Diya Villafana      [id number]  D:  2020 15:30  T:  2020 18:10  JOB #:  [id number]

## 2020-06-13 ENCOUNTER — HOSPITAL ENCOUNTER (EMERGENCY)
Age: 60
Discharge: HOME | End: 2020-06-13
Attending: EMERGENCY MEDICINE
Payer: MEDICAID

## 2020-06-13 VITALS
OXYGEN SATURATION: 99 % | BODY MASS INDEX: 49.44 KG/M2 | HEART RATE: 77 BPM | HEIGHT: 67 IN | RESPIRATION RATE: 20 BRPM | SYSTOLIC BLOOD PRESSURE: 158 MMHG | TEMPERATURE: 98.6 F | WEIGHT: 315 LBS | DIASTOLIC BLOOD PRESSURE: 76 MMHG

## 2020-06-13 DIAGNOSIS — R04.0 EPISTAXIS: Primary | ICD-10-CM

## 2020-06-13 PROCEDURE — 99284 EMERGENCY DEPT VISIT MOD MDM: CPT

## 2020-06-13 RX ORDER — SILVER NITRATE 38.21; 12.74 MG/1; MG/1
2 STICK TOPICAL ONCE
Status: DISCONTINUED | OUTPATIENT
Start: 2020-06-13 | End: 2020-06-13 | Stop reason: HOSPADM

## 2020-06-13 NOTE — ED TRIAGE NOTES
Pt states home from rehab since may 20th, pt states had trach and trach removal.  Pt reports he is on home oxygen. Pt states he began having nose bleed for about 2 weeks, intermittent. .the patient states had difficulty  Stopping the nose bleed today.  Bleeding controlled at present
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
## 2020-06-13 NOTE — DISCHARGE INSTRUCTIONS
Nosebleeds: Care Instructions  Your Care Instructions     Nosebleeds are common, especially if you have colds or allergies. Many things can cause a nosebleed. Some nosebleeds stop on their own with pressure. Others need packing. Some get cauterized (sealed). If you have gauze or other packing materials in your nose, you will need to follow up with your doctor to have the packing removed. You may need more treatment if you get nosebleeds a lot. The doctor has checked you carefully, but problems can develop later. If you notice any problems or new symptoms, get medical treatment right away. Follow-up care is a key part of your treatment and safety. Be sure to make and go to all appointments, and call your doctor if you are having problems. It's also a good idea to know your test results and keep a list of the medicines you take. How can you care for yourself at home? · If you get another nosebleed:  ? Sit up and tilt your head slightly forward. This keeps blood from going down your throat. ? Use your thumb and index finger to pinch your nose shut for 10 minutes. Use a clock. Do not check to see if the bleeding has stopped before the 10 minutes are up. If the bleeding has not stopped, pinch your nose shut for another 10 minutes. ? When the bleeding has stopped, try not to pick, rub, or blow your nose for 12 hours. Avoiding these things helps keep your nose from bleeding again. · If your doctor prescribed antibiotics, take them as directed. Do not stop taking them just because you feel better. You need to take the full course of antibiotics. To prevent nosebleeds  · Do not blow your nose too hard. · Try not to lift or strain after a nosebleed. · Raise your head on a pillow while you sleep. · Put a thin layer of a saline- or water-based nasal gel, such as NasoGel, inside your nose. Put it on the septum, which divides your nostrils. This will prevent dryness that can cause nosebleeds.   · Use a vaporizer or humidifier to add moisture to your bedroom. Follow the directions for cleaning the machine. · Do not use aspirin, ibuprofen (Advil, Motrin), or naproxen (Aleve) for 36 to 48 hours after a nosebleed unless your doctor tells you to. You can use acetaminophen (Tylenol) for pain relief. · Talk to your doctor about stopping any other medicines you are taking. Some medicines may make you more likely to get a nosebleed. · Do not use cold medicines or nasal sprays without first talking to your doctor. They can make your nose dry. When should you call for help? [id number] anytime you think you may need emergency care. For example, call if:  · You passed out (lost consciousness). Call your doctor now or seek immediate medical care if:  · You get another nosebleed and your nose is still bleeding after you have applied pressure 3 times for 10 minutes each time (30 minutes total). · There is a lot of blood running down the back of your throat even after you pinch your nose and tilt your head forward. · You have a fever. · You have sinus pain. Watch closely for changes in your health, and be sure to contact your doctor if:  · You get nosebleeds often, even if they stop. · You do not get better as expected. Where can you learn more? Go to http://bhargav-mily.info/  Enter S156 in the search box to learn more about \"Nosebleeds: Care Instructions. \"  Current as of: June 26, 2019               Content Version: 12.5  © 7802-4814 [employer]. Care instructions adapted under license by [employer] (which disclaims liability or warranty for this information). If you have questions about a medical condition or this instruction, always ask your healthcare professional. [address] any warranty or liability for your use of this information.

## 2020-06-14 NOTE — PROGRESS NOTES
6/14/2020 1229  Chart reviewed as CM on call. Received page yesterday from ER PA that pt needs humidified oxygen. Uncertain of company that supplies pts oxygen. This CM has called [employer] and they are unable to confirm. Called [employer] and awaiting return call from on call to see which company needs order.

## 2020-06-15 ENCOUNTER — PATIENT OUTREACH (OUTPATIENT)
Dept: CASE MANAGEMENT | Age: 60
End: 2020-06-15

## 2020-06-15 NOTE — PROGRESS NOTES
6/15/2020 10:07 AM     CTN attempted to contact patient for hospital follow up. Patient admitted to [facility] on 6/13/2020 and discharged on 6/13/2020 for epistaxis. Called number left for patient and it was to [facility]. Did not leave a message. Called emergency contact and a  Message was left introducing myself, the purpose of the call and giving my contact information. Requested that emergency contact have patient call back at his earliest convenience.

## 2020-06-15 NOTE — PROGRESS NOTES
Call back from [employer] ([employer]) on 6/15/2020 (Hui) [phone] to inform us that patient is no longer with [facility] for Home Oxygen. Hui seems to think that patient utilizes [facility] for Oxygen. Emergency Contact number for patient is Joe Patterson at [phone].

## 2020-06-17 ENCOUNTER — PATIENT OUTREACH (OUTPATIENT)
Dept: CASE MANAGEMENT | Age: 60
End: 2020-06-17

## 2020-06-17 NOTE — PROGRESS NOTES
6/17/2020 9:57 AM     CTN attempted to contact patient for hospital follow up. Patient admitted to [facility] on 6/13/2020 and discharged on 6/13/2020 for epistaxis. Called number left for patient and it was to [facility]. Did not leave a message. Called emergency contact and a  Message was left introducing myself, the purpose of the call and giving my contact information. Requested that emergency contact have patient call back at his earliest convenience. This is second attempt to contact patient.

## 2020-06-19 ENCOUNTER — PATIENT OUTREACH (OUTPATIENT)
Dept: CASE MANAGEMENT | Age: 60
End: 2020-06-19

## 2020-06-19 NOTE — PROGRESS NOTES
6/19/2020 1:45 PM     CTN attempted to contact patient for hospital follow up. Patient admitted to [facility] on 6/13/2020 and discharged on 6/13/2020 for epistaxis. Called number left for patient and it was to [facility]. Did not leave a message. Called emergency contact and a  Message was left introducing myself, the purpose of the call and giving my contact information. Requested that emergency contact have patient call back at his earliest convenience. This is third attempt to contact patient.

## 2020-06-22 ENCOUNTER — PATIENT OUTREACH (OUTPATIENT)
Dept: CASE MANAGEMENT | Age: 60
End: 2020-06-22

## 2020-06-22 NOTE — PROGRESS NOTES
6/22/2020 8:59 AM     CTN attempted to contact patient for hospital follow up. Patient admitted to [facility] on 6/13/2020 and discharged on 6/13/2020 for epistaxis. Called number left for patient and it was to [facility]. Did not leave a message. Called emergency contact and a  Message was left introducing myself, the purpose of the call and giving my contact information. Requested that emergency contact have patient call back at his earliest convenience. This is fourth attempt to contact patient. Will resolve episode as there have been no return phone calls.

## 2020-12-06 ENCOUNTER — HOSPITAL ENCOUNTER (INPATIENT)
Age: 60
LOS: 2 days | Discharge: HOME | DRG: 194 | End: 2020-12-08
Attending: EMERGENCY MEDICINE | Admitting: FAMILY MEDICINE
Payer: MEDICAID

## 2020-12-06 ENCOUNTER — APPOINTMENT (OUTPATIENT)
Dept: GENERAL RADIOLOGY | Age: 60
DRG: 194 | End: 2020-12-06
Attending: EMERGENCY MEDICINE
Payer: MEDICAID

## 2020-12-06 DIAGNOSIS — R06.03 RESPIRATORY DISTRESS: ICD-10-CM

## 2020-12-06 DIAGNOSIS — I50.9 ACUTE ON CHRONIC CONGESTIVE HEART FAILURE, UNSPECIFIED HEART FAILURE TYPE (HCC): Primary | ICD-10-CM

## 2020-12-06 DIAGNOSIS — R06.02 SOB (SHORTNESS OF BREATH): ICD-10-CM

## 2020-12-06 LAB
ALBUMIN SERPL-MCNC: 3.1 G/DL (ref 3.4–5)
ALBUMIN/GLOB SERPL: 0.9 {RATIO} (ref 0.8–1.7)
ALP SERPL-CCNC: 91 U/L (ref 45–117)
ALT SERPL-CCNC: 36 U/L (ref 16–61)
ANION GAP SERPL CALC-SCNC: 1 MMOL/L (ref 3–18)
APPEARANCE UR: CLEAR
ARTERIAL PATENCY WRIST A: YES
ARTERIAL PATENCY WRIST A: YES
AST SERPL-CCNC: 37 U/L (ref 10–38)
ATRIAL RATE: 89 BPM
BASE EXCESS BLD CALC-SCNC: 12 MMOL/L
BASE EXCESS BLD CALC-SCNC: 9 MMOL/L
BASOPHILS # BLD: 0 K/UL (ref 0–0.1)
BASOPHILS NFR BLD: 0 % (ref 0–2)
BDY SITE: ABNORMAL
BDY SITE: ABNORMAL
BILIRUB SERPL-MCNC: 0.5 MG/DL (ref 0.2–1)
BILIRUB UR QL: NEGATIVE
BNP SERPL-MCNC: 2649 PG/ML (ref 0–900)
BODY TEMPERATURE: 98.6
BUN SERPL-MCNC: 25 MG/DL (ref 7–18)
BUN/CREAT SERPL: 15 (ref 12–20)
CALCIUM SERPL-MCNC: 8.8 MG/DL (ref 8.5–10.1)
CALCULATED P AXIS, ECG09: 58 DEGREES
CALCULATED R AXIS, ECG10: -52 DEGREES
CALCULATED T AXIS, ECG11: 106 DEGREES
CHLORIDE SERPL-SCNC: 102 MMOL/L (ref 100–111)
CK MB CFR SERPL CALC: 1.4 % (ref 0–4)
CK MB SERPL-MCNC: 5.1 NG/ML (ref 5–25)
CK SERPL-CCNC: 376 U/L (ref 39–308)
CO2 SERPL-SCNC: 39 MMOL/L (ref 21–32)
COLOR UR: YELLOW
COVID-19 RAPID TEST, COVR: NOT DETECTED
CREAT SERPL-MCNC: 1.62 MG/DL (ref 0.6–1.3)
DIAGNOSIS, 93000: NORMAL
DIFFERENTIAL METHOD BLD: ABNORMAL
EOSINOPHIL # BLD: 0.3 K/UL (ref 0–0.4)
EOSINOPHIL NFR BLD: 3 % (ref 0–5)
ERYTHROCYTE [DISTWIDTH] IN BLOOD BY AUTOMATED COUNT: 17.2 % (ref 11.6–14.5)
GAS FLOW.O2 O2 DELIVERY SYS: ABNORMAL L/MIN
GAS FLOW.O2 O2 DELIVERY SYS: ABNORMAL L/MIN
GLOBULIN SER CALC-MCNC: 3.6 G/DL (ref 2–4)
GLUCOSE SERPL-MCNC: 108 MG/DL (ref 74–99)
GLUCOSE UR STRIP.AUTO-MCNC: NEGATIVE MG/DL
HCO3 BLD-SCNC: 35.8 MMOL/L (ref 22–26)
HCO3 BLD-SCNC: 37.1 MMOL/L (ref 22–26)
HCT VFR BLD AUTO: 40.1 % (ref 36–48)
HGB BLD-MCNC: 12.1 G/DL (ref 13–16)
HGB UR QL STRIP: NEGATIVE
KETONES UR QL STRIP.AUTO: NEGATIVE MG/DL
LACTATE BLD-SCNC: 0.53 MMOL/L (ref 0.4–2)
LEUKOCYTE ESTERASE UR QL STRIP.AUTO: NEGATIVE
LYMPHOCYTES # BLD: 1.4 K/UL (ref 0.9–3.6)
LYMPHOCYTES NFR BLD: 14 % (ref 21–52)
MCH RBC QN AUTO: 26.7 PG (ref 24–34)
MCHC RBC AUTO-ENTMCNC: 30.2 G/DL (ref 31–37)
MCV RBC AUTO: 88.3 FL (ref 74–97)
MONOCYTES # BLD: 0.5 K/UL (ref 0.05–1.2)
MONOCYTES NFR BLD: 5 % (ref 3–10)
NEUTS SEG # BLD: 7.3 K/UL (ref 1.8–8)
NEUTS SEG NFR BLD: 78 % (ref 40–73)
NITRITE UR QL STRIP.AUTO: NEGATIVE
O2/TOTAL GAS SETTING VFR VENT: 0.21 %
P-R INTERVAL, ECG05: 166 MS
PCO2 BLD: 63.7 MMHG (ref 35–45)
PCO2 BLD: 75.5 MMHG (ref 35–45)
PEEP RESPIRATORY: 5 CMH2O
PEEP RESPIRATORY: 5 CMH2O
PH BLD: 7.29 [PH] (ref 7.35–7.45)
PH BLD: 7.37 [PH] (ref 7.35–7.45)
PH UR STRIP: 7 [PH] (ref 5–8)
PIP ISTAT,IPIP: 20
PIP ISTAT,IPIP: 20
PLATELET # BLD AUTO: 255 K/UL (ref 135–420)
PMV BLD AUTO: 11.2 FL (ref 9.2–11.8)
PO2 BLD: 69 MMHG (ref 80–100)
PO2 BLD: 91 MMHG (ref 80–100)
POTASSIUM SERPL-SCNC: 4.4 MMOL/L (ref 3.5–5.5)
PRESSURE SUPPORT SETTING VENT: 15 CMH2O
PROT SERPL-MCNC: 6.7 G/DL (ref 6.4–8.2)
PROT UR STRIP-MCNC: NEGATIVE MG/DL
Q-T INTERVAL, ECG07: 384 MS
QRS DURATION, ECG06: 108 MS
QTC CALCULATION (BEZET), ECG08: 467 MS
RBC # BLD AUTO: 4.54 M/UL (ref 4.7–5.5)
SAO2 % BLD: 92 % (ref 92–97)
SAO2 % BLD: 95 % (ref 92–97)
SARS-COV-2, COV2NT: NORMAL
SERVICE CMNT-IMP: ABNORMAL
SERVICE CMNT-IMP: ABNORMAL
SODIUM SERPL-SCNC: 142 MMOL/L (ref 136–145)
SOURCE, COVRS: NORMAL
SP GR UR REFRACTOMETRY: 1.02 (ref 1–1.03)
SPECIMEN TYPE, XMCV1T: NORMAL
SPECIMEN TYPE: ABNORMAL
SPECIMEN TYPE: ABNORMAL
SPONTANEOUS TIMED, IST: YES
SPONTANEOUS TIMED, IST: YES
TOTAL RESP. RATE, ITRR: 20
TOTAL RESP. RATE, ITRR: 26
TROPONIN I SERPL-MCNC: 0.05 NG/ML (ref 0–0.04)
UROBILINOGEN UR QL STRIP.AUTO: 1 EU/DL (ref 0.2–1)
VENTRICULAR RATE, ECG03: 89 BPM
WBC # BLD AUTO: 9.4 K/UL (ref 4.6–13.2)

## 2020-12-06 PROCEDURE — 87086 URINE CULTURE/COLONY COUNT: CPT

## 2020-12-06 PROCEDURE — 82803 BLOOD GASES ANY COMBINATION: CPT

## 2020-12-06 PROCEDURE — 87635 SARS-COV-2 COVID-19 AMP PRB: CPT

## 2020-12-06 PROCEDURE — 74011000250 HC RX REV CODE- 250: Performed by: FAMILY MEDICINE

## 2020-12-06 PROCEDURE — C1751 CATH, INF, PER/CENT/MIDLINE: HCPCS

## 2020-12-06 PROCEDURE — 87040 BLOOD CULTURE FOR BACTERIA: CPT

## 2020-12-06 PROCEDURE — 5A09357 ASSISTANCE WITH RESPIRATORY VENTILATION, LESS THAN 24 CONSECUTIVE HOURS, CONTINUOUS POSITIVE AIRWAY PRESSURE: ICD-10-PCS | Performed by: FAMILY MEDICINE

## 2020-12-06 PROCEDURE — 65610000006 HC RM INTENSIVE CARE

## 2020-12-06 PROCEDURE — 81003 URINALYSIS AUTO W/O SCOPE: CPT

## 2020-12-06 PROCEDURE — 71045 X-RAY EXAM CHEST 1 VIEW: CPT

## 2020-12-06 PROCEDURE — 74011250637 HC RX REV CODE- 250/637: Performed by: FAMILY MEDICINE

## 2020-12-06 PROCEDURE — 75810000455 HC PLCMT CENT VENOUS CATH LVL 2 5182

## 2020-12-06 PROCEDURE — 94660 CPAP INITIATION&MGMT: CPT

## 2020-12-06 PROCEDURE — 93005 ELECTROCARDIOGRAM TRACING: CPT

## 2020-12-06 PROCEDURE — 85025 COMPLETE CBC W/AUTO DIFF WBC: CPT

## 2020-12-06 PROCEDURE — 83880 ASSAY OF NATRIURETIC PEPTIDE: CPT

## 2020-12-06 PROCEDURE — 74011250636 HC RX REV CODE- 250/636: Performed by: EMERGENCY MEDICINE

## 2020-12-06 PROCEDURE — 80053 COMPREHEN METABOLIC PANEL: CPT

## 2020-12-06 PROCEDURE — 77010033678 HC OXYGEN DAILY

## 2020-12-06 PROCEDURE — 02HV33Z INSERTION OF INFUSION DEVICE INTO SUPERIOR VENA CAVA, PERCUTANEOUS APPROACH: ICD-10-PCS | Performed by: EMERGENCY MEDICINE

## 2020-12-06 PROCEDURE — 83605 ASSAY OF LACTIC ACID: CPT

## 2020-12-06 PROCEDURE — 36600 WITHDRAWAL OF ARTERIAL BLOOD: CPT

## 2020-12-06 PROCEDURE — 82550 ASSAY OF CK (CPK): CPT

## 2020-12-06 PROCEDURE — 96374 THER/PROPH/DIAG INJ IV PUSH: CPT

## 2020-12-06 PROCEDURE — 99291 CRITICAL CARE FIRST HOUR: CPT

## 2020-12-06 PROCEDURE — 74011250636 HC RX REV CODE- 250/636: Performed by: FAMILY MEDICINE

## 2020-12-06 RX ORDER — DEXTROSE MONOHYDRATE 100 MG/ML
125-250 INJECTION, SOLUTION INTRAVENOUS AS NEEDED
Status: DISCONTINUED | OUTPATIENT
Start: 2020-12-06 | End: 2020-12-08 | Stop reason: HOSPADM

## 2020-12-06 RX ORDER — IPRATROPIUM BROMIDE AND ALBUTEROL SULFATE 2.5; .5 MG/3ML; MG/3ML
3 SOLUTION RESPIRATORY (INHALATION)
Status: DISCONTINUED | OUTPATIENT
Start: 2020-12-07 | End: 2020-12-08 | Stop reason: HOSPADM

## 2020-12-06 RX ORDER — ACETAMINOPHEN 650 MG/1
650 SUPPOSITORY RECTAL
Status: DISCONTINUED | OUTPATIENT
Start: 2020-12-06 | End: 2020-12-08 | Stop reason: HOSPADM

## 2020-12-06 RX ORDER — SODIUM CHLORIDE 0.9 % (FLUSH) 0.9 %
5-40 SYRINGE (ML) INJECTION EVERY 8 HOURS
Status: DISCONTINUED | OUTPATIENT
Start: 2020-12-06 | End: 2020-12-08 | Stop reason: HOSPADM

## 2020-12-06 RX ORDER — BUDESONIDE 0.5 MG/2ML
500 INHALANT ORAL
Status: DISCONTINUED | OUTPATIENT
Start: 2020-12-07 | End: 2020-12-08 | Stop reason: HOSPADM

## 2020-12-06 RX ORDER — ENOXAPARIN SODIUM 100 MG/ML
40 INJECTION SUBCUTANEOUS DAILY
Status: DISCONTINUED | OUTPATIENT
Start: 2020-12-07 | End: 2020-12-08 | Stop reason: HOSPADM

## 2020-12-06 RX ORDER — ONDANSETRON 2 MG/ML
4 INJECTION INTRAMUSCULAR; INTRAVENOUS
Status: DISCONTINUED | OUTPATIENT
Start: 2020-12-06 | End: 2020-12-08 | Stop reason: HOSPADM

## 2020-12-06 RX ORDER — FUROSEMIDE 10 MG/ML
40 INJECTION INTRAMUSCULAR; INTRAVENOUS 2 TIMES DAILY
Status: DISCONTINUED | OUTPATIENT
Start: 2020-12-07 | End: 2020-12-08 | Stop reason: HOSPADM

## 2020-12-06 RX ORDER — PROMETHAZINE HYDROCHLORIDE 25 MG/1
12.5 TABLET ORAL
Status: DISCONTINUED | OUTPATIENT
Start: 2020-12-06 | End: 2020-12-08 | Stop reason: HOSPADM

## 2020-12-06 RX ORDER — SODIUM CHLORIDE 0.9 % (FLUSH) 0.9 %
5-40 SYRINGE (ML) INJECTION AS NEEDED
Status: DISCONTINUED | OUTPATIENT
Start: 2020-12-06 | End: 2020-12-08 | Stop reason: HOSPADM

## 2020-12-06 RX ORDER — POLYETHYLENE GLYCOL 3350 17 G/17G
17 POWDER, FOR SOLUTION ORAL DAILY PRN
Status: DISCONTINUED | OUTPATIENT
Start: 2020-12-06 | End: 2020-12-08 | Stop reason: HOSPADM

## 2020-12-06 RX ORDER — INSULIN LISPRO 100 [IU]/ML
INJECTION, SOLUTION INTRAVENOUS; SUBCUTANEOUS EVERY 6 HOURS
Status: DISCONTINUED | OUTPATIENT
Start: 2020-12-07 | End: 2020-12-07

## 2020-12-06 RX ORDER — ACETAMINOPHEN 325 MG/1
650 TABLET ORAL
Status: DISCONTINUED | OUTPATIENT
Start: 2020-12-06 | End: 2020-12-08 | Stop reason: HOSPADM

## 2020-12-06 RX ORDER — FUROSEMIDE 10 MG/ML
40 INJECTION INTRAMUSCULAR; INTRAVENOUS
Status: COMPLETED | OUTPATIENT
Start: 2020-12-06 | End: 2020-12-06

## 2020-12-06 RX ORDER — MAGNESIUM SULFATE 100 %
16 CRYSTALS MISCELLANEOUS AS NEEDED
Status: DISCONTINUED | OUTPATIENT
Start: 2020-12-06 | End: 2020-12-08 | Stop reason: HOSPADM

## 2020-12-06 RX ADMIN — NITROGLYCERIN 0.5 INCH: 20 OINTMENT TOPICAL at 22:06

## 2020-12-06 RX ADMIN — CEFTRIAXONE 1 G: 1 INJECTION, POWDER, FOR SOLUTION INTRAMUSCULAR; INTRAVENOUS at 22:07

## 2020-12-06 RX ADMIN — FUROSEMIDE 40 MG: 10 INJECTION, SOLUTION INTRAMUSCULAR; INTRAVENOUS at 18:19

## 2020-12-06 RX ADMIN — Medication 10 ML: at 21:58

## 2020-12-06 RX ADMIN — AZITHROMYCIN MONOHYDRATE 500 MG: 500 INJECTION, POWDER, LYOPHILIZED, FOR SOLUTION INTRAVENOUS at 22:06

## 2020-12-06 RX ADMIN — Medication 10 ML: at 21:57

## 2020-12-06 NOTE — ED NOTES
PIV established by NE Aceves after multiple (5) attempts by Jannette Brown RN, lala RN, and Agustina OLIVIA. US guidance used in 3 unsuccessful attempts and in successful attempt.

## 2020-12-06 NOTE — ED TRIAGE NOTES
Pt to ED for SOB, pt w/ hx of CHF, COPD. Per EMS pt 99% on 2L NC.   Pt w/ hx of trach, but not present upon arrival.
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
## 2020-12-06 NOTE — ED NOTES
RN educated pt that he was to receive butler catheter for accurate I/O's, pt stated he does not wish to have catheter at this time, pt provided w/ urinal to provide specimen. NALINI VILLEDA informed.

## 2020-12-07 ENCOUNTER — APPOINTMENT (OUTPATIENT)
Dept: GENERAL RADIOLOGY | Age: 60
DRG: 194 | End: 2020-12-07
Attending: INTERNAL MEDICINE
Payer: MEDICAID

## 2020-12-07 ENCOUNTER — APPOINTMENT (OUTPATIENT)
Dept: NON INVASIVE DIAGNOSTICS | Age: 60
DRG: 194 | End: 2020-12-07
Attending: FAMILY MEDICINE
Payer: MEDICAID

## 2020-12-07 PROBLEM — I50.9 ACUTE EXACERBATION OF CHF (CONGESTIVE HEART FAILURE) (HCC): Status: RESOLVED | Noted: 2018-07-08 | Resolved: 2020-12-07

## 2020-12-07 PROBLEM — J96.92 RESPIRATORY FAILURE WITH HYPERCAPNIA (HCC): Status: RESOLVED | Noted: 2020-04-03 | Resolved: 2020-12-07

## 2020-12-07 PROBLEM — J96.02 ACUTE RESPIRATORY FAILURE WITH HYPOXIA AND HYPERCAPNIA (HCC): Status: RESOLVED | Noted: 2019-03-18 | Resolved: 2020-12-07

## 2020-12-07 PROBLEM — Z20.822 SUSPECTED COVID-19 VIRUS INFECTION: Status: RESOLVED | Noted: 2020-04-04 | Resolved: 2020-12-07

## 2020-12-07 PROBLEM — I16.1 HYPERTENSIVE EMERGENCY: Status: RESOLVED | Noted: 2018-05-11 | Resolved: 2020-12-07

## 2020-12-07 PROBLEM — N18.9 ACUTE ON CHRONIC RENAL INSUFFICIENCY: Status: RESOLVED | Noted: 2020-04-03 | Resolved: 2020-12-07

## 2020-12-07 PROBLEM — R06.03 ACUTE RESPIRATORY DISTRESS: Status: RESOLVED | Noted: 2018-05-11 | Resolved: 2020-12-07

## 2020-12-07 PROBLEM — Z93.0 TRACHEOSTOMY IN PLACE (HCC): Status: RESOLVED | Noted: 2020-04-15 | Resolved: 2020-12-07

## 2020-12-07 PROBLEM — J44.1 COPD WITH ACUTE EXACERBATION (HCC): Status: RESOLVED | Noted: 2020-04-03 | Resolved: 2020-12-07

## 2020-12-07 PROBLEM — J96.01 ACUTE RESPIRATORY FAILURE WITH HYPOXIA AND HYPERCAPNIA (HCC): Status: RESOLVED | Noted: 2019-03-18 | Resolved: 2020-12-07

## 2020-12-07 PROBLEM — R74.01 TRANSAMINITIS: Status: RESOLVED | Noted: 2020-04-03 | Resolved: 2020-12-07

## 2020-12-07 PROBLEM — J96.92 RESPIRATORY FAILURE WITH HYPOXIA AND HYPERCAPNIA (HCC): Status: RESOLVED | Noted: 2018-07-08 | Resolved: 2020-12-07

## 2020-12-07 PROBLEM — G47.30 SLEEP APNEA: Status: RESOLVED | Noted: 2018-07-09 | Resolved: 2020-12-07

## 2020-12-07 PROBLEM — J44.1 COPD WITH EXACERBATION (HCC): Status: RESOLVED | Noted: 2018-07-08 | Resolved: 2020-12-07

## 2020-12-07 PROBLEM — E66.01 MORBID OBESITY WITH BODY MASS INDEX OF 50 OR HIGHER (HCC): Status: RESOLVED | Noted: 2020-04-04 | Resolved: 2020-12-07

## 2020-12-07 PROBLEM — R07.9 CHEST PAIN: Status: RESOLVED | Noted: 2018-01-25 | Resolved: 2020-12-07

## 2020-12-07 PROBLEM — R06.02 SOB (SHORTNESS OF BREATH): Status: RESOLVED | Noted: 2018-05-11 | Resolved: 2020-12-07

## 2020-12-07 PROBLEM — J96.91 RESPIRATORY FAILURE WITH HYPOXIA AND HYPERCAPNIA (HCC): Status: RESOLVED | Noted: 2018-07-08 | Resolved: 2020-12-07

## 2020-12-07 PROBLEM — N28.9 ACUTE ON CHRONIC RENAL INSUFFICIENCY: Status: RESOLVED | Noted: 2020-04-03 | Resolved: 2020-12-07

## 2020-12-07 LAB
ALBUMIN SERPL-MCNC: 2.8 G/DL (ref 3.4–5)
ALBUMIN/GLOB SERPL: 0.8 {RATIO} (ref 0.8–1.7)
ALP SERPL-CCNC: 85 U/L (ref 45–117)
ALT SERPL-CCNC: 30 U/L (ref 16–61)
AMPHET UR QL SCN: NEGATIVE
ANION GAP SERPL CALC-SCNC: 3 MMOL/L (ref 3–18)
ARTERIAL PATENCY WRIST A: ABNORMAL
AST SERPL-CCNC: 35 U/L (ref 10–38)
BARBITURATES UR QL SCN: NEGATIVE
BASE EXCESS BLD CALC-SCNC: 13 MMOL/L
BDY SITE: ABNORMAL
BENZODIAZ UR QL: NEGATIVE
BILIRUB SERPL-MCNC: 0.5 MG/DL (ref 0.2–1)
BODY TEMPERATURE: 97.6
BUN SERPL-MCNC: 23 MG/DL (ref 7–18)
BUN/CREAT SERPL: 18 (ref 12–20)
CALCIUM SERPL-MCNC: 8.7 MG/DL (ref 8.5–10.1)
CANNABINOIDS UR QL SCN: NEGATIVE
CHLORIDE SERPL-SCNC: 103 MMOL/L (ref 100–111)
CK MB CFR SERPL CALC: 1.2 % (ref 0–4)
CK MB CFR SERPL CALC: 1.3 % (ref 0–4)
CK MB CFR SERPL CALC: 1.3 % (ref 0–4)
CK MB SERPL-MCNC: 2.8 NG/ML (ref 5–25)
CK MB SERPL-MCNC: 2.9 NG/ML (ref 5–25)
CK MB SERPL-MCNC: 3.6 NG/ML (ref 5–25)
CK SERPL-CCNC: 214 U/L (ref 39–308)
CK SERPL-CCNC: 234 U/L (ref 39–308)
CK SERPL-CCNC: 276 U/L (ref 39–308)
CO2 SERPL-SCNC: 35 MMOL/L (ref 21–32)
COCAINE UR QL SCN: POSITIVE
CREAT SERPL-MCNC: 1.3 MG/DL (ref 0.6–1.3)
ECHO AV ANNULUS DIAM: 3.61 CM
ECHO AV AREA PEAK VELOCITY: 1.96 CM2
ECHO AV AREA VTI: 1.93 CM2
ECHO AV AREA/BSA PEAK VELOCITY: 0.7 CM2/M2
ECHO AV AREA/BSA VTI: 0.7 CM2/M2
ECHO AV MEAN GRADIENT: 6.08 MMHG
ECHO AV PEAK GRADIENT: 11.21 MMHG
ECHO AV PEAK VELOCITY: 167 CM/S
ECHO AV VTI: 27.52 CM
ECHO IVC PROX: 2 CM
ECHO LA VOL 2C: 94.92 ML (ref 18–58)
ECHO LA VOL 4C: 84.24 ML (ref 18–58)
ECHO LA VOL BP: 93.5 ML (ref 18–58)
ECHO LV E' LATERAL VELOCITY: 6 CM/S
ECHO LV E' SEPTAL VELOCITY: 4 CM/S
ECHO LV EDV A2C: 181.21 ML
ECHO LV EDV A4C: 184.94 ML
ECHO LV EDV BP: 195.23 ML (ref 67–155)
ECHO LV EJECTION FRACTION A2C: 51 %
ECHO LV EJECTION FRACTION A4C: 57 %
ECHO LV EJECTION FRACTION BIPLANE: 55.9 % (ref 55–100)
ECHO LV ESV A2C: 88.63 ML
ECHO LV ESV A4C: 78.92 ML
ECHO LV ESV BP: 86.15 ML (ref 22–58)
ECHO LV INTERNAL DIMENSION DIASTOLIC: 5.35 CM (ref 4.2–5.9)
ECHO LV INTERNAL DIMENSION SYSTOLIC: 2.79 CM
ECHO LV IVSD: 2.02 CM (ref 0.6–1)
ECHO LV MASS 2D: 613.9 G (ref 88–224)
ECHO LV MASS INDEX 2D: 233.2 G/M2 (ref 49–115)
ECHO LV POSTERIOR WALL DIASTOLIC: 2.25 CM (ref 0.6–1)
ECHO LVOT CARDIAC OUTPUT: 4.62 L/MIN
ECHO LVOT DIAM: 2.05 CM
ECHO LVOT PEAK GRADIENT: 3.96 MMHG
ECHO LVOT PEAK VELOCITY: 99 CM/S
ECHO LVOT SV: 53.1 ML
ECHO LVOT SV: 92.6 ML
ECHO LVOT VTI: 16.13 CM
ECHO MV A VELOCITY: 83 CM/S
ECHO MV AREA PHT: 3.64 CM2
ECHO MV E DECELERATION TIME (DT): 208.65 MS
ECHO MV E VELOCITY: 69 CM/S
ECHO MV E/A RATIO: 0.83
ECHO MV E/E' LATERAL: 11.5
ECHO MV E/E' RATIO (AVERAGED): 14.38
ECHO MV E/E' SEPTAL: 17.25
ECHO MV PRESSURE HALF TIME (PHT): 60.51 MS
ECHO TV REGURGITANT MAX VELOCITY: 272 CM/S
ECHO TV REGURGITANT PEAK GRADIENT: 29.63 MMHG
ERYTHROCYTE [DISTWIDTH] IN BLOOD BY AUTOMATED COUNT: 17.4 % (ref 11.6–14.5)
EST. AVERAGE GLUCOSE BLD GHB EST-MCNC: 137 MG/DL
GAS FLOW.O2 O2 DELIVERY SYS: ABNORMAL L/MIN
GLOBULIN SER CALC-MCNC: 3.5 G/DL (ref 2–4)
GLUCOSE BLD STRIP.AUTO-MCNC: 110 MG/DL (ref 70–110)
GLUCOSE BLD STRIP.AUTO-MCNC: 123 MG/DL (ref 70–110)
GLUCOSE BLD STRIP.AUTO-MCNC: 124 MG/DL (ref 70–110)
GLUCOSE BLD STRIP.AUTO-MCNC: 129 MG/DL (ref 70–110)
GLUCOSE BLD STRIP.AUTO-MCNC: 238 MG/DL (ref 70–110)
GLUCOSE SERPL-MCNC: 101 MG/DL (ref 74–99)
HBA1C MFR BLD: 6.4 % (ref 4.2–5.6)
HCO3 BLD-SCNC: 36.8 MMOL/L (ref 22–26)
HCT VFR BLD AUTO: 40.1 % (ref 36–48)
HDSCOM,HDSCOM: ABNORMAL
HGB BLD-MCNC: 12.3 G/DL (ref 13–16)
LVOT MG: 1.89 MMHG
MCH RBC QN AUTO: 26.7 PG (ref 24–34)
MCHC RBC AUTO-ENTMCNC: 30.7 G/DL (ref 31–37)
MCV RBC AUTO: 87.2 FL (ref 74–97)
METHADONE UR QL: NEGATIVE
O2/TOTAL GAS SETTING VFR VENT: 0.21 %
OPIATES UR QL: NEGATIVE
PCO2 BLD: 52.5 MMHG (ref 35–45)
PCP UR QL: NEGATIVE
PEEP RESPIRATORY: 10 CMH2O
PH BLD: 7.45 [PH] (ref 7.35–7.45)
PIP ISTAT,IPIP: 16
PLATELET # BLD AUTO: 231 K/UL (ref 135–420)
PMV BLD AUTO: 11.1 FL (ref 9.2–11.8)
PO2 BLD: 58 MMHG (ref 80–100)
POTASSIUM SERPL-SCNC: 4.5 MMOL/L (ref 3.5–5.5)
PRESSURE SUPPORT SETTING VENT: 15 CMH2O
PROT SERPL-MCNC: 6.3 G/DL (ref 6.4–8.2)
RBC # BLD AUTO: 4.6 M/UL (ref 4.7–5.5)
SAO2 % BLD: 91 % (ref 92–97)
SERVICE CMNT-IMP: ABNORMAL
SODIUM SERPL-SCNC: 141 MMOL/L (ref 136–145)
SPECIMEN TYPE: ABNORMAL
TOTAL RESP. RATE, ITRR: 30
TROPONIN I SERPL-MCNC: 0.04 NG/ML (ref 0–0.04)
TROPONIN I SERPL-MCNC: 0.05 NG/ML (ref 0–0.04)
TROPONIN I SERPL-MCNC: 0.06 NG/ML (ref 0–0.04)
WBC # BLD AUTO: 8.1 K/UL (ref 4.6–13.2)

## 2020-12-07 PROCEDURE — C8929 TTE W OR WO FOL WCON,DOPPLER: HCPCS

## 2020-12-07 PROCEDURE — 74011250637 HC RX REV CODE- 250/637: Performed by: INTERNAL MEDICINE

## 2020-12-07 PROCEDURE — 71045 X-RAY EXAM CHEST 1 VIEW: CPT

## 2020-12-07 PROCEDURE — 85027 COMPLETE CBC AUTOMATED: CPT

## 2020-12-07 PROCEDURE — 74011250637 HC RX REV CODE- 250/637: Performed by: FAMILY MEDICINE

## 2020-12-07 PROCEDURE — 83036 HEMOGLOBIN GLYCOSYLATED A1C: CPT

## 2020-12-07 PROCEDURE — 74011636637 HC RX REV CODE- 636/637: Performed by: HOSPITALIST

## 2020-12-07 PROCEDURE — 82803 BLOOD GASES ANY COMBINATION: CPT

## 2020-12-07 PROCEDURE — 94660 CPAP INITIATION&MGMT: CPT

## 2020-12-07 PROCEDURE — 82962 GLUCOSE BLOOD TEST: CPT

## 2020-12-07 PROCEDURE — 77030013140 HC MSK NEB VYRM -A

## 2020-12-07 PROCEDURE — 80053 COMPREHEN METABOLIC PANEL: CPT

## 2020-12-07 PROCEDURE — 65660000000 HC RM CCU STEPDOWN

## 2020-12-07 PROCEDURE — 36600 WITHDRAWAL OF ARTERIAL BLOOD: CPT

## 2020-12-07 PROCEDURE — 80307 DRUG TEST PRSMV CHEM ANLYZR: CPT

## 2020-12-07 PROCEDURE — 77010033678 HC OXYGEN DAILY

## 2020-12-07 PROCEDURE — 94760 N-INVAS EAR/PLS OXIMETRY 1: CPT

## 2020-12-07 PROCEDURE — 36415 COLL VENOUS BLD VENIPUNCTURE: CPT

## 2020-12-07 PROCEDURE — 94640 AIRWAY INHALATION TREATMENT: CPT

## 2020-12-07 PROCEDURE — 74011000250 HC RX REV CODE- 250: Performed by: FAMILY MEDICINE

## 2020-12-07 PROCEDURE — 74011250636 HC RX REV CODE- 250/636: Performed by: FAMILY MEDICINE

## 2020-12-07 PROCEDURE — 82550 ASSAY OF CK (CPK): CPT

## 2020-12-07 RX ORDER — INSULIN LISPRO 100 [IU]/ML
INJECTION, SOLUTION INTRAVENOUS; SUBCUTANEOUS
Status: DISCONTINUED | OUTPATIENT
Start: 2020-12-07 | End: 2020-12-08 | Stop reason: HOSPADM

## 2020-12-07 RX ORDER — ISOSORBIDE DINITRATE 5 MG/1
5 TABLET ORAL 2 TIMES DAILY
Status: DISCONTINUED | OUTPATIENT
Start: 2020-12-07 | End: 2020-12-08 | Stop reason: HOSPADM

## 2020-12-07 RX ORDER — PANTOPRAZOLE SODIUM 40 MG/1
40 TABLET, DELAYED RELEASE ORAL
Status: DISCONTINUED | OUTPATIENT
Start: 2020-12-07 | End: 2020-12-08 | Stop reason: HOSPADM

## 2020-12-07 RX ORDER — HYDRALAZINE HYDROCHLORIDE 25 MG/1
25 TABLET, FILM COATED ORAL 3 TIMES DAILY
Status: DISCONTINUED | OUTPATIENT
Start: 2020-12-07 | End: 2020-12-08 | Stop reason: HOSPADM

## 2020-12-07 RX ADMIN — ISOSORBIDE DINITRATE 5 MG: 5 TABLET ORAL at 22:18

## 2020-12-07 RX ADMIN — FUROSEMIDE 40 MG: 10 INJECTION, SOLUTION INTRAMUSCULAR; INTRAVENOUS at 09:20

## 2020-12-07 RX ADMIN — Medication 10 ML: at 22:23

## 2020-12-07 RX ADMIN — ACETAMINOPHEN 650 MG: 325 TABLET ORAL at 22:18

## 2020-12-07 RX ADMIN — HYDRALAZINE HYDROCHLORIDE 25 MG: 25 TABLET, FILM COATED ORAL at 22:18

## 2020-12-07 RX ADMIN — BUDESONIDE 500 MCG: 0.5 INHALANT RESPIRATORY (INHALATION) at 08:05

## 2020-12-07 RX ADMIN — SODIUM CHLORIDE 1 ML: 9 INJECTION INTRAMUSCULAR; INTRAVENOUS; SUBCUTANEOUS at 13:02

## 2020-12-07 RX ADMIN — IPRATROPIUM BROMIDE AND ALBUTEROL SULFATE 3 ML: .5; 3 SOLUTION RESPIRATORY (INHALATION) at 15:54

## 2020-12-07 RX ADMIN — CEFTRIAXONE 1 G: 1 INJECTION, POWDER, FOR SOLUTION INTRAMUSCULAR; INTRAVENOUS at 22:18

## 2020-12-07 RX ADMIN — IPRATROPIUM BROMIDE AND ALBUTEROL SULFATE 3 ML: .5; 3 SOLUTION RESPIRATORY (INHALATION) at 04:56

## 2020-12-07 RX ADMIN — BUDESONIDE 500 MCG: 0.5 INHALANT RESPIRATORY (INHALATION) at 21:54

## 2020-12-07 RX ADMIN — BUDESONIDE 500 MCG: 0.5 INHALANT RESPIRATORY (INHALATION) at 00:40

## 2020-12-07 RX ADMIN — PANTOPRAZOLE SODIUM 40 MG: 40 TABLET, DELAYED RELEASE ORAL at 11:00

## 2020-12-07 RX ADMIN — IPRATROPIUM BROMIDE AND ALBUTEROL SULFATE 3 ML: .5; 3 SOLUTION RESPIRATORY (INHALATION) at 21:54

## 2020-12-07 RX ADMIN — IPRATROPIUM BROMIDE AND ALBUTEROL SULFATE 3 ML: .5; 3 SOLUTION RESPIRATORY (INHALATION) at 00:40

## 2020-12-07 RX ADMIN — FUROSEMIDE 40 MG: 10 INJECTION, SOLUTION INTRAMUSCULAR; INTRAVENOUS at 22:18

## 2020-12-07 RX ADMIN — NITROGLYCERIN 0.5 INCH: 20 OINTMENT TOPICAL at 09:20

## 2020-12-07 RX ADMIN — AZITHROMYCIN MONOHYDRATE 500 MG: 500 INJECTION, POWDER, LYOPHILIZED, FOR SOLUTION INTRAVENOUS at 22:18

## 2020-12-07 RX ADMIN — Medication 10 ML: at 14:00

## 2020-12-07 RX ADMIN — ENOXAPARIN SODIUM 40 MG: 40 INJECTION SUBCUTANEOUS at 09:20

## 2020-12-07 RX ADMIN — IPRATROPIUM BROMIDE AND ALBUTEROL SULFATE 3 ML: .5; 3 SOLUTION RESPIRATORY (INHALATION) at 08:05

## 2020-12-07 RX ADMIN — INSULIN LISPRO 4 UNITS: 100 INJECTION, SOLUTION INTRAVENOUS; SUBCUTANEOUS at 22:18

## 2020-12-07 NOTE — PROGRESS NOTES
12/07/20 0800   Oxygen Therapy   O2 Sat (%) 96 %   Pulse via Oximetry 81 beats per minute   O2 Device Room air

## 2020-12-07 NOTE — PROGRESS NOTES
Nutrition Note     per glycemic control team pt has hx of DM and needs DM diet added  Will add DM diet     Electronically signed by Ines Rosales on 12/7/2020 at 9:49 AM

## 2020-12-07 NOTE — PROGRESS NOTES
Physical Therapy Evaluation/Treatment Attempt     Chart reviewed. Attempted Physical Therapy Evaluation, however, patient unable to be seen due to:  []  Nausea/vomiting  []  Eating  []  Pain  [x]  Patient too lethargic: pt sleeping soundly on PT entry, aroused to voice, but falling back asleep during conversation. []  Off Unit for testing/procedure  []  Dialysis treatment in progress   []  Telemetry Results  []  Other:      Will follow up later as patient's schedule allows.    Thank you for this referral.    Samantha Garcia, PT, DPT

## 2020-12-07 NOTE — DIABETES MGMT
GLYCEMIC CONTROL PROGRESS NOTE:    - chart reviewed, known h/o DM2, HbA1C ordered per protocol  - cardiac diet initiated today, recommend adding consistent carb  - Humalog Normal Insulin Sensitivity Corrective Coverage orders in place recommend continue    Recent Glucose Results:   Lab Results   Component Value Date/Time     (H) 12/07/2020 05:29 AM     (H) 12/06/2020 06:05 PM    GLUCPOC 110 12/07/2020 06:19 AM     Yamila Grove MS, RN, CDE  Glycemic Control Team  [phone]  Pager [phone] (- 8:00-4:30P)  *After Hours pager [phone]

## 2020-12-07 NOTE — ED NOTES
Verbal shift change report given to Xenia Kunz, RN (oncoming nurse) by Jose Davidson RN (offgoing nurse). Report included the following information SBAR, Kardex, ED Summary, [facility] and Recent Results.

## 2020-12-07 NOTE — PROGRESS NOTES
Problem: Pressure Injury - Risk of  Goal: *Prevention of pressure injury  Description: Document Axel Scale and appropriate interventions in the flowsheet. Outcome: Progressing Towards Goal  Note: Pressure Injury Interventions:  Sensory Interventions: Assess changes in LOC, Keep linens dry and wrinkle-free, Maintain/enhance activity level, Minimize linen layers, Pressure redistribution bed/mattress (bed type), Turn and reposition approx. every two hours (pillows and wedges if needed)    Moisture Interventions: Absorbent underpads, Minimize layers, Maintain skin hydration (lotion/cream), Limit adult briefs, Apply protective barrier, creams and emollients    Activity Interventions: Increase time out of bed, Pressure redistribution bed/mattress(bed type)    Mobility Interventions: Assess need for specialty bed, Turn and reposition approx.  every two hours(pillow and wedges)

## 2020-12-07 NOTE — PROGRESS NOTES
12/06/20 2159   CPAP/BIPAP   CPAP/BIPAP Start/Stop On   Device Mode AVAP   $$ Bipap Daily Yes   AVAP Set Resp Rate 18   AVAP Set VT (ml) 450   [employer] ID # [id number]   Mask Type and Size Full face; Large   Skin Condition intact   PIP Observed 18 cm H20   IPAP (cm H2O)   (12-35)   EPAP (cm H2O) 10 cm H2O   Inspiratory Time (sec) 0.9 seconds   Vt Spont (ml) 376 ml   Ve Observed (l/min) 14.5 l/min   Backup Rate 18   Total RR (Spontaneous) 24 breaths per minute   Insp Rise Time (sec) 3   Leak (Estimated) 6 L/min   Pt's Home Machine No   Biomedical Check Performed Yes
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
## 2020-12-07 NOTE — PROGRESS NOTES
Bedside and Verbal shift change report given to CHAZ castro Rn (oncoming nurse) by Shama Mares (offgoing nurse). Report included the following information SBAR, Kardex, ED Summary, Intake/Output, Med Rec Status and Cardiac Rhythm NSR.     0800 hrs Shift Assessment completed. 0812 hrs PT took BIPAP off, talking on the phone,encourage to keep it on , however pt took it off. @ RA  Sat 92 %. Nurses care continues. 0850 hrs PT desating to 80\"s Placed O2 3L NC . Nurses care continues. 1150 hrs TRANSFER - OUT REPORT:    Verbal report given to Nicholas Aguilar(name) on Lenny Rubinstein  being transferred to tele(unit) for routine progression of care       Report consisted of patients Situation, Background, Assessment and   Recommendations(SBAR). Information from the following report(s) Kardex, ED Summary, Intake/Output and Cardiac Rhythm NSR was reviewed with the receiving nurse. Lines:   Triple Lumen 12/06/20 Right Internal jugular (Active)   Central Line Being Utilized Yes 12/07/20 0800   Criteria for Appropriate Use Limited/no vessel suitable for conventional peripheral access 12/07/20 0800   Site Assessment Clean, dry, & intact 12/07/20 0800   Infiltration Assessment 0 12/07/20 0800   Affected Extremity/Extremities Color distal to insertion site pink (or appropriate for race) 12/07/20 0800   Date of Last Dressing Change 12/06/20 12/07/20 0800   Dressing Status Clean, dry, & intact 12/07/20 0800   Dressing Type Disk with Chlorhexadine gluconate (CHG) 12/07/20 0800   Action Taken Open ports on tubing capped 12/07/20 0800   Proximal Hub Color/Line Status White; Infusing 12/07/20 0800   Positive Blood Return (Medial Site) Yes 12/07/20 0800   Medial Hub Color/Line Status Blue;Capped 12/07/20 0800   Positive Blood Return (Lateral Site) Yes 12/07/20 0800   Distal Hub Color/Line Status Brown;Capped 12/07/20 0800   Positive Blood Return (Site #3) Yes 12/07/20 0800   Alcohol Cap Used Yes 12/07/20 0800       Peripheral IV 12/06/20 Right Other(comment) (Active)   Site Assessment Clean, dry, & intact 12/07/20 0800   Phlebitis Assessment 0 12/07/20 0800   Infiltration Assessment 0 12/07/20 0800   Dressing Status Clean, dry, & intact 12/07/20 0800   Dressing Type Tape;Transparent 12/07/20 0800   Hub Color/Line Status Blue;Capped 12/07/20 0800   Action Taken Open ports on tubing capped 12/07/20 0800   Alcohol Cap Used Yes 12/07/20 0800        Opportunity for questions and clarification was provided.       Patient transported with:   O2 @ 3 liters, RN at bedside,  PT belongings

## 2020-12-07 NOTE — PROGRESS NOTES
Reason for Admission:   Chart reviewed; per H&P, patient is \" 56 y.o. male who has a hx of morbid obesity, JIM, CHF, prior trach in 4/2020, and COPD on 4 L home O2 who [facility]. He was initially placed on nasal cannula but then required CPAP and then BiPAP for support. I am unable to get any history from him due to distress other than he reports increased swelling of legs and abdomen for the past few days. He endorses compliance with his medications. Rapid COVID negative. \"                  RUR Score: Moderate; 27%             PCP: First and Last name:  Dr. Macey Patel   Name of Practice: Internal Medicine   Are you a current patient: Yes/No:    Approximate date of last visit:    Can you participate in a virtual visit if needed:     Do you (patient/family) have any concerns for transition/discharge? Does not want anyone he does not know to provide care in home - distrustful of strangers              Plan for utilizing home health:   None - states his son's yahir takes care of him daily from 7a-1:30p; his own sister who works at [employer] comes to his home from 2:30 until later in the day and she cooks him dinner    Current [facility] [address]:  Full code with ACP on record            Transition of Care Plan:     Met with patient at bedside in ICU; patient with multiple complaints, from how he was positioned in bed, lack of being on O2, urinal needing to be emptied, etc.  Introduced self and care manager role; patient states he is mobile via electric wheelchair but he is able to ambulate for very short distances due to his size. When asked about his O2 supplier name, he said \"[employer]\"; stated they advertised on the television. Patient was adamant that he was not interested in home health as he was distrustful of strangers in his home. Stated he had all the personal care providers he needed who were either related to him or known by him.   Care manager to follow for discharge needs.

## 2020-12-07 NOTE — PROGRESS NOTES
ICU on call  Case discussed with Hospitalist and ED  56years old male with morbid obesity/JIM/asthma/HTN /cardiomyopathy previous tracheostomy  Presents with acute  Hypercarbic and hypoxemic respiratory failure/BETHANY/CHF/NSTEMI. Due to distress on BIPAP did not give good history  CXR pulmonary congestion possible retrocardiac infiltrate  Admit to ICU  BIPAP  Follow up ABG might be difficult intubation  If no good access due to morbid obesity central line advised  Lasix  PVL/echo  ECG if trop positive heparin drip ASA  ABX CAP unless covid positive the plasma and remdesivir  I agree with Covid test  Due to morbid obesity not able to do CTA  OCTAVIA Coon MD
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
## 2020-12-07 NOTE — PROGRESS NOTES
Hospitalist Progress Note    Patient: Kacey Castaneda MRN: [id number]  [facility]: [id number]    YOB: 1960  Age: 56 y.o. Sex: male    DOA: 12/6/2020 LOS:  LOS: 1 day          Chief Complaint:    SOB      Assessment/Plan     63 y/o male w/ hx of COPD , on home 02 4 liters, NOT on CPAP or BIPAP, with healed prior tracheostomy, systolic/diastolic CHF and EF of 31-73%, and super morbid obesity who presents in acute hypoxemic and hypercapneic respiratory failure secondary to pneumonia and CHF exacerbation.      Acute chf exacerbation-improved this am  Acute resp failure-resolved, wearing no 02 this am, talking loudly on phone in full conversation when I entered room  Morbid obesity  JIM-does not use machine he states at home now  COPD-no acute wheezes this am, continue nebs PRN  BETHANY-resolved  Hypertension  Type 2 diabetes with neuropathy  Hx cocaine use-send UDS    Transfer to tele  Stable resp status    Continue supportive treatment  Encourage as tolerated ambulation  Continue IV abx  Follow BG levels    LOS 1-2 days more     Disposition :  Patient Active Problem List   Diagnosis Code    COPD exacerbation ([facility] J44.1    Type II diabetes mellitus with peripheral autonomic neuropathy ([facility]) E11.43    Hypertension I10    JIM (obstructive sleep apnea) G47.33    Morbid obesity ([facility]) E66.01    Acute on chronic combined systolic and diastolic ACC/AHA stage C congestive heart failure ([facility]) I50.43    Acute kidney injury ([facility]) N17.9    COPD (chronic obstructive pulmonary disease) ([facility]) J44.9    Acute on chronic respiratory failure ([facility]) J96.20    Cocaine abuse ([facility]) F14.10    Pneumonia J18.9       Subjective:  I am hungry, when can food get here!   Denies chest pain, SOB, palp, nasuea      Review of systems:    Constitutional: denies fevers, chills  Respiratory: denies SOB, cough  Cardiovascular: denies chest pain  Gastrointestinal: denies nausea, vomiting, diarrhea      Vital signs/Intake and Output:  Visit Vitals  BP (!) 159/73   Pulse 92   Temp 97.8 °F (36.6 °C)   Resp 18   Ht 5' 7\" (1.702 m)   Wt (!) 168.7 kg (372 lb)   SpO2 98%   BMI 58.26 kg/m²     Current Shift:  12/07 0701 - 12/07 1900  In: 0   Out: 200 [Urine:200]  Last three shifts:  12/05 1901 - 12/07 0700  In: 260 [I.V.:260]  Out: 2100 [Urine:2100]    Exam:    General: obese AAM, alert, NAD, OX3  Head/Neck: NCAT, supple, No masses, No lymphadenopathy  CVS:Regular rate and rhythm, no M/R/G, S1/S2 heard, no thrill  Lungs:Clear to auscultation bilaterally, no wheezes, rhonchi, or rales  Abdomen: Soft, Nontender, No distention, Normal Bowel sounds, No hepatomegaly  Extremities: 1-2 plus edema Le BL  Neuro:grossly normal , follows commands  Psych:appropriate                Labs: Results:       Chemistry Recent Labs     12/07/20  0529 12/06/20  1805   * 108*    142   K 4.5 4.4    102   CO2 35* 39*   BUN 23* 25*   CREA 1.30 1.62*   CA 8.7 8.8   AGAP 3 1*   BUCR 18 15   AP 85 91   TP 6.3* 6.7   ALB 2.8* 3.1*   GLOB 3.5 3.6   AGRAT 0.8 0.9      CBC w/Diff Recent Labs     12/07/20  0529 12/06/20  1805   WBC 8.1 9.4   RBC 4.60* 4.54*   HGB 12.3* 12.1*   HCT 40.1 40.1    255   GRANS  --  78*   LYMPH  --  14*   EOS  --  3      Cardiac Enzymes Recent Labs     12/07/20  0529 12/07/20  0000    276   CKND1 1.2 1.3      Coagulation No results for input(s): PTP, INR, APTT, INREXT, INREXT in the last 72 hours. Lipid Panel Lab Results   Component Value Date/Time    Cholesterol, total 196 01/25/2018 02:51 AM    HDL Cholesterol 64 (H) 01/25/2018 02:51 AM    LDL, calculated 121.2 (H) 01/25/2018 02:51 AM    VLDL, calculated 10.8 01/25/2018 02:51 AM    Triglyceride 54 01/25/2018 02:51 AM    CHOL/HDL Ratio 3.1 01/25/2018 02:51 AM      BNP No results for input(s): BNPP in the last 72 hours.    Liver Enzymes Recent Labs     12/07/20  0529   TP 6.3*   ALB 2.8*   AP 85      Thyroid Studies Lab Results   Component Value Date/Time    TSH 0.11 (L) 03/19/2019 06:50 AM        Procedures/imaging: see electronic medical records for all procedures/Xrays and details which were not copied into this note but were reviewed prior to creation of River Jiang MD

## 2020-12-07 NOTE — ED NOTES
TRANSFER - OUT REPORT:    Verbal report given to KOURTNEY Angeles (name) on Edwina Sol  being transferred to ICU (unit) for routine progression of care       Report consisted of patients Situation, Background, Assessment and   Recommendations(SBAR). Information from the following report(s) SBAR, ED Summary and MAR was reviewed with the receiving nurse. Lines:   PICC Double Lumen 87/36/34 Basilic (Active)       Triple Lumen 12/06/20 Right Internal jugular (Active)   Central Line Being Utilized Yes 12/06/20 2008   Site Assessment Clean, dry, & intact 12/06/20 2008   Infiltration Assessment 0 12/06/20 2008   Dressing Status Clean, dry, & intact 12/06/20 2008   Dressing Type Transparent 12/06/20 2008   Proximal Hub Color/Line Status Prosper Bulla; White 12/06/20 2008   Positive Blood Return (Medial Site) Yes 12/06/20 2008   Medial Hub Color/Line Status Blue 12/06/20 2008   Positive Blood Return (Lateral Site) Yes 12/06/20 2008   Distal Hub Color/Line Status Brown 12/06/20 2008   Positive Blood Return (Site #3) Yes 12/06/20 2008       Peripheral IV 12/06/20 Right Other(comment) (Active)   Site Assessment Clean, dry, & intact 12/06/20 1810   Phlebitis Assessment 0 12/06/20 1810   Infiltration Assessment 0 12/06/20 1810   Dressing Status Clean, dry, & intact 12/06/20 1810   Dressing Type Transparent;Tape 12/06/20 1810   Hub Color/Line Status Blue 12/06/20 1810   Action Taken Blood drawn 12/06/20 1810   Alcohol Cap Used Yes 12/06/20 1810        Opportunity for questions and clarification was provided. Patient transported with:   Monitor  Registered Nurse   Once Rapid COVID results received then pt to be taken to ICU. Central line in place. NSR on the monitor. Tolerating BiPap well.

## 2020-12-07 NOTE — PROGRESS NOTES
1232: Assumed care of pt  from [address] [place] [address],[address]. Pt is in room no sign of distress. 1320: Pt called down to the kitchen asking where food is. Yelling at [employer]. This RN called down 15mins earlier the kitchen stated pt had already placed order and it was on the way. Pt refuses to comply with dietary restrictions. Sly Becerra made aware of pt behavior.

## 2020-12-08 VITALS
OXYGEN SATURATION: 95 % | HEART RATE: 80 BPM | BODY MASS INDEX: 49.44 KG/M2 | RESPIRATION RATE: 20 BRPM | WEIGHT: 315 LBS | DIASTOLIC BLOOD PRESSURE: 85 MMHG | HEIGHT: 67 IN | SYSTOLIC BLOOD PRESSURE: 121 MMHG | TEMPERATURE: 97.9 F

## 2020-12-08 LAB
ALBUMIN SERPL-MCNC: 2.9 G/DL (ref 3.4–5)
ALBUMIN/GLOB SERPL: 0.8 {RATIO} (ref 0.8–1.7)
ALP SERPL-CCNC: 85 U/L (ref 45–117)
ALT SERPL-CCNC: 32 U/L (ref 16–61)
ANION GAP SERPL CALC-SCNC: 2 MMOL/L (ref 3–18)
AST SERPL-CCNC: 26 U/L (ref 10–38)
BACTERIA SPEC CULT: NORMAL
BILIRUB SERPL-MCNC: 0.4 MG/DL (ref 0.2–1)
BUN SERPL-MCNC: 29 MG/DL (ref 7–18)
BUN/CREAT SERPL: 19 (ref 12–20)
CALCIUM SERPL-MCNC: 8.6 MG/DL (ref 8.5–10.1)
CC UR VC: NORMAL
CHLORIDE SERPL-SCNC: 100 MMOL/L (ref 100–111)
CO2 SERPL-SCNC: 37 MMOL/L (ref 21–32)
CREAT SERPL-MCNC: 1.5 MG/DL (ref 0.6–1.3)
ERYTHROCYTE [DISTWIDTH] IN BLOOD BY AUTOMATED COUNT: 17.4 % (ref 11.6–14.5)
GLOBULIN SER CALC-MCNC: 3.7 G/DL (ref 2–4)
GLUCOSE BLD STRIP.AUTO-MCNC: 152 MG/DL (ref 70–110)
GLUCOSE BLD STRIP.AUTO-MCNC: 171 MG/DL (ref 70–110)
GLUCOSE SERPL-MCNC: 123 MG/DL (ref 74–99)
HCT VFR BLD AUTO: 40.8 % (ref 36–48)
HGB BLD-MCNC: 12.1 G/DL (ref 13–16)
MCH RBC QN AUTO: 26.2 PG (ref 24–34)
MCHC RBC AUTO-ENTMCNC: 29.7 G/DL (ref 31–37)
MCV RBC AUTO: 88.3 FL (ref 74–97)
PLATELET # BLD AUTO: 243 K/UL (ref 135–420)
PMV BLD AUTO: 11.6 FL (ref 9.2–11.8)
POTASSIUM SERPL-SCNC: 4.3 MMOL/L (ref 3.5–5.5)
PROT SERPL-MCNC: 6.6 G/DL (ref 6.4–8.2)
RBC # BLD AUTO: 4.62 M/UL (ref 4.7–5.5)
SERVICE CMNT-IMP: NORMAL
SODIUM SERPL-SCNC: 139 MMOL/L (ref 136–145)
TSH SERPL DL<=0.05 MIU/L-ACNC: 1.05 UIU/ML (ref 0.36–3.74)
WBC # BLD AUTO: 7.3 K/UL (ref 4.6–13.2)

## 2020-12-08 PROCEDURE — 94640 AIRWAY INHALATION TREATMENT: CPT

## 2020-12-08 PROCEDURE — 74011636637 HC RX REV CODE- 636/637: Performed by: HOSPITALIST

## 2020-12-08 PROCEDURE — 82962 GLUCOSE BLOOD TEST: CPT

## 2020-12-08 PROCEDURE — 74011250636 HC RX REV CODE- 250/636: Performed by: FAMILY MEDICINE

## 2020-12-08 PROCEDURE — 84443 ASSAY THYROID STIM HORMONE: CPT

## 2020-12-08 PROCEDURE — 94660 CPAP INITIATION&MGMT: CPT

## 2020-12-08 PROCEDURE — 74011000250 HC RX REV CODE- 250: Performed by: FAMILY MEDICINE

## 2020-12-08 PROCEDURE — 74011250637 HC RX REV CODE- 250/637: Performed by: INTERNAL MEDICINE

## 2020-12-08 PROCEDURE — 85027 COMPLETE CBC AUTOMATED: CPT

## 2020-12-08 PROCEDURE — 36415 COLL VENOUS BLD VENIPUNCTURE: CPT

## 2020-12-08 PROCEDURE — 80053 COMPREHEN METABOLIC PANEL: CPT

## 2020-12-08 PROCEDURE — 94760 N-INVAS EAR/PLS OXIMETRY 1: CPT

## 2020-12-08 PROCEDURE — 77010033678 HC OXYGEN DAILY

## 2020-12-08 RX ORDER — FUROSEMIDE 40 MG/1
40 TABLET ORAL DAILY
Qty: 30 TAB | Refills: 0 | Status: SHIPPED | OUTPATIENT
Start: 2020-12-08

## 2020-12-08 RX ADMIN — IPRATROPIUM BROMIDE AND ALBUTEROL SULFATE 3 ML: .5; 3 SOLUTION RESPIRATORY (INHALATION) at 07:15

## 2020-12-08 RX ADMIN — ISOSORBIDE DINITRATE 5 MG: 5 TABLET ORAL at 08:35

## 2020-12-08 RX ADMIN — IPRATROPIUM BROMIDE AND ALBUTEROL SULFATE 3 ML: .5; 3 SOLUTION RESPIRATORY (INHALATION) at 11:34

## 2020-12-08 RX ADMIN — INSULIN LISPRO 2 UNITS: 100 INJECTION, SOLUTION INTRAVENOUS; SUBCUTANEOUS at 07:22

## 2020-12-08 RX ADMIN — IPRATROPIUM BROMIDE AND ALBUTEROL SULFATE 3 ML: .5; 3 SOLUTION RESPIRATORY (INHALATION) at 00:00

## 2020-12-08 RX ADMIN — BUDESONIDE 500 MCG: 0.5 INHALANT RESPIRATORY (INHALATION) at 07:15

## 2020-12-08 RX ADMIN — Medication 10 ML: at 12:18

## 2020-12-08 RX ADMIN — FUROSEMIDE 40 MG: 10 INJECTION, SOLUTION INTRAMUSCULAR; INTRAVENOUS at 08:35

## 2020-12-08 RX ADMIN — ENOXAPARIN SODIUM 40 MG: 40 INJECTION SUBCUTANEOUS at 08:34

## 2020-12-08 RX ADMIN — HYDRALAZINE HYDROCHLORIDE 25 MG: 25 TABLET, FILM COATED ORAL at 08:35

## 2020-12-08 RX ADMIN — PANTOPRAZOLE SODIUM 40 MG: 40 TABLET, DELAYED RELEASE ORAL at 07:22

## 2020-12-08 RX ADMIN — INSULIN LISPRO 2 UNITS: 100 INJECTION, SOLUTION INTRAVENOUS; SUBCUTANEOUS at 11:24

## 2020-12-08 NOTE — DIABETES MGMT
GLYCEMIC CONTROL PROGRESS NOTE:    - discussed in rounds, chart reviewed, known h/o DM2, HbA1C ordered per protocol  - TDD = 4 Humalog Normal Insulin Sensitivity Corrective Coverage orders in place recommend continue    Recent Glucose Results:   Lab Results   Component Value Date/Time     (H) 12/08/2020 01:30 AM    GLUCPOC 152 (H) 12/08/2020 06:38 AM    GLUCPOC 238 (H) 12/07/2020 09:06 PM    GLUCPOC 124 (H) 12/07/2020 05:41 PM     Thad Velez MS, RN, CDE  Glycemic Control Team  [phone]  Pager [phone] (M-TH 8:00-4:30P)  *After Hours pager [phone]

## 2020-12-08 NOTE — PROGRESS NOTES
Physical Therapy Evaluation/Treatment Attempt     Chart reviewed. Attempted Physical Therapy Evaluation, however, patient unable to be seen due to:  []  Nausea/vomiting  []  Eating  []  Pain  []  Patient too lethargic  []  Off Unit for testing/procedure  []  Dialysis treatment in progress   []  Telemetry Results  [x]  Other: Pt refusing stating, \"I am not going to do that. My doctor told me I am going home, so I am not doing this. \" Discussed home set up with pt, pt reporting that he has a handicap apartment with no GRACE and has been walking to and from the bathroom with indep in his hospital room. Pt refusing to participate despite encouragement, is attempting to call a cab to pick him up to go home. KOURTNEY Garrison notified of the above. Will follow up later as patient's schedule allows.    Thank you for this referral.    Xavi Hardin, PT, DPT

## 2020-12-08 NOTE — PROGRESS NOTES
Assumed care of pt from [address]  8189: Pt consistently removes tele monitor we pt goes to bathroom. Monitor tech aware. 0945: This RN rearranged pt room so pt could recline in recliner. Pt was educated not the importance not not drinking so much juice r/t diabetes and fluid over load. Pt stated he understood and preceded to ask for more juice. Pt is noncompliant. 1304: pt on phone screaming at the kitchen for where his lunch is.

## 2020-12-08 NOTE — DISCHARGE SUMMARY
Discharge Summary    Patient: Sneha Strange MRN: [id number]  CSN: [id number]    YOB: 1960  Age: 56 y.o.   Sex: male    DOA: 12/6/2020 LOS:  LOS: 2 days   Discharge Date:      Primary Care Provider:  Callie Soria MD    Admission Diagnoses: Respiratory distress [R06.03]  CHF (congestive heart failure) ([facility] [I50.9]    Discharge Diagnoses:    Problem List as of 12/8/2020 Date Reviewed: 4/3/2020          Codes Class Noted - Resolved    Pneumonia ICD-10-CM: J18.9  ICD-9-CM: 005  4/7/2020 - Present        COPD (chronic obstructive pulmonary disease) ([facility] ICD-10-CM: J44.9  ICD-9-CM: 550  4/22/2018 - Present        * (Principal) Acute on chronic respiratory failure ([facility] ICD-10-CM: J96.20  ICD-9-CM: 518.84  4/22/2018 - Present        Cocaine abuse ([facility] ICD-10-CM: F14.10  ICD-9-CM: 305.60  4/22/2018 - Present        Acute kidney injury ([facility] ICD-10-CM: N17.9  ICD-9-CM: 584.9  3/7/2018 - Present        Acute on chronic combined systolic and diastolic ACC/AHA stage C congestive heart failure (HCC) (Chronic) ICD-10-CM: I50.43  ICD-9-CM: 428.43, 428.0  1/25/2018 - Present        COPD exacerbation ([facility] ICD-10-CM: J44.1  ICD-9-CM: 491.21  2/19/2013 - Present        Type II diabetes mellitus with peripheral autonomic neuropathy ([facility] ICD-10-CM: E11.43  ICD-9-CM: 250.60, 337.1  2/19/2013 - Present        Hypertension ICD-10-CM: I10  ICD-9-CM: 401.9  Unknown - Present        JIM (obstructive sleep apnea) ICD-10-CM: G47.33  ICD-9-CM: 327.23  2/19/2013 - Present        Morbid obesity ([facility] ICD-10-CM: E66.01  ICD-9-CM: 278.01  2/19/2013 - Present        RESOLVED: Tracheostomy in place [facility] ICD-10-CM: Z93.0  ICD-9-CM: V44.0  4/15/2020 - 12/7/2020        RESOLVED: Goals of care, counseling/discussion ICD-10-CM: Z71.89  ICD-9-CM: V65.49  Unknown - 12/7/2020        RESOLVED: Suspected COVID-19 virus infection ICD-10-CM: N58.312  ICD-9-CM: V01.79  4/4/2020 - 12/7/2020        RESOLVED: Morbid obesity with body mass index of 50 or higher (HCC) ICD-10-CM: E66.01  ICD-9-CM: 278.01  4/4/2020 - 12/7/2020        RESOLVED: COPD with acute exacerbation ([facility] ICD-10-CM: J44.1  ICD-9-CM: 491.21  4/3/2020 - 12/7/2020        RESOLVED: Respiratory failure with hypercapnia (HCC) ICD-10-CM: J96.92  ICD-9-CM: 518.81  4/3/2020 - 12/7/2020        RESOLVED: Transaminitis ICD-10-CM: R74.01  ICD-9-CM: 790.4  4/3/2020 - 12/7/2020        RESOLVED: Acute on chronic renal insufficiency ICD-10-CM: N28.9, N18.9  ICD-9-CM: 593.9, 585.9  4/3/2020 - 12/7/2020        RESOLVED: Acute respiratory failure with hypoxia and hypercapnia ([facility] ICD-10-CM: J96.01, J96.02  ICD-9-CM: 518.81  3/18/2019 - 12/7/2020        RESOLVED: Sleep apnea ICD-10-CM: G47.30  ICD-9-CM: 780.57  7/9/2018 - 12/7/2020        RESOLVED: Acute exacerbation of CHF (congestive heart failure) ([facility] ICD-10-CM: I50.9  ICD-9-CM: 428.0  7/8/2018 - 12/7/2020        RESOLVED: COPD with exacerbation ([facility] ICD-10-CM: J44.1  ICD-9-CM: 491.21  7/8/2018 - 12/7/2020        RESOLVED: Respiratory failure with hypoxia and hypercapnia (HCC) ICD-10-CM: J96.91, J96.92  ICD-9-CM: 518.81  7/8/2018 - 12/7/2020        RESOLVED: SOB (shortness of breath) ICD-10-CM: R06.02  ICD-9-CM: 786.05  5/11/2018 - 12/7/2020        RESOLVED: Acute respiratory distress ICD-10-CM: R06.03  ICD-9-CM: 518.82  5/11/2018 - 12/7/2020        RESOLVED: Hypertensive emergency ICD-10-CM: I16.1  ICD-9-CM: 401.9  5/11/2018 - 12/7/2020        RESOLVED: Chest pain ICD-10-CM: R07.9  ICD-9-CM: 786.50  1/25/2018 - 12/7/2020        RESOLVED: Hypoxia ICD-10-CM: R09.02  ICD-9-CM: 799.02  2/19/2013 - 12/7/2020              Discharge Medications:     Current Discharge Medication List      START taking these medications    Details   furosemide (Lasix) 40 mg tablet Take 1 Tab by mouth daily.   Qty: 30 Tab, Refills: 0         CONTINUE these medications which have NOT CHANGED    Details   oxymetazoline (Afrin, oxymetazoline,) 0.05 % mist 1 Spray by Nasal route two (2) times daily as needed (nose bleed). Do not exceed beyond three days  Qty: 14.7 mL, Refills: 0      carvediloL (COREG) 3.125 mg tablet Take 1 Tab by mouth two (2) times daily (with meals). Qty: 60 Tab, Refills: 0      albuterol-ipratropium (DUO-NEB) 2.5 mg-0.5 mg/3 ml nebu 3 mL by Nebulization route every four (4) hours. Qty: 30 Nebule, Refills: 0      budesonide (PULMICORT) 0.5 mg/2 mL nbsp 2 mL by Nebulization route two (2) times a day. Qty: 2 Each, Refills: 0      melatonin 5 mg tablet Take 1 Tab by mouth nightly. Qty: 1 Tab, Refills: 0      QUEtiapine (SEROquel) 25 mg tablet 1 Tab by Per NG tube route two (2) times a day. Qty: 2 Tab, Refills: 0      hydrALAZINE (APRESOLINE) 25 mg tablet Take 1 Tab by mouth three (3) times daily. Qty: 90 Tab, Refills: 0         STOP taking these medications       furosemide (LASIX) 10 mg/mL injection Comments:   Reason for Stopping:               Discharge Condition: Good    Procedures : None    Consults: Cardiology and Pulmonary/Critical Care      PHYSICAL EXAM   Visit Vitals  /85 (BP 1 Location: Left arm, BP Patient Position: At rest;Sitting)   Pulse 80   Temp 97.9 °F (36.6 °C)   Resp 20   Ht 5' 7\" (1.702 m)   Wt (!) 175.5 kg (386 lb 14.4 oz)   SpO2 95%   BMI 60.60 kg/m²     General: Awake, cooperative, no acute distress, morbidly obese    HEENT: NC, Atraumatic. PERRLA, EOMI. Anicteric sclerae. Lungs:  CTA Bilaterally. No Wheezing/Rhonchi/Rales. Heart:  Regular  rhythm,  No murmur, No Rubs, No Gallops  Abdomen: Soft, Non distended, Non tender. +Bowel sounds,   Extremities: No c/c/e  Psych:   Not anxious or agitated. Neurologic:  No acute neurological deficits. Admission HPI :   Bhanu Gonzalez is a 56 y.o. male who has a hx of morbid obesity, JIM, CHF, prior trach in 4/2020, and COPD on 4 L home O2 who was transported by EMS [facility] for SOB.  He was initially placed on nasal cannula but then required CPAP and then BiPAP for support. I am unable to get any history from him due to distress other than he reports increased swelling of legs and abdomen for the past few days. He endorses compliance with his medications. Rapid COVID negative. Hospital Course :   Mr. Teddie Cogan was initially admitted to intensive care unit, he was seen and followed by pulmonary critical care, cardiology. He was initially placed on BiPAP once his respiratory status improved he was switched back to his home oxygen. Is now stable at his home oxygen. Medically he is at his baseline. And ready for discharge. Acute on chronic respiratory failure-  Secondary to CHF, COPD, JIM. He is not compliant with his BiPAP at home. Recommend follow-up with his pulmonary physician. COPD exacerbation-  He was started on steroids and neb treatments. No wheezing. We will stop steroids at discharge continue with his neb treatment. Acute on chronic systolic CHF-  Seen by cardiology, echo with EF of 30 to 35%. He received IV Lasix. Cardiology is okay to discharge. We will discharge him on oral Lasix. JIM-  He is supposed to be using CPAP at home however he is not compliant with his CPAP machine. UDS positive for cocaine he is counseled on cocaine use on multiple occasions. DM-  Use sliding scale insulin.     Activity: Activity as tolerated    Diet: Diabetic Diet    Follow-up: PCP, pulmonary    Disposition: home    Minutes spent on discharge: 45       Labs: Results:       Chemistry Recent Labs     12/08/20 0130 12/07/20  0529 12/06/20  1805   * 101* 108*    141 142   K 4.3 4.5 4.4    103 102   CO2 37* 35* 39*   BUN 29* 23* 25*   CREA 1.50* 1.30 1.62*   CA 8.6 8.7 8.8   AGAP 2* 3 1*   BUCR 19 18 15   AP 85 85 91   TP 6.6 6.3* 6.7   ALB 2.9* 2.8* 3.1*   GLOB 3.7 3.5 3.6   AGRAT 0.8 0.8 0.9      CBC w/Diff Recent Labs     12/08/20 0130 12/07/20  0529 12/06/20  1805   WBC 7.3 8.1 9.4   RBC 4.62* 4.60* 4.54*   HGB 12.1* 12.3* 12.1*   HCT 40.8 40.1 40.1    231 255   GRANS  --   --  78*   LYMPH  --   --  14*   EOS  --   --  3      Cardiac Enzymes Recent Labs     12/07/20  1430 12/07/20  0529    234   CKND1 1.3 1.2      Coagulation No results for input(s): PTP, INR, APTT, INREXT in the last 72 hours. Lipid Panel Lab Results   Component Value Date/Time    Cholesterol, total 196 01/25/2018 02:51 AM    HDL Cholesterol 64 (H) 01/25/2018 02:51 AM    LDL, calculated 121.2 (H) 01/25/2018 02:51 AM    VLDL, calculated 10.8 01/25/2018 02:51 AM    Triglyceride 54 01/25/2018 02:51 AM    CHOL/HDL Ratio 3.1 01/25/2018 02:51 AM      BNP No results for input(s): BNPP in the last 72 hours. Liver Enzymes Recent Labs     12/08/20  0130   TP 6.6   ALB 2.9*   AP 85      Thyroid Studies Lab Results   Component Value Date/Time    TSH 1.05 12/08/2020 01:30 AM            Significant Diagnostic Studies: Xr Chest Port    Result Date: 12/7/2020  HISTORY: -Provided with order: hypoxia -Additional: None Technique : AP PORTABLE CHEST Comparison : 12/06/20 FINDINGS: HEART AND MEDIASTINUM: Cardiomediastinal silhouette appears enlarged, potentially technical. LUNGS AND PLEURAL SPACES: Right IJ central venous catheter tip projects over expected SVC. Negative for pneumothorax. Limited penetration left costophrenic angle and retrocardiac region. No gross consolidation, congestive heart failure or pleural effusion. BONY THORAX AND SOFT TISSUES: No acute osseous abnormality. Impression: No gross plain film evidence of acute cardiopulmonary disease, allowing for technique. Xr Chest Port    Result Date: 12/6/2020  EXAM: XR CHEST PORT CLINICAL INDICATION/HISTORY: Central Line placement -Additional: None COMPARISON: 12/6/2020 TECHNIQUE: Portable frontal view of the chest _______________ FINDINGS: SUPPORT DEVICES: Right IJ approach central venous catheter tip projecting over the superior cavoatrial junction.  HEART AND MEDIASTINUM: Cardiac silhouette and mediastinal contours appear stable. Mild cardiac enlargement, as previously. LUNGS AND PLEURAL SPACES: No evidence of central pulmonary vascular markings unchanged. No pneumothorax or definite pleural effusion. Right CP angle excluded from view. Left retrocardiac opacity as previously. BONY THORAX AND SOFT TISSUES: Unremarkable. _______________     IMPRESSION: 1. Right IJ approach central venous catheter in appropriate position without evidence of pneumothorax. 2. Chest findings remain otherwise unchanged. Xr Chest Port    Result Date: 12/6/2020  EXAM: XR CHEST PORT CLINICAL INDICATION/HISTORY: sob -Additional: Chest pain COMPARISON: Multiple prior exams, most recently 4/22/2020. TECHNIQUE: Frontal view of the chest. From a technical perspective, anatomic detail is limited by the presence of fairly significant photon starvation. _______________ FINDINGS: HEART AND MEDIASTINUM: Enlarged appearing cardiac silhouette with stable mediastinal contours. LUNGS AND PLEURAL SPACES: Prominence of central pulmonary vascular markings noted; appearing similar to prior. Left retrocardiac opacity has increased in the interval from comparison radiographs. No evidence of pneumothorax or large pleural effusion. BONY THORAX AND SOFT TISSUES: No acute osseous abnormality _______________     IMPRESSION: 1. Mild cardiac enlargement and central vascular congestion overall similar. Increased left retrocardiac density (atelectasis or airspace disease) noted in the interval from prior study. No results found for this or any previous visit. Please note that this dictation was completed with [employer], the computer voice recognition software. Quite often unanticipated grammatical, syntax, homophones, and other interpretive errors are inadvertently transcribed by the computer software. Please disregard these errors. Please excuse any errors that have escaped final proofreading.      CC: Herminia Cruz MD

## 2020-12-08 NOTE — PROGRESS NOTES
1930  Assumed care of pt   1955  Shift assessment complete, pt in chair watching tv   0005  Reassessment complete  0300  Pt wanted to take Bi Pap machine off - machine turned off and removed   0353  Reassessment complete    [facility] Night Shift Chart Audit    Chart Audit completed? YES    Bedside and Verbal shift change report given to [employer], RN (oncoming nurse) by Aleksandr Walker RN (offgoing nurse). Report included the following information SBAR, Kardex, ED Summary, Intake/Output, MAR, Recent Results, Med Rec Status and Cardiac Rhythm NSR.

## 2020-12-08 NOTE — DISCHARGE INSTRUCTIONS
DISCHARGE SUMMARY from Nurse    PATIENT INSTRUCTIONS:    After general anesthesia or intravenous sedation, for 24 hours or while taking prescription Narcotics:  · Limit your activities  · Do not drive and operate hazardous machinery  · Do not make important personal or business decisions  · Do  not drink alcoholic beverages  · If you have not urinated within 8 hours after discharge, please contact your surgeon on call. Report the following to your surgeon:  · Excessive pain, swelling, redness or odor of or around the surgical area  · Temperature over 100.5  · Nausea and vomiting lasting longer than 4 hours or if unable to take medications  · Any signs of decreased circulation or nerve impairment to extremity: change in color, persistent  numbness, tingling, coldness or increase pain  · Any questions    What to do at Home:  Recommended activity: Activity as tolerated,       *  Please give a list of your current medications to your Primary Care Provider. *  Please update this list whenever your medications are discontinued, doses are      changed, or new medications (including over-the-counter products) are added. *  Please carry medication information at all times in case of emergency situations. These are general instructions for a healthy lifestyle:    No smoking/ No tobacco products/ Avoid exposure to second hand smoke  Surgeon General's Warning:  Quitting smoking now greatly reduces serious risk to your health. Obesity, smoking, and sedentary lifestyle greatly increases your risk for illness    A healthy diet, regular physical exercise & weight monitoring are important for maintaining a healthy lifestyle    You may be retaining fluid if you have a history of heart failure or if you experience any of the following symptoms:  Weight gain of 3 pounds or more overnight or 5 pounds in a week, increased swelling in our hands or feet or shortness of breath while lying flat in bed.   Please call your doctor as soon as you notice any of these symptoms; do not wait until your next office visit. The discharge information has been reviewed with the patient. The patient verbalized understanding. Discharge medications reviewed with the patient and appropriate educational materials and side effects teaching were provided.   ___________________________________________________________________________________________________________________________________  Patient armband removed and shredded

## 2020-12-08 NOTE — PROGRESS NOTES
Problem: Falls - Risk of  Goal: *Absence of Falls  Description: Document Candido Collado Fall Risk and appropriate interventions in the flowsheet. Outcome: Progressing Towards Goal  Note: Fall Risk Interventions:            Medication Interventions: Patient to call before getting OOB, Teach patient to arise slowly    Elimination Interventions: Call light in reach, Patient to call for help with toileting needs, Urinal in reach              Problem: Patient Education: Go to Patient Education Activity  Goal: Patient/Family Education  Outcome: Progressing Towards Goal     Problem: Pressure Injury - Risk of  Goal: *Prevention of pressure injury  Description: Document Axel Scale and appropriate interventions in the flowsheet.   Outcome: Progressing Towards Goal  Note: Pressure Injury Interventions:  Sensory Interventions: Assess changes in LOC, Discuss PT/OT consult with provider, Keep linens dry and wrinkle-free, Maintain/enhance activity level, Minimize linen layers, Monitor skin under medical devices, Pad between skin to skin, Pressure redistribution bed/mattress (bed type)    Moisture Interventions: Absorbent underpads    Activity Interventions: Increase time out of bed, Pressure redistribution bed/mattress(bed type)    Mobility Interventions: HOB 30 degrees or less, Pressure redistribution bed/mattress (bed type), PT/OT evaluation    Nutrition Interventions: Document food/fluid/supplement intake                     Problem: Patient Education: Go to Patient Education Activity  Goal: Patient/Family Education  Outcome: Progressing Towards Goal
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
## 2020-12-09 NOTE — PROGRESS NOTES
Cardiology Progress Note        Patient: Toyin Pyle        Sex: male          DOA: 12/6/2020  YOB: 1960      Age:  56 y.o.        LOS:  LOS: 2 days    Patient seen and examined, chart reviewed  Assessment/Plan     Patient Active Problem List   Diagnosis Code    COPD exacerbation ([facility] J44.1    Type II diabetes mellitus with peripheral autonomic neuropathy ([facility] E11.43    Hypertension I10    JIM (obstructive sleep apnea) G47.33    Morbid obesity ([facility] E66.01    Acute on chronic combined systolic and diastolic ACC/AHA stage C congestive heart failure (HCC) I50.43    Acute kidney injury ([facility] N17.9    COPD (chronic obstructive pulmonary disease) ([facility]) J44.9    Acute on chronic respiratory failure ([facility]) J96.20    Cocaine abuse ([facility] F14.10    Pneumonia J18.9      Abnormal troponin: No clear evidence of ACS    Echocardiogram revealed    · Image quality for this study was technically difficult. Contrast used: DEFINITY. · Left Ventricle: Normal cavity size. Severe concentric hypertrophy. The estimated EF is 40 - 45%. Mildly reduced systolic function. There is mild (grade 1) left ventricular diastolic dysfunction E/E' ratio is 14.38. Wall Scoring: The left ventricular wall motion is globally hypokinetic. · Left Atrium: Moderately dilated left atrium. · Pulmonary Artery: Pulmonary arteries not well visualized. Pulmonary arterial systolic pressure (PASP) is 40 mmHg. Pulmonary hypertension found to be mild.     Plan:    Continue Isosorbide dinitrate and hydralazine. Continue furosemide  No beta blocker due to use of cocaine. strongly advised to quit cocaine abuse. Okay to discharge from cardiac standpoint. Advised to follow-up with primary cardiologist in 6 weeks. Plan discussed with               Subjective:    cc:    Denies any chest pain or shortness of breath      REVIEW OF SYSTEMS:     General: No fevers or chills.   Cardiovascular: No chest pain,No palpitations, No orthopnea, No PND, No leg swelling, No claudication  Pulmonary: No dyspnea. Gastrointestinal: No nausea, vomiting, bleeding  Neurology: No Dizziness    Objective:      Visit Vitals  /85 (BP 1 Location: Left arm, BP Patient Position: At rest;Sitting)   Pulse 80   Temp 97.9 °F (36.6 °C)   Resp 20   Ht 5' 7\" (1.702 m)   Wt (!) 175.5 kg (386 lb 14.4 oz)   SpO2 95%   BMI 60.60 kg/m²     Body mass index is 60.6 kg/m². Physical Exam:  General Appearance: Comfortable, not using accessory muscles of respiration. HEENT: JOYCELYN. HEAD: Atraumatic  NECK: No JVD, no thyroidomeglay. CAROTIDS: No bruit  LUNGS: Clear bilaterally. HEART: S1+S2 audible, no murmur, no pericardial rub. ABD: Non-tender, BS Audible    EXT: No edema, and no cyanosis. VASCULAR EXAM: Pulses are intact. PSYCHIATRIC EXAM: Mood is appropriate. MUSCULOSKELETAL: Grossly no joint deformity. NEUROLOGICAL: AAO times 3, No motor and sensory deficit    Medication:  No current facility-administered medications for this encounter. Current Outpatient Medications   Medication Sig    furosemide (Lasix) 40 mg tablet Take 1 Tab by mouth daily.  oxymetazoline (Afrin, oxymetazoline,) 0.05 % mist 1 Spray by Nasal route two (2) times daily as needed (nose bleed). Do not exceed beyond three days    carvediloL (COREG) 3.125 mg tablet Take 1 Tab by mouth two (2) times daily (with meals).  albuterol-ipratropium (DUO-NEB) 2.5 mg-0.5 mg/3 ml nebu 3 mL by Nebulization route every four (4) hours.  budesonide (PULMICORT) 0.5 mg/2 mL nbsp 2 mL by Nebulization route two (2) times a day.  melatonin 5 mg tablet Take 1 Tab by mouth nightly.  QUEtiapine (SEROquel) 25 mg tablet 1 Tab by Per NG tube route two (2) times a day.  hydrALAZINE (APRESOLINE) 25 mg tablet Take 1 Tab by mouth three (3) times daily.  (Patient taking differently: Take 50 mg by mouth three (3) times daily.)               Lab/Data Reviewed:       Recent Labs 12/08/20  0130 12/07/20  0529 12/06/20  1805   WBC 7.3 8.1 9.4   HGB 12.1* 12.3* 12.1*   HCT 40.8 40.1 40.1    231 255     Recent Labs     12/08/20  0130 12/07/20  0529 12/06/20  1805    141 142   K 4.3 4.5 4.4    103 102   CO2 37* 35* 39*   * 101* 108*   BUN 29* 23* 25*   CREA 1.50* 1.30 1.62*   CA 8.6 8.7 8.8       Signed By: Asiya Olivas MD     December 8, 2020

## 2021-01-14 NOTE — DIABETES MGMT
GLYCEMIC CONTROL PROGRESS NOTE:    - chart reviewed, known h/o T2DM, HbA1C ordered per protocol, not within recommended range on oral home regimen  - Solumedrol 20 mg daily  - NPO, intubated  -- Humalog Normal Insulin Sensitivity Corrective Coverage orders in place recommend continue  - pt may benefit from adjustment to home regimen    Recent Glucose Results:   Lab Results   Component Value Date/Time    GLU 97 04/08/2020 05:40 AM    GLUCPOC 140 (H) 04/08/2020 11:52 AM    GLUCPOC 84 04/08/2020 06:02 AM    GLUCPOC 79 04/08/2020 01:05 AM     Marek Simms MS, RN, CDE  Glycemic Control Team  [phone]  Pager [phone] (M-TH 8:00-4:30P)  *After Hours pager [phone]

## 2021-02-25 DIAGNOSIS — Z23 IMMUNIZATION DUE: ICD-10-CM

## 2021-04-19 NOTE — ED TRIAGE NOTES
Pt arrived via EMS for shortness of breath, chest pain and leg pain. Pt states \" I just got out of the hospital a few days ago and I know something isn't right and I can't go back home. \"
dialysis/CRRT

## 2021-08-07 ENCOUNTER — APPOINTMENT (OUTPATIENT)
Dept: GENERAL RADIOLOGY | Age: 61
DRG: 133 | End: 2021-08-07
Attending: STUDENT IN AN ORGANIZED HEALTH CARE EDUCATION/TRAINING PROGRAM
Payer: MEDICAID

## 2021-08-07 ENCOUNTER — HOSPITAL ENCOUNTER (INPATIENT)
Age: 61
LOS: 2 days | Discharge: LEFT BEFORE DISCHARGE | DRG: 133 | End: 2021-08-09
Attending: STUDENT IN AN ORGANIZED HEALTH CARE EDUCATION/TRAINING PROGRAM | Admitting: INTERNAL MEDICINE
Payer: MEDICAID

## 2021-08-07 ENCOUNTER — APPOINTMENT (OUTPATIENT)
Dept: VASCULAR SURGERY | Age: 61
DRG: 133 | End: 2021-08-07
Attending: INTERNAL MEDICINE
Payer: MEDICAID

## 2021-08-07 DIAGNOSIS — E66.01 MORBID OBESITY (HCC): ICD-10-CM

## 2021-08-07 DIAGNOSIS — J44.9 CHRONIC OBSTRUCTIVE PULMONARY DISEASE, UNSPECIFIED COPD TYPE (HCC): ICD-10-CM

## 2021-08-07 DIAGNOSIS — I50.43 ACUTE ON CHRONIC COMBINED SYSTOLIC AND DIASTOLIC ACC/AHA STAGE C CONGESTIVE HEART FAILURE (HCC): Chronic | ICD-10-CM

## 2021-08-07 DIAGNOSIS — G47.33 OSA (OBSTRUCTIVE SLEEP APNEA): ICD-10-CM

## 2021-08-07 DIAGNOSIS — J96.02 ACUTE HYPERCAPNIC RESPIRATORY FAILURE (HCC): ICD-10-CM

## 2021-08-07 DIAGNOSIS — F19.10 POLYSUBSTANCE ABUSE (HCC): Primary | ICD-10-CM

## 2021-08-07 PROBLEM — N17.9 ACUTE KIDNEY INJURY (HCC): Status: RESOLVED | Noted: 2018-03-07 | Resolved: 2021-08-07

## 2021-08-07 PROBLEM — J18.9 PNEUMONIA: Status: RESOLVED | Noted: 2020-04-07 | Resolved: 2021-08-07

## 2021-08-07 LAB
ALBUMIN SERPL-MCNC: 3.2 G/DL (ref 3.4–5)
ALBUMIN/GLOB SERPL: 0.8 {RATIO} (ref 0.8–1.7)
ALP SERPL-CCNC: 107 U/L (ref 45–117)
ALT SERPL-CCNC: 27 U/L (ref 16–61)
AMPHET UR QL SCN: NEGATIVE
ANION GAP SERPL CALC-SCNC: 1 MMOL/L (ref 3–18)
APTT PPP: 31.1 SEC (ref 23–36.4)
AST SERPL-CCNC: 37 U/L (ref 10–38)
ATRIAL RATE: 107 BPM
B PERT DNA SPEC QL NAA+PROBE: NOT DETECTED
BARBITURATES UR QL SCN: NEGATIVE
BASE EXCESS BLD CALC-SCNC: 2.3 MMOL/L
BASOPHILS # BLD: 0 K/UL (ref 0–0.1)
BASOPHILS # BLD: 0 K/UL (ref 0–0.1)
BASOPHILS NFR BLD: 0 % (ref 0–2)
BASOPHILS NFR BLD: 0 % (ref 0–2)
BDY SITE: ABNORMAL
BENZODIAZ UR QL: POSITIVE
BILIRUB SERPL-MCNC: 0.5 MG/DL (ref 0.2–1)
BNP SERPL-MCNC: 3213 PG/ML (ref 0–900)
BORDETELLA PARAPERTUSSIS PCR, BORPAR: NOT DETECTED
BUN SERPL-MCNC: 20 MG/DL (ref 7–18)
BUN/CREAT SERPL: 14 (ref 12–20)
C PNEUM DNA SPEC QL NAA+PROBE: NOT DETECTED
CALCIUM SERPL-MCNC: 8.7 MG/DL (ref 8.5–10.1)
CALCULATED R AXIS, ECG10: -42 DEGREES
CALCULATED T AXIS, ECG11: 111 DEGREES
CANNABINOIDS UR QL SCN: NEGATIVE
CHLORIDE SERPL-SCNC: 108 MMOL/L (ref 100–111)
CK MB CFR SERPL CALC: 2.3 % (ref 0–4)
CK MB SERPL-MCNC: 4.3 NG/ML (ref 5–25)
CK SERPL-CCNC: 183 U/L (ref 39–308)
CO2 SERPL-SCNC: 31 MMOL/L (ref 21–32)
COCAINE UR QL SCN: POSITIVE
COVID-19 RAPID TEST, COVR: NOT DETECTED
CREAT SERPL-MCNC: 1.42 MG/DL (ref 0.6–1.3)
DIAGNOSIS, 93000: NORMAL
DIFFERENTIAL METHOD BLD: ABNORMAL
DIFFERENTIAL METHOD BLD: ABNORMAL
EOSINOPHIL # BLD: 0 K/UL (ref 0–0.4)
EOSINOPHIL # BLD: 0.3 K/UL (ref 0–0.4)
EOSINOPHIL NFR BLD: 0 % (ref 0–5)
EOSINOPHIL NFR BLD: 3 % (ref 0–5)
ERYTHROCYTE [DISTWIDTH] IN BLOOD BY AUTOMATED COUNT: 16.5 % (ref 11.6–14.5)
ERYTHROCYTE [DISTWIDTH] IN BLOOD BY AUTOMATED COUNT: 16.7 % (ref 11.6–14.5)
FLUAV H1 2009 PAND RNA SPEC QL NAA+PROBE: NOT DETECTED
FLUAV H1 RNA SPEC QL NAA+PROBE: NOT DETECTED
FLUAV H3 RNA SPEC QL NAA+PROBE: NOT DETECTED
FLUAV SUBTYP SPEC NAA+PROBE: NOT DETECTED
FLUBV RNA SPEC QL NAA+PROBE: NOT DETECTED
GAS FLOW.O2 O2 DELIVERY SYS: ABNORMAL L/MIN
GLOBULIN SER CALC-MCNC: 4 G/DL (ref 2–4)
GLUCOSE BLD STRIP.AUTO-MCNC: 130 MG/DL (ref 70–110)
GLUCOSE SERPL-MCNC: 99 MG/DL (ref 74–99)
HADV DNA SPEC QL NAA+PROBE: NOT DETECTED
HCO3 BLD-SCNC: 31.6 MMOL/L (ref 22–26)
HCOV 229E RNA SPEC QL NAA+PROBE: NOT DETECTED
HCOV HKU1 RNA SPEC QL NAA+PROBE: NOT DETECTED
HCOV NL63 RNA SPEC QL NAA+PROBE: NOT DETECTED
HCOV OC43 RNA SPEC QL NAA+PROBE: NOT DETECTED
HCT VFR BLD AUTO: 44 % (ref 36–48)
HCT VFR BLD AUTO: 44.8 % (ref 36–48)
HDSCOM,HDSCOM: ABNORMAL
HGB BLD-MCNC: 13.2 G/DL (ref 13–16)
HGB BLD-MCNC: 13.5 G/DL (ref 13–16)
HMPV RNA SPEC QL NAA+PROBE: NOT DETECTED
HPIV1 RNA SPEC QL NAA+PROBE: NOT DETECTED
HPIV2 RNA SPEC QL NAA+PROBE: NOT DETECTED
HPIV3 RNA SPEC QL NAA+PROBE: NOT DETECTED
HPIV4 RNA SPEC QL NAA+PROBE: NOT DETECTED
LYMPHOCYTES # BLD: 0.3 K/UL (ref 0.9–3.6)
LYMPHOCYTES # BLD: 1.3 K/UL (ref 0.9–3.6)
LYMPHOCYTES NFR BLD: 16 % (ref 21–52)
LYMPHOCYTES NFR BLD: 4 % (ref 21–52)
M PNEUMO DNA SPEC QL NAA+PROBE: NOT DETECTED
MCH RBC QN AUTO: 29.2 PG (ref 24–34)
MCH RBC QN AUTO: 29.4 PG (ref 24–34)
MCHC RBC AUTO-ENTMCNC: 30 G/DL (ref 31–37)
MCHC RBC AUTO-ENTMCNC: 30.1 G/DL (ref 31–37)
MCV RBC AUTO: 97 FL (ref 74–97)
MCV RBC AUTO: 98 FL (ref 74–97)
METHADONE UR QL: NEGATIVE
MONOCYTES # BLD: 0.1 K/UL (ref 0.05–1.2)
MONOCYTES # BLD: 0.7 K/UL (ref 0.05–1.2)
MONOCYTES NFR BLD: 2 % (ref 3–10)
MONOCYTES NFR BLD: 9 % (ref 3–10)
NEUTS SEG # BLD: 5.6 K/UL (ref 1.8–8)
NEUTS SEG # BLD: 8.1 K/UL (ref 1.8–8)
NEUTS SEG NFR BLD: 71 % (ref 40–73)
NEUTS SEG NFR BLD: 94 % (ref 40–73)
O2/TOTAL GAS SETTING VFR VENT: 40 %
OPIATES UR QL: NEGATIVE
PCO2 BLD: 69.3 MMHG (ref 35–45)
PCP UR QL: NEGATIVE
PH BLD: 7.27 [PH] (ref 7.35–7.45)
PLATELET # BLD AUTO: 162 K/UL (ref 135–420)
PLATELET # BLD AUTO: 196 K/UL (ref 135–420)
PMV BLD AUTO: 11.1 FL (ref 9.2–11.8)
PMV BLD AUTO: 11.4 FL (ref 9.2–11.8)
PO2 BLD: 80 MMHG (ref 80–100)
POTASSIUM SERPL-SCNC: 4.8 MMOL/L (ref 3.5–5.5)
PROT SERPL-MCNC: 7.2 G/DL (ref 6.4–8.2)
Q-T INTERVAL, ECG07: 426 MS
QRS DURATION, ECG06: 94 MS
QTC CALCULATION (BEZET), ECG08: 462 MS
RBC # BLD AUTO: 4.49 M/UL (ref 4.35–5.65)
RBC # BLD AUTO: 4.62 M/UL (ref 4.35–5.65)
RSV RNA SPEC QL NAA+PROBE: NOT DETECTED
RV+EV RNA SPEC QL NAA+PROBE: NOT DETECTED
SAO2 % BLD: 93.2 % (ref 92–97)
SARS-COV-2 PCR, COVPCR: NOT DETECTED
SERVICE CMNT-IMP: ABNORMAL
SODIUM SERPL-SCNC: 140 MMOL/L (ref 136–145)
SOURCE, COVRS: NORMAL
SPECIMEN TYPE: ABNORMAL
TROPONIN I SERPL-MCNC: 0.03 NG/ML (ref 0–0.04)
VENTRICULAR RATE, ECG03: 71 BPM
WBC # BLD AUTO: 7.8 K/UL (ref 4.6–13.2)
WBC # BLD AUTO: 8.6 K/UL (ref 4.6–13.2)

## 2021-08-07 PROCEDURE — 74011250636 HC RX REV CODE- 250/636: Performed by: INTERNAL MEDICINE

## 2021-08-07 PROCEDURE — 65270000029 HC RM PRIVATE

## 2021-08-07 PROCEDURE — 80307 DRUG TEST PRSMV CHEM ANLYZR: CPT

## 2021-08-07 PROCEDURE — 77030013140 HC MSK NEB VYRM -A

## 2021-08-07 PROCEDURE — 94762 N-INVAS EAR/PLS OXIMTRY CONT: CPT

## 2021-08-07 PROCEDURE — 85025 COMPLETE CBC W/AUTO DIFF WBC: CPT

## 2021-08-07 PROCEDURE — 96375 TX/PRO/DX INJ NEW DRUG ADDON: CPT

## 2021-08-07 PROCEDURE — 93005 ELECTROCARDIOGRAM TRACING: CPT

## 2021-08-07 PROCEDURE — 77030035694 HC MSK BIPAP FLL FAC PERFMAX PHIL -B

## 2021-08-07 PROCEDURE — 65610000006 HC RM INTENSIVE CARE

## 2021-08-07 PROCEDURE — 0202U NFCT DS 22 TRGT SARS-COV-2: CPT

## 2021-08-07 PROCEDURE — 99285 EMERGENCY DEPT VISIT HI MDM: CPT

## 2021-08-07 PROCEDURE — 82553 CREATINE MB FRACTION: CPT

## 2021-08-07 PROCEDURE — 96374 THER/PROPH/DIAG INJ IV PUSH: CPT

## 2021-08-07 PROCEDURE — 94640 AIRWAY INHALATION TREATMENT: CPT

## 2021-08-07 PROCEDURE — 83880 ASSAY OF NATRIURETIC PEPTIDE: CPT

## 2021-08-07 PROCEDURE — 85730 THROMBOPLASTIN TIME PARTIAL: CPT

## 2021-08-07 PROCEDURE — 82962 GLUCOSE BLOOD TEST: CPT

## 2021-08-07 PROCEDURE — 87635 SARS-COV-2 COVID-19 AMP PRB: CPT

## 2021-08-07 PROCEDURE — 77010033678 HC OXYGEN DAILY

## 2021-08-07 PROCEDURE — 80053 COMPREHEN METABOLIC PANEL: CPT

## 2021-08-07 PROCEDURE — 36600 WITHDRAWAL OF ARTERIAL BLOOD: CPT

## 2021-08-07 PROCEDURE — 74011000250 HC RX REV CODE- 250: Performed by: STUDENT IN AN ORGANIZED HEALTH CARE EDUCATION/TRAINING PROGRAM

## 2021-08-07 PROCEDURE — 93970 EXTREMITY STUDY: CPT

## 2021-08-07 PROCEDURE — 71045 X-RAY EXAM CHEST 1 VIEW: CPT

## 2021-08-07 PROCEDURE — 94660 CPAP INITIATION&MGMT: CPT

## 2021-08-07 PROCEDURE — 83036 HEMOGLOBIN GLYCOSYLATED A1C: CPT

## 2021-08-07 PROCEDURE — 74011250636 HC RX REV CODE- 250/636: Performed by: STUDENT IN AN ORGANIZED HEALTH CARE EDUCATION/TRAINING PROGRAM

## 2021-08-07 PROCEDURE — 74011000250 HC RX REV CODE- 250: Performed by: INTERNAL MEDICINE

## 2021-08-07 PROCEDURE — 82803 BLOOD GASES ANY COMBINATION: CPT

## 2021-08-07 RX ORDER — BUDESONIDE 0.5 MG/2ML
500 INHALANT ORAL
Status: DISCONTINUED | OUTPATIENT
Start: 2021-08-07 | End: 2021-08-09 | Stop reason: HOSPADM

## 2021-08-07 RX ORDER — FUROSEMIDE 10 MG/ML
40 INJECTION INTRAMUSCULAR; INTRAVENOUS
Status: COMPLETED | OUTPATIENT
Start: 2021-08-07 | End: 2021-08-07

## 2021-08-07 RX ORDER — INSULIN LISPRO 100 [IU]/ML
INJECTION, SOLUTION INTRAVENOUS; SUBCUTANEOUS EVERY 6 HOURS
Status: DISCONTINUED | OUTPATIENT
Start: 2021-08-07 | End: 2021-08-08

## 2021-08-07 RX ORDER — MAGNESIUM SULFATE 100 %
4 CRYSTALS MISCELLANEOUS AS NEEDED
Status: DISCONTINUED | OUTPATIENT
Start: 2021-08-07 | End: 2021-08-09 | Stop reason: HOSPADM

## 2021-08-07 RX ORDER — BUDESONIDE 0.5 MG/2ML
500 INHALANT ORAL
Status: DISCONTINUED | OUTPATIENT
Start: 2021-08-07 | End: 2021-08-07

## 2021-08-07 RX ORDER — FUROSEMIDE 10 MG/ML
40 INJECTION INTRAMUSCULAR; INTRAVENOUS 2 TIMES DAILY
Status: DISCONTINUED | OUTPATIENT
Start: 2021-08-07 | End: 2021-08-09 | Stop reason: HOSPADM

## 2021-08-07 RX ORDER — ARFORMOTEROL TARTRATE 15 UG/2ML
15 SOLUTION RESPIRATORY (INHALATION)
Status: DISCONTINUED | OUTPATIENT
Start: 2021-08-07 | End: 2021-08-09 | Stop reason: HOSPADM

## 2021-08-07 RX ORDER — HEPARIN SODIUM 1000 [USP'U]/ML
10000 INJECTION, SOLUTION INTRAVENOUS; SUBCUTANEOUS ONCE
Status: COMPLETED | OUTPATIENT
Start: 2021-08-07 | End: 2021-08-07

## 2021-08-07 RX ORDER — ARFORMOTEROL TARTRATE 15 UG/2ML
15 SOLUTION RESPIRATORY (INHALATION)
Status: DISCONTINUED | OUTPATIENT
Start: 2021-08-07 | End: 2021-08-07

## 2021-08-07 RX ORDER — DEXTROSE 50 % IN WATER (D50W) INTRAVENOUS SYRINGE
25-50 AS NEEDED
Status: DISCONTINUED | OUTPATIENT
Start: 2021-08-07 | End: 2021-08-09 | Stop reason: HOSPADM

## 2021-08-07 RX ORDER — HEPARIN SODIUM 10000 [USP'U]/100ML
12-36 INJECTION, SOLUTION INTRAVENOUS
Status: DISCONTINUED | OUTPATIENT
Start: 2021-08-07 | End: 2021-08-08

## 2021-08-07 RX ORDER — IPRATROPIUM BROMIDE AND ALBUTEROL SULFATE 2.5; .5 MG/3ML; MG/3ML
3 SOLUTION RESPIRATORY (INHALATION)
Status: COMPLETED | OUTPATIENT
Start: 2021-08-07 | End: 2021-08-07

## 2021-08-07 RX ADMIN — METHYLPREDNISOLONE SODIUM SUCCINATE 125 MG: 125 INJECTION, POWDER, FOR SOLUTION INTRAMUSCULAR; INTRAVENOUS at 14:53

## 2021-08-07 RX ADMIN — HEPARIN SODIUM 12 UNITS/KG/HR: 10000 INJECTION, SOLUTION INTRAVENOUS at 18:59

## 2021-08-07 RX ADMIN — ARFORMOTEROL TARTRATE 15 MCG: 15 SOLUTION RESPIRATORY (INHALATION) at 22:03

## 2021-08-07 RX ADMIN — BUDESONIDE 500 MCG: 0.5 INHALANT RESPIRATORY (INHALATION) at 22:03

## 2021-08-07 RX ADMIN — HEPARIN SODIUM 10000 UNITS: 1000 INJECTION INTRAVENOUS; SUBCUTANEOUS at 18:59

## 2021-08-07 RX ADMIN — FUROSEMIDE 40 MG: 10 INJECTION, SOLUTION INTRAMUSCULAR; INTRAVENOUS at 22:00

## 2021-08-07 RX ADMIN — FUROSEMIDE 40 MG: 10 INJECTION, SOLUTION INTRAMUSCULAR; INTRAVENOUS at 14:53

## 2021-08-07 RX ADMIN — IPRATROPIUM BROMIDE AND ALBUTEROL SULFATE 3 ML: .5; 3 SOLUTION RESPIRATORY (INHALATION) at 14:14

## 2021-08-07 NOTE — ED NOTES
Bedside report received from [facility], [address] [place] [address]. Patient in NAD. VSS. Patient resting comfortably on BiPAP. Heparin infusing without difficulty. Next PTT to be drawn at 0100 08/08/21. AOX4. Sitting in recliner for comfort.

## 2021-08-07 NOTE — ED TRIAGE NOTES
Patient brought in via EMS c/o SOB x 1 week. Per EMS pt has new onset A-Fib. Pt o2 92 per EMS. Patient brought in w/ NRB.
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
## 2021-08-07 NOTE — PROGRESS NOTES
Reason for Admission:  Chart reviewed; per H&P, patient is [de-identified] 56 y.o. male with past medical history significant for COPD, chronic left ventricular systolic heart failure with a EF of 40%, atrial fibrillation, cocaine and benzodiazepine abuse, morbid obesity hypoventilation syndrome, morbid obesity presents emergency department with increased shortness of breath. It is unclear the progression of his dyspnea. \"  ABGs  - pH of 7.2 with CO2 of 70, O2 80. Has been placed on BIPAP. RUR Score:        Low, 17%             Plan for utilizing home health:     TBD     PCP: First and Last name:  Brittnee Ramirez MD     Name of Practice:    Are you a current patient: Yes/No:    Approximate date of last visit:    Can you participate in a virtual visit with your PCP:                     Current Advanced Directive/Advance Care Plan: Prior      Healthcare Decision Maker:   Click here to complete [id number] including selection of the Healthcare Decision Maker Relationship (ie \"Primary\")             Primary Decision Maker: Farshad Chuck - [facility] - [phone]                  Transition of Care Plan:    Patient is fully vaccinated against COVID, is still in ED with plans to admit to ICU on BIPAP, diurese with IV lasix, IV sterioids and monitor respiratory response. Care management will follow for discharge needs.
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
## 2021-08-08 ENCOUNTER — APPOINTMENT (OUTPATIENT)
Dept: GENERAL RADIOLOGY | Age: 61
DRG: 133 | End: 2021-08-08
Attending: INTERNAL MEDICINE
Payer: MEDICAID

## 2021-08-08 LAB
ALBUMIN SERPL-MCNC: 3.3 G/DL (ref 3.4–5)
ALBUMIN/GLOB SERPL: 0.8 {RATIO} (ref 0.8–1.7)
ALP SERPL-CCNC: 108 U/L (ref 45–117)
ALT SERPL-CCNC: 24 U/L (ref 16–61)
ANION GAP SERPL CALC-SCNC: 5 MMOL/L (ref 3–18)
APTT PPP: 77.1 SEC (ref 23–36.4)
ARTERIAL PATENCY WRIST A: POSITIVE
AST SERPL-CCNC: 20 U/L (ref 10–38)
BASE EXCESS BLD CALC-SCNC: 4 MMOL/L
BASOPHILS # BLD: 0 K/UL (ref 0–0.1)
BASOPHILS NFR BLD: 0 % (ref 0–2)
BDY SITE: ABNORMAL
BILIRUB SERPL-MCNC: 0.5 MG/DL (ref 0.2–1)
BODY TEMPERATURE: 97.6
BUN SERPL-MCNC: 21 MG/DL (ref 7–18)
BUN/CREAT SERPL: 14 (ref 12–20)
CALCIUM SERPL-MCNC: 8.8 MG/DL (ref 8.5–10.1)
CHLORIDE SERPL-SCNC: 103 MMOL/L (ref 100–111)
CO2 SERPL-SCNC: 32 MMOL/L (ref 21–32)
CREAT SERPL-MCNC: 1.48 MG/DL (ref 0.6–1.3)
DIFFERENTIAL METHOD BLD: ABNORMAL
EOSINOPHIL # BLD: 0 K/UL (ref 0–0.4)
EOSINOPHIL NFR BLD: 0 % (ref 0–5)
ERYTHROCYTE [DISTWIDTH] IN BLOOD BY AUTOMATED COUNT: 16.1 % (ref 11.6–14.5)
EST. AVERAGE GLUCOSE BLD GHB EST-MCNC: 146 MG/DL
GAS FLOW.O2 O2 DELIVERY SYS: ABNORMAL L/MIN
GLOBULIN SER CALC-MCNC: 3.9 G/DL (ref 2–4)
GLUCOSE BLD STRIP.AUTO-MCNC: 164 MG/DL (ref 70–110)
GLUCOSE BLD STRIP.AUTO-MCNC: 179 MG/DL (ref 70–110)
GLUCOSE BLD STRIP.AUTO-MCNC: 183 MG/DL (ref 70–110)
GLUCOSE BLD STRIP.AUTO-MCNC: 199 MG/DL (ref 70–110)
GLUCOSE BLD STRIP.AUTO-MCNC: 230 MG/DL (ref 70–110)
GLUCOSE SERPL-MCNC: 175 MG/DL (ref 74–99)
HBA1C MFR BLD: 6.7 % (ref 4.2–5.6)
HCO3 BLD-SCNC: 29 MMOL/L (ref 22–26)
HCT VFR BLD AUTO: 45.2 % (ref 36–48)
HGB BLD-MCNC: 13.8 G/DL (ref 13–16)
LYMPHOCYTES # BLD: 0.4 K/UL (ref 0.9–3.6)
LYMPHOCYTES NFR BLD: 6 % (ref 21–52)
MCH RBC QN AUTO: 29.4 PG (ref 24–34)
MCHC RBC AUTO-ENTMCNC: 30.5 G/DL (ref 31–37)
MCV RBC AUTO: 96.4 FL (ref 74–97)
MONOCYTES # BLD: 0.1 K/UL (ref 0.05–1.2)
MONOCYTES NFR BLD: 1 % (ref 3–10)
NEUTS SEG # BLD: 6.7 K/UL (ref 1.8–8)
NEUTS SEG NFR BLD: 93 % (ref 40–73)
O2/TOTAL GAS SETTING VFR VENT: 21 %
PCO2 BLD: 42.6 MMHG (ref 35–45)
PH BLD: 7.44 [PH] (ref 7.35–7.45)
PLATELET # BLD AUTO: 194 K/UL (ref 135–420)
PMV BLD AUTO: 11.8 FL (ref 9.2–11.8)
PO2 BLD: 64 MMHG (ref 80–100)
POTASSIUM SERPL-SCNC: 4.9 MMOL/L (ref 3.5–5.5)
PROT SERPL-MCNC: 7.2 G/DL (ref 6.4–8.2)
RBC # BLD AUTO: 4.69 M/UL (ref 4.35–5.65)
SAO2 % BLD: 93.4 % (ref 92–97)
SERVICE CMNT-IMP: ABNORMAL
SODIUM SERPL-SCNC: 140 MMOL/L (ref 136–145)
SPECIMEN TYPE: ABNORMAL
WBC # BLD AUTO: 7.1 K/UL (ref 4.6–13.2)

## 2021-08-08 PROCEDURE — 82962 GLUCOSE BLOOD TEST: CPT

## 2021-08-08 PROCEDURE — C9113 INJ PANTOPRAZOLE SODIUM, VIA: HCPCS | Performed by: INTERNAL MEDICINE

## 2021-08-08 PROCEDURE — 74011000250 HC RX REV CODE- 250: Performed by: INTERNAL MEDICINE

## 2021-08-08 PROCEDURE — 36415 COLL VENOUS BLD VENIPUNCTURE: CPT

## 2021-08-08 PROCEDURE — 36600 WITHDRAWAL OF ARTERIAL BLOOD: CPT

## 2021-08-08 PROCEDURE — 94640 AIRWAY INHALATION TREATMENT: CPT

## 2021-08-08 PROCEDURE — 85025 COMPLETE CBC W/AUTO DIFF WBC: CPT

## 2021-08-08 PROCEDURE — 82803 BLOOD GASES ANY COMBINATION: CPT

## 2021-08-08 PROCEDURE — 71045 X-RAY EXAM CHEST 1 VIEW: CPT

## 2021-08-08 PROCEDURE — 74011250637 HC RX REV CODE- 250/637: Performed by: INTERNAL MEDICINE

## 2021-08-08 PROCEDURE — 85730 THROMBOPLASTIN TIME PARTIAL: CPT

## 2021-08-08 PROCEDURE — 65660000000 HC RM CCU STEPDOWN

## 2021-08-08 PROCEDURE — 94660 CPAP INITIATION&MGMT: CPT

## 2021-08-08 PROCEDURE — 80053 COMPREHEN METABOLIC PANEL: CPT

## 2021-08-08 PROCEDURE — 74011250636 HC RX REV CODE- 250/636: Performed by: INTERNAL MEDICINE

## 2021-08-08 PROCEDURE — 74011636637 HC RX REV CODE- 636/637: Performed by: INTERNAL MEDICINE

## 2021-08-08 RX ORDER — ACETAMINOPHEN 650 MG/1
650 SUPPOSITORY RECTAL
Status: DISCONTINUED | OUTPATIENT
Start: 2021-08-08 | End: 2021-08-09 | Stop reason: HOSPADM

## 2021-08-08 RX ORDER — HALOPERIDOL 5 MG/ML
2 INJECTION INTRAMUSCULAR ONCE
Status: DISCONTINUED | OUTPATIENT
Start: 2021-08-08 | End: 2021-08-08

## 2021-08-08 RX ORDER — LORAZEPAM 2 MG/ML
0.5 INJECTION INTRAMUSCULAR
Status: DISCONTINUED | OUTPATIENT
Start: 2021-08-08 | End: 2021-08-09 | Stop reason: HOSPADM

## 2021-08-08 RX ORDER — INSULIN LISPRO 100 [IU]/ML
INJECTION, SOLUTION INTRAVENOUS; SUBCUTANEOUS
Status: DISCONTINUED | OUTPATIENT
Start: 2021-08-09 | End: 2021-08-09 | Stop reason: HOSPADM

## 2021-08-08 RX ORDER — HALOPERIDOL 5 MG/ML
2 INJECTION INTRAMUSCULAR ONCE
Status: ACTIVE | OUTPATIENT
Start: 2021-08-08 | End: 2021-08-08

## 2021-08-08 RX ADMIN — INSULIN LISPRO 2 UNITS: 100 INJECTION, SOLUTION INTRAVENOUS; SUBCUTANEOUS at 00:28

## 2021-08-08 RX ADMIN — BUDESONIDE 500 MCG: 0.5 INHALANT RESPIRATORY (INHALATION) at 19:29

## 2021-08-08 RX ADMIN — FUROSEMIDE 40 MG: 10 INJECTION, SOLUTION INTRAMUSCULAR; INTRAVENOUS at 09:38

## 2021-08-08 RX ADMIN — BUDESONIDE 500 MCG: 0.5 INHALANT RESPIRATORY (INHALATION) at 08:25

## 2021-08-08 RX ADMIN — INSULIN LISPRO 4 UNITS: 100 INJECTION, SOLUTION INTRAVENOUS; SUBCUTANEOUS at 18:13

## 2021-08-08 RX ADMIN — METHYLPREDNISOLONE SODIUM SUCCINATE 40 MG: 40 INJECTION, POWDER, FOR SOLUTION INTRAMUSCULAR; INTRAVENOUS at 00:26

## 2021-08-08 RX ADMIN — LORAZEPAM 0.5 MG: 2 INJECTION INTRAMUSCULAR; INTRAVENOUS at 23:44

## 2021-08-08 RX ADMIN — APIXABAN 5 MG: 5 TABLET, FILM COATED ORAL at 21:59

## 2021-08-08 RX ADMIN — ARFORMOTEROL TARTRATE 15 MCG: 15 SOLUTION RESPIRATORY (INHALATION) at 19:29

## 2021-08-08 RX ADMIN — INSULIN LISPRO 2 UNITS: 100 INJECTION, SOLUTION INTRAVENOUS; SUBCUTANEOUS at 06:06

## 2021-08-08 RX ADMIN — SODIUM CHLORIDE 40 MG: 9 INJECTION, SOLUTION INTRAMUSCULAR; INTRAVENOUS; SUBCUTANEOUS at 09:38

## 2021-08-08 RX ADMIN — ARFORMOTEROL TARTRATE 15 MCG: 15 SOLUTION RESPIRATORY (INHALATION) at 08:25

## 2021-08-08 RX ADMIN — INSULIN LISPRO 2 UNITS: 100 INJECTION, SOLUTION INTRAVENOUS; SUBCUTANEOUS at 22:04

## 2021-08-08 RX ADMIN — METHYLPREDNISOLONE SODIUM SUCCINATE 40 MG: 40 INJECTION, POWDER, FOR SOLUTION INTRAMUSCULAR; INTRAVENOUS at 18:11

## 2021-08-08 RX ADMIN — APIXABAN 5 MG: 5 TABLET, FILM COATED ORAL at 09:38

## 2021-08-08 RX ADMIN — METHYLPREDNISOLONE SODIUM SUCCINATE 40 MG: 40 INJECTION, POWDER, FOR SOLUTION INTRAMUSCULAR; INTRAVENOUS at 23:45

## 2021-08-08 RX ADMIN — METHYLPREDNISOLONE SODIUM SUCCINATE 40 MG: 40 INJECTION, POWDER, FOR SOLUTION INTRAMUSCULAR; INTRAVENOUS at 06:05

## 2021-08-08 RX ADMIN — FUROSEMIDE 40 MG: 10 INJECTION, SOLUTION INTRAMUSCULAR; INTRAVENOUS at 21:59

## 2021-08-08 NOTE — PROGRESS NOTES
0800 Assumed care of patient. .. Pt in chair agitated and restless. Bakari Randallch dyspneic on exertion. .. Aggressive with nursing staff and demanding. . A fib. .. RVR on exertion with dyspnea. . will continue to monitor. .  1200  Reassessment completed . . will continue to monitor'  1600  Reassessment completed . . will continue to monitor

## 2021-08-08 NOTE — PROGRESS NOTES
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 mAs and/or kVp according to  patient size, and iterative reconstruction techniques. The specific techniques  used on this CT exam have been documented in the patient's electronic medical  record. Digital Imaging and Communications in Medicine (DICOM) format image  data are available to nonaffiliated external healthcare facilities or entities  on a secure, media free, reciprocally searchable basis with patient  authorization for at least a 12-month period after this study. _______________    FINDINGS:    BRAIN AND POSTERIOR FOSSA: There is generalized prominence of the ventricular  system, associated with proportional widening of the cortical cerebral sulci,  compatible with generalized volume loss. Hazy hypoattenuation identified along  the periventricular white matter, a nonspecific finding which is most commonly  encountered in the setting of chronic microvascular disease. There is no  intracranial hemorrhage, mass effect, or midline shift. There are no  significant additional areas of abnormal parenchymal attenuation. EXTRA-AXIAL SPACES AND MENINGES: There are no abnormal extra-axial fluid  collections. CALVARIUM: No acute osseous abnormality.     OTHER: The visualized portions of the paranasal sinuses and mastoid air cells  are clear.    _______________    Impression  IMPRESSION:    1. No CT evidence of an acute intracranial abnormality or other findings  related to recent trauma. 2.  Mild cerebral atrophy/volume loss and periventricular white matter changes,  most commonly seen with chronic microvascular disease. CXR reviewed by me:  XR (Most Recent). CXR  reviewed by me and compared with previous CXR Results from Hospital Encounter encounter on 08/07/21    XR CHEST PORT    Narrative  EXAM:  AP Portable Chest X-ray 1 view    INDICATION: Shortness of breath    COMPARISON: December 6, 2020    _______________    FINDINGS: Cardiomegaly and enlarged mediastinum unchanged. Left hemidiaphragm is  obscured possibly due to overlying soft tissues however atelectasis/infiltrate  and/or effusion not excluded. Right lung is clear. No acute osseous findings. ________________    Impression  Cardiomegaly. Left hemidiaphragm is obscured possibly due to  overlying soft tissues however infiltrate/atelectasis or effusion not excluded. Enlarged stable superior mediastinum.            Luisana Ford MD  8/8/2021

## 2021-08-08 NOTE — PROGRESS NOTES
0015- TRANSFER - IN REPORT:    Verbal report received from RORO Phillips (name) on Lory Sheffield  being received from ED (unit) for routine progression of care      Report consisted of patients Situation, Background, Assessment and   Recommendations(SBAR). Information from the following report(s) SBAR, Kardex, ED Summary, Intake/Output, MAR, Recent Results, Med Rec Status and Cardiac Rhythm Afib was reviewed with the receiving nurse. Opportunity for questions and clarification was provided. Assessment completed upon patients arrival to unit and care assumed. 0020- Pt refuses full skin assessment and states \"I don't have any skin problems on my ass or junk. \" Pt focused on eating and asking for food and drink. Pt very agitated, rude, and verbally aggressive. Pt refuses to be in bed and requests to be in the chair. Chair alarm placed on chair. Dr. Coby Ace paged about diet. Given ice chips despite being on bipap d/t aggressive complaining on the matter. Will continue to monitor. 0040- Pt taking bipap on and off regularly despite education to keep it. RT notified about the noncompliance, when back down on the floor. 0139- Pt again takes off bipap completely refusing to put it back on and demanding he eat that \"he'll die if he doesn't eat. \" Pt re-educated on the importance of the mask and the possibility of intubation if he continues not to wear it and his O2 levels drop too low. He hesitantly agrees to put the mask back on. Will continue to monitor closely. 0400- Reassessment completed. No changes from previous assessment. 0700- shift change report given to GIANCARLO Cortez (oncoming nurse) by OCTAVIA Higgins (offgoing nurse). Report included the following information SBAR, Kardex, ED Summary, Intake/Output, MAR, Recent Results, Med Rec Status and Cardiac Rhythm Afib.

## 2021-08-08 NOTE — PROGRESS NOTES
Discussed with Dr. Laura Perea. Okay to transfer. Make sure patient continues at night BiPAP.   Jared Caballero MD

## 2021-08-08 NOTE — PROGRESS NOTES
Hospitalist Progress Note    Patient: Laurel Pulliam MRN: [id number]  CSN: [id number]    YOB: 1960  Age: 56 y.o. Sex: male    DOA: 8/7/2021 LOS:  LOS: 1 day            Assessment/Plan     1.acute hypercarbic respiratory failure off bipap  2. morbid obesity with obesity hypoventilation syndrome  3.cocaine and Benzodiazepine abuse  4.chronic left ventricular systolic heart failure with an ejection fraction of 40%  5. COPD- still wheezing  6. Atrial fibrillation    Plan:  - continue steroids, lasix, abx for now  - eliquis started  - inhaled bronchodilators  - discussed need to stop all illicit subtances      Patient Active Problem List   Diagnosis Code    COPD exacerbation ([email or web address] J44.1    Type II diabetes mellitus with peripheral autonomic neuropathy ([facility]) E11.43    Hypertension I10    JIM (obstructive sleep apnea) G47.33    Morbid obesity ([facility]) E66.01    Acute on chronic combined systolic and diastolic ACC/AHA stage C congestive heart failure ([facility]) I50.43    COPD (chronic obstructive pulmonary disease) ([facility]) J44.9    Acute on chronic respiratory failure ([facility]) J96.20    Cocaine abuse ([facility]) F14.10    Acute hypercapnic respiratory failure ([facility]) J96.02               Subjective:    cc:  Shortness of breath  Pt feeling better this morning  Wheezes persist      REVIEW OF SYSTEMS:  General: No fevers or chills. Cardiovascular: No chest pain or pressure. No palpitations. Pulmonary: No shortness of breath. Gastrointestinal: No nausea, vomiting. Objective:        Vital signs/Intake and Output:  Visit Vitals  BP (!) 148/103   Pulse (!) 115   Temp 97.6 °F (36.4 °C)   Resp 25   Ht 5' 7\" (1.702 m)   Wt (!) 176.3 kg (388 lb 11.2 oz)   SpO2 93%   BMI 60.88 kg/m²     Current Shift:  08/08 0701 - 08/08 1900  In: 350 [P.O.:350]  Out: 350 [Urine:350]  Last three shifts:  08/06 1901 - 08/08 0700  In: -   Out: 1250 [Urine:1250]    Body mass index is 60.88 kg/m².     Physical Exam:  GEN: Alert and oriented times three NAD, agitated  EYES: conjunctiva normal, lids with out lesions  HEENT: MMM, No thyromegaly, no lymphadenopathy  HEART: RRR +S1 +S2, no JVD, pulses 2+ distally  LUNGS  + bilateral expiratory wheezes, no rales or rhonchi  ABDOMEN: + BS, soft NT/ND no organomegaly,  No rebound  EXTREMITIES: No edema cyanosis, cap refill normal   SKIN: no rashes or skin breakdown, no nodules, normal turgor  Current Facility-Administered Medications   Medication Dose Route Frequency    LORazepam (ATIVAN) injection 0.5 mg  0.5 mg IntraVENous Q6H PRN    pantoprazole (PROTONIX) 40 mg in 0.9% sodium chloride 10 mL injection  40 mg IntraVENous DAILY    apixaban (ELIQUIS) tablet 5 mg  5 mg Oral BID    haloperidol lactate (HALDOL) injection 2 mg  2 mg IntraVENous ONCE    methylPREDNISolone (PF) (SOLU-MEDROL) injection 40 mg  40 mg IntraVENous Q6H    furosemide (LASIX) injection 40 mg  40 mg IntraVENous BID    arformoteroL (BROVANA) neb solution 15 mcg  15 mcg Nebulization BID RT    budesonide (PULMICORT) 500 mcg/2 ml nebulizer suspension  500 mcg Nebulization BID RT    insulin lispro (HUMALOG) injection   SubCUTAneous Q6H    glucose chewable tablet 16 g  4 Tablet Oral PRN    glucagon (GLUCAGEN) injection 1 mg  1 mg IntraMUSCular PRN    dextrose (D50W) injection syrg 12.5-25 g  25-50 mL IntraVENous PRN         All the patient's labs over the past 24 hours were reviewed both during my initial daily workflow process and at the time notated as \"note time\" in [facility]. (It is not time stamped separately in this workflow.)  Select labs are listed below.         Labs: Results:       Chemistry Recent Labs     08/08/21  0448 08/07/21  1338   * 99    140   K 4.9 4.8    108   CO2 32 31   BUN 21* 20*   CREA 1.48* 1.42*   CA 8.8 8.7   AGAP 5 1*   BUCR 14 14    107   TP 7.2 7.2   ALB 3.3* 3.2*   GLOB 3.9 4.0   AGRAT 0.8 0.8      CBC w/Diff Recent Labs     08/08/21 0448 08/07/21  1856 08/07/21  1445   WBC 7.1 8.6 7.8   RBC 4.69 4.62 4.49   HGB 13.8 13.5 13.2   HCT 45.2 44.8 44.0    162 196   GRANS 93* 94* 71   LYMPH 6* 4* 16*   EOS 0 0 3      Cardiac Enzymes Recent Labs     08/07/21  1338      CKND1 2.3      Coagulation Recent Labs     08/08/21  0448 08/07/21  1856   APTT 77.1* 31.1       Lipid Panel Lab Results   Component Value Date/Time    Cholesterol, total 196 01/25/2018 02:51 AM    HDL Cholesterol 64 (H) 01/25/2018 02:51 AM    LDL, calculated 121.2 (H) 01/25/2018 02:51 AM    VLDL, calculated 10.8 01/25/2018 02:51 AM    Triglyceride 54 01/25/2018 02:51 AM    CHOL/HDL Ratio 3.1 01/25/2018 02:51 AM          Liver Enzymes Recent Labs     08/08/21  0448   TP 7.2   ALB 3.3*         Thyroid Studies Lab Results   Component Value Date/Time    TSH 1.05 12/08/2020 01:30 AM        Procedures/imaging: see electronic medical records for all procedures/Xrays and details which were not copied into this note but were reviewed prior to creation of [address] [place] [address] Rd, DO  Internal Medicine/Geriatrics

## 2021-08-08 NOTE — ROUTINE PROCESS
TRANSFER - OUT REPORT:    Verbal report given to NIRU RN(name) on Jacobo Peters  being transferred to ICU(unit) for routine progression of care       Report consisted of patients Situation, Background, Assessment and   Recommendations(SBAR). Information from the following report(s) SBAR, Kardex, ED Summary, Intake/Output, MAR, Recent Results and Cardiac Rhythm AFIB was reviewed with the receiving nurse. Lines:   Peripheral IV 08/07/21 Right Forearm (Active)        Opportunity for questions and clarification was provided.       Patient transported with:   Monitor  O2 @ BiPAP liters  Registered Nurse

## 2021-08-08 NOTE — PROGRESS NOTES
Problem: Pressure Injury - Risk of  Goal: *Prevention of pressure injury  Description: Document Axel Scale and appropriate interventions in the flowsheet. Outcome: Progressing Towards Goal  Note: Pressure Injury Interventions:       Moisture Interventions: Absorbent underpads, Check for incontinence Q2 hours and as needed, Minimize layers    Activity Interventions: Pressure redistribution bed/mattress(bed type)    Mobility Interventions: HOB 30 degrees or less, Pressure redistribution bed/mattress (bed type), Turn and reposition approx. every two hours(pillow and wedges)    Nutrition Interventions: Document food/fluid/supplement intake     Problem: Patient Education: Go to Patient Education Activity  Goal: Patient/Family Education  Outcome: Progressing Towards Goal     Problem: Falls - Risk of  Goal: *Absence of Falls  Description: Document Hugoa Lionel Fall Risk and appropriate interventions in the flowsheet.   Outcome: Progressing Towards Goal  Note: Fall Risk Interventions:  Mobility Interventions: Bed/chair exit alarm, Assess mobility with egress test, Patient to call before getting OOB         Medication Interventions: Bed/chair exit alarm, Evaluate medications/consider consulting pharmacy, Patient to call before getting OOB    Elimination Interventions: Bed/chair exit alarm, Call light in reach, Patient to call for help with toileting needs, Toileting schedule/hourly rounds, Urinal in reach    History of Falls Interventions: Bed/chair exit alarm, Door open when patient unattended, Evaluate medications/consider consulting pharmacy    Problem: Patient Education: Go to Patient Education Activity  Goal: Patient/Family Education  Outcome: Progressing Towards Goal     Problem: General Medical Care Plan  Goal: *Vital signs within specified parameters  Outcome: Progressing Towards Goal  Goal: *Labs within defined limits  Outcome: Progressing Towards Goal  Goal: *Absence of infection signs and symptoms  Outcome: Progressing Towards Goal  Goal: *Optimal pain control at patient's stated goal  Outcome: Progressing Towards Goal  Goal: *Skin integrity maintained  Outcome: Progressing Towards Goal  Goal: *Fluid volume balance  Outcome: Progressing Towards Goal  Goal: *Optimize nutritional status  Outcome: Progressing Towards Goal  Goal: *Anxiety reduced or absent  Outcome: Progressing Towards Goal  Goal: *Progressive mobility and function (eg: ADL's)  Outcome: Progressing Towards Goal     Problem: Patient Education: Go to Patient Education Activity  Goal: Patient/Family Education  Outcome: Progressing Towards Goal     Saadia Cortes RN

## 2021-08-08 NOTE — ED PROVIDER NOTES
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 SALINE LOCK IV ONE TIME STAT     Standing Status:   Standing     Number of Occurrences:   1    DUPLEX LOWER EXT VENOUS BILAT     Standing Status:   Standing     Number of Occurrences:   1     Order Specific Question:   Transport     Answer:   No Transport [3]    furosemide (LASIX) injection 40 mg    methylPREDNISolone (PF) (Solu-MEDROL) injection 125 mg    albuterol-ipratropium (DUO-NEB) 2.5 MG-0.5 MG/3 ML     Order Specific Question:   MODE OF DELIVERY     Answer:   Nebulizer     Order Specific Question:   Initiate RT Bronchodilator Protocol     Answer:    Yes    DISCONTD: arformoteroL (BROVANA) neb solution 15 mcg     Order Specific Question:   MODE OF DELIVERY     Answer:   Nebulizer    DISCONTD: budesonide (PULMICORT) 500 mcg/2 ml nebulizer suspension     Order Specific Question:   MODE OF DELIVERY     Answer:   Nebulizer    methylPREDNISolone (PF) (SOLU-MEDROL) injection 40 mg    furosemide (LASIX) injection 40 mg    arformoteroL (BROVANA) neb solution 15 mcg     Order Specific Question:   MODE OF DELIVERY     Answer:   Nebulizer    budesonide (PULMICORT) 500 mcg/2 ml nebulizer suspension     Order Specific Question: MODE OF DELIVERY     Answer:   Nebulizer    heparin (porcine) 1,000 unit/mL injection 10,000 Units    heparin 25,000 units in D5W 250 ml infusion    insulin lispro (HUMALOG) injection    glucose chewable tablet 16 g    glucagon (GLUCAGEN) injection 1 mg    dextrose (D50W) injection syrg 12.5-25 g    IP CONSULT TO HOSPITALIST     Standing Status:   Standing     Number of Occurrences:   1     Order Specific Question:   Reason for Consult: Answer:   TO ADMIT     Order Specific Question:   Did you call or speak to the consulting provider? Answer:   No     Order Specific Question:   Consult To     Answer:   hospitalist     Order Specific Question:   Schedule When? Answer:   TODAY    INITIAL PHYSICIAN ORDER: INPATIENT Intensive Care; Yes; 4. Patient requires ICU level of care interventions (further clarification in H&P documentation)     Standing Status:   Standing     Number of Occurrences:   1     Order Specific Question:   Status: Answer:   INPATIENT [101]     Order Specific Question:   Type of Bed     Answer:   Intensive Care [6]     Order Specific Question:   Cardiac Monitoring Required? Answer:   Yes     Order Specific Question:   Inpatient Hospitalization Certified Necessary for the Following Reasons     Answer:   4.  Patient requires ICU level of care interventions (further clarification in H&P documentation)     Order Specific Question:   Admitting Diagnosis     Answer:   Acute hypercapnic respiratory failure [facility]) [[id number]]     Order Specific Question:   Admitting Physician     Answer:   Hailee Snyder     Order Specific Question:   Attending Physician     Answer:   Hailee Snyder     Order Specific Question:   Estimated Length of Stay     Answer:   3-4 Midnights     Order Specific Question:   Discharge Plan:     Answer:   Home with Office Follow-up       Labs -     Recent Results (from the past 12 hour(s))   EKG, 12 LEAD, INITIAL    Collection Time: 08/07/21  1:27 PM Result Value Ref Range    Ventricular Rate 71 BPM    Atrial Rate 107 BPM    QRS Duration 94 ms    Q-T Interval 426 ms    QTC Calculation (Bezet) 462 ms    Calculated R Axis -42 degrees    Calculated T Axis 111 degrees    Diagnosis       Poor data quality, interpretation may be adversely affected  Atrial fibrillation  Left axis deviation  Possible Anterior infarct , age undetermined  T wave abnormality, consider lateral ischemia  Abnormal ECG  Confirmed by Too Lawson MD, Russell Monte ([id number]) on 8/7/2021 2:41:37 PM     CARDIAC PANEL,(CK, CKMB & TROPONIN)    Collection Time: 08/07/21  1:38 PM   Result Value Ref Range    CK - MB 4.3 (H) <3.6 ng/ml    CK-MB Index 2.3 0.0 - 4.0 %     39 - 308 U/L    Troponin-I, QT 0.03 0.0 - 6.493 NG/ML   METABOLIC PANEL, COMPREHENSIVE    Collection Time: 08/07/21  1:38 PM   Result Value Ref Range    Sodium 140 136 - 145 mmol/L    Potassium 4.8 3.5 - 5.5 mmol/L    Chloride 108 100 - 111 mmol/L    CO2 31 21 - 32 mmol/L    Anion gap 1 (L) 3.0 - 18 mmol/L    Glucose 99 74 - 99 mg/dL    BUN 20 (H) 7.0 - 18 MG/DL    Creatinine 1.42 (H) 0.6 - 1.3 MG/DL    BUN/Creatinine ratio 14 12 - 20      GFR est AA >60 >60 ml/min/1.73m2    GFR est non-AA 51 (L) >60 ml/min/1.73m2    Calcium 8.7 8.5 - 10.1 MG/DL    Bilirubin, total 0.5 0.2 - 1.0 MG/DL    ALT (SGPT) 27 16 - 61 U/L    AST (SGOT) 37 10 - 38 U/L    Alk.  phosphatase 107 45 - 117 U/L    Protein, total 7.2 6.4 - 8.2 g/dL    Albumin 3.2 (L) 3.4 - 5.0 g/dL    Globulin 4.0 2.0 - 4.0 g/dL    A-G Ratio 0.8 0.8 - 1.7     NT-PRO BNP    Collection Time: 08/07/21  1:38 PM   Result Value Ref Range    NT pro-BNP 3,213 (H) 0 - 900 PG/ML   COVID-19 RAPID TEST    Collection Time: 08/07/21  1:45 PM   Result Value Ref Range    Specimen source Nasopharyngeal      COVID-19 rapid test Not detected NOTD     BLOOD GAS, ARTERIAL POC    Collection Time: 08/07/21  2:37 PM   Result Value Ref Range    Device: NASAL CANNULA      FIO2 (POC) 40 %    pH (POC) 7.27 (L) 7.35 - 7.45 pCO2 (POC) 69.3 (H) 35.0 - 45.0 MMHG    pO2 (POC) 80 80 - 100 MMHG    HCO3 (POC) 31.6 (H) 22 - 26 MMOL/L    sO2 (POC) 93.2 92 - 97 %    Base excess (POC) 2.3 mmol/L    Site RIGHT RADIAL      Specimen type (POC) ARTERIAL      Performed by Lowell Ingram)    CBC WITH AUTOMATED DIFF    Collection Time: 08/07/21  2:45 PM   Result Value Ref Range    WBC 7.8 4.6 - 13.2 K/uL    RBC 4.49 4.35 - 5.65 M/uL    HGB 13.2 13.0 - 16.0 g/dL    HCT 44.0 36.0 - 48.0 %    MCV 98.0 (H) 74.0 - 97.0 FL    MCH 29.4 24.0 - 34.0 PG    MCHC 30.0 (L) 31.0 - 37.0 g/dL    RDW 16.7 (H) 11.6 - 14.5 %    PLATELET 535 238 - 108 K/uL    MPV 11.4 9.2 - 11.8 FL    NEUTROPHILS 71 40 - 73 %    LYMPHOCYTES 16 (L) 21 - 52 %    MONOCYTES 9 3 - 10 %    EOSINOPHILS 3 0 - 5 %    BASOPHILS 0 0 - 2 %    ABS. NEUTROPHILS 5.6 1.8 - 8.0 K/UL    ABS. LYMPHOCYTES 1.3 0.9 - 3.6 K/UL    ABS. MONOCYTES 0.7 0.05 - 1.2 K/UL    ABS. EOSINOPHILS 0.3 0.0 - 0.4 K/UL    ABS.  BASOPHILS 0.0 0.0 - 0.1 K/UL    DF AUTOMATED     DRUG SCREEN, URINE    Collection Time: 08/07/21  3:00 PM   Result Value Ref Range    BENZODIAZEPINES Positive (A) NEG      BARBITURATES Negative NEG      THC (TH-CANNABINOL) Negative NEG      OPIATES Negative NEG      PCP(PHENCYCLIDINE) Negative NEG      COCAINE Positive (A) NEG      AMPHETAMINES Negative NEG      METHADONE Negative NEG      HDSCOM (NOTE)    RESPIRATORY VIRUS PANEL W/COVID-19, PCR    Collection Time: 08/07/21  4:00 PM    Specimen: Nasopharyngeal   Result Value Ref Range    Adenovirus Not detected NOTD      Coronavirus 229E Not detected NOTD      Coronavirus HKU1 Not detected NOTD      Coronavirus CVNL63 Not detected NOTD      Coronavirus OC43 Not detected NOTD      SARS-CoV-2, PCR Not detected NOTD      Metapneumovirus Not detected NOTD      Rhinovirus and Enterovirus Not detected NOTD      Influenza A Not detected NOTD      Influenza A, subtype H1 Not detected NOTD      Influenza A, subtype H3 Not detected NOTD      INFLUENZA A H1N1 PCR Not detected NOTD      Influenza B Not detected NOTD      Parainfluenza 1 Not detected NOTD      Parainfluenza 2 Not detected NOTD      Parainfluenza 3 Not detected NOTD      Parainfluenza virus 4 Not detected NOTD      RSV by PCR Not detected NOTD      B. parapertussis, PCR Not detected NOTD      Bordetella pertussis - PCR Not detected NOTD      Chlamydophila pneumoniae DNA, QL, PCR Not detected NOTD      Mycoplasma pneumoniae DNA, QL, PCR Not detected NOTD     CBC WITH AUTOMATED DIFF    Collection Time: 08/07/21  6:56 PM   Result Value Ref Range    WBC 8.6 4.6 - 13.2 K/uL    RBC 4.62 4.35 - 5.65 M/uL    HGB 13.5 13.0 - 16.0 g/dL    HCT 44.8 36.0 - 48.0 %    MCV 97.0 74.0 - 97.0 FL    MCH 29.2 24.0 - 34.0 PG    MCHC 30.1 (L) 31.0 - 37.0 g/dL    RDW 16.5 (H) 11.6 - 14.5 %    PLATELET 705 896 - 744 K/uL    MPV 11.1 9.2 - 11.8 FL    NEUTROPHILS 94 (H) 40 - 73 %    LYMPHOCYTES 4 (L) 21 - 52 %    MONOCYTES 2 (L) 3 - 10 %    EOSINOPHILS 0 0 - 5 %    BASOPHILS 0 0 - 2 %    ABS. NEUTROPHILS 8.1 (H) 1.8 - 8.0 K/UL    ABS. LYMPHOCYTES 0.3 (L) 0.9 - 3.6 K/UL    ABS. MONOCYTES 0.1 0.05 - 1.2 K/UL    ABS. EOSINOPHILS 0.0 0.0 - 0.4 K/UL    ABS. BASOPHILS 0.0 0.0 - 0.1 K/UL    DF AUTOMATED     PTT    Collection Time: 08/07/21  6:56 PM   Result Value Ref Range    aPTT 31.1 23.0 - 36.4 SEC   GLUCOSE, POC    Collection Time: 08/07/21  6:58 PM   Result Value Ref Range    Glucose (POC) 130 (H) 70 - 110 mg/dL       Radiologic Studies -   XR CHEST PORT   Final Result   Cardiomegaly. Left hemidiaphragm is obscured possibly due to   overlying soft tissues however infiltrate/atelectasis or effusion not excluded. Enlarged stable superior mediastinum. DUPLEX LOWER EXT VENOUS BILAT    (Results Pending)     CT Results  (Last 48 hours)    None        CXR Results  (Last 48 hours)               08/07/21 1403  XR CHEST PORT Final result    Impression:  Cardiomegaly.  Left hemidiaphragm is obscured possibly due to overlying soft tissues however infiltrate/atelectasis or effusion not excluded. Enlarged stable superior mediastinum. Narrative:  EXAM:  AP Portable Chest X-ray 1 view        INDICATION: Shortness of breath       COMPARISON: December 6, 2020       _______________       FINDINGS: Cardiomegaly and enlarged mediastinum unchanged. Left hemidiaphragm is   obscured possibly due to overlying soft tissues however atelectasis/infiltrate   and/or effusion not excluded. Right lung is clear. No acute osseous findings. ________________                     Disposition     Disposition:  Admit    CLINICAL IMPRESSION:    1. Polysubstance abuse ([address]    2. Chronic obstructive pulmonary disease, unspecified COPD type ([address]    3. JIM (obstructive sleep apnea)    4. Morbid obesity ([address]    5. Acute on chronic combined systolic and diastolic ACC/AHA stage C congestive heart failure ([address]    6. Acute hypercapnic respiratory failure ([address]        It should be noted that I will be the provider of record for this patient  Radha Dumont MD    Follow-up Information    None         Current Discharge Medication List          Please note that this dictation was completed with [employer], the computer voice recognition software. Quite often unanticipated grammatical, syntax, homophones, and other interpretive errors are inadvertently transcribed by the computer software. Please disregard these errors. Please excuse any errors that have escaped final proofreading.

## 2021-08-08 NOTE — PROGRESS NOTES
Received patient on Bipap. AVAP mode 550 VT/ Rate 14/ FIO2= 35%. HR 88, Oxygen Saturation 96%. In-line nebulizer treatment given. 12:00 AM: 8/8/2021. Patient is transported to ICU via [facility] without incident. Will continue to monitor.

## 2021-08-09 VITALS
WEIGHT: 315 LBS | DIASTOLIC BLOOD PRESSURE: 104 MMHG | SYSTOLIC BLOOD PRESSURE: 163 MMHG | HEART RATE: 84 BPM | TEMPERATURE: 97.9 F | RESPIRATION RATE: 22 BRPM | BODY MASS INDEX: 49.44 KG/M2 | OXYGEN SATURATION: 94 % | HEIGHT: 67 IN

## 2021-08-09 LAB
BASOPHILS # BLD: 0 K/UL (ref 0–0.1)
BASOPHILS NFR BLD: 0 % (ref 0–2)
DIFFERENTIAL METHOD BLD: ABNORMAL
EOSINOPHIL # BLD: 0 K/UL (ref 0–0.4)
EOSINOPHIL NFR BLD: 0 % (ref 0–5)
ERYTHROCYTE [DISTWIDTH] IN BLOOD BY AUTOMATED COUNT: 16.1 % (ref 11.6–14.5)
GLUCOSE BLD STRIP.AUTO-MCNC: 200 MG/DL (ref 70–110)
GLUCOSE BLD STRIP.AUTO-MCNC: 350 MG/DL (ref 70–110)
HCT VFR BLD AUTO: 45.5 % (ref 36–48)
HGB BLD-MCNC: 13.8 G/DL (ref 13–16)
LYMPHOCYTES # BLD: 0.4 K/UL (ref 0.9–3.6)
LYMPHOCYTES NFR BLD: 4 % (ref 21–52)
MCH RBC QN AUTO: 29 PG (ref 24–34)
MCHC RBC AUTO-ENTMCNC: 30.3 G/DL (ref 31–37)
MCV RBC AUTO: 95.6 FL (ref 74–97)
MONOCYTES # BLD: 0.2 K/UL (ref 0.05–1.2)
MONOCYTES NFR BLD: 2 % (ref 3–10)
NEUTS SEG # BLD: 9.6 K/UL (ref 1.8–8)
NEUTS SEG NFR BLD: 94 % (ref 40–73)
PLATELET # BLD AUTO: 201 K/UL (ref 135–420)
PMV BLD AUTO: 12.4 FL (ref 9.2–11.8)
RBC # BLD AUTO: 4.76 M/UL (ref 4.35–5.65)
WBC # BLD AUTO: 10.3 K/UL (ref 4.6–13.2)

## 2021-08-09 PROCEDURE — 74011250636 HC RX REV CODE- 250/636: Performed by: INTERNAL MEDICINE

## 2021-08-09 PROCEDURE — 74011636637 HC RX REV CODE- 636/637: Performed by: INTERNAL MEDICINE

## 2021-08-09 PROCEDURE — 74011250637 HC RX REV CODE- 250/637: Performed by: INTERNAL MEDICINE

## 2021-08-09 PROCEDURE — 74011000250 HC RX REV CODE- 250: Performed by: INTERNAL MEDICINE

## 2021-08-09 PROCEDURE — 82962 GLUCOSE BLOOD TEST: CPT

## 2021-08-09 PROCEDURE — 94640 AIRWAY INHALATION TREATMENT: CPT

## 2021-08-09 PROCEDURE — 36415 COLL VENOUS BLD VENIPUNCTURE: CPT

## 2021-08-09 PROCEDURE — 85025 COMPLETE CBC W/AUTO DIFF WBC: CPT

## 2021-08-09 RX ORDER — PREDNISONE 20 MG/1
TABLET ORAL
Qty: 12 TABLET | Refills: 0 | Status: SHIPPED | OUTPATIENT
Start: 2021-08-09

## 2021-08-09 RX ORDER — DOXYCYCLINE 100 MG/1
100 CAPSULE ORAL 2 TIMES DAILY
Qty: 10 CAPSULE | Refills: 0 | Status: SHIPPED | OUTPATIENT
Start: 2021-08-09

## 2021-08-09 RX ORDER — FUROSEMIDE 40 MG/1
40 TABLET ORAL DAILY
Qty: 30 TABLET | Refills: 0 | Status: SHIPPED | OUTPATIENT
Start: 2021-08-09

## 2021-08-09 RX ORDER — PANTOPRAZOLE SODIUM 40 MG/1
40 TABLET, DELAYED RELEASE ORAL
Status: DISCONTINUED | OUTPATIENT
Start: 2021-08-09 | End: 2021-08-09 | Stop reason: HOSPADM

## 2021-08-09 RX ADMIN — INSULIN LISPRO 6 UNITS: 100 INJECTION, SOLUTION INTRAVENOUS; SUBCUTANEOUS at 11:30

## 2021-08-09 RX ADMIN — METHYLPREDNISOLONE SODIUM SUCCINATE 40 MG: 40 INJECTION, POWDER, FOR SOLUTION INTRAMUSCULAR; INTRAVENOUS at 06:42

## 2021-08-09 RX ADMIN — FUROSEMIDE 40 MG: 10 INJECTION, SOLUTION INTRAMUSCULAR; INTRAVENOUS at 10:09

## 2021-08-09 RX ADMIN — PANTOPRAZOLE SODIUM 40 MG: 40 TABLET, DELAYED RELEASE ORAL at 10:09

## 2021-08-09 RX ADMIN — BUDESONIDE 500 MCG: 0.5 INHALANT RESPIRATORY (INHALATION) at 08:15

## 2021-08-09 RX ADMIN — APIXABAN 5 MG: 5 TABLET, FILM COATED ORAL at 10:09

## 2021-08-09 RX ADMIN — ARFORMOTEROL TARTRATE 15 MCG: 15 SOLUTION RESPIRATORY (INHALATION) at 08:15

## 2021-08-09 RX ADMIN — INSULIN LISPRO 15 UNITS: 100 INJECTION, SOLUTION INTRAVENOUS; SUBCUTANEOUS at 06:42

## 2021-08-09 RX ADMIN — METHYLPREDNISOLONE SODIUM SUCCINATE 40 MG: 40 INJECTION, POWDER, FOR SOLUTION INTRAMUSCULAR; INTRAVENOUS at 12:04

## 2021-08-09 NOTE — PROGRESS NOTES
Transfer Acknowledgement: CM notes that the patient has transferred from the ICU to [facility]. CM to follow the patient's progress and be available to assist as needed.

## 2021-08-09 NOTE — ROUTINE PROCESS
Bedside and Verbal shift change report given to [employer]  (oncoming nurse) by Roxann Suárez RN  (offgoing nurse). Report given with SBAR, Kardex, Intake/Output and Recent Results.

## 2021-08-09 NOTE — PROGRESS NOTES
4758 The patient is agitated over several concerns and will not stop talking in order for staff to correct the perceived problems. Refusing to wear the BiPAP as ordered and is very demanding.

## 2021-08-09 NOTE — ROUTINE PROCESS
Bedside and Verbal shift change report given to Angelica Vergara RN (oncoming nurse) by Preet Martins RN (offgoing nurse). Report included the following information SBAR, Kardex, Intake/Output, MAR, Recent Results, and Cardiac Rhythm: A fib.

## 2021-08-09 NOTE — PROGRESS NOTES
Nursing Progress Note     Pt is A&Ox4. Morning med pass complete. Pt states he is ready to go home today. Assisted with cleaning up and dressing. States he has some pain, MD to determine pain plan. Pt in distress after receiving news that a family member . Pt wanting to leave immediately. MD paged to come and speak with patient. RN reviewed risks of leaving AMA and went over AMA procedure with Pt. Pt called for ride and asked to be taken down to lobby with [employer]. Walking out of room on own accord. Dr Amaury Weiner to unit to speak with Pt, Pt states \"I got to go\" . AMA forms signed, meds still to be sent to Pt pharmacy. IV removed. Pt brought down to lobby in chair to be driven home by friend.

## 2021-08-09 NOTE — PROGRESS NOTES
Shift summary: Patient was agitated and refused to wear bipap for duration of shift. Often spoke aggressively towards nursing staff and was demanding. No new clinical concerns.

## 2021-08-09 NOTE — PROGRESS NOTES
[facility]: The pharmacist has determined that this patient meets P & T approved criteria for conversion from IV to oral therapy for the following medication:  Protonix    The pharmacist has initiated the following order: Protonix 40 mg PO Daily. The pharmacist will continue to monitor the patient's status for appropriateness    of oral therapy. If the patient no longer meets all criteria for oral therapy, the pharmacist will switch back     to IV therapy.     Rock Webber, [facility], Pharmacist [phone]  8/9/2021 9:26 AM

## 2021-08-09 NOTE — PROGRESS NOTES
[id number] Summary: The pt rested poorly with many, many complaints. None of which related to his clinical condition. No new clinical concerns noted.      Nightshift Chart Audit Completed

## 2021-08-09 NOTE — DISCHARGE SUMMARY
[facility] SUMMARY    Name:  Chayo Perez  MR#:   [id number]  :  1960  ACCOUNT #:  [de-identified]  ADMIT DATE:  2021  DISCHARGE DATE:  2021      The patient left against medical advice today on 2021. His date of admission was 2021. DISCHARGE DIAGNOSES:  His diagnoses at his time of leaving against medical advice included the followin. Acute hypercarbic respiratory failure. 2.  Morbid obesity with obesity hypoventilation syndrome. 3.  Cocaine and benzodiazepine use. 4.  Chronic left ventricular systolic congestive heart failure. 5.  Chronic obstructive pulmonary disease with acute exacerbation. 6.  Atrial fibrillation. HOSPITAL COURSE:  The patient is 56years old. He is a morbidly obese gentleman with obesity hypoventilation syndrome, history of drug use, chronic heart failure, COPD, AFib, came in with intensive care unit requirement with BiPAP therapy. He was diuresed with Lasix, placed on a heparin drip, IV steroids, and nebulizer treatments, seen in consultation by Critical Care Pulmonology. The patient states he has had a stroke recently and was diagnosed at another hospital with that. He was too heavy to do a CT angiogram of the chest.  The patient was stabilized and able to transition up to the telemetry unit. He has had a normal white blood cell count since admission. Hemoglobin is within normal range. His metabolic panel showed chronic kidney disease, creatinine 1.48, hemoglobin A1c is 6.7%. Urine drug screen was positive for benzos and cocaine when he came in. He has been chronically positive for cocaine on prior urine drug screen. His last chest x-ray yesterday on the  showed congestive heart failure findings. His last echocardiogram earlier in 2020 showed an EF of 40-45%. The patient had not completed his medical therapy.   He still wheezing with congestion and was on intravenous Lasix, steroids, and nebulizer treatments here, but unfortunately, he heard today about the passing of his mother, so he acutely decided to leave the hospital even though he had not completed his medical therapy, he refused to stay understanding the risks of not completing his medical treatment including worsening congestive heart failure, COPD, shortness of breath, respiratory arrest, cardiac arrest or death or stroke. Today's last blood pressure is 163/104, pulse 84, temperature 97.9, respiratory rate 22, SaO2 is 94%, he is on room air. The patient wear a BiPAP last evening. His lungs, diminished breath sounds at the bases. Few coarse rhonchi with cough, otherwise. Cardiac exam, regular rhythm. Abdomen, obese. Lower extremities, chronic stasis edema changes. Mental status is appropriate. He is able to sign against the medical advice. For his own benefit, I did call in doxycycline 100 mg twice daily for five days, Lasix 40 mg daily, and prednisone course from 40 mg to 10 mg over the next nine days.   Hopefully, he will pick those up for at least some degree of treatment in the interim, but he is at high risk for readmission and worsening medical condition due to his incomplete treatment here at the hospital per his request.      Jean Claude Jay MD      [id number]  D:  08/09/2021 13:26  T:  08/09/2021 13:50  JOB #:  [id number]

## 2021-08-09 NOTE — PROGRESS NOTES
Pt adamantly refuses to wear BIPAP for HS at this time. Pt states he wants the bipap left at bedside and he will place it upon himself if he wants to. Will continue to monitor.

## 2021-08-09 NOTE — PROGRESS NOTES
1930: Bedside and Verbal shift change report given to Dhara Torres RN (oncoming nurse) by Shweta Thompson RN (offgoing nurse). Report included the following information SBAR, Kardex, Intake/Output, MAR, Recent Results, Med Rec Status, Cardiac Rhythm A Fib and Alarm Parameters . 2000: Shift assessment completed. Pt A&Ox4, sitting upright in recliner, tolerating 4.5L via Ventimask, demanding additional meal after dinner. Additional meal given, pt ingested without incident. 2311: TRANSFER - OUT REPORT:    Verbal report given to Jesus Shi RN(name) on Stacia Mancini  being transferred to  Telemetry(unit) for routine progression of care       Report consisted of patients Situation, Background, Assessment and   Recommendations(SBAR). Information from the following report(s) SBAR, Kardex, Intake/Output, MAR, Recent Results, Med Rec Status, Cardiac Rhythm A Fib and Alarm Parameters  was reviewed with the receiving nurse. Lines:   Peripheral IV 08/07/21 Right Forearm (Active)   Site Assessment Clean, dry, & intact 08/09/21 0000   Phlebitis Assessment 0 08/09/21 0000   Infiltration Assessment 0 08/09/21 0000   Dressing Status Clean, dry, & intact 08/09/21 0000   Dressing Type Tape;Transparent 08/09/21 0000   Hub Color/Line Status Pink;Capped;Flushed;Patent 08/09/21 0000   Action Taken Open ports on tubing capped 08/09/21 0000   Alcohol Cap Used Yes 08/09/21 0000        Opportunity for questions and clarification was provided. 0000: Pt using profanity, while demanding additional frozen dinner. Patient transported with:   Monitor  O2 @ 4.5L liters  Registered Nurse    Pt appears angry on arrival to 3-Telemetry unit but calmed down upon addressing concerns.

## 2021-08-09 NOTE — DIABETES MGMT
GLYCEMIC CONTROL PLAN OF CARE        Diabetes Management:      Assessment: known h/o T2DM, HbA1C within recommended range for age + comorbids  -  Admitted for acute hypercarbic respiratory failure    Recommend: conservative dose of basal insulin   *Lantus 10 units daily    BG in target range (non-ICU\" < 180 ; -180):  [] Yes  [x] No      Steroids: Solumedrol 40mg q6h, steroid associated hyperglycemia    Current Insulin regimen: - Humalog Normal Insulin Sensitivity Corrective Coverage    TDD previous day = 10 units - Humalog Normal Insulin Sensitivity Corrective Coverage    Most recent blood glucose results:   Lab Results   Component Value Date/Time    GLUCPOC 350 (H) 08/09/2021 06:17 AM    GLUCPOC 179 (H) 08/08/2021 09:57 PM    GLUCPOC 230 (H) 08/08/2021 04:48 PM         Hypo:no    HbA1C: equivalent  to ave BGlucose of  mg/dl for 2-3 months prior to admission  Lab Results   Component Value Date/Time    Hemoglobin A1c 6.7 (H) 08/07/2021 06:56 PM       Adequate glycemic control PTA:  [x] Yes  [] No      Home diabetes medications:  Key Antihyperglycemic Medications     Patient is on no antihyperglycemic meds.         Goals:  Blood glucose will be within target range of 70 - 180 mg/dL by:8/31    Diet:   Active Orders   Diet    ADULT DIET Regular       Patient Vitals for the past 100 hrs:   % Diet Eaten   08/09/21 0806 76 - 100%   08/08/21 1321 76 - 100%   08/08/21 1255 76 - 100%   08/08/21 1053 76 - 100%      Education:  _____ Refer to Diabetes Education Record                       __X___ Education not indicated at this time    Angeles Rodriguez 87, RN, CDE  Glycemic Control Team  [phone]  Pager [phone] (8[address] [facility] 8:00-4:30P)  *After Hours pager [phone]

## 2022-04-29 NOTE — ROUTINE PROCESS
1928  Bedside and Verbal shift change report given to TERRI Saldivar RN (oncoming nurse) by Kristen Jenkins RN (offgoing nurse). Report included the following information SBAR, Kardex and MAR. bloody stools

## 2023-12-22 NOTE — PROGRESS NOTES
Hospitalist Progress Note-critical care note     Patient: Eliud Richey MRN: [id number]  [facility]: [id number]    YOB: 1960  Age: 62 y.o. Sex: male    DOA: 4/3/2020 LOS:  LOS: 10 days            Chief complaint:  Respiratory failure, joann on ckd3      Assessment/Plan         Hospital Problems  Date Reviewed: 4/3/2020          Codes Class Noted POA    HCAP (healthcare-associated pneumonia) ICD-10-CM: J18.9  ICD-9-CM: 486  4/7/2020 Unknown        Goals of care, counseling/discussion ICD-10-CM: Z71.89  ICD-9-CM: V65.49  Unknown Unknown        Suspected Covid-19 Virus Infection ICD-10-CM: R68.89  4/4/2020 Clinically Undetermined        Morbid obesity with body mass index of 50 or higher ([facility] ICD-10-CM: E66.01  ICD-9-CM: 278.01  4/4/2020 Yes        COPD with acute exacerbation ([facility] ICD-10-CM: J44.1  ICD-9-CM: 491.21  4/3/2020 Yes        * (Principal) Respiratory failure with hypercapnia ([facility] ICD-10-CM: J96.92  ICD-9-CM: 518.81  4/3/2020 Yes        Transaminitis ICD-10-CM: R74.0  ICD-9-CM: 790.4  4/3/2020 Yes        Acute on chronic renal insufficiency ICD-10-CM: N28.9, N18.9  ICD-9-CM: 593.9, 585.9  4/3/2020 Yes        Sleep apnea ICD-10-CM: G47.30  ICD-9-CM: 780.57  7/9/2018 Yes        Cocaine abuse ([facility] ICD-10-CM: F14.10  ICD-9-CM: 305.60  4/22/2018 Yes        CHF (congestive heart failure) (HCC) (Chronic) ICD-10-CM: I50.9  ICD-9-CM: 428.0  3/3/2018 Yes              61 y/o male w/ hx of COPD on 4L home O2-trilogy at home ,  EF of 35%, super morbid obesity with recent admission to [facility] who presents in acute hypercapneic respiratory failure, he was admitted tue to acute on chronic respiration failure-intubated on admission.    Urine drug screen positive for cocaine, covid 19 negative, planning trach on Monday         Respiratory   Hypercapnic respiratory failure with obesity hypoventilation syndrome   On  Vent: peep 10 and O2 40 % ,   Will have trach today per Dr. Malvin Siemens , PEG placement per IR   Vent Patient's daughter Kirstin took home patient's earrings and hearing aid.      Heidi Woodruff RN  12/22/23 0090     managed per intensive         Hcap:  Off vanc, on zosyn     Copd exacerbation   On iv steroid a, breathing tx     shyam : on trilogy at home , not compliance       Cardiovascular:  Hypotension now   Hold anti-htn meds on levophed to keep MAP >65    chf  Combined diastolic and systolic   Ef 30 - 87%. Mild (grade 1) left ventricular diastolic dysfunction from echo   Received lasix . on low dose coreg      ID: Pneumonia  Repeat COVID negative twice       Endocrine  On sliding scale insulin     FEN   Continue icu replacement protocol   Hypokalemia -replace as protocol      Renal:  joann on ckd3, improving and stable       Neuro : On versed and fentanyl     Heme  H/h so far stable        Psych  Cocaine abuse     GI   Elevated lft -improving  Will hold tube feeding today for procedure      Poor prognosis, palliative care on board       30 minutes of critical care time spent in the direct evaluation and treatment of this high risk patient. The reason for providing this level of medical care for this critically ill patient was due a critical illness that impaired one or more vital organ systems such that there was a high probability of imminent or life threatening deterioration in the patients condition. This care involved high complexity decision making to assess, manipulate, and support vital system functions, to treat this degreee vital organ system failure and to prevent further life threatening deterioration of the patients condition.   Disposition :tbd,   Review of systems:  Unable to obtain due to intubation   Vital signs/Intake and Output:  Visit Vitals  /50   Pulse 61   Temp 98.5 °F (36.9 °C)   Resp 16   Ht 5' 7\" (1.702 m)   Wt (!) 171.3 kg (377 lb 10.4 oz)   SpO2 100%   BMI 59.15 kg/m²     Current Shift:  04/13 0701 - 04/13 1900  In: 60   Out: -   Last three shifts:  04/11 1901 - 04/13 0700  In: 2419.3 [I.V.:789.3]  Out: 2050 [Urine:2050]    Physical Exam:  General: intubated   HEENT: NC, Atraumatic. anicteric sclerae. Et tube noted. Left IJ noted   Lungs: CTA Bilaterally. No Wheezing/Rhonchi/Rales. Heart:  Regular  rhythm,  No murmur, No Rubs, No Gallops  Abdomen: Soft, obesity , Non tender. +Bowel sounds,   Extremities: No c/c, mild edema of feet   Psych:   Calm   Neurologic:  On sedation          Labs: Results:       Chemistry Recent Labs     04/13/20  0430 04/12/20  1235 04/12/20  0430 04/11/20  0415   *  --  110*  --  121*     --  140  --  140   K 3.9 3.9 3.5   < > 3.5     --  106  --  106   CO2 25  --  27  --  27   BUN 30*  --  32*  --  35*   CREA 1.32*  --  1.38*  --  1.63*   CA 8.6  --  8.5  --  8.7   AGAP 7  --  7  --  7   BUCR 23*  --  23*  --  21*   *  --  94  --  95   TP 6.3*  --  6.4  --  6.3*   ALB 2.7*  --  2.9*  --  3.0*   GLOB 3.6  --  3.5  --  3.3   AGRAT 0.8  --  0.8  --  0.9    < > = values in this interval not displayed. CBC w/Diff Recent Labs     04/13/20 0430 04/12/20 0430 04/11/20 0415   WBC 9.5 8.8 7.5   RBC 4.92 5.05 5.14   HGB 13.4 13.7 14.0   HCT 43.4 44.1 44.8    156 173   GRANS 74*  --   --    LYMPH 9*  --   --    EOS 8*  --   --       Cardiac Enzymes No results for input(s): CPK, CKND1, TISHA in the last 72 hours. No lab exists for component: CKRMB, TROIP   Coagulation No results for input(s): PTP, INR, APTT, INREXT, INREXT in the last 72 hours. Lipid Panel Lab Results   Component Value Date/Time    Cholesterol, total 196 01/25/2018 02:51 AM    HDL Cholesterol 64 (H) 01/25/2018 02:51 AM    LDL, calculated 121.2 (H) 01/25/2018 02:51 AM    VLDL, calculated 10.8 01/25/2018 02:51 AM    Triglyceride 54 01/25/2018 02:51 AM    CHOL/HDL Ratio 3.1 01/25/2018 02:51 AM      BNP No results for input(s): BNPP in the last 72 hours.    Liver Enzymes Recent Labs     04/13/20  0430   TP 6.3*   ALB 2.7*   *   SGOT 163*      Thyroid Studies Lab Results   Component Value Date/Time    TSH 0.11 (L) 03/19/2019 06:50 AM        Procedures/imaging: see electronic medical records for all procedures/Xrays and details which were not copied into this note but were reviewed prior to creation of Plan    Xr Abd (kub)    Result Date: 4/4/2020  EXAM: Single view of the abdomen CLINICAL INDICATION/HISTORY: Nasogastric tube placement    --Additional history: None. COMPARISON: None TECHNIQUE: Single supine view _______________ FINDINGS: The impression. _______________     IMPRESSION: Study technically limited by patient's body habitus. Nasogastric tube tip appears to project over the gastric antrum. Xr Chest Port    Result Date: 4/7/2020  EXAM: XR CHEST PORT CLINICAL INDICATION/HISTORY: OG tube in place. -Additional: None COMPARISON: Earlier the same day TECHNIQUE: Portable frontal view of the chest _______________ FINDINGS: SUPPORT DEVICES: Endotracheal tube approximately 1 cm above the juan miguel. Enteric tube tip out of field of view possibly in the distal stomach. HEART AND MEDIASTINUM: Cardiomegaly LUNGS AND PLEURAL SPACES: Hypoinflated lungs. Probable small to moderate left effusion. Mild interstitial edema. No pneumothorax. _______________     IMPRESSION: Endotracheal tube approximately 1 cm above the juan miguel. Enteric tube tip out of field of view possibly in the distal stomach. Xr Chest Port    Result Date: 4/7/2020  EXAM: XR CHEST PORT CLINICAL INDICATION/HISTORY: pneumonia, intubated -Additional: None COMPARISON: One day prior TECHNIQUE: Portable frontal view of the chest _______________ FINDINGS: SUPPORT DEVICES: Enteric tube and endotracheal tube unchanged. Anju Sharper HEART AND MEDIASTINUM: cardiomegaly LUNGS AND PLEURAL SPACES: Hypoinflated lungs. Probable small to moderate left effusion. Mild interstitial edema. No pneumothorax. _______________     IMPRESSION: Hypoinflated lungs. Probable small to moderate left effusion. Mild interstitial edema. No pneumothorax.      Xr Chest Port    Result Date: 4/6/2020  EXAM: XR CHEST PORT CLINICAL INDICATION/HISTORY: While intubated -Additional: None COMPARISON: One day prior TECHNIQUE: Portable frontal view of the chest _______________ FINDINGS: SUPPORT DEVICES: Enteric tube and endotracheal tube unchanged. Debi Dye HEART AND MEDIASTINUM: cardiomegaly LUNGS AND PLEURAL SPACES: Hypoinflated lungs. Probable small left effusion. Mild interstitial edema. No pneumothorax. _______________     IMPRESSION: Hypoinflated lungs. Probable small left effusion. Mild interstitial edema. Xr Chest Port    Result Date: 4/5/2020  EXAM: CHEST RADIOGRAPH CLINICAL INDICATION/HISTORY: Respiratory failure -Additional: None COMPARISON: April 4, 2020 TECHNIQUE: Portable frontal view of the chest _______________ FINDINGS: SUPPORT DEVICES: The tip of an endotracheal tube is 10 cm above the juan miguel. A nasogastric tube crosses the gastroesophageal junction. A left IJ central venous catheter terminates in the proximal SVC. HEART AND MEDIASTINUM: The heart is enlarged. LUNGS AND PLEURAL SPACES: Interstitial lung markings are increased. Lung volumes are hypoinflated and there are opacities in the lung bases. No pneumothorax. BONY THORAX AND SOFT TISSUES: Unremarkable. _______________     IMPRESSION: 1. Mild pulmonary edema 2. Bibasilar opacities that may represent a combination of atelectasis and small pleural effusions     Xr Chest Port    Result Date: 4/4/2020  EXAM: PORTABLE CHEST HISTORY: Central line placement. Acute respiratory failure. COMPARISON: 4/4/2020 at 12:42 AM TECHNIQUE: Single portable view. _______________ FINDINGS: SUPPORT DEVICES: Endotracheal tube tip lies about 4.2 cm above juan miguel. Left central venous catheter tip in upper superior vena cava. HEART AND MEDIASTINUM: Cardiomegaly. LUNGS AND PLEURAL SPACES: Patchy airspace disease right lung base may represent developing subsegmental atelectasis or pneumonia. Evaluation limited by large body habitus.  BONES AND SOFT TISSUES: Bony structures are normal. _______________     IMPRESSION: Interval placement left central venous catheter. No pneumothorax. Cardiomegaly without change. Patchy airspace disease right lung base either subsegmental atelectasis or pneumonia appears slightly worse. Xr Chest Port    Result Date: 4/4/2020  EXAM: Chest radiograph  CLINICAL INDICATION/HISTORY: Chemical ventilation    --Additional history: None. COMPARISON: 04/03/2020 TECHNIQUE: Frontal view of the chest _______________ FINDINGS: SUPPORT DEVICES: There is an endotracheal tube with tip approximately 5.5 cm above the juan miguel. There is a nasogastric tube descends below the diaphragm. HEART AND MEDIASTINUM: React silhouette is enlarged. Stable mediastinal contours. . Normal pulmonary vasculature. LUNGS AND PLEURAL SPACES: No convincing focal airspace opacity given the limitations of the study and superimposed body habitus. Sharp costophrenic sulci. No pneumothoraces. BONY THORAX AND SOFT TISSUES: Questionable fracture deformity of the right lateral ninth rib. _______________     IMPRESSION: 1. Support lines and tubes as detailed above. 2. Technically limited study secondary to body habitus without significant change from the prior study. Xr Chest Port    Result Date: 4/3/2020  EXAM:  AP Portable Chest X-ray 1 view INDICATION: Chest pain shortness of breath COMPARISON: None _______________ FINDINGS:  Heart size is enlarged and mediastinal contours are within normal limits for portable radiograph. There are increased interstitial markings in the right lung. No focal airspace disease seen. . There are no pleural effusions. No acute osseous findings. ________________      IMPRESSION: Increased interstitial markings in the right lung. No focal airspace disease or effusion.       Chidi Barrios MD

## (undated) DEVICE — STERILE POLYISOPRENE POWDER-FREE SURGICAL GLOVES: Brand: PROTEXIS

## (undated) DEVICE — SPONGE GZ W4XL4IN COT 12 PLY TYP VII WVN C FLD DSGN

## (undated) DEVICE — TRAY PREP DRY W/ PREM GLV 2 APPL 6 SPNG 2 UNDPD 1 OVERWRAP

## (undated) DEVICE — SYR 10ML LUER LOK 1/5ML GRAD --

## (undated) DEVICE — INTENDED FOR TISSUE SEPARATION, AND OTHER PROCEDURES THAT REQUIRE A SHARP SURGICAL BLADE TO PUNCTURE OR CUT.: Brand: BARD-PARKER ® CARBON RIB-BACK BLADES

## (undated) DEVICE — DISPOSABLE HARDY BAYONET INSULATED BIPOLAR FORCEPS: Brand: INTEGRA® JARIT®

## (undated) DEVICE — SPONGE HEMOSTAT CELLULS 4X8IN -- SURGICEL

## (undated) DEVICE — SUT SLK 3-0 30IN SH BLK --

## (undated) DEVICE — ROUND DISSECTORS: Brand: DEROYAL

## (undated) DEVICE — NEEDLE HYPO 25GA L1.5IN BLU POLYPR HUB S STL REG BVL STR

## (undated) DEVICE — ENT PACK: Brand: MEDLINE INDUSTRIES, INC.

## (undated) DEVICE — GARMENT,MEDLINE,DVT,INT,CALF,MED, GEN2: Brand: MEDLINE

## (undated) DEVICE — INNER CANNULA XLT,DISPOSABLE: Brand: SHILEY

## (undated) DEVICE — HEAD DONUT FOAM POSITIONER: Brand: CARDINAL HEALTH

## (undated) DEVICE — SOL IRRIGATION INJ NACL 0.9% 500ML BTL

## (undated) DEVICE — HOLDER TRACH TB W1IN FIT UPTO 19.5IN NK 2 PC ADJ BLU